# Patient Record
Sex: FEMALE | Race: WHITE | NOT HISPANIC OR LATINO | Employment: PART TIME | URBAN - METROPOLITAN AREA
[De-identification: names, ages, dates, MRNs, and addresses within clinical notes are randomized per-mention and may not be internally consistent; named-entity substitution may affect disease eponyms.]

---

## 2017-01-12 ENCOUNTER — ALLSCRIPTS OFFICE VISIT (OUTPATIENT)
Dept: OTHER | Facility: OTHER | Age: 42
End: 2017-01-12

## 2017-01-17 ENCOUNTER — GENERIC CONVERSION - ENCOUNTER (OUTPATIENT)
Dept: OTHER | Facility: OTHER | Age: 42
End: 2017-01-17

## 2017-01-17 ENCOUNTER — ALLSCRIPTS OFFICE VISIT (OUTPATIENT)
Dept: OTHER | Facility: OTHER | Age: 42
End: 2017-01-17

## 2017-02-13 ENCOUNTER — GENERIC CONVERSION - ENCOUNTER (OUTPATIENT)
Dept: OTHER | Facility: OTHER | Age: 42
End: 2017-02-13

## 2017-02-13 ENCOUNTER — ALLSCRIPTS OFFICE VISIT (OUTPATIENT)
Dept: OTHER | Facility: OTHER | Age: 42
End: 2017-02-13

## 2017-02-14 ENCOUNTER — GENERIC CONVERSION - ENCOUNTER (OUTPATIENT)
Dept: OTHER | Facility: OTHER | Age: 42
End: 2017-02-14

## 2017-02-21 ENCOUNTER — TRANSCRIBE ORDERS (OUTPATIENT)
Dept: LAB | Facility: CLINIC | Age: 42
End: 2017-02-21

## 2017-02-21 ENCOUNTER — LAB (OUTPATIENT)
Dept: LAB | Facility: CLINIC | Age: 42
End: 2017-02-21
Payer: COMMERCIAL

## 2017-02-21 DIAGNOSIS — E61.1 IRON DEFICIENCY: ICD-10-CM

## 2017-02-21 LAB
ALBUMIN SERPL BCP-MCNC: 3.5 G/DL (ref 3.5–5)
ALP SERPL-CCNC: 77 U/L (ref 46–116)
ALT SERPL W P-5'-P-CCNC: 33 U/L (ref 12–78)
ANION GAP SERPL CALCULATED.3IONS-SCNC: 7 MMOL/L (ref 4–13)
AST SERPL W P-5'-P-CCNC: 17 U/L (ref 5–45)
BASOPHILS # BLD AUTO: 0.03 THOUSANDS/ΜL (ref 0–0.1)
BASOPHILS NFR BLD AUTO: 1 % (ref 0–1)
BILIRUB SERPL-MCNC: 0.6 MG/DL (ref 0.2–1)
BUN SERPL-MCNC: 14 MG/DL (ref 5–25)
CALCIUM SERPL-MCNC: 9 MG/DL (ref 8.3–10.1)
CHLORIDE SERPL-SCNC: 104 MMOL/L (ref 100–108)
CO2 SERPL-SCNC: 30 MMOL/L (ref 21–32)
CREAT SERPL-MCNC: 0.71 MG/DL (ref 0.6–1.3)
EOSINOPHIL # BLD AUTO: 0.14 THOUSAND/ΜL (ref 0–0.61)
EOSINOPHIL NFR BLD AUTO: 4 % (ref 0–6)
ERYTHROCYTE [DISTWIDTH] IN BLOOD BY AUTOMATED COUNT: 12.3 % (ref 11.6–15.1)
FERRITIN SERPL-MCNC: 37 NG/ML (ref 8–388)
GFR SERPL CREATININE-BSD FRML MDRD: >60 ML/MIN/1.73SQ M
GLUCOSE SERPL-MCNC: 91 MG/DL (ref 65–140)
HCT VFR BLD AUTO: 39.2 % (ref 34.8–46.1)
HGB BLD-MCNC: 12.8 G/DL (ref 11.5–15.4)
IRON SATN MFR SERPL: 47 %
IRON SERPL-MCNC: 150 UG/DL (ref 50–170)
LYMPHOCYTES # BLD AUTO: 1.47 THOUSANDS/ΜL (ref 0.6–4.47)
LYMPHOCYTES NFR BLD AUTO: 37 % (ref 14–44)
MCH RBC QN AUTO: 29.3 PG (ref 26.8–34.3)
MCHC RBC AUTO-ENTMCNC: 32.7 G/DL (ref 31.4–37.4)
MCV RBC AUTO: 90 FL (ref 82–98)
MONOCYTES # BLD AUTO: 0.24 THOUSAND/ΜL (ref 0.17–1.22)
MONOCYTES NFR BLD AUTO: 6 % (ref 4–12)
NEUTROPHILS # BLD AUTO: 2.09 THOUSANDS/ΜL (ref 1.85–7.62)
NEUTS SEG NFR BLD AUTO: 52 % (ref 43–75)
PLATELET # BLD AUTO: 226 THOUSANDS/UL (ref 149–390)
PMV BLD AUTO: 10.4 FL (ref 8.9–12.7)
POTASSIUM SERPL-SCNC: 3.9 MMOL/L (ref 3.5–5.3)
PROT SERPL-MCNC: 6.9 G/DL (ref 6.4–8.2)
RBC # BLD AUTO: 4.37 MILLION/UL (ref 3.81–5.12)
SODIUM SERPL-SCNC: 141 MMOL/L (ref 136–145)
TIBC SERPL-MCNC: 316 UG/DL (ref 250–450)
WBC # BLD AUTO: 3.97 THOUSAND/UL (ref 4.31–10.16)

## 2017-02-21 PROCEDURE — 36415 COLL VENOUS BLD VENIPUNCTURE: CPT

## 2017-02-21 PROCEDURE — 83540 ASSAY OF IRON: CPT

## 2017-02-21 PROCEDURE — 82728 ASSAY OF FERRITIN: CPT

## 2017-02-21 PROCEDURE — 80053 COMPREHEN METABOLIC PANEL: CPT

## 2017-02-21 PROCEDURE — 83550 IRON BINDING TEST: CPT

## 2017-02-21 PROCEDURE — 83918 ORGANIC ACIDS TOTAL QUANT: CPT

## 2017-02-21 PROCEDURE — 85025 COMPLETE CBC W/AUTO DIFF WBC: CPT

## 2017-02-24 ENCOUNTER — ALLSCRIPTS OFFICE VISIT (OUTPATIENT)
Dept: OTHER | Facility: OTHER | Age: 42
End: 2017-02-24

## 2017-02-27 LAB — METHYLMALONATE SERPL-SCNC: 161 NMOL/L (ref 0–378)

## 2017-03-01 ENCOUNTER — GENERIC CONVERSION - ENCOUNTER (OUTPATIENT)
Dept: OTHER | Facility: OTHER | Age: 42
End: 2017-03-01

## 2017-03-28 ENCOUNTER — ALLSCRIPTS OFFICE VISIT (OUTPATIENT)
Dept: OTHER | Facility: OTHER | Age: 42
End: 2017-03-28

## 2017-04-05 ENCOUNTER — ALLSCRIPTS OFFICE VISIT (OUTPATIENT)
Dept: OTHER | Facility: OTHER | Age: 42
End: 2017-04-05

## 2017-04-05 DIAGNOSIS — K94.23 GASTROSTOMY MALFUNCTION (HCC): ICD-10-CM

## 2017-04-05 DIAGNOSIS — R10.9 ABDOMINAL PAIN: ICD-10-CM

## 2017-04-05 DIAGNOSIS — R13.10 DYSPHAGIA: ICD-10-CM

## 2017-04-05 DIAGNOSIS — Z98.84 BARIATRIC SURGERY STATUS: ICD-10-CM

## 2017-04-05 DIAGNOSIS — H35.52 PIGMENTARY RETINAL DYSTROPHY: ICD-10-CM

## 2017-04-09 ENCOUNTER — HOSPITAL ENCOUNTER (OUTPATIENT)
Dept: ULTRASOUND IMAGING | Facility: HOSPITAL | Age: 42
Discharge: HOME | End: 2017-04-09
Payer: COMMERCIAL

## 2017-04-09 DIAGNOSIS — R10.9 ABDOMINAL PAIN: ICD-10-CM

## 2017-04-09 DIAGNOSIS — Z98.84 BARIATRIC SURGERY STATUS: ICD-10-CM

## 2017-04-09 PROCEDURE — 76705 ECHO EXAM OF ABDOMEN: CPT

## 2017-04-11 ENCOUNTER — GENERIC CONVERSION - ENCOUNTER (OUTPATIENT)
Dept: OTHER | Facility: OTHER | Age: 42
End: 2017-04-11

## 2017-04-12 ENCOUNTER — GENERIC CONVERSION - ENCOUNTER (OUTPATIENT)
Dept: OTHER | Facility: OTHER | Age: 42
End: 2017-04-12

## 2017-04-19 ENCOUNTER — ALLSCRIPTS OFFICE VISIT (OUTPATIENT)
Dept: OTHER | Facility: OTHER | Age: 42
End: 2017-04-19

## 2017-04-28 ENCOUNTER — HOSPITAL ENCOUNTER (OUTPATIENT)
Dept: RADIOLOGY | Facility: HOSPITAL | Age: 42
Discharge: HOME | End: 2017-04-28
Admitting: RADIOLOGY
Payer: COMMERCIAL

## 2017-04-28 VITALS
RESPIRATION RATE: 18 BRPM | WEIGHT: 166 LBS | BODY MASS INDEX: 27.66 KG/M2 | DIASTOLIC BLOOD PRESSURE: 82 MMHG | HEIGHT: 65 IN | SYSTOLIC BLOOD PRESSURE: 132 MMHG | TEMPERATURE: 98.7 F | OXYGEN SATURATION: 99 % | HEART RATE: 70 BPM

## 2017-04-28 DIAGNOSIS — E88.40 DISORDER OF MITOCHONDRIAL METABOLISM (HCC): ICD-10-CM

## 2017-04-28 PROCEDURE — 36561 INSERT TUNNELED CV CATH: CPT

## 2017-04-28 PROCEDURE — C1788 PORT, INDWELLING, IMP: HCPCS

## 2017-04-28 PROCEDURE — 76937 US GUIDE VASCULAR ACCESS: CPT

## 2017-04-28 PROCEDURE — 77001 FLUOROGUIDE FOR VEIN DEVICE: CPT

## 2017-04-28 RX ORDER — CEFAZOLIN SODIUM 1 G/3ML
INJECTION, POWDER, FOR SOLUTION INTRAMUSCULAR; INTRAVENOUS CODE/TRAUMA/SEDATION MEDICATION
Status: COMPLETED | OUTPATIENT
Start: 2017-04-28 | End: 2017-04-28

## 2017-04-28 RX ORDER — LEVOCARNITINE 1 G/10ML
100 SOLUTION ORAL
COMMUNITY
End: 2020-05-19

## 2017-04-28 RX ORDER — SODIUM CHLORIDE 9 MG/ML
75 INJECTION, SOLUTION INTRAVENOUS CONTINUOUS
Status: DISCONTINUED | OUTPATIENT
Start: 2017-04-28 | End: 2017-04-29 | Stop reason: HOSPADM

## 2017-04-28 RX ORDER — FLUDROCORTISONE ACETATE 0.1 MG/1
0.1 TABLET ORAL DAILY
COMMUNITY
End: 2018-10-01 | Stop reason: ALTCHOICE

## 2017-04-28 RX ORDER — ACARBOSE 100 MG/1
100 TABLET ORAL
COMMUNITY
End: 2018-03-30 | Stop reason: SDUPTHER

## 2017-04-28 RX ORDER — FENTANYL CITRATE 50 UG/ML
INJECTION, SOLUTION INTRAMUSCULAR; INTRAVENOUS CODE/TRAUMA/SEDATION MEDICATION
Status: COMPLETED | OUTPATIENT
Start: 2017-04-28 | End: 2017-04-28

## 2017-04-28 RX ADMIN — CEFAZOLIN 1000 MG: 1 INJECTION, POWDER, FOR SOLUTION INTRAVENOUS at 09:37

## 2017-04-28 RX ADMIN — FENTANYL CITRATE 50 MCG: 50 INJECTION INTRAMUSCULAR; INTRAVENOUS at 09:47

## 2017-05-15 DIAGNOSIS — R53.83 OTHER FATIGUE: ICD-10-CM

## 2017-05-15 DIAGNOSIS — E88.40 MITOCHONDRIAL METABOLISM DISORDER (HCC): ICD-10-CM

## 2017-05-15 DIAGNOSIS — R42 DIZZINESS AND GIDDINESS: ICD-10-CM

## 2017-05-15 DIAGNOSIS — N25.81 SECONDARY HYPERPARATHYROIDISM OF RENAL ORIGIN (HCC): ICD-10-CM

## 2017-05-18 ENCOUNTER — TRANSCRIBE ORDERS (OUTPATIENT)
Dept: LAB | Facility: CLINIC | Age: 42
End: 2017-05-18

## 2017-05-18 ENCOUNTER — APPOINTMENT (OUTPATIENT)
Dept: LAB | Facility: CLINIC | Age: 42
End: 2017-05-18
Payer: COMMERCIAL

## 2017-05-18 ENCOUNTER — ALLSCRIPTS OFFICE VISIT (OUTPATIENT)
Dept: OTHER | Facility: OTHER | Age: 42
End: 2017-05-18

## 2017-05-18 DIAGNOSIS — N25.81 SECONDARY HYPERPARATHYROIDISM OF RENAL ORIGIN (HCC): ICD-10-CM

## 2017-05-18 LAB
ALBUMIN SERPL BCP-MCNC: 3.7 G/DL (ref 3.5–5)
CALCIUM SERPL-MCNC: 9.2 MG/DL (ref 8.3–10.1)
PHOSPHATE SERPL-MCNC: 3.8 MG/DL (ref 2.7–4.5)
PTH-INTACT SERPL-MCNC: 46.2 PG/ML (ref 14–72)

## 2017-05-18 PROCEDURE — 82040 ASSAY OF SERUM ALBUMIN: CPT

## 2017-05-18 PROCEDURE — 82310 ASSAY OF CALCIUM: CPT

## 2017-05-18 PROCEDURE — 83970 ASSAY OF PARATHORMONE: CPT

## 2017-05-18 PROCEDURE — 36415 COLL VENOUS BLD VENIPUNCTURE: CPT

## 2017-05-18 PROCEDURE — 84100 ASSAY OF PHOSPHORUS: CPT

## 2017-05-19 ENCOUNTER — GENERIC CONVERSION - ENCOUNTER (OUTPATIENT)
Dept: OTHER | Facility: OTHER | Age: 42
End: 2017-05-19

## 2017-05-30 ENCOUNTER — ALLSCRIPTS OFFICE VISIT (OUTPATIENT)
Dept: OTHER | Facility: OTHER | Age: 42
End: 2017-05-30

## 2017-06-07 ENCOUNTER — APPOINTMENT (OUTPATIENT)
Dept: LAB | Facility: CLINIC | Age: 42
End: 2017-06-07
Payer: COMMERCIAL

## 2017-06-07 ENCOUNTER — GENERIC CONVERSION - ENCOUNTER (OUTPATIENT)
Dept: OTHER | Facility: OTHER | Age: 42
End: 2017-06-07

## 2017-06-07 DIAGNOSIS — E88.40 MITOCHONDRIAL METABOLISM DISORDER (HCC): ICD-10-CM

## 2017-06-07 DIAGNOSIS — R53.83 OTHER FATIGUE: ICD-10-CM

## 2017-06-07 DIAGNOSIS — R42 DIZZINESS AND GIDDINESS: ICD-10-CM

## 2017-06-07 LAB — CORTIS AM PEAK SERPL-MCNC: 14.9 UG/ML (ref 4.2–22.4)

## 2017-06-07 PROCEDURE — 36415 COLL VENOUS BLD VENIPUNCTURE: CPT

## 2017-06-07 PROCEDURE — 82024 ASSAY OF ACTH: CPT

## 2017-06-07 PROCEDURE — 82533 TOTAL CORTISOL: CPT

## 2017-06-08 LAB — ACTH PLAS-MCNC: 20.3 PG/ML (ref 7.2–63.3)

## 2017-06-23 DIAGNOSIS — E61.1 IRON DEFICIENCY: ICD-10-CM

## 2017-06-26 ENCOUNTER — TRANSCRIBE ORDERS (OUTPATIENT)
Dept: LAB | Facility: CLINIC | Age: 42
End: 2017-06-26

## 2017-06-26 ENCOUNTER — APPOINTMENT (OUTPATIENT)
Dept: LAB | Facility: CLINIC | Age: 42
End: 2017-06-26
Payer: COMMERCIAL

## 2017-06-26 DIAGNOSIS — E61.1 IRON DEFICIENCY: ICD-10-CM

## 2017-06-26 LAB
ALBUMIN SERPL BCP-MCNC: 4 G/DL (ref 3.5–5)
ALP SERPL-CCNC: 88 U/L (ref 46–116)
ALT SERPL W P-5'-P-CCNC: 29 U/L (ref 12–78)
ANION GAP SERPL CALCULATED.3IONS-SCNC: 8 MMOL/L (ref 4–13)
AST SERPL W P-5'-P-CCNC: 22 U/L (ref 5–45)
BASOPHILS # BLD AUTO: 0.03 THOUSANDS/ΜL (ref 0–0.1)
BASOPHILS NFR BLD AUTO: 1 % (ref 0–1)
BILIRUB SERPL-MCNC: 0.4 MG/DL (ref 0.2–1)
BUN SERPL-MCNC: 17 MG/DL (ref 5–25)
CALCIUM SERPL-MCNC: 9.1 MG/DL (ref 8.3–10.1)
CHLORIDE SERPL-SCNC: 101 MMOL/L (ref 100–108)
CO2 SERPL-SCNC: 27 MMOL/L (ref 21–32)
CREAT SERPL-MCNC: 0.8 MG/DL (ref 0.6–1.3)
EOSINOPHIL # BLD AUTO: 0.12 THOUSAND/ΜL (ref 0–0.61)
EOSINOPHIL NFR BLD AUTO: 2 % (ref 0–6)
ERYTHROCYTE [DISTWIDTH] IN BLOOD BY AUTOMATED COUNT: 12.5 % (ref 11.6–15.1)
FERRITIN SERPL-MCNC: 29 NG/ML (ref 8–388)
GFR SERPL CREATININE-BSD FRML MDRD: >60 ML/MIN/1.73SQ M
GLUCOSE SERPL-MCNC: 107 MG/DL (ref 65–140)
HCT VFR BLD AUTO: 41.2 % (ref 34.8–46.1)
HGB BLD-MCNC: 13.8 G/DL (ref 11.5–15.4)
IRON SATN MFR SERPL: 24 %
IRON SERPL-MCNC: 94 UG/DL (ref 50–170)
LYMPHOCYTES # BLD AUTO: 2.05 THOUSANDS/ΜL (ref 0.6–4.47)
LYMPHOCYTES NFR BLD AUTO: 35 % (ref 14–44)
MCH RBC QN AUTO: 29.6 PG (ref 26.8–34.3)
MCHC RBC AUTO-ENTMCNC: 33.5 G/DL (ref 31.4–37.4)
MCV RBC AUTO: 88 FL (ref 82–98)
MONOCYTES # BLD AUTO: 0.23 THOUSAND/ΜL (ref 0.17–1.22)
MONOCYTES NFR BLD AUTO: 4 % (ref 4–12)
NEUTROPHILS # BLD AUTO: 3.46 THOUSANDS/ΜL (ref 1.85–7.62)
NEUTS SEG NFR BLD AUTO: 58 % (ref 43–75)
PLATELET # BLD AUTO: 211 THOUSANDS/UL (ref 149–390)
PMV BLD AUTO: 10.1 FL (ref 8.9–12.7)
POTASSIUM SERPL-SCNC: 3.8 MMOL/L (ref 3.5–5.3)
PROT SERPL-MCNC: 8.1 G/DL (ref 6.4–8.2)
RBC # BLD AUTO: 4.66 MILLION/UL (ref 3.81–5.12)
SODIUM SERPL-SCNC: 136 MMOL/L (ref 136–145)
TIBC SERPL-MCNC: 384 UG/DL (ref 250–450)
WBC # BLD AUTO: 5.89 THOUSAND/UL (ref 4.31–10.16)

## 2017-06-26 PROCEDURE — 83918 ORGANIC ACIDS TOTAL QUANT: CPT

## 2017-06-26 PROCEDURE — 83540 ASSAY OF IRON: CPT

## 2017-06-26 PROCEDURE — 85025 COMPLETE CBC W/AUTO DIFF WBC: CPT

## 2017-06-26 PROCEDURE — 36415 COLL VENOUS BLD VENIPUNCTURE: CPT

## 2017-06-26 PROCEDURE — 83550 IRON BINDING TEST: CPT

## 2017-06-26 PROCEDURE — 80053 COMPREHEN METABOLIC PANEL: CPT

## 2017-06-26 PROCEDURE — 82728 ASSAY OF FERRITIN: CPT

## 2017-06-30 ENCOUNTER — ALLSCRIPTS OFFICE VISIT (OUTPATIENT)
Dept: OTHER | Facility: OTHER | Age: 42
End: 2017-06-30

## 2017-07-01 ENCOUNTER — HOSPITAL ENCOUNTER (OUTPATIENT)
Dept: ULTRASOUND IMAGING | Facility: HOSPITAL | Age: 42
Discharge: HOME | End: 2017-07-01
Attending: INTERNAL MEDICINE
Payer: COMMERCIAL

## 2017-07-01 DIAGNOSIS — E61.1 IRON DEFICIENCY: ICD-10-CM

## 2017-07-01 LAB — METHYLMALONATE SERPL-SCNC: 180 NMOL/L (ref 0–378)

## 2017-07-01 PROCEDURE — 76770 US EXAM ABDO BACK WALL COMP: CPT

## 2017-07-02 ENCOUNTER — HOSPITAL ENCOUNTER (EMERGENCY)
Facility: HOSPITAL | Age: 42
Discharge: HOME | End: 2017-07-02
Attending: EMERGENCY MEDICINE | Admitting: EMERGENCY MEDICINE
Payer: COMMERCIAL

## 2017-07-02 ENCOUNTER — APPOINTMENT (EMERGENCY)
Dept: CT IMAGING | Facility: HOSPITAL | Age: 42
End: 2017-07-02
Payer: COMMERCIAL

## 2017-07-02 VITALS
RESPIRATION RATE: 15 BRPM | DIASTOLIC BLOOD PRESSURE: 67 MMHG | TEMPERATURE: 98.4 F | HEART RATE: 69 BPM | SYSTOLIC BLOOD PRESSURE: 111 MMHG | OXYGEN SATURATION: 100 % | BODY MASS INDEX: 27.46 KG/M2 | WEIGHT: 165 LBS

## 2017-07-02 DIAGNOSIS — L03.319 CELLULITIS AT GASTROSTOMY TUBE SITE (HCC): Primary | ICD-10-CM

## 2017-07-02 DIAGNOSIS — K94.22 CELLULITIS AT GASTROSTOMY TUBE SITE (HCC): Primary | ICD-10-CM

## 2017-07-02 LAB
ANION GAP SERPL CALCULATED.3IONS-SCNC: 5 MMOL/L (ref 4–13)
BASOPHILS # BLD AUTO: 0.03 THOUSANDS/ΜL (ref 0–0.1)
BASOPHILS NFR BLD AUTO: 1 % (ref 0–1)
BUN SERPL-MCNC: 16 MG/DL (ref 5–25)
CALCIUM SERPL-MCNC: 8.8 MG/DL (ref 8.3–10.1)
CHLORIDE SERPL-SCNC: 104 MMOL/L (ref 100–108)
CO2 SERPL-SCNC: 31 MMOL/L (ref 21–32)
CREAT SERPL-MCNC: 0.79 MG/DL (ref 0.6–1.3)
EOSINOPHIL # BLD AUTO: 0.12 THOUSAND/ΜL (ref 0–0.61)
EOSINOPHIL NFR BLD AUTO: 3 % (ref 0–6)
ERYTHROCYTE [DISTWIDTH] IN BLOOD BY AUTOMATED COUNT: 12.5 % (ref 11.6–15.1)
GFR SERPL CREATININE-BSD FRML MDRD: >60 ML/MIN/1.73SQ M
GLUCOSE SERPL-MCNC: 96 MG/DL (ref 65–140)
HCG UR QL: NEGATIVE
HCT VFR BLD AUTO: 38 % (ref 34.8–46.1)
HGB BLD-MCNC: 13 G/DL (ref 11.5–15.4)
LYMPHOCYTES # BLD AUTO: 1.84 THOUSANDS/ΜL (ref 0.6–4.47)
LYMPHOCYTES NFR BLD AUTO: 38 % (ref 14–44)
MCH RBC QN AUTO: 30.4 PG (ref 26.8–34.3)
MCHC RBC AUTO-ENTMCNC: 34.2 G/DL (ref 31.4–37.4)
MCV RBC AUTO: 89 FL (ref 82–98)
MONOCYTES # BLD AUTO: 0.23 THOUSAND/ΜL (ref 0.17–1.22)
MONOCYTES NFR BLD AUTO: 5 % (ref 4–12)
NEUTROPHILS # BLD AUTO: 2.61 THOUSANDS/ΜL (ref 1.85–7.62)
NEUTS SEG NFR BLD AUTO: 53 % (ref 43–75)
NRBC BLD AUTO-RTO: 0 /100 WBCS
PLATELET # BLD AUTO: 180 THOUSANDS/UL (ref 149–390)
PMV BLD AUTO: 10.2 FL (ref 8.9–12.7)
POTASSIUM SERPL-SCNC: 3.7 MMOL/L (ref 3.5–5.3)
RBC # BLD AUTO: 4.27 MILLION/UL (ref 3.81–5.12)
SODIUM SERPL-SCNC: 140 MMOL/L (ref 136–145)
WBC # BLD AUTO: 4.83 THOUSAND/UL (ref 4.31–10.16)

## 2017-07-02 PROCEDURE — 85025 COMPLETE CBC W/AUTO DIFF WBC: CPT | Performed by: EMERGENCY MEDICINE

## 2017-07-02 PROCEDURE — 80048 BASIC METABOLIC PNL TOTAL CA: CPT | Performed by: EMERGENCY MEDICINE

## 2017-07-02 PROCEDURE — 81025 URINE PREGNANCY TEST: CPT | Performed by: EMERGENCY MEDICINE

## 2017-07-02 PROCEDURE — 99284 EMERGENCY DEPT VISIT MOD MDM: CPT

## 2017-07-02 PROCEDURE — 36415 COLL VENOUS BLD VENIPUNCTURE: CPT | Performed by: EMERGENCY MEDICINE

## 2017-07-02 PROCEDURE — 74177 CT ABD & PELVIS W/CONTRAST: CPT

## 2017-07-02 RX ORDER — CEPHALEXIN 250 MG/1
500 CAPSULE ORAL ONCE
Status: COMPLETED | OUTPATIENT
Start: 2017-07-02 | End: 2017-07-02

## 2017-07-02 RX ORDER — CEPHALEXIN 250 MG/1
500 CAPSULE ORAL 4 TIMES DAILY
Qty: 56 CAPSULE | Refills: 0 | Status: SHIPPED | OUTPATIENT
Start: 2017-07-02 | End: 2017-07-09

## 2017-07-02 RX ADMIN — IOHEXOL 100 ML: 350 INJECTION, SOLUTION INTRAVENOUS at 18:43

## 2017-07-02 RX ADMIN — CEPHALEXIN 500 MG: 250 CAPSULE ORAL at 19:41

## 2017-07-02 RX ADMIN — IOHEXOL 50 ML: 240 INJECTION, SOLUTION INTRATHECAL; INTRAVASCULAR; INTRAVENOUS; ORAL at 18:42

## 2017-07-31 ENCOUNTER — TRANSCRIBE ORDERS (OUTPATIENT)
Dept: LAB | Facility: CLINIC | Age: 42
End: 2017-07-31

## 2017-07-31 ENCOUNTER — APPOINTMENT (OUTPATIENT)
Dept: LAB | Facility: CLINIC | Age: 42
End: 2017-07-31
Payer: COMMERCIAL

## 2017-07-31 DIAGNOSIS — Z00.8 HEALTH EXAMINATION IN POPULATION SURVEY: ICD-10-CM

## 2017-07-31 DIAGNOSIS — Z00.8 HEALTH EXAMINATION IN POPULATION SURVEY: Primary | ICD-10-CM

## 2017-07-31 LAB
CHOLEST SERPL-MCNC: 135 MG/DL (ref 50–200)
EST. AVERAGE GLUCOSE BLD GHB EST-MCNC: 103 MG/DL
HBA1C MFR BLD: 5.2 % (ref 4.2–6.3)
HDLC SERPL-MCNC: 70 MG/DL (ref 40–60)
LDLC SERPL CALC-MCNC: 56 MG/DL (ref 0–100)
TRIGL SERPL-MCNC: 46 MG/DL

## 2017-07-31 PROCEDURE — 83036 HEMOGLOBIN GLYCOSYLATED A1C: CPT

## 2017-07-31 PROCEDURE — 36415 COLL VENOUS BLD VENIPUNCTURE: CPT

## 2017-07-31 PROCEDURE — 80061 LIPID PANEL: CPT

## 2017-08-04 ENCOUNTER — GENERIC CONVERSION - ENCOUNTER (OUTPATIENT)
Dept: OTHER | Facility: OTHER | Age: 42
End: 2017-08-04

## 2017-08-14 ENCOUNTER — GENERIC CONVERSION - ENCOUNTER (OUTPATIENT)
Dept: OTHER | Facility: OTHER | Age: 42
End: 2017-08-14

## 2017-08-15 ENCOUNTER — APPOINTMENT (OUTPATIENT)
Dept: LAB | Facility: CLINIC | Age: 42
End: 2017-08-15
Payer: COMMERCIAL

## 2017-08-15 ENCOUNTER — TRANSCRIBE ORDERS (OUTPATIENT)
Dept: LAB | Facility: CLINIC | Age: 42
End: 2017-08-15

## 2017-08-15 DIAGNOSIS — G43.109 MIGRAINE WITH AURA AND WITHOUT STATUS MIGRAINOSUS, NOT INTRACTABLE: ICD-10-CM

## 2017-08-15 DIAGNOSIS — M62.81 MUSCLE WEAKNESS (GENERALIZED): ICD-10-CM

## 2017-08-15 DIAGNOSIS — Z83.49 FAMILY HISTORY OF ENDOCRINE AND METABOLIC DISEASE: ICD-10-CM

## 2017-08-15 DIAGNOSIS — H53.60: ICD-10-CM

## 2017-08-15 DIAGNOSIS — H53.453 NASAL STEP VISUAL FIELD DEFECT OF BOTH EYES: ICD-10-CM

## 2017-08-15 DIAGNOSIS — R33.9 RETENTION OF URINE, UNSPECIFIED: ICD-10-CM

## 2017-08-15 DIAGNOSIS — H53.52 ACQUIRED COLOR VISION DEFICIENCIES: ICD-10-CM

## 2017-08-15 DIAGNOSIS — I82.4Y9 ACUTE VENOUS EMBOLISM AND THROMBOSIS OF DEEP VESSELS OF PROXIMAL LOWER EXTREMITY, UNSPECIFIED LATERALITY (HCC): ICD-10-CM

## 2017-08-15 DIAGNOSIS — L90.6 STRIAE ATROPHICAE: ICD-10-CM

## 2017-08-15 DIAGNOSIS — M24.859: ICD-10-CM

## 2017-08-15 DIAGNOSIS — E66.3 SEVERELY OVERWEIGHT: ICD-10-CM

## 2017-08-15 DIAGNOSIS — R11.0 NAUSEA ALONE: ICD-10-CM

## 2017-08-15 DIAGNOSIS — R73.9 BLOOD GLUCOSE ELEVATED: ICD-10-CM

## 2017-08-15 DIAGNOSIS — R13.19 ESOPHAGEAL DYSPHAGIA: ICD-10-CM

## 2017-08-15 DIAGNOSIS — Z98.84 BARIATRIC SURGERY STATUS: ICD-10-CM

## 2017-08-15 DIAGNOSIS — K22.4 DYSKINESIA OF ESOPHAGUS: ICD-10-CM

## 2017-08-15 DIAGNOSIS — G90.9 UNSPECIFIED DISORDER OF AUTONOMIC NERVOUS SYSTEM: ICD-10-CM

## 2017-08-15 DIAGNOSIS — Z98.84 BARIATRIC SURGERY STATUS: Primary | ICD-10-CM

## 2017-08-15 DIAGNOSIS — K59.01 SLOW TRANSIT CONSTIPATION: ICD-10-CM

## 2017-08-15 DIAGNOSIS — H35.52 PIGMENTARY RETINAL DYSTROPHY: ICD-10-CM

## 2017-08-15 DIAGNOSIS — R68.89 OTHER ABNORMAL CLINICAL FINDING: ICD-10-CM

## 2017-08-15 DIAGNOSIS — K21.00 REFLUX ESOPHAGITIS: ICD-10-CM

## 2017-08-15 DIAGNOSIS — F80.9 PROBLEMS WITH COMMUNICATION (INCLUDING SPEECH): ICD-10-CM

## 2017-08-15 LAB
ALBUMIN SERPL BCP-MCNC: 3.9 G/DL (ref 3.5–5)
ALP SERPL-CCNC: 81 U/L (ref 46–116)
ALT SERPL W P-5'-P-CCNC: 31 U/L (ref 12–78)
ANION GAP SERPL CALCULATED.3IONS-SCNC: 9 MMOL/L (ref 4–13)
AST SERPL W P-5'-P-CCNC: 16 U/L (ref 5–45)
BASOPHILS # BLD AUTO: 0.03 THOUSANDS/ΜL (ref 0–0.1)
BASOPHILS NFR BLD AUTO: 1 % (ref 0–1)
BILIRUB SERPL-MCNC: 0.4 MG/DL (ref 0.2–1)
BILIRUB UR QL STRIP: NEGATIVE
BUN SERPL-MCNC: 20 MG/DL (ref 5–25)
CALCIUM SERPL-MCNC: 9.5 MG/DL (ref 8.3–10.1)
CHLORIDE SERPL-SCNC: 102 MMOL/L (ref 100–108)
CHOLEST SERPL-MCNC: 150 MG/DL (ref 50–200)
CLARITY UR: CLEAR
CO2 SERPL-SCNC: 28 MMOL/L (ref 21–32)
COLOR UR: YELLOW
CREAT SERPL-MCNC: 0.9 MG/DL (ref 0.6–1.3)
EOSINOPHIL # BLD AUTO: 0.1 THOUSAND/ΜL (ref 0–0.61)
EOSINOPHIL NFR BLD AUTO: 2 % (ref 0–6)
ERYTHROCYTE [DISTWIDTH] IN BLOOD BY AUTOMATED COUNT: 12.4 % (ref 11.6–15.1)
EST. AVERAGE GLUCOSE BLD GHB EST-MCNC: 103 MG/DL
GFR SERPL CREATININE-BSD FRML MDRD: 80 ML/MIN/1.73SQ M
GLUCOSE SERPL-MCNC: 128 MG/DL (ref 65–140)
GLUCOSE UR STRIP-MCNC: NEGATIVE MG/DL
HBA1C MFR BLD: 5.2 % (ref 4.2–6.3)
HCT VFR BLD AUTO: 42 % (ref 34.8–46.1)
HDLC SERPL-MCNC: 72 MG/DL (ref 40–60)
HGB BLD-MCNC: 14.2 G/DL (ref 11.5–15.4)
HGB UR QL STRIP.AUTO: NEGATIVE
KETONES UR STRIP-MCNC: NEGATIVE MG/DL
LDLC SERPL CALC-MCNC: 70 MG/DL (ref 0–100)
LEUKOCYTE ESTERASE UR QL STRIP: NEGATIVE
LYMPHOCYTES # BLD AUTO: 1.96 THOUSANDS/ΜL (ref 0.6–4.47)
LYMPHOCYTES NFR BLD AUTO: 32 % (ref 14–44)
MCH RBC QN AUTO: 30.4 PG (ref 26.8–34.3)
MCHC RBC AUTO-ENTMCNC: 33.8 G/DL (ref 31.4–37.4)
MCV RBC AUTO: 90 FL (ref 82–98)
MONOCYTES # BLD AUTO: 0.22 THOUSAND/ΜL (ref 0.17–1.22)
MONOCYTES NFR BLD AUTO: 4 % (ref 4–12)
NEUTROPHILS # BLD AUTO: 3.83 THOUSANDS/ΜL (ref 1.85–7.62)
NEUTS SEG NFR BLD AUTO: 61 % (ref 43–75)
NITRITE UR QL STRIP: NEGATIVE
PH UR STRIP.AUTO: 5 [PH] (ref 4.5–8)
PLATELET # BLD AUTO: 200 THOUSANDS/UL (ref 149–390)
PMV BLD AUTO: 10.7 FL (ref 8.9–12.7)
POTASSIUM SERPL-SCNC: 3.6 MMOL/L (ref 3.5–5.3)
PROT SERPL-MCNC: 8 G/DL (ref 6.4–8.2)
PROT UR STRIP-MCNC: NEGATIVE MG/DL
RBC # BLD AUTO: 4.67 MILLION/UL (ref 3.81–5.12)
SODIUM SERPL-SCNC: 139 MMOL/L (ref 136–145)
SP GR UR STRIP.AUTO: 1.02 (ref 1–1.03)
TRIGL SERPL-MCNC: 40 MG/DL
UROBILINOGEN UR QL STRIP.AUTO: 0.2 E.U./DL
WBC # BLD AUTO: 6.14 THOUSAND/UL (ref 4.31–10.16)

## 2017-08-15 PROCEDURE — 82128 AMINO ACIDS MULT QUAL: CPT

## 2017-08-15 PROCEDURE — 81003 URINALYSIS AUTO W/O SCOPE: CPT | Performed by: PHYSICAL THERAPIST

## 2017-08-15 PROCEDURE — 83919 ORGANIC ACIDS QUAL EACH: CPT | Performed by: PHYSICAL THERAPIST

## 2017-08-15 PROCEDURE — 80061 LIPID PANEL: CPT

## 2017-08-15 PROCEDURE — 84210 ASSAY OF PYRUVATE: CPT

## 2017-08-15 PROCEDURE — 80053 COMPREHEN METABOLIC PANEL: CPT

## 2017-08-15 PROCEDURE — 83036 HEMOGLOBIN GLYCOSYLATED A1C: CPT

## 2017-08-15 PROCEDURE — 82542 COL CHROMOTOGRAPHY QUAL/QUAN: CPT

## 2017-08-15 PROCEDURE — 85025 COMPLETE CBC W/AUTO DIFF WBC: CPT

## 2017-08-15 PROCEDURE — 36415 COLL VENOUS BLD VENIPUNCTURE: CPT

## 2017-08-15 PROCEDURE — 83520 IMMUNOASSAY QUANT NOS NONAB: CPT

## 2017-08-18 LAB
3OH-BUTYRCARN SERPL-SCNC: 0.06 UMOL/L (ref 0–0.09)
3OH-IV+2ME3OH-BUTYR CARN SERPL-SCNC: 0.04 UMOL/L (ref 0–0.06)
3OH-LINOLEOYLCARN SERPL-SCNC: 0.01 UMOL/L (ref 0–0.01)
3OH-OLEOYLCARN SERPL-SCNC: 0.01 UMOL/L (ref 0–0.02)
3OH-PALMITOLEYLCARN SERPL-SCNC: 0.01 UMOL/L (ref 0–0.02)
3OH-PALMITOYLCARN SERPL-SCNC: 0 UMOL/L (ref 0–0.02)
3OH-STEAROYLCARN SERPL-SCNC: 0 UMOL/L (ref 0–0.02)
3OH-TDECANOYLCARN SERPL-SCNC: 0.01 UMOL/L (ref 0–0.02)
A-AMINOBUTYR SERPL-SCNC: 11.9 UMOL/L (ref 5.4–34.5)
AAA SERPL-SCNC: 1.1 UMOL/L (ref 0–2.2)
ACETYLCARN SERPL-SCNC: 7.98 UMOL/L (ref 3.23–10.29)
ACYLCARNITINE PATTERN SERPL-IMP: ABNORMAL
ALANINE SERPL-SCNC: 265.9 UMOL/L (ref 124.8–564.2)
ALLOISOLEUCINE SERPL-SCNC: 1.1 UMOL/L (ref 0.4–3.2)
AMINO ACID PAT SERPL-IMP: ABNORMAL
ARGININE SERPL-SCNC: 39.3 UMOL/L (ref 32–150)
ARGININOSUCCINATE SERPL-SCNC: 0.1 UMOL/L (ref 0–3)
ASPARAGINE SERPL-SCNC: 37.7 UMOL/L (ref 29.5–84.5)
ASPARTATE SERPL-SCNC: 0.9 UMOL/L (ref 0.9–7.4)
B-AIB SERPL-SCNC: 2.4 UMOL/L (ref 0.3–4.3)
B-ALANINE SERPL-SCNC: 2.9 UMOL/L (ref 1.1–9)
BUTYRYL+ISOBUTYRYLCARN SERPL-SCNC: 0.41 UMOL/L (ref 0.08–0.32)
CARN ESTERS/C0 SERPL-SRTO: 0.3 RATIO (ref 0.1–0.9)
CARNITINE FREE SERPL-SCNC: 30 UMOL/L (ref 16–60)
CARNITINE SERPL-SCNC: 40 UMOL/L (ref 25–69)
CITRULLINE SERPL-SCNC: 34.5 UMOL/L (ref 13.7–63.2)
CYSTATHIONIN SERPL-SCNC: 0.1 UMOL/L (ref 0–0.7)
CYSTINE SERPL-SCNC: 32.7 UMOL/L (ref 13.5–60.2)
DECADIENOYLCARN SERPL-SCNC: 0.02 UMOL/L (ref 0–0.05)
DECANOYLCARN SERPL-SCNC: 0.15 UMOL/L (ref 0–0.38)
DECENOYLCARN SERPL-SCNC: 0.14 UMOL/L (ref 0.01–0.32)
DODECANOYLCARN SERPL-SCNC: 0.06 UMOL/L (ref 0–0.15)
GABA SERPL-SCNC: <0.4 UMOL/L (ref 0–0.3)
GLUTAMATE SERPL-SCNC: 20.4 UMOL/L (ref 18.1–155.9)
GLUTAMINE SERPL-SCNC: 407.6 UMOL/L (ref 332–754)
GLUTARYLCARN SERPL-SCNC: 0.05 UMOL/L (ref 0–0.1)
GLYCINE SERPL-SCNC: 321.7 UMOL/L (ref 132–467)
HCYS SERPL-SCNC: <0.1 UMOL/L (ref 0–0.1)
HEXANOYLCARN SERPL-SCNC: 0.03 UMOL/L (ref 0–0.1)
HISTIDINE SERPL-SCNC: 73.2 UMOL/L (ref 47.2–98.5)
HOMOCITRULLINE SERPL-SCNC: 0.6 UMOL/L (ref 0–1.7)
ISOLEUCINE SERPL-SCNC: 48.9 UMOL/L (ref 27.7–112.8)
ISOVALERYL+MEBUTYRYLCARN SERPL-SCNC: 0.19 UMOL/L (ref 0.01–0.21)
LAB DIRECTOR NAME PROVIDER: ABNORMAL
LAB DIRECTOR NAME PROVIDER: ABNORMAL
LEUCINE SERPL-SCNC: 92.5 UMOL/L (ref 54.9–205)
LINOLEOYLCARN SERPL-SCNC: 0.09 UMOL/L (ref 0–0.11)
LYSINE SERPL-SCNC: 117.2 UMOL/L (ref 94–278)
MALONYLCARN SERPL-SCNC: 0.08 UMOL/L (ref 0.02–0.12)
ME-MALONYLCARN SERPL-SCNC: 0.04 UMOL/L (ref 0.01–0.07)
METHIONINE SERPL-SCNC: 17.2 UMOL/L (ref 12.7–41.1)
OCTANOYLCARN SERPL-SCNC: 0.1 UMOL/L (ref 0–0.27)
OH-LYSINE SERPL-SCNC: 0.3 UMOL/L (ref 0.1–0.8)
OH-PROLINE SERPL-SCNC: 42.3 UMOL/L (ref 4.7–35.2)
OLEOYLCARN SERPL-SCNC: 0.16 UMOL/L (ref 0.04–0.17)
ORNITHINE SERPL-SCNC: 55 UMOL/L (ref 30.5–131.4)
PALMITOLEYLCARN SERPL-SCNC: 0.03 UMOL/L (ref 0–0.04)
PALMITOYLCARN SERPL-SCNC: 0.1 UMOL/L (ref 0.03–0.13)
PHE SERPL-SCNC: 59.8 UMOL/L (ref 33.6–101.9)
PROLINE SERPL-SCNC: 156.6 UMOL/L (ref 84.8–352.5)
PROPIONYLCARN SERPL-SCNC: 0.6 UMOL/L (ref 0.16–0.62)
PYRUVATE BLD-MCNC: 0.6 MG/DL (ref 0.3–0.7)
REF LAB TEST METHOD: ABNORMAL
REF LAB TEST METHOD: ABNORMAL
SARCOSINE SERPL-SCNC: 1.7 UMOL/L (ref 0–4)
SERINE SERPL-SCNC: 114.4 UMOL/L (ref 48.7–145.2)
STEAROYLCARN SERPL-SCNC: 0.06 UMOL/L (ref 0–0.07)
STONE ANALYSIS-IMP: NORMAL
TAURINE SERPL-SCNC: 64.2 UMOL/L (ref 29.2–132.3)
TDECADIENOYLCARN SERPL-SCNC: 0.04 UMOL/L (ref 0–0.11)
TDECANOYLCARN SERPL-SCNC: 0.04 UMOL/L (ref 0–0.06)
TDECENOYLCARN SERPL-SCNC: 0.06 UMOL/L (ref 0–0.17)
TGLY+MTHYL (C5:1) SERPL-SCNC: 0.02 UMOL/L (ref 0–0.02)
THREONINE SERPL-SCNC: 31.5 UMOL/L (ref 67.8–211.6)
TRYPTOPHAN SERPL-SCNC: 63 UMOL/L (ref 23.5–93)
TYROSINE SERPL-SCNC: 51.5 UMOL/L (ref 31.1–118.1)
VALINE SERPL-SCNC: 199.6 UMOL/L (ref 102.6–345.4)

## 2017-08-19 LAB — UBIQUINONE10 SERPL-MCNC: 0.32 UG/ML (ref 0.37–2.2)

## 2017-08-21 LAB
Lab: NORMAL
ORGANIC ACIDS PATTERN UR-IMP: NORMAL
REF LAB TEST METHOD: NORMAL

## 2017-08-23 ENCOUNTER — ALLSCRIPTS OFFICE VISIT (OUTPATIENT)
Dept: OTHER | Facility: OTHER | Age: 42
End: 2017-08-23

## 2017-08-23 LAB
NIACIN SERPL-MCNC: <5 NG/ML (ref 0–5)
NICOTINAMIDE SERPL-MCNC: 14 NG/ML (ref 5.2–72.1)

## 2017-08-31 ENCOUNTER — ALLSCRIPTS OFFICE VISIT (OUTPATIENT)
Dept: OTHER | Facility: OTHER | Age: 42
End: 2017-08-31

## 2017-09-05 ENCOUNTER — ALLSCRIPTS OFFICE VISIT (OUTPATIENT)
Dept: OTHER | Facility: OTHER | Age: 42
End: 2017-09-05

## 2017-09-06 LAB — MISCELLANEOUS LAB TEST RESULT: NORMAL

## 2017-09-27 ENCOUNTER — GENERIC CONVERSION - ENCOUNTER (OUTPATIENT)
Dept: OTHER | Facility: OTHER | Age: 42
End: 2017-09-27

## 2017-11-01 ENCOUNTER — GENERIC CONVERSION - ENCOUNTER (OUTPATIENT)
Dept: OTHER | Facility: OTHER | Age: 42
End: 2017-11-01

## 2017-11-17 ENCOUNTER — GENERIC CONVERSION - ENCOUNTER (OUTPATIENT)
Dept: OTHER | Facility: OTHER | Age: 42
End: 2017-11-17

## 2017-11-29 ENCOUNTER — ALLSCRIPTS OFFICE VISIT (OUTPATIENT)
Dept: OTHER | Facility: OTHER | Age: 42
End: 2017-11-29

## 2017-11-29 DIAGNOSIS — E88.40 MITOCHONDRIAL METABOLISM DISORDER (HCC): ICD-10-CM

## 2017-11-29 DIAGNOSIS — R60.9 EDEMA: ICD-10-CM

## 2017-11-29 DIAGNOSIS — R63.5 ABNORMAL WEIGHT GAIN: ICD-10-CM

## 2017-11-29 DIAGNOSIS — K94.23 GASTROSTOMY MALFUNCTION (HCC): ICD-10-CM

## 2017-11-29 DIAGNOSIS — N25.81 SECONDARY HYPERPARATHYROIDISM OF RENAL ORIGIN (HCC): ICD-10-CM

## 2017-11-29 DIAGNOSIS — K91.2 POSTSURGICAL MALABSORPTION, NOT ELSEWHERE CLASSIFIED (CODE): ICD-10-CM

## 2017-11-29 DIAGNOSIS — R42 DIZZINESS AND GIDDINESS: ICD-10-CM

## 2017-11-29 DIAGNOSIS — K21.9 GASTRO-ESOPHAGEAL REFLUX DISEASE WITHOUT ESOPHAGITIS: ICD-10-CM

## 2017-11-29 DIAGNOSIS — R10.9 ABDOMINAL PAIN: ICD-10-CM

## 2017-11-29 DIAGNOSIS — M62.81 MUSCLE WEAKNESS (GENERALIZED): ICD-10-CM

## 2017-11-29 DIAGNOSIS — E16.1 OTHER HYPOGLYCEMIA (CODE): ICD-10-CM

## 2017-11-29 DIAGNOSIS — R53.83 OTHER FATIGUE: ICD-10-CM

## 2017-11-29 DIAGNOSIS — E72.12 METHYLENETETRAHYDROFOLATE REDUCTASE DEFICIENCY (HCC): ICD-10-CM

## 2017-11-29 DIAGNOSIS — E61.1 IRON DEFICIENCY: ICD-10-CM

## 2017-11-29 DIAGNOSIS — I82.5Z9 CHRONIC EMBOLISM AND THROMBOSIS OF DEEP VEIN OF DISTAL LOWER EXTREMITY (HCC): ICD-10-CM

## 2017-11-29 DIAGNOSIS — Z98.84 BARIATRIC SURGERY STATUS: ICD-10-CM

## 2017-11-29 DIAGNOSIS — N92.0 EXCESSIVE AND FREQUENT MENSTRUATION WITH REGULAR CYCLE: ICD-10-CM

## 2017-11-29 DIAGNOSIS — K59.00 CONSTIPATION: ICD-10-CM

## 2017-11-29 DIAGNOSIS — G90.3 MULTI-SYSTEM DEGENERATION OF AUTONOMIC NERVOUS SYSTEM (HCC): ICD-10-CM

## 2017-11-30 NOTE — PROGRESS NOTES
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  Dysautonomia orthostatic hypotension syndrome    · Midodrine HCl - 5 MG Oral Tablet; TAKE 1 TABLET 3 TIMES DAILY   Rx By: Ari Sanders; Dispense: 90 Days ; #:270 Tablet; Refill: 3;Dysautonomia orthostatic hypotension syndrome; MAXINE = N; Sent To: 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, gastric bypass surgery, and dysautonomia who presents for follow up  Since her gastric bypass surgery, has needed a central line and giving herself saline boluses - now down to several times/month  Has headaches only when she is dehydrate, and improves with 1L of saline  Orthostasis - likely due to autonomic dysfunction and possible POTS  May be precipitated by underlying malabsorption syndrome  Improving with IV saline every other week  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  Keep florinef at 0 1mg daily  Increase midodrine to 5mg 3x/day  Follow up in 4 months  Chief Complaint  Chief Complaint Free Text Note Form: pt is here for a follow up  pt c/o headaches, fatigue and leg swelling     Chief Complaint Chronic Condition [facility]: Patient is here today for follow up of chronic conditions described in HPI  History of Present Illness  Cardiology HPI Free Text Note Form [facility]: Ms Lucio Akhtar is a 38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, gastric bypass surgery, and dysautonomia who presents for follow up  Since her gastric bypass surgery, has needed a central line and giving herself saline boluses - now down to several times/month  Has headaches only when she is dehydrate, and improves with 1L of saline  Review of Systems  Cardiology Female ROS:    Cardiac: No complaints of chest pain, no palpitations, no fainting  Skin: No complaints of nonhealing sores or skin rash  Genitourinary: No complaints of recurrent urinary tract infections, frequent urination at night, difficult urination, blood in urine, kidney stones, loss of bladder control, kidney problems, denies any birth control or hormone replacement, is not post menopausal, not currently pregnant  Psychological: No complaints of feeling depressed, anxiety, panic attacks, or difficulty concentrating  General: No complaints of trouble sleeping, lack of energy, fatigue, appetite changes, weight changes, fever, frequent infections, or night sweats  Respiratory: No complaints of shortness of breath, cough with sputum, or wheezing  HEENT: No complaints of serious problems, hearing problems, nose problems, throat problems, or snoring  Gastrointestinal: diarrhea-- and-- constipation-- GERD  Hematologic: No complaints of bleeding disorders, anemia, blood clots, or excessive brusing  Neurological: dizziness  Musculoskeletal: No complaints of arthritis, back pain, or painfull swelling  Active Problems  Problems    1  Abdominal discomfort (789 00) (R10 9)   2  Abdominal pain (789 00) (R10 9)   3  Abnormal weight gain (783 1) (R63 5)   4  Acute UTI (599 0) (N39 0)   5  Allergic contact dermatitis due to other agents (692 89) (L23 89)   6  Cervical cancer screening (V76 2) (Z12 4)   7   Chronic venous embolism and thrombosis of deep vessels of distal lower extremity (453 52) (I82 5Z9)   8  Constipation (564 00) (K59 00)   9  Contact dermatitis due to poison ivy (692 6) (L23 7)   10  Diarrhea (787 91) (R19 7)   11  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)   12  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)   13  Dizziness (780 4) (R42)   14  Dysautonomia orthostatic hypotension syndrome (333 0) (G90 3)   15  Dysphagia (787 20) (R13 10)   16  Edema (782 3) (R60 9)   17  Encounter for gynecological examination (V72 31) (Z01 419)   18  Fatigue (780 79) (R53 83)   19  Fibroid (218 9) (D25 9)   20  Gastroesophageal reflux disease (530 81) (K21 9)   21  Gastrostomy tube dysfunction (536 42) (K94 23)   22  History of Nissen fundoplication (C21 61) (S66 150)   23  Hyperparathyroidism, secondary (588 81) (N25 81)   24  Hypertension (401 9) (I10)   25  Infection of Broviac site, initial encounter (999 31) (T80 219A)   26  Iron deficiency (280 9) (E61 1)   27  Lesion of subcutaneous tissue (709 9) (L98 9)   28  Menorrhagia with regular cycle (626 2) (N92 0)   29  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)   30  Migraine headache (346 90) (G43 909)   31  Muscle weakness (generalized) (728 87) (M62 81)   32  Need for diphtheria-tetanus-pertussis (Tdap) vaccine (V06 1) (Z23)   33  Neurogenic bladder (596 54) (N31 9)   34  Obesity (278 00) (E66 9)   35  Oropharyngeal dysphagia (787 22) (R13 12)   36  Polycystic ovarian syndrome (256 4) (E28 2)   37  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 3) (K91 2,Z90 3)   38  Pre-op evaluation (V72 84) (Z01 818)   39  Pre-operative cardiovascular examination (V72 81) (Z01 810)   40  Reactive hypoglycemia (251 2) (E16 1)   41  Renal cyst, right (753 10) (N28 1)   42  Retinitis pigmentosa (362 74) (H35 52)   43  Right upper quadrant abdominal pain (789 01) (R10 11)   44  Status post gastric bypass for obesity (V45 86) (Z98 84)   45   Stroke Syndrome (436)   46  Urinary retention (788 20) (R33 9)   47  Urination pain (788 1) (R30 9)   48  Voiding dysfunction (599 9) (N39 8)    Past Medical History  Problems    1  History of Abnormality Of Walk - Dragging The Right Foot   2  History of Complex Regional Pain Syndrome Type I Of The Hand (337 21)   3  History of Generalized abdominal pain (789 07) (R10 84)   4  History of  3   5  History of acute sinusitis (V12 69) (Z87 09)   6  History of headache (V13 89) (Z87 898)   7  History of nausea (V12 79) (Z87 898)   8  History of nausea and vomiting (V12 79) (Z87 898)   9  History of pilonidal cyst (V13 3) (Z87 2)   10  History of stroke (V12 54) (Z86 73)   11  History of urinary tract infection (V13 02) (Z87 440)   12  History of Intolerance to cold (780 99) (R68 89)   13  History of Intolerance to heat (780 99) (R68 89)   14  History of Morbid obesity (278 01) (E66 01)   15  History of Night sweats (780 8) (R61)   16  History of Skin irritation (709 9) (R23 8)   17  History of Stroke Syndrome (436)   18  History of Type B influenza (487 1) (J10 1)  Active Problems And Past Medical History Reviewed: The active problems and past medical history were reviewed and updated today  Surgical History  Problems    1  History of Central IV Catheter Inferior Vena Cava   2  History of Esophagogastric Fundoplasty Nissen Fundoplication   3  History of Gastric Surgery For Morbid Obesity Bypass With Rossy-en-Y   4  History of G-Tube Insertion Date   5  History of Neuroplasty With Transposition Of Ulnar Nerve - At Wrist   6  History of Open Treatment Of Fracture Of The Lateral Malleolus  Surgical History Reviewed: The surgical history was reviewed and updated today  Family History  Mother    1  Family history of Carcinoid (except of appendix)   2  Family history of Disorder Of Mitochondrial Metabolism   3  Family history of Family Health Status Of Mother - Alive   4   Family history of hypertension (V17 49) (Z82 49) 5  Family history of malignant neoplasm (V16 9) (Z80 9)   6  Family history of thyroiditis (V18 19) (Z83 49)   7  Family history of Hypertension (V17 49)   8  Family history of Stroke Syndrome (V17 1)   9  Family history of Thyroid Cancer  Father    8  Family history of Depression   11  Family history of Family Health Status Of Father - Alive   15  Family history of depression (V17 0) (Z81 8)   13  Denied: Family history of epilepsy   14  Family history of Fuch endothelial dystrophy   15  Family history of Migraine Headache  Daughter    12  Family history of Disorder Of Mitochondrial Metabolism  Son    16  Family history of Disorder Of Mitochondrial Metabolism  Brother    18  Family history of depression (V17 0) (Z81 8)   19  Family history of narcolepsy (V19 8) (Z82 0)  Maternal Grandfather    21  Family history of Breast Cancer (V16 3)  Family History Reviewed: The family history was reviewed and updated today  Social History  Problems    · Denied: Drug use   · Exercise Habits   · Marital History - Currently    · Never A Smoker   · No alcohol use   · Occupation:  Social History Reviewed: The social history was reviewed and updated today  The social history was reviewed and is unchanged  Current Meds   1  Acarbose 100 MG Oral Tablet; TAKE 1 TABLET 3 TIMES DAILY WITH THE FIRST BITE OF EACH MAIN MEAL; Therapy: 00YAN5158 to (Evaluate:56San2136)  Requested for: 03GGI6510; Last Rx:01Mar2017 Ordered   2  Calcium Carbonate 1250 (500 Ca) MG/5ML Oral Suspension; take 4 ml 3 times a day; Therapy: 45IBY1832 to (Evaluate:27Nov2017); Last Rx:88Ypk5513 Ordered   3  Culturelle CAPS; Therapy: (Recorded:40Tny0636) to Recorded   4  Docusate Calcium CAPS; Therapy: (Recorded:13Ypf9514) to Recorded   5  Fludrocortisone Acetate 0 1 MG Oral Tablet; Take 1 tablet daily; Therapy: 96NPA0558 to (Evaluate:68Oxo4021)  Requested for: 19Apr2017; Last Rx:19Apr2017 Ordered   6   Januvia 100 MG Oral Tablet; TAKE 1 TABLET DAILY; Therapy: 00THV0460 to (Evaluate:14Nov2017); Last Rx:74Ktf2355 Ordered   7  LevOCARNitine 1 GM/10ML Oral Solution; 6 5ml 4 time daily; Therapy: 22CQB1416 to (Evaluate:27Nov2017)  Requested for: 11AJF3084; Last Rx:46Kvg2905 Ordered   8  Lupron Depot (3-Month) 11 25 MG Intramuscular Kit; USE AS DIRECTED; Therapy: 14PDM5971 to (Last Zahida Muller)  Requested for: 90XMD1415 Ordered   9  Midodrine HCl - 2 5 MG Oral Tablet; TAKE 1 TABLET 3 TIMES DAILY; Therapy: 31YES3504 to (Buel Smiling)  Requested for: 47YNX2699; Last Rx:46Pdv9833 Ordered   10  Multivitamins TABS; TAKE 1 TABLET DAILY; Therapy: (Recorded:33Pom5167) to Recorded   11  OneTouch Lancets Miscellaneous; check 1x/day  Requested for: 93Qpf1536; Last  Rx:31Hak2516 Ordered   12  OneTouch Ultra 2 w/Device Kit; USE AS DIRECTED  Requested for: 51Owb4342; Last  Rx:99Zpr2986 Ordered   13  OneTouch Ultra Blue In [employer]; Check BS 1x/day  Requested for: 81Sxr7800; Last  Rx:58Wbo7981 Ordered   14  Pantoprazole Sodium 40 MG Oral Tablet Delayed Release; TAKE 1 TABLET TWICE DAILY  30 MINUTES BEFORE BREAKFAST AND DINNER; Therapy: 89DCJ8432 to ([phone])  Requested for: 59DDM8548; Last  Rx:97Whv5545 Ordered  Medication List Reviewed: The medication list was reviewed and updated today  Allergies  Medication    1  Guaifenesin & Derivatives   2  Sulfa Drugs    Vitals  Vital Signs    Recorded: 90VZM6641 04:39PM   Heart Rate 72, R Radial   Systolic 793, LUE, Sitting   Diastolic 78, LUE, Sitting   Height 5 ft 4 in   Weight 168 lb 4 oz   BMI Calculated 28 88   BSA Calculated 1 82   O2 Saturation 99       Physical Exam   Constitutional  General appearance: No acute distress, well appearing and well nourished  Eyes  Conjunctiva and Sclera examination: Conjunctiva pink, sclera anicteric  Ears, Nose, Mouth, and Throat - External inspection of ears and nose: Normal without deformities or discharge    Neck  Neck and thyroid: Normal, supple, trachea midline, no thyromegaly  Pulmonary  Respiratory effort: No increased work of breathing or signs of respiratory distress  Auscultation of lungs: Clear to auscultation, no rales, no rhonchi, no wheezing, good air movement  Cardiovascular  Auscultation of heart: Normal rate and rhythm, normal S1 and S2, no murmurs  Carotid pulses: Normal, 2+ bilaterally  Examination of extremities for edema and/or varicosities: Normal    Chest - Chest: Normal   Abdomen  Abdomen: Non-tender and no distention  Musculoskeletal Gait and station: Normal gait  Skin - Skin and subcutaneous tissue: Normal without rashes or lesions  Skin is warm and well perfused, normal turgor  Neurologic - Speech: Normal    Psychiatric - Orientation to person, place, and time: Normal -- Mood and affect: Normal       Future Appointments    Date/Time Provider Specialty Site   01/04/2018 09:20 AM JON Whitaker  Hematology Oncology CANCER [facility] MEDICAL ONCOLOGY [place]   03/06/2018 09:30 AM JON Mcfarlane  Endocrinology [facility] ENDOCRINOLOGY   12/07/2017 09:30 AM JON Chaney   General Surgery [facility]       Signatures   Electronically signed by : JON Mcnamara ; Nov 29 2017  4:52PM EST                       (Author)

## 2017-12-05 ENCOUNTER — TRANSCRIBE ORDERS (OUTPATIENT)
Dept: LAB | Facility: CLINIC | Age: 42
End: 2017-12-05

## 2017-12-05 ENCOUNTER — APPOINTMENT (OUTPATIENT)
Dept: LAB | Facility: CLINIC | Age: 42
End: 2017-12-05
Payer: COMMERCIAL

## 2017-12-05 DIAGNOSIS — I82.5Z9 CHRONIC EMBOLISM AND THROMBOSIS OF DEEP VEIN OF DISTAL LOWER EXTREMITY (HCC): ICD-10-CM

## 2017-12-05 DIAGNOSIS — N92.0 EXCESSIVE AND FREQUENT MENSTRUATION WITH REGULAR CYCLE: ICD-10-CM

## 2017-12-05 DIAGNOSIS — H53.453 NASAL STEP VISUAL FIELD DEFECT OF BOTH EYES: ICD-10-CM

## 2017-12-05 DIAGNOSIS — R42 DIZZINESS AND GIDDINESS: ICD-10-CM

## 2017-12-05 DIAGNOSIS — K94.23 GASTROSTOMY MALFUNCTION (HCC): ICD-10-CM

## 2017-12-05 DIAGNOSIS — R53.83 FATIGUE, UNSPECIFIED TYPE: ICD-10-CM

## 2017-12-05 DIAGNOSIS — R53.1 ASTHENIA: ICD-10-CM

## 2017-12-05 DIAGNOSIS — G90.3 MULTI-SYSTEM DEGENERATION OF AUTONOMIC NERVOUS SYSTEM (HCC): ICD-10-CM

## 2017-12-05 DIAGNOSIS — R10.9 ABDOMINAL PAIN: ICD-10-CM

## 2017-12-05 DIAGNOSIS — E16.1 OTHER HYPOGLYCEMIA (CODE): ICD-10-CM

## 2017-12-05 DIAGNOSIS — R68.89 EXERCISE INTOLERANCE: Primary | ICD-10-CM

## 2017-12-05 DIAGNOSIS — K21.9 GASTRO-ESOPHAGEAL REFLUX DISEASE WITHOUT ESOPHAGITIS: ICD-10-CM

## 2017-12-05 DIAGNOSIS — H35.52 PIGMENTARY RETINAL DYSTROPHY: ICD-10-CM

## 2017-12-05 DIAGNOSIS — N25.81 SECONDARY HYPERPARATHYROIDISM OF RENAL ORIGIN (HCC): ICD-10-CM

## 2017-12-05 DIAGNOSIS — G90.8 INTESTINAL AUTONOMIC NEUROPATHY: ICD-10-CM

## 2017-12-05 DIAGNOSIS — Z98.84 BARIATRIC SURGERY STATUS: ICD-10-CM

## 2017-12-05 DIAGNOSIS — R68.89 EXERCISE INTOLERANCE: ICD-10-CM

## 2017-12-05 DIAGNOSIS — K59.00 CONSTIPATION: ICD-10-CM

## 2017-12-05 DIAGNOSIS — R60.9 EDEMA: ICD-10-CM

## 2017-12-05 DIAGNOSIS — E72.12 METHYLENETETRAHYDROFOLATE REDUCTASE DEFICIENCY (HCC): ICD-10-CM

## 2017-12-05 DIAGNOSIS — R53.83 OTHER FATIGUE: ICD-10-CM

## 2017-12-05 DIAGNOSIS — R63.5 ABNORMAL WEIGHT GAIN: ICD-10-CM

## 2017-12-05 DIAGNOSIS — M62.81 MUSCLE WEAKNESS (GENERALIZED): ICD-10-CM

## 2017-12-05 DIAGNOSIS — E61.1 IRON DEFICIENCY: ICD-10-CM

## 2017-12-05 DIAGNOSIS — Z83.49: ICD-10-CM

## 2017-12-05 DIAGNOSIS — K91.2 POSTSURGICAL MALABSORPTION, NOT ELSEWHERE CLASSIFIED (CODE): ICD-10-CM

## 2017-12-05 DIAGNOSIS — E88.40 MITOCHONDRIAL METABOLISM DISORDER (HCC): ICD-10-CM

## 2017-12-05 LAB
25(OH)D3 SERPL-MCNC: 38 NG/ML (ref 30–100)
ALBUMIN SERPL BCP-MCNC: 3.7 G/DL (ref 3.5–5)
ALP SERPL-CCNC: 70 U/L (ref 46–116)
ALT SERPL W P-5'-P-CCNC: 26 U/L (ref 12–78)
ANION GAP SERPL CALCULATED.3IONS-SCNC: 9 MMOL/L (ref 4–13)
AST SERPL W P-5'-P-CCNC: 16 U/L (ref 5–45)
BASOPHILS # BLD AUTO: 0.02 THOUSANDS/ΜL (ref 0–0.1)
BASOPHILS NFR BLD AUTO: 1 % (ref 0–1)
BILIRUB SERPL-MCNC: 0.8 MG/DL (ref 0.2–1)
BUN SERPL-MCNC: 14 MG/DL (ref 5–25)
CALCIUM SERPL-MCNC: 9.2 MG/DL (ref 8.3–10.1)
CHLORIDE SERPL-SCNC: 105 MMOL/L (ref 100–108)
CO2 SERPL-SCNC: 26 MMOL/L (ref 21–32)
CREAT SERPL-MCNC: 0.78 MG/DL (ref 0.6–1.3)
EOSINOPHIL # BLD AUTO: 0.07 THOUSAND/ΜL (ref 0–0.61)
EOSINOPHIL NFR BLD AUTO: 2 % (ref 0–6)
ERYTHROCYTE [DISTWIDTH] IN BLOOD BY AUTOMATED COUNT: 12.2 % (ref 11.6–15.1)
FERRITIN SERPL-MCNC: 19 NG/ML (ref 8–388)
FOLATE SERPL-MCNC: >20 NG/ML (ref 3.1–17.5)
GFR SERPL CREATININE-BSD FRML MDRD: 95 ML/MIN/1.73SQ M
GLUCOSE P FAST SERPL-MCNC: 93 MG/DL (ref 65–99)
HCT VFR BLD AUTO: 37.9 % (ref 34.8–46.1)
HGB BLD-MCNC: 12.6 G/DL (ref 11.5–15.4)
IRON SATN MFR SERPL: 38 %
IRON SERPL-MCNC: 147 UG/DL (ref 50–170)
LYMPHOCYTES # BLD AUTO: 1.32 THOUSANDS/ΜL (ref 0.6–4.47)
LYMPHOCYTES NFR BLD AUTO: 34 % (ref 14–44)
MCH RBC QN AUTO: 29.8 PG (ref 26.8–34.3)
MCHC RBC AUTO-ENTMCNC: 33.2 G/DL (ref 31.4–37.4)
MCV RBC AUTO: 90 FL (ref 82–98)
MONOCYTES # BLD AUTO: 0.26 THOUSAND/ΜL (ref 0.17–1.22)
MONOCYTES NFR BLD AUTO: 7 % (ref 4–12)
NEUTROPHILS # BLD AUTO: 2.19 THOUSANDS/ΜL (ref 1.85–7.62)
NEUTS SEG NFR BLD AUTO: 56 % (ref 43–75)
PLATELET # BLD AUTO: 192 THOUSANDS/UL (ref 149–390)
PMV BLD AUTO: 10.6 FL (ref 8.9–12.7)
POTASSIUM SERPL-SCNC: 3.9 MMOL/L (ref 3.5–5.3)
PROT SERPL-MCNC: 7.3 G/DL (ref 6.4–8.2)
PTH-INTACT SERPL-MCNC: 101.4 PG/ML (ref 14–72)
RBC # BLD AUTO: 4.23 MILLION/UL (ref 3.81–5.12)
SODIUM SERPL-SCNC: 140 MMOL/L (ref 136–145)
TIBC SERPL-MCNC: 385 UG/DL (ref 250–450)
VIT B12 SERPL-MCNC: 660 PG/ML (ref 100–900)
WBC # BLD AUTO: 3.86 THOUSAND/UL (ref 4.31–10.16)

## 2017-12-05 PROCEDURE — 82728 ASSAY OF FERRITIN: CPT

## 2017-12-05 PROCEDURE — 83918 ORGANIC ACIDS TOTAL QUANT: CPT

## 2017-12-05 PROCEDURE — 80053 COMPREHEN METABOLIC PANEL: CPT

## 2017-12-05 PROCEDURE — 84590 ASSAY OF VITAMIN A: CPT

## 2017-12-05 PROCEDURE — 83540 ASSAY OF IRON: CPT

## 2017-12-05 PROCEDURE — 82542 COL CHROMOTOGRAPHY QUAL/QUAN: CPT

## 2017-12-05 PROCEDURE — 82746 ASSAY OF FOLIC ACID SERUM: CPT

## 2017-12-05 PROCEDURE — 82306 VITAMIN D 25 HYDROXY: CPT

## 2017-12-05 PROCEDURE — 83970 ASSAY OF PARATHORMONE: CPT

## 2017-12-05 PROCEDURE — 82607 VITAMIN B-12: CPT

## 2017-12-05 PROCEDURE — 36415 COLL VENOUS BLD VENIPUNCTURE: CPT

## 2017-12-05 PROCEDURE — 84425 ASSAY OF VITAMIN B-1: CPT

## 2017-12-05 PROCEDURE — 84630 ASSAY OF ZINC: CPT

## 2017-12-05 PROCEDURE — 85025 COMPLETE CBC W/AUTO DIFF WBC: CPT

## 2017-12-05 PROCEDURE — 83550 IRON BINDING TEST: CPT

## 2017-12-07 ENCOUNTER — ALLSCRIPTS OFFICE VISIT (OUTPATIENT)
Dept: OTHER | Facility: OTHER | Age: 42
End: 2017-12-07

## 2017-12-08 LAB — ZINC SERPL-MCNC: 84 UG/DL (ref 56–134)

## 2017-12-09 LAB — UBIQUINONE10 SERPL-MCNC: 0.35 UG/ML (ref 0.37–2.2)

## 2017-12-11 LAB
METHYLMALONATE SERPL-SCNC: 155 NMOL/L (ref 0–378)
VIT A SERPL-MCNC: 44 UG/DL (ref 20–65)

## 2017-12-13 LAB — VIT B1 BLD-SCNC: 186.9 NMOL/L (ref 66.5–200)

## 2017-12-14 ENCOUNTER — GENERIC CONVERSION - ENCOUNTER (OUTPATIENT)
Dept: OTHER | Facility: OTHER | Age: 42
End: 2017-12-14

## 2017-12-29 DIAGNOSIS — E61.1 IRON DEFICIENCY: ICD-10-CM

## 2018-01-09 NOTE — PROGRESS NOTES
Message  phone consult to discuss plan of care and GI appointment   Patient was weighed at GI office today Assess: Patient went to see Dr Dennise Molina who was unable to provide any alternatives for treatment  Mykel Katarina has made an appointment with Dr Jf Shay for evaluation for autonomic dysfuntion February 13th 2017  Patient continues to have issues with alternating diarrhea and constipation  Uses the linzess every 3 days  Continues to have fat malasbsorption , and at times white stools , and other times tans stools  She cannot tolerate the questran as it gives her symptoms of dumping and it is too thiick to use the tube  1  She has been eating a low fat diet to limit malabsorption  She is running the [employer] during the day and the pedialyte 120 ml over night to keep hydrated  Twice per week she runs IV saline when her BP drops  She continues to have leakage from her G-tube when she takes po, which contributes to her difficulty staying hydrated  Patient is also mixing Venson Keep with her pedialyte, but it can sometimes thicken the pedialyte  DX Fat malabsorption and inability to meet calorie/protein needs by po related to mitochondrial disease as evidenced by ongoing issues with hydration, esophageal dysmotility and decreased BP Intervention: Will continue 1200 ml of pedialyte per day  Will also continue with elecare homar 900 ml per day  Will try prosource for additional protein instead of Venson Keep  Prosource provides 16 gms of protein per 30 ml  Will confer with [facility] concerning coverage  Will monitor prealbumin level monthly Goals: Maintain weight and protein levels Monitor prealbumin level monthly Use prosource for added protein ( if covered) and d/c Unjury       1 Amended By: Stephanie Stein; Jan 17 2017 2:15 PM EST    Active Problems   1  Abdominal discomfort (789 00) (R10 9)  2  Abdominal pain (789 00) (R10 9)  3  Abnormal weight gain (783 1) (R63 5)  4  Acute UTI (599 0) (N39 0)  5   Allergic contact dermatitis due to other agents (692 89) (L23 89)  6  Cervical cancer screening (V76 2) (Z12 4)  7  Chronic venous embolism and thrombosis of deep vessels of distal lower extremity   (453 52) (I82 5Z9)  8  Constipation (564 00) (K59 00)  9  Diarrhea (787 91) (R19 7)  10  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)  11  Dizziness (780 4) (R42)  12  Dysphagia (787 20) (R13 10)  13  Edema (782 3) (R60 9)  14  Encounter for gynecological examination (V72 31) (Z01 419)  15  Gastroesophageal reflux disease (530 81) (K21 9)  16  Gastrostomy tube dysfunction (536 42) (K94 23)  17  History of Nissen fundoplication (C07 78) (J50 706)  18  Hypertension (401 9) (I10)  19  Infection of Broviac site, initial encounter (999 31) (T82 7XXA)  20  Iron deficiency (280 9) (E61 1)  21  Menorrhagia with regular cycle (626 2) (N92 0)  22  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)  23  Migraine headache (346 90) (G43 909)  24  Muscle weakness (generalized) (728 87) (M62 81)  25  Need for diphtheria-tetanus-pertussis (Tdap) vaccine (V06 1) (Z23)  26  Obesity (278 00) (E66 9)  27  Oropharyngeal dysphagia (787 22) (R13 12)  28  Polycystic ovarian syndrome (256 4) (E28 2)  29  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 3) (K91 2,Z90 3)  30  Pre-op evaluation (V72 84) (Z01 818)  31  Pre-operative cardiovascular examination (V72 81) (Z01 810)  32  Retinitis pigmentosa (362 74) (H35 52)  33  Status post gastric bypass for obesity (V45 86) (Z98 84)  34  Stroke Syndrome (436)  35  Urinary retention (788 20) (R33 9)  36  Urination pain (788 1) (R30 9)  37  Voiding dysfunction (599 9) (N39 8)    Current Meds  1  Cholestyramine 4 GM Oral Packet; MIX THE CONTENTS OF 1 POWDER PACKET WITH   2 TO 6 OZ OF NONCARBONATED BEVERAGE AND DRINK 3 TIMES DAILY; Therapy: 51SES9086 to (Evaluate:19Jan2017)  Requested for: 19Ppn2236; Last   Rx:54Pbq1208 Ordered  2  Citracal TABS; Therapy: (Recorded:16Sep2016) to Recorded  3  Culturelle CAPS;    Therapy: (Recorded:39Hfe9139) to Recorded  4  Linzess 145 MCG Oral Capsule Recorded  5  Multivitamins TABS; TAKE 1 TABLET DAILY; Therapy: (Recorded:71Oph9351) to Recorded  6  Pantoprazole Sodium 40 MG Oral Tablet Delayed Release (Protonix); TAKE 1 TABLET   TWICE DAILY 30 MINUTES BEFORE BREAKFAST AND DINNER; Therapy: 18CRQ3695 to (Zoila Bach)  Requested for: 45KYM4464; Last   Rx:10Mar2016 Ordered  7  Stool Softener TABS; Therapy: (Recorded:48Yem8343) to Recorded  8  Temazepam 15 MG Oral Capsule; Therapy: (Recorded:99Xxh5930) to Recorded    Allergies   1  Guaifenesin & Derivatives  2   Sulfa Drugs    Signatures   Electronically signed by : ANGELA Washburn; Jan 17 2017  2:15PM EST                       (Author)

## 2018-01-09 NOTE — PROGRESS NOTES
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)   7  Dysphagia (787 20) (R13 10)   8  Edema (782 3) (R60 9)   9  Gastroesophageal reflux disease (530 81) (K21 9)   10  History of Nissen fundoplication (D29 98) (R33 58)   11  Hypertension (401 9) (I10)   12  Iron deficiency (280 9) (E61 1)   13  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)   14  Migraine headache (346 90) (G43 909)   15  Muscle weakness (generalized) (728 87) (M62 81)   16  Nausea (787 02) (R11 0)   17  Nausea with vomiting (787 01) (R11 2)   18  Need for diphtheria-tetanus-pertussis (Tdap) vaccine (V06 1) (Z23)   19  Obesity (278 00) (E66 9)   20  Oropharyngeal dysphagia (787 22) (R13 12)   21  Polycystic ovarian syndrome (256 4) (E28 2)   22  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 3) (K91 2,Z90 3)   23  Pre-op evaluation (V72 84) (Z01 818)   24  Pre-operative cardiovascular examination (V72 81) (Z01 810)   25  Retinitis pigmentosa (362 74) (H35 52)   26  Status post gastric bypass for obesity (V45 86) (Z98 84)   27  Stroke Syndrome (436)   28  Type B influenza (487 1) (J10 1)    Current Meds   1  Amoxicillin-Pot Clavulanate 400-57 MG/5ML Oral Suspension Reconstituted; TAKE 5 ML   Every 6 hours As Directed TDD:20;   Therapy: 08NJT9102 to (Evaluate:87Xof6200)  Requested for: 94DUW5213; Last   Rx:19May2016 Ordered   2  B Complex CAPS; Therapy: (Recorded:88Acy6986) to Recorded   3  Culturelle CAPS; Therapy: (Recorded:01Cmc1628) to Recorded   4  MiraLax Oral Powder (Polyethylene Glycol 3350); Therapy: (Recorded:51Zqd4473) to Recorded   5  Multivitamins TABS; TAKE 1 TABLET DAILY; Therapy: (Recorded:72Phi8124) to Recorded   6  Ondansetron 4 MG Oral Tablet Dispersible (Zofran ODT); Take one every 4-6 hours prn   for nausea; Therapy: 75RJU0218 to (Last 72 954 091)  Requested for: 23Nov2015 Ordered   7  Pantoprazole Sodium 40 MG Oral Tablet Delayed Release (Protonix); TAKE 1 TABLET   TWICE DAILY 30 MINUTES BEFORE BREAKFAST AND DINNER; Therapy: 78VHO0552 to ([facility])  Requested for: 70XYA1092; Last   Rx:10Mar2016 Ordered   8  Stool Softener TABS; Therapy: (Recorded:05Lkc8263) to Recorded    Allergies    1  Guaifenesin & Derivatives   2   Sulfa Drugs    Signatures   Electronically signed by : ANGELA Pacheco; Jun 14 2016  4:12PM EST                       (Author)

## 2018-01-09 NOTE — MISCELLANEOUS
Message  GI Reminder Recall Leonel Issa:   Date: 10/17/2016   Dear Aniyah Feeling:     Review of our records shows you are due for the following: Follow Up Visit  Please call the following office to schedule your appointment:   [address] [facility], [address], [address] [facility] [phone]  We look forward to hearing from you!      Sincerely,     [facility]      Signatures   Electronically signed by : Aniya Shah, ; Oct 17 2016  1:48PM EST                       (Author)

## 2018-01-09 NOTE — MISCELLANEOUS
Message  spoke with Bharat sinclair to send records from July 2016 to [address] [place] [address] at [phone]  CLAIM # [id number]  and targeted return to work date as of 8/16/2016  Faxed them at 2:34pm via [employer] fax  Active Problems    1  Abdominal discomfort (789 00) (R10 9)   2  Abdominal pain (789 00) (R10 9)   3  Abnormal weight gain (783 1) (R63 5)   4  Acute UTI (599 0) (N39 0)   5  Allergic contact dermatitis due to other agents (692 89) (L23 89)   6  Cervical cancer screening (V76 2) (Z12 4)   7  Chronic venous embolism and thrombosis of deep vessels of distal lower extremity   (453 52) (I82 5Z9)   8  Constipation (564 00) (K59 00)   9  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)   10  Dysphagia (787 20) (R13 10)   11  Edema (782 3) (R60 9)   12  Gastroesophageal reflux disease (530 81) (K21 9)   13  History of Nissen fundoplication (P55 28) (X84 41)   14  Hypertension (401 9) (I10)   15  Iron deficiency (280 9) (E61 1)   16  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)   17  Migraine headache (346 90) (G43 909)   18  Muscle weakness (generalized) (728 87) (M62 81)   19  Nausea (787 02) (R11 0)   20  Nausea with vomiting (787 01) (R11 2)   21  Need for diphtheria-tetanus-pertussis (Tdap) vaccine (V06 1) (Z23)   22  Obesity (278 00) (E66 9)   23  Oropharyngeal dysphagia (787 22) (R13 12)   24  Polycystic ovarian syndrome (256 4) (E28 2)   25  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 3) (K91 2,Z90 3)   26  Pre-op evaluation (V72 84) (Z01 818)   27  Pre-operative cardiovascular examination (V72 81) (Z01 810)   28  Retinitis pigmentosa (362 74) (H35 52)   29  Status post gastric bypass for obesity (V45 86) (Z98 84)   30  Stroke Syndrome (436)   31  Type B influenza (487 1) (J10 1)   32  Urination pain (788 1) (R30 9)    Current Meds   1   Amoxicillin-Pot Clavulanate 400-57 MG/5ML Oral Suspension Reconstituted; TAKE 5 ML   Every 6 hours As Directed TDD:20;   Therapy: 72GGD2387 to (Evaluate:23Dcq5202)  Requested for: 01GHZ8434; Last   Rx:23Mho2369 Ordered   2  B Complex CAPS; Therapy: (Recorded:26Fvb2108) to Recorded   3  Culturelle CAPS; Therapy: (Recorded:07Lkb9138) to Recorded   4  MiraLax Oral Powder (Polyethylene Glycol 3350); Therapy: (Recorded:73Imu4035) to Recorded   5  Multivitamins TABS; TAKE 1 TABLET DAILY; Therapy: (Recorded:74Dcc8361) to Recorded   6  Ondansetron 4 MG Oral Tablet Dispersible (Zofran ODT); Take one every 4-6 hours prn   for nausea; Therapy: 01UMF8529 to (Last 72 954 091)  Requested for: 23Nov2015 Ordered   7  Pantoprazole Sodium 40 MG Oral Tablet Delayed Release (Protonix); TAKE 1 TABLET   TWICE DAILY 30 MINUTES BEFORE BREAKFAST AND DINNER; Therapy: 20GLQ9640 to (Daniel Alba)  Requested for: 65SDJ2605; Last   Rx:10Mar2016 Ordered   8  Stool Softener TABS; Therapy: (Recorded:73Zls0558) to Recorded    Allergies    1  Guaifenesin & Derivatives   2   Sulfa Drugs    Signatures   Electronically signed by : Ellen Christy, ; Aug 22 2016  2:33PM EST                       (Author)

## 2018-01-10 NOTE — PROGRESS NOTES
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 as evidenced by patient report and GI evaluation Intervention: Pt will stop all po intake  She will decrease to 20 ml/hr tube feeding 1/4 strength Will contact pt on Monday  If she is still not tolerating tube feeding- will increase her TPN orders and switch to Vital 1 0- lower concentration and without fiber ( Fios)  If she cannot tolerate Vital 1 0 may consider total elemental formula such as elemental Quyen Drain ( her teenage children are taking) or vivonex- although vivonex requires mixing and potential for contamination  Monitoring/evaluation/Goals 1  Decrease to 20 ml/hr 1/4 strength vital AF 1 20 2  Stop all PO intake 3  Contact pt to assess tolerance on Monday  If patient still cannot tolerate tube feeding, switch to Vital 1 0 high protein ( not fiber, thinner formula) and will increase protein and calories in TPN 4  May need to consider permanent access for TPN if unable to tolerate enteral feedings  Active Problems    1  Abdominal discomfort (789 00) (R10 9)   2  Abnormal weight gain (783 1) (R63 5)   3  Cervical cancer screening (V76 2) (Z12 4)   4  Chronic venous embolism and thrombosis of deep vessels of distal lower extremity   (453 52) (I82 5Z9)   5  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)   6  Dysphagia (787 20) (R13 10)   7   Edema (782 3) (R60 9) 8  Gastroesophageal reflux disease (530 81) (K21 9)   9  History of Nissen fundoplication (K22 69) (C70 22)   10  Hypertension (401 9) (I10)   11  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)   12  Migraine headache (346 90) (G43 909)   13  Muscle weakness (generalized) (728 87) (M62 81)   14  Nausea (787 02) (R11 0)   15  Nausea with vomiting (787 01) (R11 2)   16  Need for diphtheria-tetanus-pertussis (Tdap) vaccine (V06 1) (Z23)   17  Obesity (278 00) (E66 9)   18  Oropharyngeal dysphagia (787 22) (R13 12)   19  Polycystic ovarian syndrome (256 4) (E28 2)   20  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 3) (K91 2,Z90 3)   21  Pre-op evaluation (V72 84) (Z01 818)   22  Pre-operative cardiovascular examination (V72 81) (Z01 810)   23  Retinitis pigmentosa (362 74) (H35 52)   24  Status post gastric bypass for obesity (V45 86) (Z98 84)   25  Stroke Syndrome (436)   26  Type B influenza (487 1) (J10 1)    Past Medical History    1  History of Abnormality Of Walk - Dragging The Right Foot   2  History of Complex Regional Pain Syndrome Type I Of The Hand (337 21)   3  History of Generalized abdominal pain (789 07) (R10 84)   4  History of acute sinusitis (V12 69) (Z87 09)   5  History of headache (V13 89) (Z87 898)   6  History of stroke (V12 54) (Z86 73)   7  History of urinary tract infection (V13 02) (Z87 440)   8  History of Intolerance to cold (780 99) (R68 89)   9  History of Intolerance to heat (780 99) (R68 89)   10  History of Morbid obesity (278 01) (E66 01)   11  History of Night sweats (780 8) (R61)   12  History of Stroke Syndrome (436)    Surgical History    1  History of Central IV Catheter Inferior Vena Cava   2  History of Esophagogastric Fundoplasty Nissen Fundoplication   3  History of Gastric Surgery For Morbid Obesity Bypass With Rossy-en-Y   4  History of Neuroplasty With Transposition Of Ulnar Nerve - At Wrist   5   History of Open Treatment Of Fracture Of The Lateral Malleolus    Family History  Mother    1  Family history of Carcinoid (except of appendix)   2  Family history of Disorder Of Mitochondrial Metabolism   3  Family history of Family Health Status Of Mother - Alive   4  Family history of hypertension (V17 49) (Z82 49)   5  Family history of malignant neoplasm (V16 9) (Z80 9)   6  Family history of thyroiditis (V18 19) (Z83 49)   7  Family history of Hypertension (V17 49)   8  Family history of Stroke Syndrome (V17 1)   9  Family history of Thyroid Cancer  Father    8  Family history of Depression   11  Family history of Family Health Status Of Father - Alive   15  Family history of depression (V17 0) (Z81 8)   13  Denied: Family history of epilepsy   14  Family history of Fuch endothelial dystrophy   15  Family history of Migraine Headache  Daughter    12  Family history of Disorder Of Mitochondrial Metabolism  Son    16  Family history of Disorder Of Mitochondrial Metabolism  Brother    18  Family history of depression (V17 0) (Z81 8)   19  Family history of narcolepsy (V19 8) (Z82 0)  Maternal Grandfather    21  Family history of Breast Cancer (V16 3)    Social History    · Being A Social Drinker   · Denied: Drug use   · Exercise Habits   · Marital History - Currently    · Never A Smoker   · Occupation:   · Occupation: Medical Professional    Current Meds   1  B Complex CAPS; Therapy: (Recorded:11Ftb0074) to Recorded   2  Culturelle CAPS; Therapy: (Recorded:92Fto9671) to Recorded   3  Multivitamins TABS; TAKE 1 TABLET DAILY; Therapy: (Recorded:39Ltb2018) to Recorded   4  Ondansetron 4 MG Oral Tablet Dispersible (Zofran ODT); Take one every 4-6 hours prn   for nausea; Therapy: 43FWJ2810 to (Last 72 393 091)  Requested for: 23Nov2015 Ordered   5  Pantoprazole Sodium 40 MG Oral Tablet Delayed Release (Protonix); TAKE 1 TABLET   TWICE DAILY 30 MINUTES BEFORE BREAKFAST AND DINNER;    Therapy: 09PWI2236 to (Charly Sosa)  Requested for: 74LSM4353; Last   Rx:10Mar2016 Ordered    Allergies    1  Guaifenesin & Derivatives   2   Sulfa Drugs    Future Appointments    Date/Time Provider Specialty Site   05/19/2016 11:30 AM Terri Burch, [facility]     Signatures   Electronically signed by : ANGELA Murdock; May 12 2016  3:05PM EST                       (Author)    Electronically signed by : JON Solis ; May 13 2016 12:00PM EST                       (Validation)

## 2018-01-10 NOTE — RESULT NOTES
Message  Spoke with patient  Unable to use G-Tube except for 20 ml of water  Previously was getting bilious fluid coming out G-Tube  Due to her mitochondrial disease, and anethesisa, patient G I tract was dormant  Per her [place] doctor, it may take some time to use her G tube Currently she still has PICC line and TPN Tolerating solid foods, per patient was able to tolerate 75 grams of food  Will wait to start tube feeding  once able to use for hydration, will consider low rate on pump, gradually transition to bolus, if able  Consider starting elemental formula as it will be easier to digest    Will contact patient weekly for updates  TPN orders faxed to [facility]  Labs within acceptable limits        Signatures   Electronically signed by : ANGELA Pete; Apr 28 2016 11:13AM EST                       (Author)

## 2018-01-10 NOTE — PROGRESS NOTES
Message  Decreased TPN to as follows:  Dex 20% 400 ml= 272 kcal  AA10% 500 ml= 200 kcal  Lipids 10% 994dv=803 kcal  Total kcal = 672 kcal plus 50 grams protein , 1000 ml total volume    Per am feedings with pedialyte= 132 kcal  Unjury= 42 grams protein =168 kcal  Volume = 1320 ml, 300 kcal    Per pm feedings= 355 5 kcal  /31 grams protein  , 720 ml fluid    Total nutrition support = 1327 5 kcal , 123 grams protein, 3040 ml total volume    [facility]       Active Problems    1  Abdominal discomfort (789 00) (R10 9)   2  Abdominal pain (789 00) (R10 9)   3  Abnormal weight gain (783 1) (R63 5)   4  Acute UTI (599 0) (N39 0)   5  Allergic contact dermatitis due to other agents (692 89) (L23 89)   6  Cervical cancer screening (V76 2) (Z12 4)   7  Chronic venous embolism and thrombosis of deep vessels of distal lower extremity   (453 52) (I82 5Z9)   8  Constipation (564 00) (K59 00)   9  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)   10  Dysphagia (787 20) (R13 10)   11  Edema (782 3) (R60 9)   12  Gastroesophageal reflux disease (530 81) (K21 9)   13  History of Nissen fundoplication (P88 75) (W11 39)   14  Hypertension (401 9) (I10)   15  Iron deficiency (280 9) (E61 1)   16  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)   17  Migraine headache (346 90) (G43 909)   18  Muscle weakness (generalized) (728 87) (M62 81)   19  Nausea (787 02) (R11 0)   20  Nausea with vomiting (787 01) (R11 2)   21  Need for diphtheria-tetanus-pertussis (Tdap) vaccine (V06 1) (Z23)   22  Obesity (278 00) (E66 9)   23  Oropharyngeal dysphagia (787 22) (R13 12)   24  Polycystic ovarian syndrome (256 4) (E28 2)   25  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 3) (K91 2,Z90 3)   26  Pre-op evaluation (V72 84) (Z01 818)   27  Pre-operative cardiovascular examination (V72 81) (Z01 810)   28  Retinitis pigmentosa (362 74) (H35 52)   29  Status post gastric bypass for obesity (V45 86) (Z98 84)   30  Stroke Syndrome (436)   31  Type B influenza (487 1) (J10 1)   32  Urination pain (788 1) (R30 9)    Current Meds   1  Amoxicillin-Pot Clavulanate 400-57 MG/5ML Oral Suspension Reconstituted; TAKE 5 ML   Every 6 hours As Directed TDD:20;   Therapy: 57BLQ4763 to (Evaluate:17Aug2016)  Requested for: 86ITJ7039; Last   Rx:33Vik5927 Ordered   2  B Complex CAPS; Therapy: (Recorded:87Dyi3198) to Recorded   3  Culturelle CAPS; Therapy: (Recorded:80Iyb1551) to Recorded   4  MiraLax Oral Powder (Polyethylene Glycol 3350); Therapy: (Recorded:58Nuk8447) to Recorded   5  Multivitamins TABS; TAKE 1 TABLET DAILY; Therapy: (Recorded:73Toi8244) to Recorded   6  Ondansetron 4 MG Oral Tablet Dispersible (Zofran ODT); Take one every 4-6 hours prn   for nausea; Therapy: 75HGE5457 to (Last 72 954 091)  Requested for: 23Nov2015 Ordered   7  Pantoprazole Sodium 40 MG Oral Tablet Delayed Release (Protonix); TAKE 1 TABLET   TWICE DAILY 30 MINUTES BEFORE BREAKFAST AND DINNER; Therapy: 81ODR5177 to (Keyshawn Linton)  Requested for: 04RQD9422; Last   Rx:10Mar2016 Ordered   8  Stool Softener TABS; Therapy: (Recorded:67Ryx5813) to Recorded    Allergies    1  Guaifenesin & Derivatives   2   Sulfa Drugs    Signatures   Electronically signed by : ANGELA Murguia; Aug 10 2016  3:58PM EST                       (Author)

## 2018-01-10 NOTE — RESULT NOTES
Message  Reviewed lab results- within acceptable limits  Prealbumin level 23 1      Signatures   Electronically signed by : ANGELA Lo; May  6 2016  3:47PM EST                       (Author)

## 2018-01-10 NOTE — CONSULTS
I had the pleasure of evaluating your patient, Mc Patel  My full evaluation follows:      Chief Complaint  pt is here for a follow up  pt c/o headaches, fatigue and leg swelling  Patient is here today for follow up of chronic conditions described in HPI  History of Present Illness  Ms Armando Martin is a 38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, gastric bypass surgery, and dysautonomia who presents for follow up  Since her gastric bypass surgery, has needed a central line and giving herself saline boluses - now down to several times/month  Has headaches only when she is dehydrate, and improves with 1L of saline  Review of Systems      Cardiac: No complaints of chest pain, no palpitations, no fainting  Skin: No complaints of nonhealing sores or skin rash  Genitourinary: No complaints of recurrent urinary tract infections, frequent urination at night, difficult urination, blood in urine, kidney stones, loss of bladder control, kidney problems, denies any birth control or hormone replacement, is not post menopausal, not currently pregnant  Psychological: No complaints of feeling depressed, anxiety, panic attacks, or difficulty concentrating  General: No complaints of trouble sleeping, lack of energy, fatigue, appetite changes, weight changes, fever, frequent infections, or night sweats  Respiratory: No complaints of shortness of breath, cough with sputum, or wheezing  HEENT: No complaints of serious problems, hearing problems, nose problems, throat problems, or snoring  Gastrointestinal: diarrhea and constipation GERD  Hematologic: No complaints of bleeding disorders, anemia, blood clots, or excessive brusing  Neurological: dizziness   Musculoskeletal: No complaints of arthritis, back pain, or painfull swelling  Active Problems    1  Abdominal discomfort (789 00) (R10 9)   2  Abdominal pain (789 00) (R10 9)   3  Abnormal weight gain (783 1) (R63 5)   4   Acute UTI (599 0) (N39 0)   5  Allergic contact dermatitis due to other agents (692 89) (L23 89)   6  Cervical cancer screening (V76 2) (Z12 4)   7  Chronic venous embolism and thrombosis of deep vessels of distal lower extremity   (453 52) (I82 5Z9)   8  Constipation (564 00) (K59 00)   9  Contact dermatitis due to poison ivy (692 6) (L23 7)   10  Diarrhea (787 91) (R19 7)   11  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)   12  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)   13  Dizziness (780 4) (R42)   14  Dysautonomia orthostatic hypotension syndrome (333 0) (G90 3)   15  Dysphagia (787 20) (R13 10)   16  Edema (782 3) (R60 9)   17  Encounter for gynecological examination (V72 31) (Z01 419)   18  Fatigue (780 79) (R53 83)   19  Fibroid (218 9) (D25 9)   20  Gastroesophageal reflux disease (530 81) (K21 9)   21  Gastrostomy tube dysfunction (536 42) (K94 23)   22  History of Nissen fundoplication (N06 25) (B01 092)   23  Hyperparathyroidism, secondary (588 81) (N25 81)   24  Hypertension (401 9) (I10)   25  Infection of Broviac site, initial encounter (999 31) (T80 219A)   26  Iron deficiency (280 9) (E61 1)   27  Lesion of subcutaneous tissue (709 9) (L98 9)   28  Menorrhagia with regular cycle (626 2) (N92 0)   29  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)   30  Migraine headache (346 90) (G43 909)   31  Muscle weakness (generalized) (728 87) (M62 81)   32  Need for diphtheria-tetanus-pertussis (Tdap) vaccine (V06 1) (Z23)   33  Neurogenic bladder (596 54) (N31 9)   34  Obesity (278 00) (E66 9)   35  Oropharyngeal dysphagia (787 22) (R13 12)   36  Polycystic ovarian syndrome (256 4) (E28 2)   37  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 3) (K91 2,Z90 3)   38  Pre-op evaluation (V72 84) (Z01 818)   39  Pre-operative cardiovascular examination (V72 81) (Z01 810)   40  Reactive hypoglycemia (251 2) (E16 1)   41  Renal cyst, right (753 10) (N28 1)   42  Retinitis pigmentosa (362 74) (H35 52)   43  Right upper quadrant abdominal pain (789 01) (R10 11)   44  Status post gastric bypass for obesity (V45 86) (Z98 84)   45  Stroke Syndrome (436)   46  Urinary retention (788 20) (R33 9)   47  Urination pain (788 1) (R30 9)   48  Voiding dysfunction (599 9) (N39 8)    Past Medical History    · History of Abnormality Of Walk - Dragging The Right Foot   · History of Complex Regional Pain Syndrome Type I Of The Hand (337 21)   · History of Generalized abdominal pain (789 07) (R10 84)   · History of  3   · History of acute sinusitis (V12 69) (Z87 09)   · History of headache (V13 89) (T96 830)   · History of nausea (V12 79) (W26 467)   · History of nausea and vomiting (V12 79) (Z87 898)   · History of pilonidal cyst (V13 3) (Z87 2)   · History of stroke (V12 54) (Z86 73)   · History of urinary tract infection (V13 02) (Z87 440)   · History of Intolerance to cold (780 99) (R68 89)   · History of Intolerance to heat (780 99) (R68 89)   · History of Morbid obesity (278 01) (E66 01)   · History of Night sweats (780 8) (R61)   · History of Skin irritation (709 9) (R23 8)   · History of Stroke Syndrome (436)   · History of Type B influenza (487 1) (J10 1)    The active problems and past medical history were reviewed and updated today  Surgical History    · History of Central IV Catheter Inferior Vena Cava   · History of Esophagogastric Fundoplasty Nissen Fundoplication   · History of Gastric Surgery For Morbid Obesity Bypass With Rossy-en-Y   · History of G-Tube Insertion Date   · History of Neuroplasty With Transposition Of Ulnar Nerve - At Wrist   · History of Open Treatment Of Fracture Of The Lateral Malleolus    The surgical history was reviewed and updated today         Family History    · Family history of Carcinoid (except of appendix)   · Family history of Disorder Of Mitochondrial Metabolism   · Family history of Family Health Status Of Mother - Alive   · Family history of hypertension (V17 49) (Z82 49)   · Family history of malignant neoplasm (V16 9) (Z80 9)   · Family history of thyroiditis (V18 19) (Z83 49)   · Family history of Hypertension (V17 49)   · Family history of Stroke Syndrome (V17 1)   · Family history of Thyroid Cancer    · Family history of Depression   · Family history of Family Health Status Of Father - Alive   · Family history of depression (V17 0) (Z81 8)   · Denied: Family history of epilepsy   · Family history of Fuch endothelial dystrophy   · Family history of Migraine Headache    · Family history of Disorder Of Mitochondrial Metabolism    · Family history of Disorder Of Mitochondrial Metabolism    · Family history of depression (V17 0) (Z81 8)   · Family history of narcolepsy (V19 8) (Z82 0)    · Family history of Breast Cancer (V16 3)    The family history was reviewed and updated today  Social History    · Denied: Drug use   · Exercise Habits   · Marital History - Currently    · Never A Smoker   · No alcohol use   · Occupation:  The social history was reviewed and updated today  The social history was reviewed and is unchanged  Current Meds   1  Acarbose 100 MG Oral Tablet; TAKE 1 TABLET 3 TIMES DAILY WITH THE FIRST BITE OF   EACH MAIN MEAL; Therapy: 54XBV7099 to (Evaluate:67Goc2970)  Requested for: 08CFW4196; Last   Rx:01Mar2017 Ordered   2  Calcium Carbonate 1250 (500 Ca) MG/5ML Oral Suspension; take 4 ml 3 times a day; Therapy: 20KMK3421 to (Evaluate:27Nov2017); Last Rx:48Eqf2642 Ordered   3  Culturelle CAPS; Therapy: (Recorded:98Uvg1249) to Recorded   4  Docusate Calcium CAPS; Therapy: (Recorded:12Hci9918) to Recorded   5  Fludrocortisone Acetate 0 1 MG Oral Tablet; Take 1 tablet daily; Therapy: 61DYN1250 to (Evaluate:88Fud4300)  Requested for: 19Apr2017; Last   Rx:19Apr2017 Ordered   6  Januvia 100 MG Oral Tablet; TAKE 1 TABLET DAILY; Therapy: 12NHB2154 to (Evaluate:14Nov2017); Last Rx:15Ngk2983 Ordered   7   LevOCARNitine 1 GM/10ML Oral Solution; 6 5ml 4 time daily; Therapy: 33STB9876 to (Evaluate:68Nde7529)  Requested for: 05TLG6422; Last   Rx:46Yby8519 Ordered   8  Lupron Depot (3-Month) 11 25 MG Intramuscular Kit; USE AS DIRECTED; Therapy: 42PQO0530 to (Mitchel Briggs Mack)  Requested for: 18BDX0983 Ordered   9  Midodrine HCl - 2 5 MG Oral Tablet; TAKE 1 TABLET 3 TIMES DAILY; Therapy: 50KKQ2170 to (Telljeff Nephew)  Requested for: 64CNK0271; Last   Rx:33Kqq7860 Ordered   10  Multivitamins TABS; TAKE 1 TABLET DAILY; Therapy: (Recorded:11Uur6003) to Recorded   11  OneTouch Lancets Miscellaneous; check 1x/day  Requested for: 47Wdt0500; Last    Rx:91Pbn7155 Ordered   12  OneTouch Ultra 2 w/Device Kit; USE AS DIRECTED  Requested for: 97Wao2236; Last    Rx:68Wil3886 Ordered   13  OneTouch Ultra Blue In [employer]; Check BS 1x/day  Requested for: 99Hyp6010; Last    Rx:24Ome3332 Ordered   14  Pantoprazole Sodium 40 MG Oral Tablet Delayed Release; TAKE 1 TABLET TWICE DAILY    30 MINUTES BEFORE BREAKFAST AND DINNER; Therapy: 28UND8292 to ([facility])  Requested for: 90SXT9726; Last    Rx:07Icp6143 Ordered    The medication list was reviewed and updated today  Allergies    1  Guaifenesin & Derivatives   2  Sulfa Drugs    Vitals   Recorded: 11QYV8967 04:39PM   Heart Rate 72, R Radial   Systolic 060, LUE, Sitting   Diastolic 78, LUE, Sitting   Height 5 ft 4 in   Weight 168 lb 4 oz   BMI Calculated 28 88   BSA Calculated 1 82   O2 Saturation 99     Physical Exam    Constitutional   General appearance: No acute distress, well appearing and well nourished  Eyes   Conjunctiva and Sclera examination: Conjunctiva pink, sclera anicteric  Ears, Nose, Mouth, and Throat - External inspection of ears and nose: Normal without deformities or discharge  Neck   Neck and thyroid: Normal, supple, trachea midline, no thyromegaly  Pulmonary   Respiratory effort: No increased work of breathing or signs of respiratory distress      Auscultation of lungs: Clear to auscultation, no rales, no rhonchi, no wheezing, good air movement  Cardiovascular   Auscultation of heart: Normal rate and rhythm, normal S1 and S2, no murmurs  Carotid pulses: Normal, 2+ bilaterally  Examination of extremities for edema and/or varicosities: Normal     Chest - Chest: Normal    Abdomen   Abdomen: Non-tender and no distention  Musculoskeletal Gait and station: Normal gait  Skin - Skin and subcutaneous tissue: Normal without rashes or lesions  Skin is warm and well perfused, normal turgor  Neurologic - Speech: Normal     Psychiatric - Orientation to person, place, and time: Normal  Mood and affect: Normal       Assessment    1  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)   · DR ANSELMO JORDAN   2  Dysautonomia orthostatic hypotension syndrome (333 0) (G90 3)   3  Edema (782 3) (R60 9)   4  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 3) (K91 2,Z90 3)    Plan  Disorder of mitochondrial metabolism    · Fludrocortisone Acetate 0 1 MG Oral Tablet; Take 1 tablet daily   Rx By: Star Fillers; Dispense: 90 Days ; #:90 Tablet; Refill: 3; For: Disorder of mitochondrial metabolism; MAXINE = N; Sent To: [facility]  Dysautonomia orthostatic hypotension syndrome    · Midodrine HCl - 5 MG Oral Tablet; TAKE 1 TABLET 3 TIMES DAILY   Rx By: Star Fillers; Dispense: 90 Days ; #:270 Tablet; Refill: 3; For: Dysautonomia orthostatic hypotension syndrome; MAXINE = N; Sent To: [facility]  Postgastrectomy malabsorption    · Follow-up visit in 4 Months Evaluation and Treatment  Follow-up  Status: Hold For -  Scheduling  Requested for: 96NKG7658   Ordered; For: Postgastrectomy malabsorption; Ordered By: Star Fillers Performed:  Due: 39VKC6507    Discussion/Summary    38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, gastric bypass surgery, and dysautonomia who presents for follow up   Since her gastric bypass surgery, has needed a central line and giving herself saline boluses - now down to several times/month  Has headaches only when she is dehydrate, and improves with 1L of saline  Impression:  1  Orthostasis - likely due to autonomic dysfunction and possible POTS  May be precipitated by underlying malabsorption syndrome  Improving with IV saline every other week  2  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  Recommend:  1  Keep florinef at 0 1mg daily  2  Increase midodrine to 5mg 3x/day  3  Follow up in 4 months  Thank you very much for allowing me to participate in the care of this patient  If you have any questions, please do not hesitate to contact me        Future Appointments    Signatures   Electronically signed by : JON Ospina ; Nov 29 2017  4:52PM EST                       (Author)

## 2018-01-10 NOTE — RESULT NOTES
Discussion/Summary   Normal      Verified Results  (1) PTH N-TERMINAL (INTACT) 51EIZ9040 09:04AM [facility] Order Number: [id number]     Test Name Result Flag Reference   PARATHYROID HORMONE INTACT 46 2 pg/mL  14 0-72 0     (1) PHOSPHORUS 53RWL1232 09:04AM [facility] Order Number: [id number]     Test Name Result Flag Reference   PHOSPHORUS 3 8 mg/dL  2 7-4 5     (1) CALCIUM 19DAR9803 09:04AM [facility] Order Number: [id number]     Test Name Result Flag Reference   CALCIUM 9 2 mg/dL  8 3-10 1     (1) ALBUMIN 40PZA8214 09:04AM [facility] Order Number: [id number]     Test Name Result Flag Reference   ALBUMIN 3 7 g/dL  3 5-5 0

## 2018-01-11 NOTE — PROGRESS NOTES
Message  Spoke with pharmacy  Will switch to liquid levocarnitine solution- 1 gm/10ml  Ordered at 6 25 ml 4x per day  Will also try liquid calcium as recommended by endocrinology:  Calcium carbonate liquid (250 mg/5ml) =12 5 ml per day to provide 3000 mg as recommended by endo  LPN will place order to pharmacy      Active Problems    1  Abdominal discomfort (789 00) (R10 9)   2  Abdominal pain (789 00) (R10 9)   3  Abnormal weight gain (783 1) (R63 5)   4  Acute UTI (599 0) (N39 0)   5  Allergic contact dermatitis due to other agents (692 89) (L23 89)   6  Cervical cancer screening (V76 2) (Z12 4)   7  Chronic venous embolism and thrombosis of deep vessels of distal lower extremity   (453 52) (I82 5Z9)   8  Constipation (564 00) (K59 00)   9  Diarrhea (787 91) (R19 7)   10  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)   11  Dizziness (780 4) (R42)   12  Dysautonomia orthostatic hypotension syndrome (333 0) (G90 3)   13  Dysphagia (787 20) (R13 10)   14  Edema (782 3) (R60 9)   15  Encounter for gynecological examination (V72 31) (Z01 419)   16  Gastroesophageal reflux disease (530 81) (K21 9)   17  Gastrostomy tube dysfunction (536 42) (K94 23)   18  History of Nissen fundoplication (E38 84) (F07 854)   19  Hyperparathyroidism, secondary (588 81) (N25 81)   20  Hypertension (401 9) (I10)   21  Infection of Broviac site, initial encounter (999 31) (T82 7XXA)   22  Iron deficiency (280 9) (E61 1)   23  Menorrhagia with regular cycle (626 2) (N92 0)   24  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)   25  Migraine headache (346 90) (G43 909)   26  Muscle weakness (generalized) (728 87) (M62 81)   27  Need for diphtheria-tetanus-pertussis (Tdap) vaccine (V06 1) (Z23)   28  Obesity (278 00) (E66 9)   29  Oropharyngeal dysphagia (787 22) (R13 12)   30  Polycystic ovarian syndrome (256 4) (E28 2)   31  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 3) (K91 2,Z90 3)   32   Pre-op evaluation (S89 84) (Z01 818)   33  Pre-operative cardiovascular examination (V72 81) (Z01 810)   34  Reactive hypoglycemia (251 2) (E16 1)   35  Retinitis pigmentosa (362 74) (H35 52)   36  Status post gastric bypass for obesity (V45 86) (Z98 84)   37  Stroke Syndrome (436)   38  Urinary retention (788 20) (R33 9)   39  Urination pain (788 1) (R30 9)   40  Voiding dysfunction (599 9) (N39 8)    Current Meds   1  Acarbose 50 MG Oral Tablet; TAKE 1 TABLET 3 TIMES DAILY WITH THE FIRST BITE OF   EACH MAIN MEAL; Therapy: 72OJY4694 to (Evaluate:25Ptd6186)  Requested for: 93YDN2844; Last   Rx:84Mxy2780 Ordered   2  Cholestyramine 4 GM Oral Packet; MIX THE CONTENTS OF 1 POWDER PACKET WITH   2 TO 6 OZ OF NONCARBONATED BEVERAGE AND DRINK 3 TIMES DAILY; Therapy: 52YSP8587 to (Evaluate:19Jan2017)  Requested for: 98Fvi7806; Last   Rx:31Tgb5897 Ordered   3  Citracal TABS; Therapy: (Recorded:50Qpl2374) to Recorded   4  Culturelle CAPS; Therapy: (Recorded:91Vwf9959) to Recorded   5  Docusate Calcium CAPS; Therapy: (Recorded:12Rab6135) to Recorded   6  Linzess 145 MCG Oral Capsule Recorded   7  Multivitamins TABS; TAKE 1 TABLET DAILY; Therapy: (Recorded:72Qfq3316) to Recorded   8  Pantoprazole Sodium 40 MG Oral Tablet Delayed Release (Protonix); TAKE 1 TABLET   TWICE DAILY 30 MINUTES BEFORE BREAKFAST AND DINNER; Therapy: 57AKB0069 to (Gerardo Yañez)  Requested for: 68HRJ6422; Last   Rx:10Mar2016 Ordered   9  Stool Softener TABS; Therapy: (Recorded:05Vxd6388) to Recorded   10  Temazepam 15 MG Oral Capsule; Therapy: (Recorded:73Sqn9223) to Recorded    Allergies    1  Guaifenesin & Derivatives   2   Sulfa Drugs    Signatures   Electronically signed by : ANGELA Mcginnis; Feb 14 2017  1:32PM EST                       (Author)

## 2018-01-11 NOTE — RESULT NOTES
Message  Pt is tolerating 1/3 strength at 55 ml/hr= 440 kcal and 38 grams protein   Will Decrease  kcal  Dextrose 20% 700 ml = 476 kcal  Amino Acid 10% 800 mlg= 320 kcal  Lipids 100 ml 20% = 200 kcal  995 + + 440 kcal = 1435 kcal / 118 grams protein  updated orders faxed to [employer]   Electronically signed by : ANGELA Amato; May 31 2016  4:24PM EST                       (Author)

## 2018-01-11 NOTE — PROGRESS NOTES
Message  Per [facility] specialist_ will add 30 mg/kg body weight Carnitor ( carnitine) to TPN  Patient is 79 kg currently  Called [facility] and arranged for 2000 mg of carnitor to be added to TPN per day  Patient will also start taking Co Enzyme Q10-( 600 mg)  She is tolerating 75 ml/hour of pedialyte and unjury = 330 kcal and 40 grams of protein  Decreased TPN protein as patient is getting protein from unjury in G- tube  New TPN orders:  Dextrose 20% 650 ml, AA 10% 800 ml and Lipids 20% 200 ml   total of enteral and parenteral = 1492 kcal and 120 grams protein plus Carnitor ( 2 grams)  Patient is tolerating 16 ounces of po fluids per day ( not thickened) and intermittant po intake up to 500 calories per day  ordered Linzess to improve bowel movement which is allowing her to tolerate her G tube  will contiue to follow      Active Problems    1  Abdominal discomfort (789 00) (R10 9)   2  Abdominal pain (789 00) (R10 9)   3  Abnormal weight gain (783 1) (R63 5)   4  Acute UTI (599 0) (N39 0)   5  Allergic contact dermatitis due to other agents (692 89) (L23 89)   6  Cervical cancer screening (V76 2) (Z12 4)   7  Chronic venous embolism and thrombosis of deep vessels of distal lower extremity   (453 52) (I82 5Z9)   8  Constipation (564 00) (K59 00)   9  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)   10  Dysphagia (787 20) (R13 10)   11  Edema (782 3) (R60 9)   12  Gastroesophageal reflux disease (530 81) (K21 9)   13  History of Nissen fundoplication (G84 69) (O51 189)   14  Hypertension (401 9) (I10)   15  Iron deficiency (280 9) (E61 1)   16  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)   17  Migraine headache (346 90) (G43 909)   18  Muscle weakness (generalized) (728 87) (M62 81)   19  Nausea (787 02) (R11 0)   20  Nausea with vomiting (787 01) (R11 2)   21  Need for diphtheria-tetanus-pertussis (Tdap) vaccine (V06 1) (Z23)   22  Obesity (278 00) (E66 9)   23   Oropharyngeal dysphagia (787 22) (R13 12)   24  Polycystic ovarian syndrome (256 4) (E28 2)   25  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 3) (K91 2,Z90 3)   26  Pre-op evaluation (V72 84) (Z01 818)   27  Pre-operative cardiovascular examination (V72 81) (Z01 810)   28  Retinitis pigmentosa (362 74) (H35 52)   29  Skin irritation (709 9) (R23 8)   30  Status post gastric bypass for obesity (V45 86) (Z98 84)   31  Stroke Syndrome (436)   32  Type B influenza (487 1) (J10 1)   33  Urinary retention (788 20) (R33 9)   34  Urination pain (788 1) (R30 9)   35  Voiding dysfunction (599 9) (N39 8)    Current Meds   1  B Complex CAPS; Therapy: (Recorded:42Rtk0530) to Recorded   2  Citracal TABS; Therapy: (Recorded:40Jnw2538) to Recorded   3  Culturelle CAPS; Therapy: (Recorded:49Zys6366) to Recorded   4  Levsin 0 125 MG Oral Tablet; Therapy: (Recorded:13Kaw9955) to Recorded   5  Maxorb Extra 4"x4" External Pad; Therapy: 49QPZ7686 to Recorded   6  MiraLax Oral Powder (Polyethylene Glycol 3350); Therapy: (Recorded:93Oyy7550) to Recorded   7  Multivitamins TABS; TAKE 1 TABLET DAILY; Therapy: (Recorded:09Fyp4177) to Recorded   8  Ondansetron 4 MG Oral Tablet Dispersible (Zofran ODT); Take one every 4-6 hours prn   for nausea; Therapy: 95MWE4337 to (Last Rx:23Nov2015)  Requested for: 23Nov2015 Ordered   9  Pantoprazole Sodium 40 MG Oral Tablet Delayed Release (Protonix); TAKE 1 TABLET   TWICE DAILY 30 MINUTES BEFORE BREAKFAST AND DINNER; Therapy: 31LYP8564 to ([facility])  Requested for: 03JAQ3220; Last   Rx:10Mar2016 Ordered   10  Stool Softener TABS; Therapy: (Recorded:59Mem9727) to Recorded    Allergies    1  Guaifenesin & Derivatives   2   Sulfa Drugs    Signatures   Electronically signed by : ANGELA Polo; Oct  4 2016  3:50PM EST                       (Author)

## 2018-01-11 NOTE — RESULT NOTES
Message  Received TPN orders and reviewed labs  Labs within acceptable limits with adjustment last week   Potassium level normal  Prealbumin level 20 4 Will continue current orders and fax to [employer]   Electronically signed by : ANGELA Watts; Jan 12 2016  2:24PM EST                       (Author)

## 2018-01-11 NOTE — RESULT NOTES
Message  Labs reviewed and within acceptable limits  Prealbumin level 18  Faxed new orders   Pt is tolerating more po and is concerned about slow/limited weight loss while on TPN    New TPN recommendations:  400 ml 20% dextrose = 80 grams CHO= 272 kca  600 ml 10% AA= 60 grams prot= 240 kcal  100ml 20% lipids= 20 gram lipids 200 kcal  712 kcal and 80 grams of protein    Will continue to follow      Signatures   Electronically signed by : ANGELA Polo; Feb 16 2016 12:12PM EST                       (Author)

## 2018-01-11 NOTE — RESULT NOTES
Verified Results  US GALLBLADDER 21Jun2016 12:26PM Rosi Amanda Order Number: [id number]   Performing Comments: [address]   - Patient Instructions: NOTHING TO EAT OR DRINK AFTER MIDNIGHT   TW Order Number: [id number]   Performing Comments: [address]   - Patient Instructions: NOTHING TO EAT OR DRINK AFTER MIDNIGHT     Test Name Result Flag Reference   US GALLBLADDER (Report)     RIGHT UPPER QUADRANT ULTRASOUND     INDICATION: Right upper quadrant pain  Nausea  COMPARISON: 8/31/2015  TECHNIQUE:  Real-time ultrasound of the right upper quadrant was performed with a curvilinear transducer with both volumetric sweeps and still imaging techniques  FINDINGS:     PANCREAS: Visualized portions show no abnormality  AORTA AND IVC: Visualized portions are normal for patient age  LIVER:   Normal size  Echogenicity and contour are normal    No evidence of mass  Normal directional flow is present within the portal vein  BILIARY:   The gallbladder is normal in caliber  No wall thickening or pericholecystic fluid  No stones or sludge  No sonographic Zepeda's sign  No intrahepatic biliary dilatation  CBD measures 5 mm  No choledocholithiasis  KIDNEY:    Right kidney measures 11 1 x 4 7 cm  Within normal limits  ASCITES:  None  IMPRESSION:     Normal               Workstation performed: [id number]     Signed by:    Elli Escobedo MD   6/22/16

## 2018-01-11 NOTE — PROGRESS NOTES
Message  Returned phone call  Patient's CGM results show frequent fluctuations with blood sugar  Patient experiences symptoms at 70 mg/dl, she will eat 15 grams of carb , it will go up to 130 mg/dl and then crash  She is frequently getting the shakes, irritability and headaches with the constant hypoglycemia  Patient cannot tolerate more than 5 grams of carbohydrate or she will experience hypoglycemia one hour later  Pt's endocrinologist increased her acarbose to 100 mg tid , although she is limiting her carbohydrate so it may not have an effect  She can no longer exercise due to her sugars dropping  Endo may consider Januvia instead of acarbose  As her hypoglycemia has gotten worse since discontinuing the IV fluids, endo would like her to go back to IV saline at night - as her dysautonomia may be causing her low blood sugars  Patient is very upset about the decrease in her quality of life  She is hoping this issue can be corrected soon, does not want to do continuous D5w, as endo stated her body may get to the point of always requiring a slow low amount of glucose  patient is more agreeable to going back to using normal saline to help control her dysautonomia  Patient will temporarily stop her oral intake, and use elecare homar and prosource, plus pedialyte and IV saline   Will f/u with patient on Friday to see if her levels are improved  Active Problems    1  Abdominal discomfort (789 00) (R10 9)   2  Abdominal pain (789 00) (R10 9)   3  Abnormal weight gain (783 1) (R63 5)   4  Acute UTI (599 0) (N39 0)   5  Allergic contact dermatitis due to other agents (692 89) (L23 89)   6  Cervical cancer screening (V76 2) (Z12 4)   7  Chronic venous embolism and thrombosis of deep vessels of distal lower extremity   (453 52) (I82 5Z9)   8  Constipation (564 00) (K59 00)   9  Diarrhea (787 91) (R19 7)   10  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)   11  Dizziness (780 4) (R42)   12   Dysautonomia orthostatic hypotension syndrome (333 0) (G90 3)   13  Dysphagia (787 20) (R13 10)   14  Edema (782 3) (R60 9)   15  Encounter for gynecological examination (V72 31) (Z01 419)   16  Gastroesophageal reflux disease (530 81) (K21 9)   17  Gastrostomy tube dysfunction (536 42) (K94 23)   18  History of Nissen fundoplication (U65 81) (Q07 116)   19  Hyperparathyroidism, secondary (588 81) (N25 81)   20  Hypertension (401 9) (I10)   21  Infection of Broviac site, initial encounter (999 31) (T82 7XXA)   22  Iron deficiency (280 9) (E61 1)   23  Menorrhagia with regular cycle (626 2) (N92 0)   24  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)   25  Migraine headache (346 90) (G43 909)   26  Muscle weakness (generalized) (728 87) (M62 81)   27  Need for diphtheria-tetanus-pertussis (Tdap) vaccine (V06 1) (Z23)   28  Obesity (278 00) (E66 9)   29  Oropharyngeal dysphagia (787 22) (R13 12)   30  Polycystic ovarian syndrome (256 4) (E28 2)   31  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 3) (K91 2,Z90 3)   32  Pre-op evaluation (V72 84) (Z01 818)   33  Pre-operative cardiovascular examination (V72 81) (Z01 810)   34  Reactive hypoglycemia (251 2) (E16 1)   35  Retinitis pigmentosa (362 74) (H35 52)   36  Status post gastric bypass for obesity (V45 86) (Z98 84)   37  Stroke Syndrome (436)   38  Urinary retention (788 20) (R33 9)   39  Urination pain (788 1) (R30 9)   40  Voiding dysfunction (599 9) (N39 8)    Current Meds   1  Acarbose 100 MG Oral Tablet; TAKE 1 TABLET 3 TIMES DAILY WITH THE FIRST BITE   OF EACH MAIN MEAL; Therapy: 36TKA0686 to (Evaluate:11Ifm3838)  Requested for: 82UTW7067; Last   Rx:01Mar2017 Ordered   2  Cholestyramine 4 GM Oral Packet; MIX THE CONTENTS OF 1 POWDER PACKET WITH   2 TO 6 OZ OF NONCARBONATED BEVERAGE AND DRINK 3 TIMES DAILY; Therapy: 02DTI8762 to (Evaluate:19Jan2017)  Requested for: 61Cfe4447; Last   Rx:88Sxz2537 Ordered   3  Citracal TABS;    Therapy: (Recorded:16Sep2016) to Recorded   4  Culturelle CAPS; Therapy: (Recorded:98Fzp0691) to Recorded   5  Docusate Calcium CAPS; Therapy: (Recorded:65Lyp6301) to Recorded   6  LevOCARNitine 1 GM/10ML Oral Solution; 6 5ml 4 time daily; Therapy: 05VZN6699 to (Evaluate:96Qdk4922)  Requested for: 89Gnh1732; Last   Rx:72Tgm9556 Ordered   7  Linzess 145 MCG Oral Capsule Recorded   8  Multivitamins TABS; TAKE 1 TABLET DAILY; Therapy: (Recorded:49Gsh3097) to Recorded   9  OneTouch Lancets Miscellaneous; check 1x/day  Requested for: 19Oep3810; Last   Rx:74Ycq7927 Ordered   10  OneTouch Ultra 2 w/Device Kit; USE AS DIRECTED  Requested for: 75Erz8082; Last    Rx:98Qpy1273 Ordered   11  OneTouch Ultra Blue In [employer]; Check BS 1x/day  Requested for: 46Mwk4436; Last    Rx:46Uuw1810 Ordered   12  Pantoprazole Sodium 40 MG Oral Tablet Delayed Release (Protonix); TAKE 1 TABLET    TWICE DAILY 30 MINUTES BEFORE BREAKFAST AND DINNER; Therapy: 62VIY4391 to (Sydni Wetzel)  Requested for: 58FYC0380; Last    Rx:10Mar2016 Ordered   13  Stool Softener TABS; Therapy: (Recorded:20Hoq5447) to Recorded   14  Temazepam 15 MG Oral Capsule; Therapy: (Recorded:56Hkt2080) to Recorded    Allergies    1  Guaifenesin & Derivatives   2   Sulfa Drugs    Signatures   Electronically signed by : ANGELA Posada; Mar  1 2017  5:00PM EST                       (Author)

## 2018-01-11 NOTE — MISCELLANEOUS
Message  Pt called office today because she is having ULQ ABD pain  It just comes on with no trigger  Shwe gets sweaty and loses her color  She vomits mucus once or twice and sits for a few minuts and she feels better  Spoke to oscar and she told me to tell the pt to go on the liquid diet for 3 days then progress to the pureed and stay on that til she sees Dr Arash Padilla  Told the pt  She understood and said she would  Active Problems    1  Abdominal discomfort (789 00) (R10 9)   2  Abnormal weight gain (783 1) (R63 5)   3  Chronic venous embolism and thrombosis of deep vessels of distal lower extremity   (453 52) (I82 5Z9)   4  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)   5  Dysphagia (787 20) (R13 10)   6  Edema (782 3) (R60 9)   7  Gastroesophageal reflux disease (530 81) (K21 9)   8  History of Nissen fundoplication (T28 09) (W75 68)   9  Hypertension (401 9) (I10)   10  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)   11  Migraine headache (346 90) (G43 909)   12  Muscle weakness (generalized) (728 87) (M62 81)   13  Nausea (787 02) (R11 0)   14  Nausea with vomiting (787 01) (R11 2)   15  Obesity (278 00) (E66 9)   16  Polycystic ovarian syndrome (256 4) (E28 2)   17  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 3) (K91 2,Z90 3)   18  Pre-op evaluation (V72 84) (Z01 818)   19  Pre-operative cardiovascular examination (V72 81) (Z01 810)   20  Retinitis pigmentosa (362 74) (H35 52)   21  Status post gastric bypass for obesity (V45 86) (Z98 84)   22  Stroke Syndrome (436)   23  Type B influenza (487 1) (J10 1)    Current Meds   1  B Complex CAPS; Therapy: (Recorded:68Tpk2319) to Recorded   2  Carafate 1 GM/10ML Oral Suspension; TAKE 2 TEASPOONFULS FOUR TIMES A DAY; Therapy: 60Kqu2722 to (Evaluate:40Luk1091)  Requested for: 05Lnv8329; Last   Rx:33Aav6410 Ordered   3  Culturelle CAPS; Therapy: (Recorded:80Oyv5461) to Recorded   4  Multivitamins TABS; TAKE 1 TABLET DAILY;    Therapy: (Recorded:72Okc2945) to Recorded   5  Ondansetron 4 MG Oral Tablet Dispersible (Zofran ODT); Take one every 4-6 hours prn   for nausea; Therapy: 93DGF0648 to (Last Rx:23Nov2015)  Requested for: 23Nov2015 Ordered   6  Pantoprazole Sodium 40 MG Oral Tablet Delayed Release (Protonix); TAKE 1 TABLET   TWICE DAILY 30 MINUTES BEFORE BREAKFAST AND DINNER; Therapy: 75QUJ4468 to (Jena Graf)  Requested for: 30TLH5646; Last   Rx:10Mar2016 Ordered   7  Ranitidine HCl - 300 MG Oral Capsule; TAKE 1 CAPSULE AT BEDTIME NIGHTLY; Therapy: 46FIY1213 to (Evaluate:77Eug9055)  Requested for: 76PSQ7324; Last   Rx:18Mar2015 Ordered    Allergies    1  Guaifenesin & Derivatives   2   Sulfa Drugs    Signatures   Electronically signed by : Zachary Damon LPN; Mar 16 2061  9:28ME EST                       (Author)

## 2018-01-11 NOTE — PROGRESS NOTES
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 and she is slowly weaning off IV hydration  She is tolerating 2 to 3 cans of Vital HN  With addition of Vital HN 3 cans patient is receiving  1053 kcal and 125 grams protein  If patient can only tolerate 2 cans of Vital UV=846 kcal and 105 grams protein   Her prealbumin dropped, but may be secondary to inflammation with her mitochondrial flare  She reports increased drainage from her G-tube when she eats po, but is tolerating diluted tube feeding well  She is using her port to infusion about 1 liter of saline per day and continues to get carnitine in her saline  Dx: Dyphagia/inability to tolerate po related to mitochondrial disease as evidenced by need to G-tube and IV hydration Intervention: Patient will continue to f/u weekly via email  She will slowly increase her tube feedings and hopefully wean off IV saline  PO as tolerated and continue with carnitine  Will recheck prealbumin in a month Monitoring/evaluation F/U weekly by email, renew orders for Vital HN  Active Problems    1  Abdominal discomfort (789 00) (R10 9)   2  Abdominal pain (789 00) (R10 9)   3  Abnormal weight gain (783 1) (R63 5)   4  Acute UTI (599 0) (N39 0)   5  Allergic contact dermatitis due to other agents (692 89) (L23 89)   6  Cervical cancer screening (V76 2) (Z12 4)   7  Chronic venous embolism and thrombosis of deep vessels of distal lower extremity   (453 52) (I82 5Z9)   8  Constipation (564 00) (K59 00)   9  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)   10  Dysphagia (787 20) (R13 10)   11  Edema (782 3) (R60 9)   12  Gastroesophageal reflux disease (530 81) (K21 9)   13  History of Nissen fundoplication (X17 86) (Y50 228)   14  Hypertension (401 9) (I10)   15   Infection of Broviac site, initial encounter (999 31) (T82 7XXA)   16  Iron deficiency (280 9) (E61 1)   17  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)   18  Migraine headache (346 90) (G43 909)   19  Muscle weakness (generalized) (728 87) (M62 81)   20  Nausea (787 02) (R11 0)   21  Nausea with vomiting (787 01) (R11 2)   22  Need for diphtheria-tetanus-pertussis (Tdap) vaccine (V06 1) (Z23)   23  Obesity (278 00) (E66 9)   24  Oropharyngeal dysphagia (787 22) (R13 12)   25  Polycystic ovarian syndrome (256 4) (E28 2)   26  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 3) (K91 2,Z90 3)   27  Pre-op evaluation (V72 84) (Z01 818)   28  Pre-operative cardiovascular examination (V72 81) (Z01 810)   29  Retinitis pigmentosa (362 74) (H35 52)   30  Skin irritation (709 9) (R23 8)   31  Status post gastric bypass for obesity (V45 86) (Z98 84)   32  Stroke Syndrome (436)   33  Type B influenza (487 1) (J10 1)   34  Urinary retention (788 20) (R33 9)   35  Urination pain (788 1) (R30 9)   36  Voiding dysfunction (599 9) (N39 8)    Past Medical History    1  History of Abnormality Of Walk - Dragging The Right Foot   2  History of Complex Regional Pain Syndrome Type I Of The Hand (337 21)   3  History of Generalized abdominal pain (789 07) (R10 84)   4  History of acute sinusitis (V12 69) (Z87 09)   5  History of headache (V13 89) (Z87 898)   6  History of stroke (V12 54) (Z86 73)   7  History of urinary tract infection (V13 02) (Z87 440)   8  History of Intolerance to cold (780 99) (R68 89)   9  History of Intolerance to heat (780 99) (R68 89)   10  History of Morbid obesity (278 01) (E66 01)   11  History of Night sweats (780 8) (R61)   12  History of Stroke Syndrome (436)    Surgical History    1  History of Central IV Catheter Inferior Vena Cava   2  History of Esophagogastric Fundoplasty Nissen Fundoplication   3  History of Gastric Surgery For Morbid Obesity Bypass With Rossy-en-Y   4   History of Neuroplasty With Transposition Of Ulnar Nerve - At Wrist 5  History of Open Treatment Of Fracture Of The Lateral Malleolus    Family History  Mother    1  Family history of Carcinoid (except of appendix)   2  Family history of Disorder Of Mitochondrial Metabolism   3  Family history of Family Health Status Of Mother - Alive   4  Family history of hypertension (V17 49) (Z82 49)   5  Family history of malignant neoplasm (V16 9) (Z80 9)   6  Family history of thyroiditis (V18 19) (Z83 49)   7  Family history of Hypertension (V17 49)   8  Family history of Stroke Syndrome (V17 1)   9  Family history of Thyroid Cancer  Father    8  Family history of Depression   11  Family history of Family Health Status Of Father - Alive   15  Family history of depression (V17 0) (Z81 8)   13  Denied: Family history of epilepsy   14  Family history of Fuch endothelial dystrophy   15  Family history of Migraine Headache  Daughter    12  Family history of Disorder Of Mitochondrial Metabolism  Son    16  Family history of Disorder Of Mitochondrial Metabolism  Brother    18  Family history of depression (V17 0) (Z81 8)   19  Family history of narcolepsy (V19 8) (Z82 0)  Maternal Grandfather    21  Family history of Breast Cancer (V16 3)    Social History    · Being A Social Drinker   · Denied: Drug use   · Exercise Habits   · Marital History - Currently    · Never A Smoker   · Occupation:   · Occupation: Medical Professional    Current Meds   1  Citracal TABS; Therapy: (Recorded:47Ueo5824) to Recorded   2  Culturelle CAPS; Therapy: (Recorded:89Zzt9299) to Recorded   3  Linzess 145 MCG Oral Capsule Recorded   4  MiraLax Oral Powder (Polyethylene Glycol 3350); Therapy: (Recorded:30Lxz0322) to Recorded   5  Multivitamins TABS; TAKE 1 TABLET DAILY; Therapy: (Recorded:16Ofy9892) to Recorded   6  Ondansetron 4 MG Oral Tablet Dispersible (Zofran ODT); Take one every 4-6 hours prn   for nausea; Therapy: 72RGP3762 to (Last 54 405 123)  Requested for: 23Nov2015 Ordered   7   Pantoprazole Sodium 40 MG Oral Tablet Delayed Release (Protonix); TAKE 1 TABLET   TWICE DAILY 30 MINUTES BEFORE BREAKFAST AND DINNER; Therapy: 21YQX1269 to (Kyra Collet)  Requested for: 58TAX4934; Last   Rx:10Mar2016 Ordered   8  Stool Softener TABS; Therapy: (Recorded:19Vim5277) to Recorded    Allergies    1  Guaifenesin & Derivatives   2  Sulfa Drugs    Future Appointments    Date/Time Provider Specialty Site   02/24/2017 09:00 AM JON Burnette  Hematology [facility] [place]   12/08/2017 10:00 AM JON Griffin   General Surgery [facility]   12/12/2016 01:00 PM Derek Leach MD Obstetrics/Gynecology [facility]   05/11/2017 08:45 AM Sally Lundy MD Urology [facility] [address]     Signatures   Electronically signed by : ANGELA Guy; Dec  1 2016  1:48PM EST                       (Author)    Electronically signed by : JON Avila ; Dec  1 2016  4:51PM EST                       (Validation)

## 2018-01-11 NOTE — MISCELLANEOUS
Assessment    1  Obesity (278 00) (E66 9)   2  Dysphagia (787 20) (R13 10)   3  Gastroesophageal reflux disease (530 81) (K21 9)   4  Muscle weakness (generalized) (728 87) (M62 81)    Plan  Methylenetetrahydrofolate reductase deficiency    · From  Mestinon 60 MG Oral Tablet  To Pyridostigmine Bromide 60 MG Oral  Tablet (Mestinon) Take 1 tablet twice daily   Rx By: Martha Trimble; Dispense: 30 Days ; #:60 Tablet; Refill: 0; For: Methylenetetrahydrofolate reductase deficiency; MAXINE = N; Record    Discussion/Summary  Discussion Summary:     1  s/p feeding tube  s/p partial gastrectomy, gastric bypass, hernia repair  Will start tube feeds gradually and plan to wean off TPN if possible  If not, would like to have Port in   2  Dysphagia, GERD  On PPI bid, Zantac prn  3  Hx of mitochondrial disease  Followed at [facility]  4  Fatigue  Encouraged to schedule sleep study  5  Seasonal allergies  Follow up prn  Counseling Documentation With Imm: The patient was counseled regarding risk factor reductions  Chief Complaint  Chief Complaint Free Text Note Form: RAD      History of Present Illness  TCM Communication [facility]: She was hospitalized at [facility]  The dates of hospitalization: 04/19/2016, date of admission: 04/19/2016, date of discharge: 04/22/2016  Diagnosis: G TUBE SURGERY  She was discharged to home  Smoking Status was not reviewed  Medications reviewed and updated today  She scheduled a follow up appointment  Follow-up appointments with other specialists: Destinee Irby 04/28/2016     Symptoms: upper abdominal pain, middle abdominal pain, lower abdominal pain, nausea, constipation, incisional pain, wound drainage and wound drainage color YELLOW, but no fever, no weakness, no dizziness, no headache, no fatigue, no cough, no shortness of breath, no chest pain, no back pain on left side, no back pain on right side, no arm pain left side, no arm pain on right side, no leg pain on left side, no leg pain on right side, no rash:, no anorexia, no vomiting, no loose stools, no pain with urinating and no swelling  Communication performed and completed by   HPI: Mrs Santhosh Joseph will be starting with tube feeds  She is hopeful that it will work and be weaned off TPN  If not, she would like to have a Port placed in June  She still has dysphagia, felt her swallowing has weakened since her surgery  She feels things get stuck half way down  She has had multiple studies for this  She also noticed she is getting more hoarse, on bid PPI  It was recommended she see an ENT, has not seen them yet  She was started on Mestinon  She is constipated, needs stool softeners daily  She is constantly tired  She reports that it was difficult to wean her off intubation after feeding tube was placed  She has not scheduled a sleep study, both her children has central apnea  She is not working right now  Gastroesophageal Reflux Disease (Follow-Up): The patient is being seen for follow-up of gastroesophageal reflux disease  Interval symptoms:  denies heartburn and stable dysphagia  Associated symptoms: weight loss  Medications include ranitidine (Zantac) and pantoprazole (Protonix)  Review of Systems  Complete-Female:   Constitutional: not feeling tired  Eyes: no eyesight problems  ENT: nasal discharge  Cardiovascular: no chest pain  Respiratory: no cough  Gastrointestinal: constipation  Genitourinary: no dysuria and no incontinence  Musculoskeletal: no arthralgias and no myalgias  Neurological: no dizziness  Psychiatric: no sleep disturbances  feelings of weakness      Active Problems     1  Abdominal discomfort (789 00) (R10 9)   2  Abnormal weight gain (783 1) (R63 5)   3  Chronic venous embolism and thrombosis of deep vessels of distal lower extremity   (453 52) (I82 5Z9)   4  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)   5  Dysphagia (787 20) (R13 10)   6   Edema (782 3) (R60 9)   7  Gastroesophageal reflux disease (530 81) (K21 9)   8  History of Nissen fundoplication (G98 38) (C00 08)   9  Hypertension (401 9) (I10)   10  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)   11  Migraine headache (346 90) (G43 909)   12  Muscle weakness (generalized) (728 87) (M62 81)   13  Nausea (787 02) (R11 0)   14  Obesity (278 00) (E66 9)   15  Polycystic ovarian syndrome (256 4) (E28 2)   16  Pre-op evaluation (V72 84) (Z01 818)   17  Pre-operative cardiovascular examination (V72 81) (Z01 810)   18  Retinitis pigmentosa (362 74) (H35 52)   19  Stroke Syndrome (436)   20  Type B influenza (487 1) (J10 1)    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 3) (K91 2)       Status post gastric bypass for obesity (V45 86) (Z98 84)       Nausea with vomiting (787 01) (R11 2)       Oropharyngeal dysphagia (787 22) (R13 12)          Past Medical History    1  History of Abnormality Of Walk - Dragging The Right Foot   2  History of Complex Regional Pain Syndrome Type I Of The Hand (337 21)   3  History of Generalized abdominal pain (789 07) (R10 84)   4  History of acute sinusitis (V12 69) (Z87 09)   5  History of headache (V13 89) (Z87 898)   6  History of stroke (V12 54) (Z86 73)   7  History of urinary tract infection (V13 02) (Z87 440)   8  History of Intolerance to cold (780 99) (R68 89)   9  History of Intolerance to heat (780 99) (R68 89)   10  History of Morbid obesity (278 01) (E66 01)   11  History of Night sweats (780 8) (R61)   12  History of Stroke Syndrome (436)    Surgical History    1  History of Central IV Catheter Inferior Vena Cava   2  History of Esophagogastric Fundoplasty Nissen Fundoplication   3  History of Gastric Surgery For Morbid Obesity Bypass With Rossy-en-Y   4  History of Neuroplasty With Transposition Of Ulnar Nerve - At Wrist   5  History of Open Treatment Of Fracture Of The Lateral Malleolus    Family History    1   Family history of Carcinoid (except of appendix) 2  Family history of Disorder Of Mitochondrial Metabolism   3  Family history of Family Health Status Of Mother - Alive   4  Family history of hypertension (V17 49) (Z82 49)   5  Family history of malignant neoplasm (V16 9) (Z80 9)   6  Family history of thyroiditis (V18 19) (Z83 49)   7  Family history of Hypertension (V17 49)   8  Family history of Stroke Syndrome (V17 1)   9  Family history of Thyroid Cancer    10  Family history of Depression   11  Family history of Family Health Status Of Father - Alive   15  Family history of depression (V17 0) (Z81 8)   13  Denied: Family history of epilepsy   14  Family history of Fuch endothelial dystrophy   15  Family history of Migraine Headache    16  Family history of Disorder Of Mitochondrial Metabolism    17  Family history of Disorder Of Mitochondrial Metabolism    18  Family history of depression (V17 0) (Z81 8)   19  Family history of narcolepsy (V19 8) (Z82 0)    20  Family history of Breast Cancer (V16 3)    Social History    · Being A Social Drinker   · Denied: Drug use   · Exercise Habits   · Marital History - Currently    · Never A Smoker   · Occupation:   · Occupation: Medical Professional  Social History Reviewed: The social history was reviewed and is unchanged  Current Meds   1  B Complex CAPS; Therapy: (Recorded:89Mpz4874) to Recorded   2  Culturelle CAPS; Therapy: (Recorded:36Fil9807) to Recorded   3  Mestinon 60 MG Oral Tablet Recorded   4  Multivitamins TABS; TAKE 1 TABLET DAILY; Therapy: (Recorded:70Oqx0897) to Recorded   5  Ondansetron 4 MG Oral Tablet Dispersible; Take one every 4-6 hours prn for nausea; Therapy: 63ATF6000 to (Last Rx:23Nov2015)  Requested for: 23Nov2015 Ordered   6  Pantoprazole Sodium 40 MG Oral Tablet Delayed Release; TAKE 1 TABLET TWICE DAILY   30 MINUTES BEFORE BREAKFAST AND DINNER; Therapy: 28WRQ2706 to (Kareem Anderson)  Requested for: 94QPL2849; Last   Rx:10Mar2016 Ordered   7   Ranitidine HCl - 300 MG Oral Capsule; TAKE 1 CAPSULE AT BEDTIME NIGHTLY; Therapy: 95YIR5694 to (Evaluate:10Aet2745)  Requested for: 89JNM0358; Last   Rx:17Mar2016 Ordered  Medication List Reviewed: The medication list was reviewed and updated today  Allergies    1  Guaifenesin & Derivatives   2  Sulfa Drugs    Vitals  Signs [Data Includes: Current Encounter]   Recorded: 21WSI3675 01:00PM   Heart Rate: 85  Respiration: 18  Systolic: 507  Diastolic: 82  Height: 5 ft 4 in  Weight: 185 lb   BMI Calculated: 31 75  BSA Calculated: 1 9  O2 Saturation: 98    Physical Exam    Constitutional   General appearance: Abnormal   chronically ill and comfortable  Head and Face   Head and face: Normal     Eyes   Pupils and irises: Equal, round, reactive to light  Ears, Nose, Mouth, and Throat   External inspection of ears and nose: Normal     Pulmonary   Respiratory effort: No increased work of breathing or signs of respiratory distress  Auscultation of lungs: Clear to auscultation  Cardiovascular   Auscultation of heart: Normal rate and rhythm, normal S1 and S2, no murmurs  Examination of extremities for edema and/or varicosities: Normal     Abdomen   Abdomen: Abnormal   The abdomen was soft and nontender  feeding tube LUQ, minimal discharge, no open wounds  Musculoskeletal   Gait and station: Normal     Neurologic   Cortical function: Normal mental status      Psychiatric   Orientation to person, place, and time: Normal        Future Appointments    Date/Time Provider Specialty Site   05/12/2016 11:30 AM Alicia Burch, [facility] WEIGHT [address]     Signatures   Electronically signed by : Tej Scott MD; May  2 2016  1:37PM EST                       (Author)

## 2018-01-12 VITALS
OXYGEN SATURATION: 98 % | TEMPERATURE: 97 F | HEIGHT: 65 IN | RESPIRATION RATE: 18 BRPM | WEIGHT: 168 LBS | SYSTOLIC BLOOD PRESSURE: 110 MMHG | DIASTOLIC BLOOD PRESSURE: 72 MMHG | HEART RATE: 92 BPM | BODY MASS INDEX: 27.99 KG/M2

## 2018-01-12 NOTE — PROGRESS NOTES
Message  Summary of nutrition intake:  Pedialyte = 114 cheli, o protein, 1140 ml  Enteral= 296 cheli, 26 grams protein, 600 ml( pm)  TPN= 525 cheli, 30 grams protein, 1000ml  Unjury= 180 cheli, 42 grams protein and 240 ml  Total = 1115 kcal, 98 grams protein and 2980 ml    PO = 600 calories, 40 grams of protein and 12 ounces ( 360 ml) fluid  Weight is being maintained will continue to follow      Active Problems    1  Abdominal discomfort (789 00) (R10 9)   2  Abdominal pain (789 00) (R10 9)   3  Abnormal weight gain (783 1) (R63 5)   4  Allergic contact dermatitis due to other agents (692 89) (L23 89)   5  Cervical cancer screening (V76 2) (Z12 4)   6  Chronic venous embolism and thrombosis of deep vessels of distal lower extremity   (453 52) (I82 5Z9)   7  Constipation (564 00) (K59 00)   8  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)   9  Dysphagia (787 20) (R13 10)   10  Edema (782 3) (R60 9)   11  Gastroesophageal reflux disease (530 81) (K21 9)   12  History of Nissen fundoplication (B42 18) (U93 80)   13  Hypertension (401 9) (I10)   14  Iron deficiency (280 9) (E61 1)   15  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)   16  Migraine headache (346 90) (G43 909)   17  Muscle weakness (generalized) (728 87) (M62 81)   18  Nausea (787 02) (R11 0)   19  Nausea with vomiting (787 01) (R11 2)   20  Need for diphtheria-tetanus-pertussis (Tdap) vaccine (V06 1) (Z23)   21  Obesity (278 00) (E66 9)   22  Oropharyngeal dysphagia (787 22) (R13 12)   23  Polycystic ovarian syndrome (256 4) (E28 2)   24  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 3) (K91 2,Z90 3)   25  Pre-op evaluation (V72 84) (Z01 818)   26  Pre-operative cardiovascular examination (V72 81) (Z01 810)   27  Retinitis pigmentosa (362 74) (H35 52)   28  Status post gastric bypass for obesity (V45 86) (Z98 84)   29  Stroke Syndrome (436)   30  Type B influenza (487 1) (J10 1)    Current Meds   1   Amoxicillin-Pot Clavulanate 400-57 MG/5ML Oral Suspension Reconstituted; TAKE 5 ML   Every 6 hours As Directed TDD:20;   Therapy: 17GSQ1850 to (Evaluate:06Usi2594)  Requested for: 74EGU2524; Last   Rx:06Etr0114 Ordered   2  B Complex CAPS; Therapy: (Recorded:58Ayq6226) to Recorded   3  Culturelle CAPS; Therapy: (Recorded:22Pxy8377) to Recorded   4  MiraLax Oral Powder (Polyethylene Glycol 3350); Therapy: (Recorded:43Xhi4967) to Recorded   5  Multivitamins TABS; TAKE 1 TABLET DAILY; Therapy: (Recorded:26Dfk9888) to Recorded   6  Ondansetron 4 MG Oral Tablet Dispersible (Zofran ODT); Take one every 4-6 hours prn   for nausea; Therapy: 80ZTN7172 to (Last 72 954 091)  Requested for: 23Nov2015 Ordered   7  Pantoprazole Sodium 40 MG Oral Tablet Delayed Release (Protonix); TAKE 1 TABLET   TWICE DAILY 30 MINUTES BEFORE BREAKFAST AND DINNER; Therapy: 53UXH1897 to (Briseyda Side)  Requested for: 36UJM3284; Last   Rx:10Mar2016 Ordered   8  Stool Softener TABS; Therapy: (Recorded:47Awp9980) to Recorded    Allergies    1  Guaifenesin & Derivatives   2   Sulfa Drugs    Signatures   Electronically signed by : ANGELA Amato; Jul 14 2016 11:49AM EST                       (Author)

## 2018-01-12 NOTE — RESULT NOTES
Message  As pt was not tolerating jevity 1 2- starting Vital today  Will assess po tolerance by next week  Faxed over TPN orders- continue TPN until tube feeding is tolerated      TPN providing 572 kcal and 50 grams of protein, 1300 ml fluid      Signatures   Electronically signed by : ANGELA Varghese; May 10 2016 12:56PM EST                       (Author)

## 2018-01-12 NOTE — RESULT NOTES
Verified Results  US GALLBLADDER [id number] 03:10PM Shraddha Rowe Order Number: [id number]    - Patient Instructions: To schedule this appointment, please contact Central Scheduling at [phone]  Test Name Result Flag Reference   US GALLBLADDER (Report)     RIGHT UPPER QUADRANT ULTRASOUND     INDICATION: Right upper quadrant pain  COMPARISON: Ultrasound 6/21/2016, CT abdomen and pelvis 7/30/2016     TECHNIQUE:  Real-time ultrasound of the right upper quadrant was performed with a curvilinear transducer with both volumetric sweeps and still imaging techniques  FINDINGS:     PANCREAS: Pancreatic tail is partially obscured by overlying bowel gas  Visualized portions of the pancreas are within normal limits  AORTA AND IVC: Visualized portions are normal for patient age  LIVER:   Size: Within normal range  The liver measures 13 5 cm in the midclavicular line  Contour: Surface contour is smooth  Parenchyma: Echogenicity and echotexture are within normal limits  No evidence of suspicious mass  Limited imaging of the main portal vein shows it to be patent and hepatopedal       BILIARY:   The gallbladder is normal in caliber  No wall thickening or pericholecystic fluid  No stones or sludge identified  No sonographic Zepeda's sign  No intrahepatic biliary dilatation  CBD measures 4 mm  No choledocholithiasis  KIDNEY:    Right kidney measures 12 5 x 5 0 cm  No hydronephrosis, shadowing calculus or solid mass  1 cm mid to upper pole simple cyst      ASCITES:  None  IMPRESSION:     1 cm simple right renal cyst      Otherwise, unremarkable study         Workstation performed: [id number]     Signed by:   Xochitl Chaparro DO   4/11/17

## 2018-01-12 NOTE — PROGRESS NOTES
Message  Surprisingly I'm feeling pretty good  I've had a good week  Eating wise I'm hitting between 700-850 calories  Just can't seem to get above that  I'm drinking 25-35 oz  Tube feed wise I'm doing 12 hrs of the pedialyte/unjury at 80ml/hr and 8 hrs of water at 110ml/HR  We are trying to see if I can get the water rate any higher to decrease the need for IV hydration  My gut is feeling pretty good  Shingleton added back my Zantac as I have periods where I'm refluxing pretty bad  Adjusted TPN to decrease calories:  Dextrose 10% 800 ml  AA 10% 500 ml  Lipids 20% 100 ml  Enteral provides: 276 kcal and 42 grams protein, plus po intake  Will continue to monitor  Active Problems    1  Abdominal discomfort (789 00) (R10 9)   2  Abdominal pain (789 00) (R10 9)   3  Abnormal weight gain (783 1) (R63 5)   4  Acute UTI (599 0) (N39 0)   5  Allergic contact dermatitis due to other agents (692 89) (L23 89)   6  Cervical cancer screening (V76 2) (Z12 4)   7  Chronic venous embolism and thrombosis of deep vessels of distal lower extremity   (453 52) (I82 5Z9)   8  Constipation (564 00) (K59 00)   9  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)   10  Dysphagia (787 20) (R13 10)   11  Edema (782 3) (R60 9)   12  Gastroesophageal reflux disease (530 81) (K21 9)   13  History of Nissen fundoplication (J04 39) (G53 036)   14  Hypertension (401 9) (I10)   15  Infection of Broviac site, initial encounter (999 31) (T82 7XXA)   16  Iron deficiency (280 9) (E61 1)   17  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)   18  Migraine headache (346 90) (G43 909)   19  Muscle weakness (generalized) (728 87) (M62 81)   20  Nausea (787 02) (R11 0)   21  Nausea with vomiting (787 01) (R11 2)   22  Need for diphtheria-tetanus-pertussis (Tdap) vaccine (V06 1) (Z23)   23  Obesity (278 00) (E66 9)   24  Oropharyngeal dysphagia (787 22) (R13 12)   25  Polycystic ovarian syndrome (256 4) (E28 2)   26   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 3) (K91 2,Z90 3)   27  Pre-op evaluation (V72 84) (Z01 818)   28  Pre-operative cardiovascular examination (V72 81) (Z01 810)   29  Retinitis pigmentosa (362 74) (H35 52)   30  Skin irritation (709 9) (R23 8)   31  Status post gastric bypass for obesity (V45 86) (Z98 84)   32  Stroke Syndrome (436)   33  Type B influenza (487 1) (J10 1)   34  Urinary retention (788 20) (R33 9)   35  Urination pain (788 1) (R30 9)   36  Voiding dysfunction (599 9) (N39 8)    Current Meds   1  Citracal TABS; Therapy: (Recorded:07Vzl3481) to Recorded   2  Culturelle CAPS; Therapy: (Recorded:57Nro3349) to Recorded   3  Linzess 145 MCG Oral Capsule Recorded   4  Maxorb Extra 4"x4" External Pad; Therapy: 91PME1470 to Recorded   5  MiraLax Oral Powder (Polyethylene Glycol 3350); Therapy: (Recorded:67Hcu5722) to Recorded   6  Multivitamins TABS; TAKE 1 TABLET DAILY; Therapy: (Recorded:17Wvp0605) to Recorded   7  Ondansetron 4 MG Oral Tablet Dispersible (Zofran ODT); Take one every 4-6 hours prn   for nausea; Therapy: 37IXM5809 to (Last Rx:23Nov2015)  Requested for: 23Nov2015 Ordered   8  Pantoprazole Sodium 40 MG Oral Tablet Delayed Release (Protonix); TAKE 1 TABLET   TWICE DAILY 30 MINUTES BEFORE BREAKFAST AND DINNER; Therapy: 01PDP7914 to (Korey Riding)  Requested for: 41TBV5849; Last   Rx:10Mar2016 Ordered   9  Stool Softener TABS; Therapy: (Recorded:43Jsy2207) to Recorded    Allergies    1  Guaifenesin & Derivatives   2   Sulfa Drugs    Signatures   Electronically signed by : ANGELA Amato; Nov 1 2016  1:24PM EST                       (Author)

## 2018-01-12 NOTE — PROGRESS NOTES
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 needs related to mitochondrial disease as evidenced by manometry and EGD results  Intervention: Pt will continue to take in po as tolerated Will cut TPN in 1/2 to allow for more weight loss  TPN will provide 700 calories, 60 grams protein and 80 grams carb to supplement her po intake and provide 1200 ml of fluid  Monitoring /evaluation: Will continue to communicate via phone weekly F/U with GI doctor on 3/22/2016 F/U with Dr Elaine Claire on 4/1/2016  Active Problems    1  Abdominal discomfort (789 00) (R10 9)   2  Abnormal weight gain (783 1) (R63 5)   3  Chronic venous embolism and thrombosis of deep vessels of distal lower extremity   (453 52) (I82 5Z9)   4  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)   5  Edema (782 3) (R60 9)   6  Gastroesophageal reflux disease (530 81) (K21 9)   7  History of Nissen fundoplication (T42 39) (X40 76)   8  Hypertension (401 9) (I10)   9  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)   10  Migraine headache (346 90) (G43 909)   11  Muscle weakness (generalized) (728 87) (M62 81)   12  Nausea (787 02) (R11 0)   13  Nausea with vomiting (787 01) (R11 2)   14  Obesity (278 00) (E66 9)   15  Polycystic ovarian syndrome (256 4) (E28 2)   16  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 3) (K91 2)   17  Pre-op evaluation (V72 84) (Z01 818)   18  Pre-operative cardiovascular examination (V72 81) (Z01 810)   19  Retinitis pigmentosa (362 74) (H35 52)   20  Status post gastric bypass for obesity (V45 86) (Z98 84)   21  Stroke Syndrome (436)   22  Type B influenza (487 1) (J10 1)    Past Medical History    1  History of Abnormality Of Walk - Dragging The Right Foot   2  History of Complex Regional Pain Syndrome Type I Of The Hand (337 21)   3  History of Generalized abdominal pain (789 07) (R10 84)   4  History of acute sinusitis (V12 69) (Z87 09)   5  History of headache (V13 89) (Z87 898)   6  History of stroke (V12 54) (Z86 73)   7  History of urinary tract infection (V13 02) (Z87 440)   8  History of Intolerance to cold (780 99) (R68 89)   9  History of Intolerance to heat (780 99) (R68 89)   10  History of Morbid obesity (278 01) (E66 01)   11  History of Night sweats (780 8) (R61)   12  History of Stroke Syndrome (436)    Surgical History    1  History of Central IV Catheter Inferior Vena Cava   2  History of Esophagogastric Fundoplasty Nissen Fundoplication   3  History of Gastric Surgery For Morbid Obesity Bypass With Rossy-en-Y   4  History of Neuroplasty With Transposition Of Ulnar Nerve - At Wrist   5  History of Open Treatment Of Fracture Of The Lateral Malleolus    Family History    1  Family history of Carcinoid (except of appendix)   2  Family history of Disorder Of Mitochondrial Metabolism   3  Family history of Family Health Status Of Mother - Alive   4  Family history of hypertension (V17 49) (Z82 49)   5  Family history of malignant neoplasm (V16 9) (Z80 9)   6  Family history of thyroiditis (V18 19) (Z83 49)   7  Family history of Hypertension (V17 49)   8  Family history of Stroke Syndrome (V17 1)   9  Family history of Thyroid Cancer    10  Family history of Depression   11  Family history of Family Health Status Of Father - Alive   15  Family history of depression (V17 0) (Z81 8)   13  Denied: Family history of epilepsy   14  Family history of Fuch endothelial dystrophy   15  Family history of Migraine Headache    16   Family history of Disorder Of Mitochondrial Metabolism    17  Family history of Disorder Of Mitochondrial Metabolism    18  Family history of depression (V17 0) (Z81 8)   19  Family history of narcolepsy (V19 8) (Z82 0)    20  Family history of Breast Cancer (V16 3)    Social History    · Being A Social Drinker   · Denied: Drug use   · Exercise Habits   · Marital History - Currently    · Never A Smoker   · Occupation:   · Occupation: Medical Professional    Current Meds   1  B Complex CAPS; Therapy: (Recorded:91Qmj0858) to Recorded   2  Carafate 1 GM/10ML Oral Suspension; TAKE 2 TEASPOONFULS FOUR TIMES A DAY; Therapy: 60Wyx8634 to (Evaluate:77Iqj0369)  Requested for: 35Qrz4436; Last   Rx:08Gyt8651 Ordered   3  Culturelle CAPS; Therapy: (Recorded:87Yux3059) to Recorded   4  Multivitamins TABS; TAKE 1 TABLET DAILY; Therapy: (Recorded:98Kwq3774) to Recorded   5  Ondansetron 4 MG Oral Tablet Dispersible (Zofran ODT); Take one every 4-6 hours prn   for nausea; Therapy: 93UBH4705 to (Last Rx:23Nov2015)  Requested for: 23Nov2015 Ordered   6  Pantoprazole Sodium 40 MG Oral Tablet Delayed Release (Protonix); TAKE 1 TABLET   TWICE DAILY 30 MINUTES BEFORE BREAKFAST AND DINNER; Therapy: 97IFF7937 to (Evaluate:19Uzx3582)  Requested for: 05XCB5880; Last   Rx:44Tta8786 Ordered   7  Ranitidine HCl - 300 MG Oral Capsule; TAKE 1 CAPSULE AT BEDTIME NIGHTLY; Therapy: 12EZH5389 to (Evaluate:89Iiv0955)  Requested for: 87YZI7430; Last   Rx:18Mar2015 Ordered    Allergies    1  Guaifenesin & Derivatives   2  Sulfa Drugs    Future Appointments    Date/Time Provider Specialty Site   03/22/2016 10:15 AM Bimal Hilton DO Gastroenterology Adult [facility] GASTROENTEROLOGY SPECIAL   04/01/2016 09:00 AM JON Harrison   General Surgery [facility]     Signatures   Electronically signed by : ANGELA Posada; Feb 12 2016  1:43PM EST                       (Author)    Electronically signed by : JON Soto ; Feb 12 2016  2:36PM EST                       (Validation)

## 2018-01-12 NOTE — RESULT NOTES
Message  Reviewed labs  Prealbumin level 19 4( normal)  All labs within normal limits  Will continue current TPN   orders faxed to [facility]   Electronically signed by : ANGELA Murguia; Mar  1 2016  1:07PM EST                       (Author)

## 2018-01-12 NOTE — PROGRESS NOTES
Message  Spoke with Shreeaye Del Valle- she saw GI yesterday  The plan is for labs( review nutritional status) and to order Questran to stop the diarrhea and elastase ( pancreatic enzyme) as she is fat malabsorping She is passing light colored( grey/white stools) that float and will not flush, indicating malabsorption    2  Leigh is up to 4 cans of Vital HN per day and continuing with her Unjury + pedialyte during the day  Labs will be ordered, will add more nutritional labs  If labs indicate fat malabsorption, 2  will consider switching to total elemental 2  product, such as elecare homar or Vivonex  Vital High protein provides: 948 kcal, 82 8 grams of protein  Pedialyte + Unjury= 342 kcal and 63 grams of protein  Total of 145 8 grams of protein and 1290 kcal    Patient is to be ordered Questran after obtaining labs  If enteral tolerance does not improve, will switch to elemental 1  formula  1 Amended By: Estrada Montana; Dec 21 2016 2:09 PM EST   2 Amended By: Estrada Montana; Dec 21 2016 2:12 PM EST    Active Problems   1  Abdominal discomfort (789 00) (R10 9)  2  Abdominal pain (789 00) (R10 9)  3  Abnormal weight gain (783 1) (R63 5)  4  Acute UTI (599 0) (N39 0)  5  Allergic contact dermatitis due to other agents (692 89) (L23 89)  6  Cervical cancer screening (V76 2) (Z12 4)  7  Chronic venous embolism and thrombosis of deep vessels of distal lower extremity   (453 52) (I82 5Z9)  8  Constipation (564 00) (K59 00)  9  Diarrhea (787 91) (R19 7)  10  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)  11  Dysphagia (787 20) (R13 10)  12  Edema (782 3) (R60 9)  13  Encounter for gynecological examination (V72 31) (Z01 419)  14  Gastroesophageal reflux disease (530 81) (K21 9)  15  Gastrostomy tube dysfunction (536 42) (K94 23)  16  History of Nissen fundoplication (H51 83) (A93 178)  17  Hypertension (401 9) (I10)  18  Infection of Broviac site, initial encounter (999 31) (T82 7XXA)  19   Iron deficiency (280  9) (E61 1)  20  Menorrhagia with regular cycle (626 2) (N92 0)  21  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)  22  Migraine headache (346 90) (G43 909)  23  Muscle weakness (generalized) (728 87) (M62 81)  24  Nausea (787 02) (R11 0)  25  Nausea with vomiting (787 01) (R11 2)  26  Need for diphtheria-tetanus-pertussis (Tdap) vaccine (V06 1) (Z23)  27  Obesity (278 00) (E66 9)  28  Oropharyngeal dysphagia (787 22) (R13 12)  29  Polycystic ovarian syndrome (256 4) (E28 2)  30  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 3) (K91 2,Z90 3)  31  Pre-op evaluation (V72 84) (Z01 818)  32  Pre-operative cardiovascular examination (V72 81) (Z01 810)  33  Retinitis pigmentosa (362 74) (H35 52)  34  Skin irritation (709 9) (R23 8)  35  Status post gastric bypass for obesity (V45 86) (Z98 84)  36  Stroke Syndrome (436)  37  Type B influenza (487 1) (J10 1)  38  Urinary retention (788 20) (R33 9)  39  Urination pain (788 1) (R30 9)  40  Voiding dysfunction (599 9) (N39 8)    Current Meds  1  Cholestyramine 4 GM Oral Packet; MIX THE CONTENTS OF 1 POWDER PACKET WITH   2 TO 6 OZ OF NONCARBONATED BEVERAGE AND DRINK 3 TIMES DAILY; Therapy: 07JSZ8784 to (Evaluate:19Jan2017)  Requested for: 93Dlo0858; Last   Rx:94Pna1017; Status: ACTIVE - Retrospective By Protocol Authorization Ordered  2  Citracal TABS; Therapy: (Recorded:90Zke2918) to Recorded  3  Culturelle CAPS; Therapy: (Recorded:34Rdk9221) to Recorded  4  Linzess 145 MCG Oral Capsule Recorded  5  Multivitamins TABS; TAKE 1 TABLET DAILY; Therapy: (Recorded:02Vgm0195) to Recorded  6  Pantoprazole Sodium 40 MG Oral Tablet Delayed Release (Protonix); TAKE 1 TABLET   TWICE DAILY 30 MINUTES BEFORE BREAKFAST AND DINNER; Therapy: 53NEC7705 to (Emily Stagealberto)  Requested for: 25FRT7732; Last   Rx:10Mar2016 Ordered  7  Stool Softener TABS; Therapy: (Recorded:95Ukc8924) to Recorded  8  Temazepam 15 MG Oral Capsule;    Therapy: (Recorded:00Ngs8280) to Recorded    Allergies   1  Guaifenesin & Derivatives  2   Sulfa Drugs    Signatures   Electronically signed by : ANGELA Vance; Dec 21 2016  2:12PM EST                       (Author)

## 2018-01-12 NOTE — PROGRESS NOTES
Message  as patient is tolerating tube feeding and using normal saline for hydration, faxed order to d/c weekly labs as they are no longer needed  patient has ordered for her annual labs in February 2017      Active Problems    1  Abdominal discomfort (789 00) (R10 9)   2  Abdominal pain (789 00) (R10 9)   3  Abnormal weight gain (783 1) (R63 5)   4  Acute UTI (599 0) (N39 0)   5  Allergic contact dermatitis due to other agents (692 89) (L23 89)   6  Cervical cancer screening (V76 2) (Z12 4)   7  Chronic venous embolism and thrombosis of deep vessels of distal lower extremity   (453 52) (I82 5Z9)   8  Constipation (564 00) (K59 00)   9  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)   10  Dysphagia (787 20) (R13 10)   11  Edema (782 3) (R60 9)   12  Gastroesophageal reflux disease (530 81) (K21 9)   13  History of Nissen fundoplication (X50 66) (I22 135)   14  Hypertension (401 9) (I10)   15  Infection of Broviac site, initial encounter (999 31) (T82 7XXA)   16  Iron deficiency (280 9) (E61 1)   17  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)   18  Migraine headache (346 90) (G43 909)   19  Muscle weakness (generalized) (728 87) (M62 81)   20  Nausea (787 02) (R11 0)   21  Nausea with vomiting (787 01) (R11 2)   22  Need for diphtheria-tetanus-pertussis (Tdap) vaccine (V06 1) (Z23)   23  Obesity (278 00) (E66 9)   24  Oropharyngeal dysphagia (787 22) (R13 12)   25  Polycystic ovarian syndrome (256 4) (E28 2)   26  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 3) (K91 2,Z90 3)   27  Pre-op evaluation (V72 84) (Z01 818)   28  Pre-operative cardiovascular examination (V72 81) (Z01 810)   29  Retinitis pigmentosa (362 74) (H35 52)   30  Skin irritation (709 9) (R23 8)   31  Status post gastric bypass for obesity (V45 86) (Z98 84)   32  Stroke Syndrome (436)   33  Type B influenza (487 1) (J10 1)   34  Urinary retention (788 20) (R33 9)   35  Urination pain (788 1) (R30 9)   36   Voiding dysfunction (599  9) (N39 8)    Current Meds   1  Citracal TABS; Therapy: (Recorded:87Xyc1071) to Recorded   2  Culturelle CAPS; Therapy: (Recorded:61Oip0073) to Recorded   3  Linzess 145 MCG Oral Capsule Recorded   4  Maxorb Extra 4"x4" External Pad; Therapy: 61CNJ9863 to Recorded   5  MiraLax Oral Powder (Polyethylene Glycol 3350); Therapy: (Recorded:54Sus9807) to Recorded   6  Multivitamins TABS; TAKE 1 TABLET DAILY; Therapy: (Recorded:36Qpd3755) to Recorded   7  Ondansetron 4 MG Oral Tablet Dispersible (Zofran ODT); Take one every 4-6 hours prn   for nausea; Therapy: 54PHN8366 to (Last Rx:23Nov2015)  Requested for: 23Nov2015 Ordered   8  Pantoprazole Sodium 40 MG Oral Tablet Delayed Release (Protonix); TAKE 1 TABLET   TWICE DAILY 30 MINUTES BEFORE BREAKFAST AND DINNER; Therapy: 86ZNW7759 to (Piter Reddy)  Requested for: 70FCA1733; Last   Rx:10Mar2016 Ordered   9  Stool Softener TABS; Therapy: (Recorded:66Qng3997) to Recorded    Allergies    1  Guaifenesin & Derivatives   2   Sulfa Drugs    Signatures   Electronically signed by : ANGELA Garcia; Nov 30 2016 11:03AM EST                       (Author)

## 2018-01-12 NOTE — RESULT NOTES
Message  Reviewed labs  Phosphorous level is normalized  other labs within acceptable limits  prealbumin level is 20 4  patient reports her weight as 176 pounds     using 800 ml of normal saline  Drinking up to 40 ounces per day ( thickened)  Continues with previous week's enteral regimen  Will continue to follow      Signatures   Electronically signed by : ANGELA Fonseca; Aug 30 2016  4:17PM EST                       (Author)

## 2018-01-12 NOTE — RESULT NOTES
Discussion/Summary   Spoke with lori by phone, cortisol level normal   Also reports she feels much better on Januvia and blood sugars are much more stable  Missed acarbose once and blood sugars were more variable so will continue on both for now  Verified Results  (1) CORTISOL AM SPECIMEN 07Jun2017 08:50AM Tevin Reddy Order Number: [id number]     Test Name Result Flag Reference   CORTISO AM SPEC 14 9 ug/mL  4 2-22 4   Reference ranges established for specimens drawn between 7 and 9 am  Results may be inaccurate if timing is not correct

## 2018-01-12 NOTE — RESULT NOTES
Message  Prealbumin dropped, will increase AA in TPN  Updated order:  Dex 10% 800 ml= 272 kcal  AA 10% 400 ml= 160 kcal ( 40 grams of protein    Lipids 20% 50 ml for 100 kcal    Equals 532 kcal and 40 grams of protein    Per pt: I've been getting between 70-90 grams of protein a day between food and shakes   I'm not doing any better fluid wise  Updated orders faxed to [facility]  will continue to monitor       Signatures   Electronically signed by : ANGELA Shepard; Mar 22 2016  2:15PM EST                       (Author)

## 2018-01-12 NOTE — RESULT NOTES
Message  Reviewed labs  Prealbumin still 18 6 ( normal) Other labs within acceptable limits   Continue current orders:  400 ml 20% dextrose = 80 grams CHO= 272 kca  600 ml 10% AA= 60 grams prot= 240 kcal  100ml 20% lipids= 20 gram lipids 200 kcal  712 kcal   Orders faxed to [facility] signed by : ANGELA Richardson; Feb 23 2016  1:48PM EST                       (Author)

## 2018-01-12 NOTE — MISCELLANEOUS
Message  Patient reports some skin irritation at the tube feeding site with some oozing  She sent a picture to me of the site  Picture was then deleted  I spoke with wound center for suggestions for treatment  Will order Maxsorb (calcium alginate)in [facility] to use once daily over the site  Advised patient of same  I called [facility] who took verbal order for 10 pads and 5 refills  Patient notes oozing increases with po liquids  she is now on full tpn support  No fever, no abdominal pain  She can call with any further concerns  CR      Signatures   Electronically signed by : Lourdes Seen, PAC; Sep 26 2016 12:31PM EST                       (Author)

## 2018-01-13 VITALS
HEART RATE: 78 BPM | WEIGHT: 167.01 LBS | DIASTOLIC BLOOD PRESSURE: 80 MMHG | HEIGHT: 64 IN | SYSTOLIC BLOOD PRESSURE: 118 MMHG | BODY MASS INDEX: 28.51 KG/M2

## 2018-01-13 VITALS
RESPIRATION RATE: 16 BRPM | WEIGHT: 169 LBS | BODY MASS INDEX: 28.16 KG/M2 | SYSTOLIC BLOOD PRESSURE: 118 MMHG | OXYGEN SATURATION: 99 % | HEIGHT: 65 IN | HEART RATE: 81 BPM | DIASTOLIC BLOOD PRESSURE: 76 MMHG | TEMPERATURE: 97.2 F

## 2018-01-13 VITALS
BODY MASS INDEX: 27.66 KG/M2 | WEIGHT: 166 LBS | TEMPERATURE: 97.9 F | HEIGHT: 65 IN | DIASTOLIC BLOOD PRESSURE: 70 MMHG | SYSTOLIC BLOOD PRESSURE: 132 MMHG | RESPIRATION RATE: 20 BRPM | HEART RATE: 72 BPM

## 2018-01-13 VITALS
BODY MASS INDEX: 28 KG/M2 | OXYGEN SATURATION: 97 % | HEART RATE: 76 BPM | RESPIRATION RATE: 18 BRPM | DIASTOLIC BLOOD PRESSURE: 72 MMHG | SYSTOLIC BLOOD PRESSURE: 112 MMHG | HEIGHT: 64 IN | WEIGHT: 164 LBS | TEMPERATURE: 97.4 F

## 2018-01-13 VITALS
SYSTOLIC BLOOD PRESSURE: 98 MMHG | WEIGHT: 170 LBS | HEIGHT: 65 IN | OXYGEN SATURATION: 98 % | TEMPERATURE: 97.1 F | HEART RATE: 80 BPM | BODY MASS INDEX: 28.32 KG/M2 | DIASTOLIC BLOOD PRESSURE: 68 MMHG

## 2018-01-13 VITALS
BODY MASS INDEX: 28.32 KG/M2 | HEART RATE: 78 BPM | WEIGHT: 170 LBS | DIASTOLIC BLOOD PRESSURE: 76 MMHG | HEIGHT: 65 IN | SYSTOLIC BLOOD PRESSURE: 118 MMHG

## 2018-01-13 NOTE — PROGRESS NOTES
Message  Called and spoke with Leigh  She is waiting for her daughter to be admitted for dehydration  Pt reports abdominal distention with the Jevity 1 2 1/3 strength  Changed pump to 1/4 strength and will evaluate tolerance today  if patient continues to experience abdominal distention, will change to elemental formula as recommended by inpatient RD Katerina Brenner  Will continue current TPN for supplementation      Active Problems    1  Abdominal discomfort (789 00) (R10 9)   2  Abnormal weight gain (783 1) (R63 5)   3  Cervical cancer screening (V76 2) (Z12 4)   4  Chronic venous embolism and thrombosis of deep vessels of distal lower extremity   (453 52) (I82 5Z9)   5  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)   6  Dysphagia (787 20) (R13 10)   7  Edema (782 3) (R60 9)   8  Gastroesophageal reflux disease (530 81) (K21 9)   9  History of Nissen fundoplication (D47 41) (X48 82)   10  Hypertension (401 9) (I10)   11  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)   12  Migraine headache (346 90) (G43 909)   13  Muscle weakness (generalized) (728 87) (M62 81)   14  Nausea (787 02) (R11 0)   15  Nausea with vomiting (787 01) (R11 2)   16  Need for diphtheria-tetanus-pertussis (Tdap) vaccine (V06 1) (Z23)   17  Obesity (278 00) (E66 9)   18  Oropharyngeal dysphagia (787 22) (R13 12)   19  Polycystic ovarian syndrome (256 4) (E28 2)   20  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 3) (K91 2,Z90 3)   21  Pre-op evaluation (V72 84) (Z01 818)   22  Pre-operative cardiovascular examination (V72 81) (Z01 810)   23  Retinitis pigmentosa (362 74) (H35 52)   24  Status post gastric bypass for obesity (V45 86) (Z98 84)   25  Stroke Syndrome (436)   26  Type B influenza (487 1) (J10 1)    Current Meds   1  B Complex CAPS; Therapy: (Recorded:17Lwu7519) to Recorded   2  Culturelle CAPS; Therapy: (Recorded:31Iws3594) to Recorded   3  Multivitamins TABS; TAKE 1 TABLET DAILY;    Therapy: (Recorded:06Feb2013) to Recorded   4  Ondansetron 4 MG Oral Tablet Dispersible (Zofran ODT); Take one every 4-6 hours prn   for nausea; Therapy: 70DCV7196 to (Last 9651 4645)  Requested for: 23Nov2015 Ordered   5  Pantoprazole Sodium 40 MG Oral Tablet Delayed Release (Protonix); TAKE 1 TABLET   TWICE DAILY 30 MINUTES BEFORE BREAKFAST AND DINNER; Therapy: 96HFA4987 to (Evorn Found)  Requested for: 48ALR0258; Last   Rx:10Mar2016 Ordered   6  Pyridostigmine Bromide 60 MG Oral Tablet (Mestinon); Take 1 tablet twice daily; Last   CD:39OYA1328 Ordered   7  Ranitidine HCl - 300 MG Oral Capsule; TAKE 1 CAPSULE AT BEDTIME NIGHTLY; Therapy: 77FFK9628 to (Evaluate:73Jjr2964)  Requested for: 85IWW7279; Last   Rx:17Mar2016 Ordered    Allergies    1  Guaifenesin & Derivatives   2   Sulfa Drugs    Signatures   Electronically signed by : ANGELA Barker; May  3 2016  1:09PM EST                       (Author)

## 2018-01-13 NOTE — RESULT NOTES
Message  Faxed over renewal of TPN orders- added famotidine as pt is NPO currently  TPN provides: 1220 kcal and 80 grams of protein/ 1900 ml  As pt is still not tolerating tube feeding, changes to lower fat, lower caloric density and pre/probiotic free formul  Vital High protein 1/4 st at 54 ml per hour to provide:  324 kcal and 28 grams protein, 270 ml  total = 1524 kcal and 108 grams protein  total fluid= 1900 ml per TPN, 1239 via tube feeding and dilution  Will f/u in one week to assess tolerance          Signatures   Electronically signed by : ANGELA Murguia; May 24 2016  3:25PM EST                       (Author)

## 2018-01-13 NOTE — RESULT NOTES
Message  Labs reviewed and within normal limits   Prealbumin level is 21 3      Signatures   Electronically signed by : ANGELA Vale; Sep  7 2016 11:35AM EST                       (Author)

## 2018-01-13 NOTE — RESULT NOTES
Message  Reviewed labs- prealbumin improved to 19 7  Phos slightly elevated will continue to monitor and adjust TPN if needed  Pt continues to have difficult swallowing thin liquids  She is scheduled a video esophagogram at [facility]  Pending results, plan will be to surgically place G-tube into her remnant stomach so that TPN can be dc'd  G tube will primarily be used for hydration   F/U with Dr Tristan Everett on 4/14/2016     Faxed TPN orders to [facility] signed by : ANGELA Quintanilla; Mar 29 2016  4:07PM EST                       (Author)

## 2018-01-14 VITALS
DIASTOLIC BLOOD PRESSURE: 78 MMHG | SYSTOLIC BLOOD PRESSURE: 110 MMHG | HEIGHT: 64 IN | BODY MASS INDEX: 28.73 KG/M2 | OXYGEN SATURATION: 99 % | HEART RATE: 72 BPM | WEIGHT: 168.25 LBS

## 2018-01-14 VITALS
WEIGHT: 169 LBS | HEIGHT: 65 IN | HEART RATE: 80 BPM | DIASTOLIC BLOOD PRESSURE: 80 MMHG | RESPIRATION RATE: 18 BRPM | SYSTOLIC BLOOD PRESSURE: 124 MMHG | BODY MASS INDEX: 28.16 KG/M2 | OXYGEN SATURATION: 98 %

## 2018-01-14 VITALS
HEIGHT: 64 IN | BODY MASS INDEX: 28.36 KG/M2 | SYSTOLIC BLOOD PRESSURE: 110 MMHG | WEIGHT: 166.13 LBS | DIASTOLIC BLOOD PRESSURE: 74 MMHG | HEART RATE: 72 BPM

## 2018-01-14 VITALS
WEIGHT: 174.31 LBS | HEART RATE: 68 BPM | BODY MASS INDEX: 29.04 KG/M2 | HEIGHT: 65 IN | SYSTOLIC BLOOD PRESSURE: 112 MMHG | DIASTOLIC BLOOD PRESSURE: 82 MMHG

## 2018-01-14 VITALS
WEIGHT: 172.38 LBS | SYSTOLIC BLOOD PRESSURE: 120 MMHG | HEIGHT: 65 IN | BODY MASS INDEX: 28.72 KG/M2 | DIASTOLIC BLOOD PRESSURE: 72 MMHG | HEART RATE: 80 BPM

## 2018-01-14 VITALS
BODY MASS INDEX: 28.07 KG/M2 | WEIGHT: 168.5 LBS | DIASTOLIC BLOOD PRESSURE: 78 MMHG | HEART RATE: 70 BPM | HEIGHT: 65 IN | SYSTOLIC BLOOD PRESSURE: 112 MMHG

## 2018-01-14 NOTE — PROGRESS NOTES
Message  Patient stable on current TPN/po/enteral regimen  Will renew TPN orders, same as last week  Labs all within acceptable limits  Prealbumin level is normal      Active Problems    1  Abdominal discomfort (789 00) (R10 9)   2  Abdominal pain (789 00) (R10 9)   3  Abnormal weight gain (783 1) (R63 5)   4  Acute UTI (599 0) (N39 0)   5  Allergic contact dermatitis due to other agents (692 89) (L23 89)   6  Cervical cancer screening (V76 2) (Z12 4)   7  Chronic venous embolism and thrombosis of deep vessels of distal lower extremity   (453 52) (I82 5Z9)   8  Constipation (564 00) (K59 00)   9  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)   10  Dysphagia (787 20) (R13 10)   11  Edema (782 3) (R60 9)   12  Gastroesophageal reflux disease (530 81) (K21 9)   13  History of Nissen fundoplication (D65 05) (X83 500)   14  Hypertension (401 9) (I10)   15  Infection of Broviac site, initial encounter (999 31) (T82 7XXA)   16  Iron deficiency (280 9) (E61 1)   17  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)   18  Migraine headache (346 90) (G43 909)   19  Muscle weakness (generalized) (728 87) (M62 81)   20  Nausea (787 02) (R11 0)   21  Nausea with vomiting (787 01) (R11 2)   22  Need for diphtheria-tetanus-pertussis (Tdap) vaccine (V06 1) (Z23)   23  Obesity (278 00) (E66 9)   24  Oropharyngeal dysphagia (787 22) (R13 12)   25  Polycystic ovarian syndrome (256 4) (E28 2)   26  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 3) (K91 2,Z90 3)   27  Pre-op evaluation (V72 84) (Z01 818)   28  Pre-operative cardiovascular examination (V72 81) (Z01 810)   29  Retinitis pigmentosa (362 74) (H35 52)   30  Skin irritation (709 9) (R23 8)   31  Status post gastric bypass for obesity (V45 86) (Z98 84)   32  Stroke Syndrome (436)   33  Type B influenza (487 1) (J10 1)   34  Urinary retention (788 20) (R33 9)   35  Urination pain (788 1) (R30 9)   36  Voiding dysfunction (599 9) (N39 8)    Current Meds   1  Citracal TABS; Therapy: (Recorded:09Age0771) to Recorded   2  Culturelle CAPS; Therapy: (Recorded:50Xhm4555) to Recorded   3  Linzess 145 MCG Oral Capsule Recorded   4  Maxorb Extra 4"x4" External Pad; Therapy: 31PCT8682 to Recorded   5  MiraLax Oral Powder (Polyethylene Glycol 3350); Therapy: (Recorded:09Qow1300) to Recorded   6  Multivitamins TABS; TAKE 1 TABLET DAILY; Therapy: (Recorded:74Njt6030) to Recorded   7  Ondansetron 4 MG Oral Tablet Dispersible (Zofran ODT); Take one every 4-6 hours prn   for nausea; Therapy: 98BNP8804 to (Last Rx:23Nov2015)  Requested for: 23Nov2015 Ordered   8  Pantoprazole Sodium 40 MG Oral Tablet Delayed Release (Protonix); TAKE 1 TABLET   TWICE DAILY 30 MINUTES BEFORE BREAKFAST AND DINNER; Therapy: 82AEV8416 to (Cathye Slice)  Requested for: 69NRA3017; Last   Rx:10Mar2016 Ordered   9  Stool Softener TABS; Therapy: (Recorded:82Gdi9572) to Recorded    Allergies    1  Guaifenesin & Derivatives   2   Sulfa Drugs    Signatures   Electronically signed by : ANGELA Camilo; Oct 25 2016  1:16PM EST                       (Author)

## 2018-01-14 NOTE — MISCELLANEOUS
Message  Spoke with patient by phone-- she is feeling MUCH better since taking Januvia  Medication was not covered by insurance and appeal in process but she has been taking samples  She is having much less hypoglycemia and has been feeling much better at all  She has met with her metabolic specialist who has advised her to continue the [address]  She will have her specialist send us a copy of office visit and will forward to insurance  Chidi Hobbs is going to send us a download of her CGM report so we can compare Hypoglycemia before starting [facility] and review improvements since she has been on the [facility] [facility] and will send this to [facility] for review  I have found a few articles showing benefits of DPP-IV inhibitors to treat reactive hypoglycemia and will forward these to [facility] as well  She has appt with Dr Edel Munroe on 8/31 and will forward any additional information to [facility] at that time prior to the 10:30AM appeal meeting  Signatures   Electronically signed by : Shawnie Spurling, [facility];  Aug 14 2017 12:42PM EST                       (Author)

## 2018-01-14 NOTE — PROGRESS NOTES
Message  As patient's po intake increased , will decrease TPN  Labs all within acceptable limits  Carnitor is on back order, patient will not receive this week  TPN Orders  Dextrose 20% 500 ml  AA 10% 500 ml  Lipids 205 200 ml  2400 mg carnitor       Active Problems    1  Abdominal discomfort (789 00) (R10 9)   2  Abdominal pain (789 00) (R10 9)   3  Abnormal weight gain (783 1) (R63 5)   4  Acute UTI (599 0) (N39 0)   5  Allergic contact dermatitis due to other agents (692 89) (L23 89)   6  Cervical cancer screening (V76 2) (Z12 4)   7  Chronic venous embolism and thrombosis of deep vessels of distal lower extremity   (453 52) (I82 5Z9)   8  Constipation (564 00) (K59 00)   9  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)   10  Dysphagia (787 20) (R13 10)   11  Edema (782 3) (R60 9)   12  Gastroesophageal reflux disease (530 81) (K21 9)   13  History of Nissen fundoplication (F16 30) (A54 882)   14  Hypertension (401 9) (I10)   15  Infection of Broviac site, initial encounter (999 31) (T82 7XXA)   16  Iron deficiency (280 9) (E61 1)   17  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)   18  Migraine headache (346 90) (G43 909)   19  Muscle weakness (generalized) (728 87) (M62 81)   20  Nausea (787 02) (R11 0)   21  Nausea with vomiting (787 01) (R11 2)   22  Need for diphtheria-tetanus-pertussis (Tdap) vaccine (V06 1) (Z23)   23  Obesity (278 00) (E66 9)   24  Oropharyngeal dysphagia (787 22) (R13 12)   25  Polycystic ovarian syndrome (256 4) (E28 2)   26  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 3) (K91 2,Z90 3)   27  Pre-op evaluation (V72 84) (Z01 818)   28  Pre-operative cardiovascular examination (V72 81) (Z01 810)   29  Retinitis pigmentosa (362 74) (H35 52)   30  Skin irritation (709 9) (R23 8)   31  Status post gastric bypass for obesity (V45 86) (Z98 84)   32  Stroke Syndrome (436)   33  Type B influenza (487 1) (J10 1)   34  Urinary retention (788 20) (R33 9)   35   Urination pain (788  1) (R30 9)   36  Voiding dysfunction (599 9) (N39 8)    Current Meds   1  Citracal TABS; Therapy: (Recorded:25Glo5730) to Recorded   2  Culturelle CAPS; Therapy: (Recorded:62Pvu2648) to Recorded   3  Linzess 145 MCG Oral Capsule Recorded   4  Maxorb Extra 4"x4" External Pad; Therapy: 42RRN3901 to Recorded   5  MiraLax Oral Powder (Polyethylene Glycol 3350); Therapy: (Recorded:46Whx2246) to Recorded   6  Multivitamins TABS; TAKE 1 TABLET DAILY; Therapy: (Recorded:75Vqj8869) to Recorded   7  Ondansetron 4 MG Oral Tablet Dispersible (Zofran ODT); Take one every 4-6 hours prn   for nausea; Therapy: 11LXV8471 to (Last Rx:23Nov2015)  Requested for: 23Nov2015 Ordered   8  Pantoprazole Sodium 40 MG Oral Tablet Delayed Release (Protonix); TAKE 1 TABLET   TWICE DAILY 30 MINUTES BEFORE BREAKFAST AND DINNER; Therapy: 02TRG6977 to (Yasmany Bachelor)  Requested for: 93ZJL6316; Last   Rx:10Mar2016 Ordered   9  Stool Softener TABS; Therapy: (Recorded:95Bqj0711) to Recorded    Allergies    1  Guaifenesin & Derivatives   2   Sulfa Drugs    Signatures   Electronically signed by : ANGELA Mcginnis; Oct 20 2016  2:41PM EST                       (Author)

## 2018-01-14 NOTE — RESULT NOTES
Message  Spoke with patient is regards to urine culture  No growth in urine culture  Instructed patient to stop antibiotic at this time  Patient states symptoms resolved the next day  Patient verbalized understanding        Signatures   Electronically signed by : Ashley Quiñonez, [facility]; Jul 21 2016  3:22PM EST                       (Author)

## 2018-01-14 NOTE — RESULT NOTES
Message  Labs reviewed and within acceptable limits Prealbumin 19 5  Will continue current TPN Orders faxed      Signatures   Electronically signed by : ANGELA Pete; Jan 19 2016  1:25PM EST                       (Author)

## 2018-01-14 NOTE — PROGRESS NOTES
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)   5  Dysphagia (787 20) (R13 10)   6  Edema (782 3) (R60 9)   7  Gastroesophageal reflux disease (530 81) (K21 9)   8  History of Nissen fundoplication (K82 09) (F32 75)   9  Hypertension (401 9) (I10)   10  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)   11  Migraine headache (346 90) (G43 909)   12  Muscle weakness (generalized) (728 87) (M62 81)   13  Nausea (787 02) (R11 0)   14  Nausea with vomiting (787 01) (R11 2)   15  Obesity (278 00) (E66 9)   16  Oropharyngeal dysphagia (787 22) (R13 12)   17  Polycystic ovarian syndrome (256 4) (E28 2)   18  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 3) (K91 2,Z90 3)   19  Pre-op evaluation (V72 84) (Z01 818)   20  Pre-operative cardiovascular examination (V72 81) (Z01 810)   21  Retinitis pigmentosa (362 74) (H35 52)   22  Status post gastric bypass for obesity (V45 86) (Z98 84)   23  Stroke Syndrome (436)   24  Type B influenza (487 1) (J10 1)    Current Meds   1  B Complex CAPS; Therapy: (Recorded:11Aqm0466) to Recorded   2  Carafate 1 GM/10ML Oral Suspension; Therapy: (Recorded:24Mar2016) to Recorded   3  Culturelle CAPS; Therapy: (Recorded:68Xto3034) to Recorded   4  Multivitamins TABS; TAKE 1 TABLET DAILY; Therapy: (Recorded:11Iop7685) to Recorded   5  Ondansetron 4 MG Oral Tablet Dispersible (Zofran ODT); Take one every 4-6 hours prn   for nausea; Therapy: 45PVP8601 to (Last Rx:11Rie0875)  Requested for: 23Nov2015 Ordered   6   Pantoprazole Sodium 40 MG Oral Tablet Delayed Release (Protonix); TAKE 1 TABLET   TWICE DAILY 30 MINUTES BEFORE BREAKFAST AND DINNER; Therapy: 05YUA3620 to (Isaac Gordon)  Requested for: 16ZRA0591; Last   Rx:10Mar2016 Ordered   7  Ranitidine HCl - 300 MG Oral Capsule; TAKE 1 CAPSULE AT BEDTIME NIGHTLY; Therapy: 48ZCA1966 to (Evaluate:14Qjw5905)  Requested for: 20YZA6060; Last   Rx:17Mar2016 Ordered    Allergies    1  Guaifenesin & Derivatives   2   Sulfa Drugs    Signatures   Electronically signed by : ANGELA Hough; Apr 5 2016  4:40PM EST                       (Author)

## 2018-01-15 VITALS
WEIGHT: 168.31 LBS | HEART RATE: 84 BPM | HEIGHT: 65 IN | DIASTOLIC BLOOD PRESSURE: 74 MMHG | BODY MASS INDEX: 28.04 KG/M2 | SYSTOLIC BLOOD PRESSURE: 102 MMHG

## 2018-01-15 NOTE — RESULT NOTES
Message  Reviewed labs- all WNL  Prealbumin the same and stable  Will decrease calories as pt is tolerating more po and pt is concerned about her weight loss   Since she is drinking 2 shakes per day- will decrease the AA in her TPN  800 ml 10% dextrose = 80 grams 272 calories  200 ml 10% AA= 20 grams of protein 80 calories  100 ml 10% lipid = 100 calories  Total calories = 452 kcal, 20 grams of protein  Plus 60-75 grams of protein po = 80 to 95 grams of protein per day  Averaging 450 calories per day plus 452 kcal per TPN=   902 kcal per day, plus 1100 ml fluids plus 1000 ml po  will continue to monitor           Signatures   Electronically signed by : ANGELA Jensen; Mar 14 2016  3:51PM EST                       (Author)

## 2018-01-15 NOTE — RESULT NOTES
Message  Faxed over TPN Preablumin 19 All labs within acceptable limits   Continue current TPN 1064 kcal and 80 grams of protein      Signatures   Electronically signed by : ANGELA Varghese; Feb 2 2016  1:02PM EST                       (Author)

## 2018-01-15 NOTE — PROGRESS NOTES
Message  phone consult- review of endocrinology recommendations Assess: patient continues to do without iv fluids, runs pedialyte during the day and elecare homar at night  Bolus's prosource 4x per day and take po as tolerated  Had consultation with endocrinology concerning PTH levels and hypoglycemia  Due to her mitochondrial disease, patient has decreased absorption, plus decreased absorption with the bypass  Endo recommended that she increase her calcium supplements to 3000 mg per day  Patient also diagnosed with autonomic dystonia and was referred to another specialist for follow up  Endo is going to apply for insurance coverage of CGM   Patient will start acarbose now, then will stop with the CGM for 3 days, then start taking it again to see if it reduces hypoglycemia  If she continues to have issues, may be placed on Januvia twice per day for better control  Will also need to consider D5w instead of normal saline for IV hydration during summer months to help prevent reactive hypoglycemia  She will also consider switching from an indwelling catheter to a permanent port access  Patient was also instructed to carb load prior to her workouts at the gym, as mitochondrial disease make her more prone to reactive hypoglycemia with exercise  Dx Reactive 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 and bypass surgery as evidenced by glucometer readings of 50, 70  patient becomes symptomatic with BS under 80 mg/dl Intervention: Will obtain information on liquid calcium that can be put through G tube to increase absorption  Will also obtain information on insurance coverage for liquid carnitine ( carnitor) to be placed into G tube  Provided patient with information on healthier carbohydrate choices to eat prior to exercise  Goals: 1  Increase calcium to 3000 mg /dy 2  Decrease hypoglycemia but adding more carbohydrates prior to exercise   3  F/U with [facility] concerning liquid carnitine and calcium to be put through the G tube Monitoring/evaluation will f/u concerning liquid calcium and carnitine will order blood work to reassess prealbumin next week  Active Problems    1  Abdominal discomfort (789 00) (R10 9)   2  Abdominal pain (789 00) (R10 9)   3  Abnormal weight gain (783 1) (R63 5)   4  Acute UTI (599 0) (N39 0)   5  Allergic contact dermatitis due to other agents (692 89) (L23 89)   6  Cervical cancer screening (V76 2) (Z12 4)   7  Chronic venous embolism and thrombosis of deep vessels of distal lower extremity   (453 52) (I82 5Z9)   8  Constipation (564 00) (K59 00)   9  Diarrhea (787 91) (R19 7)   10  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)   11  Dizziness (780 4) (R42)   12  Dysautonomia orthostatic hypotension syndrome (333 0) (G90 3)   13  Dysphagia (787 20) (R13 10)   14  Edema (782 3) (R60 9)   15  Encounter for gynecological examination (V72 31) (Z01 419)   16  Gastroesophageal reflux disease (530 81) (K21 9)   17  Gastrostomy tube dysfunction (536 42) (K94 23)   18  History of Nissen fundoplication (J24 83) (X64 087)   19  Hyperparathyroidism, secondary (588 81) (N25 81)   20  Hypertension (401 9) (I10)   21  Infection of Broviac site, initial encounter (999 31) (T82 7XXA)   22  Iron deficiency (280 9) (E61 1)   23  Menorrhagia with regular cycle (626 2) (N92 0)   24  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)   25  Migraine headache (346 90) (G43 909)   26  Muscle weakness (generalized) (728 87) (M62 81)   27  Need for diphtheria-tetanus-pertussis (Tdap) vaccine (V06 1) (Z23)   28  Obesity (278 00) (E66 9)   29  Oropharyngeal dysphagia (787 22) (R13 12)   30  Polycystic ovarian syndrome (256 4) (E28 2)   31  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 3) (K91 2,Z90 3)   32  Pre-op evaluation (V72 84) (Z01 818)   33  Pre-operative cardiovascular examination (V72 81) (Z01 810)   34  Reactive hypoglycemia (251 2) (E16 1)   35  Retinitis pigmentosa (362 74) (H35 52)   36   Status post gastric bypass for obesity (V45 86) (Z98 84)   37  Stroke Syndrome (436)   38  Urinary retention (788 20) (R33 9)   39  Urination pain (788 1) (R30 9)   40  Voiding dysfunction (599 9) (N39 8)    Current Meds   1  Acarbose 50 MG Oral Tablet; TAKE 1 TABLET 3 TIMES DAILY WITH THE FIRST BITE OF   EACH MAIN MEAL; Therapy: 33GRJ6338 to (Evaluate:62Mcv6541)  Requested for: 20AQP2468; Last   Rx:98Ndn3502 Ordered   2  Cholestyramine 4 GM Oral Packet; MIX THE CONTENTS OF 1 POWDER PACKET WITH   2 TO 6 OZ OF NONCARBONATED BEVERAGE AND DRINK 3 TIMES DAILY; Therapy: 85ZOG2776 to (Evaluate:19Jan2017)  Requested for: 34Geb3190; Last   Rx:75Jfg7492 Ordered   3  Citracal TABS; Therapy: (Recorded:20Qnv6308) to Recorded   4  Culturelle CAPS; Therapy: (Recorded:91Xvf1807) to Recorded   5  Docusate Calcium CAPS; Therapy: (Recorded:62Iso9448) to Recorded   6  Linzess 145 MCG Oral Capsule Recorded   7  Multivitamins TABS; TAKE 1 TABLET DAILY; Therapy: (Recorded:33Koq0987) to Recorded   8  Pantoprazole Sodium 40 MG Oral Tablet Delayed Release (Protonix); TAKE 1 TABLET   TWICE DAILY 30 MINUTES BEFORE BREAKFAST AND DINNER; Therapy: 25TDU0728 to (9911 0053)  Requested for: 41KYR8992; Last   Rx:10Mar2016 Ordered   9  Stool Softener TABS; Therapy: (Recorded:23Duy2921) to Recorded   10  Temazepam 15 MG Oral Capsule; Therapy: (Recorded:55Fso3944) to Recorded    Allergies    1  Guaifenesin & Derivatives   2   Sulfa Drugs    Signatures   Electronically signed by : ANGELA Quintanilla; Feb 13 2017  4:24PM EST                       (Author)

## 2018-01-15 NOTE — PROGRESS NOTES
Message  Attended 9 month follow up meeting  Addressed both behavioral and dietary issues  Group discussion included the impact of tobacco use, alcohol use, psychiatric medications, relationships, body image and self esteem issues as it pertains to the weight loss surgery patient  During group discussion, reviewed vitamin recommendations, protein recommendations, portion sizes, balancing diet to include healthy carbohydrates, importance of exercise, and the bariatric rules for success  Overall pleased with weight loss and improved quality of life  Active Problems    1  Abdominal discomfort (789 00) (R10 9)   2  Abdominal pain (789 00) (R10 9)   3  Abnormal weight gain (783 1) (R63 5)   4  Acute UTI (599 0) (N39 0)   5  Allergic contact dermatitis due to other agents (692 89) (L23 89)   6  Cervical cancer screening (V76 2) (Z12 4)   7  Chronic venous embolism and thrombosis of deep vessels of distal lower extremity   (453 52) (I82 5Z9)   8  Constipation (564 00) (K59 00)   9  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)   10  Dysphagia (787 20) (R13 10)   11  Edema (782 3) (R60 9)   12  Gastroesophageal reflux disease (530 81) (K21 9)   13  History of Nissen fundoplication (G00 75) (R69 22)   14  Hypertension (401 9) (I10)   15  Iron deficiency (280 9) (E61 1)   16  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)   17  Migraine headache (346 90) (G43 909)   18  Muscle weakness (generalized) (728 87) (M62 81)   19  Nausea (787 02) (R11 0)   20  Nausea with vomiting (787 01) (R11 2)   21  Need for diphtheria-tetanus-pertussis (Tdap) vaccine (V06 1) (Z23)   22  Obesity (278 00) (E66 9)   23  Oropharyngeal dysphagia (787 22) (R13 12)   24  Polycystic ovarian syndrome (256 4) (E28 2)   25  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 3) (K91 2,Z90 3)   26  Pre-op evaluation (V72 84) (Z01 818)   27  Pre-operative cardiovascular examination (V72 81) (Z01 810)   28   Retinitis pigmentosa (362 74) (H35 52)   29  Status post gastric bypass for obesity (V45 86) (Z98 84)   30  Stroke Syndrome (436)   31  Type B influenza (487 1) (J10 1)   32  Urination pain (788 1) (R30 9)    Current Meds   1  Amoxicillin-Pot Clavulanate 400-57 MG/5ML Oral Suspension Reconstituted; TAKE 5 ML   Every 6 hours As Directed TDD:20;   Therapy: 82HXX0273 to (Evaluate:17Aug2016)  Requested for: 45EMU5165; Last   Rx:19May2016 Ordered   2  B Complex CAPS; Therapy: (Recorded:25Ucs7428) to Recorded   3  Culturelle CAPS; Therapy: (Recorded:71Qgy8033) to Recorded   4  MiraLax Oral Powder (Polyethylene Glycol 3350); Therapy: (Recorded:42Hmu2309) to Recorded   5  Multivitamins TABS; TAKE 1 TABLET DAILY; Therapy: (Recorded:05Vef9823) to Recorded   6  Ondansetron 4 MG Oral Tablet Dispersible (Zofran ODT); Take one every 4-6 hours prn   for nausea; Therapy: 17SGM0452 to (Last 72 954 091)  Requested for: 23Nov2015 Ordered   7  Pantoprazole Sodium 40 MG Oral Tablet Delayed Release (Protonix); TAKE 1 TABLET   TWICE DAILY 30 MINUTES BEFORE BREAKFAST AND DINNER; Therapy: 21UDU8000 to (William Sorto)  Requested for: 47ZFZ0365; Last   Rx:10Mar2016 Ordered   8  Stool Softener TABS; Therapy: (Recorded:30Xak8861) to Recorded    Allergies    1  Guaifenesin & Derivatives   2   Sulfa Drugs    Signatures   Electronically signed by : ANGELA Maza; Aug 18 2016 12:06PM EST                       (Author)

## 2018-01-15 NOTE — PROGRESS NOTES
Message  Labs within normal limits- decreased to weekly  As patient is tolerating pedialyte and Unjury through G tube, decreased TPN to the following  Dex 20% 650 ml  AA10% 1000ml  Lipids 20% 200 ml  Total kcal plus pedialyte/unjury= 1452 kcal, 121 grams protein and 3050 ml fluid per day  will continue to monitor labs and adjust TPN as enteral/po is tolerated  Active Problems    1  Abdominal discomfort (789 00) (R10 9)   2  Abdominal pain (789 00) (R10 9)   3  Abnormal weight gain (783 1) (R63 5)   4  Acute UTI (599 0) (N39 0)   5  Allergic contact dermatitis due to other agents (692 89) (L23 89)   6  Cervical cancer screening (V76 2) (Z12 4)   7  Chronic venous embolism and thrombosis of deep vessels of distal lower extremity   (453 52) (I82 5Z9)   8  Constipation (564 00) (K59 00)   9  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)   10  Dysphagia (787 20) (R13 10)   11  Edema (782 3) (R60 9)   12  Gastroesophageal reflux disease (530 81) (K21 9)   13  History of Nissen fundoplication (U87 40) (T12 97)   14  Hypertension (401 9) (I10)   15  Iron deficiency (280 9) (E61 1)   16  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)   17  Migraine headache (346 90) (G43 909)   18  Muscle weakness (generalized) (728 87) (M62 81)   19  Nausea (787 02) (R11 0)   20  Nausea with vomiting (787 01) (R11 2)   21  Need for diphtheria-tetanus-pertussis (Tdap) vaccine (V06 1) (Z23)   22  Obesity (278 00) (E66 9)   23  Oropharyngeal dysphagia (787 22) (R13 12)   24  Polycystic ovarian syndrome (256 4) (E28 2)   25  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 3) (K91 2,Z90 3)   26  Pre-op evaluation (V72 84) (Z01 818)   27  Pre-operative cardiovascular examination (V72 81) (Z01 810)   28  Retinitis pigmentosa (362 74) (H35 52)   29  Skin irritation (709 9) (R23 8)   30  Status post gastric bypass for obesity (V45 86) (Z98 84)   31  Stroke Syndrome (436)   32  Type B influenza (487 1) (J10 1)   33   Urinary retention (788 20) (R33 9)   34  Urination pain (788 1) (R30 9)   35  Voiding dysfunction (599 9) (N39 8)    Current Meds   1  B Complex CAPS; Therapy: (Recorded:38Fmy1050) to Recorded   2  Citracal TABS; Therapy: (Recorded:89Lxg1718) to Recorded   3  Culturelle CAPS; Therapy: (Recorded:73Vfn8554) to Recorded   4  Levsin 0 125 MG Oral Tablet; Therapy: (Recorded:16Sep2016) to Recorded   5  Maxorb Extra 4"x4" External Pad; Therapy: 05OIW0717 to Recorded   6  MiraLax Oral Powder (Polyethylene Glycol 3350); Therapy: (Recorded:88Bsu6110) to Recorded   7  Multivitamins TABS; TAKE 1 TABLET DAILY; Therapy: (Recorded:76Bcv8858) to Recorded   8  Ondansetron 4 MG Oral Tablet Dispersible (Zofran ODT); Take one every 4-6 hours prn   for nausea; Therapy: 83RIB9736 to (Last Rx:23Nov2015)  Requested for: 23Nov2015 Ordered   9  Pantoprazole Sodium 40 MG Oral Tablet Delayed Release (Protonix); TAKE 1 TABLET   TWICE DAILY 30 MINUTES BEFORE BREAKFAST AND DINNER; Therapy: 73JIS1775 to (Sharan Moyer)  Requested for: 99NUV9187; Last   Rx:10Mar2016 Ordered   10  Stool Softener TABS; Therapy: (Recorded:19May2016) to Recorded    Allergies    1  Guaifenesin & Derivatives   2   Sulfa Drugs    Signatures   Electronically signed by : ANGELA Mcginnis; Sep 28 2016  8:36AM EST                       (Author)

## 2018-01-15 NOTE — MISCELLANEOUS
Message  Pt called the office today because she is having pain around her gt tube site  She was in the [facility] over the weekend and they were unable to get a hold of the bariatric doctors  They told her to change the tube which she did and she is still having the pain  She feels a hard painful lump between 12 and 3  She has difficulty walking by the end of the day  Laying flat makes it go away  Spoke to Dr Antonio Mims and he said to have come in to the office to see him tomorrow  Gave the message to the MR and had her call and schedule pt for an appointment  Active Problems    1  Abdominal discomfort (789 00) (R10 9)   2  Abdominal pain (789 00) (R10 9)   3  Abnormal weight gain (783 1) (R63 5)   4  Acute UTI (599 0) (N39 0)   5  Allergic contact dermatitis due to other agents (692 89) (L23 89)   6  Cervical cancer screening (V76 2) (Z12 4)   7  Chronic venous embolism and thrombosis of deep vessels of distal lower extremity   (453 52) (I82 5Z9)   8  Constipation (564 00) (K59 00)   9  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)   10  Dysphagia (787 20) (R13 10)   11  Edema (782 3) (R60 9)   12  Gastroesophageal reflux disease (530 81) (K21 9)   13  History of Nissen fundoplication (A92 19) (W74 50)   14  Hypertension (401 9) (I10)   15  Iron deficiency (280 9) (E61 1)   16  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)   17  Migraine headache (346 90) (G43 909)   18  Muscle weakness (generalized) (728 87) (M62 81)   19  Nausea (787 02) (R11 0)   20  Nausea with vomiting (787 01) (R11 2)   21  Need for diphtheria-tetanus-pertussis (Tdap) vaccine (V06 1) (Z23)   22  Obesity (278 00) (E66 9)   23  Oropharyngeal dysphagia (787 22) (R13 12)   24  Polycystic ovarian syndrome (256 4) (E28 2)   25  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 3) (K91 2,Z90 3)   26  Pre-op evaluation (V72 84) (Z01 818)   27  Pre-operative cardiovascular examination (V72 81) (Z01 810)   28   Retinitis pigmentosa (235 43) (H35 52)   29  Status post gastric bypass for obesity (V45 86) (Z98 84)   30  Stroke Syndrome (436)   31  Type B influenza (487 1) (J10 1)   32  Urination pain (788 1) (R30 9)    Current Meds   1  Amoxicillin-Pot Clavulanate 400-57 MG/5ML Oral Suspension Reconstituted; TAKE 5 ML   Every 6 hours As Directed TDD:20;   Therapy: 16OYU1377 to (Evaluate:17Aug2016)  Requested for: 67JIV8706; Last   Rx:95Ukl6145 Ordered   2  B Complex CAPS; Therapy: (Recorded:77Lae3916) to Recorded   3  Culturelle CAPS; Therapy: (Recorded:04Zut4022) to Recorded   4  MiraLax Oral Powder (Polyethylene Glycol 3350); Therapy: (Recorded:86Lus1141) to Recorded   5  Multivitamins TABS; TAKE 1 TABLET DAILY; Therapy: (Recorded:39Rch6977) to Recorded   6  Ondansetron 4 MG Oral Tablet Dispersible (Zofran ODT); Take one every 4-6 hours prn   for nausea; Therapy: 75IEX1330 to (Last 72 954 091)  Requested for: 23Nov2015 Ordered   7  Pantoprazole Sodium 40 MG Oral Tablet Delayed Release (Protonix); TAKE 1 TABLET   TWICE DAILY 30 MINUTES BEFORE BREAKFAST AND DINNER; Therapy: 78ALA4849 to ([place])  Requested for: 76URW9102; Last   Rx:10Mar2016 Ordered   8  Stool Softener TABS; Therapy: (Recorded:98Tci4796) to Recorded    Allergies    1  Guaifenesin & Derivatives   2  Sulfa Drugs    Signatures   Electronically signed by : Judit Xiong LPN;  Aug  4 2016 10:50AM EST                       (Author)

## 2018-01-15 NOTE — PROGRESS NOTES
Message  Faxed over updated orders:  Pedialyte 120 ml x 12 hours to provide 120 kcal  Elecare homar 900 ml to provide 900 kcal and 27 5 grams protein  Prosource QID to provide 240 kcal and 60 grams of protein   Total= 1260 kcal and 87 5 ( 1 3 grams/kg bw) grams protein  Patient is also eating po, depending on po intake may need to increase prosource level  Active Problems    1  Abdominal discomfort (789 00) (R10 9)   2  Abdominal pain (789 00) (R10 9)   3  Abnormal weight gain (783 1) (R63 5)   4  Acute UTI (599 0) (N39 0)   5  Allergic contact dermatitis due to other agents (692 89) (L23 89)   6  Cervical cancer screening (V76 2) (Z12 4)   7  Chronic venous embolism and thrombosis of deep vessels of distal lower extremity   (453 52) (I82 5Z9)   8  Constipation (564 00) (K59 00)   9  Diarrhea (787 91) (R19 7)   10  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)   11  Dizziness (780 4) (R42)   12  Dysphagia (787 20) (R13 10)   13  Edema (782 3) (R60 9)   14  Encounter for gynecological examination (V72 31) (Z01 419)   15  Gastroesophageal reflux disease (530 81) (K21 9)   16  Gastrostomy tube dysfunction (536 42) (K94 23)   17  History of Nissen fundoplication (Q42 44) (U00 921)   18  Hypertension (401 9) (I10)   19  Infection of Broviac site, initial encounter (999 31) (T82 7XXA)   20  Iron deficiency (280 9) (E61 1)   21  Menorrhagia with regular cycle (626 2) (N92 0)   22  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)   23  Migraine headache (346 90) (G43 909)   24  Muscle weakness (generalized) (728 87) (M62 81)   25  Need for diphtheria-tetanus-pertussis (Tdap) vaccine (V06 1) (Z23)   26  Obesity (278 00) (E66 9)   27  Oropharyngeal dysphagia (787 22) (R13 12)   28  Polycystic ovarian syndrome (256 4) (E28 2)   29  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 3) (K91 2,Z90 3)   30  Pre-op evaluation (V72 84) (Z01 818)   31  Pre-operative cardiovascular examination (V72 81) (Z01 810)   32  Retinitis pigmentosa (362 74) (H35 52)   33  Status post gastric bypass for obesity (V45 86) (Z98 84)   34  Stroke Syndrome (436)   35  Urinary retention (788 20) (R33 9)   36  Urination pain (788 1) (R30 9)   37  Voiding dysfunction (599 9) (N39 8)    Current Meds   1  Cholestyramine 4 GM Oral Packet; MIX THE CONTENTS OF 1 POWDER PACKET WITH   2 TO 6 OZ OF NONCARBONATED BEVERAGE AND DRINK 3 TIMES DAILY; Therapy: 89SXL9339 to (Evaluate:19Jan2017)  Requested for: 20Clf1361; Last   Rx:61Gks9506 Ordered   2  Citracal TABS; Therapy: (Recorded:69Fzk7223) to Recorded   3  Culturelle CAPS; Therapy: (Recorded:95Kis0126) to Recorded   4  Linzess 145 MCG Oral Capsule Recorded   5  Multivitamins TABS; TAKE 1 TABLET DAILY; Therapy: (Recorded:98Sxq3768) to Recorded   6  Pantoprazole Sodium 40 MG Oral Tablet Delayed Release (Protonix); TAKE 1 TABLET   TWICE DAILY 30 MINUTES BEFORE BREAKFAST AND DINNER; Therapy: 83ETR7617 to (Mike Nolasco)  Requested for: 19EZR8892; Last   Rx:10Mar2016 Ordered   7  Stool Softener TABS; Therapy: (Recorded:87Wjw3090) to Recorded   8  Temazepam 15 MG Oral Capsule; Therapy: (Recorded:49Hbr7513) to Recorded    Allergies    1  Guaifenesin & Derivatives   2   Sulfa Drugs    Signatures   Electronically signed by : ANGELA Camilo; Jan 17 2017  4:00PM EST                       (Author)

## 2018-01-15 NOTE — RESULT NOTES
Message  From patient:  NPO is going well for the most part  I'm tolerating 40 ml of the 1/4 strength  I tried to increase to 45 but got nauseated and when unclamped, I had 5 hrs worth of tube feed in my stomach  I went back to 40 without issues  I am definitely missing the oral fluids I was getting in BP wise  Come afternoon I'm having a lot of dizziness when I change positions  My BP yesterday afternoon was 90/45  Other than that, I'm doing ok   I have a nasty cold so that's not helping anything at all          5/17/2016 1:58:52 PM - Rachel Vargas  Adjusted TPN orders to provide more nutrition and fluid with NPO status  Dextrose 20% 1000 ml  Amino acids 10% 800 ml  Lipid Emulsion 20% 100 ml  Total = 1900 ml per day( 24 ml/kg bw)  1200 kcal per day ( 15kcal/kg bw)  Labs reviewed and within acceptable limits      Signatures   Electronically signed by : ANGELA Vance; May 17 2016  2:59PM EST                       (Author)

## 2018-01-15 NOTE — PROGRESS NOTES
Discussion/Summary  Discussion Summary:   Email from pt: I have been able to increase the rate to 37 ml/hr of a mixture of 1 can to 20 oz water  I tried increasing the strength and rate and I became very distended and nauseated  I'm thinking we need to change formulas  Contacted [facility] and faxed updated order to [phone]  Changed formula to Vital AF 1 2 1/2 strength - 40 ml per hour over 24 hours plus 960 ml H20 Flushes  Provides: 36 grams of protein, 576 kcal  Per pt - tolerating  500 calories po, and 50 grams protein and 25 ounces of fluid  Total= 1076 kcal and 86 grams of protein  , plus 1828 ml H20 in formula and 750 ml PO = 2578 ml total fluid  Will continue to asses po tolerance and enteral tolerance  Goal is to d/c PICC by end of the week  Active Problems    1  Abdominal discomfort (789 00) (R10 9)   2  Abnormal weight gain (783 1) (R63 5)   3  Cervical cancer screening (V76 2) (Z12 4)   4  Chronic venous embolism and thrombosis of deep vessels of distal lower extremity   (453 52) (I82 5Z9)   5  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)   6  Dysphagia (787 20) (R13 10)   7  Edema (782 3) (R60 9)   8  Gastroesophageal reflux disease (530 81) (K21 9)   9  History of Nissen fundoplication (P98 35) (Y45 94)   10  Hypertension (401 9) (I10)   11  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)   12  Migraine headache (346 90) (G43 909)   13  Muscle weakness (generalized) (728 87) (M62 81)   14  Nausea (787 02) (R11 0)   15  Nausea with vomiting (787 01) (R11 2)   16  Need for diphtheria-tetanus-pertussis (Tdap) vaccine (V06 1) (Z23)   17  Obesity (278 00) (E66 9)   18  Oropharyngeal dysphagia (787 22) (R13 12)   19  Polycystic ovarian syndrome (256 4) (E28 2)   20  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 3) (K91 2,Z90 3)   21  Pre-op evaluation (V72 84) (Z01 818)   22  Pre-operative cardiovascular examination (V72 81) (Z01 810)   23   Retinitis pigmentosa (362 74) (H35 52)   24  Status post gastric bypass for obesity (V45 86) (Z98 84)   25  Stroke Syndrome (436)   26  Type B influenza (487 1) (J10 1)    Past Medical History    1  History of Abnormality Of Walk - Dragging The Right Foot   2  History of Complex Regional Pain Syndrome Type I Of The Hand (337 21)   3  History of Generalized abdominal pain (789 07) (R10 84)   4  History of acute sinusitis (V12 69) (Z87 09)   5  History of headache (V13 89) (Z87 898)   6  History of stroke (V12 54) (Z86 73)   7  History of urinary tract infection (V13 02) (Z87 440)   8  History of Intolerance to cold (780 99) (R68 89)   9  History of Intolerance to heat (780 99) (R68 89)   10  History of Morbid obesity (278 01) (E66 01)   11  History of Night sweats (780 8) (R61)   12  History of Stroke Syndrome (436)    Surgical History    1  History of Central IV Catheter Inferior Vena Cava   2  History of Esophagogastric Fundoplasty Nissen Fundoplication   3  History of Gastric Surgery For Morbid Obesity Bypass With Rossy-en-Y   4  History of Neuroplasty With Transposition Of Ulnar Nerve - At Wrist   5  History of Open Treatment Of Fracture Of The Lateral Malleolus    Family History  Mother    1  Family history of Carcinoid (except of appendix)   2  Family history of Disorder Of Mitochondrial Metabolism   3  Family history of Family Health Status Of Mother - Alive   4  Family history of hypertension (V17 49) (Z82 49)   5  Family history of malignant neoplasm (V16 9) (Z80 9)   6  Family history of thyroiditis (V18 19) (Z83 49)   7  Family history of Hypertension (V17 49)   8  Family history of Stroke Syndrome (V17 1)   9  Family history of Thyroid Cancer  Father    8  Family history of Depression   11  Family history of Family Health Status Of Father - Alive   15  Family history of depression (V17 0) (Z81 8)   13  Denied: Family history of epilepsy   14  Family history of Fuch endothelial dystrophy   15   Family history of Migraine Headache  Daughter    12  Family history of Disorder Of Mitochondrial Metabolism  Son    16  Family history of Disorder Of Mitochondrial Metabolism  Brother    18  Family history of depression (V17 0) (Z81 8)   19  Family history of narcolepsy (V19 8) (Z82 0)  Maternal Grandfather    21  Family history of Breast Cancer (V16 3)    Social History    · Being A Social Drinker   · Denied: Drug use   · Exercise Habits   · Marital History - Currently    · Never A Smoker   · Occupation:   · Occupation: Medical Professional    Current Meds   1  B Complex CAPS; Therapy: (Recorded:38Kcq4833) to Recorded   2  Culturelle CAPS; Therapy: (Recorded:73Ijq1126) to Recorded   3  Multivitamins TABS; TAKE 1 TABLET DAILY; Therapy: (Recorded:22Pib2607) to Recorded   4  Ondansetron 4 MG Oral Tablet Dispersible (Zofran ODT); Take one every 4-6 hours prn   for nausea; Therapy: 36IMN1841 to (Mitchel Shay)  Requested for: 23Nov2015 Ordered   5  Pantoprazole Sodium 40 MG Oral Tablet Delayed Release (Protonix); TAKE 1 TABLET   TWICE DAILY 30 MINUTES BEFORE BREAKFAST AND DINNER; Therapy: 02VAX1672 to ([place])  Requested for: 10TLH4593; Last   Rx:10Mar2016 Ordered   6  Pyridostigmine Bromide 60 MG Oral Tablet (Mestinon); Take 1 tablet twice daily; Last   FQ:47HUG1846 Ordered   7  Ranitidine HCl - 300 MG Oral Capsule; TAKE 1 CAPSULE AT BEDTIME NIGHTLY; Therapy: 11GYQ2828 to (Evaluate:47Fnh5158)  Requested for: 70LYA2421; Last   Rx:17Mar2016 Ordered    Allergies    1  Guaifenesin & Derivatives   2   Sulfa Drugs    Future Appointments    Date/Time Provider Specialty Site   05/12/2016 11:30 AM Terri Burch, [facility] General Surgery [facility]     Signatures   Electronically signed by : ANGELA Murdock; May  9 2016  1:23PM EST                       (Author)    Electronically signed by : JON Solis ; May 12 2016 10:39AM EST                       (Validation)

## 2018-01-15 NOTE — MISCELLANEOUS
Message  Return to work or school:   Sholajustice Connell is under my professional care  She was seen in my office on 08/05/2016   She is able to return to work on  08/15/2016    She can perform work without limitations           Signatures   Electronically signed by : Cristina Murillo, ; Aug 11 2016  2:03PM EST                       (Author)

## 2018-01-15 NOTE — RESULT NOTES
Message  Labs within acceptable limits Faxed over TPN orders- same as last week  Pt has f/u appointment with Dr Bandar Murray on 2/11/2016          Signatures   Electronically signed by : ANGELA Murdock; Feb 10 2016 10:22AM EST                       (Author)

## 2018-01-15 NOTE — MISCELLANEOUS
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)   5  Dysphagia (787 20) (R13 10)   6  Edema (782 3) (R60 9)   7  Gastroesophageal reflux disease (530 81) (K21 9)   8  History of Nissen fundoplication (T04 70) (P99 40)   9  Hypertension (401 9) (I10)   10  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)   11  Migraine headache (346 90) (G43 909)   12  Muscle weakness (generalized) (728 87) (M62 81)   13  Nausea (787 02) (R11 0)   14  Nausea with vomiting (787 01) (R11 2)   15  Obesity (278 00) (E66 9)   16  Oropharyngeal dysphagia (787 22) (R13 12)   17  Polycystic ovarian syndrome (256 4) (E28 2)   18  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 3) (K91 2,Z90 3)   19  Pre-op evaluation (V72 84) (Z01 818)   20  Pre-operative cardiovascular examination (V72 81) (Z01 810)   21  Retinitis pigmentosa (362 74) (H35 52)   22  Status post gastric bypass for obesity (V45 86) (Z98 84)   23  Stroke Syndrome (436)   24  Type B influenza (487 1) (J10 1)    Current Meds   1  B Complex CAPS; Therapy: (Recorded:77Abr6741) to Recorded   2  Carafate 1 GM/10ML Oral Suspension; Therapy: (Recorded:24Mar2016) to Recorded   3  Culturelle CAPS; Therapy: (Recorded:19Swf9852) to Recorded   4  Mestinon 60 MG Oral Tablet (Pyridostigmine Bromide) Recorded   5  Multivitamins TABS; TAKE 1 TABLET DAILY; Therapy: (Recorded:48Jax0240) to Recorded   6  Ondansetron 4 MG Oral Tablet Dispersible (Zofran ODT); Take one every 4-6 hours prn   for nausea; Therapy: 44AOO2604 to (Last 20 521 396)  Requested for: 23Nov2015 Ordered   7   Pantoprazole Sodium 40 MG Oral Tablet Delayed Release (Protonix); TAKE 1 TABLET   TWICE DAILY 30 MINUTES BEFORE BREAKFAST AND DINNER; Therapy: 17OBF2522 to (Breann Olmedo)  Requested for: 63PCC8916; Last   Rx:10Mar2016 Ordered   8  Ranitidine HCl - 300 MG Oral Capsule; TAKE 1 CAPSULE AT BEDTIME NIGHTLY; Therapy: 52VZG5439 to (Evaluate:25Tja7978)  Requested for: 23MIO4859; Last   Rx:17Mar2016 Ordered    Allergies    1  Guaifenesin & Derivatives   2   Sulfa Drugs    Signatures   Electronically signed by : Eboni Angeles LPN; Apr 25 0284  9:58NH EST                       (Author)

## 2018-01-16 NOTE — PROGRESS NOTES
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)   9  Dysphagia (787 20) (R13 10)   10  Edema (782 3) (R60 9)   11  Gastroesophageal reflux disease (530 81) (K21 9)   12  History of Nissen fundoplication (S55 42) (E78 22)   13  Hypertension (401 9) (I10)   14  Iron deficiency (280 9) (E61 1)   15  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)   16  Migraine headache (346 90) (G43 909)   17  Muscle weakness (generalized) (728 87) (M62 81)   18  Nausea (787 02) (R11 0)   19  Nausea with vomiting (787 01) (R11 2)   20  Need for diphtheria-tetanus-pertussis (Tdap) vaccine (V06 1) (Z23)   21  Obesity (278 00) (E66 9)   22  Oropharyngeal dysphagia (787 22) (R13 12)   23  Polycystic ovarian syndrome (256 4) (E28 2)   24  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 3) (K91 2,Z90 3)   25  Pre-op evaluation (V72 84) (Z01 818)   26  Pre-operative cardiovascular examination (V72 81) (Z01 810)   27  Retinitis pigmentosa (362 74) (H35 52)   28  Status post gastric bypass for obesity (V45 86) (Z98 84)   29  Stroke Syndrome (436)   30  Type B influenza (487 1) (J10 1)    Current Meds   1  Amoxicillin-Pot Clavulanate 400-57 MG/5ML Oral Suspension Reconstituted; TAKE 5 ML   Every 6 hours As Directed TDD:20;   Therapy: 40ASX9400 to (Evaluate:32Qoo9196)  Requested for: 53CMF7426; Last   Rx:19May2016 Ordered   2  B Complex CAPS; Therapy: (Recorded:52Ctd9007) to Recorded   3  Culturelle CAPS;    Therapy: (Recorded:11Feb2016) to Recorded   4  MiraLax Oral Powder (Polyethylene Glycol 3350); Therapy: (Recorded:24Fwq4315) to Recorded   5  Multivitamins TABS; TAKE 1 TABLET DAILY; Therapy: (Recorded:31Wje4310) to Recorded   6  Ondansetron 4 MG Oral Tablet Dispersible (Zofran ODT); Take one every 4-6 hours prn   for nausea; Therapy: 40MSW6445 to (Last [id number])  Requested for: 23Nov2015 Ordered   7  Pantoprazole Sodium 40 MG Oral Tablet Delayed Release (Protonix); TAKE 1 TABLET   TWICE DAILY 30 MINUTES BEFORE BREAKFAST AND DINNER; Therapy: 61ONO1692 to ([facility])  Requested for: 27YHS9826; Last   Rx:10Mar2016 Ordered   8  Stool Softener TABS; Therapy: (Recorded:15Luy2786) to Recorded    Allergies    1  Guaifenesin & Derivatives   2   Sulfa Drugs    Signatures   Electronically signed by : ANGELA Torres; Jul 6 2016 12:21PM EST                       (Author)

## 2018-01-16 NOTE — PROGRESS NOTES
Message  Patient is receiving via feeding tube  1380 ml 138 kcal ( pedialyte)  180 kcal, 42 grams protein Unjury  720 ml, 805 kcal, 34 grams protein ( vital)  720ml, 800 kca, 50 grams protein po  2880 ml total, 1503 kcal, 126 grams protein     As recommended by her [place] doctor- will d/c TPN and run normal saline with banana bag, plus thiamine    Contacted Kristie Carpenter and updated orders faxed to [employer] monitor hydration status and po intake - hopefully will eventually be able to d/c hydration and d/c double lumen  Will continue to follow      Active Problems    1  Abdominal discomfort (789 00) (R10 9)   2  Abdominal pain (789 00) (R10 9)   3  Abnormal weight gain (783 1) (R63 5)   4  Acute UTI (599 0) (N39 0)   5  Allergic contact dermatitis due to other agents (692 89) (L23 89)   6  Cervical cancer screening (V76 2) (Z12 4)   7  Chronic venous embolism and thrombosis of deep vessels of distal lower extremity   (453 52) (I82 5Z9)   8  Constipation (564 00) (K59 00)   9  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)   10  Dysphagia (787 20) (R13 10)   11  Edema (782 3) (R60 9)   12  Gastroesophageal reflux disease (530 81) (K21 9)   13  History of Nissen fundoplication (E27 04) (O99 09)   14  Hypertension (401 9) (I10)   15  Iron deficiency (280 9) (E61 1)   16  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)   17  Migraine headache (346 90) (G43 909)   18  Muscle weakness (generalized) (728 87) (M62 81)   19  Nausea (787 02) (R11 0)   20  Nausea with vomiting (787 01) (R11 2)   21  Need for diphtheria-tetanus-pertussis (Tdap) vaccine (V06 1) (Z23)   22  Obesity (278 00) (E66 9)   23  Oropharyngeal dysphagia (787 22) (R13 12)   24  Polycystic ovarian syndrome (256 4) (E28 2)   25  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 3) (K91 2,Z90 3)   26  Pre-op evaluation (V72 84) (Z01 818)   27  Pre-operative cardiovascular examination (V72 81) (Z01 810)   28   Retinitis pigmentosa (145 56) (H35 52)   29  Status post gastric bypass for obesity (V45 86) (Z98 84)   30  Stroke Syndrome (436)   31  Type B influenza (487 1) (J10 1)   32  Urination pain (788 1) (R30 9)    Current Meds   1  Amoxicillin-Pot Clavulanate 400-57 MG/5ML Oral Suspension Reconstituted; TAKE 5 ML   Every 6 hours As Directed TDD:20;   Therapy: 73KCG5348 to (Evaluate:17Aug2016)  Requested for: 72TTS9399; Last   Rx:54Qqp9585 Ordered   2  B Complex CAPS; Therapy: (Recorded:78Jii0792) to Recorded   3  Culturelle CAPS; Therapy: (Recorded:44Zzt7166) to Recorded   4  MiraLax Oral Powder (Polyethylene Glycol 3350); Therapy: (Recorded:55Tax6341) to Recorded   5  Multivitamins TABS; TAKE 1 TABLET DAILY; Therapy: (Recorded:97Xoi8900) to Recorded   6  Ondansetron 4 MG Oral Tablet Dispersible (Zofran ODT); Take one every 4-6 hours prn   for nausea; Therapy: 49UUE5809 to (Last 72 954 091)  Requested for: 23Nov2015 Ordered   7  Pantoprazole Sodium 40 MG Oral Tablet Delayed Release (Protonix); TAKE 1 TABLET   TWICE DAILY 30 MINUTES BEFORE BREAKFAST AND DINNER; Therapy: 81DXT6716 to (Karsten Mello)  Requested for: 54GYS1542; Last   Rx:10Mar2016 Ordered   8  Stool Softener TABS; Therapy: (Recorded:76Blz7980) to Recorded    Allergies    1  Guaifenesin & Derivatives   2   Sulfa Drugs    Signatures   Electronically signed by : Claudette Abler, LD; Aug 16 2016  1:12PM EST                       (Author)

## 2018-01-16 NOTE — RESULT NOTES
Message  Reviewed labs- all within acceptable limits  As patient it tolerating enteral and po, will d/c weekly labs     Orders faxed to [facility] to d/c labs      Signatures   Electronically signed by : ANGELA Hough; Sep 13 2016 11:28AM EST                       (Author)

## 2018-01-16 NOTE — PROGRESS NOTES
Message  Per email from patient - estimate patient is receiving the following  257 kcal from tube feeding  500 kcal from po  604 kcal from TPN  Total kcal = 1361 kcal    45 grams protein Unjury  17 5 grams from Vital  30 grams from po   50 grams from TPN= 142 5 grams of protein    Will decrease TPN to   Dex 10% 600 ml= 204 kcal  AA 10% 300 ml= 30 grams of protein/120 kcal  Lipids 20% 200 ml= 200 kcal    total with TF, po and TPN = 1277 kcal and 102 5 grams protein      Active Problems    1  Abdominal discomfort (789 00) (R10 9)   2  Abdominal pain (789 00) (R10 9)   3  Abnormal weight gain (783 1) (R63 5)   4  Allergic contact dermatitis due to other agents (692 89) (L23 89)   5  Cervical cancer screening (V76 2) (Z12 4)   6  Chronic venous embolism and thrombosis of deep vessels of distal lower extremity   (453 52) (I82 5Z9)   7  Constipation (564 00) (K59 00)   8  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)   9  Dysphagia (787 20) (R13 10)   10  Edema (782 3) (R60 9)   11  Gastroesophageal reflux disease (530 81) (K21 9)   12  History of Nissen fundoplication (L27 43) (U39 95)   13  Hypertension (401 9) (I10)   14  Iron deficiency (280 9) (E61 1)   15  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)   16  Migraine headache (346 90) (G43 909)   17  Muscle weakness (generalized) (728 87) (M62 81)   18  Nausea (787 02) (R11 0)   19  Nausea with vomiting (787 01) (R11 2)   20  Need for diphtheria-tetanus-pertussis (Tdap) vaccine (V06 1) (Z23)   21  Obesity (278 00) (E66 9)   22  Oropharyngeal dysphagia (787 22) (R13 12)   23  Polycystic ovarian syndrome (256 4) (E28 2)   24  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 3) (K91 2,Z90 3)   25  Pre-op evaluation (V72 84) (Z01 818)   26  Pre-operative cardiovascular examination (V72 81) (Z01 810)   27  Retinitis pigmentosa (362 74) (H35 52)   28  Status post gastric bypass for obesity (V45 86) (Z98 84)   29  Stroke Syndrome (436)   30   Type B influenza (487 1) (J10 1)    Current Meds   1  Amoxicillin-Pot Clavulanate 400-57 MG/5ML Oral Suspension Reconstituted; TAKE 5 ML   Every 6 hours As Directed TDD:20;   Therapy: 56FLE4428 to (Evaluate:17Aug2016)  Requested for: 00JLQ9883; Last   Rx:46Gdb0686 Ordered   2  B Complex CAPS; Therapy: (Recorded:01Vuh2027) to Recorded   3  Culturelle CAPS; Therapy: (Recorded:03Ebz9836) to Recorded   4  MiraLax Oral Powder (Polyethylene Glycol 3350); Therapy: (Recorded:82Ifq8881) to Recorded   5  Multivitamins TABS; TAKE 1 TABLET DAILY; Therapy: (Recorded:71Wcv9064) to Recorded   6  Ondansetron 4 MG Oral Tablet Dispersible (Zofran ODT); Take one every 4-6 hours prn   for nausea; Therapy: 88FNS2846 to (Last 72 954 091)  Requested for: 23Nov2015 Ordered   7  Pantoprazole Sodium 40 MG Oral Tablet Delayed Release (Protonix); TAKE 1 TABLET   TWICE DAILY 30 MINUTES BEFORE BREAKFAST AND DINNER; Therapy: 44FNW5075 to (9911 0053)  Requested for: 57KFA9170; Last   Rx:10Mar2016 Ordered   8  Stool Softener TABS; Therapy: (Recorded:19May2016) to Recorded    Allergies    1  Guaifenesin & Derivatives   2   Sulfa Drugs    Signatures   Electronically signed by : ANGELA Quintanilla; Jun 28 2016  1:06PM EST                       (Author)

## 2018-01-17 NOTE — PROGRESS NOTES
Message  Patient reports she is doing the same this week  Prealbumin level 19 ( normal) Other labs within acceptable limits  Will fax over renewal of same TPN orders  Active Problems    1  Abdominal discomfort (789 00) (R10 9)   2  Abdominal pain (789 00) (R10 9)   3  Abnormal weight gain (783 1) (R63 5)   4  Acute UTI (599 0) (N39 0)   5  Allergic contact dermatitis due to other agents (692 89) (L23 89)   6  Cervical cancer screening (V76 2) (Z12 4)   7  Chronic venous embolism and thrombosis of deep vessels of distal lower extremity   (453 52) (I82 5Z9)   8  Constipation (564 00) (K59 00)   9  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)   10  Dysphagia (787 20) (R13 10)   11  Edema (782 3) (R60 9)   12  Gastroesophageal reflux disease (530 81) (K21 9)   13  History of Nissen fundoplication (J55 44) (E09 350)   14  Hypertension (401 9) (I10)   15  Infection of Broviac site, initial encounter (999 31) (T82 7XXA)   16  Iron deficiency (280 9) (E61 1)   17  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)   18  Migraine headache (346 90) (G43 909)   19  Muscle weakness (generalized) (728 87) (M62 81)   20  Nausea (787 02) (R11 0)   21  Nausea with vomiting (787 01) (R11 2)   22  Need for diphtheria-tetanus-pertussis (Tdap) vaccine (V06 1) (Z23)   23  Obesity (278 00) (E66 9)   24  Oropharyngeal dysphagia (787 22) (R13 12)   25  Polycystic ovarian syndrome (256 4) (E28 2)   26  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 3) (K91 2,Z90 3)   27  Pre-op evaluation (V72 84) (Z01 818)   28  Pre-operative cardiovascular examination (V72 81) (Z01 810)   29  Retinitis pigmentosa (362 74) (H35 52)   30  Skin irritation (709 9) (R23 8)   31  Status post gastric bypass for obesity (V45 86) (Z98 84)   32  Stroke Syndrome (436)   33  Type B influenza (487 1) (J10 1)   34  Urinary retention (788 20) (R33 9)   35  Urination pain (788 1) (R30 9)   36  Voiding dysfunction (599 9) (N39 8)    Current Meds   1  Citracal TABS; Therapy: (Recorded:51Msd6348) to Recorded   2  Culturelle CAPS; Therapy: (Recorded:39Xlu6624) to Recorded   3  Linzess 145 MCG Oral Capsule Recorded   4  Maxorb Extra 4"x4" External Pad; Therapy: 15KFO4880 to Recorded   5  MiraLax Oral Powder (Polyethylene Glycol 3350); Therapy: (Recorded:27Pxl5856) to Recorded   6  Multivitamins TABS; TAKE 1 TABLET DAILY; Therapy: (Recorded:23Vhv7982) to Recorded   7  Ondansetron 4 MG Oral Tablet Dispersible (Zofran ODT); Take one every 4-6 hours prn   for nausea; Therapy: 16AWX9084 to (Last Rx:23Nov2015)  Requested for: 23Nov2015 Ordered   8  Pantoprazole Sodium 40 MG Oral Tablet Delayed Release (Protonix); TAKE 1 TABLET   TWICE DAILY 30 MINUTES BEFORE BREAKFAST AND DINNER; Therapy: 74ERT6635 to (Jeni Best)  Requested for: 16UKR8755; Last   Rx:10Mar2016 Ordered   9  Stool Softener TABS; Therapy: (Recorded:51Yai7882) to Recorded    Allergies    1  Guaifenesin & Derivatives   2   Sulfa Drugs    Signatures   Electronically signed by : ANGELA Maza; Nov 9 2016 11:56AM EST                       (Author)

## 2018-01-17 NOTE — PROGRESS NOTES
Message  [facility] of nutrition info:  Pediatlyte plus Unjury= 90 calories, 42 grams protein, 900 ml  Vital High protein = 300 calories, 26 grams protein 1200 ml  TPN= 944 calories, 50 grams protein 1400 ml  Total = 1514 kcal, 118 grams protein and 3500 ml fluid  Patient reports for po- 500 calories, 20 ounces fluid, inconsistent  TPN orders faxed to [facility] on Tuesday  If patient continues to tolerate more po and enteral feeds, will decrease kcal in TPN  Active Problems    1  Abdominal discomfort (789 00) (R10 9)   2  Abdominal pain (789 00) (R10 9)   3  Abnormal weight gain (783 1) (R63 5)   4  Acute UTI (599 0) (N39 0)   5  Allergic contact dermatitis due to other agents (692 89) (L23 89)   6  Cervical cancer screening (V76 2) (Z12 4)   7  Chronic venous embolism and thrombosis of deep vessels of distal lower extremity   (453 52) (I82 5Z9)   8  Constipation (564 00) (K59 00)   9  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)   10  Dysphagia (787 20) (R13 10)   11  Edema (782 3) (R60 9)   12  Gastroesophageal reflux disease (530 81) (K21 9)   13  History of Nissen fundoplication (N65 95) (Z45 99)   14  Hypertension (401 9) (I10)   15  Iron deficiency (280 9) (E61 1)   16  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)   17  Migraine headache (346 90) (G43 909)   18  Muscle weakness (generalized) (728 87) (M62 81)   19  Nausea (787 02) (R11 0)   20  Nausea with vomiting (787 01) (R11 2)   21  Need for diphtheria-tetanus-pertussis (Tdap) vaccine (V06 1) (Z23)   22  Obesity (278 00) (E66 9)   23  Oropharyngeal dysphagia (787 22) (R13 12)   24  Polycystic ovarian syndrome (256 4) (E28 2)   25  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 3) (K91 2,Z90 3)   26  Pre-op evaluation (V72 84) (Z01 818)   27  Pre-operative cardiovascular examination (V72 81) (Z01 810)   28  Retinitis pigmentosa (362 74) (H35 52)   29  Status post gastric bypass for obesity (V45 86) (Z98 84)   30   Stroke Syndrome (436)   31  Type B influenza (487 1) (J10 1)   32  Urination pain (788 1) (R30 9)    Current Meds   1  Amoxicillin-Pot Clavulanate 400-57 MG/5ML Oral Suspension Reconstituted; TAKE 5 ML   Every 6 hours As Directed TDD:20;   Therapy: 63WAQ5346 to (Evaluate:17Aug2016)  Requested for: 49PDP3214; Last   Rx:19May2016 Ordered   2  B Complex CAPS; Therapy: (Recorded:34Oho8095) to Recorded   3  Culturelle CAPS; Therapy: (Recorded:59Tqe2493) to Recorded   4  MiraLax Oral Powder (Polyethylene Glycol 3350); Therapy: (Recorded:19May2016) to Recorded   5  Multivitamins TABS; TAKE 1 TABLET DAILY; Therapy: (Recorded:52Dor2030) to Recorded   6  Nitrofurantoin Monohyd Macro 100 MG Oral Capsule (Macrobid); TAKE 1 CAPSULE   TWICE DAILY UNTIL GONE;   Therapy: 15XHY1479 to (Complete:29Jul2016)  Requested for: 50WWH6032; Last   Rx:19Jul2016 Ordered   7  Ondansetron 4 MG Oral Tablet Dispersible (Zofran ODT); Take one every 4-6 hours prn   for nausea; Therapy: 10YDK5176 to (Last Rx:23Nov2015)  Requested for: 23Nov2015 Ordered   8  Pantoprazole Sodium 40 MG Oral Tablet Delayed Release (Protonix); TAKE 1 TABLET   TWICE DAILY 30 MINUTES BEFORE BREAKFAST AND DINNER; Therapy: 29JPN6516 to (Jonah Brock)  Requested for: 17BNB7480; Last   Rx:10Mar2016 Ordered   9  Stool Softener TABS; Therapy: (Recorded:19May2016) to Recorded    Allergies    1  Guaifenesin & Derivatives   2   Sulfa Drugs    Signatures   Electronically signed by : ANGELA Lo; Jul 28 2016  9:51AM EST                       (Author)

## 2018-01-17 NOTE — PROCEDURES
Procedures by Octavio Carreon RN at 10/12/2016  3:03 PM      Author:  Octavio Carreon RN Service:  Interventional Radiology  Author Type:  Registered Nurse    Filed:  10/12/2016  3:08 PM Date of Service:  10/12/2016  3:03 PM Status:  Attested    :  Octavio Carreon RN (Registered Nurse)  Cosigner:  Tom Gorman MD at 10/12/2016  6:36 PM      Procedure Orders:       1  CENTRAL LINE [[id number]] ordered by Tom Gorman MD at 10/12/16 1503               Attestation signed by Tom Gorman MD at 10/12/2016  6:36 PM           I performed the procedure  Agree with above note  Central Line Insertion  Date/Time: 10/12/2016 3:03 PM  Performed by: George Ramos  Authorized by: Nidia Laurent     Patient location:  Other (comment) (IR )  Other Assisting  Provider: No    Consent:     Consent obtained:  Written    Consent given by:  Patient    Risks discussed:  Infection and bleeding    Alternatives discussed:  No treatment  Universal protocol:     Procedure explained and questions answered to patient or proxy's satisfaction: yes      Relevant documents present and verified: yes      Imaging studies available: yes      Required blood products,  implants, devices, and special equipment available: yes      Patient identity confirmed:  Verbally with patient and arm band  Pre-procedure details:     Hand hygiene: Hand hygiene performed prior to insertion      Sterile barrier technique: All elements of maximal sterile technique followed      Skin preparation:  ChloraPrep    Skin preparation agent: Skin preparation agent completely dried prior to procedure    Indications:     Central line indications: vascular access      Central line indications comment:  TPN     Site selection rationale:  Exchange of existing Broviac catheter   Anesthesia (see MAR for exact dosages):      Anesthesia method:  Local infiltration    Local anesthetic:  Lidocaine 1% WITH epi  Procedure details:     Location:  Right internal jugular    Vessel type: vein      Laterality:  Right    Approach: percutaneous technique used      Patient position:  Flat    Catheter type:  Single lumen    Catheter size: 6 6 fr     Landmarks identified: yes      Number of attempts:  1    Successful placement: yes      Vessel of catheter tip end:  Superior vena cava   Post-procedure details:     Assessment:  Blood return through all ports and placement verified by x-ray (in IR under Fluroscopy)    Post-procedure complications: none      Patient tolerance of procedure:   Tolerated well, no immediate complications  Comments:      Right IJ vein Cuffed/tunneled Broviac catheter                      Received for:Provider  EPIC   Oct 12 2016  6:36PM [facility] Standard Time

## 2018-01-17 NOTE — RESULT NOTES
Message  Reviewed labs  Levels all within acceptable limits   Prealbumin level 23 1  Pt is receiving via tube feedin kcal/48 grams protein, 1104 ml fluid  Adjust TPN, decreased dextrose by 100 ml  Receiving via TPn: 610 kcal, 60 grams of protein  Total kcal = 1162 kcal, 108 grams protein, 2304 ml fluid  Will continue to follow       Signatures   Electronically signed by : ANGELA Vance; 2016  2:54PM EST                       (Author)

## 2018-01-17 NOTE — MISCELLANEOUS
Message  Pt called office because she is having episodes pf ULQ ABD pain  She said it just comes on she gets sweaty and loses her color  She Vomits once or twice all mucous and sits for a few minutes and then feels better  It has happened once daily for the past 5 days  Spoke to Rajiv Mclaughlin the [place] and she told me to advise pt to do liquid diet for 3 days then progess to pureed diet and stay on that until she sees Dr Hilary Figueroa  Told pt  She understood and jim she would  Active Problems    1  Abdominal discomfort (789 00) (R10 9)   2  Abnormal weight gain (783 1) (R63 5)   3  Chronic venous embolism and thrombosis of deep vessels of distal lower extremity   (453 52) (I82 5Z9)   4  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)   5  Dysphagia (787 20) (R13 10)   6  Edema (782 3) (R60 9)   7  Gastroesophageal reflux disease (530 81) (K21 9)   8  History of Nissen fundoplication (J18 05) (I06 45)   9  Hypertension (401 9) (I10)   10  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)   11  Migraine headache (346 90) (G43 909)   12  Muscle weakness (generalized) (728 87) (M62 81)   13  Nausea (787 02) (R11 0)   14  Nausea with vomiting (787 01) (R11 2)   15  Obesity (278 00) (E66 9)   16  Polycystic ovarian syndrome (256 4) (E28 2)   17  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 3) (K91 2,Z90 3)   18  Pre-op evaluation (V72 84) (Z01 818)   19  Pre-operative cardiovascular examination (V72 81) (Z01 810)   20  Retinitis pigmentosa (362 74) (H35 52)   21  Status post gastric bypass for obesity (V45 86) (Z98 84)   22  Stroke Syndrome (436)   23  Type B influenza (487 1) (J10 1)    Current Meds   1  B Complex CAPS; Therapy: (Recorded:99Ton3176) to Recorded   2  Carafate 1 GM/10ML Oral Suspension; TAKE 2 TEASPOONFULS FOUR TIMES A DAY; Therapy: 23Jsj2005 to (Evaluate:79Kbf4286)  Requested for: 25Aug2015; Last   Rx:25Aug2015 Ordered   3  Culturelle CAPS; Therapy: (Recorded:11Feb2016) to Recorded   4  Multivitamins TABS; TAKE 1 TABLET DAILY; Therapy: (Recorded:46Lhv3335) to Recorded   5  Ondansetron 4 MG Oral Tablet Dispersible; Take one every 4-6 hours prn for nausea; Therapy: 59ZZR9475 to (Last Rx:23Nov2015)  Requested for: 23Nov2015 Ordered   6  Pantoprazole Sodium 40 MG Oral Tablet Delayed Release; TAKE 1 TABLET TWICE   DAILY 30 MINUTES BEFORE BREAKFAST AND DINNER; Therapy: 26TXR4071 to (Georgie Cates)  Requested for: 59VZQ6890; Last   Rx:10Mar2016 Ordered   7  Ranitidine HCl - 300 MG Oral Capsule; TAKE 1 CAPSULE AT BEDTIME NIGHTLY; Therapy: 32EHG5102 to (Evaluate:04Fzk3683)  Requested for: 54FGT2515; Last   Rx:18Mar2015 Ordered    Allergies    1  Guaifenesin & Derivatives   2   Sulfa Drugs    Signatures   Electronically signed by : Hayley Rubio LPN; Mar 16 8515  0:06GU EST                       (Author)

## 2018-01-17 NOTE — PROGRESS NOTES
Message /19/2016 12:15:43 PM - Conception Mancini  Pedialyte= 66 calories, o protein ( 55 ml/hr over 12 hours)  Enteral = 158 kcal, 6 grams of protein  TPN: increased to 944 kcal and 50 grams of protein  Unjury= 180 kcal, 42 grams of protein  Total = 1348 kcal and 98 grams protein    Patient is tolerating approx  200 calories per day  total volume= 2540 ml         Active Problems    1  Abdominal discomfort (789 00) (R10 9)   2  Abdominal pain (789 00) (R10 9)   3  Abnormal weight gain (783 1) (R63 5)   4  Allergic contact dermatitis due to other agents (692 89) (L23 89)   5  Cervical cancer screening (V76 2) (Z12 4)   6  Chronic venous embolism and thrombosis of deep vessels of distal lower extremity   (453 52) (I82 5Z9)   7  Constipation (564 00) (K59 00)   8  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)   9  Dysphagia (787 20) (R13 10)   10  Edema (782 3) (R60 9)   11  Gastroesophageal reflux disease (530 81) (K21 9)   12  History of Nissen fundoplication (Y11 53) (D01 54)   13  Hypertension (401 9) (I10)   14  Iron deficiency (280 9) (E61 1)   15  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)   16  Migraine headache (346 90) (G43 909)   17  Muscle weakness (generalized) (728 87) (M62 81)   18  Nausea (787 02) (R11 0)   19  Nausea with vomiting (787 01) (R11 2)   20  Need for diphtheria-tetanus-pertussis (Tdap) vaccine (V06 1) (Z23)   21  Obesity (278 00) (E66 9)   22  Oropharyngeal dysphagia (787 22) (R13 12)   23  Polycystic ovarian syndrome (256 4) (E28 2)   24  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 3) (K91 2,Z90 3)   25  Pre-op evaluation (V72 84) (Z01 818)   26  Pre-operative cardiovascular examination (V72 81) (Z01 810)   27  Retinitis pigmentosa (362 74) (H35 52)   28  Status post gastric bypass for obesity (V45 86) (Z98 84)   29  Stroke Syndrome (436)   30  Type B influenza (487 1) (J10 1)    Current Meds   1   Amoxicillin-Pot Clavulanate 400-57 MG/5ML Oral Suspension Reconstituted; TAKE 5 ML   Every 6 hours As Directed TDD:20;   Therapy: 61JCI1439 to (Evaluate:17Aug2016)  Requested for: 27RYU4210; Last   Rx:19May2016 Ordered   2  B Complex CAPS; Therapy: (Recorded:19Uuu0919) to Recorded   3  Culturelle CAPS; Therapy: (Recorded:70Xdh6343) to Recorded   4  MiraLax Oral Powder (Polyethylene Glycol 3350); Therapy: (Recorded:19May2016) to Recorded   5  Multivitamins TABS; TAKE 1 TABLET DAILY; Therapy: (Recorded:37Khc6732) to Recorded   6  Ondansetron 4 MG Oral Tablet Dispersible (Zofran ODT); Take one every 4-6 hours prn   for nausea; Therapy: 20MIG3884 to (Last 9651 1475)  Requested for: 23Nov2015 Ordered   7  Pantoprazole Sodium 40 MG Oral Tablet Delayed Release (Protonix); TAKE 1 TABLET   TWICE DAILY 30 MINUTES BEFORE BREAKFAST AND DINNER; Therapy: 29TTN4610 to (Devorah Lopez)  Requested for: 17YVI2061; Last   Rx:10Mar2016 Ordered   8  Stool Softener TABS; Therapy: (Recorded:19May2016) to Recorded    Allergies    1  Guaifenesin & Derivatives   2   Sulfa Drugs    Signatures   Electronically signed by : ANGELA Wells; Jul 19 2016  1:15PM EST                       (Author)

## 2018-01-17 NOTE — RESULT NOTES
Message  As pt is eating more food but still struggling with fluid intake, will increase fluid volume:  800 ml 10% Dex=80 grams 272 kcal  600 ml 10% BL=171lbzd  100 ml 20% lipid=200 kcal  1500 ml ( plus 1050 ml po) = 2550 ml ( 30 ml/kg bw  712 kcal plus po intake of approx   450 kcal= 1162 kcal ( 13 kcal/kg bw)   new orders faxed to [facility] signed by : ANGELA Jensen; Mar  8 2016  1:16PM EST                       (Author)

## 2018-01-18 NOTE — PROGRESS NOTES
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)  via Enteralite infinity feeding pump - portable  500 ml feeding bags  Climmie Banister CARLIN-Key extension set with secur-madonna right angle connector and 2 Port Y and clamp Length 12 in ( 30 cm)      4/29/2016 12:42:57 PM - Lilia De Anda  240 ml of Jevity 1 2 provides:  288 kcal, 13 3 gm protein    TPN provides 572 calories/ 50 grams protein  860 calories , 63 grams protein  Per pt, she is eating 70-75 grams protein per day  Will f/u concerning tolerance  Active Problems    1  Abdominal discomfort (789 00) (R10 9)   2  Abnormal weight gain (783 1) (R63 5)   3  Chronic venous embolism and thrombosis of deep vessels of distal lower extremity   (453 52) (I82 5Z9)   4  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)   5  Dysphagia (787 20) (R13 10)   6  Edema (782 3) (R60 9)   7  Gastroesophageal reflux disease (530 81) (K21 9)   8  History of Nissen fundoplication (F30 30) (K87 96)   9  Hypertension (401 9) (I10)   10  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)   11  Migraine headache (346 90) (G43 909)   12  Muscle weakness (generalized) (728 87) (M62 81)   13  Nausea (787 02) (R11 0)   14  Nausea with vomiting (787 01) (R11 2)   15  Obesity (278 00) (E66 9)   16  Oropharyngeal dysphagia (787 22) (R13 12)   17  Polycystic ovarian syndrome (256 4) (E28 2)   18  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 3) (K91 2,Z90 3)   19  Pre-op evaluation (V72 84) (Z01 818)   20  Pre-operative cardiovascular examination (V72 81) (Z01 810)   21  Retinitis pigmentosa (362 74) (H35 52)   22  Status post gastric bypass for obesity (V45 86) (Z98 84)   23  Stroke Syndrome (436)   24  Type B influenza (487 1) (J10 1)    Current Meds   1  B Complex CAPS; Therapy: (Recorded:11Feb2016) to Recorded   2  Carafate 1 GM/10ML Oral Suspension; Therapy: (Recorded:24Mar2016) to Recorded   3  Deana SMITH; Therapy: (Recorded:81Yyo5534) to Recorded   4  Mestinon 60 MG Oral Tablet (Pyridostigmine Bromide) Recorded   5  Multivitamins TABS; TAKE 1 TABLET DAILY; Therapy: (Recorded:37Swc0241) to Recorded   6  Ondansetron 4 MG Oral Tablet Dispersible (Zofran ODT); Take one every 4-6 hours prn   for nausea; Therapy: 08LLQ1660 to (Last 72 954 091)  Requested for: 23Nov2015 Ordered   7  Pantoprazole Sodium 40 MG Oral Tablet Delayed Release (Protonix); TAKE 1 TABLET   TWICE DAILY 30 MINUTES BEFORE BREAKFAST AND DINNER; Therapy: 33ZUJ9421 to (Constanza Mealing)  Requested for: 51KVQ4036; Last   Rx:10Mar2016 Ordered   8  Ranitidine HCl - 300 MG Oral Capsule; TAKE 1 CAPSULE AT BEDTIME NIGHTLY; Therapy: 09MYG3369 to (Evaluate:13Byp8984)  Requested for: 09HGM8893; Last   Rx:17Mar2016 Ordered    Allergies    1  Guaifenesin & Derivatives   2   Sulfa Drugs    Signatures   Electronically signed by : ANGELA Vance; Apr 29 2016  1:46PM EST                       (Author)

## 2018-01-18 NOTE — RESULT NOTES
Message  Labs reviewed  Prealbumin 18- will increase AA in TPN  New orders:  Dextrose 20% 800 ml= 544 kcal  Amino Acid 800ml 10% 320 kcal ( 80 grams of protein)  Lipids 20% 100 ml= 200kcal  Total kcal = 1064 calories   Other labs within normal limits  Pt scheduled for EGD and possible dilation on 1/27/2016 and manometry at [facility]  Continuing TPN will be determined after results of tests   DTM      Signatures   Electronically signed by : ANGELA Quintanilla; Jan 26 2016 10:00AM EST                       (Author)

## 2018-01-18 NOTE — PROGRESS NOTES
Message  faxed over orders to [facility] to determine if elemental formula will be covered under insurance  recommend elecare homar- 30 cheli per fluid ounce concentration  300 ml of elecare via feeding pump (75 ml/hr) for 4 hours , TID  mix 4 scoops of elecare homar with 5 ounces of water ( 8 scoops per 10 ounces of water) Hang time is 4 hours due to product being mixed  Await lab results and insurance determination      Active Problems    1  Abdominal discomfort (789 00) (R10 9)   2  Abdominal pain (789 00) (R10 9)   3  Abnormal weight gain (783 1) (R63 5)   4  Acute UTI (599 0) (N39 0)   5  Allergic contact dermatitis due to other agents (692 89) (L23 89)   6  Cervical cancer screening (V76 2) (Z12 4)   7  Chronic venous embolism and thrombosis of deep vessels of distal lower extremity   (453 52) (I82 5Z9)   8  Constipation (564 00) (K59 00)   9  Diarrhea (787 91) (R19 7)   10  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)   11  Dysphagia (787 20) (R13 10)   12  Edema (782 3) (R60 9)   13  Encounter for gynecological examination (V72 31) (Z01 419)   14  Gastroesophageal reflux disease (530 81) (K21 9)   15  Gastrostomy tube dysfunction (536 42) (K94 23)   16  History of Nissen fundoplication (D16 96) (O29 920)   17  Hypertension (401 9) (I10)   18  Infection of Broviac site, initial encounter (999 31) (T82 7XXA)   19  Iron deficiency (280 9) (E61 1)   20  Menorrhagia with regular cycle (626 2) (N92 0)   21  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)   22  Migraine headache (346 90) (G43 909)   23  Muscle weakness (generalized) (728 87) (M62 81)   24  Nausea (787 02) (R11 0)   25  Nausea with vomiting (787 01) (R11 2)   26  Need for diphtheria-tetanus-pertussis (Tdap) vaccine (V06 1) (Z23)   27  Obesity (278 00) (E66 9)   28  Oropharyngeal dysphagia (787 22) (R13 12)   29  Polycystic ovarian syndrome (256 4) (E28 2)   30  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 3) (K91 2,Z90 3)   31  Pre-op evaluation (V72 84) (Z01 818)   32  Pre-operative cardiovascular examination (V72 81) (Z01 810)   33  Retinitis pigmentosa (362 74) (H35 52)   34  Skin irritation (709 9) (R23 8)   35  Status post gastric bypass for obesity (V45 86) (Z98 84)   36  Stroke Syndrome (436)   37  Type B influenza (487 1) (J10 1)   38  Urinary retention (788 20) (R33 9)   39  Urination pain (788 1) (R30 9)   40  Voiding dysfunction (599 9) (N39 8)    Current Meds   1  Cholestyramine 4 GM Oral Packet; MIX THE CONTENTS OF 1 POWDER PACKET WITH   2 TO 6 OZ OF NONCARBONATED BEVERAGE AND DRINK 3 TIMES DAILY; Therapy: 05UOV7536 to (Evaluate:19Jan2017)  Requested for: 67Rxo6649; Last   Rx:50Fho8177 Ordered   2  Citracal TABS; Therapy: (Recorded:48Gxk2707) to Recorded   3  Culturelle CAPS; Therapy: (Recorded:00Tqk1118) to Recorded   4  Linzess 145 MCG Oral Capsule Recorded   5  Multivitamins TABS; TAKE 1 TABLET DAILY; Therapy: (Recorded:71Rdl3783) to Recorded   6  Pantoprazole Sodium 40 MG Oral Tablet Delayed Release (Protonix); TAKE 1 TABLET   TWICE DAILY 30 MINUTES BEFORE BREAKFAST AND DINNER; Therapy: 77ANK2480 to (Yosvany Arteaga)  Requested for: 37IMW6385; Last   Rx:10Mar2016 Ordered   7  Stool Softener TABS; Therapy: (Recorded:08Cbk2725) to Recorded   8  Temazepam 15 MG Oral Capsule; Therapy: (Recorded:42Dgi0207) to Recorded    Allergies    1  Guaifenesin & Derivatives   2   Sulfa Drugs    Plan    · (1) COMPREHENSIVE METABOLIC PANEL; Status:Resulted - Requires  Verification,Retrospective By Protocol Authorization;   Done: 92MOY4005 09:22AM    Signatures   Electronically signed by : ANGELA Moreno; Dec 21 2016  2:22PM EST                       (Author)

## 2018-01-18 NOTE — RESULT NOTES
Message  Prealbumin level normal, other labs within acceptable limits  Enteral feedings provide 1040 ml at 1/3 strength  Provides 520 ml enteral formula  Providing 520 calories and 45 5 grams of protein  Per pt- tolerating 15 ounces of fluid and 200 calories  Will decrease TPN  Dex 20% 300 ml  AA10% 400 ml  Lipids 20% 100 ml  To provide 564 kcal, 40 grams of protein and 800 ml fluid  Addition of po, enteral and TPN to provide  2190 ml fluid  1284 kcal and 85 5 grams of protein  Will continue to follow  Updated orders faxed to [facility]   Electronically signed by : ANGELA Vance; Jun 7 2016 12:01PM EST                       (Author)

## 2018-01-23 VITALS
SYSTOLIC BLOOD PRESSURE: 122 MMHG | WEIGHT: 161 LBS | HEIGHT: 65 IN | DIASTOLIC BLOOD PRESSURE: 80 MMHG | HEART RATE: 70 BPM | RESPIRATION RATE: 18 BRPM | TEMPERATURE: 98.2 F | BODY MASS INDEX: 26.82 KG/M2

## 2018-01-23 NOTE — RESULT NOTES
Message  Patient's labs were review with her by Dr Kathy Magdaleno  She is following with endocrinology who will further address elevated PTH level  Patient is currently taking liquid calcium carbonate, may need to increase her dose  Patient has my contact information         Signatures   Electronically signed by : ANGELA Shea; Dec 14 2017 12:42PM EST                       (Author)

## 2018-01-23 NOTE — MISCELLANEOUS
Message  Pt called office because she changed her G-tube at home and when she took the old one out she there was a lot of bright red blood  She said it didn't stop until she inserted the new one  She hasn't had this happen before and she a;ways changes it at home  I spoke to Dr Jamia Case and he said to just keep an eye on it and to irrigate it  He said if the bleeding has stopped and doesn';t return there is nothing to be done  If the bleeding returns pt should call the office  Called the pt back to make her aware  Active Problems    1  Abdominal discomfort (789 00) (R10 9)   2  Abdominal pain (789 00) (R10 9)   3  Abnormal weight gain (783 1) (R63 5)   4  Acute UTI (599 0) (N39 0)   5  Allergic contact dermatitis due to other agents (692 89) (L23 89)   6  Cervical cancer screening (V76 2) (Z12 4)   7  Chronic venous embolism and thrombosis of deep vessels of distal lower extremity   (453 52) (I82 5Z9)   8  Constipation (564 00) (K59 00)   9  Contact dermatitis due to poison ivy (692 6) (L23 7)   10  Diarrhea (787 91) (R19 7)   11  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)   12  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)   13  Dizziness (780 4) (R42)   14  Dysautonomia orthostatic hypotension syndrome (333 0) (G90 3)   15  Dysphagia (787 20) (R13 10)   16  Edema (782 3) (R60 9)   17  Encounter for gynecological examination (V72 31) (Z01 419)   18  Fatigue (780 79) (R53 83)   19  Fibroid (218 9) (D25 9)   20  Gastroesophageal reflux disease (530 81) (K21 9)   21  Gastrostomy tube dysfunction (536 42) (K94 23)   22  History of Nissen fundoplication (O94 31) (Z28 485)   23  Hyperparathyroidism, secondary (588 81) (N25 81)   24  Hypertension (401 9) (I10)   25  Infection of [place] site, initial encounter (999 31) (T80 219A)   26  Iron deficiency (280 9) (E61 1)   27  Lesion of subcutaneous tissue (709 9) (L98 9)   28  Menorrhagia with regular cycle (626 2) (N92 0)   29  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)   30  Migraine headache (346 90) (G43 909)   31  Muscle weakness (generalized) (728 87) (M62 81)   32  Need for diphtheria-tetanus-pertussis (Tdap) vaccine (V06 1) (Z23)   33  Neurogenic bladder (596 54) (N31 9)   34  Obesity (278 00) (E66 9)   35  Oropharyngeal dysphagia (787 22) (R13 12)   36  Polycystic ovarian syndrome (256 4) (E28 2)   37  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 3) (K91 2,Z90 3)   38  Pre-op evaluation (V72 84) (Z01 818)   39  Pre-operative cardiovascular examination (V72 81) (Z01 810)   40  Reactive hypoglycemia (251 2) (E16 1)   41  Renal cyst, right (753 10) (N28 1)   42  Retinitis pigmentosa (362 74) (H35 52)   43  Right upper quadrant abdominal pain (789 01) (R10 11)   44  Status post gastric bypass for obesity (V45 86) (Z98 84)   45  Stroke Syndrome (436)   46  Urinary retention (788 20) (R33 9)   47  Urination pain (788 1) (R30 9)   48  Voiding dysfunction (599 9) (N39 8)    Current Meds   1  Acarbose 100 MG Oral Tablet; TAKE 1 TABLET 3 TIMES DAILY WITH THE FIRST BITE   OF EACH MAIN MEAL; Therapy: 06VQG8419 to (Evaluate:17Ocq7832)  Requested for: 43QHE8344; Last   Rx:[id number] Ordered   2  Calcium Carbonate 1250 (500 Ca) MG/5ML Oral Suspension; take 4 ml 3 times a day; Therapy: 13EJZ1261 to (Evaluate:25Mmh1751); Last Rx:06Uvd3426 Ordered   3  Culturelle CAPS; Therapy: (Recorded:41Hlv0739) to Recorded   4  Docusate Calcium CAPS; Therapy: (Recorded:31Cqz4522) to Recorded   5  Fludrocortisone Acetate 0 1 MG Oral Tablet; Take 1 tablet daily; Therapy: 24MJM5524 to (Tsosie Solum)  Requested for: 00VCZ7648; Last   Rx:29Nov2017 Ordered   6  Januvia 100 MG Oral Tablet; TAKE 1 TABLET DAILY; Therapy: 50LDW5634 to (Evaluate:53Jdt2324); Last Rx:82Sic9754 Ordered   7  LevOCARNitine 1 GM/10ML Oral Solution; 6 5ml 4 time daily;    Therapy: 58BOB6971 to (Veto Tamayo)  Requested for: 20VUM9459; Last   Rx:26Mlk0593 Ordered 8  Lupron Depot (3-Month) 11 25 MG Intramuscular Kit; USE AS DIRECTED; Therapy: 24RBV0596 to (Mitchel Garcia)  Requested for: 91FIN3919 Ordered   9  Midodrine HCl - 5 MG Oral Tablet; TAKE 1 TABLET 3 TIMES DAILY; Therapy: 34BHB7057 to (Chelo Arturo)  Requested for: 68NWP6504; Last   Rx:02Afv4247 Ordered   10  Multivitamins TABS; TAKE 1 TABLET DAILY; Therapy: (Recorded:81Vzm1028) to Recorded   11  OneTouch Lancets Miscellaneous; check 1x/day  Requested for: 48Rwn7259; Last    Rx:15Xxn5607 Ordered   12  OneTouch Ultra 2 w/Device Kit; USE AS DIRECTED  Requested for: 24Ofv9800; Last    Rx:76Jie8844 Ordered   13  OneTouch Ultra Blue In [employer]; Check BS 1x/day  Requested for: 75End5934; Last    Rx:84Lqd6482 Ordered   14  Pantoprazole Sodium 40 MG Oral Tablet Delayed Release (Protonix); TAKE 1 TABLET    TWICE DAILY 30 MINUTES BEFORE BREAKFAST AND DINNER; Therapy: 34FHF0330 to ([facility])  Requested for: 92AIP8491; Last    Rx:15Fvy0228 Ordered    Allergies    1  Guaifenesin & Derivatives   2   Sulfa Drugs    Signatures   Electronically signed by : Ann Campbell LPN; Dec  7 8983  3:41NV EST                       (Author)

## 2018-01-29 ENCOUNTER — TELEPHONE (OUTPATIENT)
Dept: ENDOCRINOLOGY | Facility: CLINIC | Age: 43
End: 2018-01-29

## 2018-01-29 DIAGNOSIS — E16.1 REACTIVE HYPOGLYCEMIA: Primary | ICD-10-CM

## 2018-01-29 RX ORDER — ACARBOSE 100 MG/1
TABLET ORAL
COMMUNITY
End: 2018-02-01

## 2018-01-29 RX ORDER — CALCIUM CARBONATE 1250 MG/5ML
SUSPENSION ORAL
COMMUNITY
Start: 2017-03-16 | End: 2020-02-14 | Stop reason: ALTCHOICE

## 2018-01-29 RX ORDER — LACTOBACILLUS RHAMNOSUS GG 10B CELL
CAPSULE ORAL
COMMUNITY
End: 2019-01-28 | Stop reason: ALTCHOICE

## 2018-01-29 NOTE — TELEPHONE ENCOUNTER
Patient called in today and states that she has concerns about her pth labs  Patient is a asking to please advise on what she can do to lower this lab

## 2018-01-30 NOTE — TELEPHONE ENCOUNTER
The elevated parathyroid hormone level improved when she was taking more calcium  I do not know if she continues to take the same amount of calcium as she was middle of last year  If she has not continue taking the calcium, this is likely the reason that the PTH level has gone up

## 2018-02-01 ENCOUNTER — OFFICE VISIT (OUTPATIENT)
Dept: HEMATOLOGY ONCOLOGY | Facility: CLINIC | Age: 43
End: 2018-02-01
Payer: COMMERCIAL

## 2018-02-01 VITALS
DIASTOLIC BLOOD PRESSURE: 88 MMHG | WEIGHT: 169 LBS | SYSTOLIC BLOOD PRESSURE: 138 MMHG | TEMPERATURE: 97 F | BODY MASS INDEX: 28.16 KG/M2 | RESPIRATION RATE: 16 BRPM | HEIGHT: 65 IN | HEART RATE: 73 BPM

## 2018-02-01 DIAGNOSIS — D50.9 IRON DEFICIENCY ANEMIA: Primary | ICD-10-CM

## 2018-02-01 PROCEDURE — 99214 OFFICE O/P EST MOD 30 MIN: CPT | Performed by: INTERNAL MEDICINE

## 2018-02-01 RX ORDER — MIDODRINE HYDROCHLORIDE 5 MG/1
TABLET ORAL
COMMUNITY
Start: 2017-11-29 | End: 2018-11-30 | Stop reason: SDUPTHER

## 2018-02-01 NOTE — PROGRESS NOTES
Hematology/Oncology Outpatient Follow- up Note  Lord Andrews 38 y o  female MRN: @ Encounter: [id number]        Date:  2/1/2018    Presenting Complaint/Diagnosis :Iron deficiency anemia in the setting of a gastric bypass and motility issues  The patient is TPN dependent     HPI:        Previous Hematologic/ Oncologic History:    Venofer for 8 doses     Current Hematologic/ Oncologic Treatment:      Observation    Interval History:      The patient returns for follow-up visit  She states she does feel fatigued  White count was 3 86, hemoglobin 12 6, platelet count 713  MCV was 90  Iron and B12 studies were normal Her symptoms are Apart from the Fatigue She Is Otherwise at Baseline  Denies Any Nausea Denies Any Vomiting Denies Any Diarrhea  Her 14 Point Review of Systems Today Was Negative  Test Results:    Imaging: No results found  Labs:   Lab Results   Component Value Date    WBC 3 86 (L) 12/05/2017    HGB 12 6 12/05/2017    HCT 37 9 12/05/2017    MCV 90 12/05/2017     12/05/2017     Lab Results   Component Value Date     12/05/2017    K 3 9 12/05/2017     12/05/2017    CO2 26 12/05/2017    ANIONGAP 9 12/05/2017    BUN 14 12/05/2017    CREATININE 0 78 12/05/2017    GLUCOSE 128 08/15/2017    GLUF 93 12/05/2017    CALCIUM 9 2 12/05/2017    AST 16 12/05/2017    ALT 26 12/05/2017    ALKPHOS 70 12/05/2017    PROT 7 3 12/05/2017    BILITOT 0 80 12/05/2017    EGFR 95 12/05/2017             Lab Results   Component Value Date    IRON 147 12/05/2017    TIBC 385 12/05/2017    FERRITIN 19 12/05/2017       Lab Results   Component Value Date    IRNJQKUU01 660 12/05/2017         ROS: As stated in the history of present illness otherwise his 14 point review of systems today was negative        Active Problems:   Patient Active Problem List   Diagnosis    Dysphagia    Postgastrectomy malabsorption    S/P gastric bypass    Iron deficiency    Constipation    Nausea    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[address] )    GERD (gastroesophageal reflux disease)    Edema    Migraine headache    History of Nissen fundoplication       Past Medical History:   Past Medical History:   Diagnosis Date    Acid reflux     Acid reflux     Constipated     Dysautonomia     Esophageal abnormality     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   Gastrostomy tube in place [facility])     History of blood clots 2012    Left leg  Has IVC filter now  Occurred while on Lovenox    Iron deficiency     Malignant hyperthermia due to anesthesia     Patient's son has M H   States she needs precautions    Migraines     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[address] )     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[address] )     MTHFR (methylene THF reductase) deficiency and homocystinuria (HCC)     Muscle weakness (generalized)     Nausea     On total parenteral nutrition (TPN)     for 8 mts    PCOS (polycystic ovarian syndrome)     PONV (postoperative nausea and vomiting)     Postgastrectomy syndrome     malabsorption    Status post PICC central line placement     Right upper arm  Receives TPN    Stroke [facility]) 4654, 7295    Metabolic strokes  Right hemiparesis= minor now   Vomiting     When eating       Surgical History:   Past Surgical History:   Procedure Laterality Date    ANKLE SURGERY Left     Has plate and screws    COLONOSCOPY      COSMETIC SURGERY      X14 as child post attack by dog  Arms, legs and face    DILATION AND CURETTAGE OF UTERUS      x2    ESOPHAGOGASTRODUODENOSCOPY N/A 1/27/2016    Procedure: ESOPHAGOGASTRODUODENOSCOPY (EGD); Surgeon: Guanaco Villalta MD;  Location: [facility]; Service:     FRACTURE SURGERY Left     Left ankle - hardware    GASTRIC BYPASS      IVC FILTER INSERTION      NISSEN FUNDOPLICATION      OVARY SURGERY Bilateral     "Drilling" due to multiple cysts- PCOS    [place] EGD TRANSORAL BIOPSY SINGLE/MULTIPLE N/A 3/9/2016    Procedure: ESOPHAGOGASTRODUODENOSCOPY (EGD);   Surgeon: Guanaco Villalta MD;  Location: [facility];  Service: Bariatrics    MS LAP,GASTROSTOMY,W/O TUBE CONSTR N/A 4/19/2016    Procedure: INSERTION GASTROSTOMY TUBE LAPAROSCOPIC WITH INTRAOP EGD;  Surgeon: Rosemary Valera MD;  Location: [facility];  Service: Bariatrics    ULNAR NERVE TRANSPOSITION Right     WISDOM TOOTH EXTRACTION         Family History:  No family history on file  Cancer-related family history is not on file  Social History:   Social History     Social History    Marital status: /Civil Union     Spouse name: N/A    Number of children: N/A    Years of education: N/A     Occupational History    Not on file  Social History Main Topics    Smoking status: Never Smoker    Smokeless tobacco: Never Used    Alcohol use No    Drug use: No    Sexual activity: Not on file     Other Topics Concern    Not on file     Social History Narrative    No narrative on file       Current Medications:   Current Outpatient Prescriptions   Medication Sig Dispense Refill    acarbose (PRECOSE) 100 MG tablet Take 100 mg by mouth 3 (three) times a day with meals      B Complex-Biotin-FA (TH VITAMIN B 50/B-COMPLEX PO) Take 1 tablet by mouth daily   Calcium Carbonate Antacid 1250 mg/5 mL Take by mouth      docusate sodium (COLACE) 100 mg capsule Take 200 mg by mouth 2 (two) times a day   fludrocortisone (FLORINEF) 0 1 mg tablet Take 0 1 mg by mouth daily      Lactobacillus-Inulin ([address]) CAPS Take by mouth      levOCARNitine (CARNITOR) 1 g/10 mL solution Take 100 mg by mouth 4 (four) times a day (before meals and at bedtime)      midodrine (PROAMATINE) 5 mg tablet       Multiple Vitamin (MULTIVITAMIN) tablet Take 1 tablet by mouth daily   pantoprazole (PROTONIX) 40 mg tablet Take 40 mg by mouth 2 (two) times a day   polyethylene glycol (MIRALAX) 17 g packet Take 17 g by mouth 2 (two) times a day Indications: 1 5 caps full 1-2 times/day          sitaGLIPtin (JANUVIA) 100 mg tablet Take 1 tablet (100 mg total) by mouth daily 90 tablet 3     No current facility-administered medications for this visit  Allergies: Allergies   Allergen Reactions    Sulfa Antibiotics Other (See Comments)     Arrhythmia     Other Other (See Comments)     Cardiac arrhythmias    Guaifenesin Other (See Comments)     Arrhythmia       Physical Exam:    Body surface area is 1 83 meters squared  Wt Readings from Last 3 Encounters:   02/01/18 76 7 kg (169 lb)   12/07/17 73 kg (161 lb)   11/29/17 76 3 kg (168 lb 4 oz)        Temp Readings from Last 3 Encounters:   02/01/18 (!) 97 °F (36 1 °C) (Tympanic)   12/07/17 98 2 °F (36 8 °C)   09/05/17 (!) 97 4 °F (36 3 °C)        BP Readings from Last 3 Encounters:   02/01/18 138/88   12/07/17 122/80   11/29/17 110/78         Pulse Readings from Last 3 Encounters:   02/01/18 73   12/07/17 70   11/29/17 72         Physical Exam     Constitutional   General appearance: No acute distress, well appearing and well nourished  Eyes   Conjunctiva and lids: No swelling, erythema or discharge  Pupils and irises: Equal, round and reactive to light  Ears, Nose, Mouth, and Throat   External inspection of ears and nose: Normal     Nasal mucosa, septum, and turbinates: Normal without edema or erythema  Oropharynx: Normal with no erythema, edema, exudate or lesions  Pulmonary   Respiratory effort: No increased work of breathing or signs of respiratory distress  Auscultation of lungs: Clear to auscultation  Cardiovascular   Palpation of heart: Normal PMI, no thrills  Auscultation of heart: Normal rate and rhythm, normal S1 and S2, without murmurs  Examination of extremities for edema and/or varicosities: Normal     Carotid pulses: Normal     Abdomen   Abdomen: Non-tender, no masses  Liver and spleen: No hepatomegaly or splenomegaly  Lymphatic   Palpation of lymph nodes in neck: No lymphadenopathy      Musculoskeletal   Gait and station: Normal     Digits and nails: Normal without clubbing or cyanosis  Inspection/palpation of joints, bones, and muscles: Normal     Skin   Skin and subcutaneous tissue: Normal without rashes or lesions  Neurologic   Cranial nerves: Cranial nerves 2-12 intact  Sensation: No sensory loss  Psychiatric   Orientation to person, place, and time: Normal     Mood and affect: Normal         Assessment / Plan:    The patient is a very pleasant 37-year-old female with a past medical history mitochondrial disease status post Nissen fundoplication for motility issues and now more recently a gastric bypass  She was mildly anemic but her iron studies revealed iron deficiency  We gave her Venofer 200 mg twice a week for a total of 8 doses  Numbers all corrected  She is fatigued but her numbers are all normal  I told her if she starts feeling Even more fatigued he should get her blood work sooner and call us  She will discuss this with them  I'll see her back in 6 months  She does have absorption issue so is at risk for recurrent iron deficiency so we will continue to monitor her levels and replace vitamins and iron as needed  Goals and Barriers:  Current Goal:  Monitoring iron and replacement as needed  Barriers: None  Patient's Capacity to Self Care:  Patient  able to self care

## 2018-02-12 ENCOUNTER — TELEPHONE (OUTPATIENT)
Dept: INTERNAL MEDICINE CLINIC | Facility: CLINIC | Age: 43
End: 2018-02-12

## 2018-02-12 DIAGNOSIS — R51.9 NONINTRACTABLE EPISODIC HEADACHE, UNSPECIFIED HEADACHE TYPE: Primary | ICD-10-CM

## 2018-02-12 NOTE — TELEPHONE ENCOUNTER
Patient left message stating that she needs a referral to neurologist for chronic headaches  Can you please enter? Please call patient when referral is ready

## 2018-03-03 ENCOUNTER — HOSPITAL ENCOUNTER (EMERGENCY)
Facility: HOSPITAL | Age: 43
Discharge: HOME | End: 2018-03-03
Admitting: EMERGENCY MEDICINE
Payer: COMMERCIAL

## 2018-03-03 VITALS
BODY MASS INDEX: 27.91 KG/M2 | HEART RATE: 74 BPM | TEMPERATURE: 98.2 F | DIASTOLIC BLOOD PRESSURE: 59 MMHG | OXYGEN SATURATION: 96 % | SYSTOLIC BLOOD PRESSURE: 101 MMHG | WEIGHT: 165.34 LBS | RESPIRATION RATE: 16 BRPM

## 2018-03-03 DIAGNOSIS — R51.9 NONINTRACTABLE EPISODIC HEADACHE, UNSPECIFIED HEADACHE TYPE: Primary | ICD-10-CM

## 2018-03-03 LAB — EXT PREG TEST URINE: NORMAL

## 2018-03-03 PROCEDURE — 96374 THER/PROPH/DIAG INJ IV PUSH: CPT

## 2018-03-03 PROCEDURE — 81025 URINE PREGNANCY TEST: CPT | Performed by: PHYSICIAN ASSISTANT

## 2018-03-03 PROCEDURE — 96361 HYDRATE IV INFUSION ADD-ON: CPT

## 2018-03-03 PROCEDURE — 99284 EMERGENCY DEPT VISIT MOD MDM: CPT

## 2018-03-03 PROCEDURE — 96375 TX/PRO/DX INJ NEW DRUG ADDON: CPT

## 2018-03-03 RX ORDER — METOCLOPRAMIDE HYDROCHLORIDE 5 MG/ML
10 INJECTION INTRAMUSCULAR; INTRAVENOUS ONCE
Status: COMPLETED | OUTPATIENT
Start: 2018-03-03 | End: 2018-03-03

## 2018-03-03 RX ORDER — LORAZEPAM 1 MG/1
1 TABLET ORAL EVERY 6 HOURS PRN
Qty: 5 TABLET | Refills: 0 | Status: SHIPPED | OUTPATIENT
Start: 2018-03-03 | End: 2018-04-10 | Stop reason: ALTCHOICE

## 2018-03-03 RX ORDER — KETOROLAC TROMETHAMINE 30 MG/ML
30 INJECTION, SOLUTION INTRAMUSCULAR; INTRAVENOUS ONCE
Status: COMPLETED | OUTPATIENT
Start: 2018-03-03 | End: 2018-03-03

## 2018-03-03 RX ORDER — DIPHENHYDRAMINE HYDROCHLORIDE 50 MG/ML
25 INJECTION INTRAMUSCULAR; INTRAVENOUS ONCE
Status: COMPLETED | OUTPATIENT
Start: 2018-03-03 | End: 2018-03-03

## 2018-03-03 RX ORDER — METOCLOPRAMIDE 10 MG/1
10 TABLET ORAL EVERY 6 HOURS
Qty: 10 TABLET | Refills: 0 | Status: SHIPPED | OUTPATIENT
Start: 2018-03-03 | End: 2018-04-10 | Stop reason: ALTCHOICE

## 2018-03-03 RX ORDER — LORAZEPAM 2 MG/ML
1 INJECTION INTRAMUSCULAR ONCE
Status: COMPLETED | OUTPATIENT
Start: 2018-03-03 | End: 2018-03-03

## 2018-03-03 RX ORDER — MECLIZINE HCL 12.5 MG/1
25 TABLET ORAL ONCE
Status: COMPLETED | OUTPATIENT
Start: 2018-03-03 | End: 2018-03-03

## 2018-03-03 RX ORDER — MECLIZINE HYDROCHLORIDE 25 MG/1
25 TABLET ORAL EVERY 8 HOURS PRN
Qty: 15 TABLET | Refills: 0 | Status: SHIPPED | OUTPATIENT
Start: 2018-03-03 | End: 2018-10-01 | Stop reason: ALTCHOICE

## 2018-03-03 RX ADMIN — SODIUM CHLORIDE 1000 ML: 0.9 INJECTION, SOLUTION INTRAVENOUS at 20:42

## 2018-03-03 RX ADMIN — METOCLOPRAMIDE 10 MG: 5 INJECTION, SOLUTION INTRAMUSCULAR; INTRAVENOUS at 20:44

## 2018-03-03 RX ADMIN — MECLIZINE 25 MG: 12.5 TABLET ORAL at 21:36

## 2018-03-03 RX ADMIN — SODIUM CHLORIDE 1000 ML: 0.9 INJECTION, SOLUTION INTRAVENOUS at 21:39

## 2018-03-03 RX ADMIN — LORAZEPAM 1 MG: 2 INJECTION, SOLUTION INTRAMUSCULAR; INTRAVENOUS at 20:49

## 2018-03-03 RX ADMIN — DIPHENHYDRAMINE HYDROCHLORIDE 25 MG: 50 INJECTION, SOLUTION INTRAMUSCULAR; INTRAVENOUS at 20:46

## 2018-03-03 RX ADMIN — KETOROLAC TROMETHAMINE 30 MG: 30 INJECTION, SOLUTION INTRAMUSCULAR at 20:41

## 2018-03-04 NOTE — ED NOTES
Pt reports HA pain is duller, however she reports feeling like "im on a boat, swaying"     Fredi Silva, RN  03/03/18 0067

## 2018-03-04 NOTE — ED NOTES
Pt reports being in contact with PCP and neurologiest, states cant get into neurologist until April  Was told to come into ed with new developing dizziness or nausea  Pt reports bolusing self 2L NSS through port 2 days ago with no relief       Laury Farley RN  03/03/18 2019

## 2018-03-04 NOTE — ED PROVIDER NOTES
History  Chief Complaint   Patient presents with    Dizziness     pt reports to ed with on going headache with new dizziness and nausea  Pt 39 yo f with significant pmh dysautonomia, mitochondrial disease, gerd, dvt s/p IVC filter, malignant hypothermia, migraines, and metabolic strokes here today c/o headache x 4 weeks and dizziness x today  Pt has been in contact with her pcp and has an appt with neurology April 14  Her pcp told her that if her migraine gets worse, she gets dizzy or nauseated, then she should go to the ER for further eval  She states that in the past she has gotten dizzy with her migraines and when that happens she requires hospitalization for further treatment  She has tried multiple treatments at home including bolusing fluid through her port, increasing her electrolytes and magnesium, taking otc meds including motrin, excedrin and tylenol, taking rx meds including zofran  She has had photo and phonophobia  She started with nausea and dizziness today which has been getting worse  Pt also states that her dysautonomia is worse today, she is chilled and then hot  10:26 PM I had a long discussion with pt and    would like pt admitted for intractable migraine, pt would like to continue to try medication at home  I explained what would happen if pt was admitted and on the other hand what medications I would rx if pt wanted to go home  Due to pts complicated medical history I left the decision up to her  She feels like she is improving and would like to go home to try to rest at home with oral medications  I will give a short course of ativan, reglan, and antivert  I discussed with pt and  when to take medications and how long they last for  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, she is a nurse  I gave return to ed precautions and welcomed her back at any time  Pt and  agreeable           History provided by:  Patient   used: No    Headache   Pain location:  Generalized  Quality:  Sharp  Radiates to:  Does not radiate  Onset quality:  Sudden  Duration:  4 weeks  Timing:  Constant  Progression:  Worsening  Chronicity:  Recurrent  Similar to prior headaches: yes    Relieved by:  Nothing  Worsened by:  Nothing  Ineffective treatments:  Acetaminophen, aspirin, NSAIDs, prescription medications and resting in a darkened room  Associated symptoms: loss of balance, nausea, photophobia and weakness    Associated symptoms: no abdominal pain, no back pain, no blurred vision, no congestion, no cough, no diarrhea, no ear pain, no fever, no focal weakness, no myalgias, no near-syncope, no neck stiffness, no paresthesias, no sore throat, no syncope, no visual change and no vomiting        Prior to Admission Medications   Prescriptions Last Dose Informant Patient Reported? Taking? B Complex-Biotin-FA (TH VITAMIN B 50/B-COMPLEX PO)  Self Yes No   Sig: Take 1 tablet by mouth daily  Calcium Carbonate Antacid 1250 mg/5 mL   Yes No   Sig: Take by mouth   Lactobacillus-Inulin ([address]) CAPS   Yes No   Sig: Take by mouth   Multiple Vitamin (MULTIVITAMIN) tablet  Self Yes No   Sig: Take 1 tablet by mouth daily  acarbose (PRECOSE) 100 MG tablet   Yes No   Sig: Take 100 mg by mouth 3 (three) times a day with meals   docusate sodium (COLACE) 100 mg capsule   Yes No   Sig: Take 200 mg by mouth 2 (two) times a day  fludrocortisone (FLORINEF) 0 1 mg tablet   Yes No   Sig: Take 0 1 mg by mouth daily   levOCARNitine (CARNITOR) 1 g/10 mL solution   Yes No   Sig: Take 100 mg by mouth 4 (four) times a day (before meals and at bedtime)   midodrine (PROAMATINE) 5 mg tablet   Yes No   pantoprazole (PROTONIX) 40 mg tablet  Self Yes No   Sig: Take 40 mg by mouth 2 (two) times a day  polyethylene glycol (MIRALAX) 17 g packet   Yes No   Sig: Take 17 g by mouth 2 (two) times a day Indications: 1 5 caps full 1-2 times/day       sitaGLIPtin (JANUVIA) 100 mg tablet   No No Sig: Take 1 tablet (100 mg total) by mouth daily      Facility-Administered Medications: None       Past Medical History:   Diagnosis Date    Acid reflux     Acid reflux     Constipated     Dysautonomia     Esophageal abnormality     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   Gastrostomy tube in place [facility])     History of blood clots 2012    Left leg  Has IVC filter now  Occurred while on Lovenox    Iron deficiency     Malignant hyperthermia due to anesthesia     Patient's son has M H   States she needs precautions    Migraines     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[facility] [address] )     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[facility] )     MTHFR (methylene THF reductase) deficiency and homocystinuria (HCC)     Muscle weakness (generalized)     Nausea     On total parenteral nutrition (TPN)     for 8 mts    PCOS (polycystic ovarian syndrome)     PONV (postoperative nausea and vomiting)     Postgastrectomy syndrome     malabsorption    Status post PICC central line placement     Right upper arm  Receives TPN    Stroke [facility]) [id number], [id number]    Metabolic strokes  Right hemiparesis= minor now   Vomiting     When eating       Past Surgical History:   Procedure Laterality Date    ANKLE SURGERY Left     Has plate and screws    COLONOSCOPY      COSMETIC SURGERY      X14 as child post attack by dog  Arms, legs and face    DILATION AND CURETTAGE OF UTERUS      x2    ESOPHAGOGASTRODUODENOSCOPY N/A 1/27/2016    Procedure: ESOPHAGOGASTRODUODENOSCOPY (EGD); Surgeon: Kavin Vela MD;  Location: [facility]; Service:     FRACTURE SURGERY Left     Left ankle - hardware    GASTRIC BYPASS      IVC FILTER INSERTION      NISSEN FUNDOPLICATION      OVARY SURGERY Bilateral     "Drilling" due to multiple cysts- PCOS    ME EGD TRANSORAL BIOPSY SINGLE/MULTIPLE N/A 3/9/2016    Procedure: ESOPHAGOGASTRODUODENOSCOPY (EGD); Surgeon: Kavin Vela MD;  Location: [facility];   Service: Bariatrics    ME LAP,GASTROSTOMY,W/O TUBE CONSTR N/A 4/19/2016    Procedure: INSERTION GASTROSTOMY TUBE LAPAROSCOPIC WITH INTRAOP EGD;  Surgeon: Darrius Bridges MD;  Location: [facility];  Service: Bariatrics    ULNAR NERVE TRANSPOSITION Right     WISDOM TOOTH EXTRACTION         No family history on file  I have reviewed and agree with the history as documented  Social History   Substance Use Topics    Smoking status: Never Smoker    Smokeless tobacco: Never Used    Alcohol use No        Review of Systems   Constitutional: Negative for chills and fever  HENT: Negative for congestion, ear pain and sore throat  Eyes: Positive for photophobia  Negative for blurred vision  Respiratory: Negative for cough, shortness of breath and wheezing  Cardiovascular: Negative for syncope and near-syncope  Gastrointestinal: Positive for nausea  Negative for abdominal pain, diarrhea and vomiting  Musculoskeletal: Negative for back pain, myalgias and neck stiffness  Skin: Negative for color change and rash  Neurological: Positive for weakness, headaches and loss of balance  Negative for focal weakness, light-headedness and paresthesias  Psychiatric/Behavioral: Negative for behavioral problems  The patient is not hyperactive  All other systems reviewed and are negative  Physical Exam  ED Triage Vitals   Temperature Pulse Respirations Blood Pressure SpO2   03/03/18 2023 03/03/18 2017 03/03/18 2017 03/03/18 2017 03/03/18 2017   98 2 °F (36 8 °C) 74 18 (!) 161/105 99 %      Temp Source Heart Rate Source Patient Position - Orthostatic VS BP Location FiO2 (%)   03/03/18 2023 03/03/18 2017 03/03/18 2017 03/03/18 2017 --   Oral Monitor Lying Right arm       Pain Score       03/03/18 2023       8           Orthostatic Vital Signs  Vitals:    03/03/18 2017 03/03/18 2138   BP: (!) 161/105 104/73   Pulse: 74 89   Patient Position - Orthostatic VS: Lying Lying       Physical Exam   Constitutional: She is oriented to person, place, and time   She appears well-developed and well-nourished  No distress  HENT:   Head: Atraumatic  Right Ear: External ear normal    Left Ear: External ear normal    Mouth/Throat: Oropharynx is clear and moist  No oropharyngeal exudate  Eyes: EOM are normal  Pupils are equal, round, and reactive to light  Neck: Neck supple  Cardiovascular: Normal rate, regular rhythm and normal heart sounds  Exam reveals no gallop and no friction rub  No murmur heard  Pulmonary/Chest: Effort normal and breath sounds normal  No respiratory distress  She has no wheezes  She has no rales  She exhibits no tenderness  Abdominal: Soft  Bowel sounds are normal  She exhibits no distension and no mass  There is no tenderness  There is no guarding  Musculoskeletal: Normal range of motion  She exhibits no edema or tenderness  Neurological: She is alert and oriented to person, place, and time  No cranial nerve deficit or sensory deficit  She exhibits normal muscle tone  Coordination normal    Cerebellar fxn intact   Skin: Skin is warm and dry  No rash noted  No erythema  No pallor  Psychiatric: She has a normal mood and affect  Her behavior is normal    Nursing note and vitals reviewed        ED Medications  Medications   sodium chloride 0 9 % bolus 1,000 mL (1,000 mL Intravenous New Bag 3/3/18 2139)   diphenhydrAMINE (BENADRYL) injection 25 mg (25 mg Intravenous Given 3/3/18 2046)   ketorolac (TORADOL) injection 30 mg (30 mg Intravenous Given 3/3/18 2041)   metoclopramide (REGLAN) injection 10 mg (10 mg Intravenous Given 3/3/18 2044)   sodium chloride 0 9 % bolus 1,000 mL (1,000 mL Intravenous New Bag 3/3/18 2042)   LORazepam (ATIVAN) 2 mg/mL injection 1 mg (1 mg Intravenous Given 3/3/18 2049)   meclizine (ANTIVERT) tablet 25 mg (25 mg Oral Given 3/3/18 2136)       Diagnostic Studies  Results Reviewed     Procedure Component Value Units Date/Time    POCT pregnancy, urine [[id number]]  (Normal) Resulted:  03/03/18 2045    Lab Status:  Final result Specimen:  Urine Updated:  03/03/18 2045     EXT PREG TEST UR (Ref: Negative) neg                 No orders to display              Procedures  Procedures       Phone Contacts  ED Phone Contact    ED Course  ED Course                                MDM  CritCare Time    Disposition  Final diagnoses:   Nonintractable episodic headache, unspecified headache type     Time reflects when diagnosis was documented in both MDM as applicable and the Disposition within this note     Time User Action Codes Description Comment    3/3/2018 10:28 PM Airam Whittington Add [R51] Nonintractable episodic headache, unspecified headache type     3/3/2018 10:28 PM Audra Burris Modify [R51] Nonintractable episodic headache, unspecified headache type       ED Disposition     ED Disposition Condition Comment    Discharge  Dawn Link discharge to home/self care  Condition at discharge: Stable        Follow-up Information     Follow up With Specialties Details Why Contact Info    Mayi Harvey MD Internal Medicine In 1 week  [address] [place] [address],[address]  [place] [address]  [phone]          Patient's Medications   Discharge Prescriptions    LORAZEPAM (ATIVAN) 1 MG TABLET    Take 1 tablet (1 mg total) by mouth every 6 (six) hours as needed for anxiety for up to 5 days       Start Date: 3/3/2018  End Date: 3/8/2018       Order Dose: 1 mg       Quantity: 5 tablet    Refills: 0    MECLIZINE (ANTIVERT) 25 MG TABLET    Take 1 tablet (25 mg total) by mouth every 8 (eight) hours as needed for dizziness       Start Date: 3/3/2018  End Date: --       Order Dose: 25 mg       Quantity: 15 tablet    Refills: 0    METOCLOPRAMIDE (REGLAN) 10 MG TABLET    Take 1 tablet (10 mg total) by mouth every 6 (six) hours       Start Date: 3/3/2018  End Date: --       Order Dose: 10 mg       Quantity: 10 tablet    Refills: 0     No discharge procedures on file      ED Provider  Electronically Signed by           Juana Mchugh PA-C  03/03/18 Emily

## 2018-03-04 NOTE — DISCHARGE INSTRUCTIONS
Acute Headache   WHAT YOU NEED TO KNOW:   An acute headache is pain or discomfort that starts suddenly and gets worse quickly  You may have an acute headache only when you feel stress or eat certain foods  Other acute headache pain can happen every day, and sometimes several times a day  DISCHARGE INSTRUCTIONS:   Return to the emergency department if:   · You have severe pain  · You have numbness or weakness on one side of your face or body  · You have a headache that occurs after a blow to the head, a fall, or other trauma  · You have a headache, are forgetful or confused, or have trouble speaking  · You have a headache, stiff neck, and a fever  Contact your healthcare provider if:   · You have a constant headache and are vomiting  · You have a headache each day that does not get better, even after treatment  · You have changes in your headaches, or new symptoms that occur when you have a headache  · You have questions or concerns about your condition or care  Medicines: You may need any of the following:  · Prescription pain medicine  may be given  The medicine your healthcare provider recommends will depend on the kind of headaches you have  You will need to take prescription headache medicines as directed to prevent a problem called rebound headache  These headaches happen with regular use of pain relievers for headache disorders  · NSAIDs , such as ibuprofen, help decrease swelling, pain, and fever  This medicine is available with or without a doctor's order  NSAIDs can cause stomach bleeding or kidney problems in certain people  If you take blood thinner medicine, always ask your healthcare provider if NSAIDs are safe for you  Always read the medicine label and follow directions  · Acetaminophen  decreases pain and fever  It is available without a doctor's order  Ask how much to take and how often to take it  Follow directions   Read the labels of all other medicines you are using to see if they also contain acetaminophen, or ask your doctor or pharmacist  Acetaminophen can cause liver damage if not taken correctly  Do not use more than 3 grams (3,000 milligrams) total of acetaminophen in one day  · Antidepressants  may be given for some kinds of headaches  · Take your medicine as directed  Contact your healthcare provider if you think your medicine is not helping or if you have side effects  Tell him or her if you are allergic to any medicine  Keep a list of the medicines, vitamins, and herbs you take  Include the amounts, and when and why you take them  Bring the list or the pill bottles to follow-up visits  Carry your medicine list with you in case of an emergency  Manage your symptoms:   · Apply heat or ice  on the headache area  Use a heat or ice pack  For an ice pack, you can also put crushed ice in a plastic bag  Cover the pack or bag with a towel before you apply it to your skin  Ice and heat both help decrease pain, and heat also helps decrease muscle spasms  Apply heat for 20 to 30 minutes every 2 hours  Apply ice for 15 to 20 minutes every hour  Apply heat or ice for as long and for as many days as directed  You may alternate heat and ice  · Relax your muscles  Lie down in a comfortable position and close your eyes  Relax your muscles slowly  Start at your toes and work your way up your body  · Keep a record of your headaches  Write down when your headaches start and stop  Include your symptoms and what you were doing when the headache began  Record what you ate or drank for 24 hours before the headache started  Describe the pain and where it hurts  Keep track of what you did to treat your headache and if it worked  Prevent an acute headache:   · Avoid anything that triggers an acute headache  Examples include exposure to chemicals, going to high altitude, or not getting enough sleep  Create a regular sleep routine   Go to sleep at the same time and wake up at the same time each day  Do not use electronic devices before bedtime  These may trigger a headache or prevent you from sleeping well  · Do not smoke  Nicotine and other chemicals in cigarettes and cigars can trigger an acute headache or make it worse  Ask your healthcare provider for information if you currently smoke and need help to quit  E-cigarettes or smokeless tobacco still contain nicotine  Talk to your healthcare provider before you use these products  · Limit alcohol as directed  Alcohol can trigger an acute headache or make it worse  If you have cluster headaches, do not drink alcohol during an episode  For other types of headaches, ask your healthcare provider if it is safe for you to drink alcohol  Ask how much is safe for you to drink, and how often  · Exercise as directed  Exercise can reduce tension and help with headache pain  Aim for 30 minutes of physical activity on most days of the week  Your healthcare provider can help you create an exercise plan  · Eat a variety of healthy foods  Healthy foods include fruits, vegetables, low-fat dairy products, lean meats, fish, whole grains, and cooked beans  Your healthcare provider or dietitian can help you create meals plans if you need to avoid foods that trigger headaches  Follow up with your healthcare provider as directed:  Bring your headache record with you when you see your healthcare provider  Write down your questions so you remember to ask them during your visits  © 2017 [facility] Information is for End User's use only and may not be sold, redistributed or otherwise used for commercial purposes  All illustrations and images included in CareNotes® are the copyrighted property of A D [employer] M [employer]  or [employer]ora  The above information is an  only  It is not intended as medical advice for individual conditions or treatments   Talk to your doctor, nurse or pharmacist before following any medical regimen to see if it is safe and effective for you

## 2018-03-04 NOTE — ED NOTES
Orthostatic vitals    Laying down  nbp 142/85  sp02 100  Rate 65    Sitting  nbp 151/97  sp02 100  Rate 69    Standing  nbp 141/92  sp02 100  Rate 68     Anne Petersen  03/03/18 2036

## 2018-03-05 ENCOUNTER — DOCUMENTATION (OUTPATIENT)
Dept: BARIATRICS | Facility: CLINIC | Age: 43
End: 2018-03-05

## 2018-03-05 NOTE — PROGRESS NOTES
Patient emailed me that she is having severe headaches with eating  Her endocrinologist recommended running her tube feeding continuous and to stop po intake of food  Provided with updated recommendations based on 24 hour tube feedings:  Elecare homar ( 30 kcal /fluid ounce)  55 ml/hour to provide 1320 kcal and 40 grams of protein  Add 60 ml of prosource to provide additional 30 grams of protein and 120 kcal   Total 1440 kcal and 70 grams of protein     Based on most recent vitals, provides 19 kcal per kg bw,  9 gm/kg bw protein   Recommended that she flush with 240 ml TID for additional hydration

## 2018-03-06 ENCOUNTER — OFFICE VISIT (OUTPATIENT)
Dept: ENDOCRINOLOGY | Facility: CLINIC | Age: 43
End: 2018-03-06
Payer: COMMERCIAL

## 2018-03-06 VITALS
HEIGHT: 64 IN | SYSTOLIC BLOOD PRESSURE: 118 MMHG | HEART RATE: 72 BPM | DIASTOLIC BLOOD PRESSURE: 60 MMHG | WEIGHT: 171.5 LBS | BODY MASS INDEX: 29.28 KG/M2

## 2018-03-06 DIAGNOSIS — E21.1 HYPERPARATHYROIDISM , SECONDARY, NON-RENAL (HCC): ICD-10-CM

## 2018-03-06 DIAGNOSIS — L68.0 HIRSUTISM: ICD-10-CM

## 2018-03-06 DIAGNOSIS — E61.1 IRON DEFICIENCY: ICD-10-CM

## 2018-03-06 DIAGNOSIS — E16.1 REACTIVE HYPOGLYCEMIA: Primary | ICD-10-CM

## 2018-03-06 PROCEDURE — 84402 ASSAY OF FREE TESTOSTERONE: CPT | Performed by: INTERNAL MEDICINE

## 2018-03-06 PROCEDURE — 95251 CONT GLUC MNTR ANALYSIS I&R: CPT | Performed by: INTERNAL MEDICINE

## 2018-03-06 PROCEDURE — 36415 COLL VENOUS BLD VENIPUNCTURE: CPT | Performed by: INTERNAL MEDICINE

## 2018-03-06 PROCEDURE — 84403 ASSAY OF TOTAL TESTOSTERONE: CPT | Performed by: INTERNAL MEDICINE

## 2018-03-06 PROCEDURE — 99214 OFFICE O/P EST MOD 30 MIN: CPT | Performed by: INTERNAL MEDICINE

## 2018-03-06 PROCEDURE — 82627 DEHYDROEPIANDROSTERONE: CPT | Performed by: INTERNAL MEDICINE

## 2018-03-06 NOTE — ASSESSMENT & PLAN NOTE
Her intact PTH is elevated  I suspect this is due to malabsorption of calcium  She is on 4 g of calcium per day  Today, we discussed adding two tablets of calcium citrate per day to her regimen to see if we can increase absorption  If this does not improve her parathyroid level, we could consider adding Rocaltrol at a very low dose to improve absorption  She has labs to be done in about three months  Based on the results of those, we will make a decision about adding Rocaltrol

## 2018-03-06 NOTE — ASSESSMENT & PLAN NOTE
Based on her continuous glucose monitoring, her blood sugars have been stable  Continue acarbose and sitagliptin

## 2018-03-06 NOTE — ASSESSMENT & PLAN NOTE
Since she has noticed increase in hair growth on her face, arms and abdomen, I will plan to check a total and free testosterone along with a DHEA-S  Based on the results, she may need further testing

## 2018-03-06 NOTE — PROGRESS NOTES
Assessment/Plan:    Reactive hypoglycemia  Based on her continuous glucose monitoring, her blood sugars have been stable  Continue acarbose and sitagliptin  Iron deficiency  Continue iron supplementation  Hyperparathyroidism , secondary, non-renal ([address] )  Her intact PTH is elevated  I suspect this is due to malabsorption of calcium  She is on 4 g of calcium per day  Today, we discussed adding two tablets of calcium citrate per day to her regimen to see if we can increase absorption  If this does not improve her parathyroid level, we could consider adding Rocaltrol at a very low dose to improve absorption  She has labs to be done in about three months  Based on the results of those, we will make a decision about adding Rocaltrol  Hirsutism  Since she has noticed increase in hair growth on her face, arms and abdomen, I will plan to check a total and free testosterone along with a DHEA-S  Based on the results, she may need further testing  Migraine headache  She is waiting to see Neurology  Diagnoses and all orders for this visit:    Reactive hypoglycemia    Iron deficiency    Hyperparathyroidism , secondary, non-renal (HCC)    Hirsutism  -     Testosterone, free, total Clinic Collect  [facility]          Subjective:      Patient ID: Jazmine Rose is a 38 y o  female  82-year-old woman with gastrectomy who presents for follow-up of reactive hypoglycemia  She is currently on Januvia and acarbose  This has stabilized her blood sugars  She continues to have hypoglycemia around her menstrual cycle  We did discuss androgen suppression but the medication is might of toxic and is contraindicated due to her mitochondrial disease  Today, she does complain to me about increasing hair growth  This has occurred on her arms, chin and upper neck    She has also noticed increased hair growth on the abdomen as well as new hair growth on the breast     She has secondary hyperparathyroidism due to malabsorption of calcium  She is currently on at least 4 g of calcium per day  She also complains of headaches related to eating  The following portions of the patient's history were reviewed and updated as appropriate: allergies, current medications, past family history, past medical history, past social history, past surgical history and problem list     Review of Systems   Constitutional: Negative for chills and fever  Respiratory: Negative for shortness of breath  Cardiovascular: Negative for chest pain  Gastrointestinal: Negative for constipation, diarrhea, nausea and vomiting  Endocrine: Negative for cold intolerance, heat intolerance, polydipsia and polyuria  Skin:        Increased hair growth   Neurological: Negative for dizziness  All other systems reviewed and are negative  Objective:      /60   Pulse 72   Ht 5' 4 4" (1 636 m)   Wt 77 8 kg (171 lb 8 oz)   BMI 29 07 kg/m²          Physical Exam   Constitutional: She is oriented to person, place, and time  She appears well-developed and well-nourished  No distress  HENT:   Head: Normocephalic and atraumatic  Mouth/Throat: Oropharynx is clear and moist and mucous membranes are normal  No oropharyngeal exudate  Eyes: Conjunctivae, EOM and lids are normal  Right eye exhibits no discharge  Left eye exhibits no discharge  No scleral icterus  Neck: Neck supple  No thyromegaly present  Cardiovascular: Normal rate, regular rhythm and normal heart sounds  Exam reveals no gallop and no friction rub  No murmur heard  Pulmonary/Chest: Effort normal and breath sounds normal  No respiratory distress  She has no wheezes  Abdominal: Soft  Bowel sounds are normal  She exhibits no distension  There is no tenderness  Musculoskeletal: Normal range of motion  She exhibits no edema, tenderness or deformity  Lymphadenopathy:        Head (right side): No occipital adenopathy present  Head (left side): No occipital adenopathy present  Right: No supraclavicular adenopathy present  Left: No supraclavicular adenopathy present  Neurological: She is alert and oriented to person, place, and time  No cranial nerve deficit  Skin: Skin is warm and intact  No rash noted  She is not diaphoretic  No erythema  She does have hirsutism of the upper lip, chin, upper neck, abdomen  Psychiatric: She has a normal mood and affect  Vitals reviewed

## 2018-03-07 LAB
DHEA-S SERPL-MCNC: 184.9 UG/DL (ref 57.3–279.2)
TESTOST FREE SERPL-MCNC: 1.6 PG/ML (ref 0–4.2)
TESTOST SERPL-MCNC: 29 NG/DL (ref 8–48)

## 2018-03-14 ENCOUNTER — TELEPHONE (OUTPATIENT)
Dept: ENDOCRINOLOGY | Facility: CLINIC | Age: 43
End: 2018-03-14

## 2018-03-14 NOTE — TELEPHONE ENCOUNTER
----- Message from Steven Orourke MD sent at 3/8/2018  9:36 AM EST -----  The laboratory testing results are normal

## 2018-03-30 DIAGNOSIS — E16.2 HYPOGLYCEMIA: Primary | ICD-10-CM

## 2018-04-02 RX ORDER — ACARBOSE 100 MG/1
100 TABLET ORAL
Qty: 270 TABLET | Refills: 1 | Status: SHIPPED | OUTPATIENT
Start: 2018-04-02 | End: 2018-07-23 | Stop reason: SDUPTHER

## 2018-04-10 ENCOUNTER — OFFICE VISIT (OUTPATIENT)
Dept: CARDIOLOGY CLINIC | Facility: CLINIC | Age: 43
End: 2018-04-10
Payer: COMMERCIAL

## 2018-04-10 ENCOUNTER — OFFICE VISIT (OUTPATIENT)
Dept: NEUROLOGY | Facility: CLINIC | Age: 43
End: 2018-04-10
Payer: COMMERCIAL

## 2018-04-10 VITALS
OXYGEN SATURATION: 99 % | DIASTOLIC BLOOD PRESSURE: 74 MMHG | SYSTOLIC BLOOD PRESSURE: 130 MMHG | HEART RATE: 76 BPM | HEIGHT: 64 IN | WEIGHT: 167.1 LBS | BODY MASS INDEX: 28.53 KG/M2

## 2018-04-10 VITALS
WEIGHT: 167.1 LBS | DIASTOLIC BLOOD PRESSURE: 90 MMHG | HEIGHT: 64 IN | BODY MASS INDEX: 28.53 KG/M2 | HEART RATE: 81 BPM | SYSTOLIC BLOOD PRESSURE: 122 MMHG

## 2018-04-10 DIAGNOSIS — Z90.3 POSTGASTRECTOMY MALABSORPTION: ICD-10-CM

## 2018-04-10 DIAGNOSIS — I95.1 ORTHOSTATIC HYPOTENSION: ICD-10-CM

## 2018-04-10 DIAGNOSIS — E88.40 MITOCHONDRIAL DISEASE (HCC): ICD-10-CM

## 2018-04-10 DIAGNOSIS — K91.2 POSTGASTRECTOMY MALABSORPTION: ICD-10-CM

## 2018-04-10 DIAGNOSIS — G43.711 INTRACTABLE CHRONIC MIGRAINE WITHOUT AURA AND WITH STATUS MIGRAINOSUS: Primary | ICD-10-CM

## 2018-04-10 DIAGNOSIS — K21.9 GASTROESOPHAGEAL REFLUX DISEASE, ESOPHAGITIS PRESENCE NOT SPECIFIED: ICD-10-CM

## 2018-04-10 DIAGNOSIS — I82.5Z9 CHRONIC DEEP VEIN THROMBOSIS (DVT) OF DISTAL VEIN OF LOWER EXTREMITY, UNSPECIFIED LATERALITY (HCC): ICD-10-CM

## 2018-04-10 DIAGNOSIS — Z15.89 MTHFR MUTATION: ICD-10-CM

## 2018-04-10 DIAGNOSIS — E88.40 MITOCHONDRIAL METABOLISM DISORDER (HCC): Primary | ICD-10-CM

## 2018-04-10 PROCEDURE — 99214 OFFICE O/P EST MOD 30 MIN: CPT | Performed by: INTERNAL MEDICINE

## 2018-04-10 PROCEDURE — 99245 OFF/OP CONSLTJ NEW/EST HI 55: CPT | Performed by: PSYCHIATRY & NEUROLOGY

## 2018-04-10 PROCEDURE — 93000 ELECTROCARDIOGRAM COMPLETE: CPT | Performed by: INTERNAL MEDICINE

## 2018-04-10 RX ORDER — ARGININE HCL 1000 MG
6000 TABLET ORAL 2 TIMES DAILY
COMMUNITY

## 2018-04-10 RX ORDER — TOPIRAMATE 25 MG/1
TABLET ORAL
Qty: 60 TABLET | Refills: 3 | Status: SHIPPED | OUTPATIENT
Start: 2018-04-10 | End: 2018-07-23

## 2018-04-10 RX ORDER — SUCRALFATE 1 G/1
TABLET ORAL
Qty: 30 TABLET | Refills: 1 | Status: SHIPPED | OUTPATIENT
Start: 2018-04-10 | End: 2018-04-27 | Stop reason: HOSPADM

## 2018-04-10 RX ORDER — KETOROLAC TROMETHAMINE 10 MG/1
TABLET, FILM COATED ORAL
Qty: 30 TABLET | Refills: 0 | Status: SHIPPED | OUTPATIENT
Start: 2018-04-10 | End: 2018-10-01 | Stop reason: ALTCHOICE

## 2018-04-10 RX ORDER — ACARBOSE 100 MG/1
100 TABLET ORAL
COMMUNITY
End: 2018-04-10 | Stop reason: SDUPTHER

## 2018-04-10 NOTE — PROGRESS NOTES
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  3  Headaches - unclear etiology  Has an appt to see neuro  Recommendations:  1  Continue current medications  2  Increase frequency of IV saline  3  Await neuro input  4  Follow up in 4 months  HPI: Christiana Orozco is a 38y o  year old female with mitochondrial disease, gastric bypass surgery, and dysautonomia who presents for follow up  Since her gastric bypass surgery, has needed a central line and giving herself saline boluses - now down to several times/month  Has more frequent headaches, not only when she is dehydrated, and improves with 1L of saline  On Florinef 0 2mg daily and midodrine 5mg 3x/day  Review of Systems   Constitutional: Negative  HENT: Negative  Eyes: Negative  Respiratory: Negative for chest tightness and shortness of breath  Cardiovascular: Negative for chest pain, palpitations and leg swelling  Gastrointestinal: Negative  Endocrine: Negative  Genitourinary: Negative  Musculoskeletal: Negative  Skin: Negative  Allergic/Immunologic: Negative  Neurological: Positive for dizziness  Hematological: Negative  Psychiatric/Behavioral: Negative  All other systems reviewed and are negative  Past Medical History:   Diagnosis Date    Acid reflux     Acid reflux     Constipated     Dysautonomia     Esophageal abnormality     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   Gastrostomy tube in place [facility])     History of blood clots 2012    Left leg  Has IVC filter now   Occurred while on Lovenox    Iron deficiency     Malignant hyperthermia due to anesthesia     Patient's son has M H   States she needs precautions    Migraines     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[facility] [address] )     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     MTHFR (methylene THF reductase) deficiency and homocystinuria (HCC)     Muscle weakness (generalized)     Nausea     On total parenteral nutrition (TPN)     for 8 mts    PCOS (polycystic ovarian syndrome)     PONV (postoperative nausea and vomiting)     Postgastrectomy syndrome     malabsorption    Status post PICC central line placement     Right upper arm  Receives TPN    Stroke [facility]) [id number], [phone]    Metabolic strokes  Right hemiparesis= minor now   Vomiting     When eating     Past Surgical History:   Procedure Laterality Date    ANKLE SURGERY Left     Has plate and screws    COLONOSCOPY      COSMETIC SURGERY      X14 as child post attack by dog  Arms, legs and face    DILATION AND CURETTAGE OF UTERUS      x2    ESOPHAGOGASTRODUODENOSCOPY N/A 1/27/2016    Procedure: ESOPHAGOGASTRODUODENOSCOPY (EGD); Surgeon: Dayday Edmond MD;  Location: [facility]; Service:     FRACTURE SURGERY Left     Left ankle - hardware    GASTRIC BYPASS      IVC FILTER INSERTION      NISSEN FUNDOPLICATION      OVARY SURGERY Bilateral     "Drilling" due to multiple cysts- PCOS    NM EGD TRANSORAL BIOPSY SINGLE/MULTIPLE N/A 3/9/2016    Procedure: ESOPHAGOGASTRODUODENOSCOPY (EGD); Surgeon: Dayday Edmond MD;  Location: [facility];   Service: Bariatrics    NM LAP,GASTROSTOMY,W/O TUBE CONSTR N/A 4/19/2016    Procedure: INSERTION GASTROSTOMY TUBE LAPAROSCOPIC WITH INTRAOP EGD;  Surgeon: Dayday Edmond MD;  [facility]: [facility];  Service: Bariatrics    ULNAR NERVE TRANSPOSITION Right     WISDOM TOOTH EXTRACTION       History   Alcohol Use No     History   Drug Use No     History   Smoking Status    Never Smoker   Smokeless Tobacco    Never Used     Family History   Problem Relation Age of Onset    Mitochondrial disorder Mother     Cancer Mother     Hypertension Mother     Thyroid cancer Mother     Depression Father  Endocrinopathy Father     Depression Brother     Narcolepsy Brother     Breast cancer Maternal Grandmother     Mitochondrial disorder Son     Mitochondrial disorder Daughter     Stroke Family        Allergies: Allergies   Allergen Reactions    Sulfa Antibiotics Other (See Comments)     Arrhythmia     Other Other (See Comments)     Cardiac arrhythmias    Guaifenesin Other (See Comments)     Arrhythmia       Medications:     Current Outpatient Prescriptions:     acarbose (PRECOSE) 100 MG tablet, Take 1 tablet (100 mg total) by mouth 3 (three) times a day with meals for 180 days, Disp: 270 tablet, Rfl: 1    B Complex-Biotin-FA (TH VITAMIN B 50/B-COMPLEX PO), Take 1 tablet by mouth daily  , Disp: , Rfl:     Calcium Carbonate Antacid 1250 mg/5 mL, Take by mouth, Disp: , Rfl:     docusate sodium (COLACE) 100 mg capsule, Take 200 mg by mouth 2 (two) times a day , Disp: , Rfl:     fludrocortisone (FLORINEF) 0 1 mg tablet, Take 0 1 mg by mouth daily, Disp: , Rfl:     L-Arginine 1000 MG TABS, Take 1,000 mg by mouth 2 (two) times a day, Disp: , Rfl:     Lactobacillus-Inulin ([address]) CAPS, Take by mouth, Disp: , Rfl:     levOCARNitine (CARNITOR) 1 g/10 mL solution, Take 100 mg by mouth 4 (four) times a day (before meals and at bedtime), Disp: , Rfl:     meclizine (ANTIVERT) 25 mg tablet, Take 1 tablet (25 mg total) by mouth every 8 (eight) hours as needed for dizziness, Disp: 15 tablet, Rfl: 0    midodrine (PROAMATINE) 5 mg tablet, , Disp: , Rfl:     Multiple Vitamin (MULTIVITAMIN) tablet, Take 1 tablet by mouth daily  , Disp: , Rfl:     pantoprazole (PROTONIX) 40 mg tablet, Take 40 mg by mouth 2 (two) times a day , Disp: , Rfl:     polyethylene glycol (MIRALAX) 17 g packet, Take 17 g by mouth 2 (two) times a day Indications: 1 5 caps full 1-2 times/day   , Disp: , Rfl:     sitaGLIPtin (JANUVIA) 100 mg tablet, Take 1 tablet (100 mg total) by mouth daily, Disp: 90 tablet, Rfl: 3      Wt Readings from Last 3 Encounters:   04/10/18 75 8 kg (167 lb 1 6 oz)   03/06/18 77 8 kg (171 lb 8 oz)   03/03/18 75 kg (165 lb 5 5 oz)     Temp Readings from Last 3 Encounters:   03/03/18 98 2 °F (36 8 °C) (Oral)   02/01/18 (!) 97 °F (36 1 °C) (Tympanic)   12/07/17 98 2 °F (36 8 °C)     BP Readings from Last 3 Encounters:   04/10/18 130/74   03/06/18 118/60   03/03/18 101/59     Pulse Readings from Last 3 Encounters:   04/10/18 76   03/06/18 72   03/03/18 74         Physical Exam   Constitutional: She is oriented to person, place, and time  She appears well-developed  HENT:   Head: Normocephalic  Eyes: EOM are normal    Neck: Normal range of motion  Cardiovascular: Normal rate, regular rhythm and normal heart sounds  Exam reveals no gallop and no friction rub  No murmur heard  Pulmonary/Chest: Effort normal and breath sounds normal  No respiratory distress  She has no wheezes  She has no rales  Abdominal: Soft  Musculoskeletal: Normal range of motion  Neurological: She is alert and oriented to person, place, and time  Skin: Skin is warm and dry  Psychiatric: She has a normal mood and affect           Laboratory Studies:  CMP:  Lab Results   Component Value Date     12/05/2017    K 3 9 12/05/2017     12/05/2017    CO2 26 12/05/2017    ANIONGAP 9 12/05/2017    BUN 14 12/05/2017    CREATININE 0 78 12/05/2017    GLUCOSE 128 08/15/2017    AST 16 12/05/2017    ALT 26 12/05/2017    BILITOT 0 80 12/05/2017    EGFR 95 12/05/2017       Lipid Profile:   Lab Results   Component Value Date    CHOL 150 08/15/2017     Lab Results   Component Value Date    HDL 72 (H) 08/15/2017     Lab Results   Component Value Date    LDLCALC 70 08/15/2017     Lab Results   Component Value Date    TRIG 40 08/15/2017       Cardiac testing:   EKG reviewed personally: NSR 74 Nml  Results for orders placed in visit on 08/26/15   Echo complete with contrast if indicated    Narrative [address] [place] [address], [address] [facility]   Phone: [phone]   TRANSTHORACIC ECHOCARDIOGRAM   2D, M-MODE, DOPPLER, AND COLOR DOPPLER   Study date:  26-Aug-2015   Patient: Nay Hancock   MR number: [id number]   Account number: [de-identified]   : 1975   Age: 47 years   Gender: Female   Status: Outpatient   Location: [facility] [place]   Height: 64 in   Weight: 236 5 lb   BP: 120/ 80 mmHg   Indications: Pre operative cardiovascular exam    Diagnoses: V72 81 - PREOP CARDIOVSCLR EXAM   Sonographer:  Roderick Patel, SAM, RDCS   Primary Physician:  Alfonzo Brenner MD   Referring Physician:  Virgil Romero DO   Group:  [facility]   Interpreting Physician:  Mykel Funez MD   SUMMARY   LEFT VENTRICLE:   Systolic function was normal by EF (biplane method of disks)  Ejection    fraction   was estimated to be 67 %  There were no regional wall motion abnormalities  MITRAL VALVE:   There was trace regurgitation  TRICUSPID VALVE:   There was trace regurgitation  PULMONIC VALVE:   There was trace regurgitation  COMPARISONS:   Comparison was made with the previous study of 29-Sep-2011  Ejection fraction   has not changed  HISTORY: PRIOR HISTORY: Mitochondrial disorder, DVT s/p IVC filter,   Hypertension  TRANSTHORACIC ECHOCARDIOGRAM   Patient: Nay Hancock   MR number: [id number]    ------ Page 2   PROCEDURE: The study was performed in the [facility]  This was a routine study  The transthoracic approach was used  The study   included complete 2D imaging, M-mode, complete spectral Doppler, and color   Doppler  The heart rate was 63 bpm, at the start of the study  Images were   obtained from the parasternal, apical, subcostal, and suprasternal notch   acoustic windows  Image quality was adequate  LEFT VENTRICLE: Size was normal  Systolic function was normal by EF (biplane   method of disks)  Ejection fraction was estimated to be 67 %  There were no   regional wall motion abnormalities  Wall thickness was normal  DOPPLER: The   ratio of early ventricular filling to atrial contraction velocities was within   the normal range  Left ventricular diastolic function parameters were normal    RIGHT VENTRICLE: The size was normal  Systolic function was normal  DOPPLER:   Systolic pressure was within the normal range  Estimated peak pressure was 26   mmHg  LEFT ATRIUM: Size was normal    RIGHT ATRIUM: Size was normal    MITRAL VALVE: Valve structure was normal  There was normal leaflet separation  DOPPLER: The transmitral velocity was within the normal range  There was no   evidence for stenosis  There was trace regurgitation  AORTIC VALVE: The valve was trileaflet  Leaflets exhibited normal thickness    and   normal cuspal separation  DOPPLER: Transaortic velocity was within the normal   range  There was no evidence for stenosis  There was no regurgitation  TRICUSPID VALVE: The valve structure was normal  There was normal leaflet   separation  DOPPLER: The transtricuspid velocity was within the normal range  There was no evidence for stenosis  There was trace regurgitation  PULMONIC VALVE: Leaflets exhibited normal thickness, no calcification, and   normal cuspal separation  DOPPLER: The transpulmonic velocity was within the   normal range  There was trace regurgitation  PERICARDIUM: There was no pericardial effusion  AORTA: The root exhibited normal size  SYSTEMIC VEINS: IVC: The inferior vena cava was normal in size and course  Respirophasic changes were normal    PULMONARY VEINS: DOPPLER: Doppler flow pattern was normal in the pulmonary   vein(s)     SYSTEM MEASUREMENT TABLES   2D   TRANSTHORACIC ECHOCARDIOGRAM   Patient: Genna Castillo   MR number: [id number]    ------ Page 3   %FS: 30 68 %   AV Diam: 3 27 cm   EDV(Teich): 113 63 ml   EF Biplane: 66 8 %   EF(Cube): 66 7 %   EF(Teich): 58 05 %   ESV(Cube): 39 55 ml   ESV(Teich): 47 67 ml   IVSd: 0 71 cm   LA Area: 14 58 cm2   LA Diam: 3 57 cm   LVEDV MOD A2C: 110 54 ml   LVEDV MOD A4C: 101 73 ml   LVEDV MOD BP: 107 39 ml   LVEF MOD A2C: 60 61 %   LVEF MOD A4C: 71 57 %   LVESV MOD A2C: 43 54 ml   LVESV MOD A4C: 28 92 ml   LVESV MOD BP: 35 65 ml   LVIDd: 4 92 cm   LVIDs: 3 41 cm   LVLd A2C: 8 85 cm   LVLd A4C: 8 6 cm   LVLs A2C: 7 14 cm   LVLs A4C: 7 06 cm   LVPWd: 0 89 cm   RA Area: 15 21 cm2   RV Diam: 3 28 cm   SI(Cube): 37 72 ml/m2   SI(Teich): 31 41 ml/m2   SV MOD A2C: 67 ml   SV MOD A4C: 72 81 ml   SV(Cube): 79 2 ml   SV(Teich): 65 96 ml   CW   TR MaxP 04 mmHg   TR Vmax: 2 29 m/s   MM   TAPSE: 2 95 cm   PW   E': 0 13 m/s   E/E': 6 67   MV A Cesar: 0 85 m/s   MV Dec [place]: 6 43 m/s2   MV DecT: 133 22 ms   MV E Cesar: 0 86 m/s   MV E/A Ratio: 1 01   TRANSTHORACIC ECHOCARDIOGRAM   Patient: Jose Guadalupe Bocanegra   MR number: [id number]    ------ Page 4   [facility]   Prepared and electronically signed by   Monie Crowell MD   Signed [id number] 16:37:07

## 2018-04-10 NOTE — PATIENT INSTRUCTIONS
Recommendations:  1  Continue current medications  2  Increase frequency of IV saline  3  Await neuro input    4  Follow up in 4 month

## 2018-04-10 NOTE — PROGRESS NOTES
Patient ID: Isidra Barlow is a 38 y o  female  Assessment/Plan:    No problem-specific Assessment & Plan notes found for this encounter  Diagnoses and all orders for this visit:    Intractable chronic migraine without aura and with status migrainosus  -     ketorolac (TORADOL) 10 mg tablet; 1-2 tabs at onset of migraine, can repeat X1 in 6 hours, can combine with triptan  Take with food/milk/antacid  Max 2 tab a day  Max 2-3 days a week  Take with carafate to help with GI irritation  -     topiramate (TOPAMAX) 25 mg tablet; 1 tab HS X 1 week, then increase to 2 tabs qhs as tolerated  -     MRA and or MRV head wo contrast; Future to rule out venous sinus thrombosis given history MTHFR mutation and DVT  However her neurologic exam is non focal other than trace left pronator drift and headaches per description not typical for increased ICP thus relatively low suspicion for venous sinus thrombosis  -     MRI brain without contrast; Future  Discussed potential med a/e  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I have reviewed [facility] Records sent to me, with patient worked up by  as well as NM specialist, with vision changes- rods and cones diminishing over time, metabolic strokes history, gastric malabsorption, significant adverse effects with anesthesia, orthostatic hypotension and hypoglycemia and frequent dehydration (due to gastric malabsorption)- does have a port and PEG tube for nightly fluids and weekly IVF  Was on TPN but no longer   Following with multiple specialists including endocrinology, cardiology, opthalmology [facility] and here for various symptoms from likely mitochondrial disease as noted above (has significant family history with both her children with this and worse symptoms)    MTHFR mutation ([facility] [address] )  -     MRA and or MRV head wo contrast; Future  -     MRI brain without contrast; Future    Chronic deep vein thrombosis (DVT) of distal vein of lower extremity, unspecified laterality (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         Potential medication a/e discussed with patient    Subjective:    HPI    Ms Darshan Gonzalez is a pleasant 39 yo female seen in consultation, for severe headaches  She has significant past medical history (including family history) of likely mitochrondrial disease, absorption issues- has a feeding tube, weekly IVF, hypoglycemia periodically seeing endocrinology  She has hypocalcemia on supplementation, and low CoQ10 despite supplementation (800 twice a day)- follow with  and neuromuscular specialist at [address]  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 and seeing [facility] vision specialist     In regards to her headaches:  States these have been persistent for 12 weeks with no clear inciting etiology  She tells me she has been getting IVF weekly for the last 4 weeks due to dehydration and orthostatic hypotension and states that has not made a difference  States blood glucose stable and cleared from endocrine standpoint  Describes it as fronto temporal, severe pressure like and states associated periods of severe vertigo  States extreme disorientation with her  turning the lights off  States constant for 12 weeks- 2-3/10 and sometimes 10/10 with significant dizziness ( was in ER couple of weeks ago), nausea but no vomiting, photophobia, no phonophobia  States dark and quiet helps  Has mood changes and disorientation with severe migraines  No significant worsening with lying down, bearing down, sneezing, coughing  Triggers: fatigue  Denies inciting event  No apnea reports  Normal sleep study except increased sleep and states melatonin worsens symptoms      Last MRI brain 2006 normal  States 2007 had right sided weakness aphasia, cannot swallow, cognitive slowing for weeks   States was in IPR for over a week and out of work for almost nine months  Had MRI with "oddities" and was told likely metabolic stroke, states MRA negative  States no migraines with any of these  History of DVT- MTHFR mutation- currently have IVC filter  Was on blood thinners for 1 5 years when she had DVT   Has port for TPN and weekly IVF  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  Drives at daytime with no issues  States does not drive at night- states with mitochondrial mutation has dying rods and cones and does not drive when its night time  The following portions of the patient's history were reviewed and updated as appropriate:   She  has a past medical history of Acid reflux; Acid reflux; Constipated; Dysautonomia; Esophageal abnormality; Gastrostomy tube in place [facility]); History of blood clots (2012); Iron deficiency; Malignant hyperthermia due to anesthesia; Migraines; 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[facility] ); 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[place] [address] ); MTHFR (methylene THF reductase) deficiency and homocystinuria ([place] [address] ); Muscle weakness (generalized); Nausea; On total parenteral nutrition (TPN); PCOS (polycystic ovarian syndrome); PONV (postoperative nausea and vomiting); Postgastrectomy syndrome; Status post PICC central line placement; Stroke [facility]) (2004, 2009); and Vomiting    She   Patient Active Problem List    Diagnosis Date Noted    Orthostatic hypotension 04/10/2018    Reactive hypoglycemia 03/06/2018    Hyperparathyroidism , secondary, non-renal ([facility] ) 03/06/2018    Hirsutism 03/06/2018    Headache 02/12/2018    Postgastrectomy malabsorption 06/10/2016    S/P gastric bypass 06/10/2016    Iron deficiency 06/10/2016    Constipation 06/10/2016    Nausea 06/10/2016    Mitochondrial metabolism disorder ([facility] ) 06/10/2016    GERD (gastroesophageal reflux disease) 06/10/2016    Edema 06/10/2016    Migraine headache 06/10/2016    History of Nissen fundoplication 52/86/8581    Dysphagia 04/19/2016     She  has a past surgical history that includes Ulnar nerve transposition (Right); Gastric bypass; Ankle surgery (Left); Nissen fundoplication; IVC FILTER INSERTION; Cosmetic surgery; Dilation and curettage of uterus; [place] tooth extraction; Colonoscopy; Fracture surgery (Left); Ovary surgery (Bilateral); pr lap,gastrostomy,w/o tube constr (N/A, 4/19/2016); pr egd transoral biopsy single/multiple (N/A, 3/9/2016); and Esophagogastroduodenoscopy (N/A, 1/27/2016)  Her family history includes Breast cancer in her maternal grandmother; Cancer in her mother; Depression in her brother and father; Endocrinopathy in her father; Hypertension in her mother; Mitochondrial disorder in her daughter, mother, and son; Narcolepsy in her brother; Stroke in her family; Thyroid cancer in her mother  She  reports that she has never smoked  She has never used smokeless tobacco  She reports that she does not drink alcohol or use drugs  Current Outpatient Prescriptions   Medication Sig Dispense Refill    acarbose (PRECOSE) 100 MG tablet Take 1 tablet (100 mg total) by mouth 3 (three) times a day with meals for 180 days 270 tablet 1    B Complex-Biotin-FA (TH VITAMIN B 50/B-COMPLEX PO) Take 1 tablet by mouth daily   Calcium Carbonate Antacid 1250 mg/5 mL Take by mouth      docusate sodium (COLACE) 100 mg capsule Take 200 mg by mouth 2 (two) times a day   fludrocortisone (FLORINEF) 0 1 mg tablet Take 0 1 mg by mouth daily      L-Arginine 1000 MG TABS Take 1,000 mg by mouth 2 (two) times a day      Lactobacillus-Inulin ([address]) CAPS Take by mouth      levOCARNitine (CARNITOR) 1 g/10 mL solution Take 100 mg by mouth 4 (four) times a day (before meals and at bedtime)      meclizine (ANTIVERT) 25 mg tablet Take 1 tablet (25 mg total) by mouth every 8 (eight) hours as needed for dizziness 15 tablet 0    midodrine (PROAMATINE) 5 mg tablet       Multiple Vitamin (MULTIVITAMIN) tablet Take 1 tablet by mouth daily        pantoprazole (PROTONIX) 40 mg tablet Take 40 mg by mouth 2 (two) times a day   polyethylene glycol (MIRALAX) 17 g packet Take 17 g by mouth 2 (two) times a day Indications: 1 5 caps full 1-2 times/day   sitaGLIPtin (JANUVIA) 100 mg tablet Take 1 tablet (100 mg total) by mouth daily 90 tablet 3     No current facility-administered medications for this visit  Current Outpatient Prescriptions on File Prior to Visit   Medication Sig    acarbose (PRECOSE) 100 MG tablet Take 1 tablet (100 mg total) by mouth 3 (three) times a day with meals for 180 days    B Complex-Biotin-FA (TH VITAMIN B 50/B-COMPLEX PO) Take 1 tablet by mouth daily   Calcium Carbonate Antacid 1250 mg/5 mL Take by mouth    docusate sodium (COLACE) 100 mg capsule Take 200 mg by mouth 2 (two) times a day   fludrocortisone (FLORINEF) 0 1 mg tablet Take 0 1 mg by mouth daily    L-Arginine 1000 MG TABS Take 1,000 mg by mouth 2 (two) times a day    Lactobacillus-Inulin ([address]) CAPS Take by mouth    levOCARNitine (CARNITOR) 1 g/10 mL solution Take 100 mg by mouth 4 (four) times a day (before meals and at bedtime)    meclizine (ANTIVERT) 25 mg tablet Take 1 tablet (25 mg total) by mouth every 8 (eight) hours as needed for dizziness    midodrine (PROAMATINE) 5 mg tablet     Multiple Vitamin (MULTIVITAMIN) tablet Take 1 tablet by mouth daily   pantoprazole (PROTONIX) 40 mg tablet Take 40 mg by mouth 2 (two) times a day   polyethylene glycol (MIRALAX) 17 g packet Take 17 g by mouth 2 (two) times a day Indications: 1 5 caps full 1-2 times/day        sitaGLIPtin (JANUVIA) 100 mg tablet Take 1 tablet (100 mg total) by mouth daily    [DISCONTINUED] acarbose (PRECOSE) 100 MG tablet Take 100 mg by mouth 3 (three) times daily after meals    [DISCONTINUED] LORazepam (ATIVAN) 1 mg tablet Take 1 tablet (1 mg total) by mouth every 6 (six) hours as needed for anxiety for up to 5 days    [DISCONTINUED] metoclopramide (REGLAN) 10 mg tablet Take 1 tablet (10 mg total) by mouth every 6 (six) hours     No current facility-administered medications on file prior to visit  She is allergic to sulfa antibiotics; other; and guaifenesin            Objective:    Blood pressure 122/90, pulse 81, height 5' 4 4" (1 636 m), weight 75 8 kg (167 lb 1 6 oz), not currently breastfeeding  Physical Exam   Constitutional: She is oriented to person, place, and time  She appears well-developed and well-nourished  HENT:   Head: Normocephalic and atraumatic  Eyes: EOM are normal  Pupils are equal, round, and reactive to light  Neck: Normal range of motion  Cardiovascular: Normal rate and regular rhythm  Pulmonary/Chest: Effort normal    Abdominal: Soft  PEG tube   Musculoskeletal: She exhibits tenderness  Neurological: She is alert and oriented to person, place, and time  She has normal strength and normal reflexes  Gait and coordination normal    Nursing note and vitals reviewed  Neurological Exam    Mental Status  The patient is alert and oriented to person, place, time, and situation  Her recent and remote memory are normal  She has no dysarthria  She is able to name object, read and repeat  She has normal attention span and concentration  She follows multi-step commands  She has a normal fund of knowledge  Cranial Nerves    CN II: The patient's visual acuity and visual fields are normal   CN III, IV, VI: The patient's pupils are equally round and reactive to light and ocular movements are normal   CN V: The patient has normal facial sensation  CN VII:  The patient has symmetric facial movement  CN VIII:  The patient's hearing is normal   CN IX, X: The patient has symmetric palate movement and normal gag reflex  CN XI: The patient's shoulder shrug strength is normal   CN XII: The patient's tongue is midline without atrophy or fasciculations  Motor  The patient has normal muscle bulk throughout   Her overall muscle tone is normal throughout  Her strength is 5/5 throughout all four extremities  Mild left pronator drift     Sensory  The patient's sensation is normal in all four extremities to light touch, temperature and vibration  She has no right-sided and no left-sided hemispatial neglect  Reflexes  Deep tendon reflexes are 2+ and symmetric in all four extremities with downgoing toes bilaterally  Gait and Coordination  The patient has normal gait and station  She has normal tandem gait  Romberg's sign is negative  She has normal coordination bilaterally  ROS:    Review of Systems   Constitutional: Negative  Negative for appetite change and fever  HENT: Negative  Negative for hearing loss, tinnitus, trouble swallowing and voice change  Eyes: Negative  Negative for photophobia and pain  Respiratory: Negative  Negative for shortness of breath  Cardiovascular: Negative  Negative for palpitations  Gastrointestinal: Positive for nausea  Negative for vomiting  Endocrine: Negative  Negative for cold intolerance and heat intolerance  Genitourinary: Negative for dysuria, frequency and urgency  Musculoskeletal: Negative  Negative for myalgias and neck pain  Skin: Negative  Negative for rash  Neurological: Positive for dizziness, speech difficulty, light-headedness and headaches  Negative for tremors, seizures, syncope, facial asymmetry, weakness and numbness  Hematological: Negative  Does not bruise/bleed easily  Psychiatric/Behavioral: Negative  Negative for confusion, hallucinations and sleep disturbance

## 2018-04-24 ENCOUNTER — HOSPITAL ENCOUNTER (OUTPATIENT)
Dept: MRI IMAGING | Facility: HOSPITAL | Age: 43
Discharge: HOME | End: 2018-04-24
Attending: PSYCHIATRY & NEUROLOGY
Payer: COMMERCIAL

## 2018-04-24 DIAGNOSIS — G43.711 INTRACTABLE CHRONIC MIGRAINE WITHOUT AURA AND WITH STATUS MIGRAINOSUS: ICD-10-CM

## 2018-04-24 DIAGNOSIS — Z15.89 MTHFR MUTATION: ICD-10-CM

## 2018-04-24 PROCEDURE — 70551 MRI BRAIN STEM W/O DYE: CPT

## 2018-04-24 PROCEDURE — 70544 MR ANGIOGRAPHY HEAD W/O DYE: CPT

## 2018-04-25 ENCOUNTER — HOSPITAL ENCOUNTER (OUTPATIENT)
Facility: HOSPITAL | Age: 43
Setting detail: OBSERVATION
Discharge: HOME | End: 2018-04-26
Attending: EMERGENCY MEDICINE | Admitting: SURGERY
Payer: COMMERCIAL

## 2018-04-25 ENCOUNTER — APPOINTMENT (EMERGENCY)
Dept: CT IMAGING | Facility: HOSPITAL | Age: 43
End: 2018-04-25
Payer: COMMERCIAL

## 2018-04-25 ENCOUNTER — TELEPHONE (OUTPATIENT)
Dept: BARIATRICS | Facility: CLINIC | Age: 43
End: 2018-04-25

## 2018-04-25 DIAGNOSIS — Z93.1 GASTROSTOMY TUBE IN PLACE (HCC): ICD-10-CM

## 2018-04-25 DIAGNOSIS — Z98.84 HX OF GASTRIC BYPASS: ICD-10-CM

## 2018-04-25 DIAGNOSIS — R13.10 DYSPHAGIA, UNSPECIFIED TYPE: ICD-10-CM

## 2018-04-25 DIAGNOSIS — R10.9 ABDOMINAL PAIN: Primary | ICD-10-CM

## 2018-04-25 LAB
ALBUMIN SERPL BCP-MCNC: 3.8 G/DL (ref 3.5–5)
ALP SERPL-CCNC: 66 U/L (ref 46–116)
ALT SERPL W P-5'-P-CCNC: 25 U/L (ref 12–78)
ANION GAP SERPL CALCULATED.3IONS-SCNC: 8 MMOL/L (ref 4–13)
AST SERPL W P-5'-P-CCNC: 16 U/L (ref 5–45)
BASOPHILS # BLD AUTO: 0.02 THOUSANDS/ΜL (ref 0–0.1)
BASOPHILS NFR BLD AUTO: 0 % (ref 0–1)
BILIRUB SERPL-MCNC: 0.39 MG/DL (ref 0.2–1)
BILIRUB UR QL STRIP: NEGATIVE
BUN SERPL-MCNC: 14 MG/DL (ref 5–25)
CALCIUM SERPL-MCNC: 9.5 MG/DL (ref 8.3–10.1)
CHLORIDE SERPL-SCNC: 105 MMOL/L (ref 100–108)
CLARITY UR: CLEAR
CO2 SERPL-SCNC: 27 MMOL/L (ref 21–32)
COLOR UR: YELLOW
COLOR, POC: YELLOW
CREAT SERPL-MCNC: 0.88 MG/DL (ref 0.6–1.3)
EOSINOPHIL # BLD AUTO: 0.02 THOUSAND/ΜL (ref 0–0.61)
EOSINOPHIL NFR BLD AUTO: 0 % (ref 0–6)
ERYTHROCYTE [DISTWIDTH] IN BLOOD BY AUTOMATED COUNT: 12.6 % (ref 11.6–15.1)
EXT PREG TEST URINE: NEGATIVE
GFR SERPL CREATININE-BSD FRML MDRD: 82 ML/MIN/1.73SQ M
GLUCOSE SERPL-MCNC: 101 MG/DL (ref 65–140)
GLUCOSE SERPL-MCNC: 89 MG/DL (ref 65–140)
GLUCOSE UR STRIP-MCNC: NEGATIVE MG/DL
HCT VFR BLD AUTO: 38 % (ref 34.8–46.1)
HGB BLD-MCNC: 12.8 G/DL (ref 11.5–15.4)
HGB UR QL STRIP.AUTO: NEGATIVE
KETONES UR STRIP-MCNC: NEGATIVE MG/DL
LEUKOCYTE ESTERASE UR QL STRIP: NEGATIVE
LIPASE SERPL-CCNC: 117 U/L (ref 73–393)
LYMPHOCYTES # BLD AUTO: 1.41 THOUSANDS/ΜL (ref 0.6–4.47)
LYMPHOCYTES NFR BLD AUTO: 26 % (ref 14–44)
MCH RBC QN AUTO: 30.2 PG (ref 26.8–34.3)
MCHC RBC AUTO-ENTMCNC: 33.7 G/DL (ref 31.4–37.4)
MCV RBC AUTO: 90 FL (ref 82–98)
MONOCYTES # BLD AUTO: 0.15 THOUSAND/ΜL (ref 0.17–1.22)
MONOCYTES NFR BLD AUTO: 3 % (ref 4–12)
NEUTROPHILS # BLD AUTO: 3.8 THOUSANDS/ΜL (ref 1.85–7.62)
NEUTS SEG NFR BLD AUTO: 71 % (ref 43–75)
NITRITE UR QL STRIP: NEGATIVE
NRBC BLD AUTO-RTO: 0 /100 WBCS
PH UR STRIP.AUTO: 7 [PH] (ref 4.5–8)
PLATELET # BLD AUTO: 191 THOUSANDS/UL (ref 149–390)
PMV BLD AUTO: 10.4 FL (ref 8.9–12.7)
POTASSIUM SERPL-SCNC: 3.4 MMOL/L (ref 3.5–5.3)
PROT SERPL-MCNC: 7.5 G/DL (ref 6.4–8.2)
PROT UR STRIP-MCNC: NEGATIVE MG/DL
RBC # BLD AUTO: 4.24 MILLION/UL (ref 3.81–5.12)
SODIUM SERPL-SCNC: 140 MMOL/L (ref 136–145)
SP GR UR STRIP.AUTO: 1.01 (ref 1–1.03)
UROBILINOGEN UR QL STRIP.AUTO: 0.2 E.U./DL
WBC # BLD AUTO: 5.4 THOUSAND/UL (ref 4.31–10.16)

## 2018-04-25 PROCEDURE — 74177 CT ABD & PELVIS W/CONTRAST: CPT

## 2018-04-25 PROCEDURE — 96374 THER/PROPH/DIAG INJ IV PUSH: CPT

## 2018-04-25 PROCEDURE — 82948 REAGENT STRIP/BLOOD GLUCOSE: CPT

## 2018-04-25 PROCEDURE — 99285 EMERGENCY DEPT VISIT HI MDM: CPT

## 2018-04-25 PROCEDURE — 96375 TX/PRO/DX INJ NEW DRUG ADDON: CPT

## 2018-04-25 PROCEDURE — C9113 INJ PANTOPRAZOLE SODIUM, VIA: HCPCS | Performed by: SURGERY

## 2018-04-25 PROCEDURE — 36415 COLL VENOUS BLD VENIPUNCTURE: CPT | Performed by: EMERGENCY MEDICINE

## 2018-04-25 PROCEDURE — 85025 COMPLETE CBC W/AUTO DIFF WBC: CPT | Performed by: EMERGENCY MEDICINE

## 2018-04-25 PROCEDURE — 81002 URINALYSIS NONAUTO W/O SCOPE: CPT | Performed by: EMERGENCY MEDICINE

## 2018-04-25 PROCEDURE — 81025 URINE PREGNANCY TEST: CPT | Performed by: EMERGENCY MEDICINE

## 2018-04-25 PROCEDURE — 81003 URINALYSIS AUTO W/O SCOPE: CPT

## 2018-04-25 PROCEDURE — 83690 ASSAY OF LIPASE: CPT | Performed by: EMERGENCY MEDICINE

## 2018-04-25 PROCEDURE — 80053 COMPREHEN METABOLIC PANEL: CPT | Performed by: EMERGENCY MEDICINE

## 2018-04-25 RX ORDER — DEXTROSE, SODIUM CHLORIDE, SODIUM LACTATE, POTASSIUM CHLORIDE, AND CALCIUM CHLORIDE 5; .6; .31; .03; .02 G/100ML; G/100ML; G/100ML; G/100ML; G/100ML
125 INJECTION, SOLUTION INTRAVENOUS CONTINUOUS
Status: DISCONTINUED | OUTPATIENT
Start: 2018-04-25 | End: 2018-04-25

## 2018-04-25 RX ORDER — FLUDROCORTISONE ACETATE 0.1 MG/1
0.1 TABLET ORAL DAILY
Status: DISCONTINUED | OUTPATIENT
Start: 2018-04-26 | End: 2018-04-27 | Stop reason: HOSPADM

## 2018-04-25 RX ORDER — MECLIZINE HYDROCHLORIDE 25 MG/1
25 TABLET ORAL EVERY 8 HOURS PRN
Status: DISCONTINUED | OUTPATIENT
Start: 2018-04-25 | End: 2018-04-27 | Stop reason: HOSPADM

## 2018-04-25 RX ORDER — DEXTROSE AND SODIUM CHLORIDE 5; .45 G/100ML; G/100ML
50 INJECTION, SOLUTION INTRAVENOUS CONTINUOUS
Status: DISCONTINUED | OUTPATIENT
Start: 2018-04-25 | End: 2018-04-27 | Stop reason: HOSPADM

## 2018-04-25 RX ORDER — PANTOPRAZOLE SODIUM 40 MG/1
40 INJECTION, POWDER, FOR SOLUTION INTRAVENOUS
Status: DISCONTINUED | OUTPATIENT
Start: 2018-04-25 | End: 2018-04-27 | Stop reason: HOSPADM

## 2018-04-25 RX ORDER — MORPHINE SULFATE 4 MG/ML
4 INJECTION, SOLUTION INTRAMUSCULAR; INTRAVENOUS ONCE
Status: COMPLETED | OUTPATIENT
Start: 2018-04-25 | End: 2018-04-25

## 2018-04-25 RX ORDER — ONDANSETRON 2 MG/ML
4 INJECTION INTRAMUSCULAR; INTRAVENOUS ONCE
Status: DISCONTINUED | OUTPATIENT
Start: 2018-04-25 | End: 2018-04-25

## 2018-04-25 RX ORDER — ONDANSETRON 2 MG/ML
4 INJECTION INTRAMUSCULAR; INTRAVENOUS EVERY 6 HOURS PRN
Status: DISCONTINUED | OUTPATIENT
Start: 2018-04-25 | End: 2018-04-27 | Stop reason: HOSPADM

## 2018-04-25 RX ORDER — MIDODRINE HYDROCHLORIDE 5 MG/1
5 TABLET ORAL
Status: DISCONTINUED | OUTPATIENT
Start: 2018-04-25 | End: 2018-04-27 | Stop reason: HOSPADM

## 2018-04-25 RX ORDER — TOPIRAMATE 25 MG/1
50 TABLET ORAL
Status: DISCONTINUED | OUTPATIENT
Start: 2018-04-25 | End: 2018-04-27 | Stop reason: HOSPADM

## 2018-04-25 RX ORDER — ONDANSETRON 2 MG/ML
4 INJECTION INTRAMUSCULAR; INTRAVENOUS ONCE
Status: COMPLETED | OUTPATIENT
Start: 2018-04-25 | End: 2018-04-25

## 2018-04-25 RX ORDER — DEXTROSE AND SODIUM CHLORIDE 5; .9 G/100ML; G/100ML
125 INJECTION, SOLUTION INTRAVENOUS CONTINUOUS
Status: DISCONTINUED | OUTPATIENT
Start: 2018-04-25 | End: 2018-04-25

## 2018-04-25 RX ORDER — ACETAMINOPHEN 160 MG/5ML
650 SUSPENSION, ORAL (FINAL DOSE FORM) ORAL EVERY 4 HOURS PRN
Status: DISCONTINUED | OUTPATIENT
Start: 2018-04-25 | End: 2018-04-27 | Stop reason: HOSPADM

## 2018-04-25 RX ORDER — OXYCODONE HCL 5 MG/5 ML
5 SOLUTION, ORAL ORAL EVERY 4 HOURS PRN
Status: DISCONTINUED | OUTPATIENT
Start: 2018-04-25 | End: 2018-04-27 | Stop reason: HOSPADM

## 2018-04-25 RX ADMIN — PANTOPRAZOLE SODIUM 40 MG: 40 INJECTION, POWDER, FOR SOLUTION INTRAVENOUS at 16:22

## 2018-04-25 RX ADMIN — ONDANSETRON 4 MG: 2 INJECTION INTRAMUSCULAR; INTRAVENOUS at 13:29

## 2018-04-25 RX ADMIN — TOPIRAMATE 50 MG: 25 TABLET, FILM COATED ORAL at 22:25

## 2018-04-25 RX ADMIN — ASCORBIC ACID, VITAMIN A PALMITATE, CHOLECALCIFEROL, THIAMINE HYDROCHLORIDE, RIBOFLAVIN-5 PHOSPHATE SODIUM, PYRIDOXINE HYDROCHLORIDE, NIACINAMIDE, DEXPANTHENOL, ALPHA-TOCOPHEROL ACETATE, VITAMIN K1, FOLIC ACID, BIOTIN, CYANOCOBALAMIN: 200; 3300; 200; 6; 3.6; 6; 40; 15; 10; 150; 600; 60; 5 INJECTION, SOLUTION INTRAVENOUS at 16:30

## 2018-04-25 RX ADMIN — ACETAMINOPHEN 650 MG: 160 SUSPENSION ORAL at 20:09

## 2018-04-25 RX ADMIN — IOHEXOL 25 ML: 240 INJECTION, SOLUTION INTRATHECAL; INTRAVASCULAR; INTRAVENOUS; ORAL at 14:22

## 2018-04-25 RX ADMIN — ONDANSETRON 4 MG: 2 INJECTION INTRAMUSCULAR; INTRAVENOUS at 18:26

## 2018-04-25 RX ADMIN — IOHEXOL 100 ML: 350 INJECTION, SOLUTION INTRAVENOUS at 14:23

## 2018-04-25 RX ADMIN — MORPHINE SULFATE 4 MG: 4 INJECTION, SOLUTION INTRAMUSCULAR; INTRAVENOUS at 13:42

## 2018-04-25 RX ADMIN — MIDODRINE HYDROCHLORIDE 5 MG: 5 TABLET ORAL at 18:26

## 2018-04-25 RX ADMIN — DEXTROSE AND SODIUM CHLORIDE 100 ML/HR: 5; .45 INJECTION, SOLUTION INTRAVENOUS at 18:28

## 2018-04-25 NOTE — ED ATTENDING ATTESTATION
Evin Holcomb MD, saw and evaluated the patient  I have discussed the patient with the resident/non-physician practitioner and agree with the resident's/non-physician practitioner's findings, Plan of Care, and MDM as documented in the resident's/non-physician practitioner's note, except where noted  All available labs and Radiology studies were reviewed  At this point I agree with the current assessment done in the Emergency Department  I have conducted an independent evaluation of this patient a history and physical is as follows:      Critical Care Time  CritCare Time    Procedures       LUQ abd  Pain since 2am    s/p gastric bypass 2 years ago by dr Nima Lam   +nausea/wretching  Pain is worse with eating  No fevers, no cp, no sob  Pt  Doesn't normally get abd  Pain like this  She has a Rhea button for tube feeds as well as she takes po's  She also has to self-cath  Secondary to her mitochondrial disease  Well-appearing, no distress, RRR, CTA b/l, abd  LUQ abd  Tenderness, no r/g, rhea button site okay, ext   No edema

## 2018-04-25 NOTE — H&P
H&P Exam - Bariatric Surgery   Kate Tong 38 y o  female MRN: [id number]  Unit/Bed#: ED 31 Encounter: [id number]    Assessment/Plan     Assessment:  38year old female with previous gastric bypass presenting with abdominal pain    Plan:  Admission  IVF  Bowel rest  Pain control    History of Present Illness     HPI:  Kate Tong is a 38 y o  female with complex past medical history who presents with abdominal pain and nausea  She previously had a gastric bypass, paraesophaheal hernia repair w/ Bio-A mesh, with Nissen takedown in November 2015  This was complicated postoperatively by stricture s/p dilation, TPN dependance and followed by GT placement in 2016  She currently does GT feeds at night that provide ~50% calories  She follows with Dr Gamaliel Moses at [address] for a mitochondrial disorder  She awoke this morning with RUQ pain and nausea  She occassionaly gets pain like this but rarely this severe, from her feeds "backing up"  This usually improves by GT drainage  However, this time not much drained out and that pain worsened  Her BMs have been normal and regular with her daily bowel regiment  Her last bowel movement was this morning  CT was done in ED and negative for anything acute  Review of Systems   Constitutional: Negative for chills and fever  Respiratory: Negative for chest tightness and shortness of breath  Cardiovascular: Negative for chest pain and palpitations  Gastrointestinal: Positive for abdominal pain and nausea  Negative for constipation, diarrhea and vomiting  Skin: Negative for rash and wound  Neurological: Negative for dizziness and light-headedness  Historical Information   Past Medical History:   Diagnosis Date    Acid reflux     Acid reflux     Constipated     Dysautonomia     Esophageal abnormality     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 [address] )     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[facility] [address] )     MTHFR (methylene THF reductase) deficiency and homocystinuria (HCC)     Muscle weakness (generalized)     Nausea     On total parenteral nutrition (TPN)     for 8 mts    PCOS (polycystic ovarian syndrome)     PONV (postoperative nausea and vomiting)     Postgastrectomy syndrome     malabsorption    Status post PICC central line placement     Right upper arm  Receives TPN    Stroke [facility]) 3080, 2049    Metabolic strokes  Right hemiparesis= minor now   Vomiting     When eating     Past Surgical History:   Procedure Laterality Date    ANKLE SURGERY Left     Has plate and screws    COLONOSCOPY      COSMETIC SURGERY      X14 as child post attack by dog  Arms, legs and face    DILATION AND CURETTAGE OF UTERUS      x2    ESOPHAGOGASTRODUODENOSCOPY N/A 1/27/2016    Procedure: ESOPHAGOGASTRODUODENOSCOPY (EGD); Surgeon: Channing Boucher MD;  Location: [facility]; Service:     FRACTURE SURGERY Left     Left ankle - hardware    GASTRIC BYPASS      IVC FILTER INSERTION      NISSEN FUNDOPLICATION      OVARY SURGERY Bilateral     "Drilling" due to multiple cysts- PCOS    MD EGD TRANSORAL BIOPSY SINGLE/MULTIPLE N/A 3/9/2016    Procedure: ESOPHAGOGASTRODUODENOSCOPY (EGD); Surgeon: Channing Boucher MD;  Location: [facility];   Service: Bariatrics    MD LAP,GASTROSTOMY,W/O TUBE CONSTR N/A 4/19/2016    Procedure: INSERTION GASTROSTOMY TUBE LAPAROSCOPIC WITH INTRAOP EGD;  Surgeon: Channing Boucher MD;  Location: [facility];  Service: Bariatrics    ULNAR NERVE TRANSPOSITION Right     WISDOM TOOTH EXTRACTION       Social History   History   Alcohol Use No     History   Drug Use No     History   Smoking Status    Never Smoker   Smokeless Tobacco    Never Used     Family History: non-contributory    Meds/Allergies   PTA meds:   Prior to Admission Medications   Prescriptions Last Dose Informant Patient Reported? Taking? B Complex-Biotin-FA ( VITAMIN B 50/B-COMPLEX PO)  Self Yes Yes   Sig: Take 1 tablet by mouth daily  Calcium Carbonate Antacid 1250 mg/5 mL  Self Yes Yes   Sig: Take by mouth   L-Arginine 1000 MG TABS  Self Yes Yes   Sig: Take 1,000 mg by mouth 2 (two) times a day   Lactobacillus-Inulin ([address]) CAPS  Self Yes Yes   Sig: Take by mouth   Multiple Vitamin (MULTIVITAMIN) tablet  Self Yes Yes   Sig: Take 1 tablet by mouth daily  acarbose (PRECOSE) 100 MG tablet  Self No Yes   Sig: Take 1 tablet (100 mg total) by mouth 3 (three) times a day with meals for 180 days   docusate sodium (COLACE) 100 mg capsule  Self Yes Yes   Sig: Take 200 mg by mouth 2 (two) times a day  fludrocortisone (FLORINEF) 0 1 mg tablet  Self Yes Yes   Sig: Take 0 1 mg by mouth daily   ketorolac (TORADOL) 10 mg tablet   No Yes   Si-2 tabs at onset of migraine, can repeat X1 in 6 hours, can combine with triptan  Take with food/milk/antacid    levOCARNitine (CARNITOR) 1 g/10 mL solution  Self Yes Yes   Sig: Take 100 mg by mouth 4 (four) times a day (before meals and at bedtime)   meclizine (ANTIVERT) 25 mg tablet  Self No Yes   Sig: Take 1 tablet (25 mg total) by mouth every 8 (eight) hours as needed for dizziness   midodrine (PROAMATINE) 5 mg tablet  Self Yes Yes   pantoprazole (PROTONIX) 40 mg tablet  Self Yes Yes   Sig: Take 40 mg by mouth 2 (two) times a day  polyethylene glycol (MIRALAX) 17 g packet  Self Yes Yes   Sig: Take 17 g by mouth 2 (two) times a day Indications: 1 5 caps full 1-2 times/day       sitaGLIPtin (JANUVIA) 100 mg tablet  Self No Yes   Sig: Take 1 tablet (100 mg total) by mouth daily   sucralfate (CARAFATE) 1 g tablet   No Yes   Sig: Take 1 tab as need with toradol   topiramate (TOPAMAX) 25 mg tablet   No Yes   Si tab HS X 1 week, then increase to 2 tabs qhs as tolerated      Facility-Administered Medications: None     Allergies   Allergen Reactions    Sulfa Antibiotics Other (See Comments)     Arrhythmia     Other Other (See Comments)     Cardiac arrhythmias    Guaifenesin Other (See Comments)     Arrhythmia       Objective   First Vitals:   Blood Pressure: 158/80 (04/25/18 1241)  Pulse: 83 (04/25/18 1241)  Temperature: 97 9 °F (36 6 °C) (04/25/18 1241)  Temp Source: Oral (04/25/18 1241)  Respirations: 16 (04/25/18 1241)  Weight - Scale: 71 7 kg (158 lb) (04/25/18 1242)  SpO2: 100 % (04/25/18 1241)    Current Vitals:   Blood Pressure: 118/65 (04/25/18 1450)  Pulse: 65 (04/25/18 1450)  Temperature: 97 9 °F (36 6 °C) (04/25/18 1241)  Temp Source: Oral (04/25/18 1241)  Respirations: 18 (04/25/18 1450)  Weight - Scale: 71 7 kg (158 lb) (04/25/18 1242)  SpO2: 100 % (04/25/18 1450)    No intake or output data in the 24 hours ending 04/25/18 1529    Invasive Devices     Peripheral Intravenous Line            Peripheral IV 04/25/18 Left Antecubital less than 1 day          Drain            Gastrostomy/Enterostomy Gastrostomy 20 Fr   days                Physical Exam   Constitutional: She is oriented to person, place, and time  She appears well-developed and well-nourished  No distress  HENT:   Head: Normocephalic  Cardiovascular: Normal rate and intact distal pulses  Pulmonary/Chest: Effort normal  No respiratory distress  Abdominal: Soft  There is no rebound and no guarding  Tender to LUQ around GT   Musculoskeletal: She exhibits no edema or deformity  Neurological: She is alert and oriented to person, place, and time  Skin: Skin is warm  No rash noted  She is not diaphoretic  Psychiatric: She has a normal mood and affect  Her behavior is normal  Judgment and thought content normal        Lab Results:   I have personally reviewed pertinent lab results    , CBC:   Lab Results   Component Value Date    WBC 5 40 04/25/2018    HGB 12 8 04/25/2018    HCT 38 0 04/25/2018    MCV 90 04/25/2018     04/25/2018    MCH 30 2 04/25/2018    MCHC 33 7 04/25/2018    RDW 12 6 04/25/2018    MPV 10 4 04/25/2018    NRBC 0 04/25/2018   , CMP:   Lab Results   Component Value Date     04/25/2018    K 3 4 (L) 04/25/2018     04/25/2018    CO2 27 04/25/2018    ANIONGAP 8 04/25/2018    BUN 14 04/25/2018    CREATININE 0 88 04/25/2018    GLUCOSE 101 04/25/2018    CALCIUM 9 5 04/25/2018    AST 16 04/25/2018    ALT 25 04/25/2018    ALKPHOS 66 04/25/2018    PROT 7 5 04/25/2018    BILITOT 0 39 04/25/2018    EGFR 82 04/25/2018     Imaging: I have personally reviewed pertinent reports  EKG, Pathology, and Other Studies: I have personally reviewed pertinent reports  Code Status: Prior  Advance Directive and Living Will:      Power of :    POLST:      Counseling / Coordination of Care  Total floor / unit time spent today 25 minutes  Greater than 50% of total time was spent with the patient and / or family counseling and / or coordination of care  A description of the counseling / coordination of care: 25

## 2018-04-25 NOTE — TELEPHONE ENCOUNTER
Pt called to make us aware she was headed to the ER in [facility]  She has LUQ pain and it is getting worse  She said it started at 2am today  She is unable to keep anything down  I sent an email message to Dr Diamond Miranda and Dr Jenaro Mendez to make them aware

## 2018-04-25 NOTE — ED PROVIDER NOTES
History  Chief Complaint   Patient presents with    Abdominal Pain     Patient reports she woke up at 2AM with LUQ abdominal pain and nausea  Patient denies fevers  57-year-old female history of gastric bypass, mitochondral disease presents to the emergency department for evaluation of left upper quadrant abdominal pain  Patient states that she had a gradual onset of abdominal pain around her Freddie gastrostomy tube site and been constant since 2:00 a m  this morning  Describes abdominal pain as pressure squeezing- like sensation and radiates to the back  Patient states that she was in her normal state of health up until today  Patient was concerned that she may have pancreatitis given that she read that this was the side effects of her Topamax medication that she was recently started on 2 weeks ago  Patient states that her abdominal pain is worse with food intake  Patient normally receives most of her nutrition through tube feeds thru her Freddie gastrostomy tube however she is able to the ingest orally  Patient denies any urinary complaints  Patient denies any recent fever chest pain shortness of breath vomiting constipation or diarrhea  Prior to Admission Medications   Prescriptions Last Dose Informant Patient Reported? Taking? B Complex-Biotin-FA (TH VITAMIN B 50/B-COMPLEX PO)  Self Yes Yes   Sig: Take 1 tablet by mouth daily  Calcium Carbonate Antacid 1250 mg/5 mL  Self Yes Yes   Sig: Take by mouth   L-Arginine 1000 MG TABS  Self Yes Yes   Sig: Take 1,000 mg by mouth 2 (two) times a day   Lactobacillus-Inulin ([address]) CAPS  Self Yes Yes   Sig: Take by mouth   Multiple Vitamin (MULTIVITAMIN) tablet  Self Yes Yes   Sig: Take 1 tablet by mouth daily     acarbose (PRECOSE) 100 MG tablet  Self No Yes   Sig: Take 1 tablet (100 mg total) by mouth 3 (three) times a day with meals for 180 days   docusate sodium (COLACE) 100 mg capsule  Self Yes Yes   Sig: Take 200 mg by mouth 2 (two) times a day  fludrocortisone (FLORINEF) 0 1 mg tablet  Self Yes Yes   Sig: Take 0 1 mg by mouth daily   ketorolac (TORADOL) 10 mg tablet   No Yes   Si-2 tabs at onset of migraine, can repeat X1 in 6 hours, can combine with triptan  Take with food/milk/antacid    levOCARNitine (CARNITOR) 1 g/10 mL solution  Self Yes Yes   Sig: Take 100 mg by mouth 4 (four) times a day (before meals and at bedtime)   meclizine (ANTIVERT) 25 mg tablet  Self No Yes   Sig: Take 1 tablet (25 mg total) by mouth every 8 (eight) hours as needed for dizziness   midodrine (PROAMATINE) 5 mg tablet  Self Yes Yes   pantoprazole (PROTONIX) 40 mg tablet  Self Yes Yes   Sig: Take 40 mg by mouth 2 (two) times a day  polyethylene glycol (MIRALAX) 17 g packet  Self Yes Yes   Sig: Take 17 g by mouth 2 (two) times a day Indications: 1 5 caps full 1-2 times/day  sitaGLIPtin (JANUVIA) 100 mg tablet  Self No Yes   Sig: Take 1 tablet (100 mg total) by mouth daily   sucralfate (CARAFATE) 1 g tablet   No Yes   Sig: Take 1 tab as need with toradol   topiramate (TOPAMAX) 25 mg tablet   No Yes   Si tab HS X 1 week, then increase to 2 tabs qhs as tolerated      Facility-Administered Medications: None       Past Medical History:   Diagnosis Date    Acid reflux     Acid reflux     Constipated     Dysautonomia     Esophageal abnormality     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   Gastrostomy tube in place [facility])     History of blood clots     Left leg  Has IVC filter now   Occurred while on Lovenox    Iron deficiency     Malignant hyperthermia due to anesthesia     Patient's son has M H   States she needs precautions    Migraines     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     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     MTHFR (methylene THF reductase) deficiency and homocystinuria (HCC)     Muscle weakness (generalized)     Nausea     On total parenteral nutrition (TPN)     for 8 mts    PCOS (polycystic ovarian syndrome)     PONV (postoperative nausea and vomiting)     Postgastrectomy syndrome     malabsorption    Status post PICC central line placement     Right upper arm  Receives TPN    Stroke [facility]) 0984, 0680    Metabolic strokes  Right hemiparesis= minor now   Vomiting     When eating       Past Surgical History:   Procedure Laterality Date    ANKLE SURGERY Left     Has plate and screws    COLONOSCOPY      COSMETIC SURGERY      X14 as child post attack by dog  Arms, legs and face    DILATION AND CURETTAGE OF UTERUS      x2    ESOPHAGOGASTRODUODENOSCOPY N/A 1/27/2016    Procedure: ESOPHAGOGASTRODUODENOSCOPY (EGD); Surgeon: Johanna Shields MD;  Location: [facility]; Service:     FRACTURE SURGERY Left     Left ankle - hardware    GASTRIC BYPASS      IVC FILTER INSERTION      NISSEN FUNDOPLICATION      OVARY SURGERY Bilateral     "Drilling" due to multiple cysts- PCOS    NV EGD TRANSORAL BIOPSY SINGLE/MULTIPLE N/A 3/9/2016    Procedure: ESOPHAGOGASTRODUODENOSCOPY (EGD); Surgeon: Johanna Shields MD;  Location: [facility]; Service: Bariatrics    NV LAP,GASTROSTOMY,W/O TUBE CONSTR N/A 4/19/2016    Procedure: INSERTION GASTROSTOMY TUBE LAPAROSCOPIC WITH INTRAOP EGD;  Surgeon: Johanna Shields MD;  Location: [facility];  Service: Bariatrics    ULNAR NERVE TRANSPOSITION Right     WISDOM TOOTH EXTRACTION         Family History   Problem Relation Age of Onset    Mitochondrial disorder Mother     Cancer Mother     Hypertension Mother     Thyroid cancer Mother     Depression Father     Endocrinopathy Father     Depression Brother     Narcolepsy Brother     Breast cancer Maternal Grandmother     Mitochondrial disorder Son     Mitochondrial disorder Daughter     Stroke Family      I have reviewed and agree with the history as documented      Social History   Substance Use Topics    Smoking status: Never Smoker    Smokeless tobacco: Never Used    Alcohol use No        Review of Systems   Constitutional: Negative for appetite change, chills, diaphoresis, fatigue and fever  HENT: Negative for congestion, ear discharge, ear pain, hearing loss, postnasal drip, rhinorrhea, sneezing and sore throat  Eyes: Negative for pain, discharge and redness  Respiratory: Negative for choking, chest tightness, shortness of breath, wheezing and stridor  Cardiovascular: Negative for chest pain and palpitations  Gastrointestinal: Positive for abdominal pain and nausea  Negative for abdominal distention, blood in stool, constipation, diarrhea and vomiting  Genitourinary: Negative for decreased urine volume, difficulty urinating, dysuria, flank pain, frequency and hematuria  Musculoskeletal: Negative for arthralgias, gait problem, joint swelling and neck pain  Skin: Negative for color change, pallor and rash  Allergic/Immunologic: Negative for environmental allergies, food allergies and immunocompromised state  Neurological: Negative for dizziness, seizures, weakness, light-headedness, numbness and headaches  Hematological: Negative for adenopathy  Does not bruise/bleed easily  Psychiatric/Behavioral: Negative for agitation and behavioral problems  Physical Exam  ED Triage Vitals [04/25/18 1241]   Temperature Pulse Respirations Blood Pressure SpO2   97 9 °F (36 6 °C) 83 16 158/80 100 %      Temp Source Heart Rate Source Patient Position - Orthostatic VS BP Location FiO2 (%)   Oral Monitor Lying Left arm --      Pain Score       6           Orthostatic Vital Signs  Vitals:    04/25/18 1241 04/25/18 1450 04/25/18 1648   BP: 158/80 118/65 134/94   Pulse: 83 65 59   Patient Position - Orthostatic VS: Lying         Physical Exam   Constitutional: She is oriented to person, place, and time  She appears well-developed and well-nourished  HENT:   Head: Normocephalic and atraumatic     Nose: Nose normal    Mouth/Throat: Oropharynx is clear and moist    Eyes: Conjunctivae and EOM are normal  Pupils are equal, round, and reactive to light  Neck: Normal range of motion  Neck supple  Cardiovascular: Normal rate, regular rhythm and normal heart sounds  Exam reveals no gallop and no friction rub  No murmur heard  Pulmonary/Chest: Effort normal and breath sounds normal    Abdominal: Soft  Bowel sounds are normal  She exhibits no distension  There is tenderness  There is no rebound and no guarding  Peg tube site dry,clean, and intact   Musculoskeletal: Normal range of motion  Neurological: She is alert and oriented to person, place, and time  She has normal reflexes  Skin: Skin is warm and dry  No erythema  No pallor  Psychiatric: She has a normal mood and affect  Her behavior is normal    Nursing note and vitals reviewed        ED Medications  Medications   fludrocortisone (FLORINEF) tablet 0 1 mg (not administered)   meclizine (ANTIVERT) tablet 25 mg (not administered)   midodrine (PROAMATINE) tablet 5 mg (not administered)   topiramate (TOPAMAX) tablet 50 mg (not administered)   pantoprazole (PROTONIX) injection 40 mg (40 mg Intravenous Given 4/25/18 1622)   insulin lispro (HumaLOG) 100 units/mL subcutaneous injection 1-6 Units (not administered)   ondansetron (ZOFRAN) injection 4 mg (4 mg Intravenous Not Given 4/25/18 1622)   dextrose 5 % and sodium chloride 0 45 % infusion (not administered)   ondansetron (ZOFRAN) injection 4 mg (4 mg Intravenous Given 4/25/18 1329)   morphine (PF) 4 mg/mL injection 4 mg (4 mg Intravenous Given 4/25/18 1342)   iohexol (OMNIPAQUE) 350 MG/ML injection (MULTI-DOSE) 100 mL (100 mL Intravenous Given 4/25/18 1423)   iohexol (OMNIPAQUE) 240 MG/ML solution 25 mL (25 mL Oral Given 4/25/18 1422)   multivitamin (INFUVITE ADULT) 10 mL in sodium chloride 0 9 % 1,000 mL infusion ( Intravenous New Bag 4/25/18 1630)       Diagnostic Studies  Results Reviewed     Procedure Component Value Units Date/Time    Comprehensive metabolic panel [[id number]]  (Abnormal) Collected:  04/25/18 1327    Lab Status:  Final result Specimen:  Blood from Arm, Left Updated:  04/25/18 1406     Sodium 140 mmol/L      Potassium 3 4 (L) mmol/L      Chloride 105 mmol/L      CO2 27 mmol/L      Anion Gap 8 mmol/L      BUN 14 mg/dL      Creatinine 0 88 mg/dL      Glucose 101 mg/dL      Calcium 9 5 mg/dL      AST 16 U/L      ALT 25 U/L      Alkaline Phosphatase 66 U/L      Total Protein 7 5 g/dL      Albumin 3 8 g/dL      Total Bilirubin 0 39 mg/dL      eGFR 82 ml/min/1 73sq m     Narrative:         National Kidney Disease Education Program recommendations are as follows:  GFR calculation is accurate only with a steady state creatinine  Chronic Kidney disease less than 60 ml/min/1 73 sq  meters  Kidney failure less than 15 ml/min/1 73 sq  meters      Lipase [[id number]]  (Normal) Collected:  04/25/18 1327    Lab Status:  Final result Specimen:  Blood from Arm, Left Updated:  04/25/18 1406     Lipase 117 u/L     CBC and differential [[id number]]  (Abnormal) Collected:  04/25/18 1327    Lab Status:  Final result Specimen:  Blood from Arm, Left Updated:  04/25/18 1339     WBC 5 40 Thousand/uL      RBC 4 24 Million/uL      Hemoglobin 12 8 g/dL      Hematocrit 38 0 %      MCV 90 fL      MCH 30 2 pg      MCHC 33 7 g/dL      RDW 12 6 %      MPV 10 4 fL      Platelets 681 Thousands/uL      nRBC 0 /100 WBCs      Neutrophils Relative 71 %      Lymphocytes Relative 26 %      Monocytes Relative 3 (L) %      Eosinophils Relative 0 %      Basophils Relative 0 %      Neutrophils Absolute 3 80 Thousands/µL      Lymphocytes Absolute 1 41 Thousands/µL      Monocytes Absolute 0 15 (L) Thousand/µL      Eosinophils Absolute 0 02 Thousand/µL      Basophils Absolute 0 02 Thousands/µL     POCT pregnancy, urine [[id number]]  (Normal) Resulted:  04/25/18 1315    Lab Status:  Final result Specimen:  Urine Updated:  04/25/18 1315     EXT PREG TEST UR (Ref: Negative) negative    POCT urinalysis dipstick [[id number]]  (Normal) Resulted:  04/25/18 1315    Lab Status: Final result Specimen:  Urine from Urine, Other Updated:  04/25/18 1315     Color, UA yellow    ED Urine Macroscopic [[id number]]  (Normal) Collected:  04/25/18 1315    Lab Status:  Final result Specimen:  Urine Updated:  04/25/18 1314     Color, UA Yellow     Clarity, UA Clear     pH, UA 7 0     Leukocytes, UA Negative     Nitrite, UA Negative     Protein, UA Negative mg/dl      Glucose, UA Negative mg/dl      Ketones, UA Negative mg/dl      Urobilinogen, UA 0 2 E U /dl      Bilirubin, UA Negative     Blood, UA Negative     Specific Gravity, UA 1 010    Narrative:       CL[facility] RESULT                 CT abdomen pelvis with contrast   Final Result by Haylee Verma MD (04/25 5747)      No acute pathology  Workstation performed: [id number]               Procedures  Procedures      Phone Consults  ED Phone Contact    ED Course  ED Course as of Apr 25 1657 Wed Apr 25, 2018   [phone] Paged Dr Candace Amos    [phone] Patient states that she feels better  MDM  Number of Diagnoses or Management Options  Abdominal pain:   Diagnosis management comments: 53-year-old female history of gastric bypass PEG tube , mitochondrial disease presents to the emergency department for evaluation of left lower quadrant abdominal pain since 2:00 a m     I will order CBC CMP lipase to evaluate for leukocytosis gastrointestinal pathology and pancreatitis  Also will order urinalysis urine pregnancy to evaluate for UTI in pregnancy    I will order CT abdomen pelvis with IV and p o  contrast   I will treat symptomatically with Zofran and morphine    CritCare Time    Disposition  Final diagnoses:   Abdominal pain   Hx of gastric bypass   Gastrostomy tube in place [facility])     Time reflects when diagnosis was documented in both MDM as applicable and the Disposition within this note     Time User Action Codes Description Comment    4/25/2018  4:08 PM Zoe Mojica Add [R10 9] Abdominal pain     4/25/2018 4:56 PM Baldomerocornell Raf Add [Z98 84] Hx of gastric bypass     4/25/2018  4:56 PM LeighDavid gonzalez Add [Z93 1] Gastrostomy tube in place [facility])       ED Disposition     ED Disposition Condition Comment    Admit  Case was discussed with fellow for Dr Candace Amos and the patient's admission status was agreed to be observation/med/surg      Follow-up Information    None       Current Discharge Medication List      CONTINUE these medications which have NOT CHANGED    Details   acarbose (PRECOSE) 100 MG tablet Take 1 tablet (100 mg total) by mouth 3 (three) times a day with meals for 180 days  Qty: 270 tablet, Refills: 1    Associated Diagnoses: Hypoglycemia      B Complex-Biotin-FA (TH VITAMIN B 50/B-COMPLEX PO) Take 1 tablet by mouth daily  Calcium Carbonate Antacid 1250 mg/5 mL Take by mouth      docusate sodium (COLACE) 100 mg capsule Take 200 mg by mouth 2 (two) times a day  fludrocortisone (FLORINEF) 0 1 mg tablet Take 0 1 mg by mouth daily      ketorolac (TORADOL) 10 mg tablet 1-2 tabs at onset of migraine, can repeat X1 in 6 hours, can combine with triptan  Take with food/milk/antacid  Qty: 30 tablet, Refills: 0    Associated Diagnoses: Intractable chronic migraine without aura and with status migrainosus      L-Arginine 1000 MG TABS Take 1,000 mg by mouth 2 (two) times a day      Lactobacillus-Inulin ([address]) CAPS Take by mouth      levOCARNitine (CARNITOR) 1 g/10 mL solution Take 100 mg by mouth 4 (four) times a day (before meals and at bedtime)      meclizine (ANTIVERT) 25 mg tablet Take 1 tablet (25 mg total) by mouth every 8 (eight) hours as needed for dizziness  Qty: 15 tablet, Refills: 0    Associated Diagnoses: Nonintractable episodic headache, unspecified headache type      midodrine (PROAMATINE) 5 mg tablet       Multiple Vitamin (MULTIVITAMIN) tablet Take 1 tablet by mouth daily  pantoprazole (PROTONIX) 40 mg tablet Take 40 mg by mouth 2 (two) times a day        polyethylene glycol (MIRALAX) 17 g packet Take 17 g by mouth 2 (two) times a day Indications: 1 5 caps full 1-2 times/day  sitaGLIPtin (JANUVIA) 100 mg tablet Take 1 tablet (100 mg total) by mouth daily  Qty: 90 tablet, Refills: 3    Associated Diagnoses: Reactive hypoglycemia      sucralfate (CARAFATE) 1 g tablet Take 1 tab as need with toradol  Qty: 30 tablet, Refills: 1    Associated Diagnoses: Gastroesophageal reflux disease, esophagitis presence not specified      topiramate (TOPAMAX) 25 mg tablet 1 tab HS X 1 week, then increase to 2 tabs qhs as tolerated  Qty: 60 tablet, Refills: 3    Associated Diagnoses: Intractable chronic migraine without aura and with status migrainosus           No discharge procedures on file  ED Provider  Attending physically available and evaluated Zayda Marybeth EDMOND managed the patient along with the ED Attending      Electronically Signed by         Tiburcio Vega MD  04/25/18 0938

## 2018-04-26 VITALS
OXYGEN SATURATION: 100 % | RESPIRATION RATE: 18 BRPM | HEART RATE: 69 BPM | WEIGHT: 158.07 LBS | SYSTOLIC BLOOD PRESSURE: 144 MMHG | HEIGHT: 64 IN | BODY MASS INDEX: 26.99 KG/M2 | DIASTOLIC BLOOD PRESSURE: 78 MMHG | TEMPERATURE: 98 F

## 2018-04-26 LAB
ANION GAP SERPL CALCULATED.3IONS-SCNC: 7 MMOL/L (ref 4–13)
BASOPHILS # BLD AUTO: 0.01 THOUSANDS/ΜL (ref 0–0.1)
BASOPHILS NFR BLD AUTO: 0 % (ref 0–1)
BUN SERPL-MCNC: 11 MG/DL (ref 5–25)
CALCIUM SERPL-MCNC: 8.7 MG/DL (ref 8.3–10.1)
CHLORIDE SERPL-SCNC: 107 MMOL/L (ref 100–108)
CO2 SERPL-SCNC: 26 MMOL/L (ref 21–32)
CREAT SERPL-MCNC: 0.89 MG/DL (ref 0.6–1.3)
EOSINOPHIL # BLD AUTO: 0.09 THOUSAND/ΜL (ref 0–0.61)
EOSINOPHIL NFR BLD AUTO: 3 % (ref 0–6)
ERYTHROCYTE [DISTWIDTH] IN BLOOD BY AUTOMATED COUNT: 12.6 % (ref 11.6–15.1)
GFR SERPL CREATININE-BSD FRML MDRD: 81 ML/MIN/1.73SQ M
GLUCOSE SERPL-MCNC: 101 MG/DL (ref 65–140)
GLUCOSE SERPL-MCNC: 104 MG/DL (ref 65–140)
GLUCOSE SERPL-MCNC: 76 MG/DL (ref 65–140)
GLUCOSE SERPL-MCNC: 96 MG/DL (ref 65–140)
GLUCOSE SERPL-MCNC: 98 MG/DL (ref 65–140)
HCT VFR BLD AUTO: 36.6 % (ref 34.8–46.1)
HGB BLD-MCNC: 12.3 G/DL (ref 11.5–15.4)
LYMPHOCYTES # BLD AUTO: 1.49 THOUSANDS/ΜL (ref 0.6–4.47)
LYMPHOCYTES NFR BLD AUTO: 44 % (ref 14–44)
MCH RBC QN AUTO: 29.9 PG (ref 26.8–34.3)
MCHC RBC AUTO-ENTMCNC: 33.6 G/DL (ref 31.4–37.4)
MCV RBC AUTO: 89 FL (ref 82–98)
MONOCYTES # BLD AUTO: 0.25 THOUSAND/ΜL (ref 0.17–1.22)
MONOCYTES NFR BLD AUTO: 7 % (ref 4–12)
NEUTROPHILS # BLD AUTO: 1.54 THOUSANDS/ΜL (ref 1.85–7.62)
NEUTS SEG NFR BLD AUTO: 46 % (ref 43–75)
NRBC BLD AUTO-RTO: 0 /100 WBCS
PLATELET # BLD AUTO: 190 THOUSANDS/UL (ref 149–390)
PMV BLD AUTO: 10.6 FL (ref 8.9–12.7)
POTASSIUM SERPL-SCNC: 3.4 MMOL/L (ref 3.5–5.3)
RBC # BLD AUTO: 4.11 MILLION/UL (ref 3.81–5.12)
SODIUM SERPL-SCNC: 140 MMOL/L (ref 136–145)
WBC # BLD AUTO: 3.38 THOUSAND/UL (ref 4.31–10.16)

## 2018-04-26 PROCEDURE — C9113 INJ PANTOPRAZOLE SODIUM, VIA: HCPCS | Performed by: SURGERY

## 2018-04-26 PROCEDURE — 80048 BASIC METABOLIC PNL TOTAL CA: CPT | Performed by: SURGERY

## 2018-04-26 PROCEDURE — 82948 REAGENT STRIP/BLOOD GLUCOSE: CPT

## 2018-04-26 PROCEDURE — 99024 POSTOP FOLLOW-UP VISIT: CPT | Performed by: SURGERY

## 2018-04-26 PROCEDURE — 85025 COMPLETE CBC W/AUTO DIFF WBC: CPT | Performed by: SURGERY

## 2018-04-26 RX ORDER — SUCRALFATE ORAL 1 G/10ML
1 SUSPENSION ORAL 4 TIMES DAILY
Qty: 420 ML | Refills: 1 | Status: SHIPPED | OUTPATIENT
Start: 2018-04-26 | End: 2019-04-11

## 2018-04-26 RX ADMIN — MIDODRINE HYDROCHLORIDE 5 MG: 5 TABLET ORAL at 11:38

## 2018-04-26 RX ADMIN — FLUDROCORTISONE ACETATE 0.1 MG: 0.1 TABLET ORAL at 08:27

## 2018-04-26 RX ADMIN — ONDANSETRON 4 MG: 2 INJECTION INTRAMUSCULAR; INTRAVENOUS at 04:18

## 2018-04-26 RX ADMIN — MIDODRINE HYDROCHLORIDE 5 MG: 5 TABLET ORAL at 16:49

## 2018-04-26 RX ADMIN — ONDANSETRON 4 MG: 2 INJECTION INTRAMUSCULAR; INTRAVENOUS at 11:38

## 2018-04-26 RX ADMIN — DEXTROSE AND SODIUM CHLORIDE 100 ML/HR: 5; .45 INJECTION, SOLUTION INTRAVENOUS at 04:17

## 2018-04-26 RX ADMIN — MIDODRINE HYDROCHLORIDE 5 MG: 5 TABLET ORAL at 06:04

## 2018-04-26 RX ADMIN — ACETAMINOPHEN 650 MG: 160 SUSPENSION ORAL at 04:20

## 2018-04-26 RX ADMIN — PANTOPRAZOLE SODIUM 40 MG: 40 INJECTION, POWDER, FOR SOLUTION INTRAVENOUS at 08:27

## 2018-04-26 NOTE — CASE MANAGEMENT
Initial Clinical Review    Admission: Date/Time/Statement:   OBS  ORDER    4/25  @    1546     Orders Placed This Encounter   Procedures    Place in Observation     Standing Status:   Standing     Number of Occurrences:   1     Order Specific Question:   Admitting Physician     Answer:   Sia Trevino     Order Specific Question:   Level of Care     Answer:   Med Surg [16]    Place in Observation (expected length of stay for this patient is less than two midnights)     Standing Status:   Standing     Number of Occurrences:   1     Order Specific Question:   Admitting Physician     Answer:   Saeid Siddiqui [[id number]]     Order Specific Question:   Level of Care     Answer:   Med Surg [16]         ED: Date/Time/Mode of Arrival:   ED Arrival Information     Expected Arrival Acuity Means of Arrival Escorted By Service Admission Type    - 4/25/2018 12:33 Urgent Walk-In Self Surgery-General Urgent    Arrival Complaint    Abdominal Pain          Chief Complaint:   Chief Complaint   Patient presents with    Abdominal Pain     Patient reports she woke up at 2AM with LUQ abdominal pain and nausea  Patient denies fevers  History of Illness:    Zaina Rubio is a 38 y o  female with complex past medical history who presents with abdominal pain and nausea  She previously had a gastric bypass, paraesophaheal hernia repair w/ Bio-A mesh, with Nissen takedown in November 2015  This was complicated postoperatively by stricture s/p dilation, TPN dependance and followed by GT placement in 2016  She currently does GT feeds at night that provide ~50% calories  She follows with [facility] Sergio Chavez at [facility] for a mitochondrial disorder  She awoke this morning with RUQ pain and nausea  She occassionaly gets pain like this but rarely this severe, from her feeds "backing up"  This usually improves by GT drainage  However, this time not much drained out and that pain worsened   Her BMs have been normal and regular with her daily bowel regiment  Her last bowel movement was this morning      CT was done in ED and negative for anything acute  ED Vital Signs:   ED Triage Vitals [04/25/18 1241]   Temperature Pulse Respirations Blood Pressure SpO2   97 9 °F (36 6 °C) 83 16 158/80 100 %      Temp Source Heart Rate Source Patient Position - Orthostatic VS BP Location FiO2 (%)   Oral Monitor Lying Left arm --      Pain Score       6        Wt Readings from Last 1 Encounters:   04/25/18 71 7 kg (158 lb)       Vital Signs (abnormal):    above    Abnormal Labs/Diagnostic Test Results:     K  3 4  Ct  abd:  No  Acute pathology    ED Treatment:   Medication Administration from 04/25/2018 1232 to 04/25/2018 1638       Date/Time Order Dose Route Action Action by Comments     04/25/2018 1329 ondansetron (ZOFRAN) injection 4 mg 4 mg Intravenous Given Chelsy Raymundo RN      04/25/2018 1342 morphine (PF) 4 mg/mL injection 4 mg 4 mg Intravenous Given Nica Blas RN      04/25/2018 1423 iohexol (OMNIPAQUE) 350 MG/ML injection (MULTI-DOSE) 100 mL 100 mL Intravenous Given Tala Medico      04/25/2018 1422 iohexol (OMNIPAQUE) 240 MG/ML solution 25 mL 25 mL Oral Given Tala Medico      04/25/2018 1622 pantoprazole (PROTONIX) injection 40 mg 40 mg Intravenous Given Nica Blas RN      04/25/2018 1622 ondansetron (ZOFRAN) injection 4 mg 4 mg Intravenous Not Given Nica Blas RN      04/25/2018 1630 multivitamin (INFUVITE ADULT) 10 mL in sodium chloride 0 9 % 1,000 mL infusion   Intravenous New Bag Nica Blas RN           Past Medical/Surgical History:    Active Ambulatory Problems     Diagnosis Date Noted    Dysphagia 04/19/2016    Postgastrectomy malabsorption 06/10/2016    S/P gastric bypass 06/10/2016    Iron deficiency 06/10/2016    Constipation 06/10/2016    Nausea 06/10/2016    Mitochondrial metabolism disorder ([facility] [address] ) 06/10/2016    GERD (gastroesophageal reflux disease) 06/10/2016    Edema 06/10/2016    Migraine headache 06/10/2016    History of Nissen fundoplication 92/60/5217    Headache 02/12/2018    Reactive hypoglycemia 03/06/2018    Hyperparathyroidism , secondary, non-renal ([facility] ) 03/06/2018    Hirsutism 03/06/2018    Orthostatic hypotension 04/10/2018     Resolved Ambulatory Problems     Diagnosis Date Noted    No Resolved Ambulatory Problems     Past Medical History:   Diagnosis Date    Acid reflux     Acid reflux     Constipated     Dysautonomia     Esophageal abnormality     [facility])     History of blood clots 2012    Iron deficiency     Malignant hyperthermia due to anesthesia     Migraines     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HCC)     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[place] [address] )     MTHFR (methylene THF reductase) deficiency and homocystinuria ([facility])     Muscle weakness (generalized)     Nausea     On total parenteral nutrition (TPN)     PCOS (polycystic ovarian syndrome)     PONV (postoperative nausea and vomiting)     Postgastrectomy syndrome     Status post PICC central line placement     Stroke ([place] [address] ) 2004, 2009    Vomiting        Admitting Diagnosis: Abdominal pain [R10 9]    Age/Sex: 38 y o  female    Assessment/Plan: Assessment:  38year old female with previous gastric bypass presenting with abdominal pain     Plan:  Admission  IVF  Bowel rest  Pain control    Admission Orders:   OBS  ORDER    4/25  @   1546  Scheduled Meds:   Current Facility-Administered Medications:  acetaminophen 650 mg Oral Q4H PRN Sarah Martell MD    dextrose 5 % and sodium chloride 0 45 % 50 mL/hr Intravenous Continuous Sarah Martell MD Last Rate: 100 mL/hr (04/26/18 1118)   fludrocortisone 0 1 mg Oral Daily Sarah Martell MD    insulin lispro 1-6 Units Subcutaneous Q6H [facility] Sarah Martell MD    meclizine 25 mg Oral Q8H PRN Sarah Martell MD    midodrine 5 mg Oral TID AC Sarah Martell MD    ondansetron 4 mg Intravenous Q6H PRN Sarah Martell MD    oxyCODONE 5 mg Oral Q4H PRN Sarah Martell MD    pantoprazole 40 mg Intravenous Q24H [address] Vincent Rhoades MD    topiramate 50 mg Oral HS Vincent Rhoades MD      Continuous Infusions:   dextrose 5 % and sodium chloride 0 45 % 50 mL/hr Last Rate: 100 mL/hr (04/26/18 0417)     PRN Meds:   acetaminophen    meclizine    ondansetron    oxyCODONE     NPO  IV  zofran PRN  (  X 1  4/25  And  X 1  4/26  Thus far)    Thank you,  [facility] in the [employer] by [employer] for 2017  Network Utilization Review Department  Phone: [phone]; Fax [phone]  ATTENTION: The Network Utilization Review Department is now centralized for our 7 Facilities  Make a note that we have a new phone and fax numbers for our Department  Please call with any questions or concerns to [phone] and carefully follow the prompts so that you are directed to the right person  All voicemails are confidential  Fax any determinations, approvals, denials, and requests for initial or continue stay review clinical to [phone]  Due to HIGH CALL volume, it would be easier if you could please send faxed requests to expedite your requests and in part, help us provide discharge notifications faster

## 2018-04-26 NOTE — PROGRESS NOTES
Patient is pleasant and ambulating frequently in halls  Tolerating well  States she feels ready to go home  Tolerating pedialyte  Will discharge when  arrives

## 2018-04-26 NOTE — PLAN OF CARE
DISCHARGE PLANNING     Discharge to home or other facility with appropriate resources Progressing        INFECTION - ADULT     Absence or prevention of progression during hospitalization Progressing     Absence of fever/infection during neutropenic period Progressing        Knowledge Deficit     Patient/family/caregiver demonstrates understanding of disease process, treatment plan, medications, and discharge instructions Progressing        Nutrition/Hydration-ADULT     Nutrient/Hydration intake appropriate for improving, restoring or maintaining nutritional needs Progressing        PAIN - ADULT     Verbalizes/displays adequate comfort level or baseline comfort level Progressing        Potential for Falls     Patient will remain free of falls Progressing        SAFETY ADULT     Maintain or return to baseline ADL function Progressing     Maintain or return mobility status to optimal level Progressing     Patient will remain free of falls Progressing

## 2018-04-26 NOTE — PROGRESS NOTES
Bariatric Surgery Progress Note    Subjective  No adverse events  Pain improved  Ongoing nausea  Objective  Vitals:    04/26/18 0718   BP: 137/77   Pulse: 70   Resp: 15   Temp: 97 9 °F (36 6 °C)   SpO2: 100%     NAD, alert  Normal inspiratory effort  Abdomen soft, non-distended, minimally tender  G tube site c/d/i    Labs  K 3 4  H/H 12 3/36 6  Otherwise unremarkable    Assessment  38year-old woman with pain and nausea status post Rossy-en-Y gastric bypass, also in the setting of mitochondrial disorder  Workup negative for mechanical obstruction or other surgical etiology of her symptoms  Presently feeling better      Plan  - initiate tube feeds today - Pedialyte  - symptomatic nausea treatment  - decrease IVF  - possible discharge later today if feeling better

## 2018-04-26 NOTE — PROGRESS NOTES
Spoke with Sin Juárez RD who has seen pt via outpatient bariatric services, and spoke with pt as well  Both relayed detailed information regarding eating and TF rate home  Pt will try to take oral intake as much as possible when at home, and will use Elecare [employer]-Paul via TF overnight to provide roughly ~50% of energy needs  When pt has a flare up where she cannot tolerate intake, she will run Pedialyte through her port to provide glucose and electrolytes  Once she is able to tolerate this well, she will begin to titrate the [employer] as tolerated  Per outpatient RD, pt was also taking CoEnzyme Q10, L-carnitine, and calcium supplements  Recommended Elecare Jean-Paul rate was 55ml/hr x24hr continuous with 60ml prosource added to provide 1440 kcal and 70g protein  240ml flush TID  Pt is ordered Pedialyte @ 25ml/hr  Currently no TF running as Pedialyte is not on TF formulary  This RD spoke with [facility] to procure Pedialyte from their PEDS unit   will bring Pedialyte to [facility]  If pt begins to tolerate Pedialyte, she can transition to [employer] as able  This will also need to be procured from B as this formula is also not on [facility] formulary  Nutrition services will continue to monitor pt, however if pt wishes to switch to [employer] prior to d/c, please contact nutrition services so the formula can be procured with haste  Thank you

## 2018-04-27 ENCOUNTER — TELEPHONE (OUTPATIENT)
Dept: BARIATRICS | Facility: CLINIC | Age: 43
End: 2018-04-27

## 2018-04-27 NOTE — TELEPHONE ENCOUNTER
Post op follow up phone call attempted  Pt stating that she restarted tube feedings last evening but developed increased pain in middle of night and stopped them  Currently tolerating pedialyte and will compensate with IV fluids if needed  Pt will also proceed to ED if any change in condition or increased pain

## 2018-04-27 NOTE — DISCHARGE SUMMARY
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   Gastrostomy tube in place [facility])     History of blood clots 2012    Left leg  Has IVC filter now  Occurred while on Lovenox    Iron deficiency     Malignant hyperthermia due to anesthesia     Patient's son has M H   States she needs precautions    Migraines     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[address] )     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[facility] )     MTHFR (methylene THF reductase) deficiency and homocystinuria (HCC)     Muscle weakness (generalized)     Nausea     On total parenteral nutrition (TPN)     for 8 mts    PCOS (polycystic ovarian syndrome)     PONV (postoperative nausea and vomiting)     Postgastrectomy syndrome     malabsorption    Status post PICC central line placement     Right upper arm  Receives TPN    Stroke [facility]) 4964, 0632    Metabolic strokes  Right hemiparesis= minor now   Vomiting     When eating       Discharge Diagnosis: Same    Procedures Performed:     Consults: None    Hospital Course: patient is a 82-year-old woman with chronic nausea, dysphagia, and esophageal dysmotility secondary to a mitochondrial disorder that developed following an elective Rossy-en-Y gastric bypass for refractory reflux  She has been successfully managed as an outpatient with gastrostomy tube supplemental feedings and follows up regularly  She presented to the emergency department on April 25, 2018 with unrelenting visceral left upper quadrant pain  Labs and vitals were normal   Contrast-enhanced CT scan was also negative for any at abnormality    She was admitted for bowel rest and rehydration  On hospital day 1, we initiated tube feeds which she tolerated well and she was subsequently discharged home  Disposition: The patient should follow up with Amado Turner MD as needed for medical management  Call physician for temperature over 101, wound redness or discharge, vomiting, or intolerance to diet  Discharge Medications:  See after visit summary for reconciled discharge medications provided to patient and family

## 2018-05-03 ENCOUNTER — TELEPHONE (OUTPATIENT)
Dept: NEUROLOGY | Facility: CLINIC | Age: 43
End: 2018-05-03

## 2018-05-03 DIAGNOSIS — R93.0 ABNORMAL MRA, HEAD: Primary | ICD-10-CM

## 2018-05-03 NOTE — PROGRESS NOTES
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 especially as MRI shows no stroke in that region  She tells me her orthostatic episodes usually consist of right sided paresis- transient  We discussed obtaining CTA H/N for further vascular evaluation  In regards to her migraines- states Topamax not helpful thus far but tells me on 50 mg at this time  She will try a few more weeks and if no helpful, Depakote is a consideration  Depakote can cause liver failure especially in certain mitochondrial diseases- which she does have - thus asked to check if this is okay with her [facility] specialist     States will do  Will have staff call and schedule CTA H/N tomorrow morning

## 2018-05-03 NOTE — TELEPHONE ENCOUNTER
pt called regarding mra results  she would like to speak to you regarding the results      [phone]

## 2018-05-04 ENCOUNTER — TELEPHONE (OUTPATIENT)
Dept: NEUROLOGY | Facility: CLINIC | Age: 43
End: 2018-05-04

## 2018-05-04 NOTE — TELEPHONE ENCOUNTER
Appt  For CTA head and neck with and without contrast scheduled on 5-8-18 at 10pm at [facility]  Order mailed to pt

## 2018-05-04 NOTE — TELEPHONE ENCOUNTER
----- Message from [address] [employer], DO sent at 5/3/2018  5:33 PM EDT -----  Regarding: schedule CTA  Hey! I already spoke with patient, please schedule CTA H/N  She had normal BUN/Cr 4/25      Thanks

## 2018-05-08 ENCOUNTER — HOSPITAL ENCOUNTER (OUTPATIENT)
Dept: CT IMAGING | Facility: HOSPITAL | Age: 43
Discharge: HOME | End: 2018-05-08
Attending: PSYCHIATRY & NEUROLOGY
Payer: COMMERCIAL

## 2018-05-08 DIAGNOSIS — R93.0 ABNORMAL MRA, HEAD: ICD-10-CM

## 2018-05-08 PROCEDURE — 70496 CT ANGIOGRAPHY HEAD: CPT

## 2018-05-08 PROCEDURE — 70498 CT ANGIOGRAPHY NECK: CPT

## 2018-05-08 RX ADMIN — IOHEXOL 85 ML: 350 INJECTION, SOLUTION INTRAVENOUS at 22:07

## 2018-05-15 ENCOUNTER — TELEPHONE (OUTPATIENT)
Dept: ENDOCRINOLOGY | Facility: CLINIC | Age: 43
End: 2018-05-15

## 2018-05-15 NOTE — TELEPHONE ENCOUNTER
Faxed completed Prior Auth form along with last office note dated 3/6/18  to [employer] for Januvia 100 mg   Faxed to: [phone]

## 2018-05-18 ENCOUNTER — TELEPHONE (OUTPATIENT)
Dept: ENDOCRINOLOGY | Facility: CLINIC | Age: 43
End: 2018-05-18

## 2018-05-18 NOTE — TELEPHONE ENCOUNTER
[address] to get status of prior auth that was faxed to [phone] on 5/15/18 for Januvia 100 mg   Was advised the fax was not received and to refax to [phone] (faxed)

## 2018-06-12 ENCOUNTER — TELEPHONE (OUTPATIENT)
Dept: ENDOCRINOLOGY | Facility: CLINIC | Age: 43
End: 2018-06-12

## 2018-06-12 NOTE — TELEPHONE ENCOUNTER
Completed Certificate of Medical Necessity for CGM   and faxed to [facility] @ [phone]

## 2018-06-25 ENCOUNTER — TELEPHONE (OUTPATIENT)
Dept: ENDOCRINOLOGY | Facility: CLINIC | Age: 43
End: 2018-06-25

## 2018-06-25 NOTE — TELEPHONE ENCOUNTER
Completed Certificate of Medical Necessity and faxed to [facility] [phone]

## 2018-06-28 ENCOUNTER — DOCUMENTATION (OUTPATIENT)
Dept: BARIATRICS | Facility: CLINIC | Age: 43
End: 2018-06-28

## 2018-06-29 ENCOUNTER — TELEPHONE (OUTPATIENT)
Dept: ENDOCRINOLOGY | Facility: CLINIC | Age: 43
End: 2018-06-29

## 2018-06-29 NOTE — TELEPHONE ENCOUNTER
Called [employer] and requested status of prior auth submitted May 18 for Januvia 100 mg  Was advised that prior auth was denied Januvia is non formulary   Dorys Hayes is covered   Do you want to prescribe Tradjenta?

## 2018-06-29 NOTE — TELEPHONE ENCOUNTER
Spoke to jose manuel and advised that paper work was emailed to [employer] at [facility] today as she advised that she did not receive fax I sent few days ago

## 2018-07-02 DIAGNOSIS — E16.1 REACTIVE HYPOGLYCEMIA: Primary | ICD-10-CM

## 2018-07-02 NOTE — TELEPHONE ENCOUNTER
Spoke to patient and advised Myna Sour is not covered by insurance     Dr Zion Caputo stated to try Tradjenta 5 mg

## 2018-07-13 ENCOUNTER — APPOINTMENT (OUTPATIENT)
Dept: LAB | Facility: CLINIC | Age: 43
End: 2018-07-13
Payer: COMMERCIAL

## 2018-07-13 ENCOUNTER — TRANSCRIBE ORDERS (OUTPATIENT)
Dept: LAB | Facility: CLINIC | Age: 43
End: 2018-07-13

## 2018-07-13 DIAGNOSIS — Z00.8 HEALTH EXAMINATION IN POPULATION SURVEY: Primary | ICD-10-CM

## 2018-07-13 DIAGNOSIS — Z00.8 HEALTH EXAMINATION IN POPULATION SURVEY: ICD-10-CM

## 2018-07-13 DIAGNOSIS — N31.9 NEUROMUSCULAR DYSFUNCTION OF BLADDER: ICD-10-CM

## 2018-07-13 LAB
ANION GAP SERPL CALCULATED.3IONS-SCNC: 3 MMOL/L (ref 4–13)
BUN SERPL-MCNC: 20 MG/DL (ref 5–25)
CALCIUM SERPL-MCNC: 9.5 MG/DL (ref 8.3–10.1)
CHLORIDE SERPL-SCNC: 103 MMOL/L (ref 100–108)
CHOLEST SERPL-MCNC: 146 MG/DL (ref 50–200)
CO2 SERPL-SCNC: 33 MMOL/L (ref 21–32)
CREAT SERPL-MCNC: 0.86 MG/DL (ref 0.6–1.3)
EST. AVERAGE GLUCOSE BLD GHB EST-MCNC: 100 MG/DL
GFR SERPL CREATININE-BSD FRML MDRD: 85 ML/MIN/1.73SQ M
GLUCOSE SERPL-MCNC: 86 MG/DL (ref 65–140)
HBA1C MFR BLD: 5.1 % (ref 4.2–6.3)
HDLC SERPL-MCNC: 77 MG/DL (ref 40–60)
LDLC SERPL CALC-MCNC: 57 MG/DL (ref 0–100)
NONHDLC SERPL-MCNC: 69 MG/DL
POTASSIUM SERPL-SCNC: 3.5 MMOL/L (ref 3.5–5.3)
SODIUM SERPL-SCNC: 139 MMOL/L (ref 136–145)
TRIGL SERPL-MCNC: 61 MG/DL

## 2018-07-13 PROCEDURE — 80048 BASIC METABOLIC PNL TOTAL CA: CPT

## 2018-07-13 PROCEDURE — 36415 COLL VENOUS BLD VENIPUNCTURE: CPT | Performed by: PREVENTIVE MEDICINE

## 2018-07-13 PROCEDURE — 83036 HEMOGLOBIN GLYCOSYLATED A1C: CPT | Performed by: PREVENTIVE MEDICINE

## 2018-07-13 PROCEDURE — 80061 LIPID PANEL: CPT

## 2018-07-23 ENCOUNTER — OFFICE VISIT (OUTPATIENT)
Dept: ENDOCRINOLOGY | Facility: CLINIC | Age: 43
End: 2018-07-23
Payer: COMMERCIAL

## 2018-07-23 ENCOUNTER — OFFICE VISIT (OUTPATIENT)
Dept: NEUROLOGY | Facility: CLINIC | Age: 43
End: 2018-07-23
Payer: COMMERCIAL

## 2018-07-23 VITALS
DIASTOLIC BLOOD PRESSURE: 84 MMHG | BODY MASS INDEX: 28.51 KG/M2 | HEIGHT: 64 IN | WEIGHT: 167 LBS | SYSTOLIC BLOOD PRESSURE: 128 MMHG | HEART RATE: 64 BPM

## 2018-07-23 VITALS
WEIGHT: 168 LBS | HEART RATE: 95 BPM | SYSTOLIC BLOOD PRESSURE: 138 MMHG | BODY MASS INDEX: 28.84 KG/M2 | DIASTOLIC BLOOD PRESSURE: 72 MMHG

## 2018-07-23 DIAGNOSIS — Z98.84 S/P GASTRIC BYPASS: ICD-10-CM

## 2018-07-23 DIAGNOSIS — G43.119 INTRACTABLE MIGRAINE WITH AURA WITHOUT STATUS MIGRAINOSUS: ICD-10-CM

## 2018-07-23 DIAGNOSIS — E88.40 MITOCHONDRIAL METABOLISM DISORDER (HCC): ICD-10-CM

## 2018-07-23 DIAGNOSIS — E61.1 IRON DEFICIENCY: ICD-10-CM

## 2018-07-23 DIAGNOSIS — E16.2 HYPOGLYCEMIA: ICD-10-CM

## 2018-07-23 DIAGNOSIS — E16.1 REACTIVE HYPOGLYCEMIA: Primary | ICD-10-CM

## 2018-07-23 DIAGNOSIS — L68.0 HIRSUTISM: ICD-10-CM

## 2018-07-23 DIAGNOSIS — E21.1 HYPERPARATHYROIDISM , SECONDARY, NON-RENAL (HCC): ICD-10-CM

## 2018-07-23 DIAGNOSIS — R51.0 HEADACHE DUE TO LOW CEREBROSPINAL FLUID PRESSURE: Primary | ICD-10-CM

## 2018-07-23 PROCEDURE — 99215 OFFICE O/P EST HI 40 MIN: CPT | Performed by: PSYCHIATRY & NEUROLOGY

## 2018-07-23 PROCEDURE — 99214 OFFICE O/P EST MOD 30 MIN: CPT | Performed by: INTERNAL MEDICINE

## 2018-07-23 RX ORDER — RIZATRIPTAN BENZOATE 5 MG/1
TABLET, ORALLY DISINTEGRATING ORAL
Qty: 12 TABLET | Refills: 1 | Status: SHIPPED | OUTPATIENT
Start: 2018-07-23 | End: 2019-01-28 | Stop reason: ALTCHOICE

## 2018-07-23 RX ORDER — CYPROHEPTADINE HYDROCHLORIDE 4 MG/1
TABLET ORAL
Qty: 60 TABLET | Refills: 3 | Status: SHIPPED | OUTPATIENT
Start: 2018-07-23 | End: 2018-10-01 | Stop reason: ALTCHOICE

## 2018-07-23 RX ORDER — ACARBOSE 100 MG/1
100 TABLET ORAL
Qty: 270 TABLET | Refills: 3 | Status: SHIPPED | OUTPATIENT
Start: 2018-07-23 | End: 2018-10-05 | Stop reason: SDUPTHER

## 2018-07-23 NOTE — PROGRESS NOTES
Assessment/Plan:    Hyperparathyroidism , secondary, non-renal ([facility] )  She has increased her calcium intake since her last visit  Check intact PTH, calcium and albumin  Reactive hypoglycemia  She adjusted her diet further which has improved hypoglycemia  She continues on acarbose and sitagliptin  Continue to monitor with continuous glucose monitor  She follows with hematology for iron deficiency  The have been discussing doing monthly iron infusion  She had a workup for hirsutism which was essentially normal  I suspect she has idiopathic/genetic related hirsutism  Diagnoses and all orders for this visit:    Reactive hypoglycemia    Iron deficiency    Hyperparathyroidism , secondary, non-renal ([facility] )  -     Albumin Lab Collect; Future  -     Calcium Lab Collect; Future  -     PTH, intact Lab Collect Lab Collect; Future    Hirsutism    Hypoglycemia  -     acarbose (PRECOSE) 100 MG tablet; Take 1 tablet (100 mg total) by mouth 3 (three) times a day with meals for 180 days          Subjective:      Patient ID: Bhavna Ramos is a 38 y o  female  29-year-old woman with reactive hypoglycemia after gastric bypass surgery  Her hypoglycemia has been under pretty good control with medication as well as lifestyle modification  Her continuous glucose monitor is broken and therefore on the unable to obtain any data  For the hyperparathyroidism, she has increase her calcium intake  She denies any numbness or tingling  She is tolerating oral iron supplementation for iron deficiency  She underwent a workup for hirsutism which did come back normal         The following portions of the patient's history were reviewed and updated as appropriate: allergies, current medications, past family history, past medical history, past social history, past surgical history and problem list     Review of Systems   Constitutional: Negative for chills and fever  Eyes: Negative for visual disturbance     Respiratory: Negative for shortness of breath  Cardiovascular: Negative for chest pain  Gastrointestinal: Negative for constipation, diarrhea, nausea and vomiting  Neurological: Negative for light-headedness and numbness  All other systems reviewed and are negative  Objective:      /72   Pulse 95   Wt 76 2 kg (168 lb)   BMI 28 84 kg/m²          Physical Exam   Constitutional: She is oriented to person, place, and time  She appears well-developed and well-nourished  No distress  HENT:   Head: Normocephalic and atraumatic  Mouth/Throat: Oropharynx is clear and moist and mucous membranes are normal  No oropharyngeal exudate  Eyes: Conjunctivae, EOM and lids are normal  Right eye exhibits no discharge  Left eye exhibits no discharge  No scleral icterus  Neck: Neck supple  No thyromegaly present  Cardiovascular: Normal rate, regular rhythm and normal heart sounds  Exam reveals no gallop and no friction rub  No murmur heard  Pulmonary/Chest: Effort normal and breath sounds normal  No respiratory distress  She has no wheezes  Abdominal: Soft  Bowel sounds are normal  She exhibits no distension  There is no tenderness  Musculoskeletal: Normal range of motion  She exhibits no edema, tenderness or deformity  Lymphadenopathy:        Head (right side): No occipital adenopathy present  Head (left side): No occipital adenopathy present  Right: No supraclavicular adenopathy present  Left: No supraclavicular adenopathy present  Neurological: She is alert and oriented to person, place, and time  No cranial nerve deficit  Skin: Skin is warm and intact  No rash noted  She is not diaphoretic  No erythema  Psychiatric: She has a normal mood and affect  Her behavior is normal    Vitals reviewed

## 2018-07-23 NOTE — ASSESSMENT & PLAN NOTE
She adjusted her diet further which has improved hypoglycemia  She continues on acarbose and sitagliptin  Continue to monitor with continuous glucose monitor

## 2018-07-23 NOTE — PROGRESS NOTES
Patient ID: Derrick Cano is a 38 y o  female  Assessment/Plan:    No problem-specific Assessment & Plan notes found for this encounter  Diagnoses and all orders for this visit:    Headache due to low cerebrospinal fluid pressure- concern for this as significantly worse migraine, headache with standing, sitting but does have significant orthostasis from her mitochrondrial disease requiring weekly IVF from port and does have PEG tube as well  MRI Brain did not show any structural change  MRA/MRV/CTA with no acute changes as well  -     Preventative: cyproheptadine (PERIACTIN) 4 mg tablet; Take 1 tab nightly x 1 week and increase to 1 tab BID as tolerated  - Abortive: Maxalt  - Other options include Zonegran, verapamil low dose (due to significant orthostatic hypotension)  - C/w B2, CoQ10  We discussed Botox as a potential option and states will consider this pending how she does with above medication  Discussed potential med a/e  Discussed tapering off Topamax d/e minimal benefit and cognitive dulling  Intractable migraine without aura    Other orders  -     COENZYME Q-10 PO;        Subjective:    HPI     Ms Elli Wahl is a pleasant 39 yo female with mitochondrial disease and is on IVF with port 3x/ weekly and recently daily weekly when she was in [facility]  Denies any passing out events  Still has bouts of hypoglycemia and is off TPN, does have PEG tube being managed by Dr Landaverde and Dr Winkler due to esophageal dysmotility- is on altered diet based on the day  States now headaches 5 days a week and bad headaches 3 days a week with severe photophobia, phonophobia, nausea and one episode of retching and emesis    Has not tried Triptans recently- states tried Imitrex in her 25s and doesn't remember how she did with that  States Topamax mildly helpful, but already noting some cognitive deficits  States she checked with [facility] genetics and they were okay with anything other than Depakote  Tells me if she ever has a metabolic stroke the go to person is Dr Margarita Mata as they have regimen for acute metabolic stroke with her mitochondrial encephalopathy  Other medications tried and failed: gabapentin- trouble talking and forming sentences  Topamax minimally helpful but is starting to note cognitive deficits at 50 mg dosing  States daughter and son with severe mitochondrial disease  Denies any syncopal events  The following portions of the patient's history were reviewed and updated as appropriate: allergies, current medications, past family history, past medical history, past social history, past surgical history and problem list          Objective:    Blood pressure 128/84, pulse 64, height 5' 4" (1 626 m), weight 75 8 kg (167 lb), not currently breastfeeding  Physical Exam   Constitutional: She is oriented to person, place, and time  She appears well-developed and well-nourished  HENT:   Head: Normocephalic and atraumatic  Eyes: EOM are normal  Pupils are equal, round, and reactive to light  Neck: Normal range of motion  Cardiovascular: Normal rate and regular rhythm  Pulmonary/Chest: Effort normal    Abdominal: Soft  Neurological: She is alert and oriented to person, place, and time  She has normal strength and normal reflexes  Gait and coordination normal    Nursing note and vitals reviewed  Neurological Exam    Mental Status  The patient is alert and oriented to person, place, time, and situation  Her recent and remote memory are normal  She has no dysarthria  She is able to name object, read and repeat  She has normal attention span and concentration  She follows multi-step commands  She has a normal fund of knowledge      Cranial Nerves    CN II: The patient's visual acuity and visual fields are normal   CN III, IV, VI: The patient's pupils are equally round and reactive to light and ocular movements are normal   CN V: The patient has normal facial sensation  CN VII:  The patient has symmetric facial movement  CN VIII:  The patient's hearing is normal   CN IX, X: The patient has symmetric palate movement and normal gag reflex  CN XI: The patient's shoulder shrug strength is normal   CN XII: The patient's tongue is midline without atrophy or fasciculations  Motor  The patient has normal muscle bulk throughout  Her overall muscle tone is normal throughout  Her strength is 5/5 throughout all four extremities  Sensory  The patient's sensation is normal in all four extremities to light touch, temperature and vibration  She has no right-sided and no left-sided hemispatial neglect  Reflexes  Deep tendon reflexes are 2+ and symmetric in all four extremities with downgoing toes bilaterally  Gait and Coordination  The patient has normal gait and station  Romberg's sign is negative  She has normal coordination bilaterally  ROS:    Review of Systems   Constitutional: Negative  Negative for appetite change and fever  HENT: Negative  Negative for hearing loss, tinnitus, trouble swallowing and voice change  Eyes: Negative  Negative for photophobia and pain  Respiratory: Negative  Negative for shortness of breath  Cardiovascular: Negative  Negative for palpitations  Gastrointestinal: Negative  Negative for nausea and vomiting  Endocrine: Negative  Negative for cold intolerance and heat intolerance  Genitourinary: Negative  Negative for dysuria, frequency and urgency  Musculoskeletal: Negative  Negative for myalgias and neck pain  Skin: Negative  Negative for rash  Neurological: Positive for dizziness and headaches (Light sensitivity)  Negative for tremors, seizures, syncope, facial asymmetry, speech difficulty, weakness, light-headedness and numbness  Hematological: Negative  Does not bruise/bleed easily  Psychiatric/Behavioral: Positive for sleep disturbance  Negative for confusion and hallucinations

## 2018-07-26 ENCOUNTER — LAB (OUTPATIENT)
Dept: LAB | Facility: CLINIC | Age: 43
End: 2018-07-26
Payer: COMMERCIAL

## 2018-07-26 DIAGNOSIS — E21.1 HYPERPARATHYROIDISM , SECONDARY, NON-RENAL (HCC): ICD-10-CM

## 2018-07-26 DIAGNOSIS — D50.9 IRON DEFICIENCY ANEMIA: ICD-10-CM

## 2018-07-26 LAB
ALBUMIN SERPL BCP-MCNC: 3.6 G/DL (ref 3.5–5)
ALP SERPL-CCNC: 73 U/L (ref 46–116)
ALT SERPL W P-5'-P-CCNC: 24 U/L (ref 12–78)
ANION GAP SERPL CALCULATED.3IONS-SCNC: 5 MMOL/L (ref 4–13)
AST SERPL W P-5'-P-CCNC: 14 U/L (ref 5–45)
BASOPHILS # BLD AUTO: 0.02 THOUSANDS/ΜL (ref 0–0.1)
BASOPHILS NFR BLD AUTO: 1 % (ref 0–1)
BILIRUB SERPL-MCNC: 0.5 MG/DL (ref 0.2–1)
BUN SERPL-MCNC: 13 MG/DL (ref 5–25)
CALCIUM SERPL-MCNC: 9.1 MG/DL (ref 8.3–10.1)
CHLORIDE SERPL-SCNC: 106 MMOL/L (ref 100–108)
CO2 SERPL-SCNC: 30 MMOL/L (ref 21–32)
CREAT SERPL-MCNC: 0.85 MG/DL (ref 0.6–1.3)
EOSINOPHIL # BLD AUTO: 0.07 THOUSAND/ΜL (ref 0–0.61)
EOSINOPHIL NFR BLD AUTO: 2 % (ref 0–6)
ERYTHROCYTE [DISTWIDTH] IN BLOOD BY AUTOMATED COUNT: 12.3 % (ref 11.6–15.1)
FERRITIN SERPL-MCNC: 4 NG/ML (ref 8–388)
GFR SERPL CREATININE-BSD FRML MDRD: 86 ML/MIN/1.73SQ M
GLUCOSE SERPL-MCNC: 111 MG/DL (ref 65–140)
HCT VFR BLD AUTO: 37.9 % (ref 34.8–46.1)
HGB BLD-MCNC: 12.3 G/DL (ref 11.5–15.4)
IRON SATN MFR SERPL: 18 %
IRON SERPL-MCNC: 70 UG/DL (ref 50–170)
LYMPHOCYTES # BLD AUTO: 0.99 THOUSANDS/ΜL (ref 0.6–4.47)
LYMPHOCYTES NFR BLD AUTO: 27 % (ref 14–44)
MCH RBC QN AUTO: 28.7 PG (ref 26.8–34.3)
MCHC RBC AUTO-ENTMCNC: 32.5 G/DL (ref 31.4–37.4)
MCV RBC AUTO: 88 FL (ref 82–98)
MONOCYTES # BLD AUTO: 0.18 THOUSAND/ΜL (ref 0.17–1.22)
MONOCYTES NFR BLD AUTO: 5 % (ref 4–12)
NEUTROPHILS # BLD AUTO: 2.38 THOUSANDS/ΜL (ref 1.85–7.62)
NEUTS SEG NFR BLD AUTO: 66 % (ref 43–75)
PLATELET # BLD AUTO: 201 THOUSANDS/UL (ref 149–390)
PMV BLD AUTO: 10.1 FL (ref 8.9–12.7)
POTASSIUM SERPL-SCNC: 3.7 MMOL/L (ref 3.5–5.3)
PROT SERPL-MCNC: 7.3 G/DL (ref 6.4–8.2)
PTH-INTACT SERPL-MCNC: 48.9 PG/ML (ref 18.4–80.1)
RBC # BLD AUTO: 4.29 MILLION/UL (ref 3.81–5.12)
SODIUM SERPL-SCNC: 141 MMOL/L (ref 136–145)
TIBC SERPL-MCNC: 387 UG/DL (ref 250–450)
WBC # BLD AUTO: 3.64 THOUSAND/UL (ref 4.31–10.16)

## 2018-07-26 PROCEDURE — 83970 ASSAY OF PARATHORMONE: CPT

## 2018-07-26 PROCEDURE — 83918 ORGANIC ACIDS TOTAL QUANT: CPT

## 2018-07-26 PROCEDURE — 82728 ASSAY OF FERRITIN: CPT

## 2018-07-26 PROCEDURE — 83550 IRON BINDING TEST: CPT

## 2018-07-26 PROCEDURE — 83540 ASSAY OF IRON: CPT

## 2018-07-26 PROCEDURE — 36415 COLL VENOUS BLD VENIPUNCTURE: CPT

## 2018-07-26 PROCEDURE — 85025 COMPLETE CBC W/AUTO DIFF WBC: CPT

## 2018-07-26 PROCEDURE — 80053 COMPREHEN METABOLIC PANEL: CPT

## 2018-07-27 ENCOUNTER — TELEPHONE (OUTPATIENT)
Dept: ENDOCRINOLOGY | Facility: CLINIC | Age: 43
End: 2018-07-27

## 2018-07-29 LAB
METHYLMALONATE SERPL-SCNC: 119 NMOL/L (ref 0–378)
SL AMB DISCLAIMER: NORMAL

## 2018-07-30 DIAGNOSIS — N31.9 NEUROMUSCULAR DYSFUNCTION OF BLADDER: ICD-10-CM

## 2018-08-02 ENCOUNTER — OFFICE VISIT (OUTPATIENT)
Dept: HEMATOLOGY ONCOLOGY | Facility: CLINIC | Age: 43
End: 2018-08-02
Payer: COMMERCIAL

## 2018-08-02 VITALS
DIASTOLIC BLOOD PRESSURE: 72 MMHG | HEIGHT: 65 IN | OXYGEN SATURATION: 100 % | TEMPERATURE: 98.4 F | HEART RATE: 74 BPM | WEIGHT: 166 LBS | BODY MASS INDEX: 27.66 KG/M2 | SYSTOLIC BLOOD PRESSURE: 104 MMHG | RESPIRATION RATE: 16 BRPM

## 2018-08-02 DIAGNOSIS — D50.8 OTHER IRON DEFICIENCY ANEMIA: Primary | ICD-10-CM

## 2018-08-02 PROCEDURE — 99214 OFFICE O/P EST MOD 30 MIN: CPT | Performed by: INTERNAL MEDICINE

## 2018-08-02 NOTE — PROGRESS NOTES
Hematology/Oncology Outpatient Follow- up Note  Alfonzo Howard 41 y o  female MRN: @ Encounter: [id number]        Date:  8/2/2018    Presenting Complaint/Diagnosis : Iron deficiency anemia in the setting of a gastric bypass and motility issues  The patient is TPN dependent       Previous Hematologic/ Oncologic History:      Venofer for 8 doses     Current Hematologic/ Oncologic Treatment:      Observation    Interval History:      Patient returns for a follow-up visit  Her ferritin is down to 4  Her white count is slightly low but her hemoglobin is within normal limits  At this point because of her fatigue I think it is reasonable to give her 8 more doses of Venofer  She does agree with this and does feel very tired  Denies any nausea denies any vomiting  Otherwise is at baseline apart from the fatigue  The rest of her 14 point review of systems today was negative  Test Results:    Imaging: No results found  Labs:   Lab Results   Component Value Date    WBC 3 64 (L) 07/26/2018    HGB 12 3 07/26/2018    HCT 37 9 07/26/2018    MCV 88 07/26/2018     07/26/2018     Lab Results   Component Value Date     07/26/2018    K 3 7 07/26/2018     07/26/2018    CO2 30 07/26/2018    ANIONGAP 5 07/26/2018    BUN 13 07/26/2018    CREATININE 0 85 07/26/2018    GLUCOSE 111 07/26/2018    GLUF 93 12/05/2017    CALCIUM 9 1 07/26/2018    AST 14 07/26/2018    ALT 24 07/26/2018    ALKPHOS 73 07/26/2018    PROT 7 3 07/26/2018    BILITOT 0 50 07/26/2018    EGFR 86 07/26/2018       Lab Results   Component Value Date    IRON 70 07/26/2018    TIBC 387 07/26/2018    FERRITIN 4 (L) 07/26/2018       Lab Results   Component Value Date    EVYWIQGB23 660 12/05/2017         ROS: As stated in the history of present illness otherwise his 14 point review of systems today was negative        Active Problems:   Patient Active Problem List   Diagnosis    Dysphagia    Postgastrectomy malabsorption    S/P gastric bypass    Iron deficiency    Constipation    Nausea    Mitochondrial metabolism disorder (HCC)    GERD (gastroesophageal reflux disease)    Edema    Migraine headache    History of Nissen fundoplication    Headache    Reactive hypoglycemia    Hyperparathyroidism , secondary, non-renal (HCC)    Hirsutism    Orthostatic hypotension       Past Medical History:   Past Medical History:   Diagnosis Date    Acid reflux     Acid reflux     Constipated     Dysautonomia     Esophageal abnormality     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   Gastrostomy tube in place [facility])     History of blood clots 2012    Left leg  Has IVC filter now  Occurred while on Lovenox    Iron deficiency     Malignant hyperthermia due to anesthesia     Patient's son has M H   States she needs precautions    Migraines     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[facility] [address] )     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     MTHFR (methylene THF reductase) deficiency and homocystinuria (HCC)     Muscle weakness (generalized)     Nausea     On total parenteral nutrition (TPN)     for 8 mts    PCOS (polycystic ovarian syndrome)     PONV (postoperative nausea and vomiting)     Postgastrectomy syndrome     malabsorption    Status post PICC central line placement     Right upper arm  Receives TPN    Stroke [facility]) [id number], [phone]    Metabolic strokes  Right hemiparesis= minor now   Vomiting     When eating       Surgical History:   Past Surgical History:   Procedure Laterality Date    ANKLE SURGERY Left     Has plate and screws    COLONOSCOPY      COSMETIC SURGERY      X14 as child post attack by dog  Arms, legs and face    DILATION AND CURETTAGE OF UTERUS      x2    ESOPHAGOGASTRODUODENOSCOPY N/A 1/27/2016    Procedure: ESOPHAGOGASTRODUODENOSCOPY (EGD); Surgeon: Zoran Arambula MD;  Location: [facility];   Service:     FRACTURE SURGERY Left     Left ankle - hardware    GASTRIC BYPASS      IVC FILTER INSERTION      NISSEN FUNDOPLICATION      OVARY SURGERY Bilateral     "Drilling" due to multiple cysts- PCOS    AL EGD TRANSORAL BIOPSY SINGLE/MULTIPLE N/A 3/9/2016    Procedure: ESOPHAGOGASTRODUODENOSCOPY (EGD); Surgeon: Chalmer Apley, MD;  Location: [facility]; Service: Bariatrics    AL LAP,GASTROSTOMY,W/O TUBE CONSTR N/A 4/19/2016    Procedure: INSERTION GASTROSTOMY TUBE LAPAROSCOPIC WITH INTRAOP EGD;  Surgeon: Chalmer Apley, MD;  Location: [facility];  Service: Bariatrics    ULNAR NERVE TRANSPOSITION Right     WISDOM TOOTH EXTRACTION         Family History:    Family History   Problem Relation Age of Onset    Mitochondrial disorder Mother     Cancer Mother     Hypertension Mother     Thyroid cancer Mother     Depression Father     Endocrinopathy Father     Depression Brother     Narcolepsy Brother     Breast cancer Maternal Grandmother     Mitochondrial disorder Son     Mitochondrial disorder Daughter     Stroke Family        Cancer-related family history includes Breast cancer in her maternal grandmother; Cancer in her mother; Thyroid cancer in her mother  Social History:   Social History     Social History    Marital status: /Civil Union     Spouse name: N/A    Number of children: N/A    Years of education: N/A     Occupational History    Not on file  Social History Main Topics    Smoking status: Never Smoker    Smokeless tobacco: Never Used    Alcohol use No    Drug use: No    Sexual activity: Not on file     Other Topics Concern    Not on file     Social History Narrative    No narrative on file       Current Medications:   Current Outpatient Prescriptions   Medication Sig Dispense Refill    acarbose (PRECOSE) 100 MG tablet Take 1 tablet (100 mg total) by mouth 3 (three) times a day with meals for 180 days 270 tablet 3    B Complex-Biotin-FA (TH VITAMIN B 50/B-COMPLEX PO) Take 1 tablet by mouth daily          Calcium Carbonate Antacid 1250 mg/5 mL Take by mouth      COENZYME Q-10 PO       cyproheptadine (PERIACTIN) 4 mg tablet Take 1 tab nightly x 1 week and increase to 1 tab BID as tolerated 60 tablet 3    docusate sodium (COLACE) 100 mg capsule Take 200 mg by mouth 2 (two) times a day   fludrocortisone (FLORINEF) 0 1 mg tablet Take 0 1 mg by mouth daily      ketorolac (TORADOL) 10 mg tablet 1-2 tabs at onset of migraine, can repeat X1 in 6 hours, can combine with triptan  Take with food/milk/antacid  30 tablet 0    L-Arginine 1000 MG TABS Take 1,000 mg by mouth 2 (two) times a day      Lactobacillus-Inulin ([address]) CAPS Take by mouth      levOCARNitine (CARNITOR) 1 g/10 mL solution Take 100 mg by mouth 4 (four) times a day (before meals and at bedtime)      Linagliptin 5 MG TABS Take 5 mg by mouth daily 30 mg 3    meclizine (ANTIVERT) 25 mg tablet Take 1 tablet (25 mg total) by mouth every 8 (eight) hours as needed for dizziness 15 tablet 0    midodrine (PROAMATINE) 5 mg tablet       Multiple Vitamin (MULTIVITAMIN) tablet Take 1 tablet by mouth daily   pantoprazole (PROTONIX) 40 mg tablet Take 40 mg by mouth 2 (two) times a day   polyethylene glycol (MIRALAX) 17 g packet Take 17 g by mouth 2 (two) times a day Indications: 1 5 caps full 1-2 times/day   rizatriptan (MAXALT-MLT) 5 MG disintegrating tablet Take 1-2 tabs at migraine onset, can repeat once in 2 hours  Max 4 tabs in 24 hours  12 tablet 1    sucralfate (CARAFATE) 1 g/10 mL suspension Take 10 mL (1 g total) by mouth 4 (four) times a day 420 mL 1     No current facility-administered medications for this visit  Allergies: Allergies   Allergen Reactions    Sulfa Antibiotics Other (See Comments)     Arrhythmia     Other Other (See Comments)     Other reaction(s): Other  Cardiac arrhythmias  Cardiac arrhythmias    Guaifenesin Other (See Comments)     Arrhythmia       Physical Exam:    There is no height or weight on file to calculate BSA      Wt Readings from Last 3 Encounters:   07/23/18 75 8 kg (167 lb)   07/23/18 76 2 kg (168 lb)   04/26/18 71 7 kg (158 lb 1 1 oz)        Temp Readings from Last 3 Encounters:   04/26/18 98 °F (36 7 °C) (Temporal)   03/03/18 98 2 °F (36 8 °C) (Oral)   02/01/18 (!) 97 °F (36 1 °C) (Tympanic)        BP Readings from Last 3 Encounters:   07/23/18 128/84   07/23/18 138/72   04/26/18 144/78         Pulse Readings from Last 3 Encounters:   07/23/18 64   07/23/18 95   04/26/18 69         Physical Exam     Constitutional   General appearance: No acute distress, well appearing and well nourished  Eyes   Conjunctiva and lids: No swelling, erythema or discharge  Pupils and irises: Equal, round and reactive to light  Ears, Nose, Mouth, and Throat   External inspection of ears and nose: Normal     Nasal mucosa, septum, and turbinates: Normal without edema or erythema  Oropharynx: Normal with no erythema, edema, exudate or lesions  Pulmonary   Respiratory effort: No increased work of breathing or signs of respiratory distress  Auscultation of lungs: Clear to auscultation  Cardiovascular   Palpation of heart: Normal PMI, no thrills  Auscultation of heart: Normal rate and rhythm, normal S1 and S2, without murmurs  Examination of extremities for edema and/or varicosities: Normal     Carotid pulses: Normal     Abdomen   Abdomen: Non-tender, no masses  Liver and spleen: No hepatomegaly or splenomegaly  Lymphatic   Palpation of lymph nodes in neck: No lymphadenopathy  Musculoskeletal   Gait and station: Normal     Digits and nails: Normal without clubbing or cyanosis  Inspection/palpation of joints, bones, and muscles: Normal     Skin   Skin and subcutaneous tissue: Normal without rashes or lesions  Neurologic   Cranial nerves: Cranial nerves 2-12 intact  Sensation: No sensory loss      Psychiatric   Orientation to person, place, and time: Normal     Mood and affect: Normal         Assessment / Plan:    The patient is a very pleasant 53-year-old female with a past medical history mitochondrial disease status post Nissen fundoplication for motility issues and then a gastric bypass  She was mildly anemic but her iron studies revealed iron deficiency  We gave her Venofer 200 mg twice a week for a total of 8 doses  Numbers all corrected  She is fatigued again and this is worse than previously  At this point we will proceed with 8 more doses of Venofer  She is agreeable to this  She will get this twice a week  She has any questions she will call our office  See her back in 6 months with repeat blood work  If she has any questions or recurrent issues she will call us  Goals and Barriers:  Current Goal:  Prolong Survival from Iron deficiency anemia  Barriers: None  Patient's Capacity to Self Care:  Patient  able to self care  Portions of the record may have been created with voice recognition software   Occasional wrong word or "sound a like" substitutions may have occurred due to the inherent limitations of voice recognition software   Read the chart carefully and recognize, using context, where substitutions have occurred

## 2018-08-06 RX ORDER — SODIUM CHLORIDE 9 MG/ML
20 INJECTION, SOLUTION INTRAVENOUS CONTINUOUS
Status: DISCONTINUED | OUTPATIENT
Start: 2018-08-07 | End: 2018-08-10 | Stop reason: HOSPADM

## 2018-08-07 ENCOUNTER — TELEPHONE (OUTPATIENT)
Dept: BARIATRICS | Facility: CLINIC | Age: 43
End: 2018-08-07

## 2018-08-07 ENCOUNTER — HOSPITAL ENCOUNTER (OUTPATIENT)
Dept: INFUSION CENTER | Facility: CLINIC | Age: 43
Discharge: HOME | End: 2018-08-07
Payer: COMMERCIAL

## 2018-08-07 VITALS
DIASTOLIC BLOOD PRESSURE: 75 MMHG | TEMPERATURE: 98 F | SYSTOLIC BLOOD PRESSURE: 152 MMHG | RESPIRATION RATE: 18 BRPM | HEART RATE: 72 BPM

## 2018-08-07 PROCEDURE — 96365 THER/PROPH/DIAG IV INF INIT: CPT

## 2018-08-07 RX ADMIN — SODIUM CHLORIDE 20 ML/HR: 0.9 INJECTION, SOLUTION INTRAVENOUS at 13:23

## 2018-08-07 RX ADMIN — IRON SUCROSE 200 MG: 20 INJECTION, SOLUTION INTRAVENOUS at 13:23

## 2018-08-07 RX ADMIN — Medication 300 UNITS: at 14:28

## 2018-08-07 NOTE — PLAN OF CARE
Problem: SAFETY ADULT  Goal: Patient will remain free of falls  INTERVENTIONS:  - Assess patient frequently for physical needs  -  Identify cognitive and physical deficits and behaviors that affect risk of falls    -  [place] fall precautions as indicated by assessment   - Educate patient/family on patient safety including physical limitations  - Instruct patient to call for assistance with activity based on assessment  - Modify environment to reduce risk of injury  - Consider OT/PT consult to assist with strengthening/mobility  Outcome: Progressing

## 2018-08-07 NOTE — PROGRESS NOTES
LATE NOTE:  Pt to clinic for first of 8 bi-weekly venofer infusions  She offers no complaints  Venofer administered without incident

## 2018-08-08 ENCOUNTER — TRANSCRIBE ORDERS (OUTPATIENT)
Dept: BARIATRICS | Facility: CLINIC | Age: 43
End: 2018-08-08

## 2018-08-08 ENCOUNTER — DOCUMENTATION (OUTPATIENT)
Dept: BARIATRICS | Facility: CLINIC | Age: 43
End: 2018-08-08

## 2018-08-08 DIAGNOSIS — E21.1 HYPERPARATHYROIDISM , SECONDARY, NON-RENAL (HCC): ICD-10-CM

## 2018-08-08 DIAGNOSIS — K91.2 POSTGASTRECTOMY MALABSORPTION: ICD-10-CM

## 2018-08-08 DIAGNOSIS — E88.40 MITOCHONDRIAL METABOLISM DISORDER (HCC): ICD-10-CM

## 2018-08-08 DIAGNOSIS — E16.1 REACTIVE HYPOGLYCEMIA: ICD-10-CM

## 2018-08-08 DIAGNOSIS — I95.1 ORTHOSTATIC HYPOTENSION: ICD-10-CM

## 2018-08-08 DIAGNOSIS — E61.1 IRON DEFICIENCY: ICD-10-CM

## 2018-08-08 DIAGNOSIS — Z90.3 POSTGASTRECTOMY MALABSORPTION: ICD-10-CM

## 2018-08-08 DIAGNOSIS — K21.9 GASTROESOPHAGEAL REFLUX DISEASE, ESOPHAGITIS PRESENCE NOT SPECIFIED: ICD-10-CM

## 2018-08-08 DIAGNOSIS — R13.10 DYSPHAGIA, UNSPECIFIED TYPE: Primary | ICD-10-CM

## 2018-08-08 DIAGNOSIS — Z98.84 S/P GASTRIC BYPASS: ICD-10-CM

## 2018-08-08 PROBLEM — Z15.89 MTHFR MUTATION (METHYLENETETRAHYDROFOLATE REDUCTASE): Status: ACTIVE | Noted: 2018-08-08

## 2018-08-08 NOTE — PROGRESS NOTES
Received fax to renew Mrs Eulalio Santos enteral orders  Faxed orders and scanned into chart    600 ml nightly elecare jr at 75 ml/hr  Prosource No Carb 30 ml QID ( flush with 30 ml before and after) with 3 Refills  Enteral pump infinity 1200 ml bags, one nightly  Mix-key-g-tube 20 F 3 5 cm an d12"right angle extension - one extension per week     Patient to continue 3 L saline via port weekly  Patient to continue 120 ml/hr Pedialyte via feeding tube over 10 hours    Patient to continue po liquids and solids as tolerated  She will continue liquid calcium and calcium/mineral patches for treatment of her secondary hyperparathyroidism

## 2018-08-09 RX ORDER — SODIUM CHLORIDE 9 MG/ML
20 INJECTION, SOLUTION INTRAVENOUS CONTINUOUS
Status: DISCONTINUED | OUTPATIENT
Start: 2018-08-11 | End: 2018-08-14 | Stop reason: HOSPADM

## 2018-08-11 ENCOUNTER — HOSPITAL ENCOUNTER (OUTPATIENT)
Dept: INFUSION CENTER | Facility: CLINIC | Age: 43
Discharge: HOME | End: 2018-08-11
Payer: COMMERCIAL

## 2018-08-11 VITALS
DIASTOLIC BLOOD PRESSURE: 87 MMHG | SYSTOLIC BLOOD PRESSURE: 155 MMHG | HEART RATE: 83 BPM | RESPIRATION RATE: 20 BRPM | TEMPERATURE: 97.8 F

## 2018-08-11 PROCEDURE — 96365 THER/PROPH/DIAG IV INF INIT: CPT

## 2018-08-11 RX ADMIN — IRON SUCROSE 200 MG: 20 INJECTION, SOLUTION INTRAVENOUS at 11:20

## 2018-08-11 RX ADMIN — SODIUM CHLORIDE 20 ML/HR: 0.9 INJECTION, SOLUTION INTRAVENOUS at 11:12

## 2018-08-11 RX ADMIN — HEPARIN 300 UNITS: 100 SYRINGE at 12:35

## 2018-08-11 NOTE — PLAN OF CARE
Problem: Potential for Falls  Goal: Patient will remain free of falls  INTERVENTIONS:  - Assess patient frequently for physical needs  -  Identify cognitive and physical deficits and behaviors that affect risk of falls  -  [place] fall precautions as indicated by assessment   - Educate patient/family on patient safety including physical limitations  - Instruct patient to call for assistance with activity based on assessment  - Modify environment to reduce risk of injury  - Consider OT/PT consult to assist with strengthening/mobility   Outcome: Progressing      Problem: SAFETY ADULT  Goal: Patient will remain free of falls  INTERVENTIONS:  - Assess patient frequently for physical needs  -  Identify cognitive and physical deficits and behaviors that affect risk of falls  -  [place] fall precautions as indicated by assessment   - Educate patient/family on patient safety including physical limitations  - Instruct patient to call for assistance with activity based on assessment  - Modify environment to reduce risk of injury  - Consider OT/PT consult to assist with strengthening/mobility   Outcome: Progressing      Problem: Knowledge Deficit  Goal: Patient/family/caregiver demonstrates understanding of disease process, treatment plan, medications, and discharge instructions  Complete learning assessment and assess knowledge base    Interventions:  - Provide teaching at level of understanding  - Provide teaching via preferred learning methods  Outcome: Progressing

## 2018-08-15 RX ORDER — SODIUM CHLORIDE 9 MG/ML
20 INJECTION, SOLUTION INTRAVENOUS CONTINUOUS
Status: DISCONTINUED | OUTPATIENT
Start: 2018-08-16 | End: 2018-08-19 | Stop reason: HOSPADM

## 2018-08-16 ENCOUNTER — HOSPITAL ENCOUNTER (OUTPATIENT)
Dept: INFUSION CENTER | Facility: CLINIC | Age: 43
Discharge: HOME | End: 2018-08-16
Payer: COMMERCIAL

## 2018-08-16 VITALS
RESPIRATION RATE: 18 BRPM | HEART RATE: 82 BPM | TEMPERATURE: 98.4 F | DIASTOLIC BLOOD PRESSURE: 68 MMHG | SYSTOLIC BLOOD PRESSURE: 104 MMHG

## 2018-08-16 PROCEDURE — 96365 THER/PROPH/DIAG IV INF INIT: CPT

## 2018-08-16 RX ADMIN — HEPARIN 300 UNITS: 100 SYRINGE at 12:22

## 2018-08-16 RX ADMIN — SODIUM CHLORIDE 20 ML/HR: 0.9 INJECTION, SOLUTION INTRAVENOUS at 11:15

## 2018-08-16 RX ADMIN — IRON SUCROSE 200 MG: 20 INJECTION, SOLUTION INTRAVENOUS at 11:21

## 2018-08-16 NOTE — PLAN OF CARE
Problem: Potential for Falls  Goal: Patient will remain free of falls  INTERVENTIONS:  - Assess patient frequently for physical needs  -  Identify cognitive and physical deficits and behaviors that affect risk of falls    -  [place] fall precautions as indicated by assessment   - Educate patient/family on patient safety including physical limitations  - Instruct patient to call for assistance with activity based on assessment  - Modify environment to reduce risk of injury  - Consider OT/PT consult to assist with strengthening/mobility   Outcome: Progressing

## 2018-08-20 RX ORDER — SODIUM CHLORIDE 9 MG/ML
20 INJECTION, SOLUTION INTRAVENOUS CONTINUOUS
Status: DISCONTINUED | OUTPATIENT
Start: 2018-08-21 | End: 2018-08-24 | Stop reason: HOSPADM

## 2018-08-21 ENCOUNTER — HOSPITAL ENCOUNTER (OUTPATIENT)
Dept: INFUSION CENTER | Facility: CLINIC | Age: 43
Discharge: HOME | End: 2018-08-21
Payer: COMMERCIAL

## 2018-08-21 VITALS
OXYGEN SATURATION: 99 % | TEMPERATURE: 98.5 F | SYSTOLIC BLOOD PRESSURE: 122 MMHG | HEART RATE: 63 BPM | RESPIRATION RATE: 14 BRPM | DIASTOLIC BLOOD PRESSURE: 78 MMHG

## 2018-08-21 PROCEDURE — 96365 THER/PROPH/DIAG IV INF INIT: CPT

## 2018-08-21 RX ADMIN — IRON SUCROSE 200 MG: 20 INJECTION, SOLUTION INTRAVENOUS at 13:59

## 2018-08-21 RX ADMIN — HEPARIN 300 UNITS: 100 SYRINGE at 15:02

## 2018-08-21 RX ADMIN — SODIUM CHLORIDE 20 ML/HR: 0.9 INJECTION, SOLUTION INTRAVENOUS at 13:50

## 2018-08-21 NOTE — PLAN OF CARE
Problem: Potential for Falls  Goal: Patient will remain free of falls  INTERVENTIONS:  - Assess patient frequently for physical needs  -  Identify cognitive and physical deficits and behaviors that affect risk of falls    -  [place] fall precautions as indicated by assessment   - Educate patient/family on patient safety including physical limitations  - Instruct patient to call for assistance with activity based on assessment  - Modify environment to reduce risk of injury  - Consider OT/PT consult to assist with strengthening/mobility   Outcome: Progressing

## 2018-08-22 RX ORDER — SODIUM CHLORIDE 9 MG/ML
20 INJECTION, SOLUTION INTRAVENOUS CONTINUOUS
Status: DISCONTINUED | OUTPATIENT
Start: 2018-08-23 | End: 2018-08-26 | Stop reason: HOSPADM

## 2018-08-23 ENCOUNTER — HOSPITAL ENCOUNTER (OUTPATIENT)
Dept: INFUSION CENTER | Facility: CLINIC | Age: 43
Discharge: HOME | End: 2018-08-23
Payer: COMMERCIAL

## 2018-08-23 VITALS
TEMPERATURE: 98.6 F | RESPIRATION RATE: 20 BRPM | DIASTOLIC BLOOD PRESSURE: 77 MMHG | SYSTOLIC BLOOD PRESSURE: 115 MMHG | HEART RATE: 80 BPM

## 2018-08-23 PROCEDURE — 96365 THER/PROPH/DIAG IV INF INIT: CPT

## 2018-08-23 RX ADMIN — Medication 300 UNITS: at 15:31

## 2018-08-23 RX ADMIN — SODIUM CHLORIDE 20 ML/HR: 0.9 INJECTION, SOLUTION INTRAVENOUS at 14:21

## 2018-08-23 RX ADMIN — IRON SUCROSE 200 MG: 20 INJECTION, SOLUTION INTRAVENOUS at 14:21

## 2018-08-23 NOTE — PLAN OF CARE
Problem: Potential for Falls  Goal: Patient will remain free of falls  INTERVENTIONS:  - Assess patient frequently for physical needs  -  Identify cognitive and physical deficits and behaviors that affect risk of falls    -  [place] fall precautions as indicated by assessment   - Educate patient/family on patient safety including physical limitations  - Instruct patient to call for assistance with activity based on assessment  - Modify environment to reduce risk of injury  - Consider OT/PT consult to assist with strengthening/mobility   Outcome: Progressing

## 2018-08-24 RX ORDER — SODIUM CHLORIDE 9 MG/ML
20 INJECTION, SOLUTION INTRAVENOUS CONTINUOUS
Status: DISCONTINUED | OUTPATIENT
Start: 2018-08-28 | End: 2018-08-31 | Stop reason: HOSPADM

## 2018-08-28 ENCOUNTER — HOSPITAL ENCOUNTER (OUTPATIENT)
Dept: INFUSION CENTER | Facility: CLINIC | Age: 43
Discharge: HOME | End: 2018-08-28
Payer: COMMERCIAL

## 2018-08-28 VITALS
RESPIRATION RATE: 18 BRPM | HEART RATE: 81 BPM | TEMPERATURE: 98.6 F | DIASTOLIC BLOOD PRESSURE: 76 MMHG | SYSTOLIC BLOOD PRESSURE: 137 MMHG

## 2018-08-28 PROCEDURE — 96365 THER/PROPH/DIAG IV INF INIT: CPT

## 2018-08-28 RX ADMIN — SODIUM CHLORIDE 20 ML/HR: 0.9 INJECTION, SOLUTION INTRAVENOUS at 12:13

## 2018-08-28 RX ADMIN — HEPARIN 300 UNITS: 100 SYRINGE at 13:26

## 2018-08-28 RX ADMIN — IRON SUCROSE 200 MG: 20 INJECTION, SOLUTION INTRAVENOUS at 12:14

## 2018-08-29 RX ORDER — SODIUM CHLORIDE 9 MG/ML
20 INJECTION, SOLUTION INTRAVENOUS CONTINUOUS
Status: DISCONTINUED | OUTPATIENT
Start: 2018-08-30 | End: 2018-09-02 | Stop reason: HOSPADM

## 2018-08-30 ENCOUNTER — HOSPITAL ENCOUNTER (OUTPATIENT)
Dept: INFUSION CENTER | Facility: CLINIC | Age: 43
Discharge: HOME | End: 2018-08-30
Payer: COMMERCIAL

## 2018-08-30 VITALS
DIASTOLIC BLOOD PRESSURE: 74 MMHG | HEART RATE: 84 BPM | TEMPERATURE: 97 F | SYSTOLIC BLOOD PRESSURE: 125 MMHG | RESPIRATION RATE: 20 BRPM

## 2018-08-30 PROCEDURE — 96365 THER/PROPH/DIAG IV INF INIT: CPT

## 2018-08-30 RX ADMIN — SODIUM CHLORIDE 20 ML/HR: 0.9 INJECTION, SOLUTION INTRAVENOUS at 13:21

## 2018-08-30 RX ADMIN — Medication 300 UNITS: at 14:32

## 2018-08-30 RX ADMIN — IRON SUCROSE 200 MG: 20 INJECTION, SOLUTION INTRAVENOUS at 13:28

## 2018-08-30 NOTE — PLAN OF CARE
Problem: Potential for Falls  Goal: Patient will remain free of falls  INTERVENTIONS:  - Assess patient frequently for physical needs  -  Identify cognitive and physical deficits and behaviors that affect risk of falls    -  [place] fall precautions as indicated by assessment   - Educate patient/family on patient safety including physical limitations  - Instruct patient to call for assistance with activity based on assessment  - Modify environment to reduce risk of injury  - Consider OT/PT consult to assist with strengthening/mobility   Outcome: Progressing

## 2018-08-31 RX ORDER — SODIUM CHLORIDE 9 MG/ML
20 INJECTION, SOLUTION INTRAVENOUS CONTINUOUS
Status: DISCONTINUED | OUTPATIENT
Start: 2018-09-04 | End: 2018-09-07 | Stop reason: HOSPADM

## 2018-09-04 ENCOUNTER — HOSPITAL ENCOUNTER (OUTPATIENT)
Dept: INFUSION CENTER | Facility: CLINIC | Age: 43
Discharge: HOME | End: 2018-09-04
Payer: COMMERCIAL

## 2018-09-04 VITALS
SYSTOLIC BLOOD PRESSURE: 122 MMHG | OXYGEN SATURATION: 100 % | RESPIRATION RATE: 18 BRPM | HEART RATE: 100 BPM | DIASTOLIC BLOOD PRESSURE: 74 MMHG | TEMPERATURE: 98.7 F

## 2018-09-04 PROCEDURE — 96365 THER/PROPH/DIAG IV INF INIT: CPT

## 2018-09-04 RX ADMIN — IRON SUCROSE 200 MG: 20 INJECTION, SOLUTION INTRAVENOUS at 12:15

## 2018-09-04 RX ADMIN — HEPARIN 300 UNITS: 100 SYRINGE at 13:13

## 2018-09-04 RX ADMIN — SODIUM CHLORIDE 20 ML/HR: 0.9 INJECTION, SOLUTION INTRAVENOUS at 12:15

## 2018-09-04 NOTE — PROGRESS NOTES
Pt to clinic for venofer infusion, pt offers no complaints at this time, aware of next appointment, declines avs

## 2018-09-27 DIAGNOSIS — G43.719 INTRACTABLE CHRONIC MIGRAINE WITHOUT AURA AND WITHOUT STATUS MIGRAINOSUS: Primary | ICD-10-CM

## 2018-09-27 RX ORDER — AMITRIPTYLINE HYDROCHLORIDE 10 MG/1
TABLET, FILM COATED ORAL
Qty: 150 TABLET | Refills: 0 | Status: SHIPPED | OUTPATIENT
Start: 2018-09-27 | End: 2019-01-28 | Stop reason: ALTCHOICE

## 2018-09-27 RX ORDER — DEXAMETHASONE 1 MG
TABLET ORAL
Qty: 7 TABLET | Refills: 0 | Status: SHIPPED | OUTPATIENT
Start: 2018-09-27 | End: 2019-01-28 | Stop reason: ALTCHOICE

## 2018-09-27 NOTE — PROGRESS NOTES
Neurology    Spoke to patient, Toradol helpful for rescue for migraine but for the last few days migraine keeps coming back  Sent in steroid taper and will take this with carafate  Topamax not helpful, not longer on this, sent in amitriptyline for preventative treatment  Discussed potential med a/e      If migraine persists in a few days, she knows to call and let me know

## 2018-10-01 ENCOUNTER — OFFICE VISIT (OUTPATIENT)
Dept: CARDIOLOGY CLINIC | Facility: CLINIC | Age: 43
End: 2018-10-01
Payer: COMMERCIAL

## 2018-10-01 VITALS
SYSTOLIC BLOOD PRESSURE: 122 MMHG | HEART RATE: 72 BPM | BODY MASS INDEX: 29.16 KG/M2 | DIASTOLIC BLOOD PRESSURE: 68 MMHG | HEIGHT: 64 IN | OXYGEN SATURATION: 99 % | WEIGHT: 170.8 LBS

## 2018-10-01 DIAGNOSIS — Z98.84 S/P GASTRIC BYPASS: ICD-10-CM

## 2018-10-01 DIAGNOSIS — E88.40 MITOCHONDRIAL METABOLISM DISORDER (HCC): ICD-10-CM

## 2018-10-01 DIAGNOSIS — I95.1 ORTHOSTATIC HYPOTENSION: Primary | ICD-10-CM

## 2018-10-01 PROCEDURE — 99213 OFFICE O/P EST LOW 20 MIN: CPT | Performed by: INTERNAL MEDICINE

## 2018-10-01 NOTE — PROGRESS NOTES
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  3  Headaches - unclear etiology           Recommendations:  1  Continue current medications  2  Continue IV saline boluses  3  Check echocardiogram to evaluate for cardiomyopathy  4  Follow up in one year  HPI: Maria Isabel Louise is a 41y o  year old female with mitochondrial disease, gastric bypass surgery, and dysautonomia who presents for follow up  Since her gastric bypass surgery, has needed a central line and giving herself saline boluses - 1-2x/week  On midodrine 5mg 3x/day  Review of Systems   Constitutional: Negative  HENT: Negative  Eyes: Negative  Respiratory: Negative for chest tightness and shortness of breath  Cardiovascular: Positive for palpitations  Negative for chest pain and leg swelling  Gastrointestinal: Negative  Endocrine: Negative  Genitourinary: Negative  Musculoskeletal: Negative  Skin: Negative  Allergic/Immunologic: Negative  Neurological: Positive for syncope and headaches  Hematological: Negative  Psychiatric/Behavioral: Negative  All other systems reviewed and are negative  Past Medical History:   Diagnosis Date    Acid reflux     Acid reflux     Constipated     Dysautonomia (HCC)     Esophageal abnormality     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)     History of blood clots 2012    Left leg  Has IVC filter now   Occurred while on Lovenox    Iron deficiency     Malignant hyperthermia due to anesthesia     Patient's son has M H   States she needs precautions    Migraines     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[address] )     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     MTHFR (methylene THF reductase) deficiency and homocystinuria (HCC)     Muscle weakness (generalized)     Nausea     On total parenteral nutrition (TPN)     for 8 mts    PCOS (polycystic ovarian syndrome)     PONV (postoperative nausea and vomiting)     Postgastrectomy syndrome     malabsorption    Status post PICC central line placement     Right upper arm  Receives TPN    Stroke [facility]) 9640, 2806    Metabolic strokes  Right hemiparesis= minor now   Vomiting     When eating     Past Surgical History:   Procedure Laterality Date    ANKLE SURGERY Left     Has plate and screws    COLONOSCOPY      COSMETIC SURGERY      X14 as child post attack by dog  Arms, legs and face    DILATION AND CURETTAGE OF UTERUS      x2    ESOPHAGOGASTRODUODENOSCOPY N/A 1/27/2016    Procedure: ESOPHAGOGASTRODUODENOSCOPY (EGD); Surgeon: Juwan Caballero MD;  Location: [facility]; Service:     FRACTURE SURGERY Left     Left ankle - hardware    GASTRIC BYPASS      IVC FILTER INSERTION      NISSEN FUNDOPLICATION      OVARY SURGERY Bilateral     "Drilling" due to multiple cysts- PCOS    [place] EGD TRANSORAL BIOPSY SINGLE/MULTIPLE N/A 3/9/2016    Procedure: ESOPHAGOGASTRODUODENOSCOPY (EGD); Surgeon: Juwan Caballero MD;  Location: [facility];   Service: Bariatrics    [place] LAP,GASTROSTOMY,W/O TUBE CONSTR N/A 4/19/2016    Procedure: INSERTION GASTROSTOMY TUBE LAPAROSCOPIC WITH INTRAOP EGD;  Surgeon: Juwan Caballero MD;  [facility]: [facility];  Service: Bariatrics    ULNAR NERVE TRANSPOSITION Right     WISDOM TOOTH EXTRACTION       History   Alcohol Use No     History   Drug Use No     History   Smoking Status    Never Smoker   Smokeless Tobacco    Never Used     Family History   Problem Relation Age of Onset    Mitochondrial disorder Mother     Cancer Mother     Hypertension Mother     Thyroid cancer Mother     Clotting disorder Mother         MTFR    Depression Father     Endocrinopathy Father     Clotting disorder Father MTFR    Depression Brother     Narcolepsy Brother     Breast cancer Maternal Grandmother     Heart failure Maternal Grandmother 80    Coronary artery disease Maternal Grandmother     Mitochondrial disorder Son     Mitochondrial disorder Daughter     Stroke Family     Anuerysm Paternal Uncle         abdominal     Heart attack Neg Hx     Arrhythmia Neg Hx     Sudden death Neg Hx         scd       Allergies: Allergies   Allergen Reactions    Sulfa Antibiotics Other (See Comments)     Arrhythmia     Other Other (See Comments)     Other reaction(s): Other  Cardiac arrhythmias  Cardiac arrhythmias    Guaifenesin Other (See Comments)     Arrhythmia       Medications:     Current Outpatient Prescriptions:     acarbose (PRECOSE) 100 MG tablet, Take 1 tablet (100 mg total) by mouth 3 (three) times a day with meals for 180 days, Disp: 270 tablet, Rfl: 3    amitriptyline (ELAVIL) 10 mg tablet, 1 tab HS X 1 week, then increase to 2 tabs qhs, Disp: 150 tablet, Rfl: 0    B Complex-Biotin-FA (TH VITAMIN B 50/B-COMPLEX PO), Take 1 tablet by mouth daily  , Disp: , Rfl:     Calcium Carbonate Antacid 1250 mg/5 mL, Take by mouth, Disp: , Rfl:     COENZYME Q-10 PO, , Disp: , Rfl:     dexamethasone (DECADRON) 1 mg tablet, Take 2 tabs daily x 2 days, then 1 tab daily x 2 days, then half tab x 2 days   Take in AM with food or antacid, Disp: 7 tablet, Rfl: 0    docusate sodium (COLACE) 100 mg capsule, Take 200 mg by mouth 2 (two) times a day , Disp: , Rfl:     L-Arginine 1000 MG TABS, Take 1,000 mg by mouth 2 (two) times a day, Disp: , Rfl:     Lactobacillus-Inulin ([address]) CAPS, Take by mouth, Disp: , Rfl:     levOCARNitine (CARNITOR) 1 g/10 mL solution, Take 100 mg by mouth 4 (four) times a day (before meals and at bedtime), Disp: , Rfl:     Linagliptin 5 MG TABS, Take 5 mg by mouth daily, Disp: 30 mg, Rfl: 3    midodrine (PROAMATINE) 5 mg tablet, , Disp: , Rfl:     Multiple Vitamin (MULTIVITAMIN) tablet, Take 1 tablet by mouth daily  , Disp: , Rfl:     pantoprazole (PROTONIX) 40 mg tablet, Take 40 mg by mouth 2 (two) times a day , Disp: , Rfl:     polyethylene glycol (MIRALAX) 17 g packet, Take 17 g by mouth 2 (two) times a day Indications: 1 5 caps full 1-2 times/day   , Disp: , Rfl:     rizatriptan (MAXALT-MLT) 5 MG disintegrating tablet, Take 1-2 tabs at migraine onset, can repeat once in 2 hours  Max 4 tabs in 24 hours  , Disp: 12 tablet, Rfl: 1    sucralfate (CARAFATE) 1 g/10 mL suspension, Take 10 mL (1 g total) by mouth 4 (four) times a day, Disp: 420 mL, Rfl: 1      Wt Readings from Last 3 Encounters:   10/01/18 77 5 kg (170 lb 12 8 oz)   08/02/18 75 3 kg (166 lb)   07/23/18 75 8 kg (167 lb)     Temp Readings from Last 3 Encounters:   09/04/18 98 7 °F (37 1 °C) (Temporal)   08/30/18 (!) 97 °F (36 1 °C) (Temporal)   08/28/18 98 6 °F (37 °C) (Oral)     BP Readings from Last 3 Encounters:   10/01/18 122/68   09/04/18 122/74   08/30/18 125/74     Pulse Readings from Last 3 Encounters:   10/01/18 72   09/04/18 100   08/30/18 84         Physical Exam   Constitutional: She is oriented to person, place, and time  She appears well-developed  HENT:   Head: Atraumatic  Eyes: EOM are normal    Neck: Normal range of motion  Cardiovascular: Normal rate, regular rhythm and normal heart sounds  Exam reveals no gallop and no friction rub  No murmur heard  Pulmonary/Chest: Effort normal and breath sounds normal  No respiratory distress  She has no wheezes  She has no rales  Abdominal: Soft  Musculoskeletal: Normal range of motion  Neurological: She is alert and oriented to person, place, and time  Skin: Skin is warm and dry  Psychiatric: She has a normal mood and affect           Laboratory Studies:  CMP:  Lab Results   Component Value Date     07/26/2018    K 3 7 07/26/2018     07/26/2018    CO2 30 07/26/2018    ANIONGAP 9 01/06/2016    BUN 13 07/26/2018    CREATININE 0 85 07/26/2018    GLUCOSE 76 01/06/2016    AST 14 07/26/2018    ALT 24 07/26/2018    BILITOT 0 32 01/06/2016    EGFR 86 07/26/2018       Lipid Profile:   Lab Results   Component Value Date    CHOL 113 01/06/2016     Lab Results   Component Value Date    HDL 77 (H) 07/13/2018     Lab Results   Component Value Date    LDLCALC 57 07/13/2018     Lab Results   Component Value Date    TRIG 61 07/13/2018

## 2018-10-01 NOTE — PATIENT INSTRUCTIONS
Recommendations:  1  Continue current medications  2  Continue IV saline boluses  3  Check echocardiogram to evaluate for cardiomyopathy  4  Follow up in one year

## 2018-10-04 ENCOUNTER — OFFICE VISIT (OUTPATIENT)
Dept: NEUROLOGY | Facility: CLINIC | Age: 43
End: 2018-10-04
Payer: COMMERCIAL

## 2018-10-04 VITALS
SYSTOLIC BLOOD PRESSURE: 104 MMHG | HEART RATE: 73 BPM | HEIGHT: 64 IN | BODY MASS INDEX: 29.02 KG/M2 | DIASTOLIC BLOOD PRESSURE: 78 MMHG | WEIGHT: 170 LBS

## 2018-10-04 DIAGNOSIS — R51.9 FRONTAL HEADACHE: ICD-10-CM

## 2018-10-04 DIAGNOSIS — M79.18 MYOFASCIAL PAIN: ICD-10-CM

## 2018-10-04 DIAGNOSIS — G43.019 INTRACTABLE MIGRAINE WITHOUT AURA AND WITHOUT STATUS MIGRAINOSUS: Primary | ICD-10-CM

## 2018-10-04 DIAGNOSIS — M54.2 CERVICALGIA: ICD-10-CM

## 2018-10-04 PROCEDURE — 99214 OFFICE O/P EST MOD 30 MIN: CPT | Performed by: PSYCHIATRY & NEUROLOGY

## 2018-10-04 PROCEDURE — 20552 NJX 1/MLT TRIGGER POINT 1/2: CPT | Performed by: PSYCHIATRY & NEUROLOGY

## 2018-10-04 RX ORDER — KETOROLAC TROMETHAMINE 30 MG/ML
INJECTION, SOLUTION INTRAMUSCULAR; INTRAVENOUS
Qty: 10 ML | Refills: 0 | Status: SHIPPED | OUTPATIENT
Start: 2018-10-04 | End: 2019-10-17

## 2018-10-04 RX ORDER — BUPIVACAINE HYDROCHLORIDE 2.5 MG/ML
0.3 INJECTION, SOLUTION INFILTRATION; PERINEURAL ONCE
Status: COMPLETED | OUTPATIENT
Start: 2018-10-04 | End: 2018-10-04

## 2018-10-04 RX ORDER — KETOROLAC TROMETHAMINE 30 MG/ML
INJECTION, SOLUTION INTRAMUSCULAR; INTRAVENOUS
Qty: 1 ML | Refills: 0 | Status: SHIPPED | OUTPATIENT
Start: 2018-10-04 | End: 2018-10-04 | Stop reason: DRUGHIGH

## 2018-10-04 RX ORDER — SYRINGE W-NEEDLE,DISPOSAB,3 ML 25GX5/8"
SYRINGE, EMPTY DISPOSABLE MISCELLANEOUS
Qty: 15 EACH | Refills: 0 | Status: SHIPPED | OUTPATIENT
Start: 2018-10-04 | End: 2019-10-17

## 2018-10-04 RX ORDER — METHOCARBAMOL 500 MG/1
500 TABLET, FILM COATED ORAL
Qty: 10 TABLET | Refills: 0 | Status: SHIPPED | OUTPATIENT
Start: 2018-10-04 | End: 2019-01-28 | Stop reason: ALTCHOICE

## 2018-10-04 RX ADMIN — BUPIVACAINE HYDROCHLORIDE 0.3 ML: 2.5 INJECTION, SOLUTION INFILTRATION; PERINEURAL at 09:56

## 2018-10-04 NOTE — PROGRESS NOTES
Patient ID: Maria Isabel Louise is a 41 y o  female  Assessment/Plan:    No problem-specific Assessment & Plan notes found for this encounter  Diagnoses and all orders for this visit:    Intractable migraine without aura and without status migrainosus  - Trigger point injection today in office- right frontal given significant pain with palpation, in that region with myofascial tenderness  - Toradol injections will send to pharmacy  Cannot do PO given significant GI issues/ PEG tube, reflex issues with mitochondrial disease  - Robaxin 500 qhs trial  - C/w amitryptiline- may take 4-6 weeks to start noting significant benefit with this  - She is agreeable with the above treatment plan  - S/p steroid taper with some headache relief    Cervicalgia  -     methocarbamol (ROBAXIN) 500 mg tablet; Take 1 tablet (500 mg total) by mouth daily at bedtime    Myofascial pain  -     bupivacaine (MARCAINE) 0 25 % injection 0 3 mL; Inject 0 3 mL as directed once     Frontal headache  -     bupivacaine (MARCAINE) 0 25 % injection 0 3 mL; Inject 0 3 mL as directed once         Follow up in three months time  Subjective:    HPI     Ms Hardy Parks is a pleasant 37 yo female seen in follow up for migraines  She noted no benefit with Topamax, she recently started elavil as recommended after our last phone call and is not yet at 20 mg qhs dosing- discussed increasing dose as no s/e with 10 mg dose  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 with port 3x/ weekly  Denies any passing out events  Still has bouts of hypoglycemia and is off TPN, does have PEG tube being managed by Dr Landaverde and Dr Winkler due to esophageal dysmotility- is on altered diet based on the day      States now headaches 5 days a week and bad headaches 3 days a week with severe photophobia, phonophobia, nausea and one episode of retching and emesis  States tried Imitrex in her 25s and doesn't remember how she did with that      Medications tried and failed:  Maxalt made her completely dazed and confused- states felt like she could not function  States Topamax minimally helpful, but already noting some cognitive deficits at 59 mg thus discontinued  Periactin- brain fog  Has orthostatic hypotension and passing out events periodically thus cannot be on a beta blocker or CCB  States she checked with [facility] genetics and they were okay with anything other than Depakote      Description: Pressure on the right side of the head, photophobia, phonophobia, flashing lights, no nausea and vomiting, sleep can help  States triptans are okay for her as she has metabolic strokes  She is agreeable to trial of Sumatriptan SC  With her GI issues she is okay with Toradol injections but not not Toradol or other NSAIDs PO  Short course of steroids helpful first two days but then the headaches came back- takes this with carafate  Tells me if she ever has a metabolic stroke the go to person is Dr Margarita Blount as they have regimen for acute metabolic stroke with her mitochondrial encephalopathy      Other medications tried and failed: gabapentin- trouble talking and forming sentences  Topamax minimally helpful but is starting to note cognitive deficits at 50 mg dosing  States daughter and son with severe mitochondrial disease  Denies any syncopal events  The following portions of the patient's history were reviewed and updated as appropriate: allergies, current medications, past family history, past medical history, past social history, past surgical history and problem list      Had a sleep study that was great    Cough sneeze exercise are not triggers      The following portions of the patient's history were reviewed and updated as appropriate: allergies, current medications, past family history, past medical history, past social history, past surgical history and problem list       Objective:    Blood pressure 104/78, pulse 73, height 5' 4" (1 626 m), weight 77 1 kg (170 lb), not currently breastfeeding  Physical Exam   Constitutional: She is oriented to person, place, and time  She appears well-developed and well-nourished  HENT:   Head: Normocephalic and atraumatic  Eyes: Pupils are equal, round, and reactive to light  Neck: Normal range of motion  Cardiovascular: Normal rate and regular rhythm  Pulmonary/Chest: Effort normal    Abdominal: Soft  Musculoskeletal: She exhibits tenderness  Tenderness to palpation right cervical parapinal region as well as right frontal head region   Neurological: She is alert and oriented to person, place, and time  She has normal strength and normal reflexes  Coordination normal    Nursing note and vitals reviewed  Neurological Exam  Mental Status  Alert  Oriented to person, place, time and situation  Recent and remote memory are intact  no dysarthria present  Unable to repeat  Attention and concentration are normal     Cranial Nerves  CN II: Right visual acuity: normal  Left visual acuity: normal  Right normal visual field  Left normal visual field  Right funduscopic exam: disc intact  Left funduscopic exam: disc intact  CN III, IV, VI: Extraocular movements intact bilaterally  Pupils equal round and reactive to light bilaterally  CN V: Facial sensation is normal   CN VII: Full and symmetric facial movement  CN VIII: Hearing is normal   CN IX, X: Palate elevates symmetrically  Normal gag reflex  CN XI: Shoulder shrug strength is normal   CN XII: Tongue midline without atrophy or fasciculations  Motor   Normal muscle tone  Strength is 5/5 throughout all four extremities  Sensory  Sensation is intact to light touch, pinprick, vibration and proprioception in all four extremities  Light touch is normal in upper and lower extremities  Temperature is normal in upper and lower extremities  Vibration is normal in upper and lower extremities  No right-sided hemispatial neglect  No left-sided hemispatial neglect      Reflexes  Deep tendon reflexes are 2+ and symmetric in all four extremities with downgoing toes bilaterally  Coordination  Finger-to-nose, rapid alternating movements and heel-to-shin normal bilaterally without dysmetria  Gait  Normal casual, toe, heel and tandem gait  Romberg is absent  Procedure noted  Trigger point- 1 location  0 3 ml of 0 25% bupivacaine- 32 gauge-0 5 in, was injected in the right frontal head region near the right supraorbital nerve location, in a sterile manner and prepped/cleaned with alcohol swab pre-procedure  Patient tolerated the procedure well with no complications     ROS:    Review of Systems   Constitutional: Negative  Negative for appetite change and fever  HENT: Negative  Negative for hearing loss, tinnitus, trouble swallowing and voice change  Eyes: Negative  Negative for photophobia and pain  Respiratory: Negative  Negative for shortness of breath  Cardiovascular: Negative  Negative for palpitations  Gastrointestinal: Negative  Negative for nausea and vomiting  Endocrine: Negative  Negative for cold intolerance and heat intolerance  Genitourinary: Negative  Negative for dysuria, frequency and urgency  Musculoskeletal: Negative  Negative for myalgias and neck pain  Skin: Negative  Negative for rash  Neurological: Negative  Negative for dizziness, tremors, seizures, syncope, facial asymmetry, speech difficulty, weakness, light-headedness, numbness and headaches  Hematological: Negative  Does not bruise/bleed easily  Psychiatric/Behavioral: Negative  Negative for confusion, hallucinations and sleep disturbance

## 2018-10-05 DIAGNOSIS — E16.2 HYPOGLYCEMIA: ICD-10-CM

## 2018-10-09 RX ORDER — ACARBOSE 100 MG/1
TABLET ORAL
Qty: 270 TABLET | Refills: 0 | Status: SHIPPED | OUTPATIENT
Start: 2018-10-09 | End: 2019-01-15 | Stop reason: SDUPTHER

## 2018-10-11 ENCOUNTER — TRANSCRIBE ORDERS (OUTPATIENT)
Dept: LAB | Facility: CLINIC | Age: 43
End: 2018-10-11

## 2018-10-11 DIAGNOSIS — R53.83 OTHER FATIGUE: ICD-10-CM

## 2018-10-11 DIAGNOSIS — R53.1 WEAKNESS OF RIGHT SIDE OF BODY: ICD-10-CM

## 2018-10-11 DIAGNOSIS — K59.01 SLOW TRANSIT CONSTIPATION: ICD-10-CM

## 2018-10-11 DIAGNOSIS — R68.89 EXERCISE INTOLERANCE: ICD-10-CM

## 2018-10-11 DIAGNOSIS — Z83.49 FAMILY HISTORY OF ENDOCRINE AND METABOLIC DISEASE: ICD-10-CM

## 2018-10-11 DIAGNOSIS — T88.59XD DELAYED EMERGENCE FROM ANESTHESIA, SUBSEQUENT ENCOUNTER: ICD-10-CM

## 2018-10-11 DIAGNOSIS — L90.6 STRIAE: ICD-10-CM

## 2018-10-11 DIAGNOSIS — E66.3 OVERWEIGHT: ICD-10-CM

## 2018-10-11 DIAGNOSIS — G43.109 MIGRAINE WITH AURA AND WITHOUT STATUS MIGRAINOSUS, NOT INTRACTABLE: ICD-10-CM

## 2018-10-11 DIAGNOSIS — G90.1 DYSAUTONOMIA (HCC): ICD-10-CM

## 2018-10-11 DIAGNOSIS — F80.9 SPEECH DELAY: ICD-10-CM

## 2018-10-11 DIAGNOSIS — H35.52 RETINITIS PIGMENTOSA: ICD-10-CM

## 2018-10-11 DIAGNOSIS — Z98.84 BARIATRIC SURGERY STATUS: Primary | ICD-10-CM

## 2018-10-11 DIAGNOSIS — R33.9 URINARY RETENTION: ICD-10-CM

## 2018-10-11 DIAGNOSIS — H53.453: ICD-10-CM

## 2018-10-11 DIAGNOSIS — K22.4 ESOPHAGEAL DYSMOTILITY: ICD-10-CM

## 2018-10-11 DIAGNOSIS — K21.00 GASTROESOPHAGEAL REFLUX DISEASE WITH ESOPHAGITIS: ICD-10-CM

## 2018-10-11 DIAGNOSIS — Q74.2 CONGENITAL ANOMALY OF LOWER LIMB: ICD-10-CM

## 2018-10-11 DIAGNOSIS — H53.60 NYCTALOPIA: ICD-10-CM

## 2018-10-11 DIAGNOSIS — R13.14 PHARYNGOESOPHAGEAL DYSPHAGIA: ICD-10-CM

## 2018-10-11 DIAGNOSIS — R11.0 CHRONIC NAUSEA: ICD-10-CM

## 2018-10-11 DIAGNOSIS — I82.4Y9 DEEP VEIN THROMBOSIS (DVT) OF PROXIMAL LOWER EXTREMITY, UNSPECIFIED CHRONICITY, UNSPECIFIED LATERALITY (HCC): ICD-10-CM

## 2018-10-11 DIAGNOSIS — E16.2 HYPOGLYCEMIA, UNSPECIFIED: ICD-10-CM

## 2018-10-12 ENCOUNTER — APPOINTMENT (OUTPATIENT)
Dept: LAB | Facility: CLINIC | Age: 43
End: 2018-10-12
Payer: COMMERCIAL

## 2018-10-12 DIAGNOSIS — K21.00 GASTROESOPHAGEAL REFLUX DISEASE WITH ESOPHAGITIS: ICD-10-CM

## 2018-10-12 DIAGNOSIS — F80.9 SPEECH DELAY: ICD-10-CM

## 2018-10-12 DIAGNOSIS — L90.6 STRIAE: ICD-10-CM

## 2018-10-12 DIAGNOSIS — H53.453: ICD-10-CM

## 2018-10-12 DIAGNOSIS — H53.60 NYCTALOPIA: ICD-10-CM

## 2018-10-12 DIAGNOSIS — I82.4Y9 DEEP VEIN THROMBOSIS (DVT) OF PROXIMAL LOWER EXTREMITY, UNSPECIFIED CHRONICITY, UNSPECIFIED LATERALITY (HCC): ICD-10-CM

## 2018-10-12 DIAGNOSIS — Z83.49 FAMILY HISTORY OF ENDOCRINE AND METABOLIC DISEASE: ICD-10-CM

## 2018-10-12 DIAGNOSIS — H35.52 RETINITIS PIGMENTOSA: ICD-10-CM

## 2018-10-12 DIAGNOSIS — R53.83 OTHER FATIGUE: ICD-10-CM

## 2018-10-12 DIAGNOSIS — K59.01 SLOW TRANSIT CONSTIPATION: ICD-10-CM

## 2018-10-12 DIAGNOSIS — R33.9 URINARY RETENTION: ICD-10-CM

## 2018-10-12 DIAGNOSIS — T88.59XD DELAYED EMERGENCE FROM ANESTHESIA, SUBSEQUENT ENCOUNTER: ICD-10-CM

## 2018-10-12 DIAGNOSIS — E66.3 OVERWEIGHT: ICD-10-CM

## 2018-10-12 DIAGNOSIS — G43.109 MIGRAINE WITH AURA AND WITHOUT STATUS MIGRAINOSUS, NOT INTRACTABLE: ICD-10-CM

## 2018-10-12 DIAGNOSIS — R53.1 WEAKNESS OF RIGHT SIDE OF BODY: ICD-10-CM

## 2018-10-12 DIAGNOSIS — R13.14 PHARYNGOESOPHAGEAL DYSPHAGIA: ICD-10-CM

## 2018-10-12 DIAGNOSIS — Q74.2 CONGENITAL ANOMALY OF LOWER LIMB: ICD-10-CM

## 2018-10-12 DIAGNOSIS — R68.89 EXERCISE INTOLERANCE: ICD-10-CM

## 2018-10-12 DIAGNOSIS — K22.4 ESOPHAGEAL DYSMOTILITY: ICD-10-CM

## 2018-10-12 DIAGNOSIS — E16.2 HYPOGLYCEMIA, UNSPECIFIED: ICD-10-CM

## 2018-10-12 DIAGNOSIS — Z98.84 BARIATRIC SURGERY STATUS: ICD-10-CM

## 2018-10-12 DIAGNOSIS — G90.1 DYSAUTONOMIA (HCC): ICD-10-CM

## 2018-10-12 DIAGNOSIS — R11.0 CHRONIC NAUSEA: ICD-10-CM

## 2018-10-12 LAB
BILIRUB UR QL STRIP: NEGATIVE
CLARITY UR: CLEAR
COLOR UR: YELLOW
GLUCOSE UR STRIP-MCNC: NEGATIVE MG/DL
HGB UR QL STRIP.AUTO: NEGATIVE
KETONES UR STRIP-MCNC: NEGATIVE MG/DL
LEUKOCYTE ESTERASE UR QL STRIP: NEGATIVE
NITRITE UR QL STRIP: NEGATIVE
PH UR STRIP.AUTO: 5.5 [PH] (ref 4.5–8)
PROT UR STRIP-MCNC: NEGATIVE MG/DL
SP GR UR STRIP.AUTO: 1.01 (ref 1–1.03)
T3FREE SERPL-MCNC: 2.72 PG/ML (ref 2.3–4.2)
T4 SERPL-MCNC: 7.6 UG/DL (ref 4.7–13.3)
TSH SERPL DL<=0.05 MIU/L-ACNC: 1.31 UIU/ML (ref 0.36–3.74)
UROBILINOGEN UR QL STRIP.AUTO: 0.2 E.U./DL

## 2018-10-12 PROCEDURE — 84439 ASSAY OF FREE THYROXINE: CPT

## 2018-10-12 PROCEDURE — 82128 AMINO ACIDS MULT QUAL: CPT

## 2018-10-12 PROCEDURE — 82542 COL CHROMOTOGRAPHY QUAL/QUAN: CPT

## 2018-10-12 PROCEDURE — 36415 COLL VENOUS BLD VENIPUNCTURE: CPT

## 2018-10-12 PROCEDURE — 84482 T3 REVERSE: CPT

## 2018-10-12 PROCEDURE — 84210 ASSAY OF PYRUVATE: CPT

## 2018-10-12 PROCEDURE — 82128 AMINO ACIDS MULT QUAL: CPT | Performed by: MEDICAL GENETICS

## 2018-10-12 PROCEDURE — 83520 IMMUNOASSAY QUANT NOS NONAB: CPT

## 2018-10-12 PROCEDURE — 84436 ASSAY OF TOTAL THYROXINE: CPT

## 2018-10-12 PROCEDURE — 84443 ASSAY THYROID STIM HORMONE: CPT

## 2018-10-12 PROCEDURE — 81003 URINALYSIS AUTO W/O SCOPE: CPT | Performed by: MEDICAL GENETICS

## 2018-10-12 PROCEDURE — 82570 ASSAY OF URINE CREATININE: CPT

## 2018-10-12 PROCEDURE — 83918 ORGANIC ACIDS TOTAL QUANT: CPT

## 2018-10-12 PROCEDURE — 84481 FREE ASSAY (FT-3): CPT

## 2018-10-16 LAB
(HCYS)2/CREAT UR-RTO: <0.3 UMOL/G CR (ref 0–2)
A-AMINOBUTYR/CREAT UR-RTO: 20.3 UMOL/G CR (ref 0–34.6)
AAA/CREAT UR-RTO: 71 UMOL/G CR (ref 0–146.7)
ALANINE/CREAT UR-RTO: 510.7 UMOL/G CR (ref 0–1337)
ALLOISOLEUCINE/CREAT UR-RTO: 0.7 UMOL/G CR (ref 0–13.5)
AMINO ACID PAT SERPL-IMP: NORMAL
AMINO ACID, QN URINE: NORMAL
ARGININE/CREAT UR-RTO: 62.1 UMOL/G CR (ref 0–69.6)
ARGININOSUCCINATE/CREAT UR-RTO: 21.8 UMOL/G CR (ref 0–51.2)
ASPARAGINE/CREAT UR-RTO: 127.1 UMOL/G CR (ref 0–454.2)
ASPARTATE/CREAT UR-RTO: 9.3 UMOL/G CR (ref 0–86.7)
B-AIB/CREAT UR-RTO: 363.7 UMOL/G CR (ref 0–807.9)
B-ALANINE/CREAT UR-RTO: 10 UMOL/G CR (ref 0–869.8)
CARN ESTERS/C0 SERPL-SRTO: 0.4 RATIO (ref 0–0.9)
CARNITINE FREE SERPL-SCNC: 36 UMOL/L (ref 20–55)
CARNITINE SERPL-SCNC: 51 UMOL/L (ref 27–73)
CITRULLINE/CREAT UR-RTO: 7.9 UMOL/G CR (ref 0–27.4)
CYSTATHIONIN/CREAT UR-RTO: 6.7 UMOL/G CR (ref 0–80.8)
CYSTINE/CREAT UR-RTO: 68.5 UMOL/G CR (ref 0–223.8)
GABA/CREAT UR-RTO: <0.5 UMOL/G CR (ref 0–13.1)
GLUTAMATE/CREAT UR-RTO: 31.1 UMOL/G CR (ref 0–92.4)
GLUTAMINE/CREAT UR-RTO: 749.5 UMOL/G CR (ref 0–1756.2)
GLYCINE/CREAT UR-RTO: 2392.5 UMOL/G CR (ref 0–7996.9)
HISTIDINE/CREAT UR-RTO: 967.7 UMOL/G CR (ref 0–2534.2)
HOMOCITRULLINE/CREAT UR-RTO: 15.7 UMOL/G CR (ref 0–80)
ISOLEUCINE/CREAT UR-RTO: 19 UMOL/G CR (ref 0–48.1)
LAB DIRECTOR NAME PROVIDER: NORMAL
LEUCINE/CREAT UR-RTO: 52.1 UMOL/G CR (ref 0–129.1)
LYSINE/CREAT UR-RTO: 97.8 UMOL/G CR (ref 0–1020.6)
METHIONINE/CREAT UR-RTO: 6.4 UMOL/G CR (ref 0–37.1)
MISCELLANEOUS LAB TEST RESULT: NORMAL
OH-LYSINE/CREAT UR-RTO: 3.5 UMOL/G CR (ref 0–37.3)
OH-PROLINE/CREAT UR-RTO: <0.1 UMOL/G CR (ref 0–87.9)
ORNITHINE/CREAT UR-RTO: 21.6 UMOL/G CR (ref 0–76.3)
PHE/CREAT UR-RTO: 58.3 UMOL/G CR (ref 0–239)
PROLINE/CREAT UR-RTO: <1 UMOL/G CR (ref 0–168.6)
PYRUVATE BLD-MCNC: 0.1 MG/DL (ref 0.3–0.7)
REF LAB TEST METHOD: NORMAL
SARCOSINE/CREAT UR-RTO: 4.7 UMOL/G CR (ref 0–27.3)
SERINE/CREAT UR-RTO: 498.4 UMOL/G CR (ref 0–1052.8)
STONE ANALYSIS-IMP: NORMAL
TAURINE/CREAT UR-RTO: 125.3 UMOL/G CR (ref 0–5335.7)
THREONINE/CREAT UR-RTO: 234.3 UMOL/G CR (ref 0–714.9)
TRYPTOPHAN/CREAT UR-RTO: 62.6 UMOL/G CR (ref 0–207.5)
TYROSINE/CREAT UR-RTO: 116.9 UMOL/G CR (ref 0–388.9)
VALINE/CREAT UR-RTO: 45.2 UMOL/G CR (ref 0–147.4)

## 2018-10-17 LAB
3OH-BUTYRCARN SERPL-SCNC: 0.04 UMOL/L (ref 0–0.09)
3OH-IV+2ME3OH-BUTYR CARN SERPL-SCNC: 0.03 UMOL/L (ref 0–0.06)
3OH-LINOLEOYLCARN SERPL-SCNC: 0 UMOL/L (ref 0–0.01)
3OH-OLEOYLCARN SERPL-SCNC: 0.01 UMOL/L (ref 0–0.02)
3OH-PALMITOLEYLCARN SERPL-SCNC: 0.01 UMOL/L (ref 0–0.02)
3OH-PALMITOYLCARN SERPL-SCNC: 0.01 UMOL/L (ref 0–0.02)
3OH-STEAROYLCARN SERPL-SCNC: 0.01 UMOL/L (ref 0–0.02)
3OH-TDECANOYLCARN SERPL-SCNC: 0.01 UMOL/L (ref 0–0.02)
A-AMINOBUTYR SERPL-SCNC: 27.9 UMOL/L (ref 5.4–34.5)
AAA SERPL-SCNC: 2.1 UMOL/L (ref 0–1.9)
ACETYLCARN SERPL-SCNC: 7.32 UMOL/L (ref 3.23–10.29)
ACYLCARNITINE PATTERN SERPL-IMP: ABNORMAL
ALANINE SERPL-SCNC: 428 UMOL/L (ref 209.2–515.5)
ALLOISOLEUCINE SERPL-SCNC: 1.4 UMOL/L (ref 0–3.2)
AMINO ACID PAT SERPL-IMP: ABNORMAL
AMINO ACID, QN URINE: ABNORMAL
AMINO ACID, QN URINE: ABNORMAL
ARGININE SERPL-SCNC: 138.3 UMOL/L (ref 36.3–119.2)
ARGININOSUCCINATE SERPL-SCNC: 0.1 UMOL/L (ref 0–3)
ASPARAGINE SERPL-SCNC: 98.8 UMOL/L (ref 29.5–84.5)
ASPARTATE SERPL-SCNC: 5.4 UMOL/L (ref 0–7.4)
B-AIB SERPL-SCNC: 3.3 UMOL/L (ref 0–4.3)
B-ALANINE SERPL-SCNC: 2.8 UMOL/L (ref 1.1–9)
BUTYRYL+ISOBUTYRYLCARN SERPL-SCNC: 0.42 UMOL/L (ref 0.08–0.32)
CITRULLINE SERPL-SCNC: 42.4 UMOL/L (ref 15.6–46.9)
CYSTATHIONIN SERPL-SCNC: <0.5 UMOL/L (ref 0–0.7)
CYSTINE SERPL-SCNC: 24.3 UMOL/L (ref 15.8–47.3)
DECADIENOYLCARN SERPL-SCNC: 0.01 UMOL/L (ref 0–0.05)
DECANOYLCARN SERPL-SCNC: 0.07 UMOL/L (ref 0–0.38)
DECENOYLCARN SERPL-SCNC: 0.06 UMOL/L (ref 0.01–0.32)
DODECANOYLCARN SERPL-SCNC: 0.04 UMOL/L (ref 0–0.15)
GABA SERPL-SCNC: <0.5 UMOL/L (ref 0–0.6)
GLUTAMATE SERPL-SCNC: 79.2 UMOL/L (ref 18.1–155.9)
GLUTAMINE SERPL-SCNC: 657.7 UMOL/L (ref 372.8–701.4)
GLUTARYLCARN SERPL-SCNC: 0.04 UMOL/L (ref 0–0.1)
GLYCINE SERPL-SCNC: 413.9 UMOL/L (ref 144–411)
HCYS SERPL-SCNC: <0.3 UMOL/L (ref 0–0.2)
HEXANOYLCARN SERPL-SCNC: 0.03 UMOL/L (ref 0–0.1)
HISTIDINE SERPL-SCNC: 119.9 UMOL/L (ref 47.2–98.5)
HOMOCITRULLINE SERPL-SCNC: 0.5 UMOL/L (ref 0–1.7)
ISOLEUCINE SERPL-SCNC: 157.2 UMOL/L (ref 32.8–88.3)
ISOVALERYL+MEBUTYRYLCARN SERPL-SCNC: 0.27 UMOL/L (ref 0.01–0.21)
LAB DIRECTOR NAME PROVIDER: ABNORMAL
LAB DIRECTOR NAME PROVIDER: ABNORMAL
LEUCINE SERPL-SCNC: 344.9 UMOL/L (ref 66.7–165.7)
LINOLEOYLCARN SERPL-SCNC: 0.05 UMOL/L (ref 0–0.11)
LYSINE SERPL-SCNC: 378.4 UMOL/L (ref 94–278)
MALONYLCARN SERPL-SCNC: 0.07 UMOL/L (ref 0.02–0.12)
ME-MALONYLCARN SERPL-SCNC: 0.05 UMOL/L (ref 0.01–0.07)
METHIONINE SERPL-SCNC: 55.3 UMOL/L (ref 14.7–35.2)
OCTANOYLCARN SERPL-SCNC: 0.06 UMOL/L (ref 0–0.27)
OH-LYSINE SERPL-SCNC: 0.3 UMOL/L (ref 0.1–0.8)
OH-PROLINE SERPL-SCNC: 12.6 UMOL/L (ref 4.7–35.2)
OLEOYLCARN SERPL-SCNC: 0.09 UMOL/L (ref 0.04–0.17)
ORNITHINE SERPL-SCNC: 123.2 UMOL/L (ref 30.1–101.3)
PALMITOLEYLCARN SERPL-SCNC: 0.01 UMOL/L (ref 0–0.04)
PALMITOYLCARN SERPL-SCNC: 0.07 UMOL/L (ref 0.03–0.13)
PHE SERPL-SCNC: 123.6 UMOL/L (ref 35.8–76.9)
PROLINE SERPL-SCNC: 354.6 UMOL/L (ref 84.8–352.5)
PROPIONYLCARN SERPL-SCNC: 0.87 UMOL/L (ref 0.16–0.62)
REF LAB TEST METHOD: ABNORMAL
REF LAB TEST METHOD: ABNORMAL
SARCOSINE SERPL-SCNC: 3.3 UMOL/L (ref 0–4)
SERINE SERPL-SCNC: 217.8 UMOL/L (ref 48.7–145.2)
STEAROYLCARN SERPL-SCNC: 0.03 UMOL/L (ref 0–0.07)
TAURINE SERPL-SCNC: 78.6 UMOL/L (ref 29.2–132.3)
TDECADIENOYLCARN SERPL-SCNC: 0.01 UMOL/L (ref 0–0.11)
TDECANOYLCARN SERPL-SCNC: 0.03 UMOL/L (ref 0–0.06)
TDECENOYLCARN SERPL-SCNC: 0.04 UMOL/L (ref 0–0.17)
TGLY+MTHYL (C5:1) SERPL-SCNC: 0.02 UMOL/L (ref 0–0.02)
THREONINE SERPL-SCNC: 219 UMOL/L (ref 67.8–211.6)
TRYPTOPHAN SERPL-SCNC: 114.5 UMOL/L (ref 23.5–93)
TYROSINE SERPL-SCNC: 185 UMOL/L (ref 27.8–83.3)
UBIQUINONE10 SERPL-MCNC: 0.37 UG/ML (ref 0.37–2.2)
VALINE SERPL-SCNC: 475.4 UMOL/L (ref 133–317.1)

## 2018-10-18 LAB
NIACIN SERPL-MCNC: <5 NG/ML (ref 0–5)
NICOTINAMIDE SERPL-MCNC: 11 NG/ML (ref 5.2–72.1)
T3REVERSE SERPL-MCNC: 18 NG/DL (ref 9.2–24.1)

## 2018-10-22 LAB — MISCELLANEOUS LAB TEST RESULT: NORMAL

## 2018-10-23 LAB — MISCELLANEOUS LAB TEST RESULT: NORMAL

## 2018-10-25 DIAGNOSIS — Z12.39 SCREENING FOR MALIGNANT NEOPLASM OF BREAST: Primary | ICD-10-CM

## 2018-10-30 ENCOUNTER — OFFICE VISIT (OUTPATIENT)
Dept: INTERNAL MEDICINE CLINIC | Facility: CLINIC | Age: 43
End: 2018-10-30
Payer: COMMERCIAL

## 2018-10-30 VITALS
TEMPERATURE: 98 F | RESPIRATION RATE: 18 BRPM | HEART RATE: 70 BPM | OXYGEN SATURATION: 97 % | WEIGHT: 171.4 LBS | BODY MASS INDEX: 29.26 KG/M2 | HEIGHT: 64 IN | DIASTOLIC BLOOD PRESSURE: 80 MMHG | SYSTOLIC BLOOD PRESSURE: 118 MMHG

## 2018-10-30 DIAGNOSIS — E88.40 MITOCHONDRIAL METABOLISM DISORDER (HCC): ICD-10-CM

## 2018-10-30 DIAGNOSIS — E61.1 IRON DEFICIENCY: ICD-10-CM

## 2018-10-30 DIAGNOSIS — Z85.820 HISTORY OF MALIGNANT MELANOMA OF SKIN: ICD-10-CM

## 2018-10-30 DIAGNOSIS — G43.009 MIGRAINE WITHOUT AURA AND WITHOUT STATUS MIGRAINOSUS, NOT INTRACTABLE: ICD-10-CM

## 2018-10-30 DIAGNOSIS — L98.9 SKIN LESION: Primary | ICD-10-CM

## 2018-10-30 DIAGNOSIS — R33.9 URINARY RETENTION: ICD-10-CM

## 2018-10-30 PROBLEM — D21.9 FIBROID: Status: ACTIVE | Noted: 2017-09-29

## 2018-10-30 PROBLEM — N28.1 RENAL CYST, RIGHT: Status: ACTIVE | Noted: 2017-08-23

## 2018-10-30 PROBLEM — N31.9 NEUROGENIC BLADDER: Status: ACTIVE | Noted: 2017-08-23

## 2018-10-30 PROBLEM — I95.1 DYSAUTONOMIA ORTHOSTATIC HYPOTENSION SYNDROME: Status: ACTIVE | Noted: 2017-02-13

## 2018-10-30 PROBLEM — E16.1 REACTIVE HYPOGLYCEMIA: Status: RESOLVED | Noted: 2018-03-06 | Resolved: 2018-10-30

## 2018-10-30 PROCEDURE — 3008F BODY MASS INDEX DOCD: CPT | Performed by: INTERNAL MEDICINE

## 2018-10-30 PROCEDURE — 1036F TOBACCO NON-USER: CPT | Performed by: INTERNAL MEDICINE

## 2018-10-30 PROCEDURE — 99214 OFFICE O/P EST MOD 30 MIN: CPT | Performed by: INTERNAL MEDICINE

## 2018-10-30 NOTE — PROGRESS NOTES
Assessment/Plan:    Mitochondrial metabolism disorder (HCC)  Doing well, tolerating IV saline weekly and tube feeds qHS  Migraine without aura and without status migrainosus, not intractable  On amitriptyline, uses Toradol IM prn  Followed by neurology  ? Botox injections    Iron deficiency  Will start IV iron  Urinary retention  Encouraged to use catheter daily  Dysphagia  Improved  S/P gastric bypass  On Tradjenta  Diagnoses and all orders for this visit:    Skin lesion  Comments:  Reassured  Refer to dermatology since with history of melanoma  Orders:  -     Ambulatory referral to Dermatology; Future    History of malignant melanoma of skin  -     Ambulatory referral to Dermatology; Future    Mitochondrial metabolism disorder (HCC)    Migraine without aura and without status migrainosus, not intractable    Iron deficiency    Urinary retention          Subjective:      Patient ID: Jeff Meek is a 41 y o  female  [place] is feeling well  She is concerned about a spot on her neck, was told it is now raised  She has an appointment with dermatology in 2 months  She reports she had an melanoma on her left arm when she was 9, has not had a skin check for several years  She reports that she has been doing well  She continues to do tube feeds in the evening, does IV saline at least once a week  She has not had any dizziness, low blood pressure or low sugar readings  Her iron is low, she will be scheduled for iron infusions  Her main issue is her more frequent migraine headaches  She is seen Urology, has tried multiple meds medications with minimal success  She is currently on a bit amitriptyline  Last weekend, she reports she needed Toradol because of a severe headache  She is taking Tradjenta for her sugars  She was recommended to self catheterize at least once a day, she does it about 3 to 4 times a week    She feels she is able to empty her bladder most of the time     She follows several specialists in the area and in at [address]  The following portions of the patient's history were reviewed and updated as appropriate: allergies, current medications, past medical history, past social history and problem list     Review of Systems   Constitutional: Negative for fatigue  Respiratory: Negative for shortness of breath  Cardiovascular: Negative for chest pain  Gastrointestinal: Negative for abdominal pain  Genitourinary: Negative for difficulty urinating and dysuria  Musculoskeletal: Negative for arthralgias  Neurological: Negative for dizziness, syncope and weakness  Psychiatric/Behavioral: Negative for sleep disturbance  Objective:      /80   Pulse 70   Temp 98 °F (36 7 °C)   Resp 18   Ht 5' 4" (1 626 m)   Wt 77 7 kg (171 lb 6 4 oz)   SpO2 97%   BMI 29 42 kg/m²          Physical Exam   Constitutional: She is oriented to person, place, and time  She appears well-developed  HENT:   Head: Normocephalic and atraumatic  Eyes: Pupils are equal, round, and reactive to light  Neck: Normal range of motion  Pulmonary/Chest: Effort normal    Neurological: She is alert and oriented to person, place, and time  Skin: Skin is warm and dry  Multiple nevi on neck, trunk and extremities  Psychiatric: She has a normal mood and affect  Her behavior is normal    Nursing note and vitals reviewed  Reviewed available records, labs

## 2018-11-02 LAB — MISCELLANEOUS LAB TEST RESULT: NORMAL

## 2018-11-14 DIAGNOSIS — E16.1 REACTIVE HYPOGLYCEMIA: ICD-10-CM

## 2018-11-20 ENCOUNTER — PATIENT MESSAGE (OUTPATIENT)
Dept: BARIATRICS | Facility: CLINIC | Age: 43
End: 2018-11-20

## 2018-11-20 NOTE — TELEPHONE ENCOUNTER
Sent an email to patient   I received some patches for CoQ10 with L-carnitine  I sent some in the mail for her to try as it does not require absorption through the GI track  The patch needs to be worn for 8 hours for maximum absorption       Product name:  CoQ10 Plus, made by Bridget BURNS

## 2018-11-27 ENCOUNTER — TELEPHONE (OUTPATIENT)
Dept: NEUROLOGY | Facility: CLINIC | Age: 43
End: 2018-11-27

## 2018-11-30 ENCOUNTER — TELEPHONE (OUTPATIENT)
Dept: CARDIOLOGY CLINIC | Facility: CLINIC | Age: 43
End: 2018-11-30

## 2018-11-30 DIAGNOSIS — I95.89 OTHER SPECIFIED HYPOTENSION: Primary | ICD-10-CM

## 2018-11-30 RX ORDER — MIDODRINE HYDROCHLORIDE 5 MG/1
5 TABLET ORAL 3 TIMES DAILY
Qty: 270 TABLET | Refills: 3 | Status: SHIPPED | OUTPATIENT
Start: 2018-11-30 | End: 2019-11-18 | Stop reason: SDUPTHER

## 2018-11-30 NOTE — TELEPHONE ENCOUNTER
----- Message from Jenna Lockhart sent at 11/30/2018  7:31 AM EST -----  Regarding: Prescription Question  Contact: [phone]  I am in need of a refill of my midodrine  Due to insurance it needs to be a 90 day script sent to [facility] mail order  Thank you!

## 2018-11-30 NOTE — TELEPHONE ENCOUNTER
----- Message from Jenna Ash sent at 11/30/2018  7:31 AM EST -----  Regarding: Prescription Question  Contact: [phone]  I am in need of a refill of my midodrine  Due to insurance it needs to be a 90 day script sent to [facility] mail order  Thank you!

## 2018-12-03 DIAGNOSIS — G43.719 INTRACTABLE CHRONIC MIGRAINE WITHOUT AURA AND WITHOUT STATUS MIGRAINOSUS: Primary | ICD-10-CM

## 2018-12-03 NOTE — PROGRESS NOTES
Neurology    Failed elavil, topamax, decadron, toradol, methocarbamol, rizatriptan  Cannot do beta blockers or CCB due to mitochondrial disease and significant orthostatic hypotension and low Bps requiring PEG tube fluids from time to time already  Cannot do Depakote due to mitochondrial disease      Thus sent for emgality after discussion with patient

## 2018-12-14 ENCOUNTER — TELEPHONE (OUTPATIENT)
Dept: BARIATRICS | Facility: CLINIC | Age: 43
End: 2018-12-14

## 2018-12-27 ENCOUNTER — TELEPHONE (OUTPATIENT)
Dept: NEUROLOGY | Facility: CLINIC | Age: 43
End: 2018-12-27

## 2018-12-27 NOTE — TELEPHONE ENCOUNTER
Pt states emgality needs PA per pharmacy    sandip [phone]    ID: [id number]  William Ayala [id number]  Pcn: Carlton Ang  rx grp: [id number]    PA submitted via CMM, will a/w determination

## 2018-12-27 NOTE — TELEPHONE ENCOUNTER
chente- I scanned this PA over to you for Dr KUMARI to sign  Please fax w/last office note & also send the scanned document from [facility] genetics 12/20  Thanks so much

## 2018-12-27 NOTE — TELEPHONE ENCOUNTER
sandip will fax paper form to backline fax  I submitted w/incorrect med name  sandip prefers to only use [employer] for online PA's or paper faxes    Will resubmit

## 2019-01-14 ENCOUNTER — TELEPHONE (OUTPATIENT)
Dept: GASTROENTEROLOGY | Facility: CLINIC | Age: 44
End: 2019-01-14

## 2019-01-14 ENCOUNTER — OFFICE VISIT (OUTPATIENT)
Dept: NEUROLOGY | Facility: CLINIC | Age: 44
End: 2019-01-14
Payer: COMMERCIAL

## 2019-01-14 VITALS
SYSTOLIC BLOOD PRESSURE: 119 MMHG | WEIGHT: 162 LBS | HEART RATE: 63 BPM | BODY MASS INDEX: 27.81 KG/M2 | DIASTOLIC BLOOD PRESSURE: 82 MMHG

## 2019-01-14 DIAGNOSIS — Z98.84 S/P GASTRIC BYPASS: ICD-10-CM

## 2019-01-14 DIAGNOSIS — I73.00 RAYNAUD'S DISEASE WITHOUT GANGRENE: ICD-10-CM

## 2019-01-14 DIAGNOSIS — G43.719 INTRACTABLE CHRONIC MIGRAINE WITHOUT AURA AND WITHOUT STATUS MIGRAINOSUS: Primary | ICD-10-CM

## 2019-01-14 DIAGNOSIS — G43.119 INTRACTABLE MIGRAINE WITH AURA WITHOUT STATUS MIGRAINOSUS: ICD-10-CM

## 2019-01-14 DIAGNOSIS — E16.2 HYPOGLYCEMIA: ICD-10-CM

## 2019-01-14 PROCEDURE — 99214 OFFICE O/P EST MOD 30 MIN: CPT | Performed by: PHYSICIAN ASSISTANT

## 2019-01-14 RX ORDER — ACARBOSE 100 MG/1
TABLET ORAL
Qty: 270 TABLET | Refills: 0 | Status: CANCELLED | OUTPATIENT
Start: 2019-01-14

## 2019-01-14 RX ORDER — VERAPAMIL HYDROCHLORIDE 40 MG/1
TABLET ORAL
Qty: 30 TABLET | Refills: 2 | Status: SHIPPED | OUTPATIENT
Start: 2019-01-14 | End: 2019-01-22 | Stop reason: ALTCHOICE

## 2019-01-14 NOTE — PROGRESS NOTES
Patient ID: Torsten Kevin is a 41 y o  female  Assessment/Plan:       Problem List Items Addressed This Visit        Cardiovascular and Mediastinum    Raynaud's disease without gangrene    Relevant Medications    verapamil (CALAN) 40 mg tablet    Other Relevant Orders    Ambulatory referral to Rheumatology       Other    S/P gastric bypass      Other Visit Diagnoses     Intractable chronic migraine without aura and without status migrainosus    -  Primary    Relevant Medications    verapamil (CALAN) 40 mg tablet    Intractable migraine with aura without status migrainosus        Relevant Medications    verapamil (CALAN) 40 mg tablet            For prevention she will repeat Emgality injection every 30 days  Loading dose was about 2 weeks ago and she denies any side effects other than mild site injection reaction with only one of the injections  She does not think that it worsened Raynaud's  She is concerned about poor circulation in the fingers and toes, and color changes as well, indicative of possible Raynaud's   I prescribed a low dose of verapamil and considering she is on midodrine for hypotension she should be cautious but I think the benefits here outweigh the risks considering she is debilitated by the symptoms of poor circulation  I reviewed the common side effects of verapamil including lightheadedness, constipation  She should start a half tab at bedtime for about a week and then b i d  If tolerated  She does not 1 repeat trigger points today, but they did help for a few days after the last visit  Toradol injections prn migraine  Cannot do PO given significant GI issues/ PEG tube, reflex issues with mitochondrial disease    Of note, Depakote C/I  I asked her to avoid Maxalt unless absolutely necessary  She does not usually take Maxalt unless debilitated by a migraine  I asked her to consider Botox injections for the prevention of chronic migraine    Handout information was provided today  She will consider  Subjective:    HPI    Essence Cruz is a pleasant 40 yo female seen in follow up for migraines        Since last seen she tried and failed Topamax and Elavil so far  She states she no longer takes amitriptyline because it was not effective  She reports a site injection reaction of mild redness and swelling with 1 injection of Emgality, the other injection during loading dose was uneventful  She had these injections 2 weeks ago  She does not notice a huge difference yet in terms of migraine prevention  She is considering Botox  She reports poor circulation, pain and sometimes numbness and tingling in the fingertips and toes  She reports color changes such as white fingers, sometimes blue and purple, she denies a grayish or black discoloration  She states that her circulation is there but is very poor  She has had poor circulation her whole life, but recently in the winter months it started to become debilitating  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[employer] and has difficulty doing her job due to the pain in her fingers  She uses multiple gloves, and heat packs which burn at times but improves the discomfort temporarily  She states that she cannot walk her 6year old daughter the bus because she does not like to be outside as this worsens the circulation and pain      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 with port 3x/ weekly  Denies any passing out events  Still has bouts of hypoglycemia and is off TPN, does have PEG tube being managed by Dr Landaverde and Dr Winkler due to esophageal dysmotility- is on altered diet based on the day      States now headaches 5 days a week and bad headaches 3 days a week with severe photophobia, phonophobia, nausea and one episode of retching and emesis  Headaches are >15 days out of the month and last at least 4 hr or greater  States tried Imitrex in her 25s and doesn't remember how she did with that  Medications tried and failed:  Maxalt made her completely dazed and confused- states felt like she could not function  States Topamax minimally helpful, but already noting some cognitive deficits at 59 mg thus discontinued  Periactin- brain fog  Has orthostatic hypotension and passing out events periodically thus cannot be on a beta blocker or CCB  States she checked with [facility] genetics and they were okay with anything other than Depakote  Failed elavil      Description: Pressure on the right side of the head, photophobia, phonophobia, flashing lights, no nausea and vomiting, sleep can help  States triptans are okay for her as she has metabolic strokes  She is agreeable to trial of Sumatriptan SC  With her GI issues she is okay with Toradol injections but not not Toradol or other NSAIDs PO  Short course of steroids helpful first two days but then the headaches came back- takes this with carafate  Tells me if she ever has a metabolic stroke the go to person is Dr Margarita Mena as they have regimen for acute metabolic stroke with her mitochondrial encephalopathy      Other medications tried and failed: gabapentin- trouble talking and forming sentences  Topamax minimally helpful but is starting to note cognitive deficits at 50 mg dosing  States daughter and son with severe mitochondrial disease  Denies any syncopal events  The following portions of the patient's history were reviewed and updated as appropriate: allergies, current medications, past family history, past medical history, past social history, past surgical history and problem list      Had a sleep study that was great  Cough, sneeze, exercise are not triggers  The following portions of the patient's history were reviewed and updated as appropriate:   She  has a past medical history of Acid reflux; Acid reflux; Constipated; Dysautonomia ([address] ); Esophageal abnormality; Gastrostomy tube in place [facility]); History of blood clots (2012);  Iron deficiency; Malignant hyperthermia due to anesthesia; Migraines; 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[facility] ); 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[facility] ); MTHFR (methylene THF reductase) deficiency and homocystinuria ([facility] ); Muscle weakness (generalized); Nausea; On total parenteral nutrition (TPN); PCOS (polycystic ovarian syndrome); PONV (postoperative nausea and vomiting); Postgastrectomy syndrome; Status post PICC central line placement; Stroke [facility]) (2004, 2009); and Vomiting  She   Patient Active Problem List    Diagnosis Date Noted    Raynaud's disease without gangrene 01/14/2019    History of malignant melanoma of skin 10/30/2018    MTHFR mutation (methylenetetrahydrofolate reductase) ([place] [address] ) 08/08/2018    Orthostatic hypotension 04/10/2018    Hyperparathyroidism , secondary, non-renal ([place] [address] ) 03/06/2018    Hirsutism 03/06/2018    Fibroid 09/29/2017    Renal cyst, right 08/23/2017    Neurogenic bladder 08/23/2017    Dysautonomia orthostatic hypotension syndrome (HCC) 02/13/2017    Postgastrectomy malabsorption 06/10/2016    S/P gastric bypass 06/10/2016    Iron deficiency 06/10/2016    Constipation 06/10/2016    Mitochondrial metabolism disorder ([facility] ) 06/10/2016    GERD (gastroesophageal reflux disease) 06/10/2016    Migraine without aura and without status migrainosus, not intractable 06/10/2016    History of Nissen fundoplication 57/69/0660    Dysphagia 04/19/2016    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    Family history of endocrine and metabolic disease 49/17/3580    Esophageal dysmotility 12/04/2015    Urinary retention 12/04/2015    Retinitis pigmentosa 12/04/2015    Nyctalopia 12/04/2015    Polycystic ovarian syndrome 03/12/2013    Hypertension 02/06/2013    Chronic venous embolism and thrombosis of deep vessels of distal lower extremity ([facility] ) 07/10/2012     She  has a past surgical history that includes Ulnar nerve transposition (Right); Gastric bypass; Ankle surgery (Left);  Nissen fundoplication; IVC FILTER INSERTION; Cosmetic surgery; Dilation and curettage of uterus; [place] tooth extraction; Colonoscopy; Fracture surgery (Left); Ovary surgery (Bilateral); pr lap,gastrostomy,w/o tube constr (N/A, 4/19/2016); pr egd transoral biopsy single/multiple (N/A, 3/9/2016); and Esophagogastroduodenoscopy (N/A, 1/27/2016)  Her family history includes Anuerysm in her paternal uncle; Breast cancer in her maternal grandmother; Cancer in her mother; Clotting disorder in her father and mother; Coronary artery disease in her maternal grandmother; Depression in her brother and father; Endocrinopathy in her father; Heart failure (age of onset: 80) in her maternal grandmother; Hypertension in her mother; Mitochondrial disorder in her daughter, mother, and son; Narcolepsy in her brother; Stroke in her family; Thyroid cancer in her mother  She  reports that she has never smoked  She has never used smokeless tobacco  She reports that she does not drink alcohol or use drugs  Current Outpatient Prescriptions   Medication Sig Dispense Refill    acarbose (PRECOSE) 100 MG tablet TAKE ONE TABLET BY MOUTH 3 TIMES A DAY WITH MEALS 270 tablet 0    amitriptyline (ELAVIL) 10 mg tablet 1 tab HS X 1 week, then increase to 2 tabs qhs 150 tablet 0    B Complex-Biotin-FA (TH VITAMIN B 50/B-COMPLEX PO) Take 1 tablet by mouth daily   Calcium Carbonate Antacid 1250 mg/5 mL Take by mouth      COENZYME Q-10 PO       dexamethasone (DECADRON) 1 mg tablet Take 2 tabs daily x 2 days, then 1 tab daily x 2 days, then half tab x 2 days  Take in AM with food or antacid 7 tablet 0    docusate sodium (COLACE) 100 mg capsule Take 200 mg by mouth 2 (two) times a day   Galcanezumab-gnlm (EMGALITY) 120 MG/ML SOAJ Two injections SC for first use (one injection in each thigh) with once monthly injection afterward 3 pen 0    ketorolac (TORADOL) 30 mg/mL injection Administer 1 ml by IM route at onset of migraine, max 1 injection in 24 hours   Max 2-3 days a week  Do not take with other NSAIDs 10 mL 0    L-Arginine 1000 MG TABS Take 1,000 mg by mouth 2 (two) times a day      Lactobacillus-Inulin ([address]) CAPS Take by mouth      levOCARNitine (CARNITOR) 1 g/10 mL solution Take 100 mg by mouth 4 (four) times a day (before meals and at bedtime)      Linagliptin 5 MG TABS Take 5 mg by mouth daily 90 tablet 0    methocarbamol (ROBAXIN) 500 mg tablet Take 1 tablet (500 mg total) by mouth daily at bedtime 10 tablet 0    midodrine (PROAMATINE) 5 mg tablet Take 1 tablet (5 mg total) by mouth 3 (three) times a day 270 tablet 3    Multiple Vitamin (MULTIVITAMIN) tablet Take 1 tablet by mouth daily   pantoprazole (PROTONIX) 40 mg tablet Take 40 mg by mouth 2 (two) times a day   polyethylene glycol (MIRALAX) 17 g packet Take 17 g by mouth 2 (two) times a day Indications: 1 5 caps full 1-2 times/day   rizatriptan (MAXALT-MLT) 5 MG disintegrating tablet Take 1-2 tabs at migraine onset, can repeat once in 2 hours  Max 4 tabs in 24 hours  12 tablet 1    sucralfate (CARAFATE) 1 g/10 mL suspension Take 10 mL (1 g total) by mouth 4 (four) times a day 420 mL 1    Syringe/Needle, Disp, (SYRINGE 3CC/35UE8-6/4") 27G X 1-1/4" 3 ML MISC Use with Toradol IM injection 15 each 0    verapamil (CALAN) 40 mg tablet 20 mg or 1/2 tab qhs x 5 days, then BID if tolerated  30 tablet 2     No current facility-administered medications for this visit  She is allergic to sulfa antibiotics; guaifenesin; and other            Objective:    Blood pressure 119/82, pulse 63, weight 73 5 kg (162 lb), not currently breastfeeding  Physical Exam    Neurological Exam  Vital signs reviewed  Well developed, well nourished  Head: Normocephalic, atraumatic  CN 2-43: intact and symmetric, including EOMs which are normal b/l and PERRL  MSK: 5/5 t/o  ROM normal x all 4 extr  Sensation: Inact to LT and temp x4 extr    Fingers and hands are cold to the touch, and there is some sensory loss to temperature and pin in the fingers bilaterally  I do not observe redness or blue discoloration  Hands are pale with slow capillary refill  No weakness of finger abduction or   Reflexes: 2+ and symmetric in all 4 extr  Coordination: Nml x4 extr  Gait: Steady normal gait  ROS:    Review of Systems   Constitutional: Positive for fatigue  HENT: Positive for trouble swallowing  Sore throat, Hoarseness   Eyes: Negative  Respiratory: Negative  Cardiovascular: Negative  Gastrointestinal: Negative  Endocrine: Positive for cold intolerance (pain in feet/hands)  Genitourinary: Negative  Musculoskeletal: Negative  Skin: Negative  Allergic/Immunologic: Negative  Neurological: Positive for dizziness, light-headedness and headaches  Memory problems   Hematological: Negative  Psychiatric/Behavioral: Positive for sleep disturbance (Increase sleepiness)  The following portions of the patient's history were reviewed and updated as appropriate: allergies, current medications/ medication history, past family history, past medical history, past social history, past surgical history and problem list     Review of systems was reviewed and otherwise unremarkable from a neurological perspective

## 2019-01-15 DIAGNOSIS — E16.2 HYPOGLYCEMIA: ICD-10-CM

## 2019-01-15 RX ORDER — ACARBOSE 100 MG/1
100 TABLET ORAL
Qty: 270 TABLET | Refills: 0 | Status: SHIPPED | OUTPATIENT
Start: 2019-01-15 | End: 2019-01-28 | Stop reason: SDUPTHER

## 2019-01-15 RX ORDER — ACARBOSE 100 MG/1
100 TABLET ORAL
Qty: 270 TABLET | Refills: 0 | Status: CANCELLED | OUTPATIENT
Start: 2019-01-15

## 2019-01-18 ENCOUNTER — TELEPHONE (OUTPATIENT)
Dept: NEUROLOGY | Facility: CLINIC | Age: 44
End: 2019-01-18

## 2019-01-18 DIAGNOSIS — I73.00 RAYNAUD'S DISEASE WITHOUT GANGRENE: Primary | ICD-10-CM

## 2019-01-18 NOTE — TELEPHONE ENCOUNTER
----- Message from Markus Guthrie DO sent at 1/14/2019 12:55 PM EST -----  Hi Katarzyna Howard,  The first thing I would do is change the verapamil  The dihydropyridine calcium channel blockers (amlodipine, nifedipine) are the best for raynaud's because they have more of the peripheral vasodilatory effects than verapamil, which is more cardiac-specific  I usually start with one of those CCB at a low dose first and see how the patient does before adding anything else  If their BP tolerates the low dose and it's not helping much, try increasing the dose with an eye on their BP  The most important thing you can tell her is keeping warm, not just hands and toes, but core body temp is the most important thing  We are happy to see her, but I would encourage her to take a new patient appointment with Dr Shashank Bell who is located at the [facility] which is only about 10-15 min from [facility], since my new patient slots are now booking into July unfortunately  Dr Shashank Bell should have openings within 1-2 months  Hope that helps! Thanks,  Daniel Dickens     ----- Message -----  From: Robel Bishop PA-C  Sent: 1/14/2019  10:32 AM  To: DO Suraj Salcedo Dr,  I am seeing this pt for migraines and she also has mitochondrial disease (seeing [facility])  She tells me for the past few weeks her finger pain/ circulation has been terrible and burning pain in the finger tips, etc  She definitely has a circulation problem and possibly raynaud's  I did speak with the neurologist and we agreed to give her low dose verapamil to see if this would help  Do you mind seeing her or adding recs? You have not seen this pt yet so you may not be able to recommend anything until you see her  Thanks in advance for your help    Katarzyna Howard

## 2019-01-18 NOTE — TELEPHONE ENCOUNTER
Patient returning M C's call  She wanted to let her know that she "hasn't noticed much change" since starting the verapamil  Not sure if her headaches are weather related? She also said she will be making an appt with rheumatologist   Umer Leone can try to call her on her cell [phone] (she is at work til 4:30)

## 2019-01-21 NOTE — TELEPHONE ENCOUNTER
Please attempt to contact her re: recs below  Basically just want to tell her that verapamil not greatest choice for raynauds and if she is willing I could give her amlodipine per Dr Cooney Elder  She would obviously need to stop the verapamil  Thanks

## 2019-01-22 RX ORDER — AMLODIPINE BESYLATE 5 MG/1
TABLET ORAL
Qty: 30 TABLET | Refills: 2 | Status: SHIPPED | OUTPATIENT
Start: 2019-01-22 | End: 2019-03-04 | Stop reason: SDUPTHER

## 2019-01-22 NOTE — TELEPHONE ENCOUNTER
Sent amlodipine  I sent another referral for her to see Dr Apple Corey, as noted below this rheumatologist is a shorter wait versus Dr See Bains  I will mail to her  Thanks

## 2019-01-28 ENCOUNTER — OFFICE VISIT (OUTPATIENT)
Dept: ENDOCRINOLOGY | Facility: CLINIC | Age: 44
End: 2019-01-28
Payer: COMMERCIAL

## 2019-01-28 ENCOUNTER — APPOINTMENT (OUTPATIENT)
Dept: LAB | Facility: CLINIC | Age: 44
End: 2019-01-28
Payer: COMMERCIAL

## 2019-01-28 ENCOUNTER — TRANSCRIBE ORDERS (OUTPATIENT)
Dept: LAB | Facility: CLINIC | Age: 44
End: 2019-01-28

## 2019-01-28 VITALS
WEIGHT: 175 LBS | BODY MASS INDEX: 29.88 KG/M2 | SYSTOLIC BLOOD PRESSURE: 116 MMHG | HEART RATE: 66 BPM | HEIGHT: 64 IN | DIASTOLIC BLOOD PRESSURE: 78 MMHG

## 2019-01-28 DIAGNOSIS — E21.1 HYPERPARATHYROIDISM , SECONDARY, NON-RENAL (HCC): ICD-10-CM

## 2019-01-28 DIAGNOSIS — L68.0 HIRSUTISM: ICD-10-CM

## 2019-01-28 DIAGNOSIS — E16.2 HYPOGLYCEMIA: ICD-10-CM

## 2019-01-28 DIAGNOSIS — D50.8 OTHER IRON DEFICIENCY ANEMIA: ICD-10-CM

## 2019-01-28 DIAGNOSIS — E16.1 REACTIVE HYPOGLYCEMIA: Primary | ICD-10-CM

## 2019-01-28 DIAGNOSIS — E16.1 REACTIVE HYPOGLYCEMIA: ICD-10-CM

## 2019-01-28 LAB
ALBUMIN SERPL BCP-MCNC: 3.8 G/DL (ref 3.5–5)
ALP SERPL-CCNC: 75 U/L (ref 46–116)
ALT SERPL W P-5'-P-CCNC: 29 U/L (ref 12–78)
ANION GAP SERPL CALCULATED.3IONS-SCNC: 4 MMOL/L (ref 4–13)
AST SERPL W P-5'-P-CCNC: 14 U/L (ref 5–45)
BASOPHILS # BLD AUTO: 0.05 THOUSANDS/ΜL (ref 0–0.1)
BASOPHILS NFR BLD AUTO: 1 % (ref 0–1)
BILIRUB SERPL-MCNC: 0.3 MG/DL (ref 0.2–1)
BUN SERPL-MCNC: 14 MG/DL (ref 5–25)
CALCIUM SERPL-MCNC: 9.7 MG/DL (ref 8.3–10.1)
CHLORIDE SERPL-SCNC: 105 MMOL/L (ref 100–108)
CO2 SERPL-SCNC: 32 MMOL/L (ref 21–32)
CREAT SERPL-MCNC: 0.71 MG/DL (ref 0.6–1.3)
EOSINOPHIL # BLD AUTO: 0.06 THOUSAND/ΜL (ref 0–0.61)
EOSINOPHIL NFR BLD AUTO: 1 % (ref 0–6)
ERYTHROCYTE [DISTWIDTH] IN BLOOD BY AUTOMATED COUNT: 11.8 % (ref 11.6–15.1)
FERRITIN SERPL-MCNC: 67 NG/ML (ref 8–388)
GFR SERPL CREATININE-BSD FRML MDRD: 106 ML/MIN/1.73SQ M
GLUCOSE SERPL-MCNC: 83 MG/DL (ref 65–140)
HCT VFR BLD AUTO: 41 % (ref 34.8–46.1)
HGB BLD-MCNC: 13.7 G/DL (ref 11.5–15.4)
IMM GRANULOCYTES # BLD AUTO: 0.01 THOUSAND/UL (ref 0–0.2)
IMM GRANULOCYTES NFR BLD AUTO: 0 % (ref 0–2)
IRON SATN MFR SERPL: 25 %
IRON SERPL-MCNC: 87 UG/DL (ref 50–170)
LYMPHOCYTES # BLD AUTO: 1.54 THOUSANDS/ΜL (ref 0.6–4.47)
LYMPHOCYTES NFR BLD AUTO: 31 % (ref 14–44)
MCH RBC QN AUTO: 31.1 PG (ref 26.8–34.3)
MCHC RBC AUTO-ENTMCNC: 33.4 G/DL (ref 31.4–37.4)
MCV RBC AUTO: 93 FL (ref 82–98)
MONOCYTES # BLD AUTO: 0.3 THOUSAND/ΜL (ref 0.17–1.22)
MONOCYTES NFR BLD AUTO: 6 % (ref 4–12)
NEUTROPHILS # BLD AUTO: 2.94 THOUSANDS/ΜL (ref 1.85–7.62)
NEUTS SEG NFR BLD AUTO: 61 % (ref 43–75)
NRBC BLD AUTO-RTO: 0 /100 WBCS
PLATELET # BLD AUTO: 217 THOUSANDS/UL (ref 149–390)
PMV BLD AUTO: 9.6 FL (ref 8.9–12.7)
POTASSIUM SERPL-SCNC: 3.9 MMOL/L (ref 3.5–5.3)
PROT SERPL-MCNC: 7.5 G/DL (ref 6.4–8.2)
PTH-INTACT SERPL-MCNC: 57.8 PG/ML (ref 18.4–80.1)
RBC # BLD AUTO: 4.41 MILLION/UL (ref 3.81–5.12)
SODIUM SERPL-SCNC: 141 MMOL/L (ref 136–145)
TIBC SERPL-MCNC: 344 UG/DL (ref 250–450)
WBC # BLD AUTO: 4.9 THOUSAND/UL (ref 4.31–10.16)

## 2019-01-28 PROCEDURE — 82728 ASSAY OF FERRITIN: CPT

## 2019-01-28 PROCEDURE — 99214 OFFICE O/P EST MOD 30 MIN: CPT | Performed by: PHYSICIAN ASSISTANT

## 2019-01-28 PROCEDURE — 80053 COMPREHEN METABOLIC PANEL: CPT

## 2019-01-28 PROCEDURE — 83918 ORGANIC ACIDS TOTAL QUANT: CPT

## 2019-01-28 PROCEDURE — 83540 ASSAY OF IRON: CPT

## 2019-01-28 PROCEDURE — 36415 COLL VENOUS BLD VENIPUNCTURE: CPT

## 2019-01-28 PROCEDURE — 85025 COMPLETE CBC W/AUTO DIFF WBC: CPT

## 2019-01-28 PROCEDURE — 83550 IRON BINDING TEST: CPT

## 2019-01-28 PROCEDURE — 83970 ASSAY OF PARATHORMONE: CPT

## 2019-01-28 PROCEDURE — 83036 HEMOGLOBIN GLYCOSYLATED A1C: CPT

## 2019-01-28 RX ORDER — BLOOD-GLUCOSE METER
EACH MISCELLANEOUS
Qty: 1 KIT | Refills: 1 | Status: SHIPPED | OUTPATIENT
Start: 2019-01-28 | End: 2021-05-03

## 2019-01-28 RX ORDER — BLOOD SUGAR DIAGNOSTIC
STRIP MISCELLANEOUS
Qty: 300 EACH | Refills: 1 | Status: SHIPPED | OUTPATIENT
Start: 2019-01-28 | End: 2020-09-02 | Stop reason: SDUPTHER

## 2019-01-28 RX ORDER — ACARBOSE 100 MG/1
100 TABLET ORAL
Qty: 270 TABLET | Refills: 3 | Status: SHIPPED | OUTPATIENT
Start: 2019-01-28 | End: 2020-01-03 | Stop reason: SDUPTHER

## 2019-01-29 ENCOUNTER — TELEPHONE (OUTPATIENT)
Dept: ENDOCRINOLOGY | Facility: CLINIC | Age: 44
End: 2019-01-29

## 2019-01-29 LAB
EST. AVERAGE GLUCOSE BLD GHB EST-MCNC: 100 MG/DL
HBA1C MFR BLD: 5.1 % (ref 4.2–6.3)

## 2019-01-29 NOTE — ASSESSMENT & PLAN NOTE
Continues with hypoglycemia unawareness and Hypoglycemia in Dexcom report 10% of time  Severity and frequency of hypoglycemia has improved with Dpp4 inhibitors, Acarbose, Dietary modifications, and use of DexCom  Continue current regimen/CGM

## 2019-01-29 NOTE — PROGRESS NOTES
Established Patient Progress Note       Chief Complaint   Patient presents with    Hypoglycemia        History of Present Illness:     Serafin Gudino is a 41 y o  female with a history of hypoglycemia that has developed after gastric bypass surgery for severe GI disorder  Trying to get into rheumatology  Suffering from migraines, follows with neurology  On TPN and IV fluids  Following with genetics at [facility]  For hypoglycemia, she is using DexCom CGM  She will be getting G6 soon  She is taking tradjenta and Acarbose  With these medications, she can tolerate about 10g carb per meal   Prior to that, could only tolerate about 2g  She continues with hypoglycemia but much better than in the past   She has had hypoglycemia alarms on dexcom and didn't feel symptoms even when BG as low as 45  She has history of secondary hyperparathyroidism and has increased her calcium intake and also takes Vitamin D supplements       Patient Active Problem List   Diagnosis    Dysphagia    Postgastrectomy malabsorption    S/P gastric bypass    Iron deficiency    Constipation    Mitochondrial metabolism disorder (HCC)    GERD (gastroesophageal reflux disease)    Migraine without aura and without status migrainosus, not intractable    History of Nissen fundoplication    Reactive hypoglycemia    Hyperparathyroidism , secondary, non-renal (HCC)    Hirsutism    Orthostatic hypotension    MTHFR mutation (methylenetetrahydrofolate reductase) (HCC)    History of malignant melanoma of skin    Chronic venous embolism and thrombosis of deep vessels of distal lower extremity (HCC)    Congenital anomaly of lower limb    Family history of endocrine and metabolic disease    Esophageal dysmotility    Dysautonomia orthostatic hypotension syndrome (HCC)    Fibroid    Urinary retention    Renal cyst, right    Retinitis pigmentosa    Polycystic ovarian syndrome    Neurogenic bladder    Nyctalopia    Hypertension    Raynaud's disease without gangrene      Past Medical History:   Diagnosis Date    Acid reflux     Acid reflux     Constipated     Dysautonomia (HCC)     Esophageal abnormality     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   Gastrostomy tube in place [facility])     History of blood clots 2012    Left leg  Has IVC filter now  Occurred while on Lovenox    Iron deficiency     Malignant hyperthermia due to anesthesia     Patient's son has M H   States she needs precautions    Migraines     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[facility] [address] )     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[facility] )     MTHFR (methylene THF reductase) deficiency and homocystinuria (HCC)     Muscle weakness (generalized)     Nausea     On total parenteral nutrition (TPN)     for 8 mts    PCOS (polycystic ovarian syndrome)     PONV (postoperative nausea and vomiting)     Postgastrectomy syndrome     malabsorption    Status post PICC central line placement     Right upper arm  Receives TPN    Stroke [facility]) 7525, 9587    Metabolic strokes  Right hemiparesis= minor now   Vomiting     When eating      Past Surgical History:   Procedure Laterality Date    ANKLE SURGERY Left     Has plate and screws    COLONOSCOPY      COSMETIC SURGERY      X14 as child post attack by dog  Arms, legs and face    DILATION AND CURETTAGE OF UTERUS      x2    ESOPHAGOGASTRODUODENOSCOPY N/A 1/27/2016    Procedure: ESOPHAGOGASTRODUODENOSCOPY (EGD); Surgeon: Elaine Peña MD;  Location: [facility]; Service:     FRACTURE SURGERY Left     Left ankle - hardware    GASTRIC BYPASS      IVC FILTER INSERTION      NISSEN FUNDOPLICATION      OVARY SURGERY Bilateral     "Drilling" due to multiple cysts- PCOS    ME EGD TRANSORAL BIOPSY SINGLE/MULTIPLE N/A 3/9/2016    Procedure: ESOPHAGOGASTRODUODENOSCOPY (EGD); Surgeon: Elaine Peña MD;  Location: [facility];   Service: Bariatrics    ME LAP,GASTROSTOMY,W/O TUBE CONSTR N/A 4/19/2016    Procedure: INSERTION GASTROSTOMY TUBE LAPAROSCOPIC WITH INTRAOP EGD;  Surgeon: Ramona Britt MD;  Location: [facility];  Service: Bariatrics    ULNAR NERVE TRANSPOSITION Right     WISDOM TOOTH EXTRACTION        Family History   Problem Relation Age of Onset    Mitochondrial disorder Mother     Cancer Mother     Hypertension Mother     Thyroid cancer Mother     Clotting disorder Mother         MTFR   [place] Depression Father     Endocrinopathy Father     Clotting disorder Father         MTFR    Depression Brother     Narcolepsy Brother     Breast cancer Maternal Grandmother     Heart failure Maternal Grandmother 80    Coronary artery disease Maternal Grandmother     Mitochondrial disorder Son     Mitochondrial disorder Daughter     Stroke Family     Anuerysm Paternal Uncle         abdominal     Heart attack Neg Hx     Arrhythmia Neg Hx     Sudden death Neg Hx         scd     Social History   Substance Use Topics    Smoking status: Never Smoker    Smokeless tobacco: Never Used    Alcohol use No     Allergies   Allergen Reactions    Sulfa Antibiotics Other (See Comments)     Arrhythmia     Guaifenesin Other (See Comments)     Arrhythmia    Other Other (See Comments)     Arrhythmia       Current Outpatient Prescriptions:     acarbose (PRECOSE) 100 MG tablet, Take 1 tablet (100 mg total) by mouth 3 (three) times a day with meals for 300 days, Disp: 270 tablet, Rfl: 3    amLODIPine (NORVASC) 5 mg tablet, 5 mg qd x 1 week, if needed can increase to 10 mg qd  Hold verapamil , Disp: 30 tablet, Rfl: 2    B Complex-Biotin-FA (TH VITAMIN B 50/B-COMPLEX PO), Take 1 tablet by mouth daily    , Disp: , Rfl:     Calcium Carbonate Antacid 1250 mg/5 mL, Take by mouth, Disp: , Rfl:     COENZYME Q-10 PO, , Disp: , Rfl:     docusate sodium (COLACE) 100 mg capsule, Take 200 mg by mouth 2 (two) times a day , Disp: , Rfl:     esomeprazole (NEXIUM) 20 mg capsule, Take 20 mg by mouth every morning before breakfast, Disp: , Rfl:     Galcanezumab-gnlm (EMGALITY) 120 MG/ML SOAJ, Two injections SC for first use (one injection in each thigh) with once monthly injection afterward, Disp: 3 pen, Rfl: 0    ketorolac (TORADOL) 30 mg/mL injection, Administer 1 ml by IM route at onset of migraine, max 1 injection in 24 hours  Max 2-3 days a week  Do not take with other NSAIDs, Disp: 10 mL, Rfl: 0    L-Arginine 1000 MG TABS, Take 1,000 mg by mouth 2 (two) times a day, Disp: , Rfl:     levOCARNitine (CARNITOR) 1 g/10 mL solution, Take 100 mg by mouth 4 (four) times a day (before meals and at bedtime), Disp: , Rfl:     Linagliptin (TRADJENTA) 5 MG TABS, Take 5 mg by mouth daily for 90 days, Disp: 90 mg, Rfl: 3    midodrine (PROAMATINE) 5 mg tablet, Take 1 tablet (5 mg total) by mouth 3 (three) times a day, Disp: 270 tablet, Rfl: 3    Multiple Vitamin (MULTIVITAMIN) tablet, Take 1 tablet by mouth daily  , Disp: , Rfl:     polyethylene glycol (MIRALAX) 17 g packet, Take 17 g by mouth 2 (two) times a day Indications: 1 5 caps full 1-2 times/day   , Disp: , Rfl:     sucralfate (CARAFATE) 1 g/10 mL suspension, Take 10 mL (1 g total) by mouth 4 (four) times a day, Disp: 420 mL, Rfl: 1    Syringe/Needle, Disp, (SYRINGE 3CC/25EI6-5/4") 27G X 1-1/4" 3 ML MISC, Use with Toradol IM injection, Disp: 15 each, Rfl: 0    Blood Glucose Monitoring Suppl (ONETOUCH VERIO) w/Device KIT, Dispense 1 meter, Disp: 1 kit, Rfl: 1    ONETOUCH DELICA LANCETS FINE MISC, Check BG 3x per day, Disp: 300 each, Rfl: 3    ONETOUCH VERIO test strip, Check BG 3x per day, Disp: 300 each, Rfl: 1    Review of Systems   Constitutional: Negative for activity change, appetite change, chills, diaphoresis, fatigue, fever and unexpected weight change  HENT: Positive for voice change  Negative for trouble swallowing  Eyes: Negative for visual disturbance  Respiratory: Negative for shortness of breath  Cardiovascular: Negative for chest pain and palpitations     Gastrointestinal: Negative for abdominal pain, constipation and diarrhea  Endocrine: Negative for cold intolerance, heat intolerance, polydipsia, polyphagia and polyuria  Genitourinary: Negative for frequency and menstrual problem  Musculoskeletal: Positive for myalgias  Negative for arthralgias  Skin: Negative for rash  Allergic/Immunologic: Negative for food allergies  Neurological: Positive for dizziness and headaches  Negative for tremors  Hematological: Negative for adenopathy  Psychiatric/Behavioral: Negative for sleep disturbance  All other systems reviewed and are negative  Physical Exam:  Body mass index is 30 04 kg/m²  /78   Pulse 66   Ht 5' 4" (1 626 m)   Wt 79 4 kg (175 lb)   BMI 30 04 kg/m²    Wt Readings from Last 3 Encounters:   01/28/19 79 4 kg (175 lb)   01/14/19 73 5 kg (162 lb)   10/30/18 77 7 kg (171 lb 6 4 oz)       Physical Exam   Constitutional: She is oriented to person, place, and time  She appears well-developed and well-nourished  No distress  HENT:   Head: Normocephalic and atraumatic  Eyes: Pupils are equal, round, and reactive to light  Conjunctivae are normal    Neck: Normal range of motion  Neck supple  No thyromegaly present  Cardiovascular: Normal rate, regular rhythm and normal heart sounds  Pulmonary/Chest: Effort normal and breath sounds normal  No respiratory distress  She has no wheezes  She has no rales  Abdominal: Soft  Bowel sounds are normal  She exhibits no distension  There is no tenderness  Musculoskeletal: Normal range of motion  She exhibits no edema  Neurological: She is alert and oriented to person, place, and time  Skin: Skin is warm and dry  Psychiatric: She has a normal mood and affect  Vitals reviewed        Labs:   Component      Latest Ref Rng & Units 8/15/2017 12/5/2017 7/26/2018 10/12/2018   Sodium      136 - 145 mmol/L       Potassium      3 5 - 5 3 mmol/L       Chloride      100 - 108 mmol/L       CO2      21 - 32 mmol/L       Anion Gap      4 - 13 mmol/L       BUN      5 - 25 mg/dL       Creatinine      0 60 - 1 30 mg/dL       Glucose, Random      65 - 140 mg/dL       Calcium      8 3 - 10 1 mg/dL       AST      5 - 45 U/L       ALT      12 - 78 U/L       Alkaline Phosphatase      46 - 116 U/L       Total Protein      6 4 - 8 2 g/dL       Albumin      3 5 - 5 0 g/dL       TOTAL BILIRUBIN      0 20 - 1 00 mg/dL       eGFR      ml/min/1 73sq m       Hemoglobin A1C      4 2 - 6 3 % 5 2      EAG      mg/dl 103      PARATHYROID HORMONE      18 4 - 80 1 pg/mL  101 4 (H) 48 9    Vit D, 25-Hydroxy      30 0 - 100 0 ng/mL  38 0     T4 TOTAL      4 7 - 13 3 ug/dL    7 6   T3 REVERSE      9 2 - 24 1 ng/dL    18 0   T3, Free      2 30 - 4 20 pg/mL    2 72   TSH 3RD GENERATON      0 358 - 3 740 uIU/mL    1 307     Component      Latest Ref Rng & Units 1/28/2019   Sodium      136 - 145 mmol/L 141   Potassium      3 5 - 5 3 mmol/L 3 9   Chloride      100 - 108 mmol/L 105   CO2      21 - 32 mmol/L 32   Anion Gap      4 - 13 mmol/L 4   BUN      5 - 25 mg/dL 14   Creatinine      0 60 - 1 30 mg/dL 0 71   Glucose, Random      65 - 140 mg/dL 83   Calcium      8 3 - 10 1 mg/dL 9 7   AST      5 - 45 U/L 14   ALT      12 - 78 U/L 29   Alkaline Phosphatase      46 - 116 U/L 75   Total Protein      6 4 - 8 2 g/dL 7 5   Albumin      3 5 - 5 0 g/dL 3 8   TOTAL BILIRUBIN      0 20 - 1 00 mg/dL 0 30   eGFR      ml/min/1 73sq m 106   Hemoglobin A1C      4 2 - 6 3 %    EAG      mg/dl    PARATHYROID HORMONE      18 4 - 80 1 pg/mL    Vit D, 25-Hydroxy      30 0 - 100 0 ng/mL    T4 TOTAL      4 7 - 13 3 ug/dL    T3 REVERSE      9 2 - 24 1 ng/dL    T3, Free      2 30 - 4 20 pg/mL    TSH 3RD GENERATON      0 358 - 3 740 uIU/mL        Impression & Plan:    Problem List Items Addressed This Visit     Reactive hypoglycemia - Primary     Continues with hypoglycemia unawareness and Hypoglycemia in Dexcom report 10% of time    Severity and frequency of hypoglycemia has improved with Dpp4 inhibitors, Acarbose, Dietary modifications, and use of DexCom  Continue current regimen/CGM  Relevant Medications    Blood Glucose Monitoring Suppl (ONETOUCH VERIO) w/Device KIT    ONETOUCH VERIO test strip    ONETOUCH DELICA LANCETS FINE MISC    Linagliptin (TRADJENTA) 5 MG TABS    Other Relevant Orders    HEMOGLOBIN A1C W/ EAG ESTIMATION Lab Collect    Hyperparathyroidism , secondary, non-renal ([facility] [address] )     Improved on 7/2018 labs  Continue to monitor and continue Calcium/Vitamin D  Relevant Orders    Albumin Lab Collect    PTH, intact Lab Collect Lab Collect    Calcium Lab Collect    Hirsutism      Other Visit Diagnoses     Hypoglycemia        Relevant Medications    acarbose (PRECOSE) 100 MG tablet    Other Relevant Orders    HEMOGLOBIN A1C W/ EAG ESTIMATION Lab Collect          Orders Placed This Encounter   Procedures    Albumin Lab Collect     Standing Status:   Future     Number of Occurrences:   1     Standing Expiration Date:   1/28/2020    PTH, intact Lab Collect Lab Collect     Standing Status:   Future     Number of Occurrences:   1     Standing Expiration Date:   1/28/2020    Calcium Lab Collect     Standing Status:   Future     Number of Occurrences:   1     Standing Expiration Date:   1/28/2020    HEMOGLOBIN A1C W/ EAG ESTIMATION Lab Collect     Standing Status:   Future     Number of Occurrences:   1     Standing Expiration Date:   1/28/2020       There are no Patient Instructions on file for this visit  Discussed with the patient and all questioned fully answered  She will call me if any problems arise  Follow-up appointment in 6 months       Counseled patient on diagnostic results, prognosis, risk and benefit of treatment options, instruction for management, importance of treatment compliance, Risk  factor reduction and impressions      Mónica Vickers PA-C

## 2019-01-30 ENCOUNTER — TELEPHONE (OUTPATIENT)
Dept: ENDOCRINOLOGY | Facility: CLINIC | Age: 44
End: 2019-01-30

## 2019-01-30 NOTE — TELEPHONE ENCOUNTER
----- Message from Mónica Vickers PA-C sent at 1/30/2019  8:50 AM EST -----  PTH, A1C, CMP normal   Nydia sent dexcom paperwork yesterday

## 2019-01-31 LAB
METHYLMALONATE SERPL-SCNC: 243 NMOL/L (ref 0–378)
SL AMB DISCLAIMER: NORMAL

## 2019-02-14 ENCOUNTER — OFFICE VISIT (OUTPATIENT)
Dept: BARIATRICS | Facility: CLINIC | Age: 44
End: 2019-02-14
Payer: COMMERCIAL

## 2019-02-14 ENCOUNTER — TELEPHONE (OUTPATIENT)
Dept: ENDOCRINOLOGY | Facility: CLINIC | Age: 44
End: 2019-02-14

## 2019-02-14 ENCOUNTER — OFFICE VISIT (OUTPATIENT)
Dept: HEMATOLOGY ONCOLOGY | Facility: CLINIC | Age: 44
End: 2019-02-14
Payer: COMMERCIAL

## 2019-02-14 VITALS
SYSTOLIC BLOOD PRESSURE: 140 MMHG | DIASTOLIC BLOOD PRESSURE: 84 MMHG | RESPIRATION RATE: 16 BRPM | WEIGHT: 168 LBS | HEIGHT: 64 IN | BODY MASS INDEX: 28.68 KG/M2 | OXYGEN SATURATION: 100 % | TEMPERATURE: 96.5 F | HEART RATE: 75 BPM

## 2019-02-14 VITALS
DIASTOLIC BLOOD PRESSURE: 82 MMHG | TEMPERATURE: 97.4 F | WEIGHT: 165.5 LBS | HEIGHT: 65 IN | SYSTOLIC BLOOD PRESSURE: 126 MMHG | HEART RATE: 74 BPM | BODY MASS INDEX: 27.57 KG/M2

## 2019-02-14 DIAGNOSIS — E88.40 MITOCHONDRIAL METABOLISM DISORDER (HCC): ICD-10-CM

## 2019-02-14 DIAGNOSIS — Z98.84 S/P GASTRIC BYPASS: ICD-10-CM

## 2019-02-14 DIAGNOSIS — K91.2 POSTSURGICAL MALABSORPTION: ICD-10-CM

## 2019-02-14 DIAGNOSIS — Z15.89 MTHFR MUTATION (METHYLENETETRAHYDROFOLATE REDUCTASE): ICD-10-CM

## 2019-02-14 DIAGNOSIS — Z98.84 S/P GASTRIC BYPASS: Primary | ICD-10-CM

## 2019-02-14 DIAGNOSIS — Z98.84 BARIATRIC SURGERY STATUS: ICD-10-CM

## 2019-02-14 DIAGNOSIS — E61.1 IRON DEFICIENCY: Primary | ICD-10-CM

## 2019-02-14 PROCEDURE — 99213 OFFICE O/P EST LOW 20 MIN: CPT | Performed by: INTERNAL MEDICINE

## 2019-02-14 PROCEDURE — 99213 OFFICE O/P EST LOW 20 MIN: CPT | Performed by: SURGERY

## 2019-02-14 RX ORDER — LACTOBACILLUS RHAMNOSUS GG 10B CELL
CAPSULE ORAL
COMMUNITY
Start: 2018-10-17 | End: 2019-04-11

## 2019-02-14 RX ORDER — COMPOUNDING VEHICLE SUSP NO.5
SUSPENSION, ORAL (FINAL DOSE FORM) ORAL
COMMUNITY
Start: 2019-02-06

## 2019-02-14 NOTE — PROGRESS NOTES
Hematology/Oncology Outpatient Follow- up Note  Sandoval Teixeira 41 y o  female MRN: @ Encounter: [id number]        Date:  2/14/2019    Presenting Complaint/Diagnosis :     Iron deficiency anemia in the setting of a gastric bypass and motility issues  the patient is TPN dependent  Previous Hematologic/ Oncologic History:      Venofer for 8 doses     Current Hematologic/ Oncologic Treatment:      Observation    Interval History:      Patient returns for follow-up visit  Her iron studies are returned to normal  Her hemoglobin is also now normal  She is feeling better  Iron studies have improved  She is asking if we could consider some form of maintenance as her other physicians wishes for a ferritin run above 50  I think this is very reasonable considering she does drop lower every few months  We could put her on Venofer once a month along with B12 once a month and monitor her ferritin  She is agreeable to this  As far as symptoms are concerned she still gets fatigued but she states sometimes she does not sleep well  Denies any nausea denies any vomiting or diarrhea  The rest of her 14 point review of systems today was negative  Test Results:    Imaging: No results found      Labs:   Lab Results   Component Value Date    WBC 4 90 01/28/2019    HGB 13 7 01/28/2019    HCT 41 0 01/28/2019    MCV 93 01/28/2019     01/28/2019     Lab Results   Component Value Date     01/06/2016    K 3 9 01/28/2019     01/28/2019    CO2 32 01/28/2019    ANIONGAP 9 01/06/2016    BUN 14 01/28/2019    CREATININE 0 71 01/28/2019    GLUCOSE 76 01/06/2016    GLUF 93 12/05/2017    CALCIUM 9 7 01/28/2019    AST 14 01/28/2019    ALT 29 01/28/2019    ALKPHOS 75 01/28/2019    PROT 6 5 01/06/2016    BILITOT 0 32 01/06/2016    EGFR 106 01/28/2019         Lab Results   Component Value Date    IRON 87 01/28/2019    TIBC 344 01/28/2019    FERRITIN 67 01/28/2019       Lab Results   Component Value Date    [id number] 968 12/05/2017         ROS: As stated in the history of present illness otherwise his 14 point review of systems today was negative  Active Problems:   Patient Active Problem List   Diagnosis    Dysphagia    Postgastrectomy malabsorption    S/P gastric bypass    Iron deficiency    Constipation    Mitochondrial metabolism disorder (HCC)    GERD (gastroesophageal reflux disease)    Migraine without aura and without status migrainosus, not intractable    History of Nissen fundoplication    Reactive hypoglycemia    Hyperparathyroidism , secondary, non-renal (HCC)    Hirsutism    Orthostatic hypotension    MTHFR mutation (methylenetetrahydrofolate reductase) (HCC)    History of malignant melanoma of skin    Chronic venous embolism and thrombosis of deep vessels of distal lower extremity (HCC)    Congenital anomaly of lower limb    Family history of endocrine and metabolic disease    Esophageal dysmotility    Dysautonomia orthostatic hypotension syndrome ([facility])    Fibroid    Urinary retention    Renal cyst, right    Retinitis pigmentosa    Polycystic ovarian syndrome    Neurogenic bladder    Nyctalopia    Hypertension    Raynaud's disease without gangrene       Past Medical History:   Past Medical History:   Diagnosis Date    Acid reflux     Constipated     [facility]onomia ([facility] )     Esophageal abnormality     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   Gastrostomy tube in place [facility])     History of blood clots 2012    Left leg  Has IVC filter now   Occurred while on Lovenox    Iron deficiency     Malignant hyperthermia due to anesthesia     Patient's son has M H   States she needs precautions    Migraines     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 )     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 )     MTHFR (methylene THF reductase) deficiency and homocystinuria ([facility])     Muscle weakness (generalized)     Nausea     On total parenteral nutrition (TPN)     for 8 mts    PCOS (polycystic ovarian syndrome)  PONV (postoperative nausea and vomiting)     Postgastrectomy syndrome     malabsorption    Postsurgical malabsorption     Status post gastric bypass for obesity     Status post PICC central line placement     Right upper arm  Receives TPN    Stroke [facility]) 3395, [id number]    Metabolic strokes  Right hemiparesis= minor now   Vomiting     When eating       Surgical History:   Past Surgical History:   Procedure Laterality Date    ANKLE SURGERY Left     Has plate and screws    COLONOSCOPY      COSMETIC SURGERY      X14 as child post attack by dog  Arms, legs and face    DILATION AND CURETTAGE OF UTERUS      x2    ESOPHAGOGASTRODUODENOSCOPY N/A 1/27/2016    Procedure: ESOPHAGOGASTRODUODENOSCOPY (EGD); Surgeon: Lasandra Lefort, MD;  [facility]; Service:     FRACTURE SURGERY Left     Left ankle - hardware    GASTRIC BYPASS      IVC FILTER INSERTION      NISSEN FUNDOPLICATION      OVARY SURGERY Bilateral     "Drilling" due to multiple cysts- PCOS    DE EGD TRANSORAL BIOPSY SINGLE/MULTIPLE N/A 3/9/2016    Procedure: ESOPHAGOGASTRODUODENOSCOPY (EGD); Surgeon: Lasandra Lefort, MD;  [facility];   Service: Bariatrics    DE LAP,GASTROSTOMY,W/O TUBE CONSTR N/A 4/19/2016    Procedure: INSERTION GASTROSTOMY TUBE LAPAROSCOPIC WITH INTRAOP EGD;  Surgeon: Lasandra Lefort, MD;  [facility];  Service: Bariatrics    ULNAR NERVE TRANSPOSITION Right     WISDOM TOOTH EXTRACTION         Family History:    Family History   Problem Relation Age of Onset    Mitochondrial disorder Mother     Cancer Mother     Hypertension Mother     Thyroid cancer Mother     Clotting disorder Mother         MTFR    Depression Father     Endocrinopathy Father     Clotting disorder Father         MTFR    Depression Brother     Narcolepsy Brother     Breast cancer Maternal Grandmother     Heart failure Maternal Grandmother 80    Coronary artery disease Maternal Grandmother     Mitochondrial disorder Son  Mitochondrial disorder Daughter     Stroke Family     Anuerysm Paternal Uncle         abdominal     Heart attack Neg Hx     Arrhythmia Neg Hx     Sudden death Neg Hx         scd       Cancer-related family history includes Breast cancer in her maternal grandmother; Cancer in her mother; Thyroid cancer in her mother      Social History:   Social History     Socioeconomic History    Marital status: /Civil Union     Spouse name: Not on file    Number of children: Not on file    Years of education: Not on file    Highest education level: Not on file   Occupational History    Not on file   Social Needs    Financial resource strain: Not on file    Food insecurity:     Worry: Not on file     Inability: Not on file    Transportation needs:     Medical: Not on file     Non-medical: Not on file   Tobacco Use    Smoking status: Never Smoker    Smokeless tobacco: Never Used   Substance and Sexual Activity    Alcohol use: No    Drug use: No    Sexual activity: Not on file   Lifestyle    Physical activity:     Days per week: Not on file     Minutes per session: Not on file    Stress: Not on file   Relationships    Social connections:     Talks on phone: Not on file     Gets together: Not on file     Attends Holiness service: Not on file     Active member of club or organization: Not on file     Attends meetings of clubs or organizations: Not on file     Relationship status: Not on file    Intimate partner violence:     Fear of current or ex partner: Not on file     Emotionally abused: Not on file     Physically abused: Not on file     Forced sexual activity: Not on file   Other Topics Concern    Not on file   Social History Narrative        2 children - son and daughter with mitrochondrial diseas       Current Medications:   Current Outpatient Medications   Medication Sig Dispense Refill    acarbose (PRECOSE) 100 MG tablet Take 1 tablet (100 mg total) by mouth 3 (three) times a day with meals for 300 days 270 tablet 3    amLODIPine (NORVASC) 5 mg tablet 5 mg qd x 1 week, if needed can increase to 10 mg qd  Hold verapamil  30 tablet 2    B Complex-Biotin-FA (TH VITAMIN B 50/B-COMPLEX PO) Take 1 tablet by mouth daily   Blood Glucose Monitoring Suppl (ONETOUCH VERIO) w/Device KIT Dispense 1 meter 1 kit 1    Calcium Carbonate Antacid 1250 mg/5 mL Take by mouth      COENZYME Q-10 PO       docusate sodium (COLACE) 100 mg capsule Take 200 mg by mouth 2 (two) times a day   esomeprazole (NEXIUM) 20 mg capsule Take 20 mg by mouth every morning before breakfast      Galcanezumab-gnlm (EMGALITY) 120 MG/ML SOAJ Two injections SC for first use (one injection in each thigh) with once monthly injection afterward 3 pen 0    ketorolac (TORADOL) 30 mg/mL injection Administer 1 ml by IM route at onset of migraine, max 1 injection in 24 hours  Max 2-3 days a week  Do not take with other NSAIDs 10 mL 0    L-Arginine 1000 MG TABS Take 1,000 mg by mouth 2 (two) times a day      levOCARNitine (CARNITOR) 1 g/10 mL solution Take 100 mg by mouth 4 (four) times a day (before meals and at bedtime)      Linagliptin (TRADJENTA) 5 MG TABS Take 5 mg by mouth daily for 90 days 90 mg 3    midodrine (PROAMATINE) 5 mg tablet Take 1 tablet (5 mg total) by mouth 3 (three) times a day 270 tablet 3    Multiple Vitamin (MULTIVITAMIN) tablet Take 1 tablet by mouth daily   ONETOUCH DELICA LANCETS FINE MISC Check BG 3x per day 300 each 3    ONETOUCH VERIO test strip Check BG 3x per day 300 each 1    polyethylene glycol (MIRALAX) 17 g packet Take 17 g by mouth 2 (two) times a day Indications: 1 5 caps full 1-2 times/day   sucralfate (CARAFATE) 1 g/10 mL suspension Take 10 mL (1 g total) by mouth 4 (four) times a day 420 mL 1    Syringe/Needle, Disp, (SYRINGE 3CC/40GG4-8/4") 27G X 1-1/4" 3 ML MISC Use with Toradol IM injection 15 each 0     No current facility-administered medications for this visit  Allergies: Allergies   Allergen Reactions    Sulfa Antibiotics      Arrhythmia     Guaifenesin      Arrhythmia    Other      Arrhythmia       Physical Exam:    There is no height or weight on file to calculate BSA  Wt Readings from Last 3 Encounters:   01/28/19 79 4 kg (175 lb)   01/14/19 73 5 kg (162 lb)   10/30/18 77 7 kg (171 lb 6 4 oz)        Temp Readings from Last 3 Encounters:   10/30/18 98 °F (36 7 °C)   09/04/18 98 7 °F (37 1 °C) (Temporal)   08/30/18 (!) 97 °F (36 1 °C) (Temporal)        BP Readings from Last 3 Encounters:   01/28/19 116/78   01/14/19 119/82   10/30/18 118/80         Pulse Readings from Last 3 Encounters:   01/28/19 66   01/14/19 63   10/30/18 70         Physical Exam     Constitutional   General appearance: No acute distress, well appearing and well nourished  Eyes   Conjunctiva and lids: No swelling, erythema or discharge  Pupils and irises: Equal, round and reactive to light  Ears, Nose, Mouth, and Throat   External inspection of ears and nose: Normal     Nasal mucosa, septum, and turbinates: Normal without edema or erythema  Oropharynx: Normal with no erythema, edema, exudate or lesions  Pulmonary   Respiratory effort: No increased work of breathing or signs of respiratory distress  Auscultation of lungs: Clear to auscultation  Cardiovascular   Palpation of heart: Normal PMI, no thrills  Auscultation of heart: Normal rate and rhythm, normal S1 and S2, without murmurs  Examination of extremities for edema and/or varicosities: Normal     Carotid pulses: Normal     Abdomen   Abdomen: Non-tender, no masses  Liver and spleen: No hepatomegaly or splenomegaly  Lymphatic   Palpation of lymph nodes in neck: No lymphadenopathy  Musculoskeletal   Gait and station: Normal     Digits and nails: Normal without clubbing or cyanosis      Inspection/palpation of joints, bones, and muscles: Normal     Skin   Skin and subcutaneous tissue: Normal without rashes or lesions  Neurologic   Cranial nerves: Cranial nerves 2-12 intact  Sensation: No sensory loss  Psychiatric   Orientation to person, place, and time: Normal     Mood and affect: Normal         Assessment / Plan:      The patient is a very pleasant 51-year-old female with a past medical history mitochondrial disease status post Nissen fundoplication for motility issues and then a gastric bypass  She was mildly anemic but her iron studies revealed iron deficiency  We gave her Venofer 200 mg twice a week for a total of 8 doses  Numbers all corrected  At her last visit she was fatigued again  We decided to give her a more doses of Venofer  Her numbers are all improved now  Her B12 and iron studies are within normal limits as is her hemoglobin  She tells me her mitochondrial physicians wished for her ferritin to run in a higher range I will put her on maintenance Venofer  She will get 200 mg IV every month  She will also get B12 once a month  I'll see her back in 6 months  She will have iron studies I e  Ferritin repeated at that time  I have spent 20 minutes with Patient  today in which greater than 50% of this time was spent in counseling/coordination of care regarding Diagnostic results  Goals and Barriers:  Current Goal:  Prolong Survival from Iron deficiency anemia  Barriers: None  Patient's Capacity to Self Care:  Patient able to self care  Portions of the record may have been created with voice recognition software   Occasional wrong word or "sound a like" substitutions may have occurred due to the inherent limitations of voice recognition software   Read the chart carefully and recognize, using context, where substitutions have occurred

## 2019-02-14 NOTE — PROGRESS NOTES
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 )     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[place] [address] )     MTHFR (methylene THF reductase) deficiency and homocystinuria (HCC)     Muscle weakness (generalized)     Nausea     On total parenteral nutrition (TPN)     for 8 mts    PCOS (polycystic ovarian syndrome)     PONV (postoperative nausea and vomiting)     Postgastrectomy syndrome     malabsorption    Postsurgical malabsorption     Status post gastric bypass for obesity     Status post PICC central line placement     Right upper arm  Receives TPN    Stroke [facility]) 3231, 7637    Metabolic strokes  Right hemiparesis= minor now   Vomiting     When eating     Past Surgical History:   Procedure Laterality Date    ANKLE SURGERY Left     Has plate and screws    COLONOSCOPY      COSMETIC SURGERY      X14 as child post attack by dog  Arms, legs and face    DILATION AND CURETTAGE OF UTERUS      x2    ESOPHAGOGASTRODUODENOSCOPY N/A 1/27/2016    Procedure: ESOPHAGOGASTRODUODENOSCOPY (EGD); Surgeon: Song Cordova MD;  [facility];   Service:     FRACTURE SURGERY Left     Left ankle - hardware    GASTRIC BYPASS      IVC FILTER INSERTION      NISSEN FUNDOPLICATION      OVARY SURGERY Bilateral "Drilling" due to multiple cysts- PCOS    KS EGD TRANSORAL BIOPSY SINGLE/MULTIPLE N/A 3/9/2016    Procedure: ESOPHAGOGASTRODUODENOSCOPY (EGD); Surgeon: Destinee Irby MD;  [facility]; Service: Bariatrics    [place] LAP,GASTROSTOMY,W/O TUBE CONSTR N/A 4/19/2016    Procedure: INSERTION GASTROSTOMY TUBE LAPAROSCOPIC WITH INTRAOP EGD;  Surgeon: Destinee Irby MD;  [facility];  Service: [address] [place] [address] Right     WISDOM TOOTH EXTRACTION       Social History   Social History     Substance and Sexual Activity   Alcohol Use No     Social History     Substance and Sexual Activity   Drug Use No     Social History     Tobacco Use   Smoking Status Never Smoker   Smokeless Tobacco Never Used     Family History: non-contributory    Meds/Allergies   all medications and allergies reviewed  Allergies   Allergen Reactions    Sulfa Antibiotics      Arrhythmia     Guaifenesin      Arrhythmia    Other      Arrhythmia       Objective       Current Vitals:   Blood Pressure: 126/82 (02/14/19 1551)  Pulse: 74 (02/14/19 1551)  Temperature: (!) 97 4 °F (36 3 °C) (02/14/19 1551)  Temp Source: Tympanic (02/14/19 1551)  Height: 5' 4 5" (163 8 cm) (02/14/19 1551)  Weight - Scale: 75 1 kg (165 lb 8 oz) (02/14/19 1551)        Invasive Devices     Peripheral Intravenous Line            Peripheral IV 04/25/18 Left Antecubital 295 days    Peripheral IV 05/08/18 Left Antecubital 281 days          Drain            Gastrostomy/Enterostomy Gastrostomy 20 Fr  LUQ 1031 days                Physical Exam   Constitutional: She is oriented to person, place, and time  She appears well-developed  No distress  HENT:   Head: Normocephalic and atraumatic  Right Ear: External ear normal    Left Ear: External ear normal    Eyes: Conjunctivae are normal  Right eye exhibits no discharge  Left eye exhibits no discharge  No scleral icterus  Abdominal: Soft   Bowel sounds are normal  She exhibits no distension and no mass  There is no tenderness  There is no rebound and no guarding  Abdomen is flat benign  G-tube button is in place  Some irritation around it  No evidence of infection   Neurological: She is alert and oriented to person, place, and time  Skin: She is not diaphoretic  Psychiatric: She has a normal mood and affect  Her behavior is normal  Judgment and thought content normal    Vitals reviewed  Lab Results: I have personally reviewed pertinent lab results  Imaging: I have personally reviewed pertinent reports  EKG, Pathology, and Other Studies: I have personally reviewed pertinent reports  Assessment/PLAN:    41 y o  female with a history 51-year-old female status post laparoscopic Rossy-en-Y gastric bypass in October 2015  Alaina Chou has a complicated past medical history  Refer to my previous notes for further details  From the bariatric point of view she is doing well  She has lost 85% excess weight loss and 37% total body weight and has been maintaining over 3 years out  She is in good spirits and very optimistic as usual   She uses tube feeds to supplement her diet and she tells me that she uses it still as needed  She describes episodes intermittently where she would eat and she feels crampy abdominal pain and bile reflux is out of her G-tube button  As she lets it air into a bag she feels relief  I had an opportunity to review her labs and I was very happy that her levels are within normal limits  She is getting iron infusions and her PTH has normalized  She is seeing  GI to address some of her abdominal pain issues and she continues to be [facility]  We had a long discussion with regards to her progress and that she is maintaining her excess weight loss  From the bariatric standpoint she is stable  She is committed to continue to observe her diet and to increase her physical activity  She will follow up with us as scheduled yearly      Elaine Peña MD  2/14/2019  4:23 PM

## 2019-02-14 NOTE — TELEPHONE ENCOUNTER
[employer] forms are on LAS desk   Pt is not a diabetic however is currently using the sensor, I want to make sure this is filled out properly, I have started it however I will wait for her to go over the rest

## 2019-02-21 ENCOUNTER — TELEPHONE (OUTPATIENT)
Dept: ENDOCRINOLOGY | Facility: CLINIC | Age: 44
End: 2019-02-21

## 2019-02-28 RX ORDER — CYANOCOBALAMIN 1000 UG/ML
1000 INJECTION INTRAMUSCULAR; SUBCUTANEOUS ONCE
Status: COMPLETED | OUTPATIENT
Start: 2019-03-02 | End: 2019-03-02

## 2019-02-28 RX ORDER — SODIUM CHLORIDE 9 MG/ML
20 INJECTION, SOLUTION INTRAVENOUS CONTINUOUS
Status: DISCONTINUED | OUTPATIENT
Start: 2019-03-02 | End: 2019-03-05 | Stop reason: HOSPADM

## 2019-03-02 ENCOUNTER — HOSPITAL ENCOUNTER (OUTPATIENT)
Dept: INFUSION CENTER | Facility: CLINIC | Age: 44
Discharge: HOME | End: 2019-03-02
Payer: COMMERCIAL

## 2019-03-02 VITALS
HEART RATE: 81 BPM | TEMPERATURE: 97.8 F | RESPIRATION RATE: 16 BRPM | DIASTOLIC BLOOD PRESSURE: 76 MMHG | OXYGEN SATURATION: 99 % | SYSTOLIC BLOOD PRESSURE: 112 MMHG

## 2019-03-02 PROCEDURE — 96372 THER/PROPH/DIAG INJ SC/IM: CPT

## 2019-03-02 PROCEDURE — 96365 THER/PROPH/DIAG IV INF INIT: CPT

## 2019-03-02 RX ADMIN — IRON SUCROSE 200 MG: 20 INJECTION, SOLUTION INTRAVENOUS at 11:14

## 2019-03-02 RX ADMIN — SODIUM CHLORIDE 20 ML/HR: 0.9 INJECTION, SOLUTION INTRAVENOUS at 11:14

## 2019-03-02 RX ADMIN — CYANOCOBALAMIN 1000 MCG: 1000 INJECTION, SOLUTION INTRAMUSCULAR at 12:14

## 2019-03-02 NOTE — PLAN OF CARE
Problem: Potential for Falls  Goal: Patient will remain free of falls  Description  INTERVENTIONS:  - Assess patient frequently for physical needs  -  Identify cognitive and physical deficits and behaviors that affect risk of falls  -  [place] fall precautions as indicated by assessment   - Educate patient/family on patient safety including physical limitations  - Instruct patient to call for assistance with activity based on assessment  - Modify environment to reduce risk of injury  - Consider OT/PT consult to assist with strengthening/mobility  Outcome: Progressing     Problem: SAFETY ADULT  Goal: Patient will remain free of falls  Description  INTERVENTIONS:  - Assess patient frequently for physical needs  -  Identify cognitive and physical deficits and behaviors that affect risk of falls  -  [place] fall precautions as indicated by assessment   - Educate patient/family on patient safety including physical limitations  - Instruct patient to call for assistance with activity based on assessment  - Modify environment to reduce risk of injury  - Consider OT/PT consult to assist with strengthening/mobility  Outcome: Progressing     Problem: Knowledge Deficit  Goal: Patient/family/caregiver demonstrates understanding of disease process, treatment plan, medications, and discharge instructions  Description  Complete learning assessment and assess knowledge base    Interventions:  - Provide teaching at level of understanding  - Provide teaching via preferred learning methods  Outcome: Progressing

## 2019-03-02 NOTE — PROGRESS NOTES
Patient tolerated treatment today without issue   Patient verified upcoming appointment and declined AVS

## 2019-03-04 DIAGNOSIS — I73.00 RAYNAUD'S DISEASE WITHOUT GANGRENE: ICD-10-CM

## 2019-03-04 DIAGNOSIS — G43.719 INTRACTABLE CHRONIC MIGRAINE WITHOUT AURA AND WITHOUT STATUS MIGRAINOSUS: ICD-10-CM

## 2019-03-04 RX ORDER — AMLODIPINE BESYLATE 5 MG/1
TABLET ORAL
Qty: 180 TABLET | Refills: 1 | Status: SHIPPED | OUTPATIENT
Start: 2019-03-04 | End: 2019-09-04 | Stop reason: SDUPTHER

## 2019-03-04 RX ORDER — AMLODIPINE BESYLATE 5 MG/1
5 TABLET ORAL 2 TIMES DAILY
Qty: 180 TABLET | Refills: 0 | Status: CANCELLED | OUTPATIENT
Start: 2019-03-04

## 2019-03-04 RX ORDER — AMLODIPINE BESYLATE 5 MG/1
TABLET ORAL
Qty: 30 TABLET | Refills: 0 | Status: CANCELLED | OUTPATIENT
Start: 2019-03-04

## 2019-03-27 NOTE — PLAN OF CARE
Problem: Nutrition/Hydration-ADULT  Goal: Nutrient/Hydration intake appropriate for improving, restoring or maintaining nutritional needs  Monitor and assess patient's nutrition/hydration status for malnutrition (ex- brittle hair, bruises, dry skin, pale skin and conjunctiva, muscle wasting, smooth red tongue, and disorientation)  Collaborate with interdisciplinary team and initiate plan and interventions as ordered  Monitor patient's weight and dietary intake as ordered or per policy  Utilize nutrition screening tool and intervene per policy  Determine patient's food preferences and provide high-protein, high-caloric foods as appropriate  INTERVENTIONS:  - Monitor oral intake, urinary output, labs, and treatment plans  - Assess nutrition and hydration status and recommend course of action  - Evaluate amount of meals eaten  - Assist patient with eating if necessary   - Allow adequate time for meals  - Recommend/ encourage appropriate diets, oral nutritional supplements, and vitamin/mineral supplements  - Order, calculate, and assess calorie counts as needed  - Recommend, monitor, and adjust tube feedings and TPN/PPN based on assessed needs  - Assess need for intravenous fluids  - Provide specific nutrition/hydration education as appropriate  - Include patient/family/caregiver in decisions related to nutrition   Outcome: Progressing  spoke with Nick Black RD who has seen pt via outpatient bariatric services, and spoke with pt as well  Both relayed detailed information regarding eating and TF ate home  Pt will try to take oral intake as much as possible when at home, and will use Elecare Jean-Paul via TF overnight to provide roughly ~50% of energy needs  When pt has a flare up where she cannot tolerate intake, she will run Pedialyte through her port to provide glucose and electrolytes  Once she is able to tolerate this well, she will begin to titrate the [employer] as tolerated   Per outpatient RD, pt was also taking CoEnzyme Q10, L-carnitine, and calcium supplements  Recommended Elecare Jean-Paul rate was 55ml/hr x24hr continuous with 60ml prosource added to provide 1440 kcal and 70g protein  240ml flush TID  Pt is ordered Pedialyte @ 25ml/hr  Currently no TF running as Pedialyte is not on TF formulary  This RD spoke with [facility] to procure Pedialyte from their PEDS unit   will bring Pedialyte to [facility]  If pt begins to tolerate Pedialyte, she can transition to [employer] as able  This will also need to be procured from [facility] as this formula is also not on [facility] formulary  Nutrition services will continue to monitor pt, however if pt wishes to switch to [employer] prior to d/c, please contact nutrition services so the formula can be procured with francis  18

## 2019-04-01 RX ORDER — SODIUM CHLORIDE 9 MG/ML
20 INJECTION, SOLUTION INTRAVENOUS CONTINUOUS
Status: DISCONTINUED | OUTPATIENT
Start: 2019-04-02 | End: 2019-04-05 | Stop reason: HOSPADM

## 2019-04-01 RX ORDER — CYANOCOBALAMIN 1000 UG/ML
1000 INJECTION INTRAMUSCULAR; SUBCUTANEOUS ONCE
Status: COMPLETED | OUTPATIENT
Start: 2019-04-02 | End: 2019-04-02

## 2019-04-02 ENCOUNTER — HOSPITAL ENCOUNTER (OUTPATIENT)
Dept: INFUSION CENTER | Facility: CLINIC | Age: 44
Discharge: HOME | End: 2019-04-02
Payer: COMMERCIAL

## 2019-04-02 ENCOUNTER — OFFICE VISIT (OUTPATIENT)
Dept: GASTROENTEROLOGY | Facility: AMBULARY SURGERY CENTER | Age: 44
End: 2019-04-02
Payer: COMMERCIAL

## 2019-04-02 VITALS
DIASTOLIC BLOOD PRESSURE: 74 MMHG | OXYGEN SATURATION: 99 % | SYSTOLIC BLOOD PRESSURE: 116 MMHG | HEART RATE: 78 BPM | TEMPERATURE: 97.2 F | RESPIRATION RATE: 18 BRPM

## 2019-04-02 VITALS
DIASTOLIC BLOOD PRESSURE: 68 MMHG | RESPIRATION RATE: 14 BRPM | BODY MASS INDEX: 28.99 KG/M2 | WEIGHT: 174 LBS | SYSTOLIC BLOOD PRESSURE: 120 MMHG | TEMPERATURE: 98 F | HEIGHT: 65 IN | HEART RATE: 80 BPM

## 2019-04-02 DIAGNOSIS — K21.9 GASTROESOPHAGEAL REFLUX DISEASE, ESOPHAGITIS PRESENCE NOT SPECIFIED: ICD-10-CM

## 2019-04-02 DIAGNOSIS — R13.19 ESOPHAGEAL DYSPHAGIA: Primary | ICD-10-CM

## 2019-04-02 PROCEDURE — 96365 THER/PROPH/DIAG IV INF INIT: CPT

## 2019-04-02 PROCEDURE — 99213 OFFICE O/P EST LOW 20 MIN: CPT | Performed by: INTERNAL MEDICINE

## 2019-04-02 PROCEDURE — 96372 THER/PROPH/DIAG INJ SC/IM: CPT

## 2019-04-02 RX ORDER — ACETYLCYSTEINE 200 MG/ML
SOLUTION ORAL; RESPIRATORY (INHALATION)
COMMUNITY
Start: 2019-03-10 | End: 2019-04-11

## 2019-04-02 RX ORDER — UBIQUINOL 32 %
POWDER (GRAM) MISCELLANEOUS
COMMUNITY
Start: 2019-03-23

## 2019-04-02 RX ORDER — ACETYLCYSTEINE 200 MG/ML
SOLUTION ORAL; RESPIRATORY (INHALATION)
COMMUNITY
End: 2019-04-11

## 2019-04-02 RX ORDER — IRON SUCROSE 20 MG/ML
INJECTION, SOLUTION INTRAVENOUS
COMMUNITY

## 2019-04-02 RX ORDER — PANTOPRAZOLE SODIUM 40 MG/1
40 TABLET, DELAYED RELEASE ORAL 2 TIMES DAILY
Qty: 180 TABLET | Refills: 3 | Status: SHIPPED | OUTPATIENT
Start: 2019-04-02 | End: 2019-08-20 | Stop reason: ALTCHOICE

## 2019-04-02 RX ADMIN — SODIUM CHLORIDE 20 ML/HR: 0.9 INJECTION, SOLUTION INTRAVENOUS at 12:41

## 2019-04-02 RX ADMIN — IRON SUCROSE 200 MG: 20 INJECTION, SOLUTION INTRAVENOUS at 12:46

## 2019-04-02 RX ADMIN — CYANOCOBALAMIN 1000 MCG: 1000 INJECTION, SOLUTION INTRAMUSCULAR at 13:58

## 2019-04-09 ENCOUNTER — HOSPITAL ENCOUNTER (OUTPATIENT)
Dept: NON INVASIVE DIAGNOSTICS | Facility: CLINIC | Age: 44
Discharge: HOME | End: 2019-04-09
Payer: COMMERCIAL

## 2019-04-09 DIAGNOSIS — I95.1 ORTHOSTATIC HYPOTENSION: ICD-10-CM

## 2019-04-09 PROCEDURE — 93306 TTE W/DOPPLER COMPLETE: CPT

## 2019-04-10 PROCEDURE — 93306 TTE W/DOPPLER COMPLETE: CPT | Performed by: INTERNAL MEDICINE

## 2019-04-11 ENCOUNTER — CLINICAL SUPPORT (OUTPATIENT)
Dept: PAIN MEDICINE | Facility: CLINIC | Age: 44
End: 2019-04-11
Payer: COMMERCIAL

## 2019-04-11 VITALS
SYSTOLIC BLOOD PRESSURE: 130 MMHG | HEART RATE: 68 BPM | HEIGHT: 64 IN | WEIGHT: 178 LBS | BODY MASS INDEX: 30.39 KG/M2 | TEMPERATURE: 98.2 F | DIASTOLIC BLOOD PRESSURE: 91 MMHG

## 2019-04-11 DIAGNOSIS — G90.511 COMPLEX REGIONAL PAIN SYNDROME TYPE 1 OF RIGHT UPPER EXTREMITY: Primary | ICD-10-CM

## 2019-04-11 DIAGNOSIS — I73.00 RAYNAUD'S DISEASE WITHOUT GANGRENE: ICD-10-CM

## 2019-04-11 PROCEDURE — 99244 OFF/OP CNSLTJ NEW/EST MOD 40: CPT | Performed by: ANESTHESIOLOGY

## 2019-04-11 RX ORDER — GABAPENTIN 100 MG/1
300 CAPSULE ORAL
Qty: 90 CAPSULE | Refills: 1 | Status: SHIPPED | OUTPATIENT
Start: 2019-04-11 | End: 2019-07-25 | Stop reason: ALTCHOICE

## 2019-04-11 RX ORDER — GABAPENTIN 300 MG/1
CAPSULE ORAL
COMMUNITY
End: 2019-04-11 | Stop reason: SDUPTHER

## 2019-04-16 ENCOUNTER — EVALUATION (OUTPATIENT)
Dept: PHYSICAL THERAPY | Facility: REHABILITATION | Age: 44
End: 2019-04-16
Payer: COMMERCIAL

## 2019-04-16 DIAGNOSIS — G90.511 COMPLEX REGIONAL PAIN SYNDROME TYPE 1 OF RIGHT UPPER EXTREMITY: Primary | ICD-10-CM

## 2019-04-16 DIAGNOSIS — M79.601 CHRONIC PAIN OF RIGHT UPPER EXTREMITY: ICD-10-CM

## 2019-04-16 DIAGNOSIS — G89.29 CHRONIC PAIN OF RIGHT UPPER EXTREMITY: ICD-10-CM

## 2019-04-16 PROCEDURE — 97112 NEUROMUSCULAR REEDUCATION: CPT | Performed by: PHYSICAL THERAPIST

## 2019-04-16 PROCEDURE — 97163 PT EVAL HIGH COMPLEX 45 MIN: CPT | Performed by: PHYSICAL THERAPIST

## 2019-04-17 ENCOUNTER — OFFICE VISIT (OUTPATIENT)
Dept: NEUROLOGY | Facility: CLINIC | Age: 44
End: 2019-04-17
Payer: COMMERCIAL

## 2019-04-17 ENCOUNTER — TELEPHONE (OUTPATIENT)
Dept: CARDIOLOGY CLINIC | Facility: CLINIC | Age: 44
End: 2019-04-17

## 2019-04-17 VITALS
HEART RATE: 77 BPM | HEIGHT: 64 IN | DIASTOLIC BLOOD PRESSURE: 80 MMHG | RESPIRATION RATE: 16 BRPM | WEIGHT: 178 LBS | BODY MASS INDEX: 30.39 KG/M2 | SYSTOLIC BLOOD PRESSURE: 110 MMHG

## 2019-04-17 DIAGNOSIS — G43.719 INTRACTABLE CHRONIC MIGRAINE WITHOUT AURA AND WITHOUT STATUS MIGRAINOSUS: Primary | ICD-10-CM

## 2019-04-17 DIAGNOSIS — G90.511 COMPLEX REGIONAL PAIN SYNDROME TYPE 1 OF RIGHT UPPER EXTREMITY: ICD-10-CM

## 2019-04-17 DIAGNOSIS — I73.00 RAYNAUD'S DISEASE WITHOUT GANGRENE: ICD-10-CM

## 2019-04-17 PROCEDURE — 99214 OFFICE O/P EST MOD 30 MIN: CPT | Performed by: PSYCHIATRY & NEUROLOGY

## 2019-04-18 ENCOUNTER — HOSPITAL ENCOUNTER (OUTPATIENT)
Dept: MAMMOGRAPHY | Facility: HOSPITAL | Age: 44
Discharge: HOME | End: 2019-04-18
Attending: OBSTETRICS & GYNECOLOGY
Payer: COMMERCIAL

## 2019-04-18 VITALS — BODY MASS INDEX: 30.39 KG/M2 | HEIGHT: 64 IN | WEIGHT: 178 LBS

## 2019-04-18 DIAGNOSIS — Z12.39 SCREENING FOR MALIGNANT NEOPLASM OF BREAST: ICD-10-CM

## 2019-04-18 PROCEDURE — 77063 BREAST TOMOSYNTHESIS BI: CPT

## 2019-04-18 PROCEDURE — 77067 SCR MAMMO BI INCL CAD: CPT

## 2019-04-23 ENCOUNTER — OFFICE VISIT (OUTPATIENT)
Dept: PHYSICAL THERAPY | Facility: REHABILITATION | Age: 44
End: 2019-04-23
Payer: COMMERCIAL

## 2019-04-23 DIAGNOSIS — M79.601 CHRONIC PAIN OF RIGHT UPPER EXTREMITY: ICD-10-CM

## 2019-04-23 DIAGNOSIS — G90.511 COMPLEX REGIONAL PAIN SYNDROME TYPE 1 OF RIGHT UPPER EXTREMITY: Primary | ICD-10-CM

## 2019-04-23 DIAGNOSIS — G89.29 CHRONIC PAIN OF RIGHT UPPER EXTREMITY: ICD-10-CM

## 2019-04-23 PROCEDURE — 97112 NEUROMUSCULAR REEDUCATION: CPT | Performed by: PHYSICAL THERAPIST

## 2019-04-29 RX ORDER — CYANOCOBALAMIN 1000 UG/ML
1000 INJECTION INTRAMUSCULAR; SUBCUTANEOUS ONCE
Status: DISCONTINUED | OUTPATIENT
Start: 2019-04-30 | End: 2019-04-29

## 2019-04-29 RX ORDER — SODIUM CHLORIDE 9 MG/ML
20 INJECTION, SOLUTION INTRAVENOUS CONTINUOUS
Status: DISCONTINUED | OUTPATIENT
Start: 2019-04-30 | End: 2019-04-29

## 2019-04-30 ENCOUNTER — HOSPITAL ENCOUNTER (OUTPATIENT)
Dept: INFUSION CENTER | Facility: CLINIC | Age: 44
Discharge: HOME | End: 2019-04-30

## 2019-04-30 ENCOUNTER — HOSPITAL ENCOUNTER (OUTPATIENT)
Dept: RADIOLOGY | Facility: CLINIC | Age: 44
Discharge: HOME | End: 2019-04-30
Payer: COMMERCIAL

## 2019-04-30 ENCOUNTER — OFFICE VISIT (OUTPATIENT)
Dept: PHYSICAL THERAPY | Facility: REHABILITATION | Age: 44
End: 2019-04-30
Payer: COMMERCIAL

## 2019-04-30 VITALS
HEART RATE: 78 BPM | TEMPERATURE: 98.2 F | SYSTOLIC BLOOD PRESSURE: 125 MMHG | RESPIRATION RATE: 20 BRPM | DIASTOLIC BLOOD PRESSURE: 83 MMHG | OXYGEN SATURATION: 97 %

## 2019-04-30 DIAGNOSIS — G90.511 COMPLEX REGIONAL PAIN SYNDROME TYPE 1 OF RIGHT UPPER EXTREMITY: Primary | ICD-10-CM

## 2019-04-30 DIAGNOSIS — G90.511 COMPLEX REGIONAL PAIN SYNDROME TYPE 1 OF RIGHT UPPER EXTREMITY: ICD-10-CM

## 2019-04-30 DIAGNOSIS — M79.601 CHRONIC PAIN OF RIGHT UPPER EXTREMITY: ICD-10-CM

## 2019-04-30 DIAGNOSIS — G89.29 CHRONIC PAIN OF RIGHT UPPER EXTREMITY: ICD-10-CM

## 2019-04-30 PROCEDURE — 97112 NEUROMUSCULAR REEDUCATION: CPT | Performed by: PHYSICAL THERAPIST

## 2019-04-30 PROCEDURE — 64510 N BLOCK STELLATE GANGLION: CPT | Performed by: ANESTHESIOLOGY

## 2019-04-30 PROCEDURE — 77003 FLUOROGUIDE FOR SPINE INJECT: CPT | Performed by: ANESTHESIOLOGY

## 2019-04-30 PROCEDURE — 97140 MANUAL THERAPY 1/> REGIONS: CPT | Performed by: PHYSICAL THERAPIST

## 2019-04-30 RX ORDER — LIDOCAINE HYDROCHLORIDE 10 MG/ML
5 INJECTION, SOLUTION EPIDURAL; INFILTRATION; INTRACAUDAL; PERINEURAL ONCE
Status: COMPLETED | OUTPATIENT
Start: 2019-04-30 | End: 2019-04-30

## 2019-04-30 RX ADMIN — LIDOCAINE HYDROCHLORIDE 2 ML: 10 INJECTION, SOLUTION EPIDURAL; INFILTRATION; INTRACAUDAL; PERINEURAL at 10:04

## 2019-04-30 RX ADMIN — LIDOCAINE HYDROCHLORIDE 5 ML: 20 INJECTION, SOLUTION EPIDURAL; INFILTRATION; INTRACAUDAL; PERINEURAL at 10:12

## 2019-04-30 RX ADMIN — IOHEXOL 2 ML: 300 INJECTION, SOLUTION INTRAVENOUS at 10:08

## 2019-04-30 RX ADMIN — SODIUM CHLORIDE 250 ML: 0.9 INJECTION, SOLUTION INTRAVENOUS at 09:35

## 2019-05-01 ENCOUNTER — TELEPHONE (OUTPATIENT)
Dept: RADIOLOGY | Facility: CLINIC | Age: 44
End: 2019-05-01

## 2019-05-02 DIAGNOSIS — E61.1 IRON DEFICIENCY: ICD-10-CM

## 2019-05-02 RX ORDER — SODIUM CHLORIDE 9 MG/ML
20 INJECTION, SOLUTION INTRAVENOUS CONTINUOUS
Status: DISCONTINUED | OUTPATIENT
Start: 2019-05-04 | End: 2019-05-07 | Stop reason: HOSPADM

## 2019-05-02 RX ORDER — CYANOCOBALAMIN 1000 UG/ML
1000 INJECTION INTRAMUSCULAR; SUBCUTANEOUS ONCE
Status: CANCELLED | OUTPATIENT
Start: 2019-05-28

## 2019-05-02 RX ORDER — CYANOCOBALAMIN 1000 UG/ML
1000 INJECTION INTRAMUSCULAR; SUBCUTANEOUS ONCE
Status: COMPLETED | OUTPATIENT
Start: 2019-05-04 | End: 2019-05-04

## 2019-05-03 ENCOUNTER — TELEPHONE (OUTPATIENT)
Dept: NEUROLOGY | Facility: CLINIC | Age: 44
End: 2019-05-03

## 2019-05-04 ENCOUNTER — HOSPITAL ENCOUNTER (OUTPATIENT)
Dept: INFUSION CENTER | Facility: CLINIC | Age: 44
Discharge: HOME | End: 2019-05-04
Payer: COMMERCIAL

## 2019-05-04 VITALS
RESPIRATION RATE: 16 BRPM | TEMPERATURE: 97.6 F | SYSTOLIC BLOOD PRESSURE: 116 MMHG | OXYGEN SATURATION: 99 % | HEART RATE: 69 BPM | DIASTOLIC BLOOD PRESSURE: 72 MMHG

## 2019-05-04 PROCEDURE — 96372 THER/PROPH/DIAG INJ SC/IM: CPT

## 2019-05-04 PROCEDURE — 96365 THER/PROPH/DIAG IV INF INIT: CPT

## 2019-05-04 RX ADMIN — CYANOCOBALAMIN 1000 MCG: 1000 INJECTION, SOLUTION INTRAMUSCULAR at 11:21

## 2019-05-04 RX ADMIN — SODIUM CHLORIDE 20 ML/HR: 0.9 INJECTION, SOLUTION INTRAVENOUS at 11:20

## 2019-05-04 RX ADMIN — IRON SUCROSE 200 MG: 20 INJECTION, SOLUTION INTRAVENOUS at 11:20

## 2019-05-07 ENCOUNTER — TELEPHONE (OUTPATIENT)
Dept: PAIN MEDICINE | Facility: CLINIC | Age: 44
End: 2019-05-07

## 2019-05-07 ENCOUNTER — OFFICE VISIT (OUTPATIENT)
Dept: PHYSICAL THERAPY | Facility: REHABILITATION | Age: 44
End: 2019-05-07
Payer: COMMERCIAL

## 2019-05-07 DIAGNOSIS — M79.601 CHRONIC PAIN OF RIGHT UPPER EXTREMITY: ICD-10-CM

## 2019-05-07 DIAGNOSIS — G89.29 CHRONIC PAIN OF RIGHT UPPER EXTREMITY: ICD-10-CM

## 2019-05-07 DIAGNOSIS — G90.511 COMPLEX REGIONAL PAIN SYNDROME TYPE 1 OF RIGHT UPPER EXTREMITY: Primary | ICD-10-CM

## 2019-05-07 PROCEDURE — 97140 MANUAL THERAPY 1/> REGIONS: CPT | Performed by: PHYSICAL THERAPIST

## 2019-05-07 PROCEDURE — 97112 NEUROMUSCULAR REEDUCATION: CPT | Performed by: PHYSICAL THERAPIST

## 2019-05-07 RX ORDER — DULOXETIN HYDROCHLORIDE 30 MG/1
30 CAPSULE, DELAYED RELEASE ORAL DAILY
Qty: 30 CAPSULE | Refills: 1 | Status: SHIPPED | OUTPATIENT
Start: 2019-05-07 | End: 2019-07-25 | Stop reason: ALTCHOICE

## 2019-05-14 ENCOUNTER — APPOINTMENT (OUTPATIENT)
Dept: PHYSICAL THERAPY | Facility: REHABILITATION | Age: 44
End: 2019-05-14
Payer: COMMERCIAL

## 2019-05-15 NOTE — PROGRESS NOTES
Message  Patient is getting in 2500 ml of pedialyte per day: 250 kcal  Unjury provides 360 kcal and 84 grams of protein   Plus po intake = 800 kcal and 50 grams of protein  Total for the day= 1410 kcal per day and 134 grams of protein  Will discontinue the Vital high protein as patient no longer needs additional calories or protein  Patient will continue to wean off of her saline        Active Problems    1  Abdominal discomfort (789 00) (R10 9)   2  Abdominal pain (789 00) (R10 9)   3  Abnormal weight gain (783 1) (R63 5)   4  Acute UTI (599 0) (N39 0)   5  Allergic contact dermatitis due to other agents (692 89) (L23 89)   6  Cervical cancer screening (V76 2) (Z12 4)   7  Chronic venous embolism and thrombosis of deep vessels of distal lower extremity   (453 52) (I82 5Z9)   8  Constipation (564 00) (K59 00)   9  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)   10  Dysphagia (787 20) (R13 10)   11  Edema (782 3) (R60 9)   12  Gastroesophageal reflux disease (530 81) (K21 9)   13  History of Nissen fundoplication (V99 44) (W33 76)   14  Hypertension (401 9) (I10)   15  Iron deficiency (280 9) (E61 1)   16  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)   17  Migraine headache (346 90) (G43 909)   18  Muscle weakness (generalized) (728 87) (M62 81)   19  Nausea (787 02) (R11 0)   20  Nausea with vomiting (787 01) (R11 2)   21  Need for diphtheria-tetanus-pertussis (Tdap) vaccine (V06 1) (Z23)   22  Obesity (278 00) (E66 9)   23  Oropharyngeal dysphagia (787 22) (R13 12)   24  Polycystic ovarian syndrome (256 4) (E28 2)   25  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 3) (K91 2,Z90 3)   26  Pre-op evaluation (V72 84) (Z01 818)   27  Pre-operative cardiovascular examination (V72 81) (Z01 810)   28  Retinitis pigmentosa (362 74) (H35 52)   29  Status post gastric bypass for obesity (V45 86) (Z98 84)   30  Stroke Syndrome (436)   31  Type B influenza (487 1) (J10 1)   32   Urination pain (788 1) (R30 9)    Current Meds   1  Amoxicillin-Pot Clavulanate 400-57 MG/5ML Oral Suspension Reconstituted; TAKE 5 ML   Every 6 hours As Directed TDD:20;   Therapy: 08WXL7321 to (Evaluate:17Aug2016)  Requested for: 22RNL7942; Last   Rx:19May2016 Ordered   2  B Complex CAPS; Therapy: (Recorded:54Jkf6189) to Recorded   3  Culturelle CAPS; Therapy: (Recorded:22Yvv6839) to Recorded   4  MiraLax Oral Powder (Polyethylene Glycol 3350); Therapy: (Recorded:46Bjp4707) to Recorded   5  Multivitamins TABS; TAKE 1 TABLET DAILY; Therapy: (Recorded:29Yww0426) to Recorded   6  Ondansetron 4 MG Oral Tablet Dispersible (Zofran ODT); Take one every 4-6 hours prn   for nausea; Therapy: 31UUL6793 to (Last 72 954 091)  Requested for: 23Nov2015 Ordered   7  Pantoprazole Sodium 40 MG Oral Tablet Delayed Release (Protonix); TAKE 1 TABLET   TWICE DAILY 30 MINUTES BEFORE BREAKFAST AND DINNER; Therapy: 50XIE0661 to (Aury Seaman)  Requested for: 34SJG6424; Last   Rx:10Mar2016 Ordered   8  Stool Softener TABS; Therapy: (Recorded:19May2016) to Recorded    Allergies    1  Guaifenesin & Derivatives   2   Sulfa Drugs    Signatures   Electronically signed by : ANGELA Moreno; Sep 14 2016 10:30AM EST                       (Author) 0 = independent

## 2019-05-16 ENCOUNTER — OFFICE VISIT (OUTPATIENT)
Dept: PHYSICAL THERAPY | Facility: REHABILITATION | Age: 44
End: 2019-05-16
Payer: COMMERCIAL

## 2019-05-16 DIAGNOSIS — G90.511 COMPLEX REGIONAL PAIN SYNDROME TYPE 1 OF RIGHT UPPER EXTREMITY: Primary | ICD-10-CM

## 2019-05-16 DIAGNOSIS — M79.601 CHRONIC PAIN OF RIGHT UPPER EXTREMITY: ICD-10-CM

## 2019-05-16 DIAGNOSIS — G89.29 CHRONIC PAIN OF RIGHT UPPER EXTREMITY: ICD-10-CM

## 2019-05-16 PROCEDURE — 97110 THERAPEUTIC EXERCISES: CPT | Performed by: PHYSICAL THERAPIST

## 2019-05-16 PROCEDURE — 97140 MANUAL THERAPY 1/> REGIONS: CPT | Performed by: PHYSICAL THERAPIST

## 2019-05-16 PROCEDURE — 97112 NEUROMUSCULAR REEDUCATION: CPT | Performed by: PHYSICAL THERAPIST

## 2019-05-21 ENCOUNTER — OFFICE VISIT (OUTPATIENT)
Dept: PHYSICAL THERAPY | Facility: REHABILITATION | Age: 44
End: 2019-05-21
Payer: COMMERCIAL

## 2019-05-21 DIAGNOSIS — M79.601 CHRONIC PAIN OF RIGHT UPPER EXTREMITY: Primary | ICD-10-CM

## 2019-05-21 DIAGNOSIS — G90.511 COMPLEX REGIONAL PAIN SYNDROME TYPE 1 OF RIGHT UPPER EXTREMITY: ICD-10-CM

## 2019-05-21 DIAGNOSIS — G89.29 CHRONIC PAIN OF RIGHT UPPER EXTREMITY: Primary | ICD-10-CM

## 2019-05-21 PROCEDURE — 97112 NEUROMUSCULAR REEDUCATION: CPT | Performed by: PHYSICAL THERAPIST

## 2019-05-21 PROCEDURE — 97110 THERAPEUTIC EXERCISES: CPT | Performed by: PHYSICAL THERAPIST

## 2019-05-21 PROCEDURE — 97140 MANUAL THERAPY 1/> REGIONS: CPT | Performed by: PHYSICAL THERAPIST

## 2019-05-23 ENCOUNTER — OFFICE VISIT (OUTPATIENT)
Dept: PHYSICAL THERAPY | Facility: REHABILITATION | Age: 44
End: 2019-05-23
Payer: COMMERCIAL

## 2019-05-23 DIAGNOSIS — E61.1 IRON DEFICIENCY: ICD-10-CM

## 2019-05-23 DIAGNOSIS — G89.29 CHRONIC PAIN OF RIGHT UPPER EXTREMITY: Primary | ICD-10-CM

## 2019-05-23 DIAGNOSIS — G90.511 COMPLEX REGIONAL PAIN SYNDROME TYPE 1 OF RIGHT UPPER EXTREMITY: ICD-10-CM

## 2019-05-23 DIAGNOSIS — M79.601 CHRONIC PAIN OF RIGHT UPPER EXTREMITY: Primary | ICD-10-CM

## 2019-05-23 PROCEDURE — 97110 THERAPEUTIC EXERCISES: CPT | Performed by: PHYSICAL THERAPIST

## 2019-05-23 PROCEDURE — 97112 NEUROMUSCULAR REEDUCATION: CPT | Performed by: PHYSICAL THERAPIST

## 2019-05-23 RX ORDER — SODIUM CHLORIDE 9 MG/ML
20 INJECTION, SOLUTION INTRAVENOUS ONCE
Status: CANCELLED | OUTPATIENT
Start: 2019-05-28

## 2019-05-28 ENCOUNTER — HOSPITAL ENCOUNTER (OUTPATIENT)
Dept: INFUSION CENTER | Facility: CLINIC | Age: 44
Discharge: HOME | End: 2019-05-28
Payer: COMMERCIAL

## 2019-05-28 VITALS
RESPIRATION RATE: 20 BRPM | SYSTOLIC BLOOD PRESSURE: 133 MMHG | TEMPERATURE: 98.2 F | DIASTOLIC BLOOD PRESSURE: 72 MMHG | HEART RATE: 82 BPM

## 2019-05-28 DIAGNOSIS — E61.1 IRON DEFICIENCY: Primary | ICD-10-CM

## 2019-05-28 PROCEDURE — 96365 THER/PROPH/DIAG IV INF INIT: CPT

## 2019-05-28 PROCEDURE — 96372 THER/PROPH/DIAG INJ SC/IM: CPT

## 2019-05-28 RX ORDER — SODIUM CHLORIDE 9 MG/ML
20 INJECTION, SOLUTION INTRAVENOUS ONCE
Status: COMPLETED | OUTPATIENT
Start: 2019-05-28 | End: 2019-05-28

## 2019-05-28 RX ORDER — CYANOCOBALAMIN 1000 UG/ML
1000 INJECTION INTRAMUSCULAR; SUBCUTANEOUS ONCE
Status: COMPLETED | OUTPATIENT
Start: 2019-05-28 | End: 2019-05-28

## 2019-05-28 RX ORDER — CYANOCOBALAMIN 1000 UG/ML
1000 INJECTION INTRAMUSCULAR; SUBCUTANEOUS ONCE
Status: CANCELLED | OUTPATIENT
Start: 2019-06-25

## 2019-05-28 RX ORDER — SODIUM CHLORIDE 9 MG/ML
20 INJECTION, SOLUTION INTRAVENOUS ONCE
Status: CANCELLED | OUTPATIENT
Start: 2019-06-25

## 2019-05-28 RX ADMIN — SODIUM CHLORIDE 20 ML/HR: 0.9 INJECTION, SOLUTION INTRAVENOUS at 11:26

## 2019-05-28 RX ADMIN — CYANOCOBALAMIN 1000 MCG: 1000 INJECTION, SOLUTION INTRAMUSCULAR at 11:27

## 2019-05-28 RX ADMIN — IRON SUCROSE 200 MG: 20 INJECTION, SOLUTION INTRAVENOUS at 11:26

## 2019-05-28 NOTE — PROGRESS NOTES
Patient here for venofer and b12  She is doing well, no c/o offered  She tolerated b12 as ordered to left deltoid, bandaid applied post   Venofer infusing via port

## 2019-05-28 NOTE — PATIENT INSTRUCTIONS
May 2019      [place] Monday Tuesday Wednesday Thursday Friday Saturday                  1     2     3     4    INF IV-MED-VENOFER 1 HR  11:00 AM   (120 min )   AN INF CHAIR [facility]            5     6     7    PT-TREATMENT   2:15 PM   (60 min )   Kiya Jha PT   Physical Therapy at [facility]  8     9     10     11                12     13     14     15     16    PT-TREATMENT   2:00 PM   (60 min )   Kiya Jha PT   Physical Therapy at [facility]  17     18                19     20     21    PT-TREATMENT  10:00 AM   (60 min )   Kiya Jha PT   Physical Therapy at [facility]  22     23    PT-TREATMENT   2:00 PM   (60 min )   Kiya Jha PT   Physical Therapy at [facility]  24     25                26     27     28    INF IV-MED-VENOFER 1 HR  11:00 AM   (120 min )   AN INF CHAIR [facility] 29     30    PT-TREATMENT   2:00 PM   (60 min )   Kiya Jha PT   Physical Therapy at [facility]  31              Cycle 1, Treatment 1            Treatment Details       5/28/2019 - Cycle 1, Treatment 1      Supportive Care: APPT1, [facility] NURSE [id number], sodium chloride, iron sucrose (VENOFER), MONITOR MARTY        June 2019 Sunday Monday Tuesday Wednesday Thursday Friday Saturday                                 1                2     3     4    PT-TREATMENT   2:00 PM   (60 min )   Kiya Jha PT   Physical Therapy at [facility]  5     6     7     8                9     10    OFFICE VISIT SHORT PG   1:45 PM   (15 min )   Joanne Casas, DO [facility] [place] 11     12     13     14     15                16     17     18     19     20     21     22                23     24     25     26     27     28     29         Cycle 1, Treatment 2       30                                                   Treatment Details       6/25/2019 - Cycle 1, Treatment 2      Supportive Care: APPT1

## 2019-05-28 NOTE — PROGRESS NOTES
Patient tolerated venofer without incident and will RTO in 1 month for same or at physician's discretion  Appointment has to be scheduled yet

## 2019-05-30 ENCOUNTER — OFFICE VISIT (OUTPATIENT)
Dept: PHYSICAL THERAPY | Facility: REHABILITATION | Age: 44
End: 2019-05-30
Payer: COMMERCIAL

## 2019-05-30 DIAGNOSIS — G89.29 CHRONIC PAIN OF RIGHT UPPER EXTREMITY: Primary | ICD-10-CM

## 2019-05-30 DIAGNOSIS — G90.511 COMPLEX REGIONAL PAIN SYNDROME TYPE 1 OF RIGHT UPPER EXTREMITY: ICD-10-CM

## 2019-05-30 DIAGNOSIS — M79.601 CHRONIC PAIN OF RIGHT UPPER EXTREMITY: Primary | ICD-10-CM

## 2019-05-30 PROCEDURE — 97140 MANUAL THERAPY 1/> REGIONS: CPT | Performed by: PHYSICAL THERAPIST

## 2019-05-30 PROCEDURE — 97110 THERAPEUTIC EXERCISES: CPT | Performed by: PHYSICAL THERAPIST

## 2019-06-10 ENCOUNTER — CLINICAL SUPPORT (OUTPATIENT)
Dept: PAIN MEDICINE | Facility: CLINIC | Age: 44
End: 2019-06-10
Payer: COMMERCIAL

## 2019-06-10 VITALS
TEMPERATURE: 98.6 F | SYSTOLIC BLOOD PRESSURE: 124 MMHG | DIASTOLIC BLOOD PRESSURE: 86 MMHG | WEIGHT: 173 LBS | HEART RATE: 71 BPM | BODY MASS INDEX: 29.7 KG/M2

## 2019-06-10 DIAGNOSIS — G90.511 COMPLEX REGIONAL PAIN SYNDROME TYPE 1 OF RIGHT UPPER EXTREMITY: Primary | ICD-10-CM

## 2019-06-10 DIAGNOSIS — G43.719 INTRACTABLE CHRONIC MIGRAINE WITHOUT AURA AND WITHOUT STATUS MIGRAINOSUS: ICD-10-CM

## 2019-06-10 PROCEDURE — 99213 OFFICE O/P EST LOW 20 MIN: CPT | Performed by: ANESTHESIOLOGY

## 2019-06-24 ENCOUNTER — HOSPITAL ENCOUNTER (OUTPATIENT)
Dept: INFUSION CENTER | Facility: CLINIC | Age: 44
Discharge: HOME | End: 2019-06-24
Payer: COMMERCIAL

## 2019-06-24 VITALS
HEART RATE: 79 BPM | DIASTOLIC BLOOD PRESSURE: 76 MMHG | RESPIRATION RATE: 18 BRPM | TEMPERATURE: 98.5 F | SYSTOLIC BLOOD PRESSURE: 115 MMHG

## 2019-06-24 DIAGNOSIS — E61.1 IRON DEFICIENCY: Primary | ICD-10-CM

## 2019-06-24 PROCEDURE — 96365 THER/PROPH/DIAG IV INF INIT: CPT

## 2019-06-24 PROCEDURE — 96372 THER/PROPH/DIAG INJ SC/IM: CPT

## 2019-06-24 RX ORDER — SODIUM CHLORIDE 9 MG/ML
20 INJECTION, SOLUTION INTRAVENOUS ONCE
Status: COMPLETED | OUTPATIENT
Start: 2019-06-24 | End: 2019-06-24

## 2019-06-24 RX ORDER — CYANOCOBALAMIN 1000 UG/ML
1000 INJECTION INTRAMUSCULAR; SUBCUTANEOUS ONCE
Status: COMPLETED | OUTPATIENT
Start: 2019-06-24 | End: 2019-06-24

## 2019-06-24 RX ORDER — SODIUM CHLORIDE 9 MG/ML
20 INJECTION, SOLUTION INTRAVENOUS ONCE
Status: CANCELLED | OUTPATIENT
Start: 2019-07-22

## 2019-06-24 RX ORDER — CYANOCOBALAMIN 1000 UG/ML
1000 INJECTION INTRAMUSCULAR; SUBCUTANEOUS ONCE
Status: CANCELLED | OUTPATIENT
Start: 2019-07-22

## 2019-06-24 RX ADMIN — CYANOCOBALAMIN 1000 MCG: 1000 INJECTION, SOLUTION INTRAMUSCULAR at 12:00

## 2019-06-24 RX ADMIN — IRON SUCROSE 200 MG: 20 INJECTION, SOLUTION INTRAVENOUS at 10:45

## 2019-06-24 RX ADMIN — SODIUM CHLORIDE 20 ML/HR: 0.9 INJECTION, SOLUTION INTRAVENOUS at 10:34

## 2019-06-24 NOTE — PROGRESS NOTES
Patient tolerated her venofer and B12 without any adverse reactions   Next appointment confirmed and she declined avs

## 2019-07-11 ENCOUNTER — TELEPHONE (OUTPATIENT)
Dept: NEUROLOGY | Facility: CLINIC | Age: 44
End: 2019-07-11

## 2019-07-11 NOTE — TELEPHONE ENCOUNTER
Pt left message on refill line requesting [employer] refill  However medication does need a PA  Please assist with this

## 2019-07-12 ENCOUNTER — TELEPHONE (OUTPATIENT)
Dept: NEUROLOGY | Facility: CLINIC | Age: 44
End: 2019-07-12

## 2019-07-15 NOTE — TELEPHONE ENCOUNTER
sandip called and states that [employer] approved for 1 year, effective 7/15/19-7/15/2020    Left message for pharm making them aware of approval

## 2019-07-24 RX ORDER — CYANOCOBALAMIN 1000 UG/ML
1000 INJECTION INTRAMUSCULAR; SUBCUTANEOUS ONCE
Status: CANCELLED | OUTPATIENT
Start: 2019-07-30

## 2019-07-25 ENCOUNTER — OFFICE VISIT (OUTPATIENT)
Dept: RHEUMATOLOGY | Facility: CLINIC | Age: 44
End: 2019-07-25
Payer: COMMERCIAL

## 2019-07-25 VITALS — BODY MASS INDEX: 29.71 KG/M2 | HEART RATE: 80 BPM | WEIGHT: 174 LBS | HEIGHT: 64 IN

## 2019-07-25 DIAGNOSIS — I73.00 RAYNAUD'S DISEASE WITHOUT GANGRENE: Primary | ICD-10-CM

## 2019-07-25 DIAGNOSIS — M25.50 POLYARTHRALGIA: ICD-10-CM

## 2019-07-25 DIAGNOSIS — M79.10 MYALGIA: ICD-10-CM

## 2019-07-25 DIAGNOSIS — M15.0 PRIMARY GENERALIZED (OSTEO)ARTHRITIS: ICD-10-CM

## 2019-07-25 PROCEDURE — 99204 OFFICE O/P NEW MOD 45 MIN: CPT | Performed by: INTERNAL MEDICINE

## 2019-07-25 NOTE — PROGRESS NOTES
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 who presents for rheumatology evaluation for Raynaud phenomenon  She has had multiple GI issues including gastric bypass related to the mitochondrial disease  She continues to follow with specialist at [facility] for this  In terms of her Raynaud's she has had this for quite some time but feels that over the past winter it was particularly worse and she did developed early signs of a finger wound  She was started on amlodipine by Neurology and the doses titrated up to 10 mg daily which has been helping  She otherwise does not describe any other symptoms that would be concerning for an underlying systemic autoimmune disease such as scleroderma or lupus  Her exam today is unrevealing for scleroderma skin changes and is grossly unremarkable  She does have bony hypertrophy in her PIP joints but I do not appreciate any swelling or synovitis  We discussed we will do the appropriate autoimmune workup since she has not had 1 previously but I have low suspicion for underlying systemic autoimmune disease related to the Raynaud's and I think she likely has primary Raynaud's  She will follow up again in about 4 months as it will be the start of winter around that time and we will see if she needs any additional treatment for the Raynaud's  Plan:  Diagnoses and all orders for this visit:    Raynaud's disease without gangrene  -     BLAIRE Screen w/ Reflex to Titer/Pattern  -     Anticardiolipin Ab, IgG/M, Qn  -     Beta-2 glycoprotein antibodies  -     C3 complement  -     C4 complement  -     CBC and differential  -     Comprehensive metabolic panel  -     Sjogren's Antibodies; Future  -     RF Screen w/ Reflex to Titer;  Future  -     Cyclic citrul peptide antibody, IgG  -     Chronic Hepatitis Panel  -     Lupus anticoagulant    Polyarthralgia  -     BLAIRE Screen w/ Reflex to Titer/Pattern  -     Anticardiolipin Ab, IgG/M, Qn  -     Beta-2 glycoprotein antibodies  -     C3 complement  -     C4 complement  -     CBC and differential  -     Comprehensive metabolic panel  -     Sjogren's Antibodies; Future  -     RF Screen w/ Reflex to Titer; Future  -     Cyclic citrul peptide antibody, IgG  -     Chronic Hepatitis Panel  -     Lupus anticoagulant    Myalgia  -     BLAIRE Screen w/ Reflex to Titer/Pattern  -     Anticardiolipin Ab, IgG/M, Qn  -     Beta-2 glycoprotein antibodies  -     C3 complement  -     C4 complement  -     CBC and differential  -     Comprehensive metabolic panel  -     Sjogren's Antibodies; Future  -     RF Screen w/ Reflex to Titer; Future  -     Cyclic citrul peptide antibody, IgG  -     Chronic Hepatitis Panel  -     Lupus anticoagulant    Primary generalized (osteo)arthritis        Follow-up plan: 4 months       HPI  Deena Price is a 41 y o   female with a form of mitochondrial genetic disorder (follows at [facility]), history of Nissen fundoplication and ultimately gastric bypass in 2015 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, GERD, chronic TPN use through PEG tube, iron deficiency anemia requiring IV replacement, secondary hyperparathyroidism, hypoglycemia, vitamin-D deficiency, 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, who presents for rheumatology consult by request of Dr Mulu Huynh for raynaud phenomenon  Her mitochondrial disease has caused various issues for her including dysautonomia, GI dysmotility resulting in Nissen procedure then gastric bypass in 2015, she now has uptake to which she uses mainly for hydration and protein and does eat normally p o;  also has chronic hypoglycemia, persistent GERD, and some dysphagia  She reports that overall she is doing better compared to initially after she had a gastric bypass  She continues to follow with [facility]    Both of her children have mitochondrial disease and have had severe GI complications  She reports she has had Raynaud's for quite some time but always felt it was milder  She gets Raynaud's in both her fingers and toes  Raynaud's became severe again this past winter with blue and white color changes  She got a sore on her left index finger this past winter but it resolved within 1 week  She was started on amlodipine 5mg over the winter which helped the symptoms  She is now on 10 mg daily and tolerating the dose well  She still does feel that it has helped her  She does anticipate worsening Raynaud's with the winter  Saw Dr Gauri Pulido in the past several years ago and she was not diagnosed with underlying systemic autoimmune disease  She reports swelling in her PIP knuckles and think they are generally larger  She has morning stiffness for a few minutes but no prolonged stiffness  Denies photosensitivity, rashes, psoriasis, sicca symptoms, oral or nasal ulcers, alopecia, h/o pericarditis or pleurisy  1 miscarriage at 10 weeks  She had 2 pregnancies but both had  labor  She also has history of a DVT which occurred in the setting of a foot fracture  Her CRPS flared back up within past 6 months and she had a repeat stellate ganglion block  It did not help as much as it did the first time she had it 15 years ago  She plans to follow up with pain management again for additional treatment options  She did previously try gabapentin but felt too drowsy on the even on the lower dose  She also felt more depression after starting Cymbalta and so she is now on neither of these medications  She has not tried Lyrica  Review of Systems  Review of Systems   Constitutional: Negative for chills, fatigue, fever and unexpected weight change  HENT: Positive for trouble swallowing  Negative for mouth sores  Eyes: Negative for pain and visual disturbance     Respiratory: Negative for cough and shortness of breath  Cardiovascular: Negative for chest pain and leg swelling  Gastrointestinal: Positive for constipation  Negative for abdominal pain, blood in stool, diarrhea and nausea  Genitourinary: Negative for hematuria  Musculoskeletal: Negative for arthralgias, back pain, joint swelling and myalgias  Skin: Negative for rash  Neurological: Negative for weakness and numbness  Hematological: Negative for adenopathy  Psychiatric/Behavioral: Negative for sleep disturbance  Allergies  Allergies   Allergen Reactions    Sulfate Other (See Comments)    Sulfa Antibiotics      Arrhythmia     Guaifenesin      Arrhythmia    Other      Arrhythmia       Home Medications    Current Outpatient Medications:     acarbose (PRECOSE) 100 MG tablet, Take 1 tablet (100 mg total) by mouth 3 (three) times a day with meals for 300 days, Disp: 270 tablet, Rfl: 3    Alpha-Lipoic Acid 100 MG CAPS, Take by mouth, Disp: , Rfl:     amLODIPine (NORVASC) 5 mg tablet, 5 mg BID , Disp: 180 tablet, Rfl: 1    B Complex-Biotin-FA (TH VITAMIN B 50/B-COMPLEX PO), Take 1 tablet by mouth daily  , Disp: , Rfl:     Blood Glucose Monitoring Suppl (ONETOUCH VERIO) w/Device KIT, Dispense 1 meter, Disp: 1 kit, Rfl: 1    Calcium Carbonate Antacid 1250 mg/5 mL, Take by mouth, Disp: , Rfl:     COENZYME Q-10 PO, , Disp: , Rfl:     CREATINE MONOHYDRATE PO, Take 2 5 g by mouth, Disp: , Rfl:     docusate sodium (COLACE) 100 mg capsule, Take 200 mg by mouth 2 (two) times a day , Disp: , Rfl:     Galcanezumab-gnlm (EMGALITY) 120 MG/ML SOAJ, One subQ injection q 30 days  , Disp: 3 pen, Rfl: 0    iron sucrose (VENOFER) 20 mg/mL, Venofer 200 mg iron/10 mL intravenous solution, Disp: , Rfl:     ketorolac (TORADOL) 30 mg/mL injection, Administer 1 ml by IM route at onset of migraine, max 1 injection in 24 hours  Max 2-3 days a week   Do not take with other NSAIDs, Disp: 10 mL, Rfl: 0    L-Arginine 1000 MG TABS, Take 6,000 mg by mouth 2 (two) times a day , Disp: , Rfl:     levOCARNitine (CARNITOR) 1 g/10 mL solution, Take 100 mg by mouth 4 (four) times a day (before meals and at bedtime), Disp: , Rfl:     midodrine (PROAMATINE) 5 mg tablet, Take 1 tablet (5 mg total) by mouth 3 (three) times a day, Disp: 270 tablet, Rfl: 3    Multiple Vitamin (MULTIVITAMIN) tablet, Take 1 tablet by mouth daily  , Disp: , Rfl:     ONETOUCH DELICA LANCETS FINE MISC, Check BG 3x per day, Disp: 300 each, Rfl: 3    ONETOUCH VERIO test strip, Check BG 3x per day, Disp: 300 each, Rfl: 1    Oral Vehicles (PCCA-PLUS) SUSP, , Disp: , Rfl:     pantoprazole (PROTONIX) 40 mg tablet, Take 1 tablet (40 mg total) by mouth 2 (two) times a day, Disp: 180 tablet, Rfl: 3    polyethylene glycol (MIRALAX) 17 g packet, Take 17 g by mouth 2 (two) times a day Indications: 1 5 caps full 1-2 times/day   , Disp: , Rfl:     Riboflavin 100 MG TABS, Take 100 mg by mouth, Disp: , Rfl:     Syringe/Needle, Disp, (SYRINGE 3CC/57NA6-2/4") 27G X 1-1/4" 3 ML MISC, Use with Toradol IM injection, Disp: 15 each, Rfl: 0    Ubiquinol Liposomal 100 MG/5ML SYRP, Take 300 mg by mouth, Disp: , Rfl:     Ubiquinol POWD, , Disp: , Rfl:     vitamin E 100 UNIT capsule, Take 78 Units by mouth daily, Disp: , Rfl:     Linagliptin (TRADJENTA) 5 MG TABS, Take 5 mg by mouth daily for 90 days, Disp: 90 mg, Rfl: 3    Past Medical History  Past Medical History:   Diagnosis Date    Acid reflux     Constipated     [facility]     Esophageal abnormality     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)     History of blood clots 2012    Left leg  Has IVC filter now   Occurred while on Lovenox    Iron deficiency     Malignant hyperthermia due to anesthesia     Patient's son has M H   States she needs precautions    Migraines     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[facility] )     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[facility] )     MTHFR (methylene THF reductase) deficiency and homocystinuria ([facility] )     Muscle weakness (generalized)     Nausea     On total parenteral nutrition (TPN)     for 8 mts    PCOS (polycystic ovarian syndrome)     PONV (postoperative nausea and vomiting)     Postgastrectomy syndrome     malabsorption    Postsurgical malabsorption     Status post gastric bypass for obesity     Status post PICC central line placement     Right upper arm  Receives TPN    Stroke [facility]) [id number], [id number]    Metabolic strokes  Right hemiparesis= minor now   Vomiting     When eating       Past Surgical History   Past Surgical History:   Procedure Laterality Date    ANKLE SURGERY Left     Has plate and screws    COLONOSCOPY      COSMETIC SURGERY      X14 as child post attack by dog  Arms, legs and face    DILATION AND CURETTAGE OF UTERUS      x2    ESOPHAGOGASTRODUODENOSCOPY N/A 1/27/2016    Procedure: ESOPHAGOGASTRODUODENOSCOPY (EGD); Surgeon: Benito Hardy MD;  Location: [facility]; Service:     FRACTURE SURGERY Left     Left ankle - hardware    GASTRIC BYPASS      IVC FILTER INSERTION      NISSEN FUNDOPLICATION      OVARY SURGERY Bilateral     "Drilling" due to multiple cysts- PCOS    NH EGD TRANSORAL BIOPSY SINGLE/MULTIPLE N/A 3/9/2016    Procedure: ESOPHAGOGASTRODUODENOSCOPY (EGD); Surgeon: Benito Hardy MD;  Location: [facility];   Service: Bariatrics    NH LAP,GASTROSTOMY,W/O TUBE CONSTR N/A 4/19/2016    Procedure: INSERTION GASTROSTOMY TUBE LAPAROSCOPIC WITH INTRAOP EGD;  Surgeon: Benito Hardy MD;  Location: [facility];  Service: Bariatrics    ULNAR NERVE TRANSPOSITION Right     WISDOM TOOTH EXTRACTION         Family History  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in both children; RA in GM  Family History   Problem Relation Age of Onset    Mitochondrial disorder Mother     Hypertension Mother     Thyroid cancer Mother 48    Clotting disorder Mother         MTFR   [place] Cancer Mother 62        renal    Depression Father     Endocrinopathy Father     Clotting disorder Father         MTFR   [place] Depression Brother     Narcolepsy Brother     Heart failure Maternal Grandmother 80    Coronary artery disease Maternal Grandmother     Mitochondrial disorder Son     Mitochondrial disorder Daughter     Stroke Family     Anuerysm Paternal Uncle         abdominal     Breast cancer Maternal Grandfather 55    Breast cancer Maternal Aunt 43    Heart attack Neg Hx     Arrhythmia Neg Hx     Sudden death Neg Hx         scd       Social History  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   Social History     Substance and Sexual Activity   Alcohol Use No     Social History     Substance and Sexual Activity   Drug Use No     Social History     Tobacco Use   Smoking Status Never Smoker   Smokeless Tobacco Never Used       Objective:    Vitals:    07/25/19 1258   Pulse: 80   Weight: 78 9 kg (174 lb)   Height: 5' 4" (1 626 m)       Physical Exam   Constitutional: She appears well-developed and well-nourished  No distress  HENT:   Head: Normocephalic and atraumatic  Mouth/Throat: Oropharynx is clear and moist and mucous membranes are normal    Eyes: Conjunctivae and EOM are normal  No scleral icterus  Neck: Neck supple  No spinous process tenderness and no muscular tenderness present  No thyromegaly present  Cardiovascular: Normal rate, regular rhythm, S1 normal and S2 normal  Exam reveals no friction rub  No murmur heard  Pulmonary/Chest: Effort normal and breath sounds normal  No respiratory distress  She has no wheezes  She has no rhonchi  She has no rales  Abdominal: Soft  She exhibits no distension  There is no hepatosplenomegaly  There is no tenderness  +PEG in place clean/dry   Musculoskeletal:   No joint swelling or synovitis anywhere  No reproducible soft tissue or joint tenderness  Bony hypertrophy throughout PIPs   Lymphadenopathy:     She has no cervical adenopathy  Neurological: She is alert  She has normal strength  No sensory deficit  Skin: Skin is warm and dry  No rash noted   Nails show no clubbing  Psychiatric: She has a normal mood and affect         Labs:   Component      Latest Ref Rng & Units 1/28/2019   WBC      4 31 - 10 16 Thousand/uL 4 90   Red Blood Cell Count      3 81 - 5 12 Million/uL 4 41   Hemoglobin      11 5 - 15 4 g/dL 13 7   HCT      34 8 - 46 1 % 41 0   MCV      82 - 98 fL 93   MCH      26 8 - 34 3 pg 31 1   MCHC      31 4 - 37 4 g/dL 33 4   RDW      11 6 - 15 1 % 11 8   MPV      8 9 - 12 7 fL 9 6   Platelet Count      770 - 390 Thousands/uL 217   nRBC      /100 WBCs 0   Neutrophils %      43 - 75 % 61   Immat GRANS %      0 - 2 % 0   Lymphocytes Relative      14 - 44 % 31   Monocytes Relative      4 - 12 % 6   Eosinophils      0 - 6 % 1   Basophils Relative      0 - 1 % 1   Absolute Neutrophils      1 85 - 7 62 Thousands/µL 2 94   Immature Grans Absolute      0 00 - 0 20 Thousand/uL 0 01   Lymphocytes Absolute      0 60 - 4 47 Thousands/µL 1 54   Absolute Monocytes      0 17 - 1 22 Thousand/µL 0 30   Absolute Eosinophils      0 00 - 0 61 Thousand/µL 0 06   Basophils Absolute      0 00 - 0 10 Thousands/µL 0 05   Sodium      136 - 145 mmol/L 141   Potassium      3 5 - 5 3 mmol/L 3 9   Chloride      100 - 108 mmol/L 105   CO2      21 - 32 mmol/L 32   Anion Gap      4 - 13 mmol/L 4   BUN      5 - 25 mg/dL 14   Creatinine      0 60 - 1 30 mg/dL 0 71   Glucose, Random      65 - 140 mg/dL 83   Calcium      8 3 - 10 1 mg/dL 9 7   AST      5 - 45 U/L 14   ALT      12 - 78 U/L 29   Alkaline Phosphatase      46 - 116 U/L 75   Total Protein      6 4 - 8 2 g/dL 7 5   Albumin      3 5 - 5 0 g/dL 3 8   TOTAL BILIRUBIN      0 20 - 1 00 mg/dL 0 30   eGFR      ml/min/1 73sq m 106

## 2019-07-26 RX ORDER — SODIUM CHLORIDE 9 MG/ML
20 INJECTION, SOLUTION INTRAVENOUS ONCE
Status: CANCELLED | OUTPATIENT
Start: 2019-07-30

## 2019-07-29 ENCOUNTER — CLINICAL SUPPORT (OUTPATIENT)
Dept: PAIN MEDICINE | Facility: CLINIC | Age: 44
End: 2019-07-29
Payer: COMMERCIAL

## 2019-07-29 VITALS
DIASTOLIC BLOOD PRESSURE: 77 MMHG | SYSTOLIC BLOOD PRESSURE: 111 MMHG | BODY MASS INDEX: 29.02 KG/M2 | HEIGHT: 64 IN | TEMPERATURE: 98.3 F | HEART RATE: 72 BPM | WEIGHT: 170 LBS

## 2019-07-29 DIAGNOSIS — G90.511 COMPLEX REGIONAL PAIN SYNDROME TYPE 1 OF RIGHT UPPER EXTREMITY: Primary | ICD-10-CM

## 2019-07-29 PROCEDURE — 99213 OFFICE O/P EST LOW 20 MIN: CPT | Performed by: ANESTHESIOLOGY

## 2019-07-29 NOTE — PROGRESS NOTES
Assessment  1  Complex regional pain syndrome type 1 of right upper extremity        Plan  56-year-old female returning for follow-up of CRPS 1 of the right upper extremity  She had a right stellate ganglion block April 30, 2019 with a centrally 90% relief of her right upper extremity pain which she is still noticing today  She does still continue to do desensitization therapy at home  She is no longer taking duloxetine or gabapentin as she was unable tolerate side effects  1  We will repeat right stellate ganglion block p r n     The patient does not feel the need to repeat this as she is still getting significant relief from the last 1  2  The patient will continue with desensitization therapy  3  I will follow up the patient on a p r n  Basis       My impressions and treatment recommendations were discussed in detail with the patient who verbalized understanding and had no further questions  Discharge instructions were provided  I personally saw and examined the patient and I agree with the above discussed plan of care  No orders of the defined types were placed in this encounter  No orders of the defined types were placed in this encounter  History of Present Illness    Dominick Bailey is a 45 y o  female returning for follow-up of right upper extremity pain and allodynia with associated paresthesias and subjective weakness in no specific dermatomal fashion secondary to CRPS 1 of the right upper extremity  The patient states that her pain has been significantly alleviated from right stellate ganglion block which was done in April 2019 and she is still experiencing approximately 90% relief today  The weakness in the right arm has also improved  The patient was taking gabapentin and duloxetine, however has discontinue these medications secondary to side effects  She is continuing to do desensitization therapy at home with significant progress in her neuropathic symptoms    The patient rates her pain a 1/10 and the pain is worse in the evening  The pain is intermittent and described as burning  The pain is worse with manual palpation and movement of the right upper extremity  The pain is alleviated with relaxation, therapy, and biofeedback  I have personally reviewed and/or updated the patient's past medical history, past surgical history, family history, social history, current medications, allergies, and vital signs today  Other than as stated above, the patient denies any interval changes in medications, medical condition, mental condition, symptoms, or allergies since the last office visit  Review of Systems   Constitutional: Negative for fever and unexpected weight change  HENT: Negative for trouble swallowing  Eyes: Negative for visual disturbance  Respiratory: Negative for shortness of breath and wheezing  Cardiovascular: Negative for chest pain and palpitations  Gastrointestinal: Negative for constipation, diarrhea, nausea and vomiting  Endocrine: Negative for cold intolerance, heat intolerance and polydipsia  Genitourinary: Negative for difficulty urinating and frequency  Musculoskeletal: Negative for arthralgias, gait problem, joint swelling and myalgias  Pain in extremity   Skin: Negative for rash  Neurological: Negative for dizziness, seizures, syncope, weakness and headaches  Hematological: Does not bruise/bleed easily  Psychiatric/Behavioral: Negative for dysphoric mood  All other systems reviewed and are negative        Patient Active Problem List   Diagnosis    Dysphagia    Postsurgical malabsorption    S/P gastric bypass    Iron deficiency    Constipation    Mitochondrial metabolism disorder (HCC)    GERD (gastroesophageal reflux disease)    Migraine without aura and without status migrainosus, not intractable    History of Nissen fundoplication    Reactive hypoglycemia    Hyperparathyroidism , secondary, non-renal ([facility] )    Hirsutism    Orthostatic hypotension    MTHFR mutation (methylenetetrahydrofolate reductase) (HCC)    History of malignant melanoma of skin    Chronic venous embolism and thrombosis of deep vessels of distal lower extremity (HCC)    Congenital anomaly of lower limb    Family history of endocrine and metabolic disease    Esophageal dysmotility    Dysautonomia orthostatic hypotension syndrome (HCC)    Fibroid    Urinary retention    Renal cyst, right    Retinitis pigmentosa    Polycystic ovarian syndrome    Neurogenic bladder    Nyctalopia    Hypertension    Raynaud's disease without gangrene    Bariatric surgery status    Complex regional pain syndrome type 1 of right upper extremity       Past Medical History:   Diagnosis Date    Acid reflux     Constipated     [facility]     Esophageal abnormality     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   Gastrostomy tube in place [facility])     History of blood clots 2012    Left leg  Has IVC filter now  Occurred while on Lovenox    Iron deficiency     Malignant hyperthermia due to anesthesia     Patient's son has M H   States she needs precautions    Migraines     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[facility] )     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[place] [address] )     MTHFR (methylene THF reductase) deficiency and homocystinuria ([facility])     Muscle weakness (generalized)     Nausea     On total parenteral nutrition (TPN)     for 8 mts    PCOS (polycystic ovarian syndrome)     PONV (postoperative nausea and vomiting)     Postgastrectomy syndrome     malabsorption    Postsurgical malabsorption     Status post gastric bypass for obesity     Status post PICC central line placement     Right upper arm  Receives TPN    Stroke [facility]) 2698, 0216    Metabolic strokes  Right hemiparesis= minor now      Vomiting     When eating       Past Surgical History:   Procedure Laterality Date    ANKLE SURGERY Left     Has plate and screws    COLONOSCOPY      COSMETIC SURGERY      X14 as child post attack by dog  Arms, legs and face    DILATION AND CURETTAGE OF UTERUS      x2    ESOPHAGOGASTRODUODENOSCOPY N/A 1/27/2016    Procedure: ESOPHAGOGASTRODUODENOSCOPY (EGD); Surgeon: Anup Meza MD;  Location: [facility]; Service:     FRACTURE SURGERY Left     Left ankle - hardware    GASTRIC BYPASS      IVC FILTER INSERTION      NISSEN FUNDOPLICATION      OVARY SURGERY Bilateral     "Drilling" due to multiple cysts- PCOS    NM EGD TRANSORAL BIOPSY SINGLE/MULTIPLE N/A 3/9/2016    Procedure: ESOPHAGOGASTRODUODENOSCOPY (EGD); Surgeon: Anup Meza MD;  Location: [facility];   Service: Bariatrics    NM LAP,GASTROSTOMY,W/O TUBE CONSTR N/A 4/19/2016    Procedure: INSERTION GASTROSTOMY TUBE LAPAROSCOPIC WITH INTRAOP EGD;  Surgeon: Anup Meza MD;  Location: [facility];  Service: Bariatrics    ULNAR NERVE TRANSPOSITION Right     WISDOM TOOTH EXTRACTION         Family History   Problem Relation Age of Onset    Mitochondrial disorder Mother     Hypertension Mother     Thyroid cancer Mother 48    Clotting disorder Mother         MTFR   Kayy Blanco Cancer Mother 62        renal    Depression Father     Endocrinopathy Father     Clotting disorder Father         MTFR    Depression Brother     Narcolepsy Brother     Heart failure Maternal Grandmother 80    Coronary artery disease Maternal Grandmother     Mitochondrial disorder Son     Mitochondrial disorder Daughter     Stroke Family     Anuerysm Paternal Uncle         abdominal     Breast cancer Maternal Grandfather 55    Breast cancer Maternal Aunt 43    Heart attack Neg Hx     Arrhythmia Neg Hx     Sudden death Neg Hx         scd       Social History     Occupational History    Not on file   Tobacco Use    Smoking status: Never Smoker    Smokeless tobacco: Never Used   Substance and Sexual Activity    Alcohol use: No    Drug use: No    Sexual activity: Yes     Partners: Male     Birth control/protection: Male Sterilization Current Outpatient Medications on File Prior to Visit   Medication Sig    acarbose (PRECOSE) 100 MG tablet Take 1 tablet (100 mg total) by mouth 3 (three) times a day with meals for 300 days    Alpha-Lipoic Acid 100 MG CAPS Take by mouth    amLODIPine (NORVASC) 5 mg tablet 5 mg BID   B Complex-Biotin-FA (TH VITAMIN B 50/B-COMPLEX PO) Take 1 tablet by mouth daily   Blood Glucose Monitoring Suppl (ONETOUCH VERIO) w/Device KIT Dispense 1 meter    Calcium Carbonate Antacid 1250 mg/5 mL Take by mouth    COENZYME Q-10 PO     CREATINE MONOHYDRATE PO Take 2 5 g by mouth    docusate sodium (COLACE) 100 mg capsule Take 200 mg by mouth 2 (two) times a day   Galcanezumab-gnlm (EMGALITY) 120 MG/ML SOAJ One subQ injection q 30 days   iron sucrose (VENOFER) 20 mg/mL Venofer 200 mg iron/10 mL intravenous solution    ketorolac (TORADOL) 30 mg/mL injection Administer 1 ml by IM route at onset of migraine, max 1 injection in 24 hours  Max 2-3 days a week  Do not take with other NSAIDs    L-Arginine 1000 MG TABS Take 6,000 mg by mouth 2 (two) times a day     levOCARNitine (CARNITOR) 1 g/10 mL solution Take 100 mg by mouth 4 (four) times a day (before meals and at bedtime)    midodrine (PROAMATINE) 5 mg tablet Take 1 tablet (5 mg total) by mouth 3 (three) times a day    Multiple Vitamin (MULTIVITAMIN) tablet Take 1 tablet by mouth daily   ONETOUCH DELICA LANCETS FINE MISC Check BG 3x per day    ONETOUCH VERIO test strip Check BG 3x per day    Oral Vehicles (PCCA-PLUS) SUSP     pantoprazole (PROTONIX) 40 mg tablet Take 1 tablet (40 mg total) by mouth 2 (two) times a day    polyethylene glycol (MIRALAX) 17 g packet Take 17 g by mouth 2 (two) times a day Indications: 1 5 caps full 1-2 times/day        Riboflavin 100 MG TABS Take 100 mg by mouth    Syringe/Needle, Disp, (SYRINGE 3CC/50DA8-4/4") 27G X 1-1/4" 3 ML MISC Use with Toradol IM injection    Ubiquinol Liposomal 100 MG/5ML SYRP Take 300 mg by mouth    Ubiquinol POWD     vitamin E 100 UNIT capsule Take 78 Units by mouth daily    Linagliptin (TRADJENTA) 5 MG TABS Take 5 mg by mouth daily for 90 days     No current facility-administered medications on file prior to visit  Allergies   Allergen Reactions    Sulfate Other (See Comments)    Sulfa Antibiotics      Arrhythmia     Guaifenesin      Arrhythmia    Other      Arrhythmia       Physical Exam    /77   Pulse 72   Temp 98 3 °F (36 8 °C) (Oral)   Ht 5' 4" (1 626 m)   Wt 77 1 kg (170 lb)   BMI 29 18 kg/m²     Constitutional: normal, well developed, well nourished, alert, in no distress and non-toxic and no overt pain behavior  Eyes: anicteric  HEENT: grossly intact  Neck: supple, symmetric, trachea midline and no masses   Pulmonary:even and unlabored  Cardiovascular:No edema or pitting edema present  Skin:Normal without rashes or lesions and well hydrated  Psychiatric:Mood and affect appropriate  Neurologic:Cranial Nerves II-XII grossly intact  Musculoskeletal:normal gait  Bilateral cervical paraspinals and trapezii nontender to palpation  Bilateral upper extremity strength 5/5 in all muscle groups  Sensation intact to light touch in C5 through T1 dermatomes bilaterally  Negative Spurling's bilaterally  Allodynia noted of the entire right upper extremity in no specific dermatomal fashion, however worsen the ulnar distribution of the right arm  No temperature or color changes noted of the right arm when compared to the left      Imaging  Imaging reviewed

## 2019-07-30 ENCOUNTER — APPOINTMENT (OUTPATIENT)
Dept: LAB | Facility: CLINIC | Age: 44
End: 2019-07-30
Payer: COMMERCIAL

## 2019-07-30 ENCOUNTER — HOSPITAL ENCOUNTER (OUTPATIENT)
Dept: INFUSION CENTER | Facility: CLINIC | Age: 44
Discharge: HOME | End: 2019-07-30
Payer: COMMERCIAL

## 2019-07-30 VITALS
RESPIRATION RATE: 18 BRPM | TEMPERATURE: 98.1 F | HEART RATE: 67 BPM | SYSTOLIC BLOOD PRESSURE: 128 MMHG | DIASTOLIC BLOOD PRESSURE: 72 MMHG

## 2019-07-30 DIAGNOSIS — I73.00 RAYNAUD'S DISEASE WITHOUT GANGRENE: ICD-10-CM

## 2019-07-30 DIAGNOSIS — Z15.89 MTHFR MUTATION (METHYLENETETRAHYDROFOLATE REDUCTASE): ICD-10-CM

## 2019-07-30 DIAGNOSIS — E61.1 IRON DEFICIENCY: ICD-10-CM

## 2019-07-30 DIAGNOSIS — E61.1 IRON DEFICIENCY: Primary | ICD-10-CM

## 2019-07-30 DIAGNOSIS — M25.50 POLYARTHRALGIA: ICD-10-CM

## 2019-07-30 DIAGNOSIS — M79.10 MYALGIA: ICD-10-CM

## 2019-07-30 DIAGNOSIS — Z98.84 S/P GASTRIC BYPASS: ICD-10-CM

## 2019-07-30 LAB
ALBUMIN SERPL BCP-MCNC: 3.7 G/DL (ref 3.5–5)
ALP SERPL-CCNC: 86 U/L (ref 46–116)
ALT SERPL W P-5'-P-CCNC: 26 U/L (ref 12–78)
ANION GAP SERPL CALCULATED.3IONS-SCNC: 9 MMOL/L (ref 4–13)
AST SERPL W P-5'-P-CCNC: 17 U/L (ref 5–45)
BASOPHILS # BLD AUTO: 0.04 THOUSANDS/ΜL (ref 0–0.1)
BASOPHILS NFR BLD AUTO: 1 % (ref 0–1)
BILIRUB SERPL-MCNC: 0.4 MG/DL (ref 0.2–1)
BUN SERPL-MCNC: 16 MG/DL (ref 5–25)
C3 SERPL-MCNC: 94.3 MG/DL (ref 90–180)
C4 SERPL-MCNC: 26 MG/DL (ref 10–40)
CALCIUM SERPL-MCNC: 9.8 MG/DL (ref 8.3–10.1)
CHLORIDE SERPL-SCNC: 106 MMOL/L (ref 100–108)
CO2 SERPL-SCNC: 29 MMOL/L (ref 21–32)
CREAT SERPL-MCNC: 0.92 MG/DL (ref 0.6–1.3)
EOSINOPHIL # BLD AUTO: 0.1 THOUSAND/ΜL (ref 0–0.61)
EOSINOPHIL NFR BLD AUTO: 2 % (ref 0–6)
ERYTHROCYTE [DISTWIDTH] IN BLOOD BY AUTOMATED COUNT: 11.7 % (ref 11.6–15.1)
FERRITIN SERPL-MCNC: 184 NG/ML (ref 8–388)
GFR SERPL CREATININE-BSD FRML MDRD: 77 ML/MIN/1.73SQ M
GLUCOSE SERPL-MCNC: 93 MG/DL (ref 65–140)
HCT VFR BLD AUTO: 40.3 % (ref 34.8–46.1)
HGB BLD-MCNC: 13.5 G/DL (ref 11.5–15.4)
IMM GRANULOCYTES # BLD AUTO: 0.01 THOUSAND/UL (ref 0–0.2)
IMM GRANULOCYTES NFR BLD AUTO: 0 % (ref 0–2)
LYMPHOCYTES # BLD AUTO: 1.17 THOUSANDS/ΜL (ref 0.6–4.47)
LYMPHOCYTES NFR BLD AUTO: 18 % (ref 14–44)
MCH RBC QN AUTO: 31 PG (ref 26.8–34.3)
MCHC RBC AUTO-ENTMCNC: 33.5 G/DL (ref 31.4–37.4)
MCV RBC AUTO: 93 FL (ref 82–98)
MONOCYTES # BLD AUTO: 0.36 THOUSAND/ΜL (ref 0.17–1.22)
MONOCYTES NFR BLD AUTO: 6 % (ref 4–12)
NEUTROPHILS # BLD AUTO: 4.81 THOUSANDS/ΜL (ref 1.85–7.62)
NEUTS SEG NFR BLD AUTO: 73 % (ref 43–75)
NRBC BLD AUTO-RTO: 0 /100 WBCS
PLATELET # BLD AUTO: 220 THOUSANDS/UL (ref 149–390)
PMV BLD AUTO: 9.9 FL (ref 8.9–12.7)
POTASSIUM SERPL-SCNC: 3.4 MMOL/L (ref 3.5–5.3)
PROT SERPL-MCNC: 7.3 G/DL (ref 6.4–8.2)
RBC # BLD AUTO: 4.35 MILLION/UL (ref 3.81–5.12)
SODIUM SERPL-SCNC: 144 MMOL/L (ref 136–145)
WBC # BLD AUTO: 6.49 THOUSAND/UL (ref 4.31–10.16)

## 2019-07-30 PROCEDURE — 96372 THER/PROPH/DIAG INJ SC/IM: CPT

## 2019-07-30 PROCEDURE — 87340 HEPATITIS B SURFACE AG IA: CPT | Performed by: INTERNAL MEDICINE

## 2019-07-30 PROCEDURE — 36415 COLL VENOUS BLD VENIPUNCTURE: CPT | Performed by: INTERNAL MEDICINE

## 2019-07-30 PROCEDURE — 86200 CCP ANTIBODY: CPT | Performed by: INTERNAL MEDICINE

## 2019-07-30 PROCEDURE — 85613 RUSSELL VIPER VENOM DILUTED: CPT | Performed by: INTERNAL MEDICINE

## 2019-07-30 PROCEDURE — 86803 HEPATITIS C AB TEST: CPT | Performed by: INTERNAL MEDICINE

## 2019-07-30 PROCEDURE — 86704 HEP B CORE ANTIBODY TOTAL: CPT | Performed by: INTERNAL MEDICINE

## 2019-07-30 PROCEDURE — 85670 THROMBIN TIME PLASMA: CPT | Performed by: INTERNAL MEDICINE

## 2019-07-30 PROCEDURE — 86147 CARDIOLIPIN ANTIBODY EA IG: CPT

## 2019-07-30 PROCEDURE — 96365 THER/PROPH/DIAG IV INF INIT: CPT

## 2019-07-30 PROCEDURE — 86160 COMPLEMENT ANTIGEN: CPT | Performed by: INTERNAL MEDICINE

## 2019-07-30 PROCEDURE — 82728 ASSAY OF FERRITIN: CPT

## 2019-07-30 PROCEDURE — 86038 ANTINUCLEAR ANTIBODIES: CPT | Performed by: INTERNAL MEDICINE

## 2019-07-30 PROCEDURE — 85732 THROMBOPLASTIN TIME PARTIAL: CPT | Performed by: INTERNAL MEDICINE

## 2019-07-30 PROCEDURE — 80053 COMPREHEN METABOLIC PANEL: CPT | Performed by: INTERNAL MEDICINE

## 2019-07-30 PROCEDURE — 86235 NUCLEAR ANTIGEN ANTIBODY: CPT

## 2019-07-30 PROCEDURE — 86146 BETA-2 GLYCOPROTEIN ANTIBODY: CPT | Performed by: INTERNAL MEDICINE

## 2019-07-30 PROCEDURE — 86705 HEP B CORE ANTIBODY IGM: CPT | Performed by: INTERNAL MEDICINE

## 2019-07-30 PROCEDURE — 86430 RHEUMATOID FACTOR TEST QUAL: CPT

## 2019-07-30 PROCEDURE — 85025 COMPLETE CBC W/AUTO DIFF WBC: CPT | Performed by: INTERNAL MEDICINE

## 2019-07-30 PROCEDURE — 85705 THROMBOPLASTIN INHIBITION: CPT | Performed by: INTERNAL MEDICINE

## 2019-07-30 RX ORDER — CYANOCOBALAMIN 1000 UG/ML
1000 INJECTION INTRAMUSCULAR; SUBCUTANEOUS ONCE
Status: CANCELLED | OUTPATIENT
Start: 2019-08-27

## 2019-07-30 RX ORDER — CYANOCOBALAMIN 1000 UG/ML
1000 INJECTION INTRAMUSCULAR; SUBCUTANEOUS ONCE
Status: COMPLETED | OUTPATIENT
Start: 2019-07-30 | End: 2019-07-30

## 2019-07-30 RX ORDER — SODIUM CHLORIDE 9 MG/ML
20 INJECTION, SOLUTION INTRAVENOUS ONCE
Status: COMPLETED | OUTPATIENT
Start: 2019-07-30 | End: 2019-07-30

## 2019-07-30 RX ORDER — SODIUM CHLORIDE 9 MG/ML
20 INJECTION, SOLUTION INTRAVENOUS ONCE
Status: CANCELLED | OUTPATIENT
Start: 2019-08-27

## 2019-07-30 RX ADMIN — SODIUM CHLORIDE 20 ML/HR: 0.9 INJECTION, SOLUTION INTRAVENOUS at 12:53

## 2019-07-30 RX ADMIN — IRON SUCROSE 200 MG: 20 INJECTION, SOLUTION INTRAVENOUS at 12:56

## 2019-07-30 RX ADMIN — CYANOCOBALAMIN 1000 MCG: 1000 INJECTION, SOLUTION INTRAMUSCULAR at 12:59

## 2019-07-30 NOTE — PROGRESS NOTES
Pt  tolerated Venofer infusion without incident, B12 admin  IM in [place]  AVS declined  Next appt  confirmed

## 2019-07-31 LAB
CARDIOLIPIN IGA SER IA-ACNC: <9 APL U/ML (ref 0–11)
CARDIOLIPIN IGG SER IA-ACNC: <9 GPL U/ML (ref 0–14)
CARDIOLIPIN IGM SER IA-ACNC: <9 MPL U/ML (ref 0–12)
ENA SS-A AB SER-ACNC: <0.2 AI (ref 0–0.9)
ENA SS-B AB SER-ACNC: <0.2 AI (ref 0–0.9)
HBV CORE AB SER QL: NORMAL
HBV CORE IGM SER QL: NORMAL
HBV SURFACE AG SER QL: NORMAL
HCV AB SER QL: NORMAL
RHEUMATOID FACT SER QL LA: NEGATIVE
RYE IGE QN: NEGATIVE

## 2019-08-01 LAB
APTT SCREEN TO CONFIRM RATIO: 1.14 RATIO (ref 0–1.4)
B2 GLYCOPROT1 IGA SER-ACNC: <9 GPI IGA UNITS (ref 0–25)
B2 GLYCOPROT1 IGG SER-ACNC: <9 GPI IGG UNITS (ref 0–20)
B2 GLYCOPROT1 IGM SER-ACNC: <9 GPI IGM UNITS (ref 0–32)
CCP IGA+IGG SERPL IA-ACNC: 2 UNITS (ref 0–19)
CONFIRM APTT/NORMAL: 39.5 SEC (ref 0–55)
LA PPP-IMP: NORMAL
SCREEN APTT: 29.4 SEC (ref 0–51.9)
SCREEN DRVVT: 33.3 SEC (ref 0–47)
THROMBIN TIME: 16.9 SEC (ref 0–23)

## 2019-08-13 ENCOUNTER — HOSPITAL ENCOUNTER (OUTPATIENT)
Dept: RADIOLOGY | Facility: HOSPITAL | Age: 44
Discharge: HOME | End: 2019-08-13
Attending: INTERNAL MEDICINE
Payer: COMMERCIAL

## 2019-08-13 DIAGNOSIS — R13.19 ESOPHAGEAL DYSPHAGIA: ICD-10-CM

## 2019-08-13 PROCEDURE — 74240 X-RAY XM UPR GI TRC 1CNTRST: CPT

## 2019-08-15 ENCOUNTER — OFFICE VISIT (OUTPATIENT)
Dept: HEMATOLOGY ONCOLOGY | Facility: CLINIC | Age: 44
End: 2019-08-15
Payer: COMMERCIAL

## 2019-08-15 VITALS
SYSTOLIC BLOOD PRESSURE: 112 MMHG | TEMPERATURE: 97.1 F | OXYGEN SATURATION: 99 % | RESPIRATION RATE: 16 BRPM | HEART RATE: 77 BPM | BODY MASS INDEX: 29.53 KG/M2 | WEIGHT: 173 LBS | HEIGHT: 64 IN | DIASTOLIC BLOOD PRESSURE: 84 MMHG

## 2019-08-15 DIAGNOSIS — G43.719 INTRACTABLE CHRONIC MIGRAINE WITHOUT AURA AND WITHOUT STATUS MIGRAINOSUS: ICD-10-CM

## 2019-08-15 DIAGNOSIS — D50.8 OTHER IRON DEFICIENCY ANEMIA: Primary | ICD-10-CM

## 2019-08-15 PROCEDURE — 99213 OFFICE O/P EST LOW 20 MIN: CPT | Performed by: INTERNAL MEDICINE

## 2019-08-15 RX ORDER — ACETYLCYSTEINE 200 MG/ML
SOLUTION ORAL; RESPIRATORY (INHALATION) AS NEEDED
COMMUNITY
Start: 2019-07-29

## 2019-08-15 NOTE — PROGRESS NOTES
Hematology/Oncology Outpatient Follow- up Note  [place] 45 y o  female MRN: @ Encounter: [id number]        Date:  8/15/2019    Presenting Complaint/Diagnosis :     Iron deficiency anemia in the setting of a gastric bypass and motility issues  the patient is TPN dependent      Previous Hematologic/ Oncologic History:      Venofer And B12 on multiple occasions    Current Hematologic/ Oncologic Treatment:      Venofer and B12 once a month as maintenance  Interval History:      Patient returns for follow-up visit  Her iron studies are normal And her ferritin is running higher  Her hemoglobin is also now normal  She is feeling better  Iron studies have improved  He is currently on maintenance once a month  She states her energy levels have improved significantly  Really denies any complaints today apart from heartburn which is chronic  Denies any nausea denies any vomiting or diarrhea  The rest of her 14 point review of systems today was negative  Test Results:    Imaging: Fl Upper Gi Ugi    Result Date: 8/13/2019  Narrative: UPPER GI SERIES INDICATION:  Dysphagia COMPARISON:  7/15/2016 IMAGES:  261  (this includes cine capture images) FLUOROSCOPY TIME:   1 5 mft TECHNIQUE:  The patient was given thin liquid barium by mouth and images of the esophagus, stomach, and small bowel were obtained  FINDINGS: Patient is status post Rossy-en-Y gastric bypass surgery  The small gastric remnant appears unremarkable and the anastomotic site is also unremarkable as is the proximal small bowel Evaluation of the esophagus demonstrated no evidence of stricture, lesion or ulceration  On the patient is supine, spontaneous gastroesophageal reflux between the pouch and distal to mid esophagus is noted  An IVC filter is present  Impression: 1  Status post Rossy-en-Y gastric bypass  Reflux occurs between the gastric pouch and mid to distal esophagus 2  The esophagus is otherwise unremarkable in appearance    The anastomotic site and proximal visualized small bowel is also unremarkable  Workstation performed: [id number]       Labs:   Lab Results   Component Value Date    WBC 6 49 07/30/2019    HGB 13 5 07/30/2019    HCT 40 3 07/30/2019    MCV 93 07/30/2019     07/30/2019     Lab Results   Component Value Date     01/06/2016    K 3 4 (L) 07/30/2019     07/30/2019    CO2 29 07/30/2019    ANIONGAP 9 01/06/2016    BUN 16 07/30/2019    CREATININE 0 92 07/30/2019    GLUCOSE 76 01/06/2016    GLUF 93 12/05/2017    CALCIUM 9 8 07/30/2019    AST 17 07/30/2019    ALT 26 07/30/2019    ALKPHOS 86 07/30/2019    PROT 6 5 01/06/2016    BILITOT 0 32 01/06/2016    EGFR 77 07/30/2019       Lab Results   Component Value Date    IRON 87 01/28/2019    TIBC 344 01/28/2019    FERRITIN 184 07/30/2019       Lab Results   Component Value Date    ESYEOBLV28 660 12/05/2017         ROS: As stated in the history of present illness otherwise his 14 point review of systems today was negative        Active Problems:   Patient Active Problem List   Diagnosis    Dysphagia    Postsurgical malabsorption    S/P gastric bypass    Iron deficiency    Constipation    Mitochondrial metabolism disorder (HCC)    GERD (gastroesophageal reflux disease)    Migraine without aura and without status migrainosus, not intractable    History of Nissen fundoplication    Reactive hypoglycemia    Hyperparathyroidism , secondary, non-renal (HCC)    Hirsutism    Orthostatic hypotension    MTHFR mutation (methylenetetrahydrofolate reductase) (HCC)    History of malignant melanoma of skin    Chronic venous embolism and thrombosis of deep vessels of distal lower extremity (HCC)    Congenital anomaly of lower limb    Family history of endocrine and metabolic disease    Esophageal dysmotility    Dysautonomia orthostatic hypotension syndrome (HCC)    Fibroid    Urinary retention    Renal cyst, right    Retinitis pigmentosa    Polycystic ovarian syndrome    Neurogenic bladder    Nyctalopia    Hypertension    Raynaud's disease without gangrene    Bariatric surgery status    Complex regional pain syndrome type 1 of right upper extremity       Past Medical History:   Past Medical History:   Diagnosis Date    Acid reflux     Constipated     Dysautonomia ([facility] )     Esophageal abnormality     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)     History of blood clots 2012    Left leg  Has IVC filter now  Occurred while on Lovenox    Iron deficiency     Malignant hyperthermia due to anesthesia     Patient's son has M H   States she needs precautions    Migraines     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[facility] )     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[place] [address] )     MTHFR (methylene THF reductase) deficiency and homocystinuria (HCC)     Muscle weakness (generalized)     Nausea     On total parenteral nutrition (TPN)     for 8 mts    PCOS (polycystic ovarian syndrome)     PONV (postoperative nausea and vomiting)     Postgastrectomy syndrome     malabsorption    Postsurgical malabsorption     Status post gastric bypass for obesity     Status post PICC central line placement     Right upper arm  Receives TPN    Stroke [facility]) 0120, 0429    Metabolic strokes  Right hemiparesis= minor now   Vomiting     When eating       Surgical History:   Past Surgical History:   Procedure Laterality Date    ANKLE SURGERY Left     Has plate and screws    COLONOSCOPY      COSMETIC SURGERY      X14 as child post attack by dog  Arms, legs and face    DILATION AND CURETTAGE OF UTERUS      x2    ESOPHAGOGASTRODUODENOSCOPY N/A 1/27/2016    Procedure: ESOPHAGOGASTRODUODENOSCOPY (EGD); Surgeon: Karoline Larios MD;  Location: [facility];   Service:     FRACTURE SURGERY Left     Left ankle - hardware    GASTRIC BYPASS      IVC FILTER INSERTION      NISSEN FUNDOPLICATION      OVARY SURGERY Bilateral     "Drilling" due to multiple cysts- PCOS    SC EGD TRANSORAL BIOPSY SINGLE/MULTIPLE N/A 3/9/2016    Procedure: ESOPHAGOGASTRODUODENOSCOPY (EGD); Surgeon: Lilliam Hernandez MD;  Location: [facility]; Service: Bariatrics    MI LAP,GASTROSTOMY,W/O TUBE CONSTR N/A 4/19/2016    Procedure: INSERTION GASTROSTOMY TUBE LAPAROSCOPIC WITH INTRAOP EGD;  Surgeon: Lilliam Hernandez MD;  Location: [facility];  Service: Bariatrics    ULNAR NERVE TRANSPOSITION Right     WISDOM TOOTH EXTRACTION         Family History:    Family History   Problem Relation Age of Onset    Mitochondrial disorder Mother     Hypertension Mother     Thyroid cancer Mother 48    Clotting disorder Mother         MTFR   [facility] Cancer Mother 62        renal    Depression Father     Endocrinopathy Father     Clotting disorder Father         MTFR    Depression Brother     Narcolepsy Brother     Heart failure Maternal Grandmother 80    Coronary artery disease Maternal Grandmother     Mitochondrial disorder Son     Mitochondrial disorder Daughter     Stroke Family     Anuerysm Paternal Uncle         abdominal     Breast cancer Maternal Grandfather 55    Breast cancer Maternal Aunt 43    Heart attack Neg Hx     Arrhythmia Neg Hx     Sudden death Neg Hx         scd       Cancer-related family history includes Breast cancer (age of onset: 43) in her maternal aunt; Breast cancer (age of onset: 55) in her maternal grandfather; Cancer (age of onset: 62) in her mother; Thyroid cancer (age of onset: 48) in her mother      Social History:   Social History     Socioeconomic History    Marital status: /Civil Union     Spouse name: Not on file    Number of children: Not on file    Years of education: Not on file    Highest education level: Not on file   Occupational History    Not on file   Social Needs    Financial resource strain: Not on file    Food insecurity:     Worry: Not on file     Inability: Not on file    Transportation needs:     Medical: Not on file     Non-medical: Not on file Tobacco Use    Smoking status: Never Smoker    Smokeless tobacco: Never Used   Substance and Sexual Activity    Alcohol use: No    Drug use: No    Sexual activity: Yes     Partners: Male     Birth control/protection: Male Sterilization   Lifestyle    Physical activity:     Days per week: Not on file     Minutes per session: Not on file    Stress: Not on file   Relationships    Social connections:     Talks on phone: Not on file     Gets together: Not on file     Attends Je[employer]'s witness service: Not on file     Active member of club or organization: Not on file     Attends meetings of clubs or organizations: Not on file     Relationship status: Not on file    Intimate partner violence:     Fear of current or ex partner: Not on file     Emotionally abused: Not on file     Physically abused: Not on file     Forced sexual activity: Not on file   Other Topics Concern    Not on file   Social History Narrative        2 children - son and daughter with mitrochondrial diseas       Current Medications:   Current Outpatient Medications   Medication Sig Dispense Refill    acarbose (PRECOSE) 100 MG tablet Take 1 tablet (100 mg total) by mouth 3 (three) times a day with meals for 300 days 270 tablet 3    Alpha-Lipoic Acid 100 MG CAPS Take by mouth      amLODIPine (NORVASC) 5 mg tablet 5 mg BID  180 tablet 1    B Complex-Biotin-FA (TH VITAMIN B 50/B-COMPLEX PO) Take 1 tablet by mouth daily   Blood Glucose Monitoring Suppl (ONETOUCH VERIO) w/Device KIT Dispense 1 meter 1 kit 1    Calcium Carbonate Antacid 1250 mg/5 mL Take by mouth      COENZYME Q-10 PO       CREATINE MONOHYDRATE PO Take 2 5 g by mouth      docusate sodium (COLACE) 100 mg capsule Take 200 mg by mouth 2 (two) times a day   Galcanezumab-gnlm (EMGALITY) 120 MG/ML SOAJ One subQ injection q 30 days   3 pen 0    iron sucrose (VENOFER) 20 mg/mL Venofer 200 mg iron/10 mL intravenous solution      ketorolac (TORADOL) 30 mg/mL injection Administer 1 ml by IM route at onset of migraine, max 1 injection in 24 hours  Max 2-3 days a week  Do not take with other NSAIDs 10 mL 0    L-Arginine 1000 MG TABS Take 6,000 mg by mouth 2 (two) times a day       levOCARNitine (CARNITOR) 1 g/10 mL solution Take 100 mg by mouth 4 (four) times a day (before meals and at bedtime)      Linagliptin (TRADJENTA) 5 MG TABS Take 5 mg by mouth daily for 90 days 90 mg 3    midodrine (PROAMATINE) 5 mg tablet Take 1 tablet (5 mg total) by mouth 3 (three) times a day 270 tablet 3    Multiple Vitamin (MULTIVITAMIN) tablet Take 1 tablet by mouth daily   ONETOUCH DELICA LANCETS FINE MISC Check BG 3x per day 300 each 3    ONETOUCH VERIO test strip Check BG 3x per day 300 each 1    Oral Vehicles (PCCA-PLUS) SUSP       pantoprazole (PROTONIX) 40 mg tablet Take 1 tablet (40 mg total) by mouth 2 (two) times a day 180 tablet 3    polyethylene glycol (MIRALAX) 17 g packet Take 17 g by mouth 2 (two) times a day Indications: 1 5 caps full 1-2 times/day   Riboflavin 100 MG TABS Take 100 mg by mouth      Syringe/Needle, Disp, (SYRINGE 3CC/66JM5-1/4") 27G X 1-1/4" 3 ML MISC Use with Toradol IM injection 15 each 0    Ubiquinol Liposomal 100 MG/5ML SYRP Take 300 mg by mouth      Ubiquinol POWD       vitamin E 100 UNIT capsule Take 78 Units by mouth daily       No current facility-administered medications for this visit  Allergies: Allergies   Allergen Reactions    Sulfate Other (See Comments)    Sulfa Antibiotics      Arrhythmia     Guaifenesin      Arrhythmia    Other      Arrhythmia       Physical Exam:    There is no height or weight on file to calculate BSA      Wt Readings from Last 3 Encounters:   07/29/19 77 1 kg (170 lb)   07/25/19 78 9 kg (174 lb)   06/10/19 78 5 kg (173 lb)        Temp Readings from Last 3 Encounters:   07/30/19 98 1 °F (36 7 °C)   07/29/19 98 3 °F (36 8 °C) (Oral)   06/24/19 98 5 °F (36 9 °C) (Temporal)        BP Readings from Last 3 Encounters:   07/30/19 128/72   07/29/19 111/77   06/24/19 115/76         Pulse Readings from Last 3 Encounters:   07/30/19 67   07/29/19 72   07/25/19 80        Physical Exam     Constitutional   General appearance: No acute distress, well appearing and well nourished  Eyes   Conjunctiva and lids: No swelling, erythema or discharge  Pupils and irises: Equal, round and reactive to light  Ears, Nose, Mouth, and Throat   External inspection of ears and nose: Normal     Nasal mucosa, septum, and turbinates: Normal without edema or erythema  Oropharynx: Normal with no erythema, edema, exudate or lesions  Pulmonary   Respiratory effort: No increased work of breathing or signs of respiratory distress  Auscultation of lungs: Clear to auscultation  Cardiovascular   Palpation of heart: Normal PMI, no thrills  Auscultation of heart: Normal rate and rhythm, normal S1 and S2, without murmurs  Examination of extremities for edema and/or varicosities: Normal     Carotid pulses: Normal     Abdomen   Abdomen: Non-tender, no masses  Liver and spleen: No hepatomegaly or splenomegaly  Lymphatic   Palpation of lymph nodes in neck: No lymphadenopathy  Musculoskeletal   Gait and station: Normal     Digits and nails: Normal without clubbing or cyanosis  Inspection/palpation of joints, bones, and muscles: Normal     Skin   Skin and subcutaneous tissue: Normal without rashes or lesions  Neurologic   Cranial nerves: Cranial nerves 2-12 intact  Sensation: No sensory loss  Psychiatric   Orientation to person, place, and time: Normal     Mood and affect: Normal         Assessment / Plan:      The patient is a very pleasant 45year-old female with a past medical history mitochondrial disease status post Nissen fundoplication for motility issues and then a gastric bypass  She was mildly anemic but her iron studies revealed iron deficiency   We gave her Venofer 200 mg twice a week for a total of 8 doses  Numbers all corrected       At her last visit she told me her mitochondrial physicians wished for her ferritin to run in a higher range so I put her on maintenance Venofer  She gets 200 mg IV every month  She also gets B12 once a month  Her numbers are running in a normal range now  I'll see her back in 6 months  She will have iron studies I e  Ferritin repeated at that time      I have spent 20 minutes with Patient  today in which greater than 50% of this time was spent in counseling/coordination of care regarding Diagnostic results  Goals and Barriers:  Current Goal:  Prolong Survival from Iron deficiency anemia  Barriers: None  Patient's Capacity to Self Care:  Patient able to self care  Portions of the record may have been created with voice recognition software   Occasional wrong word or "sound a like" substitutions may have occurred due to the inherent limitations of voice recognition software   Read the chart carefully and recognize, using context, where substitutions have occurred

## 2019-08-16 RX ORDER — GALCANEZUMAB 120 MG/ML
INJECTION, SOLUTION SUBCUTANEOUS
Qty: 1 ML | Refills: 11 | Status: SHIPPED | OUTPATIENT
Start: 2019-08-16 | End: 2019-09-04 | Stop reason: SDUPTHER

## 2019-08-19 ENCOUNTER — TELEPHONE (OUTPATIENT)
Dept: GASTROENTEROLOGY | Facility: AMBULARY SURGERY CENTER | Age: 44
End: 2019-08-19

## 2019-08-19 NOTE — TELEPHONE ENCOUNTER
Pt called back informed her of the results,   Patient mentioned that the medication she was given for reflux doesn't seem to be working  Is there something else that can be prescribed that may help? And also she would like to know what else is the plan because she doesn't want to keep feeling this way  Patient is scheduled in Oct   With [facility] for a follow up

## 2019-08-19 NOTE — TELEPHONE ENCOUNTER
----- Message from Ivonne Carrington MD sent at 8/19/2019 10:23 AM EDT -----  Please inform the patient that recent upper GI series demonstrates acid reflux from her gastric pouch into her esophagus  The contrast otherwise appears to flow from the pouch to her small-bowel normally without any significant strictures or narrowing which is good news  Please find out if her symptoms persist or if they have improved with her medications  Please have her follow up in the office as previously scheduled  Please have her call with any questions or concerns

## 2019-08-20 DIAGNOSIS — K21.9 GASTROESOPHAGEAL REFLUX DISEASE, ESOPHAGITIS PRESENCE NOT SPECIFIED: Primary | ICD-10-CM

## 2019-08-20 RX ORDER — DEXLANSOPRAZOLE 60 MG/1
60 CAPSULE, DELAYED RELEASE ORAL DAILY
Qty: 30 CAPSULE | Refills: 5 | Status: SHIPPED | OUTPATIENT
Start: 2019-08-20 | End: 2019-10-17

## 2019-08-20 NOTE — TELEPHONE ENCOUNTER
PT GIVEN SAMPLES FOR 3 WEEKS OF DEXILANT 60MG  5 BOXES LOT #[id number]  EXP MAY2021  SPOKE TO PT PT WILL BE OVER TO  SAMPLES FROM THE [place] OFFICE

## 2019-08-20 NOTE — TELEPHONE ENCOUNTER
I talked with her, she has not noticed any improvement on the pantoprazole BID, she notes reflux with even drinking water and develops a hoarse voice by the end of the day  In regards to medications, I recommend we trial dexilant  I also will reach out to Dr Melissa Carcamo in regards to any further recommendations/consideration for an EGD given her complicated history  An alternative is to consider using more tube feedings however she wishes to trial all alternatives to this as it will significantly affect her quality of life    -Please provider patient with samples of dexilant 60mg for 2-3 weeks  I will also send a prescription  Please call her when these are available to be picked up   Thank you

## 2019-08-27 ENCOUNTER — HOSPITAL ENCOUNTER (OUTPATIENT)
Dept: INFUSION CENTER | Facility: CLINIC | Age: 44
Discharge: HOME | End: 2019-08-27
Payer: COMMERCIAL

## 2019-08-27 VITALS
HEART RATE: 69 BPM | SYSTOLIC BLOOD PRESSURE: 128 MMHG | TEMPERATURE: 98.4 F | RESPIRATION RATE: 69 BRPM | DIASTOLIC BLOOD PRESSURE: 77 MMHG

## 2019-08-27 DIAGNOSIS — E61.1 IRON DEFICIENCY: Primary | ICD-10-CM

## 2019-08-27 PROCEDURE — 96372 THER/PROPH/DIAG INJ SC/IM: CPT

## 2019-08-27 PROCEDURE — 96365 THER/PROPH/DIAG IV INF INIT: CPT

## 2019-08-27 RX ORDER — CYANOCOBALAMIN 1000 UG/ML
1000 INJECTION INTRAMUSCULAR; SUBCUTANEOUS ONCE
Status: COMPLETED | OUTPATIENT
Start: 2019-08-27 | End: 2019-08-27

## 2019-08-27 RX ORDER — SODIUM CHLORIDE 9 MG/ML
20 INJECTION, SOLUTION INTRAVENOUS ONCE
Status: CANCELLED | OUTPATIENT
Start: 2019-09-24

## 2019-08-27 RX ORDER — CYANOCOBALAMIN 1000 UG/ML
1000 INJECTION INTRAMUSCULAR; SUBCUTANEOUS ONCE
Status: CANCELLED | OUTPATIENT
Start: 2019-09-24

## 2019-08-27 RX ORDER — SODIUM CHLORIDE 9 MG/ML
20 INJECTION, SOLUTION INTRAVENOUS ONCE
Status: COMPLETED | OUTPATIENT
Start: 2019-08-27 | End: 2019-08-27

## 2019-08-27 RX ADMIN — IRON SUCROSE 200 MG: 20 INJECTION, SOLUTION INTRAVENOUS at 09:08

## 2019-08-27 RX ADMIN — CYANOCOBALAMIN 1000 MCG: 1000 INJECTION, SOLUTION INTRAMUSCULAR at 10:15

## 2019-08-27 RX ADMIN — SODIUM CHLORIDE 20 ML/HR: 0.9 INJECTION, SOLUTION INTRAVENOUS at 08:56

## 2019-08-27 NOTE — PROGRESS NOTES
Pt received venofer infusion & B12 IM inj given in left deltoid   Tolerated well, offers no complaints

## 2019-08-29 ENCOUNTER — TELEPHONE (OUTPATIENT)
Dept: GASTROENTEROLOGY | Facility: AMBULARY SURGERY CENTER | Age: 44
End: 2019-08-29

## 2019-09-04 DIAGNOSIS — I73.00 RAYNAUD'S DISEASE WITHOUT GANGRENE: ICD-10-CM

## 2019-09-04 DIAGNOSIS — G43.719 INTRACTABLE CHRONIC MIGRAINE WITHOUT AURA AND WITHOUT STATUS MIGRAINOSUS: ICD-10-CM

## 2019-09-04 RX ORDER — AMLODIPINE BESYLATE 5 MG/1
TABLET ORAL
Qty: 180 TABLET | Refills: 1 | Status: SHIPPED | OUTPATIENT
Start: 2019-09-04 | End: 2020-02-14 | Stop reason: ALTCHOICE

## 2019-09-17 ENCOUNTER — OFFICE VISIT (OUTPATIENT)
Dept: NEUROLOGY | Facility: CLINIC | Age: 44
End: 2019-09-17
Payer: COMMERCIAL

## 2019-09-17 VITALS
WEIGHT: 172 LBS | DIASTOLIC BLOOD PRESSURE: 76 MMHG | HEIGHT: 64 IN | BODY MASS INDEX: 29.37 KG/M2 | SYSTOLIC BLOOD PRESSURE: 124 MMHG

## 2019-09-17 DIAGNOSIS — G43.709 CHRONIC MIGRAINE WITHOUT AURA WITHOUT STATUS MIGRAINOSUS, NOT INTRACTABLE: Primary | ICD-10-CM

## 2019-09-17 DIAGNOSIS — Z98.84 S/P GASTRIC BYPASS: ICD-10-CM

## 2019-09-17 DIAGNOSIS — R47.89 WORD FINDING DIFFICULTY: ICD-10-CM

## 2019-09-17 DIAGNOSIS — G90.511 COMPLEX REGIONAL PAIN SYNDROME TYPE 1 OF RIGHT UPPER EXTREMITY: ICD-10-CM

## 2019-09-17 DIAGNOSIS — M79.2 NEUROPATHIC PAIN: ICD-10-CM

## 2019-09-17 PROCEDURE — 99214 OFFICE O/P EST MOD 30 MIN: CPT | Performed by: PSYCHIATRY & NEUROLOGY

## 2019-09-17 NOTE — PROGRESS NOTES
Patient ID: Marie Lombardi is a 45 y o  female  Assessment/Plan:    No problem-specific Assessment & Plan notes found for this encounter  Diagnoses and all orders for this visit:    Chronic migraine without aura without status migrainosus, not intractable  - Preventative: Emgality- over 75% reduction in frequency and severity and no a/e, thus will continue  - Abortive: Ibuprofen helps now  Rare: maxalt with benefit    Neuropathic pain-- with no significant focal weakness, mild length dependent sensory loss only to temp LLE greater than right which is where she has the neuropathic pain  Will monitor    -     Vitamin B12; Future  -     Folate; Future  -     Vitamin B6; Future  Sent TD topical cream to be used nightly for severe neuropathic LE pain and CRPS RUE as needed 3-4 x/day  With her mitochondrial disorder she has significant other med comorbidity and medication a/e with PO dosing  Word finding difficulty  -     Vitamin B12; Future  -     Folate; Future  -     Vitamin B6; Future    S/P gastric bypass  -     Vitamin B12; Future  -     Folate; Future  -     Vitamin B6; Future     She will let me know if worse pain, imbalance, sensory loss or low back pain or bowel or bladder control changes and will see her sooner  Follow up in six months to one years yolande      Subjective:    HPI   Mrs Jovani Myrick is a pleasant 42 yo female with hx mitochondrial disorder, chronic hypotension, esophageal dysmotility, seen in follow up for chronic migraines  She continues to do well with Emgality and states over 75% reduction in migraine frequency and severity  She tells me she still has wearing of after three weeks  Denies medication a/e,  She states she is having sharp shooting severe pain at the end of the day lasting for ten minutes at least at the end of the day starting about 3 5 weeks ago    States it is electric burning pain, sudden onset severe for 10-15 minutes left foot radiating to calf and on occasion mild right foot  No new medication  States not other weakness low back pain falls  States now off duloxetine due to worse depression  Prior hx     States her RSD flared up six weeks ago with no inciting event, hx of this 13 years ago after bypass surgery helped with stellate ganglion block  States gabapentin with severe depression with 100 mg  States it might be the Raynaud's that was worse this winter  Is on amlodipine per rheumatology that is helping and undergoing rheumatology work up with [facility]  Doing PT with benefit  States the are trying mirror desensitization therapy  Seeing PM- Dr Quesada and may get a block again  The following portions of the patient's history were reviewed and updated as appropriate: allergies, current medications, past family history, past medical history, past social history, past surgical history and problem list and ROS         Objective:    Blood pressure 124/76, height 5' 4" (1 626 m), weight 78 kg (172 lb), not currently breastfeeding  Physical Exam   Constitutional: She is oriented to person, place, and time  She appears well-developed and well-nourished  HENT:   Head: Normocephalic and atraumatic  Eyes: Pupils are equal, round, and reactive to light  Neck: Normal range of motion  Cardiovascular: Normal rate and regular rhythm  Pulmonary/Chest: Effort normal    Abdominal: Soft  Musculoskeletal: Normal range of motion  She exhibits no tenderness  Neurological: She is alert and oriented to person, place, and time  She has normal strength and normal reflexes  Coordination normal    Nursing note and vitals reviewed  Neurological Exam  Mental Status  Alert  Oriented to person, place, time and situation  Recent and remote memory are intact  no dysarthria present  Language is fluent with no aphasia  Attention and concentration are normal  Fund of knowledge is appropriate for level of education      Cranial Nerves  CN II: Visual fields full to confrontation  CN III, IV, VI: Extraocular movements intact bilaterally  Pupils equal round and reactive to light bilaterally  CN V: Facial sensation is normal   CN VII: Full and symmetric facial movement  CN VIII: Hearing is normal   CN IX, X: Palate elevates symmetrically  Normal gag reflex  CN XI: Shoulder shrug strength is normal   CN XII: Tongue midline without atrophy or fasciculations  Motor   Normal muscle tone  Strength is 5/5 throughout all four extremities  Sensory  Light touch is normal in upper and lower extremities  Pinprick is normal in upper and lower extremities  Temperature abnormality: Vibration is normal in upper and lower extremities  No right-sided hemispatial neglect  No left-sided hemispatial neglect  Length dependent sensory loss bl Le mild to temp only on todays exam worse LLE  Allodynia to touch RUE mild  Reflexes  Deep tendon reflexes are 2+ and symmetric in all four extremities with downgoing toes bilaterally  Coordination  Finger-to-nose, rapid alternating movements and heel-to-shin normal bilaterally without dysmetria  Gait  Casual gait is normal including stance, stride, and arm swing  Romberg is absent  ROS:    Review of Systems   Constitutional: Negative  Negative for appetite change and fever  HENT: Negative  Negative for hearing loss, tinnitus, trouble swallowing and voice change  Eyes: Negative  Negative for photophobia and pain  Respiratory: Negative  Negative for shortness of breath  Cardiovascular: Negative  Negative for palpitations  Gastrointestinal: Negative  Negative for nausea and vomiting  Endocrine: Negative  Negative for cold intolerance and heat intolerance  Genitourinary: Negative  Negative for dysuria, frequency and urgency  Musculoskeletal: Negative  Negative for myalgias and neck pain  Skin: Negative  Negative for rash  Neurological: Positive for dizziness, light-headedness and headaches   Negative for tremors, seizures, syncope, facial asymmetry, speech difficulty, weakness and numbness  Electric shots on left leg    Hematological: Negative  Does not bruise/bleed easily  Psychiatric/Behavioral: Negative  Negative for confusion, hallucinations and sleep disturbance

## 2019-09-17 NOTE — PATIENT INSTRUCTIONS
Trial of Transdermal therapeutics for neuropathic pain- call us if you do not hear from them in two to three weeks

## 2019-09-24 ENCOUNTER — HOSPITAL ENCOUNTER (OUTPATIENT)
Dept: INFUSION CENTER | Facility: CLINIC | Age: 44
Discharge: HOME | End: 2019-09-24
Payer: COMMERCIAL

## 2019-09-24 VITALS
TEMPERATURE: 98.5 F | OXYGEN SATURATION: 100 % | SYSTOLIC BLOOD PRESSURE: 134 MMHG | HEART RATE: 82 BPM | RESPIRATION RATE: 18 BRPM | DIASTOLIC BLOOD PRESSURE: 79 MMHG

## 2019-09-24 DIAGNOSIS — E61.1 IRON DEFICIENCY: Primary | ICD-10-CM

## 2019-09-24 PROCEDURE — 96365 THER/PROPH/DIAG IV INF INIT: CPT

## 2019-09-24 PROCEDURE — 96372 THER/PROPH/DIAG INJ SC/IM: CPT

## 2019-09-24 RX ORDER — SODIUM CHLORIDE 9 MG/ML
20 INJECTION, SOLUTION INTRAVENOUS ONCE
Status: CANCELLED | OUTPATIENT
Start: 2019-10-22

## 2019-09-24 RX ORDER — SODIUM CHLORIDE 9 MG/ML
20 INJECTION, SOLUTION INTRAVENOUS ONCE
Status: COMPLETED | OUTPATIENT
Start: 2019-09-24 | End: 2019-09-24

## 2019-09-24 RX ORDER — CYANOCOBALAMIN 1000 UG/ML
1000 INJECTION INTRAMUSCULAR; SUBCUTANEOUS ONCE
Status: CANCELLED | OUTPATIENT
Start: 2019-10-22

## 2019-09-24 RX ORDER — CYANOCOBALAMIN 1000 UG/ML
1000 INJECTION INTRAMUSCULAR; SUBCUTANEOUS ONCE
Status: COMPLETED | OUTPATIENT
Start: 2019-09-24 | End: 2019-09-24

## 2019-09-24 RX ADMIN — SODIUM CHLORIDE 20 ML/HR: 0.9 INJECTION, SOLUTION INTRAVENOUS at 11:30

## 2019-09-24 RX ADMIN — IRON SUCROSE 200 MG: 20 INJECTION, SOLUTION INTRAVENOUS at 11:45

## 2019-09-24 RX ADMIN — CYANOCOBALAMIN 1000 MCG: 1000 INJECTION, SOLUTION INTRAMUSCULAR; SUBCUTANEOUS at 12:55

## 2019-09-24 NOTE — PROGRESS NOTES
Patient to Sergey for Venofer / Vitamin B12: Offers no complaints at present time: Right PAC accessed without difficulty: Good blood return noted

## 2019-09-24 NOTE — PROGRESS NOTES
Tolerated infusion without incident: No adverse reactions noted: Vitamin B12 per MD order: Injection given in Left UA: Verified follow up appt with patient: Declined AVS

## 2019-10-15 ENCOUNTER — OFFICE VISIT (OUTPATIENT)
Dept: GASTROENTEROLOGY | Facility: AMBULARY SURGERY CENTER | Age: 44
End: 2019-10-15
Payer: COMMERCIAL

## 2019-10-15 VITALS
HEART RATE: 72 BPM | RESPIRATION RATE: 16 BRPM | TEMPERATURE: 98.7 F | WEIGHT: 175.8 LBS | HEIGHT: 64 IN | BODY MASS INDEX: 30.01 KG/M2 | SYSTOLIC BLOOD PRESSURE: 120 MMHG | DIASTOLIC BLOOD PRESSURE: 70 MMHG

## 2019-10-15 DIAGNOSIS — K59.04 CHRONIC IDIOPATHIC CONSTIPATION: ICD-10-CM

## 2019-10-15 DIAGNOSIS — R13.19 ESOPHAGEAL DYSPHAGIA: ICD-10-CM

## 2019-10-15 DIAGNOSIS — R13.10 DYSPHAGIA, UNSPECIFIED TYPE: Primary | ICD-10-CM

## 2019-10-15 DIAGNOSIS — K21.9 GASTROESOPHAGEAL REFLUX DISEASE, ESOPHAGITIS PRESENCE NOT SPECIFIED: ICD-10-CM

## 2019-10-15 DIAGNOSIS — K21.9 GASTROESOPHAGEAL REFLUX DISEASE, ESOPHAGITIS PRESENCE NOT SPECIFIED: Primary | ICD-10-CM

## 2019-10-15 PROCEDURE — 99214 OFFICE O/P EST MOD 30 MIN: CPT | Performed by: PHYSICIAN ASSISTANT

## 2019-10-15 RX ORDER — PANTOPRAZOLE SODIUM 40 MG/1
TABLET, DELAYED RELEASE ORAL
Refills: 3 | COMMUNITY
Start: 2019-09-27

## 2019-10-15 RX ORDER — SUCRALFATE ORAL 1 G/10ML
1 SUSPENSION ORAL 4 TIMES DAILY
Qty: 420 ML | Refills: 5 | Status: SHIPPED | OUTPATIENT
Start: 2019-10-15

## 2019-10-15 NOTE — PROGRESS NOTES
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  Had been seen at [facility] in the past  Persistent GERD symptoms despite gastric bypass in 2015 with Dr Liya Choudhary  She has a PEG in place for hydration also a port in place given dysautonomia  -UGI shows reflux to mid esophagus but does not show delay of contrast  -continued symptoms despite BID pantoprazole  -will add carafate, she has had symptomatic relief with this in the past  -she is undergoing at the hospital in 2 days, assess for esophagitis/barretts, consider botox injection, she has never had this completed prior  -given intermittent coughing/choking would recommend repeat swallow eval/barium swallow  -she is to see Dr Renuka Connell at [facility], pt states he has a history of working with mitochondrial disorders    Constipation  -continue miralax daily  ______________________________________________________________________    SUBJECTIVE:  Geri Beverage is a pleasant 46 yo female who presents for follow up of GERD/dysmotility  History of mitochondrial disorder, follows at clinic at [facility] for this  Long history of GI issues with dysmotility, gerd, constipation  History of gastric bypass in 2015 for GERD however her symptoms returned after 6 months  She reports heartburn and hoarseness of her voice, she feels that liquids and solids are very slow to pass through her esophagus but she has not had food impaction  She feels that she refluxes liquids prior to them reaching the esophagus  She takes miralax for constipation and generally goes daily  She gets fluids through PEG overnight but she gets her nutrition orally  She was recently trialed on dexilant however this made symptoms worse  She is now back on pantoprazole BID   Carafate suspension has helped with symptoms in the past  She reports intermittent coughing with eating and drinking  Her weight is stable    REVIEW OF SYSTEMS IS OTHERWISE NEGATIVE  Historical Information   Past Medical History:   Diagnosis Date    Acid reflux     Constipated     Dysautonomia ([facility] )     Esophageal abnormality     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   Gastrostomy tube in place [facility])     History of blood clots 2012    Left leg  Has IVC filter now  Occurred while on Lovenox    Iron deficiency     Malignant hyperthermia due to anesthesia     Patient's son has M H   States she needs precautions    Migraines     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[address] )     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[address] )     MTHFR (methylene THF reductase) deficiency and homocystinuria (HCC)     Muscle weakness (generalized)     Nausea     On total parenteral nutrition (TPN)     for 8 mts    PCOS (polycystic ovarian syndrome)     PONV (postoperative nausea and vomiting)     Postgastrectomy syndrome     malabsorption    Postsurgical malabsorption     Status post gastric bypass for obesity     Status post PICC central line placement     Right upper arm  Receives TPN    Stroke [facility]) 8924, 6572    Metabolic strokes  Right hemiparesis= minor now   Vomiting     When eating     Past Surgical History:   Procedure Laterality Date    ANKLE SURGERY Left     Has plate and screws    COLONOSCOPY      COSMETIC SURGERY      X14 as child post attack by dog  Arms, legs and face    DILATION AND CURETTAGE OF UTERUS      x2    ESOPHAGOGASTRODUODENOSCOPY N/A 1/27/2016    Procedure: ESOPHAGOGASTRODUODENOSCOPY (EGD); Surgeon: Celio Mcmillan MD;  [facility];   Service:     FRACTURE SURGERY Left     Left ankle - hardware    GASTRIC BYPASS      IVC FILTER INSERTION      NISSEN FUNDOPLICATION      OVARY SURGERY Bilateral     "Drilling" due to multiple cysts- PCOS    [place] EGD TRANSORAL BIOPSY SINGLE/MULTIPLE N/A 3/9/2016    Procedure: ESOPHAGOGASTRODUODENOSCOPY (EGD); Surgeon: Trudi Loving MD;  [facility];   Service: Bariatrics    [place] LAP,GASTROSTOMY,W/O TUBE CONSTR N/A 4/19/2016    Procedure: INSERTION GASTROSTOMY TUBE LAPAROSCOPIC WITH INTRAOP EGD;  Surgeon: Trudi Loving MD;  [facility];  Service: [id number] [facility] Right     WISDOM TOOTH EXTRACTION       Social History   Social History     Substance and Sexual Activity   Alcohol Use No     Social History     Substance and Sexual Activity   Drug Use No     Social History     Tobacco Use   Smoking Status Never Smoker   Smokeless Tobacco Never Used     Family History   Problem Relation Age of Onset    Mitochondrial disorder Mother     Hypertension Mother     Thyroid cancer Mother 48    Clotting disorder Mother         MTFR    Cancer Mother 62        renal    Depression Father     Endocrinopathy Father     Clotting disorder Father         MTFR    Depression Brother     Narcolepsy Brother     Heart failure Maternal Grandmother 80    Coronary artery disease Maternal Grandmother     Mitochondrial disorder Son     Mitochondrial disorder Daughter     Stroke Family     Anuerysm Paternal Uncle         abdominal     Breast cancer Maternal Grandfather 55    Breast cancer Maternal Aunt 43    Heart attack Neg Hx     Arrhythmia Neg Hx     Sudden death Neg Hx         scd     Meds/Allergies     Current Outpatient Medications:     acarbose (PRECOSE) 100 MG tablet    acetylcysteine (MUCOMYST) 200 mg/mL nebulizer solution    Alpha-Lipoic Acid 100 MG CAPS    amLODIPine (NORVASC) 5 mg tablet    B Complex-Biotin-FA (TH VITAMIN B 50/B-COMPLEX PO)    Blood Glucose Monitoring Suppl (ONETOUCH VERIO) w/Device KIT    Calcium Carbonate Antacid 1250 mg/5 mL    COENZYME Q-10 PO    CREATINE MONOHYDRATE PO    docusate sodium (COLACE) 100 mg capsule    Galcanezumab-gnlm (EMGALITY) 120 MG/ML SOAJ    iron sucrose (VENOFER) 20 mg/mL    L-Arginine 1000 MG TABS    midodrine (PROAMATINE) 5 mg tablet    Multiple Vitamin (MULTIVITAMIN) tablet    ONETOUCH DELICA LANCETS FINE MISC    ONETOUCH VERIO test strip    Oral Vehicles (PCCA-PLUS) SUSP    pantoprazole (PROTONIX) 40 mg tablet    polyethylene glycol (MIRALAX) 17 g packet    Riboflavin 100 MG TABS    Ubiquinol Liposomal 100 MG/5ML SYRP    Ubiquinol POWD    vitamin E 100 UNIT capsule    dexlansoprazole (DEXILANT) 60 MG capsule    ketorolac (TORADOL) 30 mg/mL injection    levOCARNitine (CARNITOR) 1 g/10 mL solution    sucralfate (CARAFATE) 1 g/10 mL suspension    Syringe/Needle, Disp, (SYRINGE 3CC/46RE1-2/4") 27G X 1-1/4" 3 ML MISC    Allergies   Allergen Reactions    Sulfate Other (See Comments)    Sulfa Antibiotics      Arrhythmia     Guaifenesin      Arrhythmia    Other      Arrhythmia     Objective     Blood pressure 120/70, pulse 72, temperature 98 7 °F (37 1 °C), temperature source Tympanic, resp  rate 16, height 5' 4" (1 626 m), weight 79 7 kg (175 lb 12 8 oz), not currently breastfeeding  Body mass index is 30 18 kg/m²  PHYSICAL EXAM:      General Appearance:   Alert, cooperative, no distress   HEENT:   Normocephalic, atraumatic, anicteric      Neck:  Supple, symmetrical, trachea midline   Lungs:   Clear to auscultation bilaterally   Heart[de-identified]   Regular rate   Abdomen:   Soft, non-tender, non-distended; normal bowel sounds   Genitalia:   Deferred    Rectal:   Deferred    Extremities:  No cyanosis, clubbing or edema    Pulses:  2+ and symmetric    Skin:  No jaundice   Lymph nodes:  No palpable cervical lymphadenopathy        Lab Results:   No visits with results within 1 Day(s) from this visit     Latest known visit with results is:   Appointment on 07/30/2019   Component Date Value    Ferritin 07/30/2019 184     SS-A (RO) Ab 07/30/2019 <0 2     SS-B (LA) Ab 07/30/2019 <0 2     Rheumatoid Factor 07/30/2019 Negative     Anticardiolipin IgA 07/30/2019 <9     Anticardiolipin IgG 07/30/2019 <9     Anticardiolipin IgM 07/30/2019 <9          Radiology Results:   No results found  I have reviewed and confirmed nurses' notes for patient's medications, allergies, medical history, and surgical history.

## 2019-10-17 ENCOUNTER — ANESTHESIA (OUTPATIENT)
Dept: GASTROENTEROLOGY | Facility: HOSPITAL | Age: 44
End: 2019-10-17

## 2019-10-17 ENCOUNTER — HOSPITAL ENCOUNTER (OUTPATIENT)
Dept: GASTROENTEROLOGY | Facility: HOSPITAL | Age: 44
Setting detail: OUTPATIENT SURGERY
Discharge: HOME | End: 2019-10-17
Attending: INTERNAL MEDICINE
Payer: COMMERCIAL

## 2019-10-17 ENCOUNTER — ANESTHESIA EVENT (OUTPATIENT)
Dept: GASTROENTEROLOGY | Facility: HOSPITAL | Age: 44
End: 2019-10-17

## 2019-10-17 VITALS
WEIGHT: 175 LBS | HEART RATE: 77 BPM | OXYGEN SATURATION: 98 % | DIASTOLIC BLOOD PRESSURE: 87 MMHG | SYSTOLIC BLOOD PRESSURE: 134 MMHG | HEIGHT: 64 IN | RESPIRATION RATE: 20 BRPM | TEMPERATURE: 98.2 F | BODY MASS INDEX: 29.88 KG/M2

## 2019-10-17 DIAGNOSIS — R13.19 ESOPHAGEAL DYSPHAGIA: ICD-10-CM

## 2019-10-17 LAB
EXT PREGNANCY TEST URINE: NEGATIVE
EXT. CONTROL: NORMAL

## 2019-10-17 PROCEDURE — 88305 TISSUE EXAM BY PATHOLOGIST: CPT | Performed by: PATHOLOGY

## 2019-10-17 PROCEDURE — 81025 URINE PREGNANCY TEST: CPT | Performed by: ANESTHESIOLOGY

## 2019-10-17 PROCEDURE — 43239 EGD BIOPSY SINGLE/MULTIPLE: CPT | Performed by: INTERNAL MEDICINE

## 2019-10-17 RX ORDER — PROMETHAZINE HYDROCHLORIDE 25 MG/ML
12.5 INJECTION, SOLUTION INTRAMUSCULAR; INTRAVENOUS ONCE AS NEEDED
Status: DISCONTINUED | OUTPATIENT
Start: 2019-10-17 | End: 2019-10-21 | Stop reason: HOSPADM

## 2019-10-17 RX ORDER — FENTANYL CITRATE/PF 50 MCG/ML
25 SYRINGE (ML) INJECTION
Status: DISCONTINUED | OUTPATIENT
Start: 2019-10-17 | End: 2019-10-21 | Stop reason: HOSPADM

## 2019-10-17 RX ORDER — SODIUM CHLORIDE, SODIUM LACTATE, POTASSIUM CHLORIDE, CALCIUM CHLORIDE 600; 310; 30; 20 MG/100ML; MG/100ML; MG/100ML; MG/100ML
125 INJECTION, SOLUTION INTRAVENOUS CONTINUOUS
Status: DISCONTINUED | OUTPATIENT
Start: 2019-10-17 | End: 2019-10-21 | Stop reason: HOSPADM

## 2019-10-17 RX ORDER — PROPOFOL 10 MG/ML
INJECTION, EMULSION INTRAVENOUS AS NEEDED
Status: DISCONTINUED | OUTPATIENT
Start: 2019-10-17 | End: 2019-10-17 | Stop reason: SURG

## 2019-10-17 RX ORDER — SODIUM CHLORIDE 9 MG/ML
INJECTION, SOLUTION INTRAVENOUS CONTINUOUS PRN
Status: DISCONTINUED | OUTPATIENT
Start: 2019-10-17 | End: 2019-10-17 | Stop reason: SURG

## 2019-10-17 RX ORDER — LIDOCAINE HYDROCHLORIDE 10 MG/ML
0.5 INJECTION, SOLUTION EPIDURAL; INFILTRATION; INTRACAUDAL; PERINEURAL ONCE AS NEEDED
Status: DISCONTINUED | OUTPATIENT
Start: 2019-10-17 | End: 2019-10-21 | Stop reason: HOSPADM

## 2019-10-17 RX ORDER — ALBUTEROL SULFATE 2.5 MG/3ML
2.5 SOLUTION RESPIRATORY (INHALATION) ONCE AS NEEDED
Status: DISCONTINUED | OUTPATIENT
Start: 2019-10-17 | End: 2019-10-21 | Stop reason: HOSPADM

## 2019-10-17 RX ORDER — LABETALOL 20 MG/4 ML (5 MG/ML) INTRAVENOUS SYRINGE
5
Status: DISCONTINUED | OUTPATIENT
Start: 2019-10-17 | End: 2019-10-21 | Stop reason: HOSPADM

## 2019-10-17 RX ORDER — ONDANSETRON 2 MG/ML
4 INJECTION INTRAMUSCULAR; INTRAVENOUS ONCE AS NEEDED
Status: DISCONTINUED | OUTPATIENT
Start: 2019-10-17 | End: 2019-10-21 | Stop reason: HOSPADM

## 2019-10-17 RX ORDER — HYDROMORPHONE HCL/PF 1 MG/ML
0.5 SYRINGE (ML) INJECTION
Status: DISCONTINUED | OUTPATIENT
Start: 2019-10-17 | End: 2019-10-21 | Stop reason: HOSPADM

## 2019-10-17 RX ORDER — MEPERIDINE HYDROCHLORIDE 25 MG/ML
12.5 INJECTION INTRAMUSCULAR; INTRAVENOUS; SUBCUTANEOUS AS NEEDED
Status: DISCONTINUED | OUTPATIENT
Start: 2019-10-17 | End: 2019-10-21 | Stop reason: HOSPADM

## 2019-10-17 RX ADMIN — PROPOFOL 150 MG: 10 INJECTION, EMULSION INTRAVENOUS at 12:40

## 2019-10-17 RX ADMIN — PROPOFOL 50 MG: 10 INJECTION, EMULSION INTRAVENOUS at 12:43

## 2019-10-17 RX ADMIN — SODIUM CHLORIDE: 0.9 INJECTION, SOLUTION INTRAVENOUS at 12:34

## 2019-10-17 RX ADMIN — PROPOFOL 50 MG: 10 INJECTION, EMULSION INTRAVENOUS at 12:47

## 2019-10-17 NOTE — ANESTHESIA PREPROCEDURE EVALUATION
Review of Systems/Medical History  Patient summary reviewed    History of anesthetic complications PONV    Cardiovascular  Negative cardio ROS Hypertension ,    Pulmonary  Negative pulmonary ROS        GI/Hepatic  Negative GI/hepatic ROS   GERD well controlled,        Negative  ROS        Endo/Other  Negative endo/other ROS   Comment: MO- lost 40 pounds    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GYN  Negative gynecology ROS          Hematology  Negative hematology ROS      Musculoskeletal  Negative musculoskeletal ROS        Neurology  Negative neurology ROS   CVA , residual symptoms, Headaches,    Psychology   Negative psychology ROS              Physical Exam    Airway    Mallampati score: I  TM Distance: >3 FB  Neck ROM: full     Dental   No notable dental hx     Cardiovascular  Comment: Negative ROS, Rhythm: regular, Rate: normal,     Pulmonary  Pulmonary exam normal Breath sounds clear to auscultation,     Other Findings        Anesthesia Plan  ASA Score- 3     Anesthesia Type- general and IV sedation with anesthesia with ASA Monitors  Additional Monitors:   Airway Plan:     Comment: I have seen the patient and reviewed the history  Patient to receive IV sedation with full ASA monitors  Risks discussed with the patient, consent signed  As per literature review small short term IV propofol boluses are OK in patient's with mitochondrial disease  Therefore we plan to use only propofol for the procedure        Plan Factors-    Induction- intravenous  Postoperative Plan-     Informed Consent- Anesthetic plan and risks discussed with patient  I personally reviewed this patient with the CRNA  Discussed and agreed on the Anesthesia Plan with the CRNA  Cindy Foreman

## 2019-10-17 NOTE — ANESTHESIA POSTPROCEDURE EVALUATION
Post-Op Assessment Note    CV Status:  Stable  Pain Score: 0    Pain management: adequate     Mental Status:  Sleepy   Hydration Status:  Euvolemic   PONV Controlled:  Controlled   Airway Patency:  Patent   Post Op Vitals Reviewed: Yes      Staff: CRNA, Anesthesiologist           /81 (10/17/19 1255)    Temp (!) 97 °F (36 1 °C) (10/17/19 1255)    Pulse 76 (10/17/19 1255)   Resp 18 (10/17/19 1255)    SpO2 99 % (10/17/19 1255)

## 2019-10-17 NOTE — H&P
History and Physical -  [facility]  Marly Potts 45 y o  female MRN: [id number]    HPI: Marly Potts is a 45y o  year old female who presents with GERD and dysphagia  Review of Systems    Historical Information   Past Medical History:   Diagnosis Date    Acid reflux     Constipated     Dysautonomia (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[address] )     MTHFR (methylene THF reductase) deficiency and homocystinuria (HCC)     Muscle weakness (generalized)     Nausea     On total parenteral nutrition (TPN)     for 8 mts    PCOS (polycystic ovarian syndrome)     PONV (postoperative nausea and vomiting)     Postgastrectomy syndrome     malabsorption    Postsurgical malabsorption     Status post gastric bypass for obesity     Status post PICC central line placement     Right upper arm  Receives TPN    Stroke [facility]) 4256, 0808    Metabolic strokes  Right hemiparesis= minor now   Vomiting     When eating     Past Surgical History:   Procedure Laterality Date    ANKLE SURGERY Left     Has plate and screws    COLONOSCOPY      COSMETIC SURGERY      X14 as child post attack by dog  Arms, legs and face    DILATION AND CURETTAGE OF UTERUS      x2    ESOPHAGOGASTRODUODENOSCOPY N/A 1/27/2016    Procedure: ESOPHAGOGASTRODUODENOSCOPY (EGD); Surgeon: Bill Soria MD;  [facility];   Service:     FRACTURE SURGERY Left     Left ankle - hardware    GASTRIC BYPASS      IVC FILTER INSERTION      NISSEN FUNDOPLICATION      OVARY SURGERY Bilateral     "Drilling" due to multiple cysts- PCOS    [place] EGD TRANSORAL BIOPSY SINGLE/MULTIPLE N/A 3/9/2016    Procedure: ESOPHAGOGASTRODUODENOSCOPY (EGD); Surgeon: Yudi Groves MD;  [facility]; Service: Bariatrics    [place] LAP,GASTROSTOMY,W/O TUBE CONSTR N/A 4/19/2016    Procedure: INSERTION GASTROSTOMY TUBE LAPAROSCOPIC WITH INTRAOP EGD;  Surgeon: Yudi Groves MD;  [facility];  Service: [address] [place] [address] Right     WISDOM TOOTH EXTRACTION       Social History   Social History     Substance and Sexual Activity   Alcohol Use No     Social History     Substance and Sexual Activity   Drug Use No     Social History     Tobacco Use   Smoking Status Never Smoker   Smokeless Tobacco Never Used     Family History   Problem Relation Age of Onset    Mitochondrial disorder Mother     Hypertension Mother     Thyroid cancer Mother 48    Clotting disorder Mother         MTFR    Cancer Mother 62        renal    Depression Father     Endocrinopathy Father     Clotting disorder Father         MTFR    Depression Brother     Narcolepsy Brother     Heart failure Maternal Grandmother 80    Coronary artery disease Maternal Grandmother     Mitochondrial disorder Son     Mitochondrial disorder Daughter     Stroke Family     Anuerysm Paternal Uncle         abdominal     Breast cancer Maternal Grandfather 55    Breast cancer Maternal Aunt 43    Heart attack Neg Hx     Arrhythmia Neg Hx     Sudden death Neg Hx         scd       Meds/Allergies       (Not in a hospital admission)    Allergies   Allergen Reactions    Sulfate Other (See Comments)    Sulfa Antibiotics      Arrhythmia     Guaifenesin      Arrhythmia    Other      Malignant hyperhermia precautions   NEVER use Lacted Ringers       Objective     /76   Pulse 77   Temp 97 5 °F (36 4 °C)   Resp 18   Ht 5' 4" (1 626 m)   Wt 79 4 kg (175 lb)   LMP  (LMP Unknown)   SpO2 100%   BMI 30 04 kg/m²       PHYSICAL EXAM    Gen: NAD  CV: RRR  CHEST: Clear  ABD: soft, NT/ND  EXT: no edema  Neuro: AAO      ASSESSMENT/PLAN:  This is a 45y o  year old female here for GERD and dysphagia         PLAN:   Procedure: Eusebio Calvo

## 2019-10-17 NOTE — PROGRESS NOTES
Pt came with her port accessed,so I flushed and started nss fluids, pt wanted to leave with her port accessed like how she came and I dc'd fluids and flushed with nss  Pt gives herself fluids at home  pt was awake and alert and said she felt good to go home

## 2019-10-18 ENCOUNTER — TELEPHONE (OUTPATIENT)
Dept: GASTROENTEROLOGY | Facility: CLINIC | Age: 44
End: 2019-10-18

## 2019-10-18 ENCOUNTER — TELEPHONE (OUTPATIENT)
Dept: INFUSION CENTER | Facility: CLINIC | Age: 44
End: 2019-10-18

## 2019-10-18 NOTE — TELEPHONE ENCOUNTER
----- Message from Jenna Mayberry sent at 10/18/2019 10:26 AM EDT -----  Regarding: Prescription Question  Contact: [phone]  I received the OK to try Bethanechol  After reviewing my chart, Dr Alexi Joseph at [facility] is going to see me November 7th  Thank you

## 2019-10-24 ENCOUNTER — TELEPHONE (OUTPATIENT)
Dept: GASTROENTEROLOGY | Facility: CLINIC | Age: 44
End: 2019-10-24

## 2019-10-24 ENCOUNTER — HOSPITAL ENCOUNTER (OUTPATIENT)
Dept: RADIOLOGY | Facility: HOSPITAL | Age: 44
Discharge: HOME | End: 2019-10-24
Payer: COMMERCIAL

## 2019-10-24 DIAGNOSIS — R13.10 DYSPHAGIA, UNSPECIFIED TYPE: ICD-10-CM

## 2019-10-24 PROCEDURE — 74230 X-RAY XM SWLNG FUNCJ C+: CPT

## 2019-10-24 PROCEDURE — G8996 SWALLOW CURRENT STATUS: HCPCS

## 2019-10-24 PROCEDURE — G8997 SWALLOW GOAL STATUS: HCPCS

## 2019-10-24 PROCEDURE — G8998 SWALLOW D/C STATUS: HCPCS

## 2019-10-24 PROCEDURE — 92611 MOTION FLUOROSCOPY/SWALLOW: CPT

## 2019-10-24 NOTE — TELEPHONE ENCOUNTER
----- Message from Saud Durant MD sent at 10/23/2019 10:37 PM EDT -----  Please inform the patient that biopsies from recent upper endoscopy were negative for eosinophilic esophagitis  Please find out if she has discussed the possibility of using bethanechol with her metabolic specialist   If it is okay that we can go ahead prescribe this medication  Please have her call with any questions or concerns

## 2019-10-24 NOTE — PROCEDURES
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   Gastrostomy tube in place [facility])     History of blood clots 2012    Left leg  Has IVC filter now  Occurred while on Lovenox    Iron deficiency     Malignant hyperthermia due to anesthesia     Patient's son has M H   States she needs precautions    Migraines     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[address] )     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[address] )     MTHFR (methylene THF reductase) deficiency and homocystinuria (HCC)     Muscle weakness (generalized)     Nausea     On total parenteral nutrition (TPN)     for 8 mts    PCOS (polycystic ovarian syndrome)     PONV (postoperative nausea and vomiting)     Postgastrectomy syndrome     malabsorption    Postsurgical malabsorption     Status post gastric bypass for obesity     Status post PICC central line placement     Right upper arm  Receives TPN    Stroke [facility]) 4007, 4623    Metabolic strokes  Right hemiparesis= minor now   Vomiting     When eating        Past Surgical History  Past Surgical History:   Procedure Laterality Date    ANKLE SURGERY Left     Has plate and screws    COLONOSCOPY      COSMETIC SURGERY      X14 as child post attack by dog  Arms, legs and face    DILATION AND CURETTAGE OF UTERUS      x2    ESOPHAGOGASTRODUODENOSCOPY N/A 1/27/2016    Procedure: ESOPHAGOGASTRODUODENOSCOPY (EGD); Surgeon: Bill Soria MD;  [facility];   Service:     FRACTURE SURGERY Left     Left ankle - hardware    GASTRIC BYPASS      IVC FILTER INSERTION      NISSEN FUNDOPLICATION      OVARY SURGERY Bilateral     "Drilling" due to multiple cysts- PCOS    [place] EGD TRANSORAL BIOPSY SINGLE/MULTIPLE N/A 3/9/2016    Procedure: ESOPHAGOGASTRODUODENOSCOPY (EGD); Surgeon: Juan Antonio Garsia MD;  [facility]; Service: Bariatrics    [place] LAP,GASTROSTOMY,W/O TUBE CONSTR N/A 4/19/2016    Procedure: INSERTION GASTROSTOMY TUBE LAPAROSCOPIC WITH INTRAOP EGD;  Surgeon: Juan Atnonio Garsia MD;  [facility];  Service: Bariatrics    ULNAR NERVE TRANSPOSITION Right     WISDOM TOOTH EXTRACTION           General Information:   46 yo female referred to [facility] for a VBS by Jordana Lawson PA-C for dysphagia w/  c/o intermittent coughing when eating/drinking, and feels like food gets stuck in throat  PMH significant for GERD, esophageal dysmotility, and mitochondrial disorder  Pt has a PEG in place for hydration overnight, but primary means of nutrition is oral      Cognition:  WFL    Speech/Swallow Mech: Oral motor movements appeared WFL; Dentition was natural; Respiratory Status: RA; Current diet: regular and thin; PEG for hydration overnight  Prior VBS N/A  Pt was seen in radiology for a Video Barium Swallow Study, standing in the upright position and viewed laterally with the following consistencies: puree, soft solid, solid, HTL, NTL, thin liquids via cup/straw, barium pill w/ water      Results are as follows:     **Images are available for review on PACS          Oral Stage: WFL  Good bolus acceptance via spoon, cup, straw  Functional manipulation/mastication with puree and solid foods  Timely A-P transfer  Good oral control of liquids with no posterior spill to pharynx  Velopharyngeal closure complete  Pharyngeal Stage: [facility]  Prompt swallow initiation  Normal hyolaryngeal excursion, though decreased in elevation  Complete epiglottic inversion and laryngeal vestibule closure  Good BOT retraction  Adequate pharyngeal constriction with no pharyngeal residue  Trace, transient penetration noted x1 with thin liquids  No aspiration observed  Barium tablet passed through pharynx w/o incidence  Esophageal Stage:  Briefly assessed using barium pill w/ water; Pill slow to clear w/ stasis in lower esophagus; use of second sip of water facilitated emptying into stomach  Assessment Summary:  Oral and pharyngeal stages of swallow are within normal limits  No aspiration events                 Recommendations:  Regular diet w/ thin liquids  Meds as tolerated (use of extra sip w/ water to facilitate clearance)  Alternate food and liquid  Continue to follow with GI

## 2019-10-26 DIAGNOSIS — R13.19 ESOPHAGEAL DYSPHAGIA: Primary | ICD-10-CM

## 2019-10-26 DIAGNOSIS — K21.9 GASTROESOPHAGEAL REFLUX DISEASE WITHOUT ESOPHAGITIS: ICD-10-CM

## 2019-10-26 RX ORDER — BETHANECHOL CHLORIDE 25 MG/1
25 TABLET ORAL 2 TIMES DAILY
Qty: 60 TABLET | Refills: 3 | Status: SHIPPED | OUTPATIENT
Start: 2019-10-26 | End: 2019-12-09 | Stop reason: ALTCHOICE

## 2019-10-26 NOTE — TELEPHONE ENCOUNTER
Please inform the patient that I sent in a prescription for the bethanechol  Please have her try this, 25 mg twice daily, call with any side effects

## 2019-10-30 RX ORDER — CYANOCOBALAMIN 1000 UG/ML
1000 INJECTION INTRAMUSCULAR; SUBCUTANEOUS ONCE
Status: CANCELLED | OUTPATIENT
Start: 2019-10-31

## 2019-10-31 ENCOUNTER — APPOINTMENT (OUTPATIENT)
Dept: LAB | Facility: CLINIC | Age: 44
End: 2019-10-31
Payer: COMMERCIAL

## 2019-10-31 ENCOUNTER — HOSPITAL ENCOUNTER (OUTPATIENT)
Dept: INFUSION CENTER | Facility: CLINIC | Age: 44
Discharge: HOME | End: 2019-10-31
Payer: COMMERCIAL

## 2019-10-31 ENCOUNTER — TRANSCRIBE ORDERS (OUTPATIENT)
Dept: LAB | Facility: CLINIC | Age: 44
End: 2019-10-31

## 2019-10-31 VITALS
OXYGEN SATURATION: 100 % | RESPIRATION RATE: 18 BRPM | DIASTOLIC BLOOD PRESSURE: 78 MMHG | SYSTOLIC BLOOD PRESSURE: 111 MMHG | HEART RATE: 82 BPM | TEMPERATURE: 98.7 F

## 2019-10-31 DIAGNOSIS — K59.01 SLOW TRANSIT CONSTIPATION: ICD-10-CM

## 2019-10-31 DIAGNOSIS — K22.4 ESOPHAGEAL DYSMOTILITY: ICD-10-CM

## 2019-10-31 DIAGNOSIS — R33.9 RETENTION OF URINE, UNSPECIFIED: ICD-10-CM

## 2019-10-31 DIAGNOSIS — R53.83 OTHER FATIGUE: ICD-10-CM

## 2019-10-31 DIAGNOSIS — G43.109 COMPLICATED MIGRAINE: ICD-10-CM

## 2019-10-31 DIAGNOSIS — E61.1 IRON DEFICIENCY: Primary | ICD-10-CM

## 2019-10-31 DIAGNOSIS — G90.1 DYSAUTONOMIA (HCC): ICD-10-CM

## 2019-10-31 DIAGNOSIS — D50.8 OTHER IRON DEFICIENCY ANEMIA: ICD-10-CM

## 2019-10-31 DIAGNOSIS — R11.0 NAUSEA: ICD-10-CM

## 2019-10-31 DIAGNOSIS — R11.0 NAUSEA: Primary | ICD-10-CM

## 2019-10-31 LAB
ALBUMIN SERPL BCP-MCNC: 3.9 G/DL (ref 3.5–5)
ALP SERPL-CCNC: 80 U/L (ref 46–116)
ALT SERPL W P-5'-P-CCNC: 26 U/L (ref 12–78)
ANION GAP SERPL CALCULATED.3IONS-SCNC: 6 MMOL/L (ref 4–13)
AST SERPL W P-5'-P-CCNC: 18 U/L (ref 5–45)
BACTERIA UR QL AUTO: ABNORMAL /HPF
BASOPHILS # BLD AUTO: 0.03 THOUSANDS/ΜL (ref 0–0.1)
BASOPHILS NFR BLD AUTO: 1 % (ref 0–1)
BILIRUB SERPL-MCNC: 0.4 MG/DL (ref 0.2–1)
BILIRUB UR QL STRIP: NEGATIVE
BUN SERPL-MCNC: 11 MG/DL (ref 5–25)
CALCIUM SERPL-MCNC: 9.7 MG/DL (ref 8.3–10.1)
CHLORIDE SERPL-SCNC: 103 MMOL/L (ref 100–108)
CHOLEST SERPL-MCNC: 160 MG/DL (ref 50–200)
CLARITY UR: CLEAR
CO2 SERPL-SCNC: 30 MMOL/L (ref 21–32)
COLOR UR: YELLOW
CREAT SERPL-MCNC: 0.63 MG/DL (ref 0.6–1.3)
EOSINOPHIL # BLD AUTO: 0.07 THOUSAND/ΜL (ref 0–0.61)
EOSINOPHIL NFR BLD AUTO: 2 % (ref 0–6)
ERYTHROCYTE [DISTWIDTH] IN BLOOD BY AUTOMATED COUNT: 11.9 % (ref 11.6–15.1)
EST. AVERAGE GLUCOSE BLD GHB EST-MCNC: 103 MG/DL
FERRITIN SERPL-MCNC: 170 NG/ML (ref 8–388)
GFR SERPL CREATININE-BSD FRML MDRD: 111 ML/MIN/1.73SQ M
GLUCOSE P FAST SERPL-MCNC: 91 MG/DL (ref 65–99)
GLUCOSE UR STRIP-MCNC: NEGATIVE MG/DL
HBA1C MFR BLD: 5.2 % (ref 4.2–6.3)
HCT VFR BLD AUTO: 37.1 % (ref 34.8–46.1)
HDLC SERPL-MCNC: 80 MG/DL
HGB BLD-MCNC: 12.4 G/DL (ref 11.5–15.4)
HGB UR QL STRIP.AUTO: NEGATIVE
IMM GRANULOCYTES # BLD AUTO: 0.02 THOUSAND/UL (ref 0–0.2)
IMM GRANULOCYTES NFR BLD AUTO: 0 % (ref 0–2)
IRON SERPL-MCNC: 160 UG/DL (ref 50–170)
KETONES UR STRIP-MCNC: NEGATIVE MG/DL
LDLC SERPL CALC-MCNC: 67 MG/DL (ref 0–100)
LEUKOCYTE ESTERASE UR QL STRIP: ABNORMAL
LYMPHOCYTES # BLD AUTO: 1.15 THOUSANDS/ΜL (ref 0.6–4.47)
LYMPHOCYTES NFR BLD AUTO: 25 % (ref 14–44)
MCH RBC QN AUTO: 31.3 PG (ref 26.8–34.3)
MCHC RBC AUTO-ENTMCNC: 33.4 G/DL (ref 31.4–37.4)
MCV RBC AUTO: 94 FL (ref 82–98)
MONOCYTES # BLD AUTO: 0.35 THOUSAND/ΜL (ref 0.17–1.22)
MONOCYTES NFR BLD AUTO: 8 % (ref 4–12)
NEUTROPHILS # BLD AUTO: 3 THOUSANDS/ΜL (ref 1.85–7.62)
NEUTS SEG NFR BLD AUTO: 64 % (ref 43–75)
NITRITE UR QL STRIP: NEGATIVE
NON-SQ EPI CELLS URNS QL MICRO: ABNORMAL /HPF
NONHDLC SERPL-MCNC: 80 MG/DL
NRBC BLD AUTO-RTO: 0 /100 WBCS
PH UR STRIP.AUTO: 7.5 [PH]
PLATELET # BLD AUTO: 168 THOUSANDS/UL (ref 149–390)
PMV BLD AUTO: 10 FL (ref 8.9–12.7)
POTASSIUM SERPL-SCNC: 3.8 MMOL/L (ref 3.5–5.3)
PROT SERPL-MCNC: 7.5 G/DL (ref 6.4–8.2)
PROT UR STRIP-MCNC: NEGATIVE MG/DL
PTH-INTACT SERPL-MCNC: 122.5 PG/ML (ref 18.4–80.1)
RBC # BLD AUTO: 3.96 MILLION/UL (ref 3.81–5.12)
RBC #/AREA URNS AUTO: ABNORMAL /HPF
SODIUM SERPL-SCNC: 139 MMOL/L (ref 136–145)
SP GR UR STRIP.AUTO: <=1.005 (ref 1–1.03)
T3 SERPL-MCNC: 0.9 NG/ML (ref 0.6–1.8)
T4 FREE SERPL-MCNC: 0.93 NG/DL (ref 0.76–1.46)
TRIGL SERPL-MCNC: 63 MG/DL
TSH SERPL DL<=0.05 MIU/L-ACNC: 1.73 UIU/ML (ref 0.36–3.74)
UROBILINOGEN UR QL STRIP.AUTO: 0.2 E.U./DL
WBC # BLD AUTO: 4.62 THOUSAND/UL (ref 4.31–10.16)
WBC #/AREA URNS AUTO: ABNORMAL /HPF

## 2019-10-31 PROCEDURE — 83919 ORGANIC ACIDS QUAL EACH: CPT | Performed by: MEDICAL GENETICS

## 2019-10-31 PROCEDURE — 84480 ASSAY TRIIODOTHYRONINE (T3): CPT

## 2019-10-31 PROCEDURE — 84443 ASSAY THYROID STIM HORMONE: CPT

## 2019-10-31 PROCEDURE — 82728 ASSAY OF FERRITIN: CPT

## 2019-10-31 PROCEDURE — 80061 LIPID PANEL: CPT

## 2019-10-31 PROCEDURE — 81001 URINALYSIS AUTO W/SCOPE: CPT | Performed by: MEDICAL GENETICS

## 2019-10-31 PROCEDURE — 96372 THER/PROPH/DIAG INJ SC/IM: CPT

## 2019-10-31 PROCEDURE — CPT82978 HB MISC LAB SENDOUT

## 2019-10-31 PROCEDURE — 84439 ASSAY OF FREE THYROXINE: CPT

## 2019-10-31 PROCEDURE — 83540 ASSAY OF IRON: CPT

## 2019-10-31 PROCEDURE — 80053 COMPREHEN METABOLIC PANEL: CPT

## 2019-10-31 PROCEDURE — 85025 COMPLETE CBC W/AUTO DIFF WBC: CPT

## 2019-10-31 PROCEDURE — 84210 ASSAY OF PYRUVATE: CPT

## 2019-10-31 PROCEDURE — 83970 ASSAY OF PARATHORMONE: CPT

## 2019-10-31 PROCEDURE — 83520 IMMUNOASSAY QUANT NOS NONAB: CPT

## 2019-10-31 PROCEDURE — 83036 HEMOGLOBIN GLYCOSYLATED A1C: CPT

## 2019-10-31 PROCEDURE — 82128 AMINO ACIDS MULT QUAL: CPT

## 2019-10-31 PROCEDURE — 36415 COLL VENOUS BLD VENIPUNCTURE: CPT

## 2019-10-31 PROCEDURE — 82128 AMINO ACIDS MULT QUAL: CPT | Performed by: MEDICAL GENETICS

## 2019-10-31 PROCEDURE — 96365 THER/PROPH/DIAG IV INF INIT: CPT

## 2019-10-31 RX ORDER — SODIUM CHLORIDE 9 MG/ML
20 INJECTION, SOLUTION INTRAVENOUS ONCE
Status: COMPLETED | OUTPATIENT
Start: 2019-10-31 | End: 2019-10-31

## 2019-10-31 RX ORDER — CYANOCOBALAMIN 1000 UG/ML
1000 INJECTION INTRAMUSCULAR; SUBCUTANEOUS ONCE
Status: CANCELLED | OUTPATIENT
Start: 2019-12-03

## 2019-10-31 RX ORDER — CYANOCOBALAMIN 1000 UG/ML
1000 INJECTION INTRAMUSCULAR; SUBCUTANEOUS ONCE
Status: COMPLETED | OUTPATIENT
Start: 2019-10-31 | End: 2019-10-31

## 2019-10-31 RX ORDER — SODIUM CHLORIDE 9 MG/ML
20 INJECTION, SOLUTION INTRAVENOUS ONCE
Status: CANCELLED | OUTPATIENT
Start: 2019-11-19

## 2019-10-31 RX ADMIN — IRON SUCROSE 200 MG: 20 INJECTION, SOLUTION INTRAVENOUS at 14:12

## 2019-10-31 RX ADMIN — CYANOCOBALAMIN 1000 MCG: 1000 INJECTION, SOLUTION INTRAMUSCULAR; SUBCUTANEOUS at 14:15

## 2019-10-31 RX ADMIN — SODIUM CHLORIDE 20 ML/HR: 0.9 INJECTION, SOLUTION INTRAVENOUS at 14:05

## 2019-10-31 NOTE — PROGRESS NOTES
Pt tolerated infusion without complications   Pt requested to stay accessed, she will de-access self at home, aware of next appointments, declined avs

## 2019-11-04 LAB
3OH-BUTYRCARN SERPL-SCNC: 0.04 UMOL/L (ref 0–0.09)
3OH-IV+2ME3OH-BUTYR CARN SERPL-SCNC: 0.05 UMOL/L (ref 0–0.06)
3OH-LINOLEOYLCARN SERPL-SCNC: 0 UMOL/L (ref 0–0.01)
3OH-OLEOYLCARN SERPL-SCNC: 0.01 UMOL/L (ref 0–0.02)
3OH-PALMITOLEYLCARN SERPL-SCNC: 0.01 UMOL/L (ref 0–0.02)
3OH-PALMITOYLCARN SERPL-SCNC: 0 UMOL/L (ref 0–0.02)
3OH-STEAROYLCARN SERPL-SCNC: 0.01 UMOL/L (ref 0–0.02)
3OH-TDECANOYLCARN SERPL-SCNC: 0.01 UMOL/L (ref 0–0.02)
ACETYLCARN SERPL-SCNC: 7.56 UMOL/L (ref 3.23–10.29)
ACYLCARNITINE PATTERN SERPL-IMP: NORMAL
AMINO ACID, QN URINE: NORMAL
BUTYRYL+ISOBUTYRYLCARN SERPL-SCNC: 0.22 UMOL/L (ref 0.08–0.32)
DECADIENOYLCARN SERPL-SCNC: 0.02 UMOL/L (ref 0–0.05)
DECANOYLCARN SERPL-SCNC: 0.12 UMOL/L (ref 0–0.38)
DECENOYLCARN SERPL-SCNC: 0.12 UMOL/L (ref 0.01–0.32)
DODECANOYLCARN SERPL-SCNC: 0.05 UMOL/L (ref 0–0.15)
GLUTARYLCARN SERPL-SCNC: 0.05 UMOL/L (ref 0–0.1)
HEXANOYLCARN SERPL-SCNC: 0.05 UMOL/L (ref 0–0.1)
ISOVALERYL+MEBUTYRYLCARN SERPL-SCNC: 0.07 UMOL/L (ref 0.01–0.21)
LAB DIRECTOR NAME PROVIDER: NORMAL
LINOLEOYLCARN SERPL-SCNC: 0.08 UMOL/L (ref 0–0.11)
MALONYLCARN SERPL-SCNC: 0.07 UMOL/L (ref 0.02–0.12)
ME-MALONYLCARN SERPL-SCNC: 0.03 UMOL/L (ref 0.01–0.07)
OCTANOYLCARN SERPL-SCNC: 0.1 UMOL/L (ref 0–0.27)
OLEOYLCARN SERPL-SCNC: 0.16 UMOL/L (ref 0.04–0.17)
PALMITOLEYLCARN SERPL-SCNC: 0.04 UMOL/L (ref 0–0.04)
PALMITOYLCARN SERPL-SCNC: 0.13 UMOL/L (ref 0.03–0.13)
PROPIONYLCARN SERPL-SCNC: 0.28 UMOL/L (ref 0.16–0.62)
REF LAB TEST METHOD: NORMAL
STEAROYLCARN SERPL-SCNC: 0.05 UMOL/L (ref 0–0.07)
TDECADIENOYLCARN SERPL-SCNC: 0.05 UMOL/L (ref 0–0.11)
TDECANOYLCARN SERPL-SCNC: 0.03 UMOL/L (ref 0–0.06)
TDECENOYLCARN SERPL-SCNC: 0.07 UMOL/L (ref 0–0.17)
TGLY+MTHYL (C5:1) SERPL-SCNC: 0.01 UMOL/L (ref 0–0.02)

## 2019-11-05 LAB
(HCYS)2/CREAT UR-RTO: <0.3 UMOL/G CR (ref 0–2)
A-AMINOBUTYR SERPL-SCNC: 16.4 UMOL/L (ref 5.4–34.5)
A-AMINOBUTYR/CREAT UR-RTO: 16.6 UMOL/G CR (ref 0–34.6)
AAA SERPL-SCNC: 0.7 UMOL/L (ref 0–1.9)
AAA/CREAT UR-RTO: 22.4 UMOL/G CR (ref 0–146.7)
ALANINE SERPL-SCNC: 173.2 UMOL/L (ref 209.2–515.5)
ALANINE/CREAT UR-RTO: 265.5 UMOL/G CR (ref 0–1337)
ALLOISOLEUCINE SERPL-SCNC: 1.2 UMOL/L (ref 0–3.2)
ALLOISOLEUCINE/CREAT UR-RTO: 0.3 UMOL/G CR (ref 0–13.5)
AMINO ACID PAT SERPL-IMP: ABNORMAL
AMINO ACID PAT SERPL-IMP: NORMAL
AMINO ACID, QN URINE: NORMAL
ARGININE SERPL-SCNC: 45.2 UMOL/L (ref 36.3–119.2)
ARGININE/CREAT UR-RTO: 26.2 UMOL/G CR (ref 0–69.6)
ARGININOSUCCINATE SERPL-SCNC: <0.1 UMOL/L (ref 0–3)
ARGININOSUCCINATE/CREAT UR-RTO: 10.3 UMOL/G CR (ref 0–51.2)
ASPARAGINE SERPL-SCNC: 40.7 UMOL/L (ref 29.5–84.5)
ASPARAGINE/CREAT UR-RTO: 60.7 UMOL/G CR (ref 0–454.2)
ASPARTATE SERPL-SCNC: 2.1 UMOL/L (ref 0–7.4)
ASPARTATE/CREAT UR-RTO: 4.5 UMOL/G CR (ref 0–86.7)
B-AIB SERPL-SCNC: 2.7 UMOL/L (ref 0–4.3)
B-AIB/CREAT UR-RTO: 270 UMOL/G CR (ref 0–807.9)
B-ALANINE SERPL-SCNC: 3.1 UMOL/L (ref 1.1–9)
B-ALANINE/CREAT UR-RTO: 17.2 UMOL/G CR (ref 0–869.8)
CARN ESTERS/C0 SERPL-SRTO: 0.6 RATIO (ref 0–0.9)
CARNITINE FREE SERPL-SCNC: 26 UMOL/L (ref 20–55)
CARNITINE SERPL-SCNC: 41 UMOL/L (ref 27–73)
CITRULLINE SERPL-SCNC: 34.2 UMOL/L (ref 15.6–46.9)
CITRULLINE/CREAT UR-RTO: <1 UMOL/G CR (ref 0–27.4)
CYSTATHIONIN SERPL-SCNC: <0.5 UMOL/L (ref 0–0.7)
CYSTATHIONIN/CREAT UR-RTO: <0.5 UMOL/G CR (ref 0–80.8)
CYSTINE SERPL-SCNC: 26.9 UMOL/L (ref 15.8–47.3)
CYSTINE/CREAT UR-RTO: 31.4 UMOL/G CR (ref 0–223.8)
GABA SERPL-SCNC: <0.5 UMOL/L (ref 0–0.6)
GABA/CREAT UR-RTO: 1.7 UMOL/G CR (ref 0–13.1)
GLUTAMATE SERPL-SCNC: 35.5 UMOL/L (ref 18.1–155.9)
GLUTAMATE/CREAT UR-RTO: <5 UMOL/G CR (ref 0–92.4)
GLUTAMINE SERPL-SCNC: 489.1 UMOL/L (ref 372.8–701.4)
GLUTAMINE/CREAT UR-RTO: 380.7 UMOL/G CR (ref 0–1756.2)
GLYCINE SERPL-SCNC: 313.2 UMOL/L (ref 144–411)
GLYCINE/CREAT UR-RTO: 1244.1 UMOL/G CR (ref 0–7996.9)
HCYS SERPL-SCNC: <0.3 UMOL/L (ref 0–0.2)
HISTIDINE SERPL-SCNC: 65.2 UMOL/L (ref 47.2–98.5)
HISTIDINE/CREAT UR-RTO: 411.4 UMOL/G CR (ref 0–2534.2)
HOMOCITRULLINE SERPL-SCNC: <0.5 UMOL/L (ref 0–1.7)
HOMOCITRULLINE/CREAT UR-RTO: 15.9 UMOL/G CR (ref 0–80)
ISOLEUCINE SERPL-SCNC: 51.3 UMOL/L (ref 32.8–88.3)
ISOLEUCINE/CREAT UR-RTO: 9.3 UMOL/G CR (ref 0–48.1)
LAB DIRECTOR NAME PROVIDER: ABNORMAL
LAB DIRECTOR NAME PROVIDER: NORMAL
LEUCINE SERPL-SCNC: 89.7 UMOL/L (ref 66.7–165.7)
LEUCINE/CREAT UR-RTO: 22.1 UMOL/G CR (ref 0–129.1)
LYSINE SERPL-SCNC: 156.5 UMOL/L (ref 94–278)
LYSINE/CREAT UR-RTO: 58.3 UMOL/G CR (ref 0–1020.6)
METHIONINE SERPL-SCNC: 22.4 UMOL/L (ref 14.7–35.2)
METHIONINE/CREAT UR-RTO: 6.6 UMOL/G CR (ref 0–37.1)
OH-LYSINE SERPL-SCNC: 0.3 UMOL/L (ref 0.1–0.8)
OH-LYSINE/CREAT UR-RTO: 3.4 UMOL/G CR (ref 0–37.3)
OH-PROLINE SERPL-SCNC: 12 UMOL/L (ref 4.7–35.2)
OH-PROLINE/CREAT UR-RTO: 1.7 UMOL/G CR (ref 0–87.9)
ORNITHINE SERPL-SCNC: 59.5 UMOL/L (ref 30.1–101.3)
ORNITHINE/CREAT UR-RTO: <5 UMOL/G CR (ref 0–76.3)
PHE SERPL-SCNC: 55.9 UMOL/L (ref 35.8–76.9)
PHE/CREAT UR-RTO: 33.1 UMOL/G CR (ref 0–239)
PROLINE SERPL-SCNC: 111.1 UMOL/L (ref 84.8–352.5)
PROLINE/CREAT UR-RTO: <5 UMOL/G CR (ref 0–168.6)
PYRUVATE BLD-MCNC: 0.2 MG/DL (ref 0.3–0.7)
REF LAB TEST METHOD: ABNORMAL
REF LAB TEST METHOD: NORMAL
SARCOSINE SERPL-SCNC: 1.1 UMOL/L (ref 0–4)
SARCOSINE/CREAT UR-RTO: <0.5 UMOL/G CR (ref 0–27.3)
SERINE SERPL-SCNC: 145.8 UMOL/L (ref 48.7–145.2)
SERINE/CREAT UR-RTO: 336.9 UMOL/G CR (ref 0–1052.8)
TAURINE SERPL-SCNC: 64.5 UMOL/L (ref 29.2–132.3)
TAURINE/CREAT UR-RTO: 387.6 UMOL/G CR (ref 0–5335.7)
THREONINE SERPL-SCNC: 61.7 UMOL/L (ref 67.8–211.6)
THREONINE/CREAT UR-RTO: 127.6 UMOL/G CR (ref 0–714.9)
TRYPTOPHAN SERPL-SCNC: 46.7 UMOL/L (ref 23.5–93)
TRYPTOPHAN/CREAT UR-RTO: 47.2 UMOL/G CR (ref 0–207.5)
TYROSINE SERPL-SCNC: 52.4 UMOL/L (ref 27.8–83.3)
TYROSINE/CREAT UR-RTO: 41.7 UMOL/G CR (ref 0–388.9)
VALINE SERPL-SCNC: 181 UMOL/L (ref 133–317.1)
VALINE/CREAT UR-RTO: 24.8 UMOL/G CR (ref 0–147.4)

## 2019-11-11 LAB — MISCELLANEOUS LAB TEST RESULT: NORMAL

## 2019-11-15 LAB
MISCELLANEOUS LAB TEST RESULT: NORMAL
MISCELLANEOUS LAB TEST RESULT: NORMAL

## 2019-11-18 DIAGNOSIS — I95.89 OTHER SPECIFIED HYPOTENSION: ICD-10-CM

## 2019-11-18 RX ORDER — MIDODRINE HYDROCHLORIDE 5 MG/1
5 TABLET ORAL 3 TIMES DAILY
Qty: 270 TABLET | Refills: 3 | Status: SHIPPED | OUTPATIENT
Start: 2019-11-18 | End: 2020-10-30 | Stop reason: SDUPTHER

## 2019-11-29 RX ORDER — SODIUM CHLORIDE 9 MG/ML
20 INJECTION, SOLUTION INTRAVENOUS ONCE
Status: CANCELLED | OUTPATIENT
Start: 2019-12-03

## 2019-12-03 ENCOUNTER — HOSPITAL ENCOUNTER (OUTPATIENT)
Dept: INFUSION CENTER | Facility: CLINIC | Age: 44
Discharge: HOME | End: 2019-12-03
Payer: COMMERCIAL

## 2019-12-03 VITALS
TEMPERATURE: 98.7 F | RESPIRATION RATE: 16 BRPM | SYSTOLIC BLOOD PRESSURE: 115 MMHG | HEART RATE: 71 BPM | DIASTOLIC BLOOD PRESSURE: 70 MMHG | OXYGEN SATURATION: 100 %

## 2019-12-03 DIAGNOSIS — E61.1 IRON DEFICIENCY: Primary | ICD-10-CM

## 2019-12-03 PROCEDURE — 96372 THER/PROPH/DIAG INJ SC/IM: CPT

## 2019-12-03 PROCEDURE — 96365 THER/PROPH/DIAG IV INF INIT: CPT

## 2019-12-03 RX ORDER — SODIUM CHLORIDE 9 MG/ML
20 INJECTION, SOLUTION INTRAVENOUS ONCE
Status: CANCELLED | OUTPATIENT
Start: 2019-12-31

## 2019-12-03 RX ORDER — SODIUM CHLORIDE 9 MG/ML
20 INJECTION, SOLUTION INTRAVENOUS ONCE
Status: COMPLETED | OUTPATIENT
Start: 2019-12-03 | End: 2019-12-03

## 2019-12-03 RX ORDER — CYANOCOBALAMIN 1000 UG/ML
1000 INJECTION INTRAMUSCULAR; SUBCUTANEOUS ONCE
Status: CANCELLED | OUTPATIENT
Start: 2019-12-26

## 2019-12-03 RX ORDER — CYANOCOBALAMIN 1000 UG/ML
1000 INJECTION INTRAMUSCULAR; SUBCUTANEOUS ONCE
Status: COMPLETED | OUTPATIENT
Start: 2019-12-03 | End: 2019-12-03

## 2019-12-03 RX ADMIN — CYANOCOBALAMIN 1000 MCG: 1000 INJECTION, SOLUTION INTRAMUSCULAR; SUBCUTANEOUS at 09:01

## 2019-12-03 RX ADMIN — SODIUM CHLORIDE 20 ML/HR: 0.9 INJECTION, SOLUTION INTRAVENOUS at 08:58

## 2019-12-03 RX ADMIN — IRON SUCROSE 200 MG: 20 INJECTION, SOLUTION INTRAVENOUS at 09:00

## 2019-12-03 NOTE — PLAN OF CARE
Problem: Potential for Falls  Goal: Patient will remain free of falls  Description  INTERVENTIONS:  - Assess patient frequently for physical needs  -  Identify cognitive and physical deficits and behaviors that affect risk of falls  -  [facility] fall precautions as indicated by assessment   - Educate patient/family on patient safety including physical limitations  - Instruct patient to call for assistance with activity based on assessment  - Modify environment to reduce risk of injury  - Consider OT/PT consult to assist with strengthening/mobility  Outcome: Progressing     Problem: Knowledge Deficit  Goal: Patient/family/caregiver demonstrates understanding of disease process, treatment plan, medications, and discharge instructions  Description  Complete learning assessment and assess knowledge base    Interventions:  - Provide teaching at level of understanding  - Provide teaching via preferred learning methods  Outcome: Progressing

## 2019-12-03 NOTE — PROGRESS NOTES
Pt to clinic for venofer and b12 given via L arm, pt came with port accessed and left with port accessed per request  Pt will disconnect herself at home    pt tolerated infusion without complications, aware of next appointment, declined avs

## 2019-12-09 ENCOUNTER — OFFICE VISIT (OUTPATIENT)
Dept: OBGYN CLINIC | Facility: CLINIC | Age: 44
End: 2019-12-09
Payer: COMMERCIAL

## 2019-12-09 VITALS
SYSTOLIC BLOOD PRESSURE: 120 MMHG | WEIGHT: 177 LBS | DIASTOLIC BLOOD PRESSURE: 80 MMHG | HEIGHT: 64 IN | BODY MASS INDEX: 30.22 KG/M2

## 2019-12-09 DIAGNOSIS — Z01.419 ENCOUNTER FOR GYNECOLOGICAL EXAMINATION WITHOUT ABNORMAL FINDING: Primary | ICD-10-CM

## 2019-12-09 DIAGNOSIS — N92.0 MENORRHAGIA WITH REGULAR CYCLE: ICD-10-CM

## 2019-12-09 PROCEDURE — 99396 PREV VISIT EST AGE 40-64: CPT | Performed by: OBSTETRICS & GYNECOLOGY

## 2019-12-09 PROCEDURE — 87624 HPV HI-RISK TYP POOLED RSLT: CPT | Performed by: OBSTETRICS & GYNECOLOGY

## 2019-12-09 PROCEDURE — G0145 SCR C/V CYTO,THINLAYER,RESCR: HCPCS | Performed by: OBSTETRICS & GYNECOLOGY

## 2019-12-09 RX ORDER — DIPHENHYDRAMINE HCL 25 MG
TABLET ORAL
COMMUNITY
Start: 2019-10-25

## 2019-12-09 RX ORDER — POLYETHYLENE GLYOCOL 3350, SODIUM CHLORIDE, SODIUM BICARBONATE AND POTASSIUM CHLORIDE 420; 11.2; 5.72; 1.48 G/4L; G/4L; G/4L; G/4L
POWDER, FOR SOLUTION NASOGASTRIC; ORAL
Refills: 5 | COMMUNITY
Start: 2019-12-03

## 2019-12-09 NOTE — PROGRESS NOTES
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  She has a port in place and is getting iron infusions monthly she also has a G-tube and gets fluids overnight  Patient also does a bowel cleanout on a regular basis due to her dysmotility which slightly improves her bladder function  She usually has to self cath at least twice a day in order to empty  No intermenstrual bleeding, spotting, or discharge  Patient reports Yes mild hot flashes/night sweats, No pain problems intercourse, Yes occasional  vaginal dryness, sleeping poorly  Fully counseled to possible options risks and benefits  Patient is most interested in ablation and will schedule appointment for endometrial biopsy and preop discussion  Will give patient slip for pelvic ultrasound in the meantime  Reviewed in counseled discussed anesthesia which takes extra time to clear and she can only have normal saline not lactated  Current contraception: vasectomy  History of abnormal Pap smear: no  Family history of uterine or ovarian cancer: no  Regular self breast exam: yes  History of abnormal mammogram: no  Family history of breast cancer:  Yes maternal grandfather  Mother diagnosed with renal cell cancer as well as GI carcinoid also has mitochondrial disorder  Menstrual History:  OB History        2    Para   2    Term   2            AB        Living           SAB        TAB        Ectopic        Multiple        Live Births                    Menarche age: 17    LMP Dec 4, 2019      Past Medical History:   Diagnosis Date    Acid reflux     Constipated     Dysautonomia ([address] )     Esophageal abnormality     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      [facility]     History of blood clots     Left leg  Has IVC filter now  Occurred while on Lovenox    Iron deficiency     Malignant hyperthermia due to anesthesia     Patient's son has M H   States she needs precautions    Migraines     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     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     MTHFR (methylene THF reductase) deficiency and homocystinuria (HCC)     Muscle weakness (generalized)     Nausea     On total parenteral nutrition (TPN)     for 8 mts    PCOS (polycystic ovarian syndrome)     PONV (postoperative nausea and vomiting)     Postgastrectomy syndrome     malabsorption    Postsurgical malabsorption     Status post gastric bypass for obesity     Status post PICC central line placement     Right upper arm  Receives TPN    Stroke [facility]) 2527, 1177    Metabolic strokes  Right hemiparesis= minor now   Vomiting     When eating     Past Surgical History:   Procedure Laterality Date    ANKLE SURGERY Left     Has plate and screws    COLONOSCOPY      COSMETIC SURGERY      X14 as child post attack by dog  Arms, legs and face    DILATION AND CURETTAGE OF UTERUS      x2    ESOPHAGOGASTRODUODENOSCOPY N/A 2016    Procedure: ESOPHAGOGASTRODUODENOSCOPY (EGD); Surgeon: Tete Novoa MD;  [facility]; Service:     FRACTURE SURGERY Left     Left ankle - hardware    GASTRIC BYPASS      IVC FILTER INSERTION      NISSEN FUNDOPLICATION      OVARY SURGERY Bilateral     "Drilling" due to multiple cysts- PCOS    DC EGD TRANSORAL BIOPSY SINGLE/MULTIPLE N/A 3/9/2016    Procedure: ESOPHAGOGASTRODUODENOSCOPY (EGD); Surgeon: Tete Novoa MD;  [facility];   Service: Bariatrics    DC LAP,GASTROSTOMY,W/O TUBE CONSTR N/A 2016    Procedure: INSERTION GASTROSTOMY TUBE LAPAROSCOPIC WITH INTRAOP EGD;  Surgeon: Tete Novoa MD;  [facility];  Service: [address] [place] [address] Right     WISDOM TOOTH EXTRACTION       OB History        3 Para   2    Term   2            AB   1    Living           SAB   1    TAB        Ectopic        Multiple        Live Births                     Review of Systems  Review of Systems   Constitutional: Negative for fatigue, fever and unexpected weight change  HENT: Negative for dental problem, sinus pressure and sinus pain  Eyes: Negative for visual disturbance  Respiratory: Negative for cough, shortness of breath and wheezing  Cardiovascular: Negative for chest pain and leg swelling  Gastrointestinal: Negative for blood in stool, constipation, diarrhea, nausea and vomiting  Endocrine: Negative for cold intolerance, heat intolerance and polydipsia  Genitourinary: Positive for menstrual problem  Negative for dysuria, frequency, hematuria and pelvic pain  Heavy bleeding , difficultly urinating , incontinence   Musculoskeletal: Negative for arthralgias and back pain  Neurological: Negative for dizziness, seizures and headaches  Migraines   Psychiatric/Behavioral: The patient is not nervous/anxious                Objective     Vitals:    19 0916   BP: 120/80   Weight: 80 3 kg (177 lb)   Height: 5' 4" (1 626 m)       General:   alert and oriented, in no acute distress   Heart: regular rate and rhythm, S1, S2 normal, no murmur, click, rub or gallop   Lungs: clear to auscultation bilaterally   Abdomen: soft, non-tender, without masses or organomegaly   Vulva: normal   Vagina: normal mucosa   Cervix: no bleeding following Pap, no cervical motion tenderness and no lesions   Uterus: normal size, mobile, non-tender   Adnexa: normal adnexa and no mass, fullness, tenderness   Breast inspection negative, no nipple discharge or bleeding, no masses or nodularity palpable  Rectal negative, stool guaiac negative  Pupils equal round reactive to light and accommodation  Throat clear no lesions or findings  Thyroid normal no masses or nodules    Assessment   45year-old  with heavy menses and multiple risk factors generally caused by her mitochondrial disease  Plan   The patient is to return for endometrial biopsy and preop discussion for ablation  Pelvic u/s    Can only have NS for ivf

## 2019-12-10 ENCOUNTER — TELEPHONE (OUTPATIENT)
Dept: ENDOCRINOLOGY | Facility: CLINIC | Age: 44
End: 2019-12-10

## 2019-12-10 NOTE — TELEPHONE ENCOUNTER
[employer] faxed over request for last OV and A1c results  Records printed and faxed to [facility], [phone]

## 2019-12-11 LAB
HPV HR 12 DNA CVX QL NAA+PROBE: NEGATIVE
HPV16 DNA CVX QL NAA+PROBE: NEGATIVE
HPV18 DNA CVX QL NAA+PROBE: NEGATIVE

## 2019-12-12 LAB
LAB AP GYN PRIMARY INTERPRETATION: NORMAL
Lab: NORMAL

## 2019-12-17 ENCOUNTER — OFFICE VISIT (OUTPATIENT)
Dept: CARDIOLOGY CLINIC | Facility: CLINIC | Age: 44
End: 2019-12-17
Payer: COMMERCIAL

## 2019-12-17 VITALS
WEIGHT: 179 LBS | SYSTOLIC BLOOD PRESSURE: 114 MMHG | OXYGEN SATURATION: 98 % | HEART RATE: 76 BPM | BODY MASS INDEX: 30.56 KG/M2 | HEIGHT: 64 IN | DIASTOLIC BLOOD PRESSURE: 82 MMHG

## 2019-12-17 DIAGNOSIS — I95.1 DYSAUTONOMIA ORTHOSTATIC HYPOTENSION SYNDROME: ICD-10-CM

## 2019-12-17 DIAGNOSIS — E88.40 MITOCHONDRIAL METABOLISM DISORDER (HCC): ICD-10-CM

## 2019-12-17 DIAGNOSIS — I10 HYPERTENSION, UNSPECIFIED TYPE: Primary | ICD-10-CM

## 2019-12-17 DIAGNOSIS — I95.1 ORTHOSTATIC HYPOTENSION: ICD-10-CM

## 2019-12-17 PROCEDURE — 99214 OFFICE O/P EST MOD 30 MIN: CPT | Performed by: INTERNAL MEDICINE

## 2019-12-17 PROCEDURE — 93000 ELECTROCARDIOGRAM COMPLETE: CPT | Performed by: INTERNAL MEDICINE

## 2019-12-17 PROCEDURE — 3074F SYST BP LT 130 MM HG: CPT | Performed by: INTERNAL MEDICINE

## 2019-12-17 PROCEDURE — 3079F DIAST BP 80-89 MM HG: CPT | Performed by: INTERNAL MEDICINE

## 2019-12-17 NOTE — PATIENT INSTRUCTIONS
Recommendations:  1  Continue current medications  2  Continue IV saline boluses  3  Check ECG daily for underlying atrial fibrillation  4  Follow up in one year  Consider echo next year

## 2019-12-17 NOTE — PROGRESS NOTES
Cardiology   Joyce Maldonado 45 y o  female MRN: [id number]        Impression:  1  Orthostasis - likely due to autonomic dysfunction and possible POTS  May be precipitated by underlying malabsorption syndrome  Improving with IV saline  Has IV saline 1-2x/month  2  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  3  Headaches - unclear etiology     4  Possible genetic mutation for premature atrial fibrillation - would check ECG daily          Recommendations:  1  Continue current medications  2  Continue IV saline boluses  3  Check ECG daily for underlying atrial fibrillation  4  Follow up in one year  Consider echo next year  HPI: Joyce Maldonado is a 45y o  year old female with mitochondrial disease, gastric bypass surgery, and dysautonomia who presents for follow up  Since her gastric bypass surgery, has needed a central line and giving herself saline boluses - 1-2x/month  On midodrine 5mg 3x/day           Review of Systems   Constitutional: Negative  HENT: Negative  Eyes: Negative  Respiratory: Negative for chest tightness and shortness of breath  Cardiovascular: Negative for chest pain, palpitations and leg swelling  Gastrointestinal: Negative  Endocrine: Negative  Genitourinary: Negative  Musculoskeletal: Negative  Skin: Negative  Allergic/Immunologic: Negative  Neurological: Positive for light-headedness  Hematological: Negative  Psychiatric/Behavioral: Negative  All other systems reviewed and are negative  Past Medical History:   Diagnosis Date    Acid reflux     Constipated     Dysautonomia ([address] )     Esophageal abnormality     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   Gastrostomy tube in place [facility])     History of blood clots 2012    Left leg  Has IVC filter now   Occurred while on Lovenox    Iron deficiency     Malignant hyperthermia due to anesthesia     Patient's son has M H   States she needs precautions    Migraines     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HCC)     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HCC)     MTHFR (methylene THF reductase) deficiency and homocystinuria (HCC)     Muscle weakness (generalized)     Nausea     On total parenteral nutrition (TPN)     for 8 mts    PCOS (polycystic ovarian syndrome)     PONV (postoperative nausea and vomiting)     Postgastrectomy syndrome     malabsorption    Postsurgical malabsorption     Status post gastric bypass for obesity     Status post PICC central line placement     Right upper arm  Receives TPN    Stroke [facility]) 4290, 1158    Metabolic strokes  Right hemiparesis= minor now   Vomiting     When eating     Past Surgical History:   Procedure Laterality Date    ANKLE SURGERY Left     Has plate and screws    COLONOSCOPY      COSMETIC SURGERY      X14 as child post attack by dog  Arms, legs and face    DILATION AND CURETTAGE OF UTERUS      x2    ESOPHAGOGASTRODUODENOSCOPY N/A 1/27/2016    Procedure: ESOPHAGOGASTRODUODENOSCOPY (EGD); Surgeon: Ceci Turcios MD;  [facility]; Service:     FRACTURE SURGERY Left     Left ankle - hardware    GASTRIC BYPASS      IVC FILTER INSERTION      NISSEN FUNDOPLICATION      OVARY SURGERY Bilateral     "Drilling" due to multiple cysts- PCOS    UT EGD TRANSORAL BIOPSY SINGLE/MULTIPLE N/A 3/9/2016    Procedure: ESOPHAGOGASTRODUODENOSCOPY (EGD); Surgeon: Ceci Turcios MD;  [facility];   Service: Bariatrics    UT LAP,GASTROSTOMY,W/O TUBE CONSTR N/A 4/19/2016    Procedure: INSERTION GASTROSTOMY TUBE LAPAROSCOPIC WITH INTRAOP EGD;  Surgeon: Ceci Turcios MD;  [facility];  Service: Bariatrics    ULNAR NERVE TRANSPOSITION Right     WISDOM TOOTH EXTRACTION       Social History     Substance and Sexual Activity   Alcohol Use No     Social History     Substance and Sexual Activity   Drug Use No     Social History     Tobacco Use   Smoking Status Never Smoker   Smokeless Tobacco Never Used     Family History Problem Relation Age of Onset    Mitochondrial disorder Mother     Hypertension Mother     Thyroid cancer Mother 48    Clotting disorder Mother         MTFR   [facility] Mother 62        renal    Depression Father     Endocrinopathy Father     Clotting disorder Father         MTFR    Depression Brother     Narcolepsy Brother     Heart failure Maternal Grandmother 80    Coronary artery disease Maternal Grandmother     Mitochondrial disorder Son     Mitochondrial disorder Daughter     Stroke Family     Anuerysm Paternal Uncle         abdominal     Breast cancer Maternal Grandfather 55    Breast cancer Maternal Aunt 43    Heart attack Neg Hx     Arrhythmia Neg Hx     Sudden death Neg Hx         scd       Allergies: Allergies   Allergen Reactions    Sulfate Other (See Comments)    Sulfa Antibiotics      Arrhythmia     Guaifenesin      Arrhythmia    Other      Malignant hyperhermia precautions  NEVER use [place] Ringers       Medications:     Current Outpatient Medications:     acarbose (PRECOSE) 100 MG tablet, Take 1 tablet (100 mg total) by mouth 3 (three) times a day with meals for 300 days, Disp: 270 tablet, Rfl: 3    acetylcysteine (MUCOMYST) 200 mg/mL nebulizer solution, , Disp: , Rfl:     Alpha-Lipoic Acid 100 MG CAPS, Take by mouth, Disp: , Rfl:     amLODIPine (NORVASC) 5 mg tablet, 5 mg BID , Disp: 180 tablet, Rfl: 1    B Complex-Biotin-FA (TH VITAMIN B 50/B-COMPLEX PO), Take 1 tablet by mouth daily  , Disp: , Rfl:     Blood Glucose Monitoring Suppl (ONETOUCH VERIO) w/Device KIT, Dispense 1 meter, Disp: 1 kit, Rfl: 1    Calcium Carbonate Antacid 1250 mg/5 mL, Take by mouth, Disp: , Rfl:     COENZYME Q-10 PO, , Disp: , Rfl:     CREATINE MONOHYDRATE PO, Take 2 5 g by mouth, Disp: , Rfl:     Cyanocobalamin (B-12) 1000 MCG/ML KIT, Inject  as directed every 30 days  , Disp: , Rfl:     diphenhydrAMINE (BENADRYL) 25 mg tablet, Take by mouth, Disp: , Rfl:     docusate sodium (COLACE) 100 mg capsule, Take 200 mg by mouth 2 (two) times a day , Disp: , Rfl:     Galcanezumab-gnlm (EMGALITY) 120 MG/ML SOAJ, 1 subq injection every 30 days  , Disp: 1 mL, Rfl: 0    iron sucrose (VENOFER) 20 mg/mL, Venofer 200 mg iron/10 mL intravenous solution, Disp: , Rfl:     L-Arginine 1000 MG TABS, Take 6,000 mg by mouth 2 (two) times a day , Disp: , Rfl:     levOCARNitine (CARNITOR) 1 g/10 mL solution, Take 100 mg by mouth 4 (four) times a day (before meals and at bedtime), Disp: , Rfl:     linaCLOtide 145 MCG CAPS, Take 145 mcg by mouth 2 (two) times a day, Disp: , Rfl:     midodrine (PROAMATINE) 5 mg tablet, Take 1 tablet (5 mg total) by mouth 3 (three) times a day, Disp: 270 tablet, Rfl: 3    Multiple Vitamin (MULTIVITAMIN) tablet, Take 1 tablet by mouth daily  , Disp: , Rfl:     ONETOUCH DELICA LANCETS FINE MISC, Check BG 3x per day, Disp: 300 each, Rfl: 3    ONETOUCH VERIO test strip, Check BG 3x per day, Disp: 300 each, Rfl: 1    Oral Vehicles (PCCA-PLUS) SUSP, , Disp: , Rfl:     pantoprazole (PROTONIX) 40 mg tablet, , Disp: , Rfl: 3    polyethylene glycol (MIRALAX) 17 g packet, Take 17 g by mouth 2 (two) times a day Indications: 1 5 caps full 1-2 times/day   , Disp: , Rfl:     Riboflavin 100 MG TABS, Take 100 mg by mouth, Disp: , Rfl:     sucralfate (CARAFATE) 1 g/10 mL suspension, Take 10 mL (1 g total) by mouth 4 (four) times a day Must have liquid suspension, Disp: 420 mL, Rfl: 5    TRILYTE 420 g solution, , Disp: , Rfl: 5    Ubiquinol Liposomal 100 MG/5ML SYRP, Take 300 mg by mouth, Disp: , Rfl:     Ubiquinol POWD, , Disp: , Rfl:     vitamin E 100 UNIT capsule, Take 78 Units by mouth daily, Disp: , Rfl:       Wt Readings from Last 3 Encounters:   12/17/19 81 2 kg (179 lb)   12/09/19 80 3 kg (177 lb)   10/17/19 79 4 kg (175 lb)     Temp Readings from Last 3 Encounters:   12/03/19 98 7 °F (37 1 °C) (Oral)   10/31/19 98 7 °F (37 1 °C) (Temporal)   10/17/19 98 2 °F (36 8 °C) (Temporal)     BP Readings from Last 3 Encounters:   19 114/82   19 120/80   19 115/70     Pulse Readings from Last 3 Encounters:   19 76   19 71   10/31/19 82         Physical Exam   Constitutional: She is oriented to person, place, and time  She appears well-developed  HENT:   Head: Atraumatic  Eyes: EOM are normal    Neck: Normal range of motion  Cardiovascular: Normal rate, regular rhythm and normal heart sounds  Exam reveals no gallop  No murmur heard  Pulmonary/Chest: Effort normal and breath sounds normal    Abdominal: Soft  Musculoskeletal: Normal range of motion  Neurological: She is alert and oriented to person, place, and time  Skin: Skin is warm and dry  Psychiatric: She has a normal mood and affect           Laboratory Studies:  CMP:  Lab Results   Component Value Date     2016    K 3 8 10/31/2019     10/31/2019    CO2 30 10/31/2019    ANIONGAP 9 2016    BUN 11 10/31/2019    CREATININE 0 63 10/31/2019    GLUCOSE 76 2016    AST 18 10/31/2019    ALT 26 10/31/2019    BILITOT 0 32 2016    EGFR 111 10/31/2019       Lipid Profile:   Lab Results   Component Value Date    CHOL 113 2016     Lab Results   Component Value Date    HDL 80 10/31/2019     Lab Results   Component Value Date    LDLCALC 67 10/31/2019     Lab Results   Component Value Date    TRIG 63 10/31/2019       Cardiac testing:   EKG reviewed personally: NSR 68 Nml  Results for orders placed during the hospital encounter of 19   Echo complete with contrast if indicated    Narrative Alistair Gonzalez  [address]  [phone]    Transthoracic Echocardiogram  2D, M-mode, Doppler, and Color Doppler    Study date:  2019    Patient: Angelina Toledo  MR number: [id number]  Account number: [de-identified]  : 1975  Age: 41 years  Gender: Female  Status: Outpatient  [facility] [address] [place] [address] [facility]  Height: 64 in  Weight: 174 lb  BP: 122/ 68 mmHg    Indications: Cardiomyopathy    Diagnoses: I42 9 - Cardiomyopathy, unspecified    Sonographer:  SAM Mccrary  Referring Physician:  Radha Hinton MD  Group:  [id number]  Interpreting Physician:  Karolina Rankin MD    SUMMARY    LEFT VENTRICLE:  Systolic function was normal  Ejection fraction was estimated to be 60 %  There were no regional wall motion abnormalities  MITRAL VALVE:  There was trace regurgitation  TRICUSPID VALVE:  There was trace regurgitation  Pulmonary artery systolic pressure was within the normal range  HISTORY: PRIOR HISTORY: PICC, CVA, Gastric bypass, Mitochondrial disease, DVT, IVC filter    PROCEDURE: The study was performed in the [address]  This was a routine study  The transthoracic approach was used  The study included complete 2D imaging, M-mode, complete spectral Doppler, and color Doppler  The  heart rate was 68 bpm, at the start of the study  Images were obtained from the parasternal, apical, subcostal, and suprasternal notch acoustic windows  Image quality was adequate  LEFT VENTRICLE: Size was normal  Systolic function was normal  Ejection fraction was estimated to be 60 %  There were no regional wall motion abnormalities  Wall thickness was normal  No evidence of apical thrombus  DOPPLER: Left  ventricular diastolic function parameters were normal     RIGHT VENTRICLE: The size was normal  Systolic function was normal  Wall thickness was normal     LEFT ATRIUM: Size was normal     RIGHT ATRIUM: Size was normal     MITRAL VALVE: Valve structure was normal  There was normal leaflet separation  DOPPLER: The transmitral velocity was within the normal range  There was no evidence for stenosis  There was trace regurgitation  AORTIC VALVE: The valve was trileaflet  Leaflets exhibited normal thickness and normal cuspal separation   DOPPLER: Transaortic velocity was within the normal range  There was no evidence for stenosis  There was no significant  regurgitation  TRICUSPID VALVE: The valve structure was normal  There was normal leaflet separation  DOPPLER: The transtricuspid velocity was within the normal range  There was no evidence for stenosis  There was trace regurgitation  Pulmonary artery  systolic pressure was within the normal range  PULMONIC VALVE: Leaflets exhibited normal thickness, no calcification, and normal cuspal separation  DOPPLER: The transpulmonic velocity was within the normal range  There was no significant regurgitation  PERICARDIUM: There was no pericardial effusion  The pericardium was normal in appearance  AORTA: The root exhibited normal size  SYSTEMIC VEINS: IVC: The inferior vena cava was normal in size      SYSTEM MEASUREMENT TABLES    2D  %FS: 37 64 %  Ao Diam: 3 19 cm  EDV(Teich): 136 32 ml  EF(Cube): 75 74 %  EF(Teich): 67 25 %  ESV(Cube): 36 45 ml  ESV(Teich): 44 64 ml  IVSd: 0 55 cm  LA Area: 17 49 cm2  LA Diam: 3 2 cm  LVEDV MOD A4C: 88 62 ml  LVEF MOD A4C: 61 05 %  LVESV MOD A4C: 34 52 ml  LVIDd: 5 32 cm  LVIDs: 3 32 cm  LVLd A4C: 7 74 cm  LVLs A4C: 6 33 cm  LVPWd: 0 72 cm  RA Area: 13 91 cm2  RV Diam : 3 17 cm  SV MOD A4C: 54 1 ml  SV(Cube): 113 83 ml  SV(Teich): 91 68 ml    CW  TR Vmax: 2 16 m/s  TR maxP 68 mmHg    MM  TAPSE: 2 38 cm    PW  E': 0 12 m/s  E/E': 6 43  MV A Cesar: 0 56 m/s  MV Dec Chester: 5 45 m/s2  MV DecT: 144 86 ms  MV E Cesar: 0 79 m/s  MV E/A Ratio: 1 41    [employer] Accredited Echocardiography Laboratory    Prepared and electronically signed by    Karolina Rankin MD  Signed 10-Apr-2019 11:06:24

## 2020-01-03 ENCOUNTER — OFFICE VISIT (OUTPATIENT)
Dept: ENDOCRINOLOGY | Facility: CLINIC | Age: 45
End: 2020-01-03
Payer: COMMERCIAL

## 2020-01-03 ENCOUNTER — LAB (OUTPATIENT)
Dept: LAB | Facility: CLINIC | Age: 45
End: 2020-01-03
Payer: COMMERCIAL

## 2020-01-03 VITALS
DIASTOLIC BLOOD PRESSURE: 84 MMHG | SYSTOLIC BLOOD PRESSURE: 124 MMHG | WEIGHT: 178.8 LBS | BODY MASS INDEX: 30.69 KG/M2 | HEART RATE: 72 BPM

## 2020-01-03 DIAGNOSIS — E21.1 HYPERPARATHYROIDISM , SECONDARY, NON-RENAL (HCC): ICD-10-CM

## 2020-01-03 DIAGNOSIS — E53.8 B12 DEFICIENCY: ICD-10-CM

## 2020-01-03 DIAGNOSIS — E61.1 IRON DEFICIENCY: ICD-10-CM

## 2020-01-03 DIAGNOSIS — E16.2 HYPOGLYCEMIA: ICD-10-CM

## 2020-01-03 DIAGNOSIS — K91.2 HYPOGLYCEMIA AFTER GI (GASTROINTESTINAL) SURGERY: Primary | ICD-10-CM

## 2020-01-03 LAB — 25(OH)D3 SERPL-MCNC: 32.5 NG/ML (ref 30–100)

## 2020-01-03 PROCEDURE — 36415 COLL VENOUS BLD VENIPUNCTURE: CPT

## 2020-01-03 PROCEDURE — 99214 OFFICE O/P EST MOD 30 MIN: CPT | Performed by: INTERNAL MEDICINE

## 2020-01-03 PROCEDURE — 82306 VITAMIN D 25 HYDROXY: CPT

## 2020-01-03 PROCEDURE — 95251 CONT GLUC MNTR ANALYSIS I&R: CPT | Performed by: INTERNAL MEDICINE

## 2020-01-03 RX ORDER — ACARBOSE 100 MG/1
100 TABLET ORAL
Qty: 270 TABLET | Refills: 3 | Status: SHIPPED | OUTPATIENT
Start: 2020-01-03 | End: 2020-03-16 | Stop reason: SDUPTHER

## 2020-01-03 RX ORDER — CYANOCOBALAMIN 1000 UG/ML
1000 INJECTION INTRAMUSCULAR; SUBCUTANEOUS ONCE
Status: CANCELLED | OUTPATIENT
Start: 2020-01-07

## 2020-01-03 NOTE — PROGRESS NOTES
Assessment/Plan:    Hyperparathyroidism , secondary, non-renal ([address] )  This may be related to low vitamin-D 25 hydroxy vitamin-D  Check 25 hydroxy vitamin-D  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  This seems to be well controlled with acarbose  Continue continuous glucose monitoring since she does have hypoglycemia unawareness  Iron deficiency  Continue iron infusions every month  For B12 deficiency, continue monthly injections  Diagnoses and all orders for this visit:    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    Hyperparathyroidism , secondary, non-renal (HCC)  -     Vitamin D 25 hydroxy Lab Collect; Future    Iron deficiency    Hypoglycemia  -     acarbose (PRECOSE) 100 MG tablet; Take 1 tablet (100 mg total) by mouth 3 (three) times a day with meals          Subjective:      Patient ID: Duane Dove is a 45 y o  female  33-year-old woman with postsurgical hypoglycemia who is on tube feeds  She is on acarbose 3 times a day with meals and uses continuous glucose monitoring that once her about impending hypoglycemia  She states that the monitor his helped her significantly  She is not having as many episodes of hypoglycemia  For the iron deficiency, she was sees monthly iron infusions and B12 injections for B12 deficiency  She is noted to have secondary hyperparathyroidism  This may be related to vitamin-D deficiency  The following portions of the patient's history were reviewed and updated as appropriate: allergies, current medications, past family history, past medical history, past social history, past surgical history and problem list     Review of Systems   Constitutional: Negative for chills and fever  Respiratory: Negative for shortness of breath  Cardiovascular: Negative for chest pain  Gastrointestinal: Negative for constipation, diarrhea, nausea and vomiting  All other systems reviewed and are negative          Objective:      /84 Pulse 72   Wt 81 1 kg (178 lb 12 8 oz)   BMI 30 69 kg/m²          Physical Exam   Constitutional: She is oriented to person, place, and time  She appears well-developed and well-nourished  No distress  HENT:   Head: Normocephalic and atraumatic  Mouth/Throat: Oropharynx is clear and moist and mucous membranes are normal  No oropharyngeal exudate  Eyes: Conjunctivae, EOM and lids are normal  Right eye exhibits no discharge  Left eye exhibits no discharge  No scleral icterus  Neck: Neck supple  No thyromegaly present  Cardiovascular: Normal rate, regular rhythm and normal heart sounds  Exam reveals no gallop and no friction rub  No murmur heard  Pulmonary/Chest: Effort normal and breath sounds normal  No respiratory distress  She has no wheezes  Abdominal: Soft  Bowel sounds are normal  She exhibits no distension  There is no tenderness  Musculoskeletal: Normal range of motion  She exhibits no edema, tenderness or deformity  Lymphadenopathy:        Head (right side): No occipital adenopathy present  Head (left side): No occipital adenopathy present  Right: No supraclavicular adenopathy present  Left: No supraclavicular adenopathy present  Neurological: She is alert and oriented to person, place, and time  No cranial nerve deficit  Skin: Skin is warm and intact  No rash noted  She is not diaphoretic  No erythema  Psychiatric: She has a normal mood and affect  Her behavior is normal    Vitals reviewed  I reviewed her continuous glucose monitor download which shows that her average glucose is 113 mg/dL with standard deviation of 19 mg/dL  98% of the time she is in the target range with 1% of the time she is having hyperglycemia and 1% of the time she has hypoglycemia

## 2020-01-03 NOTE — ASSESSMENT & PLAN NOTE
This seems to be well controlled with acarbose  Continue continuous glucose monitoring since she does have hypoglycemia unawareness

## 2020-01-06 ENCOUNTER — TELEPHONE (OUTPATIENT)
Dept: ENDOCRINOLOGY | Facility: CLINIC | Age: 45
End: 2020-01-06

## 2020-01-07 ENCOUNTER — HOSPITAL ENCOUNTER (OUTPATIENT)
Dept: INFUSION CENTER | Facility: CLINIC | Age: 45
Discharge: HOME | End: 2020-01-07
Payer: COMMERCIAL

## 2020-01-07 ENCOUNTER — OFFICE VISIT (OUTPATIENT)
Dept: RHEUMATOLOGY | Facility: CLINIC | Age: 45
End: 2020-01-07
Payer: COMMERCIAL

## 2020-01-07 VITALS
TEMPERATURE: 98.1 F | DIASTOLIC BLOOD PRESSURE: 74 MMHG | SYSTOLIC BLOOD PRESSURE: 131 MMHG | HEART RATE: 83 BPM | RESPIRATION RATE: 18 BRPM

## 2020-01-07 VITALS
DIASTOLIC BLOOD PRESSURE: 76 MMHG | BODY MASS INDEX: 30.52 KG/M2 | WEIGHT: 178.79 LBS | HEART RATE: 84 BPM | HEIGHT: 64 IN | SYSTOLIC BLOOD PRESSURE: 128 MMHG

## 2020-01-07 DIAGNOSIS — E61.1 IRON DEFICIENCY: Primary | ICD-10-CM

## 2020-01-07 DIAGNOSIS — G56.90 NEUROPATHY OF HAND, UNSPECIFIED LATERALITY: ICD-10-CM

## 2020-01-07 DIAGNOSIS — I73.00 RAYNAUD'S DISEASE WITHOUT GANGRENE: Primary | ICD-10-CM

## 2020-01-07 DIAGNOSIS — Z79.899 LONG TERM USE OF DRUG: ICD-10-CM

## 2020-01-07 DIAGNOSIS — M25.541 ARTHRALGIA OF BOTH HANDS: ICD-10-CM

## 2020-01-07 DIAGNOSIS — M25.542 ARTHRALGIA OF BOTH HANDS: ICD-10-CM

## 2020-01-07 PROCEDURE — 96365 THER/PROPH/DIAG IV INF INIT: CPT

## 2020-01-07 PROCEDURE — 96372 THER/PROPH/DIAG INJ SC/IM: CPT

## 2020-01-07 PROCEDURE — 99214 OFFICE O/P EST MOD 30 MIN: CPT | Performed by: INTERNAL MEDICINE

## 2020-01-07 RX ORDER — NIFEDIPINE 30 MG/1
30 TABLET, EXTENDED RELEASE ORAL DAILY
Qty: 30 TABLET | Refills: 2 | Status: SHIPPED | OUTPATIENT
Start: 2020-01-07 | End: 2020-04-08 | Stop reason: SDUPTHER

## 2020-01-07 RX ORDER — SODIUM CHLORIDE 9 MG/ML
20 INJECTION, SOLUTION INTRAVENOUS ONCE
Status: COMPLETED | OUTPATIENT
Start: 2020-01-07 | End: 2020-01-07

## 2020-01-07 RX ORDER — SODIUM CHLORIDE 9 MG/ML
20 INJECTION, SOLUTION INTRAVENOUS ONCE
Status: CANCELLED | OUTPATIENT
Start: 2020-02-04

## 2020-01-07 RX ORDER — CYANOCOBALAMIN 1000 UG/ML
1000 INJECTION INTRAMUSCULAR; SUBCUTANEOUS ONCE
Status: COMPLETED | OUTPATIENT
Start: 2020-01-07 | End: 2020-01-07

## 2020-01-07 RX ORDER — CYANOCOBALAMIN 1000 UG/ML
1000 INJECTION INTRAMUSCULAR; SUBCUTANEOUS ONCE
Status: CANCELLED | OUTPATIENT
Start: 2020-02-04

## 2020-01-07 RX ADMIN — CYANOCOBALAMIN 1000 MCG: 1000 INJECTION, SOLUTION INTRAMUSCULAR at 08:57

## 2020-01-07 RX ADMIN — SODIUM CHLORIDE 20 ML/HR: 0.9 INJECTION, SOLUTION INTRAVENOUS at 08:45

## 2020-01-07 RX ADMIN — IRON SUCROSE 200 MG: 20 INJECTION, SOLUTION INTRAVENOUS at 08:54

## 2020-01-07 NOTE — PROGRESS NOTES
Assessment and Plan:   Patient is a 43-year-old female with a genetic mitochondrial disorder who presents for rheumatology follow-up regarding primary Raynaud phenomenon, currently with symptoms uncontrolled on amlodipine 10 mg daily  She was doing well up until the winter months  Her symptoms were stable on amlodipine through the summer and fall but now she is having uncontrolled pain in her hands and toes regardless of keeping warm  Her exam is overall unchanged and there is no skin breakdown on her fingers or toes  We discussed that we will try a different calcium channel blockers since the amlodipine was her 1st medication for the Raynaud's  We will try amlodipine starting at 30 mg daily with close monitoring of blood pressure  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 and also is aware of the symptoms of hypotension  She will let me know how she is doing and if she needs to increase the dose if the low-dose is not enough  We discussed if this is not helpful then we might want to consider something else like sildenafil but will consider that only if she fails the nifedipine  She will keep me posted otherwise we will follow-up in about 10 months  Plan:  Diagnoses and all orders for this visit:    Raynaud's disease without gangrene  -     NIFEdipine (PROCARDIA XL) 30 mg 24 hr tablet; Take 1 tablet (30 mg total) by mouth daily    Arthralgia of both hands    Neuropathy of hand, unspecified laterality    Long term use of drug        Follow-up plan: 10 months        Rheumatic Disease Summary  1  Primary Raynaud phenomenon   -Rheum eval 7/25/19 for raynauds, presented on amlodipine 10mg doing well; saw Dr Nathan Vargas previously and not dx with an AI disease   -Labs 7/30/19: negative BLAIRE, SSA, SSB, RF, CCP, APLs, hep panel, normal C3/4  -Visit 1/7/20: raynaud's sx uncontrolled on amlodipine 10mg, D/C'd and started Nifedipine 30mg   2   Comorbidities: mitochondrial genetic disorder (follows at [facility]), history of Nissen fundoplication and ultimately gastric bypass in 2015 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, GERD, chronic TPN use through PEG tube, iron deficiency anemia requiring IV replacement, secondary hyperparathyroidism, hypoglycemia, vitamin-D deficiency, 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, h/o DVT after foot fracture      ASHLEY Walsh is a 45 y o   female who presents for rheumatology follow-up regarding primary Raynaud phenomenon  We did additional autoimmune workup to make sure there were no markers of underlying autoimmune disease and this was all negative  When we initially met it was July and so she was pretty stable in terms of her symptoms  She was taking the amlodipine 10 mg at that time too  She reports she is still on the amlodipine 10 mg but is having much more difficulty in the winter  She is having very severe pain in her fingers and toes in the cold weather despite multiple layers and keeping warm  She is getting typical white and blue color changes when it is had but ultimately tries to prevent this by keeping out of the cold  The pain is the most debilitating part and she is wondering what other medication she can try  She denies any skin ulcers or skin breakdown  Her blood pressure has been stable on the amlodipine  This is the only medication she has tried for Raynaud's  No other new health changes  She recalls in the past trying medications like gabapentin for possible neuropathy but she did not tolerate the medication and is not interested these types of medications  She has discussed with her genetic specialist that neuropathy is a possible manifestation of her mitochondrial disorder  She is really not interested in an EMG study to further evaluate for this since it would not really  or outcome      The following portions of the patient's history were reviewed and updated as appropriate: allergies, current medications, past family history, past medical history, past social history, past surgical history and problem list     Review of Systems:   Review of Systems   Constitutional: Negative for chills, fatigue, fever and unexpected weight change  HENT: Negative for mouth sores and trouble swallowing  Eyes: Negative for pain and visual disturbance  Respiratory: Negative for cough and shortness of breath  Cardiovascular: Negative for chest pain and leg swelling  Gastrointestinal: Negative for abdominal pain, blood in stool, constipation, diarrhea and nausea  Genitourinary: Negative for hematuria  Musculoskeletal: Positive for arthralgias  Negative for back pain, joint swelling and myalgias  Skin: Positive for color change  Negative for rash  Neurological: Negative for weakness and numbness  Hematological: Negative for adenopathy  Psychiatric/Behavioral: Negative for sleep disturbance  Home Medications:    Current Outpatient Medications:     acarbose (PRECOSE) 100 MG tablet, Take 1 tablet (100 mg total) by mouth 3 (three) times a day with meals, Disp: 270 tablet, Rfl: 3    acetylcysteine (MUCOMYST) 200 mg/mL nebulizer solution, , Disp: , Rfl:     Alpha-Lipoic Acid 100 MG CAPS, Take by mouth, Disp: , Rfl:     amLODIPine (NORVASC) 5 mg tablet, 5 mg BID , Disp: 180 tablet, Rfl: 1    B Complex-Biotin-FA (TH VITAMIN B 50/B-COMPLEX PO), Take 1 tablet by mouth daily  , Disp: , Rfl:     Blood Glucose Monitoring Suppl (ONETOUCH VERIO) w/Device KIT, Dispense 1 meter, Disp: 1 kit, Rfl: 1    Calcium Carbonate Antacid 1250 mg/5 mL, Take by mouth, Disp: , Rfl:     COENZYME Q-10 PO, , Disp: , Rfl:     CREATINE MONOHYDRATE PO, Take 2 5 g by mouth, Disp: , Rfl:     Cyanocobalamin (B-12) 1000 MCG/ML KIT, Inject  as directed every 30 days  , Disp: , Rfl:     diphenhydrAMINE (BENADRYL) 25 mg tablet, Take by mouth, Disp: , Rfl:     docusate sodium (COLACE) 100 mg capsule, Take 200 mg by mouth 2 (two) times a day , Disp: , Rfl:     Galcanezumab-gnlm (EMGALITY) 120 MG/ML SOAJ, 1 subq injection every 30 days  , Disp: 1 mL, Rfl: 0    iron sucrose (VENOFER) 20 mg/mL, Venofer 200 mg iron/10 mL intravenous solution, Disp: , Rfl:     L-Arginine 1000 MG TABS, Take 6,000 mg by mouth 2 (two) times a day , Disp: , Rfl:     levOCARNitine (CARNITOR) 1 g/10 mL solution, Take 100 mg by mouth 4 (four) times a day (before meals and at bedtime), Disp: , Rfl:     linaCLOtide 145 MCG CAPS, Take 145 mcg by mouth 2 (two) times a day, Disp: , Rfl:     midodrine (PROAMATINE) 5 mg tablet, Take 1 tablet (5 mg total) by mouth 3 (three) times a day, Disp: 270 tablet, Rfl: 3    Multiple Vitamin (MULTIVITAMIN) tablet, Take 1 tablet by mouth daily  , Disp: , Rfl:     ONETOUCH DELICA LANCETS FINE MISC, Check BG 3x per day, Disp: 300 each, Rfl: 3    ONETOUCH VERIO test strip, Check BG 3x per day, Disp: 300 each, Rfl: 1    Oral Vehicles (PCCA-PLUS) SUSP, , Disp: , Rfl:     pantoprazole (PROTONIX) 40 mg tablet, , Disp: , Rfl: 3    polyethylene glycol (MIRALAX) 17 g packet, Take 17 g by mouth 2 (two) times a day Indications: 1 5 caps full 1-2 times/day   , Disp: , Rfl:     Riboflavin 100 MG TABS, Take 100 mg by mouth, Disp: , Rfl:     sucralfate (CARAFATE) 1 g/10 mL suspension, Take 10 mL (1 g total) by mouth 4 (four) times a day Must have liquid suspension, Disp: 420 mL, Rfl: 5    TRILYTE 420 g solution, , Disp: , Rfl: 5    Ubiquinol Liposomal 100 MG/5ML SYRP, Take 300 mg by mouth, Disp: , Rfl:     Ubiquinol POWD, , Disp: , Rfl:     vitamin E 100 UNIT capsule, Take 78 Units by mouth daily, Disp: , Rfl:     NIFEdipine (PROCARDIA XL) 30 mg 24 hr tablet, Take 1 tablet (30 mg total) by mouth daily, Disp: 30 tablet, Rfl: 2  No current facility-administered medications for this visit       Objective:    Vitals:    01/07/20 1422   BP: 128/76   BP Location: Left arm   Patient Position: Sitting   Cuff Size: Standard   Pulse: 84   Weight: 81 1 kg (178 lb 12 7 oz)   Height: 5' 4" (1 626 m)       Physical Exam   Constitutional: She appears well-developed and well-nourished  She is cooperative  HENT:   Head: Normocephalic and atraumatic  Eyes: Conjunctivae and EOM are normal  No scleral icterus  Neck: No spinous process tenderness and no muscular tenderness present  No thyromegaly present  Musculoskeletal:   Cold fingers  No active color change  No reproducible soft tissue or joint tenderness  No joint swelling or synovitis anywhere  Lymphadenopathy:     She has no cervical adenopathy  Neurological: She is alert  No sensory deficit  Skin: Skin is warm and dry  No rash noted  Nails show no clubbing  Psychiatric: She has a normal mood and affect  Imaging:    UGI 8/2019:  IMPRESSION:  1  Status post Rossy-en-Y gastric bypass  Reflux occurs between the gastric pouch and mid to distal esophagus  2  The esophagus is otherwise unremarkable in appearance    The anastomotic site and proximal visualized small bowel is also unremarkable      Labs:   Component      Latest Ref Rng & Units 7/30/2019   WBC      4 31 - 10 16 Thousand/uL 6 49   Red Blood Cell Count      3 81 - 5 12 Million/uL 4 35   Hemoglobin      11 5 - 15 4 g/dL 13 5   HCT      34 8 - 46 1 % 40 3   MCV      82 - 98 fL 93   MCH      26 8 - 34 3 pg 31 0   MCHC      31 4 - 37 4 g/dL 33 5   RDW      11 6 - 15 1 % 11 7   MPV      8 9 - 12 7 fL 9 9   Platelet Count      773 - 390 Thousands/uL 220   nRBC      /100 WBCs 0   Neutrophils %      43 - 75 % 73   Immat GRANS %      0 - 2 % 0   Lymphocytes Relative      14 - 44 % 18   Monocytes Relative      4 - 12 % 6   Eosinophils      0 - 6 % 2   Basophils Relative      0 - 1 % 1   Absolute Neutrophils      1 85 - 7 62 Thousands/µL 4 81   Immature Grans Absolute      0 00 - 0 20 Thousand/uL 0 01   Lymphocytes Absolute      0 60 - 4 47 Thousands/µL 1 17   Absolute Monocytes      0 17 - 1 22 Thousand/µL 0 36   Absolute Eosinophils 0 00 - 0 61 Thousand/µL 0 10   Basophils Absolute      0 00 - 0 10 Thousands/µL 0 04   Sodium      136 - 145 mmol/L 144   Potassium      3 5 - 5 3 mmol/L 3 4 (L)   Chloride      100 - 108 mmol/L 106   CO2      21 - 32 mmol/L 29   Anion Gap      4 - 13 mmol/L 9   BUN      5 - 25 mg/dL 16   Creatinine      0 60 - 1 30 mg/dL 0 92   Glucose, Random      65 - 140 mg/dL 93   Calcium      8 3 - 10 1 mg/dL 9 8   AST      5 - 45 U/L 17   ALT      12 - 78 U/L 26   Alkaline Phosphatase      46 - 116 U/L 86   Total Protein      6 4 - 8 2 g/dL 7 3   Albumin      3 5 - 5 0 g/dL 3 7   TOTAL BILIRUBIN      0 20 - 1 00 mg/dL 0 40   eGFR      ml/min/1 73sq m 77   PTT LUPUS ANTICOAGULANT      0 0 - 51 9 sec 29 4   DILUTE CHRISTINA VIPER VENOM TIME      0 0 - 47 0 sec 33 3   DILUTE PROTHROMBIN TIME(DPT)      0 0 - 55 0 sec 39 5   THROMBIN TIME (DRVW)      0 0 - 23 0 sec 16 9   DPT CONFIRM RATIO      0 00 - 1 40 Ratio 1 14   LUPUS REFLEX INTERPRETATION       Negative   HEPATITIS B SURFACE ANTIGEN      Non-reactive, NonReactive - Confirmed Non-reactive   HEPATITIS C ANTIBODY      Non-reactive Non-reactive   HEPATITIS B CORE IGM ANTIBODY      Non-reactive Non-reactive   HEPATITIS B CORE TOTAL ANTIBODY      Non-reactive Non-reactive   BETA-2 GLYCOPROTEIN 1 IGG ANTIBODY      0 - 20 GPI IgG units <9   BETA-2 GLYCOPROTEIN 1 IGA ANTIBODY      0 - 25 GPI IgA units <9   BETA-2 GLYCOPROTEIN 1 IGM ANTIBODY      0 - 32 GPI IgM units <9   ANTICARDIOLIPIN IGA ANTIBODY      0 - 11 APL U/mL <9   ANTICARDIOLIPIN IGG ANTIBODY      0 - 14 GPL U/mL <9   ANTICARDIOLIPIN IGM ANTIBODY      0 - 12 MPL U/mL <9   SSA (RO) ANTIBODY      0 0 - 0 9 AI <0 2   SSB (LA) ANTIBODY      0 0 - 0 9 AI <0 2   ANTI-NUCLEAR ANTIBODY (BLAIRE)      Negative Negative   C3 Complement      90 0 - 180 0 mg/dL 94 3   C4, COMPLEMENT      10 0 - 40 0 mg/dL 41 7   CYCLIC CITRULLINATED PEPTIDE ANTIBODY      0 - 19 units 2   Ferritin      8 - 388 ng/mL 184   RHEUMATOID FACTOR      Negative Negative

## 2020-01-16 ENCOUNTER — HOSPITAL ENCOUNTER (OUTPATIENT)
Dept: ULTRASOUND IMAGING | Facility: HOSPITAL | Age: 45
Discharge: HOME | End: 2020-01-16
Attending: OBSTETRICS & GYNECOLOGY
Payer: COMMERCIAL

## 2020-01-16 DIAGNOSIS — N92.0 MENORRHAGIA WITH REGULAR CYCLE: ICD-10-CM

## 2020-01-16 PROCEDURE — 76856 US EXAM PELVIC COMPLETE: CPT

## 2020-01-16 PROCEDURE — 76830 TRANSVAGINAL US NON-OB: CPT

## 2020-02-04 ENCOUNTER — HOSPITAL ENCOUNTER (OUTPATIENT)
Dept: INFUSION CENTER | Facility: CLINIC | Age: 45
Discharge: HOME | End: 2020-02-04
Payer: COMMERCIAL

## 2020-02-04 VITALS
SYSTOLIC BLOOD PRESSURE: 132 MMHG | RESPIRATION RATE: 18 BRPM | OXYGEN SATURATION: 99 % | DIASTOLIC BLOOD PRESSURE: 69 MMHG | TEMPERATURE: 98.2 F | HEART RATE: 71 BPM

## 2020-02-04 DIAGNOSIS — E61.1 IRON DEFICIENCY: Primary | ICD-10-CM

## 2020-02-04 PROCEDURE — 96372 THER/PROPH/DIAG INJ SC/IM: CPT

## 2020-02-04 PROCEDURE — 96365 THER/PROPH/DIAG IV INF INIT: CPT

## 2020-02-04 RX ORDER — SODIUM CHLORIDE 9 MG/ML
20 INJECTION, SOLUTION INTRAVENOUS ONCE
Status: CANCELLED | OUTPATIENT
Start: 2020-03-03

## 2020-02-04 RX ORDER — SODIUM CHLORIDE 9 MG/ML
20 INJECTION, SOLUTION INTRAVENOUS ONCE
Status: COMPLETED | OUTPATIENT
Start: 2020-02-04 | End: 2020-02-04

## 2020-02-04 RX ORDER — CYANOCOBALAMIN 1000 UG/ML
1000 INJECTION INTRAMUSCULAR; SUBCUTANEOUS ONCE
Status: COMPLETED | OUTPATIENT
Start: 2020-02-04 | End: 2020-02-04

## 2020-02-04 RX ORDER — CYANOCOBALAMIN 1000 UG/ML
1000 INJECTION INTRAMUSCULAR; SUBCUTANEOUS ONCE
Status: CANCELLED | OUTPATIENT
Start: 2020-03-03

## 2020-02-04 RX ADMIN — IRON SUCROSE 200 MG: 20 INJECTION, SOLUTION INTRAVENOUS at 08:45

## 2020-02-04 RX ADMIN — SODIUM CHLORIDE 20 ML/HR: 0.9 INJECTION, SOLUTION INTRAVENOUS at 08:30

## 2020-02-04 RX ADMIN — CYANOCOBALAMIN 1000 MCG: 1000 INJECTION, SOLUTION INTRAMUSCULAR; SUBCUTANEOUS at 09:45

## 2020-02-04 NOTE — PROGRESS NOTES
Patient to [facility] for Venofer / Vitamin B12: Offers no complaints at present time: Right PAC accessed by patient at home: Good blood return: Flushes without difficulty

## 2020-02-14 ENCOUNTER — OFFICE VISIT (OUTPATIENT)
Dept: BARIATRICS | Facility: CLINIC | Age: 45
End: 2020-02-14

## 2020-02-14 ENCOUNTER — OFFICE VISIT (OUTPATIENT)
Dept: BARIATRICS | Facility: CLINIC | Age: 45
End: 2020-02-14
Payer: COMMERCIAL

## 2020-02-14 VITALS
SYSTOLIC BLOOD PRESSURE: 112 MMHG | HEIGHT: 65 IN | WEIGHT: 178 LBS | BODY MASS INDEX: 29.66 KG/M2 | HEART RATE: 71 BPM | DIASTOLIC BLOOD PRESSURE: 60 MMHG | TEMPERATURE: 97.7 F

## 2020-02-14 DIAGNOSIS — Z98.84 S/P GASTRIC BYPASS: Primary | ICD-10-CM

## 2020-02-14 DIAGNOSIS — K91.2 POSTSURGICAL MALABSORPTION: ICD-10-CM

## 2020-02-14 DIAGNOSIS — K22.4 ESOPHAGEAL DYSMOTILITY: ICD-10-CM

## 2020-02-14 DIAGNOSIS — F32.A DEPRESSION: Primary | ICD-10-CM

## 2020-02-14 DIAGNOSIS — R13.19 ESOPHAGEAL DYSPHAGIA: ICD-10-CM

## 2020-02-14 PROCEDURE — 3008F BODY MASS INDEX DOCD: CPT | Performed by: SURGERY

## 2020-02-14 PROCEDURE — 3078F DIAST BP <80 MM HG: CPT | Performed by: SURGERY

## 2020-02-14 PROCEDURE — 99213 OFFICE O/P EST LOW 20 MIN: CPT | Performed by: SURGERY

## 2020-02-14 PROCEDURE — RECHECK

## 2020-02-14 PROCEDURE — 3074F SYST BP LT 130 MM HG: CPT | Performed by: SURGERY

## 2020-02-14 PROCEDURE — 1036F TOBACCO NON-USER: CPT | Performed by: SURGERY

## 2020-02-14 NOTE — PROGRESS NOTES
Patient seen at request of Dr Jaqueline Adams  Patient is employee of [address] [place] [address], [employer]  Called counseling services two days ago and has had no response  Patient is completely stressed out and overwhelmed by situation at home with sink hole discovered underneath their home and working to figure out how to correct it  Patient's two children have complex medical issues that she manages and oversees their care  Patient works in the infusion center at [employer]  Patient describes herself as the "one who holds the family together " Strong marriage of 21 years, good support from , however, difficult for her to share her feelings with others  Doesn't want them to worry or upset them  Looking for counseling resources  States she call [employer]  [employer] two days ago and had no response  Discussed EAP services provided by [address] [place] [address]  [employer] and will place order for psychological services  Patient thinks she will benefit having someone she can talk with  Will follow up with patient about resources  Order sent to [facility] for counseling services

## 2020-02-14 NOTE — PROGRESS NOTES
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   Gastrostomy tube in place [facility])     History of blood clots 2012    Left leg  Has IVC filter now  Occurred while on Lovenox    Iron deficiency     Malignant hyperthermia due to anesthesia     Patient's son has M H   States she needs precautions    Migraines     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[place] [address] )     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[place] [address] )     MTHFR (methylene THF reductase) deficiency and homocystinuria (HCC)     Muscle weakness (generalized)     Nausea     On total parenteral nutrition (TPN)     for 8 mts    PCOS (polycystic ovarian syndrome)     PONV (postoperative nausea and vomiting)     Postgastrectomy syndrome     malabsorption    Postsurgical malabsorption     Status post gastric bypass for obesity     Status post PICC central line placement     Right upper arm  Receives TPN    Stroke [facility]) 3903, 2779    Metabolic strokes  Right hemiparesis= minor now   Vomiting     When eating     Past Surgical History:   Procedure Laterality Date    ANKLE SURGERY Left     Has plate and screws    COLONOSCOPY      COSMETIC SURGERY      X14 as child post attack by dog  Arms, legs and face    DILATION AND CURETTAGE OF UTERUS      x2    ESOPHAGOGASTRODUODENOSCOPY N/A 1/27/2016    Procedure: ESOPHAGOGASTRODUODENOSCOPY (EGD);   Surgeon: Trudi Loving MD;  Location: [facility]; Service:     FRACTURE SURGERY Left     Left ankle - hardware    GASTRIC BYPASS      IVC FILTER INSERTION      NISSEN FUNDOPLICATION      OVARY SURGERY Bilateral     "Drilling" due to multiple cysts- PCOS    ND EGD TRANSORAL BIOPSY SINGLE/MULTIPLE N/A 3/9/2016    Procedure: ESOPHAGOGASTRODUODENOSCOPY (EGD); Surgeon: Thuy Avila MD;  Location: [facility]; Service: Bariatrics    ND LAP,GASTROSTOMY,W/O TUBE CONSTR N/A 4/19/2016    Procedure: INSERTION GASTROSTOMY TUBE LAPAROSCOPIC WITH INTRAOP EGD;  Surgeon: Thuy Avila MD;  Location: [facility] OR;  Service: [address] [place] [address] Right     WISDOM TOOTH EXTRACTION       Social History   Social History     Substance and Sexual Activity   Alcohol Use No     Social History     Substance and Sexual Activity   Drug Use No     Social History     Tobacco Use   Smoking Status Never Smoker   Smokeless Tobacco Never Used     Family History: non-contributory    Meds/Allergies   all medications and allergies reviewed  Allergies   Allergen Reactions    Sulfate Other (See Comments)    Sulfa Antibiotics      Arrhythmia     Guaifenesin      Arrhythmia    Other      Malignant hyperhermia precautions  NEVER use Lacted Ringers       Objective       Current Vitals:   Blood Pressure: 112/60 (02/14/20 1001)  Pulse: 71 (02/14/20 1001)  Temperature: 97 7 °F (36 5 °C) (02/14/20 1001)  Temp Source: Tympanic (02/14/20 1001)  Height: 5' 4 5" (163 8 cm) (02/14/20 1001)  Weight - Scale: 80 7 kg (178 lb) (02/14/20 1001)        Invasive Devices     Central Venous Catheter Line            Port A Cath Right Chest -- days          Peripheral Intravenous Line            Peripheral IV 04/25/18 Left Antecubital 659 days    Peripheral IV 05/08/18 Left Antecubital 646 days          Drain            Gastrostomy/Enterostomy Gastrostomy 20 Fr  LUQ 1395 days                Physical Exam   Constitutional: She is oriented to person, place, and time  She appears well-developed  No distress  HENT:   Head: Normocephalic and atraumatic  Right Ear: External ear normal    Left Ear: External ear normal    Eyes: Conjunctivae are normal  Right eye exhibits no discharge  Left eye exhibits no discharge  No scleral icterus  Abdominal: She exhibits no distension and no mass  There is no tenderness  There is no rebound and no guarding  VALENTIN tube in place   Neurological: She is alert and oriented to person, place, and time  Skin: She is not diaphoretic  Psychiatric: She has a normal mood and affect  Her behavior is normal  Judgment and thought content normal    Vitals reviewed  Lab Results: I have personally reviewed pertinent lab results  Imaging: I have personally reviewed pertinent reports  EKG, Pathology, and Other Studies: I have personally reviewed pertinent reports  Assessment/PLAN:    45 y o  female with a history 46-year-old female status post laparoscopic Rossy-en-Y gastric bypass after a Nissen takedown in October 2015  Garfield Ruiz has a complicated past medical history  Refer to my previous notes for further details  She has a 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CMS-HCC), 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 and follows up with the [facility]  I have placed a gastrostomy tube (Valentin-Key) Button in April of 2016 to assist with her nutritional requirements  From the bariatric point of view she is doing well  She has lost 74% excess weight loss and 32% total body  She has regained 13 lb since last year  Her heartburn and regurgitation is worsening and she is having more trouble with moving her bowels  She has developed Raynaud's and she has been seing Rheumatology for that      To complicate things more she is dealing with a personal crisis at home as she found to have a sink hole under her house      All this has stressed her enormously and has reached out to a therapist within [facility] but she has not heard back yet     I am going to make an appointment for her to talk to our  to help her in any way we can  We have also discussed that I would like her to meet with our dietitians to give her nutritional recommendations  Her case is being presented at a multi-disciplinary meeting with the specialists at [facility] and the GI motility expert has even suggested the possibility of a colectomy to help her with her dysmotility      She has not had any formal nutritional labs but her CBC and her vitamin-D are within normal limits  She told me that her doctor will be ordering a panel soon        We had a long discussion with regards to her progress and that she has regained some weight back  With all her GI issues this is not entirely bad      From the bariatric standpoint she is stable  I had a detailed discussion with her stressing the fact that long-term success is largely dependent on compliance and abidance to the recommendations of the program       She will follow up with us as scheduled      Thang Garica MD  2/14/2020  10:14 AM

## 2020-02-17 ENCOUNTER — PROCEDURE VISIT (OUTPATIENT)
Dept: OBGYN CLINIC | Facility: CLINIC | Age: 45
End: 2020-02-17
Payer: COMMERCIAL

## 2020-02-17 VITALS
BODY MASS INDEX: 30.56 KG/M2 | HEIGHT: 64 IN | SYSTOLIC BLOOD PRESSURE: 112 MMHG | WEIGHT: 179 LBS | DIASTOLIC BLOOD PRESSURE: 64 MMHG

## 2020-02-17 DIAGNOSIS — R87.619 ABNORMAL CERVICAL PAPANICOLAOU SMEAR, UNSPECIFIED ABNORMAL PAP FINDING: ICD-10-CM

## 2020-02-17 DIAGNOSIS — N92.0 MENORRHAGIA WITH REGULAR CYCLE: Primary | ICD-10-CM

## 2020-02-17 PROCEDURE — 88305 TISSUE EXAM BY PATHOLOGIST: CPT | Performed by: PATHOLOGY

## 2020-02-19 ENCOUNTER — TELEPHONE (OUTPATIENT)
Dept: OBGYN CLINIC | Facility: CLINIC | Age: 45
End: 2020-02-19

## 2020-02-19 PROCEDURE — 58100 BIOPSY OF UTERUS LINING: CPT | Performed by: OBSTETRICS & GYNECOLOGY

## 2020-02-19 NOTE — TELEPHONE ENCOUNTER
Called patient to let her know that her results were normal and she asked about when she would be able to schedule the procedure for a Ablation or D&C  I informed her I would look into it and someone would have to give her a call back  She said a call back tomorrow would be fine

## 2020-02-20 NOTE — PROGRESS NOTES
Subjective     Anju Talbot is a 45 y o   female here for a problem visit  Patient has heavy menses and is interested in endometrial ablation  Patient is here today for endometrial biopsy and was fully counseled to procedure risks and benefits of endometrial biopsy  Patient reports consent to biopsy  Patient tolerated very well  Also reviewed with patient options to treat menorrhagia, reviewed procedure risks and benefits of D&C hysteroscopy Xi ablation patient's questions all answered and surgical instructions reviewed  Patient desires to proceed  Patient is high risk for anesthesia but tolerates general anesthesia well  Patient has mitochondrial disorder  Patient has hypoglycemia and will need D5 half-normal saline cannot get lactated Ringer's  Patient often takes a long time to wake up and will need to be given that time  Patient has reflux and history of gastric bypass and should not have the preop carb drink  Patient has bowel dysmotility with chronic constipation, will do clean out prior to surgery, must straight cath to empty bladder usually at least twice daily  Does best with copious fluid hydration, okay to have Toradol  Patient has G-tube and connects to fluids every night  Personal health questionnaire reviewed: yes  Gynecologic History  Contraception: vasectomy  Last Pap:  2019  Results were: normal  Last mammogram:  2019  Results were: normal    Obstetric History  OB History    Para Term  AB Living   3 2 2   1     SAB TAB Ectopic Multiple Live Births   1              # Outcome Date GA Lbr Jose Cruz/2nd Weight Sex Delivery Anes PTL Lv   3 SAB      SAB      2 Term            1 Term              Past Medical History:   Diagnosis Date    Acid reflux     Constipated     Dysautonomia ([address] )     Esophageal abnormality     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      Gastrost[facility] tube in [facility])     History of blood clots     Left leg  Has IVC filter now  Occurred while on Lovenox    Iron deficiency     Malignant hyperthermia due to anesthesia     Patient's son has M H   States she needs precautions    Migraines     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     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     MTHFR (methylene THF reductase) deficiency and homocystinuria (HCC)     Muscle weakness (generalized)     Nausea     On total parenteral nutrition (TPN)     for 8 mts    PCOS (polycystic ovarian syndrome)     PONV (postoperative nausea and vomiting)     Postgastrectomy syndrome     malabsorption    Postsurgical malabsorption     Status post gastric bypass for obesity     Status post PICC central line placement     Right upper arm  Receives TPN    Stroke [facility]) 7943, 1652    Metabolic strokes  Right hemiparesis= minor now   Vomiting     When eating     Past Surgical History:   Procedure Laterality Date    ANKLE SURGERY Left     Has plate and screws    COLONOSCOPY      COSMETIC SURGERY      X14 as child post attack by dog  Arms, legs and face    DILATION AND CURETTAGE OF UTERUS      x2    ESOPHAGOGASTRODUODENOSCOPY N/A 1/27/2016    Procedure: ESOPHAGOGASTRODUODENOSCOPY (EGD); Surgeon: Corinne Pitt, MD;  Location: [facility]; Service:     FRACTURE SURGERY Left     Left ankle - hardware    GASTRIC BYPASS      IVC FILTER INSERTION      NISSEN FUNDOPLICATION      OVARY SURGERY Bilateral     "Drilling" due to multiple cysts- PCOS    RI EGD TRANSORAL BIOPSY SINGLE/MULTIPLE N/A 3/9/2016    Procedure: ESOPHAGOGASTRODUODENOSCOPY (EGD); Surgeon: Corinne Pitt, MD;  Location: [facility];   Service: Bariatrics    RI LAP,GASTROSTOMY,W/O TUBE CONSTR N/A 4/19/2016    Procedure: INSERTION GASTROSTOMY TUBE LAPAROSCOPIC WITH INTRAOP EGD;  Surgeon: Corinne Pitt, MD;  Location: [facility];  Service: Bariatrics    ULNAR NERVE TRANSPOSITION Right     WISDOM TOOTH EXTRACTION         Current Outpatient Medications:     acarbose (PRECOSE) 100 MG tablet, Take 1 tablet (100 mg total) by mouth 3 (three) times a day with meals, Disp: 270 tablet, Rfl: 3    acetylcysteine (MUCOMYST) 200 mg/mL nebulizer solution, , Disp: , Rfl:     Alpha-Lipoic Acid 100 MG CAPS, Take by mouth, Disp: , Rfl:     B Complex-Biotin-FA (TH VITAMIN B 50/B-COMPLEX PO), Take 1 tablet by mouth daily  , Disp: , Rfl:     Blood Glucose Monitoring Suppl (ONETOUCH VERIO) w/Device KIT, Dispense 1 meter, Disp: 1 kit, Rfl: 1    COENZYME Q-10 PO, , Disp: , Rfl:     CREATINE MONOHYDRATE PO, Take 2 5 g by mouth, Disp: , Rfl:     Cyanocobalamin (B-12) 1000 MCG/ML KIT, Inject  as directed every 30 days  , Disp: , Rfl:     diphenhydrAMINE (BENADRYL) 25 mg tablet, Take by mouth, Disp: , Rfl:     docusate sodium (COLACE) 100 mg capsule, Take 200 mg by mouth 2 (two) times a day , Disp: , Rfl:     Galcanezumab-gnlm (EMGALITY) 120 MG/ML SOAJ, 1 subq injection every 30 days  , Disp: 1 mL, Rfl: 0    iron sucrose (VENOFER) 20 mg/mL, Venofer 200 mg iron/10 mL intravenous solution, Disp: , Rfl:     L-Arginine 1000 MG TABS, Take 6,000 mg by mouth 2 (two) times a day , Disp: , Rfl:     levOCARNitine (CARNITOR) 1 g/10 mL solution, Take 100 mg by mouth 4 (four) times a day (before meals and at bedtime), Disp: , Rfl:     linaCLOtide 145 MCG CAPS, Take 145 mcg by mouth 2 (two) times a day, Disp: , Rfl:     midodrine (PROAMATINE) 5 mg tablet, Take 1 tablet (5 mg total) by mouth 3 (three) times a day, Disp: 270 tablet, Rfl: 3    Multiple Vitamin (MULTIVITAMIN) tablet, Take 1 tablet by mouth daily  , Disp: , Rfl:     NIFEdipine (PROCARDIA XL) 30 mg 24 hr tablet, Take 1 tablet (30 mg total) by mouth daily, Disp: 30 tablet, Rfl: 2    ONETOUCH DELICA LANCETS FINE MISC, Check BG 3x per day, Disp: 300 each, Rfl: 3    ONETOUCH VERIO test strip, Check BG 3x per day, Disp: 300 each, Rfl: 1    Oral Vehicles (PCCA-PLUS) SUSP, , Disp: , Rfl:     pantoprazole (PROTONIX) 40 mg tablet, , Disp: , Rfl: 3    polyethylene glycol (MIRALAX) 17 g packet, Take 17 g by mouth 2 (two) times a day Indications: 1 5 caps full 1-2 times/day   , Disp: , Rfl:     Prucalopride Succinate (Motegrity) 2 MG TABS, Take by mouth daily, Disp: , Rfl:     Riboflavin 100 MG TABS, Take 100 mg by mouth, Disp: , Rfl:     sucralfate (CARAFATE) 1 g/10 mL suspension, Take 10 mL (1 g total) by mouth 4 (four) times a day Must have liquid suspension, Disp: 420 mL, Rfl: 5    TRILYTE 420 g solution, , Disp: , Rfl: 5    Ubiquinol Liposomal 100 MG/5ML SYRP, Take 300 mg by mouth, Disp: , Rfl:     Ubiquinol POWD, , Disp: , Rfl:     vitamin E 100 UNIT capsule, Take 78 Units by mouth daily, Disp: , Rfl:     Allergies   Allergen Reactions    Sulfate Other (See Comments)    Sulfa Antibiotics      Arrhythmia     Guaifenesin      Arrhythmia    Other      Malignant hyperhermia precautions  NEVER use [place] [employer]       Social History     Tobacco Use    Smoking status: Never Smoker    Smokeless tobacco: Never Used   Substance Use Topics    Alcohol use: No    Drug use: No       Review of Systems  Review of Systems     Objective     /64 (BP Location: Right arm, Patient Position: Sitting, Cuff Size: Standard)   Ht 5' 4" (1 626 m)   Wt 81 2 kg (179 lb)   LMP  (LMP Unknown)   BMI 30 73 kg/m²   General appearance: alert and oriented, in no acute distress  Lungs: clear to auscultation bilaterally  Heart: regular rate and rhythm, S1, S2 normal, no murmur, click, rub or gallop  Abdomen: soft, non-tender; bowel sounds normal; no masses,  no organomegaly     Endometrial biopsy  Date/Time: 2/19/2020 8:51 PM  Performed by: Manuel Walsh MD  Authorized by: Manuel Walsh MD     Consent:     Consent obtained:  Verbal    Consent given by:  Patient    Patient questions answered: yes      Patient agrees, verbalizes understanding, and wants to proceed: yes    Indication:     Indications:  Other disorder of menstruation and other abnormal bleeding from female genital tract Procedure:     Procedure: endometrial biopsy with Pipelle      A bivalve speculum was placed in the vagina: yes      Cervix cleaned and prepped: yes      Uterus sound depth (cm):  9    Specimen collected: specimen collected and sent to pathology      Patient tolerated procedure well with no complications: yes    Findings:     Uterus size:  Non-gravid    Cervix: normal    Comments:      Cervix was cleansed with Betadine and grasped at 12:00 o'clock with Allis  Pipelle passed x2 with appropriate return of tissue  Patient tolerated very well  Assessment  46-year-old  with menorrhagia and planned for [facility]  hysteroscopy Xi ablation with some anesthesia/procedural risks  Considerations as denoted HPI         Plan  D&C hysteroscopy Xi ablation

## 2020-02-21 ENCOUNTER — HOSPITAL ENCOUNTER (EMERGENCY)
Facility: HOSPITAL | Age: 45
Discharge: HOME | End: 2020-02-21
Attending: EMERGENCY MEDICINE | Admitting: EMERGENCY MEDICINE
Payer: COMMERCIAL

## 2020-02-21 VITALS
DIASTOLIC BLOOD PRESSURE: 94 MMHG | OXYGEN SATURATION: 99 % | BODY MASS INDEX: 29.88 KG/M2 | WEIGHT: 175 LBS | TEMPERATURE: 97.9 F | RESPIRATION RATE: 16 BRPM | HEIGHT: 64 IN | SYSTOLIC BLOOD PRESSURE: 139 MMHG | HEART RATE: 97 BPM

## 2020-02-21 DIAGNOSIS — G43.909 MIGRAINE: Primary | ICD-10-CM

## 2020-02-21 DIAGNOSIS — E87.6 HYPOKALEMIA: ICD-10-CM

## 2020-02-21 LAB
ALBUMIN SERPL BCP-MCNC: 3.4 G/DL (ref 3.5–5)
ALP SERPL-CCNC: 74 U/L (ref 46–116)
ALT SERPL W P-5'-P-CCNC: 18 U/L (ref 12–78)
ANION GAP SERPL CALCULATED.3IONS-SCNC: 11 MMOL/L (ref 4–13)
AST SERPL W P-5'-P-CCNC: 12 U/L (ref 5–45)
BACTERIA UR QL AUTO: ABNORMAL /HPF
BASOPHILS # BLD AUTO: 0.04 THOUSANDS/ΜL (ref 0–0.1)
BASOPHILS NFR BLD AUTO: 1 % (ref 0–1)
BILIRUB SERPL-MCNC: 0.17 MG/DL (ref 0.2–1)
BILIRUB UR QL STRIP: NEGATIVE
BUN SERPL-MCNC: 14 MG/DL (ref 5–25)
CALCIUM SERPL-MCNC: 8.9 MG/DL (ref 8.3–10.1)
CHLORIDE SERPL-SCNC: 107 MMOL/L (ref 100–108)
CLARITY UR: CLEAR
CO2 SERPL-SCNC: 23 MMOL/L (ref 21–32)
COLOR UR: YELLOW
CREAT SERPL-MCNC: 0.57 MG/DL (ref 0.6–1.3)
EOSINOPHIL # BLD AUTO: 0.09 THOUSAND/ΜL (ref 0–0.61)
EOSINOPHIL NFR BLD AUTO: 1 % (ref 0–6)
ERYTHROCYTE [DISTWIDTH] IN BLOOD BY AUTOMATED COUNT: 12 % (ref 11.6–15.1)
FLUAV RNA NPH QL NAA+PROBE: NORMAL
FLUBV RNA NPH QL NAA+PROBE: NORMAL
GFR SERPL CREATININE-BSD FRML MDRD: 115 ML/MIN/1.73SQ M
GLUCOSE SERPL-MCNC: 91 MG/DL (ref 65–140)
GLUCOSE UR STRIP-MCNC: NEGATIVE MG/DL
HCT VFR BLD AUTO: 34.9 % (ref 34.8–46.1)
HGB BLD-MCNC: 11.6 G/DL (ref 11.5–15.4)
HGB UR QL STRIP.AUTO: NEGATIVE
IMM GRANULOCYTES # BLD AUTO: 0.03 THOUSAND/UL (ref 0–0.2)
IMM GRANULOCYTES NFR BLD AUTO: 0 % (ref 0–2)
KETONES UR STRIP-MCNC: ABNORMAL MG/DL
LEUKOCYTE ESTERASE UR QL STRIP: ABNORMAL
LYMPHOCYTES # BLD AUTO: 1.07 THOUSANDS/ΜL (ref 0.6–4.47)
LYMPHOCYTES NFR BLD AUTO: 15 % (ref 14–44)
MAGNESIUM SERPL-MCNC: 2 MG/DL (ref 1.6–2.6)
MCH RBC QN AUTO: 30.8 PG (ref 26.8–34.3)
MCHC RBC AUTO-ENTMCNC: 33.2 G/DL (ref 31.4–37.4)
MCV RBC AUTO: 93 FL (ref 82–98)
MONOCYTES # BLD AUTO: 0.53 THOUSAND/ΜL (ref 0.17–1.22)
MONOCYTES NFR BLD AUTO: 7 % (ref 4–12)
NEUTROPHILS # BLD AUTO: 5.52 THOUSANDS/ΜL (ref 1.85–7.62)
NEUTS SEG NFR BLD AUTO: 76 % (ref 43–75)
NITRITE UR QL STRIP: NEGATIVE
NON-SQ EPI CELLS URNS QL MICRO: ABNORMAL /HPF
NRBC BLD AUTO-RTO: 0 /100 WBCS
PH UR STRIP.AUTO: 5 [PH]
PLATELET # BLD AUTO: 159 THOUSANDS/UL (ref 149–390)
PMV BLD AUTO: 10.2 FL (ref 8.9–12.7)
POTASSIUM SERPL-SCNC: 3.3 MMOL/L (ref 3.5–5.3)
PROT SERPL-MCNC: 6.7 G/DL (ref 6.4–8.2)
PROT UR STRIP-MCNC: NEGATIVE MG/DL
RBC # BLD AUTO: 3.77 MILLION/UL (ref 3.81–5.12)
RBC #/AREA URNS AUTO: ABNORMAL /HPF
RSV RNA NPH QL NAA+PROBE: NORMAL
S PYO DNA THROAT QL NAA+PROBE: NORMAL
SODIUM SERPL-SCNC: 141 MMOL/L (ref 136–145)
SP GR UR STRIP.AUTO: 1.01 (ref 1–1.03)
UROBILINOGEN UR QL STRIP.AUTO: 0.2 E.U./DL
WBC # BLD AUTO: 7.28 THOUSAND/UL (ref 4.31–10.16)
WBC #/AREA URNS AUTO: ABNORMAL /HPF

## 2020-02-21 PROCEDURE — 83735 ASSAY OF MAGNESIUM: CPT | Performed by: INTERNAL MEDICINE

## 2020-02-21 PROCEDURE — 99285 EMERGENCY DEPT VISIT HI MDM: CPT

## 2020-02-21 PROCEDURE — 36415 COLL VENOUS BLD VENIPUNCTURE: CPT | Performed by: INTERNAL MEDICINE

## 2020-02-21 PROCEDURE — 96375 TX/PRO/DX INJ NEW DRUG ADDON: CPT

## 2020-02-21 PROCEDURE — 96365 THER/PROPH/DIAG IV INF INIT: CPT

## 2020-02-21 PROCEDURE — 81001 URINALYSIS AUTO W/SCOPE: CPT | Performed by: INTERNAL MEDICINE

## 2020-02-21 PROCEDURE — 87651 STREP A DNA AMP PROBE: CPT | Performed by: INTERNAL MEDICINE

## 2020-02-21 PROCEDURE — 80053 COMPREHEN METABOLIC PANEL: CPT | Performed by: INTERNAL MEDICINE

## 2020-02-21 PROCEDURE — 85025 COMPLETE CBC W/AUTO DIFF WBC: CPT | Performed by: INTERNAL MEDICINE

## 2020-02-21 PROCEDURE — 99283 EMERGENCY DEPT VISIT LOW MDM: CPT | Performed by: EMERGENCY MEDICINE

## 2020-02-21 PROCEDURE — 96366 THER/PROPH/DIAG IV INF ADDON: CPT

## 2020-02-21 PROCEDURE — 96368 THER/DIAG CONCURRENT INF: CPT

## 2020-02-21 PROCEDURE — 87631 RESP VIRUS 3-5 TARGETS: CPT | Performed by: INTERNAL MEDICINE

## 2020-02-21 RX ORDER — MAGNESIUM SULFATE HEPTAHYDRATE 40 MG/ML
2 INJECTION, SOLUTION INTRAVENOUS ONCE
Status: COMPLETED | OUTPATIENT
Start: 2020-02-21 | End: 2020-02-21

## 2020-02-21 RX ORDER — METOCLOPRAMIDE HYDROCHLORIDE 5 MG/ML
10 INJECTION INTRAMUSCULAR; INTRAVENOUS ONCE
Status: COMPLETED | OUTPATIENT
Start: 2020-02-21 | End: 2020-02-21

## 2020-02-21 RX ORDER — DIPHENHYDRAMINE HYDROCHLORIDE 50 MG/ML
12.5 INJECTION INTRAMUSCULAR; INTRAVENOUS ONCE
Status: COMPLETED | OUTPATIENT
Start: 2020-02-21 | End: 2020-02-21

## 2020-02-21 RX ORDER — KETOROLAC TROMETHAMINE 30 MG/ML
30 INJECTION, SOLUTION INTRAMUSCULAR; INTRAVENOUS ONCE
Status: COMPLETED | OUTPATIENT
Start: 2020-02-21 | End: 2020-02-21

## 2020-02-21 RX ORDER — POTASSIUM CHLORIDE 20 MEQ/1
40 TABLET, EXTENDED RELEASE ORAL ONCE
Status: COMPLETED | OUTPATIENT
Start: 2020-02-21 | End: 2020-02-21

## 2020-02-21 RX ADMIN — KETOROLAC TROMETHAMINE 30 MG: 30 INJECTION, SOLUTION INTRAMUSCULAR at 21:14

## 2020-02-21 RX ADMIN — DEXTROSE AND SODIUM CHLORIDE 500 ML: 5; .45 INJECTION, SOLUTION INTRAVENOUS at 21:20

## 2020-02-21 RX ADMIN — POTASSIUM CHLORIDE 40 MEQ: 1500 TABLET, EXTENDED RELEASE ORAL at 22:36

## 2020-02-21 RX ADMIN — MAGNESIUM SULFATE HEPTAHYDRATE 2 G: 40 INJECTION, SOLUTION INTRAVENOUS at 21:19

## 2020-02-21 RX ADMIN — DIPHENHYDRAMINE HYDROCHLORIDE 12.5 MG: 50 INJECTION INTRAMUSCULAR; INTRAVENOUS at 21:07

## 2020-02-21 RX ADMIN — METOCLOPRAMIDE HYDROCHLORIDE 10 MG: 5 INJECTION INTRAMUSCULAR; INTRAVENOUS at 21:09

## 2020-02-22 NOTE — ED ATTENDING ATTESTATION
2/21/2020  IVerito MD, saw and evaluated the patient  I have discussed the patient with the resident/non-physician practitioner and agree with the resident's/non-physician practitioner's findings, Plan of Care, and MDM as documented in the resident's/non-physician practitioner's note, except where noted  All available labs and Radiology studies were reviewed  I was present for key portions of any procedure(s) performed by the resident/non-physician practitioner and I was immediately available to provide assistance  At this point I agree with the current assessment done in the Emergency Department  I have conducted an independent evaluation of this patient a history and physical is as follows:    21-year-old female with reported mitochondrial disorder, chronic migraines presented for evaluation of migraine headache this evening as well as some sore throat, neck pain, lower back ache, low-grade temperature at home  On exam here she seemed uncomfortable but reported that her symptoms were typical of a bad migraine  She has not had no one similar to this for the last few months since starting emgality  No focal neurologic deficits  Lab work notable only for mild hypokalemia  Her flu and strep tests were negative  Had some improvement with migraine cocktail  Asked to go home, rest     ED Course  ED Course as of Feb 22 0115   Fri Feb 21, 2020   2219 Patient reports feeling somewhat better  Plan to leave after migraine cocktail              Critical Care Time  Procedures

## 2020-02-22 NOTE — ED PROVIDER NOTES
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 and began feeling weak, sore throat with white patches, headache and stiff neck, no fevers      Asad Méndez is a 45 old female presenting to [facility] ER complaining of 1 day history of headache, neck stiffness, low back pain, sore throat, and low-grade fever  Patient feels that she has strep throat and thought she "saw some white exudate" in the back of her throat  No cough present  Patient states that she also feels that her low back pain is more like "she got kicked in the back" and is located in the low back bilaterally  Patient also rates the headache as a 10/10 is stating that the headache wrapped around her head and goes down to the base of the neck along the shoulders  Patient also noted that yesterday, her temperature was measured at 100 2° F after which content she gave herself a migraine cocktail of Benadryl, IM Toradol, and saline, which she states mildly alleviated her symptoms  Patient also complains of photophobia, neck stiffness along with neck pain  Patient denies confusion, altered mentation hematuria, abdominal, nausea, vomiting, chest pain, shortness of breath, sinus pressure, sinus tenderness  Patient has a port present that she uses to administer intravenous medications  Patient also has a G-tube in place  Past medical history is notable for a "mitochondrial disease" of unspecified etiology  Patient states that her children have both been diagnosed with complex 1 and 4 deficiency mitochondrial disease  Prior to Admission Medications   Prescriptions Last Dose Informant Patient Reported? Taking? Alpha-Lipoic Acid 100 MG CAPS  Self Yes No   Sig: Take by mouth   B Complex-Biotin-FA (TH VITAMIN B 50/B-COMPLEX PO)  Self Yes No   Sig: Take 1 tablet by mouth daily       Blood Glucose Monitoring Suppl (Poppy Naranjo) w/Device KIT  Self No No   Sig: Dispense 1 meter   COENZYME Q-10 PO  Self Yes No   CREATINE MONOHYDRATE PO  Self Yes No   Sig: Take 2 5 g by mouth   Cyanocobalamin (B-12) 1000 MCG/ML KIT  Self Yes No   Sig: Inject  as directed every 30 days  Galcanezumab-gnlm (EMGALITY) 120 MG/ML SOAJ  Self No No   Si subq injection every 30 days  L-Arginine 1000 MG TABS  Self Yes No   Sig: Take 6,000 mg by mouth 2 (two) times a day    Multiple Vitamin (MULTIVITAMIN) tablet  Self Yes No   Sig: Take 1 tablet by mouth daily  NIFEdipine (PROCARDIA XL) 30 mg 24 hr tablet   No No   Sig: Take 1 tablet (30 mg total) by mouth daily   ONETOUCH DELICA LANCETS FINE MISC  Self No No   Sig: Check BG 3x per day   ONETOUCH VERIO test strip  Self No No   Sig: Check BG 3x per day   Oral Vehicles (PCCA-PLUS) SUSP  Self Yes No   Prucalopride Succinate (Motegrity) 2 MG TABS  Self Yes No   Sig: Take by mouth daily   Riboflavin 100 MG TABS  Self Yes No   Sig: Take 100 mg by mouth   TRILYTE 420 g solution  Self Yes No   Ubiquinol Liposomal 100 MG/5ML SYRP  Self Yes No   Sig: Take 300 mg by mouth   Ubiquinol POWD  Self Yes No   acarbose (PRECOSE) 100 MG tablet   No No   Sig: Take 1 tablet (100 mg total) by mouth 3 (three) times a day with meals   acetylcysteine (MUCOMYST) 200 mg/mL nebulizer solution  Self Yes No   diphenhydrAMINE (BENADRYL) 25 mg tablet  Self Yes No   Sig: Take by mouth   docusate sodium (COLACE) 100 mg capsule  Self Yes No   Sig: Take 200 mg by mouth 2 (two) times a day     iron sucrose (VENOFER) 20 mg/mL  Self Yes No   Sig: Venofer 200 mg iron/10 mL intravenous solution   levOCARNitine (CARNITOR) 1 g/10 mL solution  Self Yes No   Sig: Take 100 mg by mouth 4 (four) times a day (before meals and at bedtime)   linaCLOtide 145 MCG CAPS  Self Yes No   Sig: Take 145 mcg by mouth 2 (two) times a day   midodrine (PROAMATINE) 5 mg tablet  Self No No   Sig: Take 1 tablet (5 mg total) by mouth 3 (three) times a day   pantoprazole (PROTONIX) 40 mg tablet  Self Yes No polyethylene glycol (MIRALAX) 17 g packet  Self Yes No   Sig: Take 17 g by mouth 2 (two) times a day Indications: 1 5 caps full 1-2 times/day  sucralfate (CARAFATE) 1 g/10 mL suspension  Self No No   Sig: Take 10 mL (1 g total) by mouth 4 (four) times a day Must have liquid suspension   vitamin E 100 UNIT capsule  Self Yes No   Sig: Take 78 Units by mouth daily      Facility-Administered Medications: None       Past Medical History:   Diagnosis Date    Acid reflux     Constipated     Dysautonomia ([facility] )     Esophageal abnormality     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   Gastrostomy tube in place [facility])     History of blood clots 2012    Left leg  Has IVC filter now  Occurred while on Lovenox    Iron deficiency     Malignant hyperthermia due to anesthesia     Patient's son has M H   States she needs precautions    Migraines     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[facility] )     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[place] [address] )     MTHFR (methylene THF reductase) deficiency and homocystinuria (HCC)     Muscle weakness (generalized)     Nausea     On total parenteral nutrition (TPN)     for 8 mts    PCOS (polycystic ovarian syndrome)     PONV (postoperative nausea and vomiting)     Postgastrectomy syndrome     malabsorption    Postsurgical malabsorption     Status post gastric bypass for obesity     Status post PICC central line placement     Right upper arm  Receives TPN    Stroke [facility]) [phone], [phone]    Metabolic strokes  Right hemiparesis= minor now   Vomiting     When eating       Past Surgical History:   Procedure Laterality Date    ANKLE SURGERY Left     Has plate and screws    COLONOSCOPY      COSMETIC SURGERY      X14 as child post attack by dog  Arms, legs and face    DILATION AND CURETTAGE OF UTERUS      x2    ESOPHAGOGASTRODUODENOSCOPY N/A 1/27/2016    Procedure: ESOPHAGOGASTRODUODENOSCOPY (EGD); Surgeon: Jerica Green MD;  Location: [facility];   Service:     FRACTURE SURGERY Left     Left ankle - hardware    GASTRIC BYPASS      IVC FILTER INSERTION      NISSEN FUNDOPLICATION      OVARY SURGERY Bilateral     "Drilling" due to multiple cysts- PCOS    DC EGD TRANSORAL BIOPSY SINGLE/MULTIPLE N/A 3/9/2016    Procedure: ESOPHAGOGASTRODUODENOSCOPY (EGD); Surgeon: Joshua Arellano MD;  Location: [facility]; Service: Bariatrics    DC LAP,GASTROSTOMY,W/O TUBE CONSTR N/A 4/19/2016    Procedure: INSERTION GASTROSTOMY TUBE LAPAROSCOPIC WITH INTRAOP EGD;  Surgeon: Joshua Arellano MD;  Location: [facility];  Service: Bariatrics    ULNAR NERVE TRANSPOSITION Right     WISDOM TOOTH EXTRACTION         Family History   Problem Relation Age of Onset    Mitochondrial disorder Mother     Hypertension Mother     Thyroid cancer Mother 48    Clotting disorder Mother         [facility] Cancer Mother 62        renal    Depression Father     Endocrinopathy Father     Clotting disorder Father         MTFR    Depression Brother     Narcolepsy Brother     Heart failure Maternal Grandmother 80    Coronary artery disease Maternal Grandmother     Mitochondrial disorder Son     Mitochondrial disorder Daughter     Stroke Family     Anuerysm Paternal Uncle         abdominal     Breast cancer Maternal Grandfather 55    Breast cancer Maternal Aunt 43    Heart attack Neg Hx     Arrhythmia Neg Hx     Sudden death Neg Hx         scd     I have reviewed and agree with the history as documented  Social History     Tobacco Use    Smoking status: Never Smoker    Smokeless tobacco: Never Used   Substance Use Topics    Alcohol use: No    Drug use: No        Review of Systems   Constitutional: Negative for activity change and fever  HENT: Negative for congestion  Eyes: Positive for photophobia  Respiratory: Negative for cough, chest tightness, shortness of breath and wheezing  Cardiovascular: Negative for chest pain and leg swelling     Gastrointestinal: Negative for abdominal distention, abdominal pain, constipation, diarrhea, nausea and vomiting  Genitourinary: Negative for difficulty urinating, frequency, hematuria and urgency  Musculoskeletal: Positive for back pain, neck pain and neck stiffness  Negative for arthralgias and myalgias  Skin: Negative for rash  Neurological: Positive for headaches  Negative for dizziness, weakness and numbness  Psychiatric/Behavioral: Negative for confusion  All other systems reviewed and are negative  Physical Exam  ED Triage Vitals   Temperature Pulse Respirations Blood Pressure SpO2   02/21/20 1907 02/21/20 1907 02/21/20 1907 02/21/20 1907 02/21/20 1907   97 9 °F (36 6 °C) 95 18 151/91 99 %      Temp Source Heart Rate Source Patient Position - Orthostatic VS BP Location FiO2 (%)   02/21/20 1907 02/21/20 2110 02/21/20 2110 02/21/20 2110 --   Oral Monitor Lying Right arm       Pain Score       02/21/20 2137       6             Orthostatic Vital Signs  Vitals:    02/21/20 1907 02/21/20 2110   BP: 151/91 139/94   Pulse: 95 97   Patient Position - Orthostatic VS:  Lying       Physical Exam   Constitutional: She is oriented to person, place, and time  Vital signs are normal  She appears well-developed and well-nourished  She is cooperative  Non-toxic appearance  She does not have a sickly appearance  She does not appear ill  She appears distressed (moderate)  She is not intubated  HENT:   Head: Normocephalic and atraumatic  Right Ear: Hearing normal    Left Ear: Hearing normal    Nose: Nose normal    Mouth/Throat: Uvula is midline, oropharynx is clear and moist and mucous membranes are normal  No oral lesions  No uvula swelling  No oropharyngeal exudate or tonsillar abscesses  Eyes: Conjunctivae are normal  No scleral icterus  Neck: No tracheal deviation present  Cardiovascular: Normal rate, regular rhythm, S1 normal, S2 normal, normal heart sounds, intact distal pulses and normal pulses  Exam reveals no gallop and no friction rub     No murmur heard   Pulses:       Radial pulses are 2+ on the right side, and 2+ on the left side  Dorsalis pedis pulses are 2+ on the right side, and 2+ on the left side  Pulmonary/Chest: Effort normal and breath sounds normal  No stridor  No apnea  She is not intubated  No respiratory distress  She has no decreased breath sounds  She has no wheezes  She has no rales  She exhibits no tenderness  Abdominal: Soft  Bowel sounds are normal  She exhibits no distension and no mass  There is no tenderness  Musculoskeletal: She exhibits no edema, tenderness or deformity  Lymphadenopathy:     She has no cervical adenopathy  Neurological: She is alert and oriented to person, place, and time  Skin: Skin is warm, dry and intact  She is not diaphoretic  No erythema  No pallor  Psychiatric: She has a normal mood and affect  Her speech is normal and behavior is normal  Judgment and thought content normal  Cognition and memory are normal    Vitals reviewed    Kernig and Brudzinski signs negative    ED Medications  Medications   dextrose 5 % and sodium chloride 0 45 % bolus 500 mL (0 mL Intravenous Stopped 2/21/20 2320)   ketorolac (TORADOL) injection 30 mg (30 mg Intravenous Given 2/21/20 2114)   metoclopramide (REGLAN) injection 10 mg (10 mg Intravenous Given 2/21/20 2109)   diphenhydrAMINE (BENADRYL) injection 12 5 mg (12 5 mg Intravenous Given 2/21/20 2107)   magnesium sulfate 2 g/50 mL IVPB (premix) 2 g (0 g Intravenous Stopped 2/21/20 2319)   potassium chloride (K-DUR,KLOR-CON) CR tablet 40 mEq (40 mEq Oral Given 2/21/20 2236)       Diagnostic Studies  Results Reviewed     Procedure Component Value Units Date/Time    Urine Microscopic [[id number]]  (Abnormal) Collected:  02/21/20 2122    Lab Status:  Final result Specimen:  Urine, Clean Catch Updated:  02/21/20 2141     RBC, UA None Seen /hpf      WBC, UA 2-4 /hpf      Epithelial Cells Occasional /hpf      Bacteria, UA Occasional /hpf     UA w Reflex to Microscopic w Reflex to Culture [[id number]]  (Abnormal) Collected:  02/21/20 2122    Lab Status:  Final result Specimen:  Urine, Clean Catch Updated:  02/21/20 2134     Color, UA Yellow     Clarity, UA Clear     Specific Gravity, UA 1 015     pH, UA 5 0     Leukocytes, UA Small     Nitrite, UA Negative     Protein, UA Negative mg/dl      Glucose, UA Negative mg/dl      Ketones, UA Trace mg/dl      Urobilinogen, UA 0 2 E U /dl      Bilirubin, UA Negative     Blood, UA Negative    Influenza A/B and RSV PCR [[id number]]  (Normal) Collected:  02/21/20 2032    Lab Status:  Final result Specimen:  Nose Updated:  02/21/20 2130     INFLUENZA A PCR None Detected     INFLUENZA B PCR None Detected     RSV PCR None Detected    Strep A PCR [[id number]]  (Normal) Collected:  02/21/20 2036    Lab Status:  Final result Specimen:  Throat Updated:  02/21/20 2128     STREP A PCR None Detected    Comprehensive metabolic panel [[id number]]  (Abnormal) Collected:  02/21/20 2032    Lab Status:  Final result Specimen:  Blood from Central Venous Line Updated:  02/21/20 2107     Sodium 141 mmol/L      Potassium 3 3 mmol/L      Chloride 107 mmol/L      CO2 23 mmol/L      ANION GAP 11 mmol/L      BUN 14 mg/dL      Creatinine 0 57 mg/dL      Glucose 91 mg/dL      Calcium 8 9 mg/dL      AST 12 U/L      ALT 18 U/L      Alkaline Phosphatase 74 U/L      Total Protein 6 7 g/dL      Albumin 3 4 g/dL      Total Bilirubin 0 17 mg/dL      eGFR 115 ml/min/1 73sq m     Narrative:       Patti guidelines for Chronic Kidney Disease (CKD):     Stage 1 with normal or high GFR (GFR > 90 mL/min/1 73 square meters)    Stage 2 Mild CKD (GFR = 60-89 mL/min/1 73 square meters)    Stage 3A Moderate CKD (GFR = 45-59 mL/min/1 73 square meters)    Stage 3B Moderate CKD (GFR = 30-44 mL/min/1 73 square meters)    Stage 4 Severe CKD (GFR = 15-29 mL/min/1 73 square meters)    Stage 5 End Stage CKD (GFR <15 mL/min/1 73 square meters)  Note: GFR calculation is accurate only with a steady state creatinine    Magnesium [[id number]]  (Normal) Collected:  02/21/20 2032    Lab Status:  Final result Specimen:  Blood from Central Venous Line Updated:  02/21/20 2107     Magnesium 2 0 mg/dL     CBC and differential [[id number]]  (Abnormal) Collected:  02/21/20 2032    Lab Status:  Final result Specimen:  Blood from Central Venous Line Updated:  02/21/20 2048     WBC 7 28 Thousand/uL      RBC 3 77 Million/uL      Hemoglobin 11 6 g/dL      Hematocrit 34 9 %      MCV 93 fL      MCH 30 8 pg      MCHC 33 2 g/dL      RDW 12 0 %      MPV 10 2 fL      Platelets 932 Thousands/uL      nRBC 0 /100 WBCs      Neutrophils Relative 76 %      Immat GRANS % 0 %      Lymphocytes Relative 15 %      Monocytes Relative 7 %      Eosinophils Relative 1 %      Basophils Relative 1 %      Neutrophils Absolute 5 52 Thousands/µL      Immature Grans Absolute 0 03 Thousand/uL      Lymphocytes Absolute 1 07 Thousands/µL      Monocytes Absolute 0 53 Thousand/µL      Eosinophils Absolute 0 09 Thousand/µL      Basophils Absolute 0 04 Thousands/µL                  No orders to display         Procedures  Procedures      ED Course  ED Course as of Feb 21 2338   Fri Feb 21, 2020 2013 Ordered CBC, CMP, Flu/RSV, Mg, UA w/ reflex to culture      2026 Step A pcr ordered      2027 Migraine cocktail and D5W with 1/2 NS ordered      2032 wnl   Magnesium   2032 Hypokalemia at 3 3; gave Kdur 40 meq po   Comprehensive metabolic panel(!)   0407 negative   Strep A PCR   2113 CBC showed no leukocytosis, anemia, thromocytopenia   CBC and differential(!)   2122 Small leuk, trace ketones   UA w Reflex to Microscopic w Reflex to Culture(!)   2122 2-4 WBCs   Urine Microscopic(!)   2336 Patient stable for discharge                                    MDM        Disposition  Final diagnoses:   Migraine   Hypokalemia     Time reflects when diagnosis was documented in both MDM as applicable and the Disposition within this note     Time User Action Codes Description Comment    2/21/2020 10:58 PM Martha Dawkisn Add [O23 807] Migraine     2/21/2020 10:58 PM Martha Dawkins Add [E87 6] Hypokalemia       ED Disposition     ED Disposition Condition Date/Time Comment    Discharge Stable Fri Feb 21, 2020 11:35 PM Duane Dove discharge to home/self care  Follow-up Information     Follow up With Specialties Details Why Contact Info    Irvin Peña MD Internal Medicine In 1 week  [address] [place] [address],[address]  [place] [address]  [phone]            Patient's Medications   Discharge Prescriptions    No medications on file     No discharge procedures on file  ED Provider  Attending physically available and evaluated Duane Dove I managed the patient along with the ED Attending      Electronically Signed by         Aysha Boston DO  02/21/20 0844

## 2020-02-22 NOTE — DISCHARGE INSTRUCTIONS
You were given a migraine cocktail of Toradol, Reglan, and Benadryl, along with IV fluids and magnesium to replenish your electrolytes  You were also given additional potassium for mildly decreased potassium levels in your bloodwork  Please follow up with your PCP within 1-2 weeks for further management of your migraine symptoms  Please return to ER if symptoms worsen      Thank you for allowing us to be a part of your care team

## 2020-02-24 ENCOUNTER — OFFICE VISIT (OUTPATIENT)
Dept: INTERNAL MEDICINE CLINIC | Facility: CLINIC | Age: 45
End: 2020-02-24
Payer: COMMERCIAL

## 2020-02-24 VITALS
WEIGHT: 179.8 LBS | SYSTOLIC BLOOD PRESSURE: 120 MMHG | DIASTOLIC BLOOD PRESSURE: 68 MMHG | OXYGEN SATURATION: 98 % | HEIGHT: 64 IN | HEART RATE: 70 BPM | BODY MASS INDEX: 30.7 KG/M2 | RESPIRATION RATE: 18 BRPM | TEMPERATURE: 97.8 F

## 2020-02-24 DIAGNOSIS — J01.00 ACUTE NON-RECURRENT MAXILLARY SINUSITIS: ICD-10-CM

## 2020-02-24 DIAGNOSIS — N31.9 NEUROGENIC BLADDER: ICD-10-CM

## 2020-02-24 DIAGNOSIS — J02.9 SORE THROAT: Primary | ICD-10-CM

## 2020-02-24 DIAGNOSIS — K59.04 CHRONIC IDIOPATHIC CONSTIPATION: ICD-10-CM

## 2020-02-24 PROCEDURE — 99213 OFFICE O/P EST LOW 20 MIN: CPT | Performed by: INTERNAL MEDICINE

## 2020-02-24 PROCEDURE — 3008F BODY MASS INDEX DOCD: CPT | Performed by: INTERNAL MEDICINE

## 2020-02-24 PROCEDURE — 1036F TOBACCO NON-USER: CPT | Performed by: INTERNAL MEDICINE

## 2020-02-24 PROCEDURE — 3078F DIAST BP <80 MM HG: CPT | Performed by: INTERNAL MEDICINE

## 2020-02-24 PROCEDURE — 3074F SYST BP LT 130 MM HG: CPT | Performed by: INTERNAL MEDICINE

## 2020-02-24 RX ORDER — AMOXICILLIN AND CLAVULANATE POTASSIUM 875; 125 MG/1; MG/1
1 TABLET, FILM COATED ORAL EVERY 12 HOURS SCHEDULED
Qty: 14 TABLET | Refills: 0 | Status: SHIPPED | OUTPATIENT
Start: 2020-02-24 | End: 2020-03-02

## 2020-02-24 NOTE — PROGRESS NOTES
Assessment/Plan:    Neurogenic bladder  Continue self-cath, monitor residual urine  Consider rechecking U/A end of the week if symptoms do not improve  Constipation  May start bowel regimen if no improvement with antibiotics  S/P gastric bypass  She has adjusted her fluids  Migraine without aura and without status migrainosus, not intractable  Ongoing headache, alternates Tylenol and NSAIDs  Diagnoses and all orders for this visit:    Sore throat  Comments:  Start antibiotics, continue saline gargles, Tylenol/NSAIDs prn  Orders:  -     amoxicillin-clavulanate (AUGMENTIN) 875-125 mg per tablet; Take 1 tablet by mouth every 12 (twelve) hours for 7 days    Acute non-recurrent maxillary sinusitis  Comments:  Antibiotics as planned, monitor fevers  Chronic idiopathic constipation    Neurogenic bladder          Subjective:      Patient ID: Marly Potts is a 45 y o  female  Leigh complains of a sore throat  Symptoms started several days ago, went to the ER to be evaluated  She reports that she saw white spots at the back of her throat for the past few days  She is also experiencing nasal congestion and postnasal drip, with thick green discharge  She feels her body is shutting down since symptoms started  She also complains of ongoing headache, not as severe as it was  She continues to experience low-grade fevers, most recently it was 100 9  She has been alternating Tylenol and ibuprofen with some relief  She continues to have severe sore throat, has a difficult time with swallowing  She reports severe mid and lower back pain, feels her coat kidneys are shutting down    She had noticed that her urine output has decreased  She started to self-catheterize since yesterday, noted residual urine after each urination  She has increased her fluids  She has not had a bowel movement, had planned to start her bowel regimen in the next few days    She is frustrated since she was at the ER and was not given anything to treat the sore throat and sinus infection  The following portions of the patient's history were reviewed and updated as appropriate: allergies, current medications, past medical history, past social history and problem list     Review of Systems   Constitutional: Positive for fatigue and fever  HENT: Positive for congestion, sinus pressure and sinus pain  Respiratory: Negative for cough and shortness of breath  Cardiovascular: Negative for chest pain  Gastrointestinal: Positive for constipation  Negative for nausea and vomiting  Genitourinary: Positive for decreased urine volume and difficulty urinating  Negative for dysuria  Musculoskeletal: Positive for arthralgias and myalgias  Neurological: Positive for headaches  Negative for dizziness  Objective:      /68   Pulse 70   Temp 97 8 °F (36 6 °C) (Oral)   Resp 18   Ht 5' 4" (1 626 m)   Wt 81 6 kg (179 lb 12 8 oz)   LMP  (LMP Unknown)   SpO2 98%   BMI 30 86 kg/m²          Physical Exam   Constitutional: She appears well-developed and well-nourished  HENT:   Head: Normocephalic and atraumatic  Right Ear: Tympanic membrane, external ear and ear canal normal    Left Ear: Tympanic membrane, external ear and ear canal normal    Nose: Rhinorrhea present  No mucosal edema  Right sinus exhibits maxillary sinus tenderness  Left sinus exhibits maxillary sinus tenderness  Mouth/Throat: Mucous membranes are normal  Oropharyngeal exudate and posterior oropharyngeal erythema present  No posterior oropharyngeal edema  Tonsillar exudate  Eyes: Pupils are equal, round, and reactive to light  Conjunctivae are normal    Cardiovascular: Normal rate, regular rhythm and normal heart sounds  Pulmonary/Chest: Effort normal and breath sounds normal  She has no wheezes  She has no rales  Abdominal: Bowel sounds are decreased  Lymphadenopathy:     She has no cervical adenopathy  Neurological: She is alert  Skin: Skin is warm  Nursing note and vitals reviewed  Reviewed ER records, labs

## 2020-02-24 NOTE — ASSESSMENT & PLAN NOTE
Continue self-cath, monitor residual urine  Consider rechecking U/A end of the week if symptoms do not improve

## 2020-02-26 NOTE — PROGRESS NOTES
Bariatric Follow Up Nutrition Note    Type of surgery  Gastric bypass: laparoscopic  Surgery Date: 11/11/2015  4 years  post-op  Surgeon: Dr Theresa Hernandez    Patient requested follow up appointment for review of dietary intake     Nutrition Assessment   Leigh Chavez Parish  45 y o   female  LMP  (LMP Unknown)     [facility]  Equation:  BMR:  1452 kcal per day  Estimated calories for weight maintenance : 1743 kcal per day ( sedentary )  Estimated protein needs 67-80 grams per day (1 0-1 2 grams /kg IBW )       Weight on Day of Weight Loss Surgery: 226#  Weight in (lb) to have BMI = 25: 148 2#  Pre-Op Excess Wt: 77 8  Post-Op Wt Loss: 83 6#/ 78% EBWL in 5 year(s)    Review of History and Medications   Past Medical History:   Diagnosis Date    Acid reflux     Constipated     Dysautonomia ([facility] )     Esophageal abnormality     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   Gastrostomy tube in place [facility])     History of blood clots 2012    Left leg  Has IVC filter now  Occurred while on Lovenox    Iron deficiency     Malignant hyperthermia due to anesthesia     Patient's son has M H   States she needs precautions    Migraines     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[facility] )     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[facility] )     MTHFR (methylene THF reductase) deficiency and homocystinuria (HCC)     Muscle weakness (generalized)     Nausea     On total parenteral nutrition (TPN)     for 8 mts    PCOS (polycystic ovarian syndrome)     PONV (postoperative nausea and vomiting)     Postgastrectomy syndrome     malabsorption    Postsurgical malabsorption     Status post gastric bypass for obesity     Status post PICC central line placement     Right upper arm  Receives TPN    Stroke [facility]) 7892, 2888    Metabolic strokes  Right hemiparesis= minor now      Vomiting     When eating     Past Surgical History:   Procedure Laterality Date    ANKLE SURGERY Left     Has plate and screws    COLONOSCOPY      COSMETIC SURGERY      X14 as child post attack by dog  Arms, legs and face    DILATION AND CURETTAGE OF UTERUS      x2    ESOPHAGOGASTRODUODENOSCOPY N/A 1/27/2016    Procedure: ESOPHAGOGASTRODUODENOSCOPY (EGD); Surgeon: Atif Stevens MD;  Location: [facility]; Service:     FRACTURE SURGERY Left     Left ankle - hardware    GASTRIC BYPASS      IVC FILTER INSERTION      NISSEN FUNDOPLICATION      OVARY SURGERY Bilateral     "Drilling" due to multiple cysts- PCOS    VT EGD TRANSORAL BIOPSY SINGLE/MULTIPLE N/A 3/9/2016    Procedure: ESOPHAGOGASTRODUODENOSCOPY (EGD); Surgeon: Atif Stevens MD;  Location: [facility];   Service: Bariatrics    VT LAP,GASTROSTOMY,W/O TUBE CONSTR N/A 4/19/2016    Procedure: INSERTION GASTROSTOMY TUBE LAPAROSCOPIC WITH INTRAOP EGD;  Surgeon: Atif Stevens MD;  [facility]: [facility];  Service: Bariatrics    ULNAR NERVE TRANSPOSITION Right     WISDOM TOOTH EXTRACTION       Social History     Socioeconomic History    Marital status: /Civil Union     Spouse name: Not on file    Number of children: Not on file    Years of education: Not on file    Highest education level: Not on file   Occupational History    Occupation: infusion center   Social Needs    Financial resource strain: Not on file    Food insecurity:     Worry: Not on file     Inability: Not on file    Transportation needs:     Medical: Not on file     Non-medical: Not on file   Tobacco Use    Smoking status: Never Smoker    Smokeless tobacco: Never Used   Substance and Sexual Activity    Alcohol use: No    Drug use: No    Sexual activity: Yes     Partners: Male     Birth control/protection: Male Sterilization   Lifestyle    Physical activity:     Days per week: Not on file     Minutes per session: Not on file    Stress: Not on file   Relationships    Social connections:     Talks on phone: Not on file     Gets together: Not on file     Attends Restoration service: Not on file     Active member of club or organization: Not on file     Attends meetings of clubs or organizations: Not on file     Relationship status: Not on file    Intimate partner violence:     Fear of current or ex partner: Not on file     Emotionally abused: Not on file     Physically abused: Not on file     Forced sexual activity: Not on file   Other Topics Concern    Not on file   Social History Narrative        2 children - son and daughter with mitrochondrial diseas       Current Outpatient Medications:     acarbose (PRECOSE) 100 MG tablet, Take 1 tablet (100 mg total) by mouth 3 (three) times a day with meals, Disp: 270 tablet, Rfl: 3    acetylcysteine (MUCOMYST) 200 mg/mL nebulizer solution, , Disp: , Rfl:     Alpha-Lipoic Acid 100 MG CAPS, Take by mouth, Disp: , Rfl:     amoxicillin-clavulanate (AUGMENTIN) 875-125 mg per tablet, Take 1 tablet by mouth every 12 (twelve) hours for 7 days, Disp: 14 tablet, Rfl: 0    B Complex-Biotin-FA (TH VITAMIN B 50/B-COMPLEX PO), Take 1 tablet by mouth daily  , Disp: , Rfl:     Blood Glucose Monitoring Suppl (ONETOUCH VERIO) w/Device KIT, Dispense 1 meter, Disp: 1 kit, Rfl: 1    COENZYME Q-10 PO, , Disp: , Rfl:     CREATINE MONOHYDRATE PO, Take 2 5 g by mouth, Disp: , Rfl:     Cyanocobalamin (B-12) 1000 MCG/ML KIT, Inject  as directed every 30 days  , Disp: , Rfl:     diphenhydrAMINE (BENADRYL) 25 mg tablet, Take by mouth, Disp: , Rfl:     docusate sodium (COLACE) 100 mg capsule, Take 200 mg by mouth 2 (two) times a day , Disp: , Rfl:     Galcanezumab-gnlm (EMGALITY) 120 MG/ML SOAJ, 1 subq injection every 30 days  , Disp: 1 mL, Rfl: 0    iron sucrose (VENOFER) 20 mg/mL, Venofer 200 mg iron/10 mL intravenous solution, Disp: , Rfl:     L-Arginine 1000 MG TABS, Take 6,000 mg by mouth 2 (two) times a day , Disp: , Rfl:     levOCARNitine (CARNITOR) 1 g/10 mL solution, Take 100 mg by mouth 4 (four) times a day (before meals and at bedtime), Disp: , Rfl:     linaCLOtide 145 MCG CAPS, Take 145 mcg by mouth 2 (two) times a day, Disp: , Rfl:     midodrine (PROAMATINE) 5 mg tablet, Take 1 tablet (5 mg total) by mouth 3 (three) times a day, Disp: 270 tablet, Rfl: 3    Multiple Vitamin (MULTIVITAMIN) tablet, Take 1 tablet by mouth daily  , Disp: , Rfl:     NIFEdipine (PROCARDIA XL) 30 mg 24 hr tablet, Take 1 tablet (30 mg total) by mouth daily, Disp: 30 tablet, Rfl: 2    ONETOUCH DELICA LANCETS FINE MISC, Check BG 3x per day, Disp: 300 each, Rfl: 3    ONETOUCH VERIO test strip, Check BG 3x per day, Disp: 300 each, Rfl: 1    Oral Vehicles (PCCA-PLUS) SUSP, , Disp: , Rfl:     pantoprazole (PROTONIX) 40 mg tablet, , Disp: , Rfl: 3    polyethylene glycol (MIRALAX) 17 g packet, Take 17 g by mouth 2 (two) times a day Indications: 1 5 caps full 1-2 times/day   , Disp: , Rfl:     Prucalopride Succinate (Motegrity) 2 MG TABS, Take by mouth daily, Disp: , Rfl:     Riboflavin 100 MG TABS, Take 100 mg by mouth, Disp: , Rfl:     sucralfate (CARAFATE) 1 g/10 mL suspension, Take 10 mL (1 g total) by mouth 4 (four) times a day Must have liquid suspension, Disp: 420 mL, Rfl: 5    TRILYTE 420 g solution, , Disp: , Rfl: 5    Ubiquinol Liposomal 100 MG/5ML SYRP, Take 300 mg by mouth, Disp: , Rfl:     Ubiquinol POWD, , Disp: , Rfl:     vitamin E 100 UNIT capsule, Take 78 Units by mouth daily, Disp: , Rfl:     Food Intake and Lifestyle Assessment  Patient utilizes feeding tube as needed- not currently using Elecare   Prosource 0 carb 3-4 ounces per day- as needed to hit goal of 90 grams of protein per day   600 ml nightly Elecare Jr @ 75 ml/hour   Patient also has perma cath to run saline as needed to maintain BP    Food Intake Assessment completed via usual food intake   Patient can only tolerate 7 grams of carb per meal  East multiple small portions 6 times per day  Beverage intake: water  Diet texture/stage: regular  Protein supplement: none currently   Estimated protein intake per day: 60 grams  Per day ( 10 grams per meal )  Estimated fluid intake per day: 48-64 ounces - patient also has perma cath that she uses to infuse saline as needed to keep her BP up   Meals eaten away from home: rarely   Typical meal pattern: 3 meals per day and 3 snacks per day  Eating Behaviors: Appropriate diet advancement, Appropriate portion sizes and Does not drink with meals and waits 30-minutes after meal before resuming drinking    Patient is taking her vitamins and minerals as recommended  Food allergies or intolerances: cannot tolerate more than 7 grams of carbohydrate due to severe hypoglycemia   Cultural or Christianity considerations: n/a     Physical Assessment  Nutrition Related Findings  Constipation, Nausea, Headache, Dizziness, Taste Changes and due to severe mitochondrial disease, patient has esphageal dismotility , needs to take go lightly every 2 weeks to clear out bowel due to decrease bowel motility, sometimes needs cathetar to empty her bladder   Severe hypoglycemia - is followed by endo and prescribed medication   Followed by cardiology concerning low BP     Physical Activity  Types of exercise: None  Current physical limitations: instructed to not exercise due to increase in lactic acid in muscles with out ability to move out of her muscles, so can damage her muscles     Psychosocial Assessment   Support systems: spouse spouse and children friend(s) relative(s)  Socioeconomic factors: recently found out her house is on sink hole and will need to move - both children have mitochondrial disease, son is senior does not want to move  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  Nutrition Diagnosis  Diagnosis: Altered GI function (NC-1 4)  Related to: mitochondrial  disease   As Evidenced by: esosphageal dismotility, bowel dismotilitiy      Interventions and Teaching   Patient educated on post-op nutrition guidelines  Patient educated and handouts provided    Capacity of post-surgery stomach  Diet progression  Adequate hydration  Nutrition considerations after surgery  Appropriate carbohydrate, protein, and fat intake, and food/fluid choices to maximize safe weight loss, nutrient intake, and tolerance     Education provided to: patient    Barriers to learning: No barriers identified    Readiness to change: action and monitoring    Comprehension: demonstrated understanding and verbalizes understanding     Expected Compliance: excellent    Evaluation/Monitoring   Eating pattern as discussed Body weight Lab values Bowel pattern    Goals  Eat 3 meals per day and 3 snacks per day ( 6 mini meals )      Time Spent:   1 Hour

## 2020-02-27 ENCOUNTER — OFFICE VISIT (OUTPATIENT)
Dept: BARIATRICS | Facility: CLINIC | Age: 45
End: 2020-02-27

## 2020-02-27 VITALS — HEIGHT: 64 IN | WEIGHT: 175.8 LBS | BODY MASS INDEX: 30.01 KG/M2

## 2020-02-27 DIAGNOSIS — Z98.84 S/P GASTRIC BYPASS: Primary | ICD-10-CM

## 2020-02-27 DIAGNOSIS — K91.2 POSTSURGICAL MALABSORPTION: ICD-10-CM

## 2020-02-27 PROCEDURE — RECHECK: Performed by: DIETITIAN, REGISTERED

## 2020-02-28 PROBLEM — N92.0 MENORRHAGIA WITH REGULAR CYCLE: Status: ACTIVE | Noted: 2020-02-28

## 2020-02-28 PROBLEM — D64.9 ANEMIA: Status: ACTIVE | Noted: 2020-02-28

## 2020-03-03 ENCOUNTER — HOSPITAL ENCOUNTER (OUTPATIENT)
Dept: INFUSION CENTER | Facility: CLINIC | Age: 45
Discharge: HOME | End: 2020-03-03
Payer: COMMERCIAL

## 2020-03-03 VITALS
HEART RATE: 73 BPM | TEMPERATURE: 97.5 F | RESPIRATION RATE: 18 BRPM | SYSTOLIC BLOOD PRESSURE: 129 MMHG | DIASTOLIC BLOOD PRESSURE: 68 MMHG

## 2020-03-03 DIAGNOSIS — E61.1 IRON DEFICIENCY: Primary | ICD-10-CM

## 2020-03-03 PROCEDURE — 96372 THER/PROPH/DIAG INJ SC/IM: CPT

## 2020-03-03 PROCEDURE — 96365 THER/PROPH/DIAG IV INF INIT: CPT

## 2020-03-03 RX ORDER — SODIUM CHLORIDE 9 MG/ML
20 INJECTION, SOLUTION INTRAVENOUS ONCE
Status: CANCELLED | OUTPATIENT
Start: 2020-03-31

## 2020-03-03 RX ORDER — CYANOCOBALAMIN 1000 UG/ML
1000 INJECTION INTRAMUSCULAR; SUBCUTANEOUS ONCE
Status: CANCELLED | OUTPATIENT
Start: 2020-03-31

## 2020-03-03 RX ORDER — SODIUM CHLORIDE 9 MG/ML
20 INJECTION, SOLUTION INTRAVENOUS ONCE
Status: COMPLETED | OUTPATIENT
Start: 2020-03-03 | End: 2020-03-03

## 2020-03-03 RX ORDER — CYANOCOBALAMIN 1000 UG/ML
1000 INJECTION INTRAMUSCULAR; SUBCUTANEOUS ONCE
Status: COMPLETED | OUTPATIENT
Start: 2020-03-03 | End: 2020-03-03

## 2020-03-03 RX ADMIN — CYANOCOBALAMIN 1000 MCG: 1000 INJECTION, SOLUTION INTRAMUSCULAR; SUBCUTANEOUS at 10:31

## 2020-03-03 RX ADMIN — SODIUM CHLORIDE 20 ML/HR: 0.9 INJECTION, SOLUTION INTRAVENOUS at 09:09

## 2020-03-03 RX ADMIN — IRON SUCROSE 200 MG: 20 INJECTION, SOLUTION INTRAVENOUS at 09:25

## 2020-03-03 NOTE — PROGRESS NOTES
Patient is here for venofer and   B12  She offers no complaints at this time  Patient came with port already accessed  She accessed it her self at home yesterday   Port flushes well with excellent blood return

## 2020-03-03 NOTE — PROGRESS NOTES
Patient tolerated her venofer without any adverse reactions  B 12 given in left arm and she tolerated it well   Next appointment confirmed and she declined avs

## 2020-03-10 DIAGNOSIS — E16.1 REACTIVE HYPOGLYCEMIA: Primary | ICD-10-CM

## 2020-03-10 RX ORDER — BLOOD-GLUCOSE SENSOR
EACH MISCELLANEOUS
Qty: 3 EACH | Refills: 11 | Status: SHIPPED | OUTPATIENT
Start: 2020-03-10 | End: 2021-03-11

## 2020-03-10 RX ORDER — BLOOD-GLUCOSE TRANSMITTER
EACH MISCELLANEOUS
Qty: 1 EACH | Refills: 3 | Status: SHIPPED | OUTPATIENT
Start: 2020-03-10 | End: 2021-02-10

## 2020-03-16 DIAGNOSIS — E16.2 HYPOGLYCEMIA: ICD-10-CM

## 2020-03-16 RX ORDER — ACARBOSE 100 MG/1
100 TABLET ORAL
Qty: 270 TABLET | Refills: 1 | Status: SHIPPED | OUTPATIENT
Start: 2020-03-16 | End: 2020-09-01 | Stop reason: SDUPTHER

## 2020-03-31 ENCOUNTER — HOSPITAL ENCOUNTER (OUTPATIENT)
Dept: INFUSION CENTER | Facility: CLINIC | Age: 45
Discharge: HOME | End: 2020-03-31
Payer: COMMERCIAL

## 2020-03-31 VITALS
TEMPERATURE: 97.6 F | SYSTOLIC BLOOD PRESSURE: 128 MMHG | RESPIRATION RATE: 16 BRPM | HEART RATE: 72 BPM | DIASTOLIC BLOOD PRESSURE: 77 MMHG

## 2020-03-31 DIAGNOSIS — E61.1 IRON DEFICIENCY: Primary | ICD-10-CM

## 2020-03-31 PROCEDURE — 96372 THER/PROPH/DIAG INJ SC/IM: CPT

## 2020-03-31 PROCEDURE — 96365 THER/PROPH/DIAG IV INF INIT: CPT

## 2020-03-31 RX ORDER — CYANOCOBALAMIN 1000 UG/ML
1000 INJECTION INTRAMUSCULAR; SUBCUTANEOUS ONCE
Status: CANCELLED | OUTPATIENT
Start: 2020-04-28

## 2020-03-31 RX ORDER — SODIUM CHLORIDE 9 MG/ML
20 INJECTION, SOLUTION INTRAVENOUS ONCE
Status: COMPLETED | OUTPATIENT
Start: 2020-03-31 | End: 2020-03-31

## 2020-03-31 RX ORDER — CYANOCOBALAMIN 1000 UG/ML
1000 INJECTION INTRAMUSCULAR; SUBCUTANEOUS ONCE
Status: COMPLETED | OUTPATIENT
Start: 2020-03-31 | End: 2020-03-31

## 2020-03-31 RX ORDER — SODIUM CHLORIDE 9 MG/ML
20 INJECTION, SOLUTION INTRAVENOUS ONCE
Status: CANCELLED | OUTPATIENT
Start: 2020-04-28

## 2020-03-31 RX ADMIN — SODIUM CHLORIDE 20 ML/HR: 0.9 INJECTION, SOLUTION INTRAVENOUS at 09:21

## 2020-03-31 RX ADMIN — CYANOCOBALAMIN 1000 MCG: 1000 INJECTION, SOLUTION INTRAMUSCULAR; SUBCUTANEOUS at 09:25

## 2020-03-31 RX ADMIN — IRON SUCROSE 200 MG: 20 INJECTION, SOLUTION INTRAVENOUS at 09:22

## 2020-04-08 DIAGNOSIS — I73.00 RAYNAUD'S DISEASE WITHOUT GANGRENE: ICD-10-CM

## 2020-04-08 RX ORDER — NIFEDIPINE 30 MG/1
30 TABLET, EXTENDED RELEASE ORAL DAILY
Qty: 90 TABLET | Refills: 1 | Status: SHIPPED | OUTPATIENT
Start: 2020-04-08 | End: 2020-10-05 | Stop reason: SDUPTHER

## 2020-04-16 DIAGNOSIS — E88.40 MITOCHONDRIAL METABOLISM DISORDER (HCC): Primary | ICD-10-CM

## 2020-04-16 DIAGNOSIS — I95.1 ORTHOSTATIC HYPOTENSION: ICD-10-CM

## 2020-04-16 DIAGNOSIS — K91.2 POSTSURGICAL MALABSORPTION: ICD-10-CM

## 2020-04-16 DIAGNOSIS — K91.2 HYPOGLYCEMIA AFTER GI (GASTROINTESTINAL) SURGERY: ICD-10-CM

## 2020-04-16 DIAGNOSIS — E16.1 REACTIVE HYPOGLYCEMIA: ICD-10-CM

## 2020-04-16 DIAGNOSIS — K22.4 ESOPHAGEAL DYSMOTILITY: ICD-10-CM

## 2020-04-16 DIAGNOSIS — I95.1 DYSAUTONOMIA ORTHOSTATIC HYPOTENSION SYNDROME: ICD-10-CM

## 2020-04-16 DIAGNOSIS — Z90.3 POSTGASTRECTOMY MALABSORPTION: ICD-10-CM

## 2020-04-16 DIAGNOSIS — Z98.84 S/P GASTRIC BYPASS: ICD-10-CM

## 2020-04-16 DIAGNOSIS — R13.19 ESOPHAGEAL DYSPHAGIA: ICD-10-CM

## 2020-04-16 DIAGNOSIS — K91.2 POSTGASTRECTOMY MALABSORPTION: ICD-10-CM

## 2020-04-16 DIAGNOSIS — R13.10 DYSPHAGIA, UNSPECIFIED TYPE: ICD-10-CM

## 2020-04-25 ENCOUNTER — TELEPHONE (OUTPATIENT)
Dept: INFUSION CENTER | Facility: CLINIC | Age: 45
End: 2020-04-25

## 2020-04-28 ENCOUNTER — HOSPITAL ENCOUNTER (OUTPATIENT)
Dept: INFUSION CENTER | Facility: CLINIC | Age: 45
Discharge: HOME | End: 2020-04-28
Payer: COMMERCIAL

## 2020-04-28 VITALS
RESPIRATION RATE: 16 BRPM | SYSTOLIC BLOOD PRESSURE: 127 MMHG | TEMPERATURE: 98.3 F | HEART RATE: 86 BPM | DIASTOLIC BLOOD PRESSURE: 80 MMHG

## 2020-04-28 DIAGNOSIS — E61.1 IRON DEFICIENCY: Primary | ICD-10-CM

## 2020-04-28 PROCEDURE — 96372 THER/PROPH/DIAG INJ SC/IM: CPT

## 2020-04-28 PROCEDURE — 96365 THER/PROPH/DIAG IV INF INIT: CPT

## 2020-04-28 RX ORDER — CYANOCOBALAMIN 1000 UG/ML
1000 INJECTION INTRAMUSCULAR; SUBCUTANEOUS ONCE
Status: CANCELLED | OUTPATIENT
Start: 2020-05-26

## 2020-04-28 RX ORDER — SODIUM CHLORIDE 9 MG/ML
20 INJECTION, SOLUTION INTRAVENOUS ONCE
Status: COMPLETED | OUTPATIENT
Start: 2020-04-28 | End: 2020-04-28

## 2020-04-28 RX ORDER — CYANOCOBALAMIN 1000 UG/ML
1000 INJECTION INTRAMUSCULAR; SUBCUTANEOUS ONCE
Status: COMPLETED | OUTPATIENT
Start: 2020-04-28 | End: 2020-04-28

## 2020-04-28 RX ORDER — SODIUM CHLORIDE 9 MG/ML
20 INJECTION, SOLUTION INTRAVENOUS ONCE
Status: CANCELLED | OUTPATIENT
Start: 2020-05-26

## 2020-04-28 RX ADMIN — SODIUM CHLORIDE 20 ML/HR: 0.9 INJECTION, SOLUTION INTRAVENOUS at 09:10

## 2020-04-28 RX ADMIN — IRON SUCROSE 200 MG: 20 INJECTION, SOLUTION INTRAVENOUS at 09:22

## 2020-04-28 RX ADMIN — CYANOCOBALAMIN 1000 MCG: 1000 INJECTION, SOLUTION INTRAMUSCULAR; SUBCUTANEOUS at 09:23

## 2020-05-11 ENCOUNTER — TELEPHONE (OUTPATIENT)
Dept: OBGYN CLINIC | Facility: CLINIC | Age: 45
End: 2020-05-11

## 2020-05-13 ENCOUNTER — TELEPHONE (OUTPATIENT)
Dept: HEMATOLOGY ONCOLOGY | Facility: CLINIC | Age: 45
End: 2020-05-13

## 2020-05-13 ENCOUNTER — TELEPHONE (OUTPATIENT)
Dept: LABOR AND DELIVERY | Facility: HOSPITAL | Age: 45
End: 2020-05-13

## 2020-05-19 ENCOUNTER — APPOINTMENT (OUTPATIENT)
Dept: LAB | Facility: CLINIC | Age: 45
End: 2020-05-19
Payer: COMMERCIAL

## 2020-05-19 ENCOUNTER — OFFICE VISIT (OUTPATIENT)
Dept: INTERNAL MEDICINE CLINIC | Facility: CLINIC | Age: 45
End: 2020-05-19
Payer: COMMERCIAL

## 2020-05-19 VITALS
TEMPERATURE: 97.5 F | WEIGHT: 178 LBS | SYSTOLIC BLOOD PRESSURE: 112 MMHG | DIASTOLIC BLOOD PRESSURE: 80 MMHG | OXYGEN SATURATION: 99 % | BODY MASS INDEX: 30.39 KG/M2 | HEIGHT: 64 IN | HEART RATE: 73 BPM

## 2020-05-19 DIAGNOSIS — E61.1 IRON DEFICIENCY: ICD-10-CM

## 2020-05-19 DIAGNOSIS — R33.9 URINARY RETENTION: ICD-10-CM

## 2020-05-19 DIAGNOSIS — N92.0 MENORRHAGIA WITH REGULAR CYCLE: ICD-10-CM

## 2020-05-19 DIAGNOSIS — D64.9 ANEMIA, UNSPECIFIED TYPE: ICD-10-CM

## 2020-05-19 DIAGNOSIS — D21.9 FIBROID: Primary | ICD-10-CM

## 2020-05-19 DIAGNOSIS — K21.9 GASTROESOPHAGEAL REFLUX DISEASE WITHOUT ESOPHAGITIS: ICD-10-CM

## 2020-05-19 DIAGNOSIS — E88.40 MITOCHONDRIAL DISEASE (HCC): ICD-10-CM

## 2020-05-19 LAB
ABO GROUP BLD: NORMAL
ALBUMIN SERPL BCP-MCNC: 4 G/DL (ref 3.5–5)
ALP SERPL-CCNC: 75 U/L (ref 46–116)
ALT SERPL W P-5'-P-CCNC: 23 U/L (ref 12–78)
ANION GAP SERPL CALCULATED.3IONS-SCNC: 5 MMOL/L (ref 4–13)
AST SERPL W P-5'-P-CCNC: 17 U/L (ref 5–45)
BASOPHILS # BLD AUTO: 0.03 THOUSANDS/ΜL (ref 0–0.1)
BASOPHILS NFR BLD AUTO: 1 % (ref 0–1)
BILIRUB SERPL-MCNC: 0.39 MG/DL (ref 0.2–1)
BLD GP AB SCN SERPL QL: NEGATIVE
BUN SERPL-MCNC: 14 MG/DL (ref 5–25)
CALCIUM SERPL-MCNC: 9.9 MG/DL (ref 8.3–10.1)
CHLORIDE SERPL-SCNC: 105 MMOL/L (ref 100–108)
CO2 SERPL-SCNC: 30 MMOL/L (ref 21–32)
CREAT SERPL-MCNC: 0.83 MG/DL (ref 0.6–1.3)
EOSINOPHIL # BLD AUTO: 0.08 THOUSAND/ΜL (ref 0–0.61)
EOSINOPHIL NFR BLD AUTO: 2 % (ref 0–6)
ERYTHROCYTE [DISTWIDTH] IN BLOOD BY AUTOMATED COUNT: 12 % (ref 11.6–15.1)
EST. AVERAGE GLUCOSE BLD GHB EST-MCNC: 94 MG/DL
GFR SERPL CREATININE-BSD FRML MDRD: 87 ML/MIN/1.73SQ M
GLUCOSE P FAST SERPL-MCNC: 90 MG/DL (ref 65–99)
HBA1C MFR BLD: 4.9 %
HCT VFR BLD AUTO: 43.3 % (ref 34.8–46.1)
HGB BLD-MCNC: 13.9 G/DL (ref 11.5–15.4)
IMM GRANULOCYTES # BLD AUTO: 0.01 THOUSAND/UL (ref 0–0.2)
IMM GRANULOCYTES NFR BLD AUTO: 0 % (ref 0–2)
LYMPHOCYTES # BLD AUTO: 1.09 THOUSANDS/ΜL (ref 0.6–4.47)
LYMPHOCYTES NFR BLD AUTO: 32 % (ref 14–44)
MCH RBC QN AUTO: 30.5 PG (ref 26.8–34.3)
MCHC RBC AUTO-ENTMCNC: 32.1 G/DL (ref 31.4–37.4)
MCV RBC AUTO: 95 FL (ref 82–98)
MONOCYTES # BLD AUTO: 0.26 THOUSAND/ΜL (ref 0.17–1.22)
MONOCYTES NFR BLD AUTO: 8 % (ref 4–12)
NEUTROPHILS # BLD AUTO: 1.97 THOUSANDS/ΜL (ref 1.85–7.62)
NEUTS SEG NFR BLD AUTO: 57 % (ref 43–75)
NRBC BLD AUTO-RTO: 0 /100 WBCS
PLATELET # BLD AUTO: 229 THOUSANDS/UL (ref 149–390)
PMV BLD AUTO: 10.5 FL (ref 8.9–12.7)
POTASSIUM SERPL-SCNC: 4.2 MMOL/L (ref 3.5–5.3)
PROT SERPL-MCNC: 7.8 G/DL (ref 6.4–8.2)
RBC # BLD AUTO: 4.56 MILLION/UL (ref 3.81–5.12)
RH BLD: NEGATIVE
SODIUM SERPL-SCNC: 140 MMOL/L (ref 136–145)
SPECIMEN EXPIRATION DATE: NORMAL
T4 FREE SERPL-MCNC: 0.92 NG/DL (ref 0.76–1.46)
TSH SERPL DL<=0.05 MIU/L-ACNC: 1.48 UIU/ML (ref 0.36–3.74)
WBC # BLD AUTO: 3.44 THOUSAND/UL (ref 4.31–10.16)

## 2020-05-19 PROCEDURE — 84439 ASSAY OF FREE THYROXINE: CPT

## 2020-05-19 PROCEDURE — 3008F BODY MASS INDEX DOCD: CPT | Performed by: NURSE PRACTITIONER

## 2020-05-19 PROCEDURE — U0003 INFECTIOUS AGENT DETECTION BY NUCLEIC ACID (DNA OR RNA); SEVERE ACUTE RESPIRATORY SYNDROME CORONAVIRUS 2 (SARS-COV-2) (CORONAVIRUS DISEASE [COVID-19]), AMPLIFIED PROBE TECHNIQUE, MAKING USE OF HIGH THROUGHPUT TECHNOLOGIES AS DESCRIBED BY CMS-2020-01-R: HCPCS

## 2020-05-19 PROCEDURE — 36415 COLL VENOUS BLD VENIPUNCTURE: CPT

## 2020-05-19 PROCEDURE — 86900 BLOOD TYPING SEROLOGIC ABO: CPT

## 2020-05-19 PROCEDURE — 80053 COMPREHEN METABOLIC PANEL: CPT

## 2020-05-19 PROCEDURE — 86901 BLOOD TYPING SEROLOGIC RH(D): CPT

## 2020-05-19 PROCEDURE — 3079F DIAST BP 80-89 MM HG: CPT | Performed by: NURSE PRACTITIONER

## 2020-05-19 PROCEDURE — 1036F TOBACCO NON-USER: CPT | Performed by: NURSE PRACTITIONER

## 2020-05-19 PROCEDURE — 86850 RBC ANTIBODY SCREEN: CPT

## 2020-05-19 PROCEDURE — 3074F SYST BP LT 130 MM HG: CPT | Performed by: NURSE PRACTITIONER

## 2020-05-19 PROCEDURE — 84443 ASSAY THYROID STIM HORMONE: CPT

## 2020-05-19 PROCEDURE — 83036 HEMOGLOBIN GLYCOSYLATED A1C: CPT

## 2020-05-19 PROCEDURE — 85025 COMPLETE CBC W/AUTO DIFF WBC: CPT

## 2020-05-19 PROCEDURE — 99214 OFFICE O/P EST MOD 30 MIN: CPT | Performed by: NURSE PRACTITIONER

## 2020-05-20 LAB — SARS-COV-2 RNA SPEC QL NAA+PROBE: NOT DETECTED

## 2020-05-21 ENCOUNTER — ANESTHESIA EVENT (OUTPATIENT)
Dept: PERIOP | Facility: HOSPITAL | Age: 45
End: 2020-05-21
Payer: COMMERCIAL

## 2020-05-22 ENCOUNTER — ANESTHESIA (OUTPATIENT)
Dept: PERIOP | Facility: HOSPITAL | Age: 45
End: 2020-05-22
Payer: COMMERCIAL

## 2020-05-22 ENCOUNTER — HOSPITAL ENCOUNTER (OUTPATIENT)
Facility: HOSPITAL | Age: 45
Setting detail: OUTPATIENT SURGERY
Discharge: HOME | End: 2020-05-23
Attending: OBSTETRICS & GYNECOLOGY | Admitting: OBSTETRICS & GYNECOLOGY
Payer: COMMERCIAL

## 2020-05-22 DIAGNOSIS — N92.0 MENORRHAGIA WITH REGULAR CYCLE: Primary | ICD-10-CM

## 2020-05-22 DIAGNOSIS — D64.9 ANEMIA, UNSPECIFIED TYPE: ICD-10-CM

## 2020-05-22 LAB
GLUCOSE SERPL-MCNC: 183 MG/DL (ref 65–140)
GLUCOSE SERPL-MCNC: 83 MG/DL (ref 65–140)

## 2020-05-22 PROCEDURE — 82948 REAGENT STRIP/BLOOD GLUCOSE: CPT

## 2020-05-22 PROCEDURE — 99024 POSTOP FOLLOW-UP VISIT: CPT | Performed by: OBSTETRICS & GYNECOLOGY

## 2020-05-22 PROCEDURE — 88305 TISSUE EXAM BY PATHOLOGIST: CPT | Performed by: PATHOLOGY

## 2020-05-22 PROCEDURE — 58563 HYSTEROSCOPY ABLATION: CPT | Performed by: OBSTETRICS & GYNECOLOGY

## 2020-05-22 RX ORDER — PROPOFOL 10 MG/ML
INJECTION, EMULSION INTRAVENOUS AS NEEDED
Status: DISCONTINUED | OUTPATIENT
Start: 2020-05-22 | End: 2020-05-22 | Stop reason: SURG

## 2020-05-22 RX ORDER — DEXTROSE AND SODIUM CHLORIDE 5; .45 G/100ML; G/100ML
250 INJECTION, SOLUTION INTRAVENOUS CONTINUOUS
Status: DISCONTINUED | OUTPATIENT
Start: 2020-05-22 | End: 2020-05-22

## 2020-05-22 RX ORDER — POLYETHYLENE GLYCOL 3350 17 G/17G
17 POWDER, FOR SOLUTION ORAL 2 TIMES DAILY
Status: DISCONTINUED | OUTPATIENT
Start: 2020-05-22 | End: 2020-05-23 | Stop reason: HOSPADM

## 2020-05-22 RX ORDER — KETOROLAC TROMETHAMINE 30 MG/ML
30 INJECTION, SOLUTION INTRAMUSCULAR; INTRAVENOUS ONCE
Status: COMPLETED | OUTPATIENT
Start: 2020-05-22 | End: 2020-05-22

## 2020-05-22 RX ORDER — ONDANSETRON 2 MG/ML
INJECTION INTRAMUSCULAR; INTRAVENOUS AS NEEDED
Status: DISCONTINUED | OUTPATIENT
Start: 2020-05-22 | End: 2020-05-22 | Stop reason: SURG

## 2020-05-22 RX ORDER — SODIUM CHLORIDE 9 MG/ML
175 INJECTION, SOLUTION INTRAVENOUS CONTINUOUS
Status: DISCONTINUED | OUTPATIENT
Start: 2020-05-22 | End: 2020-05-23 | Stop reason: HOSPADM

## 2020-05-22 RX ORDER — SODIUM CHLORIDE 9 MG/ML
INJECTION, SOLUTION INTRAVENOUS CONTINUOUS PRN
Status: DISCONTINUED | OUTPATIENT
Start: 2020-05-22 | End: 2020-05-22 | Stop reason: SURG

## 2020-05-22 RX ORDER — PROMETHAZINE HYDROCHLORIDE 25 MG/ML
12.5 INJECTION, SOLUTION INTRAMUSCULAR; INTRAVENOUS EVERY 6 HOURS PRN
Status: DISCONTINUED | OUTPATIENT
Start: 2020-05-22 | End: 2020-05-23 | Stop reason: HOSPADM

## 2020-05-22 RX ORDER — PROMETHAZINE HYDROCHLORIDE 25 MG/ML
12.5 INJECTION, SOLUTION INTRAMUSCULAR; INTRAVENOUS EVERY 6 HOURS PRN
Status: DISCONTINUED | OUTPATIENT
Start: 2020-05-22 | End: 2020-05-22

## 2020-05-22 RX ORDER — DEXAMETHASONE SODIUM PHOSPHATE 10 MG/ML
INJECTION, SOLUTION INTRAMUSCULAR; INTRAVENOUS AS NEEDED
Status: DISCONTINUED | OUTPATIENT
Start: 2020-05-22 | End: 2020-05-22 | Stop reason: SURG

## 2020-05-22 RX ORDER — ACETAMINOPHEN 650 MG/1
650 SUPPOSITORY RECTAL EVERY 6 HOURS PRN
Status: DISCONTINUED | OUTPATIENT
Start: 2020-05-22 | End: 2020-05-23 | Stop reason: HOSPADM

## 2020-05-22 RX ORDER — SUCRALFATE ORAL 1 G/10ML
1000 SUSPENSION ORAL 4 TIMES DAILY
Status: DISCONTINUED | OUTPATIENT
Start: 2020-05-22 | End: 2020-05-23 | Stop reason: HOSPADM

## 2020-05-22 RX ORDER — MEPERIDINE HYDROCHLORIDE 25 MG/ML
12.5 INJECTION INTRAMUSCULAR; INTRAVENOUS; SUBCUTANEOUS ONCE
Status: COMPLETED | OUTPATIENT
Start: 2020-05-22 | End: 2020-05-22

## 2020-05-22 RX ORDER — MIDAZOLAM HYDROCHLORIDE 2 MG/2ML
INJECTION, SOLUTION INTRAMUSCULAR; INTRAVENOUS AS NEEDED
Status: DISCONTINUED | OUTPATIENT
Start: 2020-05-22 | End: 2020-05-22 | Stop reason: SURG

## 2020-05-22 RX ORDER — DOCUSATE SODIUM 100 MG/1
200 CAPSULE, LIQUID FILLED ORAL 2 TIMES DAILY
Status: DISCONTINUED | OUTPATIENT
Start: 2020-05-22 | End: 2020-05-23 | Stop reason: HOSPADM

## 2020-05-22 RX ORDER — SODIUM CHLORIDE 9 MG/ML
125 INJECTION, SOLUTION INTRAVENOUS CONTINUOUS
Status: DISCONTINUED | OUTPATIENT
Start: 2020-05-22 | End: 2020-05-22

## 2020-05-22 RX ORDER — KETAMINE HCL IN NACL, ISO-OSM 100MG/10ML
SYRINGE (ML) INJECTION AS NEEDED
Status: DISCONTINUED | OUTPATIENT
Start: 2020-05-22 | End: 2020-05-22 | Stop reason: SURG

## 2020-05-22 RX ORDER — NIFEDIPINE 30 MG/1
30 TABLET, EXTENDED RELEASE ORAL DAILY
Status: DISCONTINUED | OUTPATIENT
Start: 2020-05-23 | End: 2020-05-23 | Stop reason: HOSPADM

## 2020-05-22 RX ORDER — SODIUM CHLORIDE 9 MG/ML
500 INJECTION, SOLUTION INTRAVENOUS CONTINUOUS
Status: DISCONTINUED | OUTPATIENT
Start: 2020-05-22 | End: 2020-05-22

## 2020-05-22 RX ORDER — GLYCOPYRROLATE 0.2 MG/ML
INJECTION INTRAMUSCULAR; INTRAVENOUS AS NEEDED
Status: DISCONTINUED | OUTPATIENT
Start: 2020-05-22 | End: 2020-05-22 | Stop reason: SURG

## 2020-05-22 RX ORDER — ONDANSETRON 2 MG/ML
4 INJECTION INTRAMUSCULAR; INTRAVENOUS ONCE AS NEEDED
Status: DISCONTINUED | OUTPATIENT
Start: 2020-05-22 | End: 2020-05-22 | Stop reason: HOSPADM

## 2020-05-22 RX ORDER — MAGNESIUM HYDROXIDE 1200 MG/15ML
LIQUID ORAL AS NEEDED
Status: DISCONTINUED | OUTPATIENT
Start: 2020-05-22 | End: 2020-05-22 | Stop reason: HOSPADM

## 2020-05-22 RX ORDER — MECLIZINE HYDROCHLORIDE 25 MG/1
25 TABLET ORAL ONCE
Status: COMPLETED | OUTPATIENT
Start: 2020-05-22 | End: 2020-05-22

## 2020-05-22 RX ORDER — ACETAMINOPHEN 325 MG/1
650 TABLET ORAL EVERY 6 HOURS PRN
Status: DISCONTINUED | OUTPATIENT
Start: 2020-05-22 | End: 2020-05-22

## 2020-05-22 RX ORDER — KETOROLAC TROMETHAMINE 30 MG/ML
30 INJECTION, SOLUTION INTRAMUSCULAR; INTRAVENOUS EVERY 6 HOURS PRN
Status: DISCONTINUED | OUTPATIENT
Start: 2020-05-22 | End: 2020-05-23 | Stop reason: HOSPADM

## 2020-05-22 RX ORDER — DEXTROSE AND SODIUM CHLORIDE 5; .45 G/100ML; G/100ML
50 INJECTION, SOLUTION INTRAVENOUS CONTINUOUS
Status: DISCONTINUED | OUTPATIENT
Start: 2020-05-22 | End: 2020-05-22

## 2020-05-22 RX ORDER — PROPOFOL 10 MG/ML
INJECTION, EMULSION INTRAVENOUS CONTINUOUS PRN
Status: DISCONTINUED | OUTPATIENT
Start: 2020-05-22 | End: 2020-05-22 | Stop reason: SURG

## 2020-05-22 RX ORDER — HYDROMORPHONE HCL/PF 1 MG/ML
0.5 SYRINGE (ML) INJECTION
Status: DISCONTINUED | OUTPATIENT
Start: 2020-05-22 | End: 2020-05-22 | Stop reason: HOSPADM

## 2020-05-22 RX ORDER — ACARBOSE 100 MG/1
100 TABLET ORAL
Status: DISCONTINUED | OUTPATIENT
Start: 2020-05-22 | End: 2020-05-23 | Stop reason: HOSPADM

## 2020-05-22 RX ORDER — PROMETHAZINE HYDROCHLORIDE 25 MG/ML
12.5 INJECTION, SOLUTION INTRAMUSCULAR; INTRAVENOUS ONCE
Status: COMPLETED | OUTPATIENT
Start: 2020-05-22 | End: 2020-05-22

## 2020-05-22 RX ORDER — ALBUTEROL SULFATE 2.5 MG/3ML
2.5 SOLUTION RESPIRATORY (INHALATION) ONCE AS NEEDED
Status: DISCONTINUED | OUTPATIENT
Start: 2020-05-22 | End: 2020-05-22 | Stop reason: HOSPADM

## 2020-05-22 RX ORDER — FENTANYL CITRATE 50 UG/ML
INJECTION, SOLUTION INTRAMUSCULAR; INTRAVENOUS AS NEEDED
Status: DISCONTINUED | OUTPATIENT
Start: 2020-05-22 | End: 2020-05-22 | Stop reason: SURG

## 2020-05-22 RX ORDER — ONDANSETRON 2 MG/ML
4 INJECTION INTRAMUSCULAR; INTRAVENOUS EVERY 6 HOURS PRN
Status: DISCONTINUED | OUTPATIENT
Start: 2020-05-22 | End: 2020-05-23 | Stop reason: HOSPADM

## 2020-05-22 RX ORDER — MIDODRINE HYDROCHLORIDE 5 MG/1
5 TABLET ORAL 3 TIMES DAILY
Status: DISCONTINUED | OUTPATIENT
Start: 2020-05-22 | End: 2020-05-23 | Stop reason: HOSPADM

## 2020-05-22 RX ORDER — SODIUM CHLORIDE, SODIUM LACTATE, POTASSIUM CHLORIDE, CALCIUM CHLORIDE 600; 310; 30; 20 MG/100ML; MG/100ML; MG/100ML; MG/100ML
125 INJECTION, SOLUTION INTRAVENOUS CONTINUOUS
Status: DISCONTINUED | OUTPATIENT
Start: 2020-05-22 | End: 2020-05-22

## 2020-05-22 RX ORDER — METOCLOPRAMIDE HYDROCHLORIDE 5 MG/ML
10 INJECTION INTRAMUSCULAR; INTRAVENOUS ONCE AS NEEDED
Status: DISCONTINUED | OUTPATIENT
Start: 2020-05-22 | End: 2020-05-22 | Stop reason: HOSPADM

## 2020-05-22 RX ORDER — FENTANYL CITRATE/PF 50 MCG/ML
50 SYRINGE (ML) INJECTION
Status: DISCONTINUED | OUTPATIENT
Start: 2020-05-22 | End: 2020-05-22 | Stop reason: HOSPADM

## 2020-05-22 RX ADMIN — SODIUM CHLORIDE 500 ML/HR: 0.9 INJECTION, SOLUTION INTRAVENOUS at 12:02

## 2020-05-22 RX ADMIN — Medication 20 MG: at 08:17

## 2020-05-22 RX ADMIN — Medication 20 MG: at 08:04

## 2020-05-22 RX ADMIN — PROMETHAZINE HYDROCHLORIDE 12.5 MG: 25 INJECTION INTRAMUSCULAR; INTRAVENOUS at 14:19

## 2020-05-22 RX ADMIN — KETOROLAC TROMETHAMINE 30 MG: 30 INJECTION, SOLUTION INTRAMUSCULAR at 11:10

## 2020-05-22 RX ADMIN — MIDAZOLAM HYDROCHLORIDE 2 MG: 1 INJECTION, SOLUTION INTRAMUSCULAR; INTRAVENOUS at 07:57

## 2020-05-22 RX ADMIN — PROPOFOL 50 MG: 10 INJECTION, EMULSION INTRAVENOUS at 08:06

## 2020-05-22 RX ADMIN — FENTANYL CITRATE 50 MCG: 50 INJECTION INTRAMUSCULAR; INTRAVENOUS at 08:20

## 2020-05-22 RX ADMIN — FENTANYL CITRATE 25 MCG: 50 INJECTION INTRAMUSCULAR; INTRAVENOUS at 08:36

## 2020-05-22 RX ADMIN — POLYETHYLENE GLYCOL 3350 17 G: 17 POWDER, FOR SOLUTION ORAL at 17:51

## 2020-05-22 RX ADMIN — GLYCOPYRROLATE 0.2 MG: 0.2 INJECTION, SOLUTION INTRAMUSCULAR; INTRAVENOUS at 08:04

## 2020-05-22 RX ADMIN — MIDODRINE HYDROCHLORIDE 5 MG: 5 TABLET ORAL at 17:51

## 2020-05-22 RX ADMIN — ONDANSETRON 4 MG: 2 INJECTION INTRAMUSCULAR; INTRAVENOUS at 11:19

## 2020-05-22 RX ADMIN — FENTANYL CITRATE 50 MCG: 50 INJECTION INTRAMUSCULAR; INTRAVENOUS at 08:04

## 2020-05-22 RX ADMIN — ONDANSETRON 4 MG: 2 INJECTION INTRAMUSCULAR; INTRAVENOUS at 08:10

## 2020-05-22 RX ADMIN — PROPOFOL 100 MCG/KG/MIN: 10 INJECTION, EMULSION INTRAVENOUS at 08:08

## 2020-05-22 RX ADMIN — DOCUSATE SODIUM 200 MG: 100 CAPSULE, LIQUID FILLED ORAL at 17:51

## 2020-05-22 RX ADMIN — SODIUM CHLORIDE 175 ML/HR: 0.9 INJECTION, SOLUTION INTRAVENOUS at 16:46

## 2020-05-22 RX ADMIN — SODIUM CHLORIDE: 0.9 INJECTION, SOLUTION INTRAVENOUS at 08:10

## 2020-05-22 RX ADMIN — MECLIZINE HYDROCHLORIDE 25 MG: 25 TABLET ORAL at 12:25

## 2020-05-22 RX ADMIN — LINACLOTIDE 145 MCG: 145 CAPSULE, GELATIN COATED ORAL at 21:01

## 2020-05-22 RX ADMIN — SUCRALFATE 1000 MG: 1 SUSPENSION ORAL at 22:13

## 2020-05-22 RX ADMIN — DEXTROSE AND SODIUM CHLORIDE 250 ML/HR: 5; .45 INJECTION, SOLUTION INTRAVENOUS at 07:11

## 2020-05-22 RX ADMIN — DEXAMETHASONE SODIUM PHOSPHATE 8 MG: 10 INJECTION, SOLUTION INTRAMUSCULAR; INTRAVENOUS at 08:10

## 2020-05-22 RX ADMIN — PROMETHAZINE HYDROCHLORIDE 12.5 MG: 25 INJECTION INTRAMUSCULAR; INTRAVENOUS at 22:16

## 2020-05-22 RX ADMIN — MEPERIDINE HYDROCHLORIDE 12.5 MG: 25 INJECTION INTRAMUSCULAR; INTRAVENOUS; SUBCUTANEOUS at 09:59

## 2020-05-22 RX ADMIN — SUCRALFATE 1000 MG: 1 SUSPENSION ORAL at 17:51

## 2020-05-22 RX ADMIN — SODIUM CHLORIDE 175 ML/HR: 0.9 INJECTION, SOLUTION INTRAVENOUS at 17:54

## 2020-05-22 RX ADMIN — PROPOFOL 200 MG: 10 INJECTION, EMULSION INTRAVENOUS at 08:04

## 2020-05-23 VITALS
BODY MASS INDEX: 29.88 KG/M2 | TEMPERATURE: 99.7 F | RESPIRATION RATE: 16 BRPM | WEIGHT: 175 LBS | HEART RATE: 81 BPM | HEIGHT: 64 IN | OXYGEN SATURATION: 97 % | SYSTOLIC BLOOD PRESSURE: 119 MMHG | DIASTOLIC BLOOD PRESSURE: 64 MMHG

## 2020-05-23 PROCEDURE — 99024 POSTOP FOLLOW-UP VISIT: CPT | Performed by: OBSTETRICS & GYNECOLOGY

## 2020-05-23 RX ADMIN — POLYETHYLENE GLYCOL 3350 17 G: 17 POWDER, FOR SOLUTION ORAL at 09:00

## 2020-05-23 RX ADMIN — ACARBOSE 100 MG: 100 TABLET ORAL at 09:00

## 2020-05-23 RX ADMIN — SUCRALFATE 1000 MG: 1 SUSPENSION ORAL at 09:00

## 2020-05-23 RX ADMIN — DOCUSATE SODIUM 200 MG: 100 CAPSULE, LIQUID FILLED ORAL at 09:00

## 2020-05-23 RX ADMIN — NIFEDIPINE 30 MG: 30 TABLET, FILM COATED, EXTENDED RELEASE ORAL at 09:00

## 2020-05-23 RX ADMIN — SODIUM CHLORIDE 175 ML/HR: 0.9 INJECTION, SOLUTION INTRAVENOUS at 06:00

## 2020-05-23 RX ADMIN — SODIUM CHLORIDE 175 ML/HR: 0.9 INJECTION, SOLUTION INTRAVENOUS at 00:27

## 2020-05-23 RX ADMIN — MIDODRINE HYDROCHLORIDE 5 MG: 5 TABLET ORAL at 09:00

## 2020-06-02 RX ORDER — CYANOCOBALAMIN 1000 UG/ML
1000 INJECTION INTRAMUSCULAR; SUBCUTANEOUS ONCE
Status: CANCELLED | OUTPATIENT
Start: 2020-06-04

## 2020-06-02 RX ORDER — SODIUM CHLORIDE 9 MG/ML
20 INJECTION, SOLUTION INTRAVENOUS ONCE
Status: CANCELLED | OUTPATIENT
Start: 2020-06-04

## 2020-06-04 ENCOUNTER — HOSPITAL ENCOUNTER (OUTPATIENT)
Dept: INFUSION CENTER | Facility: CLINIC | Age: 45
Discharge: HOME | End: 2020-06-04
Payer: COMMERCIAL

## 2020-06-04 VITALS — HEART RATE: 73 BPM | SYSTOLIC BLOOD PRESSURE: 128 MMHG | DIASTOLIC BLOOD PRESSURE: 75 MMHG | RESPIRATION RATE: 18 BRPM

## 2020-06-04 DIAGNOSIS — E61.1 IRON DEFICIENCY: Primary | ICD-10-CM

## 2020-06-04 PROCEDURE — 96372 THER/PROPH/DIAG INJ SC/IM: CPT

## 2020-06-04 PROCEDURE — 96365 THER/PROPH/DIAG IV INF INIT: CPT

## 2020-06-04 RX ORDER — CYANOCOBALAMIN 1000 UG/ML
1000 INJECTION INTRAMUSCULAR; SUBCUTANEOUS ONCE
Status: CANCELLED | OUTPATIENT
Start: 2020-07-02

## 2020-06-04 RX ORDER — CYANOCOBALAMIN 1000 UG/ML
1000 INJECTION INTRAMUSCULAR; SUBCUTANEOUS ONCE
Status: COMPLETED | OUTPATIENT
Start: 2020-06-04 | End: 2020-06-04

## 2020-06-04 RX ORDER — SODIUM CHLORIDE 9 MG/ML
20 INJECTION, SOLUTION INTRAVENOUS ONCE
Status: COMPLETED | OUTPATIENT
Start: 2020-06-04 | End: 2020-06-04

## 2020-06-04 RX ORDER — SODIUM CHLORIDE 9 MG/ML
20 INJECTION, SOLUTION INTRAVENOUS ONCE
Status: CANCELLED | OUTPATIENT
Start: 2020-07-02

## 2020-06-04 RX ADMIN — IRON SUCROSE 200 MG: 20 INJECTION, SOLUTION INTRAVENOUS at 14:24

## 2020-06-04 RX ADMIN — SODIUM CHLORIDE 20 ML/HR: 0.9 INJECTION, SOLUTION INTRAVENOUS at 14:22

## 2020-06-04 RX ADMIN — CYANOCOBALAMIN 1000 MCG: 1000 INJECTION, SOLUTION INTRAMUSCULAR; SUBCUTANEOUS at 14:26

## 2020-06-09 ENCOUNTER — APPOINTMENT (OUTPATIENT)
Dept: LAB | Facility: CLINIC | Age: 45
End: 2020-06-09
Payer: COMMERCIAL

## 2020-06-09 ENCOUNTER — DOCUMENTATION (OUTPATIENT)
Dept: OBGYN CLINIC | Facility: CLINIC | Age: 45
End: 2020-06-09

## 2020-06-09 DIAGNOSIS — D50.8 OTHER IRON DEFICIENCY ANEMIA: ICD-10-CM

## 2020-06-09 DIAGNOSIS — D50.9 IRON DEFICIENCY ANEMIA, UNSPECIFIED IRON DEFICIENCY ANEMIA TYPE: Primary | ICD-10-CM

## 2020-06-09 DIAGNOSIS — D50.9 IRON DEFICIENCY ANEMIA, UNSPECIFIED IRON DEFICIENCY ANEMIA TYPE: ICD-10-CM

## 2020-06-09 LAB
ALBUMIN SERPL BCP-MCNC: 3.7 G/DL (ref 3.5–5)
ALP SERPL-CCNC: 68 U/L (ref 46–116)
ALT SERPL W P-5'-P-CCNC: 19 U/L (ref 12–78)
ANION GAP SERPL CALCULATED.3IONS-SCNC: 5 MMOL/L (ref 4–13)
AST SERPL W P-5'-P-CCNC: 16 U/L (ref 5–45)
BASOPHILS # BLD AUTO: 0.04 THOUSANDS/ΜL (ref 0–0.1)
BASOPHILS NFR BLD AUTO: 1 % (ref 0–1)
BILIRUB SERPL-MCNC: 0.49 MG/DL (ref 0.2–1)
BUN SERPL-MCNC: 16 MG/DL (ref 5–25)
CALCIUM SERPL-MCNC: 9.7 MG/DL (ref 8.3–10.1)
CHLORIDE SERPL-SCNC: 107 MMOL/L (ref 100–108)
CO2 SERPL-SCNC: 29 MMOL/L (ref 21–32)
CREAT SERPL-MCNC: 0.86 MG/DL (ref 0.6–1.3)
EOSINOPHIL # BLD AUTO: 0.09 THOUSAND/ΜL (ref 0–0.61)
EOSINOPHIL NFR BLD AUTO: 3 % (ref 0–6)
ERYTHROCYTE [DISTWIDTH] IN BLOOD BY AUTOMATED COUNT: 11.9 % (ref 11.6–15.1)
FERRITIN SERPL-MCNC: 433 NG/ML (ref 8–388)
GFR SERPL CREATININE-BSD FRML MDRD: 84 ML/MIN/1.73SQ M
GLUCOSE P FAST SERPL-MCNC: 77 MG/DL (ref 65–99)
HCT VFR BLD AUTO: 41.2 % (ref 34.8–46.1)
HGB BLD-MCNC: 13.6 G/DL (ref 11.5–15.4)
IMM GRANULOCYTES # BLD AUTO: 0.01 THOUSAND/UL (ref 0–0.2)
IMM GRANULOCYTES NFR BLD AUTO: 0 % (ref 0–2)
IRON SATN MFR SERPL: 58 %
IRON SERPL-MCNC: 170 UG/DL (ref 50–170)
LYMPHOCYTES # BLD AUTO: 1.12 THOUSANDS/ΜL (ref 0.6–4.47)
LYMPHOCYTES NFR BLD AUTO: 32 % (ref 14–44)
MCH RBC QN AUTO: 31.3 PG (ref 26.8–34.3)
MCHC RBC AUTO-ENTMCNC: 33 G/DL (ref 31.4–37.4)
MCV RBC AUTO: 95 FL (ref 82–98)
MONOCYTES # BLD AUTO: 0.25 THOUSAND/ΜL (ref 0.17–1.22)
MONOCYTES NFR BLD AUTO: 7 % (ref 4–12)
NEUTROPHILS # BLD AUTO: 1.95 THOUSANDS/ΜL (ref 1.85–7.62)
NEUTS SEG NFR BLD AUTO: 57 % (ref 43–75)
NRBC BLD AUTO-RTO: 0 /100 WBCS
PLATELET # BLD AUTO: 211 THOUSANDS/UL (ref 149–390)
PMV BLD AUTO: 10.3 FL (ref 8.9–12.7)
POTASSIUM SERPL-SCNC: 4.1 MMOL/L (ref 3.5–5.3)
PROT SERPL-MCNC: 7.1 G/DL (ref 6.4–8.2)
RBC # BLD AUTO: 4.34 MILLION/UL (ref 3.81–5.12)
SODIUM SERPL-SCNC: 141 MMOL/L (ref 136–145)
TIBC SERPL-MCNC: 293 UG/DL (ref 250–450)
WBC # BLD AUTO: 3.46 THOUSAND/UL (ref 4.31–10.16)

## 2020-06-09 PROCEDURE — 83540 ASSAY OF IRON: CPT

## 2020-06-09 PROCEDURE — 83918 ORGANIC ACIDS TOTAL QUANT: CPT

## 2020-06-09 PROCEDURE — 85025 COMPLETE CBC W/AUTO DIFF WBC: CPT

## 2020-06-09 PROCEDURE — 80053 COMPREHEN METABOLIC PANEL: CPT

## 2020-06-09 PROCEDURE — 83550 IRON BINDING TEST: CPT

## 2020-06-09 PROCEDURE — 82728 ASSAY OF FERRITIN: CPT

## 2020-06-09 PROCEDURE — 36415 COLL VENOUS BLD VENIPUNCTURE: CPT

## 2020-06-12 ENCOUNTER — OFFICE VISIT (OUTPATIENT)
Dept: OBGYN CLINIC | Facility: CLINIC | Age: 45
End: 2020-06-12

## 2020-06-12 VITALS
SYSTOLIC BLOOD PRESSURE: 118 MMHG | WEIGHT: 177 LBS | BODY MASS INDEX: 30.22 KG/M2 | HEIGHT: 64 IN | DIASTOLIC BLOOD PRESSURE: 78 MMHG

## 2020-06-12 DIAGNOSIS — Z12.39 SCREENING FOR MALIGNANT NEOPLASM OF BREAST: Primary | ICD-10-CM

## 2020-06-12 PROCEDURE — 99024 POSTOP FOLLOW-UP VISIT: CPT | Performed by: OBSTETRICS & GYNECOLOGY

## 2020-06-12 PROCEDURE — 3074F SYST BP LT 130 MM HG: CPT | Performed by: OBSTETRICS & GYNECOLOGY

## 2020-06-12 PROCEDURE — 3008F BODY MASS INDEX DOCD: CPT | Performed by: OBSTETRICS & GYNECOLOGY

## 2020-06-12 PROCEDURE — 3078F DIAST BP <80 MM HG: CPT | Performed by: OBSTETRICS & GYNECOLOGY

## 2020-06-12 RX ORDER — AMINOBENZOIC ACID
POWDER (GRAM) MISCELLANEOUS
COMMUNITY
Start: 2020-06-01

## 2020-06-15 ENCOUNTER — TELEPHONE (OUTPATIENT)
Dept: NEUROLOGY | Facility: CLINIC | Age: 45
End: 2020-06-15

## 2020-06-16 ENCOUNTER — OFFICE VISIT (OUTPATIENT)
Dept: HEMATOLOGY ONCOLOGY | Facility: CLINIC | Age: 45
End: 2020-06-16
Payer: COMMERCIAL

## 2020-06-16 VITALS
WEIGHT: 177 LBS | TEMPERATURE: 97.9 F | HEIGHT: 64 IN | RESPIRATION RATE: 16 BRPM | BODY MASS INDEX: 30.22 KG/M2 | SYSTOLIC BLOOD PRESSURE: 132 MMHG | DIASTOLIC BLOOD PRESSURE: 82 MMHG | HEART RATE: 77 BPM | OXYGEN SATURATION: 99 %

## 2020-06-16 DIAGNOSIS — D50.8 OTHER IRON DEFICIENCY ANEMIA: ICD-10-CM

## 2020-06-16 DIAGNOSIS — E61.1 IRON DEFICIENCY: Primary | ICD-10-CM

## 2020-06-16 LAB
METHYLMALONATE SERPL-SCNC: 177 NMOL/L (ref 0–378)
SL AMB DISCLAIMER: NORMAL

## 2020-06-16 PROCEDURE — 1036F TOBACCO NON-USER: CPT | Performed by: INTERNAL MEDICINE

## 2020-06-16 PROCEDURE — 3079F DIAST BP 80-89 MM HG: CPT | Performed by: INTERNAL MEDICINE

## 2020-06-16 PROCEDURE — 99214 OFFICE O/P EST MOD 30 MIN: CPT | Performed by: INTERNAL MEDICINE

## 2020-06-16 PROCEDURE — 3008F BODY MASS INDEX DOCD: CPT | Performed by: INTERNAL MEDICINE

## 2020-06-16 PROCEDURE — 3075F SYST BP GE 130 - 139MM HG: CPT | Performed by: INTERNAL MEDICINE

## 2020-06-16 RX ORDER — SODIUM CHLORIDE 9 MG/ML
20 INJECTION, SOLUTION INTRAVENOUS ONCE
Status: CANCELLED | OUTPATIENT
Start: 2020-08-27

## 2020-06-16 RX ORDER — CYANOCOBALAMIN 1000 UG/ML
1000 INJECTION INTRAMUSCULAR; SUBCUTANEOUS ONCE
Status: CANCELLED | OUTPATIENT
Start: 2020-08-27

## 2020-07-07 ENCOUNTER — OFFICE VISIT (OUTPATIENT)
Dept: ENDOCRINOLOGY | Facility: CLINIC | Age: 45
End: 2020-07-07
Payer: COMMERCIAL

## 2020-07-07 VITALS
SYSTOLIC BLOOD PRESSURE: 128 MMHG | BODY MASS INDEX: 30.39 KG/M2 | HEIGHT: 64 IN | DIASTOLIC BLOOD PRESSURE: 80 MMHG | HEART RATE: 68 BPM | TEMPERATURE: 98 F | WEIGHT: 178 LBS

## 2020-07-07 DIAGNOSIS — K91.2 POSTSURGICAL MALABSORPTION: ICD-10-CM

## 2020-07-07 DIAGNOSIS — K91.2 HYPOGLYCEMIA AFTER GI (GASTROINTESTINAL) SURGERY: Primary | ICD-10-CM

## 2020-07-07 DIAGNOSIS — E21.1 HYPERPARATHYROIDISM , SECONDARY, NON-RENAL (HCC): ICD-10-CM

## 2020-07-07 PROCEDURE — 99214 OFFICE O/P EST MOD 30 MIN: CPT | Performed by: INTERNAL MEDICINE

## 2020-07-07 PROCEDURE — 95251 CONT GLUC MNTR ANALYSIS I&R: CPT | Performed by: INTERNAL MEDICINE

## 2020-07-07 PROCEDURE — 3074F SYST BP LT 130 MM HG: CPT | Performed by: INTERNAL MEDICINE

## 2020-07-07 PROCEDURE — 3079F DIAST BP 80-89 MM HG: CPT | Performed by: INTERNAL MEDICINE

## 2020-07-07 PROCEDURE — 3008F BODY MASS INDEX DOCD: CPT | Performed by: INTERNAL MEDICINE

## 2020-07-07 PROCEDURE — 1036F TOBACCO NON-USER: CPT | Performed by: INTERNAL MEDICINE

## 2020-07-07 NOTE — ASSESSMENT & PLAN NOTE
She continues to require hydration through her G-tube  Most of her calories and nutrition is coming from oral intake

## 2020-07-07 NOTE — PROGRESS NOTES
Assessment/Plan:    Postsurgical malabsorption  She continues to require hydration through her G-tube  Most of her calories and nutrition is coming from oral intake  Reactive hypoglycemia  She is having very few episodes of hypoglycemia  Continue acarbose  Hyperparathyroidism , secondary, non-renal ([facility] )  Repeat intact PTH, calcium, phosphorus and albumin  Due to hyperparathyroidism and malabsorption, it would be reasonable to check a DEXA scan  Diagnoses and all orders for this visit:    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    Hyperparathyroidism , secondary, non-renal ([facility] )  -     DXA bone density spine hip and pelvis; Future  -     PTH, intact Lab Collect Lab Collect; Future  -     Calcium Lab Collect; Future  -     Albumin Lab Collect; Future  -     Phosphorus Lab Collect; Future    Postsurgical malabsorption          Subjective:      Patient ID: Rojelio Kat is a 45 y o  female  61-year-old woman with gastric surgery who has hypoglycemia  The hypoglycemia has improved with addition of acarbose 300 mg per day  She states that her hypoglycemia occurs rarely  It does occur when she has excessive carbohydrates  She has secondary hyperparathyroidism that has been stable  Would she denies any falls or fractures  Postsurgical malabsorption is stable on and she does require hydration through her G-tube  The following portions of the patient's history were reviewed and updated as appropriate: allergies, current medications, past family history, past medical history, past social history, past surgical history and problem list     Review of Systems   Constitutional: Negative for chills and fever  Respiratory: Negative for shortness of breath  Cardiovascular: Negative for chest pain  Gastrointestinal: Negative for constipation, diarrhea, nausea and vomiting  All other systems reviewed and are negative          Objective:      /80 (BP Location: Left arm, Patient Position: Sitting, Cuff Size: Standard)   Pulse 68   Temp 98 °F (36 7 °C) (Tympanic)   Ht 5' 4" (1 626 m)   Wt 80 7 kg (178 lb)   BMI 30 55 kg/m²          Physical Exam   Constitutional: She is oriented to person, place, and time  She appears well-developed and well-nourished  No distress  HENT:   Head: Normocephalic and atraumatic  Eyes: Conjunctivae, EOM and lids are normal  Right eye exhibits no discharge  Left eye exhibits no discharge  No scleral icterus  Neck: Neck supple  No thyromegaly present  Cardiovascular: Normal rate, regular rhythm and normal heart sounds  Exam reveals no gallop and no friction rub  No murmur heard  Pulmonary/Chest: Effort normal and breath sounds normal  No respiratory distress  She has no wheezes  Abdominal: Soft  Bowel sounds are normal  She exhibits no distension  There is no tenderness  Musculoskeletal: Normal range of motion  She exhibits no edema, tenderness or deformity  Lymphadenopathy:        Head (right side): No occipital adenopathy present  Head (left side): No occipital adenopathy present  Right: No supraclavicular adenopathy present  Left: No supraclavicular adenopathy present  Neurological: She is alert and oriented to person, place, and time  No cranial nerve deficit  Skin: Skin is warm and intact  No rash noted  She is not diaphoretic  No erythema  Psychiatric: She has a normal mood and affect  Her behavior is normal    Vitals reviewed  Gabino Karimi   Device used dex com  Home use       Indication   Hypoglycemia and hypoglycemia unawareness    More than 72 hours of data was reviewed  Report to be scanned to chart       Date Range:  June 24, 2020 to July 7, 2020    Analysis of data:   Average Glucose:  102 mg/dL  SD :  19 mg/dL   Time in Target Range:  97%   Time Above Range:  1%   Time Below Range:  1%     Interpretation of data:  Her blood sugar has been stable with rare hypo and hyperglycemic episodes

## 2020-07-07 NOTE — ASSESSMENT & PLAN NOTE
Repeat intact PTH, calcium, phosphorus and albumin  Due to hyperparathyroidism and malabsorption, it would be reasonable to check a DEXA scan

## 2020-07-09 ENCOUNTER — TRANSCRIBE ORDERS (OUTPATIENT)
Dept: LAB | Facility: CLINIC | Age: 45
End: 2020-07-09

## 2020-07-09 ENCOUNTER — LAB (OUTPATIENT)
Dept: LAB | Facility: CLINIC | Age: 45
End: 2020-07-09
Payer: COMMERCIAL

## 2020-07-09 ENCOUNTER — TELEPHONE (OUTPATIENT)
Dept: ENDOCRINOLOGY | Facility: CLINIC | Age: 45
End: 2020-07-09

## 2020-07-09 DIAGNOSIS — E21.1 HYPERPARATHYROIDISM , SECONDARY, NON-RENAL (HCC): ICD-10-CM

## 2020-07-09 LAB
ALBUMIN SERPL BCP-MCNC: 3.7 G/DL (ref 3.5–5)
CALCIUM SERPL-MCNC: 9.2 MG/DL (ref 8.3–10.1)
PHOSPHATE SERPL-MCNC: 2.8 MG/DL (ref 2.7–4.5)
PTH-INTACT SERPL-MCNC: 56.2 PG/ML (ref 18.4–80.1)

## 2020-07-09 PROCEDURE — 83970 ASSAY OF PARATHORMONE: CPT

## 2020-07-09 PROCEDURE — 82040 ASSAY OF SERUM ALBUMIN: CPT

## 2020-07-09 PROCEDURE — 84100 ASSAY OF PHOSPHORUS: CPT

## 2020-07-09 PROCEDURE — 82310 ASSAY OF CALCIUM: CPT

## 2020-07-09 PROCEDURE — 36415 COLL VENOUS BLD VENIPUNCTURE: CPT

## 2020-07-09 NOTE — TELEPHONE ENCOUNTER
----- Message from Winton Mortimer, MD sent at 7/9/2020  2:06 PM EDT -----  The laboratory testing results are normal

## 2020-07-21 ENCOUNTER — TELEPHONE (OUTPATIENT)
Dept: NEUROLOGY | Facility: CLINIC | Age: 45
End: 2020-07-21

## 2020-08-15 ENCOUNTER — HOSPITAL ENCOUNTER (OUTPATIENT)
Dept: RADIOLOGY | Age: 45
Discharge: HOME | End: 2020-08-15
Payer: COMMERCIAL

## 2020-08-15 DIAGNOSIS — E21.1 HYPERPARATHYROIDISM , SECONDARY, NON-RENAL (HCC): ICD-10-CM

## 2020-08-15 PROCEDURE — 77080 DXA BONE DENSITY AXIAL: CPT

## 2020-08-18 ENCOUNTER — TELEPHONE (OUTPATIENT)
Dept: ENDOCRINOLOGY | Facility: CLINIC | Age: 45
End: 2020-08-18

## 2020-08-27 ENCOUNTER — HOSPITAL ENCOUNTER (OUTPATIENT)
Dept: INFUSION CENTER | Facility: CLINIC | Age: 45
Discharge: HOME | End: 2020-08-27
Payer: COMMERCIAL

## 2020-08-27 VITALS
SYSTOLIC BLOOD PRESSURE: 116 MMHG | HEART RATE: 86 BPM | OXYGEN SATURATION: 99 % | DIASTOLIC BLOOD PRESSURE: 78 MMHG | RESPIRATION RATE: 14 BRPM | TEMPERATURE: 97.5 F

## 2020-08-27 DIAGNOSIS — E61.1 IRON DEFICIENCY: Primary | ICD-10-CM

## 2020-08-27 PROCEDURE — 96365 THER/PROPH/DIAG IV INF INIT: CPT

## 2020-08-27 PROCEDURE — 96372 THER/PROPH/DIAG INJ SC/IM: CPT

## 2020-08-27 RX ORDER — CYANOCOBALAMIN 1000 UG/ML
1000 INJECTION INTRAMUSCULAR; SUBCUTANEOUS ONCE
Status: COMPLETED | OUTPATIENT
Start: 2020-08-27 | End: 2020-08-27

## 2020-08-27 RX ORDER — SODIUM CHLORIDE 9 MG/ML
20 INJECTION, SOLUTION INTRAVENOUS ONCE
Status: CANCELLED | OUTPATIENT
Start: 2020-11-27

## 2020-08-27 RX ORDER — SODIUM CHLORIDE 9 MG/ML
20 INJECTION, SOLUTION INTRAVENOUS ONCE
Status: COMPLETED | OUTPATIENT
Start: 2020-08-27 | End: 2020-08-27

## 2020-08-27 RX ORDER — CYANOCOBALAMIN 1000 UG/ML
1000 INJECTION INTRAMUSCULAR; SUBCUTANEOUS ONCE
Status: CANCELLED | OUTPATIENT
Start: 2020-11-27

## 2020-08-27 RX ADMIN — IRON SUCROSE 200 MG: 20 INJECTION, SOLUTION INTRAVENOUS at 14:43

## 2020-08-27 RX ADMIN — SODIUM CHLORIDE 20 ML/HR: 0.9 INJECTION, SOLUTION INTRAVENOUS at 14:43

## 2020-08-27 RX ADMIN — CYANOCOBALAMIN 1000 MCG: 1000 INJECTION, SOLUTION INTRAMUSCULAR; SUBCUTANEOUS at 14:43

## 2020-08-27 NOTE — PROGRESS NOTES
Pt here for venofer infusion and vitamin b12 injection  Pt offers no complaints, resting comfortably  Vitals stable  Treatment tolerated well without any adverse reactions  Aware of next appointments   Declines AVS

## 2020-09-01 DIAGNOSIS — E16.1 REACTIVE HYPOGLYCEMIA: ICD-10-CM

## 2020-09-01 DIAGNOSIS — G43.719 INTRACTABLE CHRONIC MIGRAINE WITHOUT AURA AND WITHOUT STATUS MIGRAINOSUS: ICD-10-CM

## 2020-09-01 DIAGNOSIS — E16.2 HYPOGLYCEMIA: ICD-10-CM

## 2020-09-02 DIAGNOSIS — E16.1 REACTIVE HYPOGLYCEMIA: ICD-10-CM

## 2020-09-02 RX ORDER — BLOOD SUGAR DIAGNOSTIC
STRIP MISCELLANEOUS
Qty: 300 EACH | Refills: 0 | OUTPATIENT
Start: 2020-09-02

## 2020-09-02 RX ORDER — ACARBOSE 100 MG/1
100 TABLET ORAL
Qty: 270 TABLET | Refills: 0 | Status: SHIPPED | OUTPATIENT
Start: 2020-09-02 | End: 2020-12-01 | Stop reason: SDUPTHER

## 2020-09-03 RX ORDER — BLOOD SUGAR DIAGNOSTIC
1 STRIP MISCELLANEOUS 3 TIMES DAILY
Qty: 300 EACH | Refills: 1 | Status: SHIPPED | OUTPATIENT
Start: 2020-09-03 | End: 2022-02-24 | Stop reason: ALTCHOICE

## 2020-09-03 RX ORDER — GALCANEZUMAB 120 MG/ML
INJECTION, SOLUTION SUBCUTANEOUS
Qty: 1 ML | Refills: 11 | Status: SHIPPED | OUTPATIENT
Start: 2020-09-03 | End: 2022-02-24

## 2020-09-03 RX ORDER — LANCETS 33 GAUGE
EACH MISCELLANEOUS 3 TIMES DAILY
Qty: 300 EACH | Refills: 1 | Status: SHIPPED | OUTPATIENT
Start: 2020-09-03 | End: 2022-02-24 | Stop reason: ALTCHOICE

## 2020-09-15 ENCOUNTER — OFFICE VISIT (OUTPATIENT)
Dept: DERMATOLOGY | Facility: CLINIC | Age: 45
End: 2020-09-15
Payer: COMMERCIAL

## 2020-09-15 VITALS — BODY MASS INDEX: 30.39 KG/M2 | TEMPERATURE: 98.8 F | HEIGHT: 64 IN | WEIGHT: 178 LBS

## 2020-09-15 DIAGNOSIS — L72.0 EPIDERMAL INCLUSION CYST: ICD-10-CM

## 2020-09-15 DIAGNOSIS — Z85.820 HX OF MELANOMA OF SKIN: ICD-10-CM

## 2020-09-15 DIAGNOSIS — D48.5 NEOPLASM OF UNCERTAIN BEHAVIOR OF SKIN: Primary | ICD-10-CM

## 2020-09-15 DIAGNOSIS — D22.9 MULTIPLE NEVI: ICD-10-CM

## 2020-09-15 PROCEDURE — 11102 TANGNTL BX SKIN SINGLE LES: CPT | Performed by: STUDENT IN AN ORGANIZED HEALTH CARE EDUCATION/TRAINING PROGRAM

## 2020-09-15 PROCEDURE — 88342 IMHCHEM/IMCYTCHM 1ST ANTB: CPT | Performed by: STUDENT IN AN ORGANIZED HEALTH CARE EDUCATION/TRAINING PROGRAM

## 2020-09-15 PROCEDURE — 88305 TISSUE EXAM BY PATHOLOGIST: CPT | Performed by: STUDENT IN AN ORGANIZED HEALTH CARE EDUCATION/TRAINING PROGRAM

## 2020-09-15 PROCEDURE — 88341 IMHCHEM/IMCYTCHM EA ADD ANTB: CPT | Performed by: STUDENT IN AN ORGANIZED HEALTH CARE EDUCATION/TRAINING PROGRAM

## 2020-09-15 PROCEDURE — 99204 OFFICE O/P NEW MOD 45 MIN: CPT | Performed by: STUDENT IN AN ORGANIZED HEALTH CARE EDUCATION/TRAINING PROGRAM

## 2020-09-15 NOTE — PATIENT INSTRUCTIONS
INFORMED CONSENT DISCUSSION AND POST-OPERATIVE INSTRUCTIONS FOR PATIENT    I   RATIONALE FOR PROCEDURE  I understand that a skin biopsy allows the Dermatologist to test a lesion or rash under the microscope to obtain a diagnosis  It usually involves numbing the area with numbing medication and removing a small piece of skin; sometimes the area will be closed with sutures  In this specific procedure, sutures are not usually needed  If any sutures are placed, then they are usually need to be removed in 2 weeks or less  I understand that my Dermatologist recommends that a skin "shave" biopsy be performed today  A local anesthetic, similar to the kind that a dentist uses when filling a cavity, will be injected with a very small needle into the skin area to be sampled  The injected skin and tissue underneath "will go to sleep and become numb so no pain should be felt afterwards  An instrument shaped like a tiny "razor blade" (shave biopsy instrument) will be used to cut a small piece of tissue and skin from the area so that a sample of tissue can be taken and examined more closely under the microscope  A slight amount of bleeding will occur, but it will be stopped with direct pressure and a pressure bandage and any other appropriate methods  I understands that a scar will form where the wound was created  Surgical ointment will be applied to help protect the wound  Sutures are not usually needed      II   RISKS AND POTENTIAL COMPLICATIONS   I understand the risks and potential complications of a skin biopsy include but are not limited to the following:   Bleeding   Infection   Pain   Scar/keloid   Skin discoloration   Incomplete Removal   Recurrence   Nerve Damage/Numbness/Loss of Function   Allergic Reaction to Anesthesia   Biopsies are diagnostic procedures and based on findings additional treatment or evaluation may be required   Loss or destruction of specimen resulting in no additional findings    My Dermatologist has explained to me the nature of the condition, the nature of the procedure, and the benefits to be reasonably expected compared with alternative approaches  My Dermatologist has discussed the likelihood of major risks or complications of this procedure including the specific risks listed above, such as bleeding, infection, and scarring/keloid  I understand that a scar is expected after this procedure  I understand that my physician cannot predict if the scar will form a "keloid," which extends beyond the borders of the wound that is created  A keloid is a thick, painful, and bumpy scar  A keloid can be difficult to treat, as it does not always respond well to therapy, which includes injecting cortisone directly into the keloid every few weeks  While this usually reduces the pain and size of the scar, it does not eliminate it  I understand that photographs may be taken before and after the procedure  These will be maintained as part of the medical providers confidential records and may not be made available to me  I further authorize the medical provider to use the photographs for teaching purposes or to illustrate scientific papers, books, or lectures if in his/her judgment, medical research, education, or science may benefit from its use  I have had an opportunity to fully inquire about the risks and benefits of this procedure and its alternatives  I have been given ample time and opportunity to ask questions and to seek a second opinion if I wished to do so  I acknowledge that there have specifically been no guarantees as to the cosmetic results from the procedure  I am aware that with any procedure there is always the possibility of an unexpected complication  III  POST-PROCEDURAL CARE (WHAT YOU WILL NEED TO DO "AFTER THE BIOPSY" TO OPTIMIZE HEALING)     Keep the area clean and dry    Try NOT to remove the bandage or get it wet for the first 24 hours      Gently clean the area and apply surgical ointment (such as Vaseline petrolatum ointment, which is available "over the counter" and not a prescription) to the biopsy site for up to 2 weeks straight  This acts to protect the wound from the outside world   Sutures are not usually placed in this procedure  If any sutures were placed, return for suture removal as instructed (generally 1 week for the face, 2 weeks for the body)   Take Acetaminophen (Tylenol) for discomfort, if no contraindications  Ibuprofen or aspirin could make bleeding worse   Call our office immediately for signs of infection: fever, chills, increased redness, warmth, tenderness, discomfort/pain, or pus or foul smell coming from the wound  WHAT TO DO IF THERE IS ANY BLEEDING? If a small amount of bleeding is noticed, place a clean cloth over the area and apply firm pressure for ten minutes  Check the wound after 10 minutes of direct pressure  If bleeding persists, try one more time for an additional 10 minutes of direct pressure on the area  If the bleeding becomes heavier or does not stop after the second attempt, or if you have any other questions about this procedure, then please call your [facility]'s Dermatologist by calling [phone] ([phone])  I hereby acknowledge that I have reviewed and verified the site with my Dermatologist and have requested and authorized my Dermatologist to proceed with the procedure

## 2020-09-15 NOTE — PROGRESS NOTES
[facility] Note     Patient Name: Cody Kendall  Encounter Date: 09/15/2020     Have you been cared for by a [facility] in the last 3 years and, if so, which one? No    · Have you traveled outside of the [address] [place] [address] in the past 3 months or outside of the [facility] area in the last 2 weeks? No     May we call your Preferred Phone number to discuss your specific medical information? Yes     May we leave a detailed message that includes your specific medical information? Yes      Today's Chief Concerns:   Concern #1:  Full body exam   Concern #2: Hx of melanoma    Past Medical History:  Have you personally ever had or currently have any of the following? · Skin cancer (such as Melanoma, Basal Cell Carcinoma, Squamous Cell Carcinoma? (If Yes, please provide more detail)- YES, Melanoma; excised at 11years old  · Eczema: No  · Psoriasis: YES, left elbow  · HIV/AIDS: No  · Hepatitis B or C: No  · Tuberculosis: No  · Systemic Immunosuppression such as Diabetes, Biologic or Immunotherapy, Chemotherapy, Organ Transplantation, Bone Marrow Transplantation (If YES, please provide more detail): No  · Radiation Treatment (If YES, please provide more detail): No  · Any other major medical conditions/concerns? 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 Hiking, baking    Family History:  Have any of your "first degree relatives" (parent, brother, sister, or child) had any of the following       · Skin cancer such as Melanoma or Merkel Cell Carcinoma or Pancreatic Cancer? YES, paternal grandfather and father hx of Melanoma and basal cell carcinoma  · Eczema, Asthma, Hay Fever or Seasonal Allergies: No  · Psoriasis or Psoriatic Arthritis: YES, father hx of psoriasis  · Do any other medical conditions seem to run in your family?   If Yes, what condition and which relatives? YES, mother hx of thyroid, renal, and carcinoid cancer    Current Medications:   (please update all dermatological medications before printing patient's AVS!)      Current Outpatient Medications:     acarbose (PRECOSE) 100 MG tablet, Take 1 tablet (100 mg total) by mouth 3 (three) times a day with meals, Disp: 270 tablet, Rfl: 0    acetylcysteine (MUCOMYST) 200 mg/mL nebulizer solution, as needed , Disp: , Rfl:     Alpha-Lipoic Acid 100 MG CAPS, Take by mouth, Disp: , Rfl:     B Complex-Biotin-FA (TH VITAMIN B 50/B-COMPLEX PO), Take 1 tablet by mouth daily  , Disp: , Rfl:     Blood Glucose Monitoring Suppl (ONETOUCH VERIO) w/Device KIT, Dispense 1 meter, Disp: 1 kit, Rfl: 1    COENZYME Q-10 PO, , Disp: , Rfl:     Continuous Blood Gluc Sensor (DEXCOM G6 SENSOR) MISC, Use as directed for CGM - Change every 10 days, Disp: 3 each, Rfl: 11    Continuous Blood Gluc Transmit (DEXCOM G6 TRANSMITTER) MISC, Use as directed for CGM - Change every 3 months, Disp: 1 each, Rfl: 3    CREATINE MONOHYDRATE PO, Take 2 5 g by mouth, Disp: , Rfl:     Cyanocobalamin (B-12) 1000 MCG/ML KIT, Inject  as directed every 30 days  , Disp: , Rfl:     diphenhydrAMINE (BENADRYL) 25 mg tablet, Take by mouth, Disp: , Rfl:     docusate sodium (COLACE) 100 mg capsule, Take 200 mg by mouth 2 (two) times a day , Disp: , Rfl:     Galcanezumab-gnlm (Emgality) 120 MG/ML SOAJ, 1 subq injection every 30 days  , Disp: 1 mL, Rfl: 11    iron sucrose (VENOFER) 20 mg/mL, Venofer 200 mg iron/10 mL intravenous solution, Disp: , Rfl:     L-Arginine 1000 MG TABS, Take 6,000 mg by mouth 2 (two) times a day , Disp: , Rfl:     linaCLOtide 145 MCG CAPS, Take 145 mcg by mouth 2 (two) times a day, Disp: , Rfl:     midodrine (PROAMATINE) 5 mg tablet, Take 1 tablet (5 mg total) by mouth 3 (three) times a day, Disp: 270 tablet, Rfl: 3    Multiple Vitamin (MULTIVITAMIN) tablet, Take 1 tablet by mouth daily  , Disp: , Rfl:     NIFEdipine (PROCARDIA XL) 30 mg 24 hr tablet, Take 1 tablet (30 mg total) by mouth daily, Disp: 90 tablet, Rfl: 1    OneTouch Delica Lancets 32S MISC, by Does not apply route 3 (three) times a day, Disp: 300 each, Rfl: 1    OneTouch Verio test strip, 1 each by Other route 3 (three) times a day, Disp: 300 each, Rfl: 1    Oral Vehicles (PCCA-PLUS) SUSP, , Disp: , Rfl:     P-Aminobenzoic Acid (PARA-AMINOBENZOIC ACID) POWD, , Disp: , Rfl:     pantoprazole (PROTONIX) 40 mg tablet, , Disp: , Rfl: 3    polyethylene glycol (MIRALAX) 17 g packet, Take 17 g by mouth 2 (two) times a day Indications: 1 5 caps full 1-2 times/day   , Disp: , Rfl:     Prucalopride Succinate (Motegrity) 2 MG TABS, Take by mouth daily, Disp: , Rfl:     Riboflavin 100 MG TABS, Take 100 mg by mouth, Disp: , Rfl:     sucralfate (CARAFATE) 1 g/10 mL suspension, Take 10 mL (1 g total) by mouth 4 (four) times a day Must have liquid suspension, Disp: 420 mL, Rfl: 5    TRILYTE 420 g solution, , Disp: , Rfl: 5    Ubiquinol Liposomal 100 MG/5ML SYRP, Take 300 mg by mouth, Disp: , Rfl:     Ubiquinol POWD, , Disp: , Rfl:     vitamin E 100 UNIT capsule, Take 78 Units by mouth daily, Disp: , Rfl:       Review of Systems:  Have you recently had or currently have any of the following? If YES, what are you doing for the problem? · Fever, chills or unintended weight loss: No  · Sudden loss or change in your vision: No  · Nausea, vomiting or blood in your stool: No  · Painful or swollen joints: No  · Wheezing or cough: No  · Changing mole or non-healing wound: No  · Nosebleeds: YES, hx of deviated septum  · Excessive sweating: No  · Easy or prolonged bleeding? No  · Over the last 2 weeks, how often have you been bothered by the following problems?   · Taking little interest or pleasure in doing things: 1 - Not at All  · Feeling down, depressed, or hopeless: 1 - Not at All  · Rapid heartbeat with epinephrine:  No    · FEMALES ONLY:    · Are you pregnant or planning to become pregnant? No  · Are you currently or planning to be nursing or breast feeding? No    · Any known allergies? Allergies   Allergen Reactions    Sulfate Other (See Comments)    Sulfa Antibiotics      Arrhythmia     Guaifenesin      Arrhythmia    Other      Malignant hyperhermia precautions  NEVER use Lacted Ringers         Physical Exam:     Was a chaperone (Derm Clinical Assistant) present throughout the entire Physical Exam? Yes     Did the Dermatology Team specifically  the patient on the importance of a Full Skin Exam to be sure that nothing is missed clinically?  Yes}  o Did the patient ultimately request or accept a Full Skin Exam?  Yes  o Did the patient specifically refuse to have the areas "under-the-bra" examined by the Dermatologist? No  o Did the patient specifically refuse to have the areas "under-the-underwear" examined by the Dermatologist? No    CONSTITUTIONAL:   Vitals:    09/15/20 0906   Temp: 98 8 °F (37 1 °C)   TempSrc: Temporal   Weight: 80 7 kg (178 lb)   Height: 5' 3 5" (1 613 m)       PSYCH: Normal mood and affect  EYES: Normal conjunctiva  ENT: Normal lips and oral mucosa  CARDIOVASCULAR: No edema  RESPIRATORY: Normal respirations  HEME/LYMPH/IMMUNO:  No regional lymphadenopathy except as noted below in "ASSESSMENT AND PLAN BY DIAGNOSIS"    SKIN:  FULL ORGAN SYSTEM EXAM  Hair, Scalp, Ears, Face Normal except as noted below in Assessment   Neck, Cervical Chain Nodes Normal except as noted below in Assessment   Right Arm/Hand/Fingers Normal except as noted below in Assessment   Left Arm/Hand/Fingers Normal except as noted below in Assessment   Chest/Breasts/Axillae Viewed areas Normal except as noted below in Assessment   Abdomen, Umbilicus Normal except as noted below in Assessment   Back/Spine Normal except as noted below in Assessment   Groin/Genitalia/Buttocks NOT EXAMINED (PT DEFERRED)   Right Leg, Foot, Toes Normal except as noted below in Assessment   Left Leg, Foot, Toes Normal except as noted below in Assessment        Assessment and Plan by Diagnosis:    History of Present Condition:     Duration:  How long has this been an issue for you?    o  Years   Location Affected:  Where on the body is this affecting you?    o  Right neck and chest area   Quality:  Is there any bleeding, pain, itch, burning/irritation, or redness associated with the skin lesion?    o  Denies   Severity:  Describe any bleeding, pain, itch, burning/irritation, or redness on a scale of 1 to 10 (with 10 being the worst)  o  1   Timing:  Does this condition seem to be there pretty constantly or do you notice it more at specific times throughout the day?    o  Constantly   Context:  Have you ever noticed that this condition seems to be associated with specific activities you do?    o  Denies   Modifying Factors:    o Anything that seems to make the condition worse?    -  Denies  o What have you tried to do to make the condition better? -  Denies   Associated Signs and Symptoms:  Does this skin lesion seem to be associated with any of the following:  o  SL AMB DERM SIGNS AND SYMPTOMS: Pus or Discharge     HISTORY OF MELANOMA    Physical Exam:   Anatomic Location Affected:  Left upper arm   Morphological Description of Scar:  Well healed scar   Year Treated: Age 7/8   TNM Classification: N/A   Suspected Recurrence: no   Regional adenopathy: no    Additional History of Present Condition:  Patient states she had melanoma excised at age [age]years old  Assessment and Plan:  Based on a thorough discussion of this condition and the management approach to it (including a comprehensive discussion of the known risks, side effects and potential benefits of treatment), the patient (family) agrees to implement the following specific plan:   Continue routine full body skin exams  What happens at follow-up?   The main purpose of follow-up is to detect recurrences early (metastatic melanoma), but it also offers an opportunity to diagnose a new primary melanoma at the first possible opportunity  A second invasive melanoma occurs in 5-10% of melanoma patients and a new melanoma in situ is diagnosed in more than 20% of melanoma patients  Our practice makes the following recommendations for follow-up for patients with invasive melanoma   At-least "monthly" self-skin examinations    Routine skin checks by a board certified dermatologist   Follow-up intervals are "every 3 months" within 2 years of a new melanoma diagnosis; "every 6 months" between 2-4 years of a new melanoma diagnosis; and "annually" after 4 years of a new melanoma diagnosis   Individual patient's needs should be considered before an appropriate follow-up is offered   Provide education and support to help the patient adjust to their illness    Follow-up appointments should include:   A check of the scar where the primary melanoma was removed   Checking the regional lymph nodes   A general skin examination   A full physical examination at least annually by your primary care physician    In those with more advanced primary disease, follow-up may include:   Blood tests   Imaging: ultrasound, X-ray, CT, MRI and PET scan  Most tests are not worthwhile for patients with stage 1 or 2 melanoma unless there are signs or symptoms of disease recurrence or metastasis  No tests are necessary for healthy patients who have remained well for five years or longer after removal of their melanoma  What is the outlook for patients with melanoma?  Melanoma in situ is cured by excision because it has no potential to spread around the body   The risk of spread and ultimate death from invasive melanoma depends on several factors, but the main one is the Breslow thickness of the melanoma at the time it was surgically removed   Metastases are rare for melanomas < 0 75 mm and the risk for tumours 0 75-1 mm thick is about 5%   The risk steadily increases with thickness so that melanomas > 4 mm have a risk of metastasis of about 40%  Melanoma is a potentially serious type of skin cancer, in which there is uncontrolled growth of melanocytes (pigment cells)  Melanoma is sometimes called malignant melanoma  Normal melanocytes are found in the basal layer of the epidermis (the outer layer of skin)  Melanocytes produce a protein called melanin, which protects skin cells by absorbing ultraviolet (UV) radiation  Melanocytes are found in equal numbers in black and white skin, but melanocytes in black skin produce much more melanin  People with dark brown or black skin are very much less likely to be damaged by UV radiation than those with white skin  MELANOCYTIC NEVI ("Moles")    Physical Exam:   Anatomic Location Affected:   Mostly on sun-exposed areas of the bilateral upper and lower extremities   Morphological Description:  Scattered, 1-4mm round to ovoid, symmetrical-appearing, even bordered, skin colored to dark brown macules/papules, mostly in sun-exposed areas   Pertinent Positives:   Pertinent Negatives: Additional History of Present Condition:  Patient denies any changes to moles  Assessment and Plan:  Based on a thorough discussion of this condition and the management approach to it (including a comprehensive discussion of the known risks, side effects and potential benefits of treatment), the patient (family) agrees to implement the following specific plan:   Continue sun protection and monitor for any changes  Melanocytic Nevi  Melanocytic nevi ("moles") are tan or brown, raised or flat areas of the skin which have an increased number of melanocytes  Melanocytes are the cells in our body which make pigment and account for skin color  Some moles are present at birth (I e , "congenital nevi"), while others come up later in life (i e , "acquired nevi")  The sun can stimulate the body to make more moles    Sunburns are not the only thing that triggers more moles  Chronic sun exposure can do it too  Clinically distinguishing a healthy mole from melanoma may be difficult, even for experienced dermatologists  The "ABCDE's" of moles have been suggested as a means of helping to alert a person to a suspicious mole and the possible increased risk of melanoma  The suggestions for raising alert are as follows:    Asymmetry: Healthy moles tend to be symmetric, while melanomas are often asymmetric  Asymmetry means if you draw a line through the mole, the two halves do not match in color, size, shape, or surface texture  Asymmetry can be a result of rapid enlargement of a mole, the development of a raised area on a previously flat lesion, scaling, ulceration, bleeding or scabbing within the mole  Any mole that starts to demonstrate "asymmetry" should be examined promptly by a board certified dermatologist      Border: Healthy moles tend to have discrete, even borders  The border of a melanoma often blends into the normal skin and does not sharply delineate the mole from normal skin  Any mole that starts to demonstrate "uneven borders" should be examined promptly by a board certified dermatologist      Color: Healthy moles tend to be one color throughout  Melanomas tend to be made up of different colors ranging from dark black, blue, white, or red  Any mole that demonstrates a color change should be examined promptly by a board certified dermatologist      Diameter: Healthy moles tend to be smaller than 0 6 cm in size; an exception are "congenital nevi" that can be larger  Melanomas tend to grow and can often be greater than 0 6 cm (1/4 of an inch, or the size of a pencil eraser)  This is only a guideline, and many normal moles may be larger than 0 6 cm without being unhealthy    Any mole that starts to change in size (small to bigger or bigger to smaller) should be examined promptly by a board certified dermatologist      Evolving: Healthy moles tend to "stay the same "  Melanomas may often show signs of change or evolution such as a change in size, shape, color, or elevation  Any mole that starts to itch, bleed, crust, burn, hurt, or ulcerate or demonstrate a change or evolution should be examined promptly by a board certified dermatologist       Dysplastic Nevi  Dysplastic moles are moles that fit the ABCDE rules of melanoma but are not identified as melanomas when examined under the microscope  They may indicate an increased risk of melanoma in that person  If there is a family history of melanoma, most experts agree that the person may be at an increased risk for developing a melanoma  Experts still do not agree on what dysplastic moles mean in patients without a personal or family history of melanoma  Dysplastic moles are usually larger than common moles and have different colors within it with irregular borders  The appearance can be very similar to a melanoma  Biopsies of dysplastic moles may show abnormalities which are different from a regular mole  Melanoma  Malignant melanoma is a type of skin cancer that can be deadly if it spreads throughout the body  The incidence of melanoma in the [place] is growing faster than any other cancer  Melanoma usually grows near the surface of the skin for a period of time, and then begins to grow deeper into the skin  Once it grows deeper into the skin, the risk of spread to other organs greatly increases  Therefore, early detection and removal of a malignant melanoma may result in a better chance at a complete cure; removal after the tumor has spread may not be as effective, leading to worse clinical outcomes such as death  The true rate of nevus transformation into a melanoma is unknown   It has been estimated that the lifetime risk for any acquired melanocytic nevus on any 21year-old individual transforming into melanoma by age [de-identified] is 0 03% (1 in 3,164) for men and 0 009% (1 in 10,800) for women  The appearance of a "new mole" remains one of the most reliable methods for identifying a malignant melanoma  Occasionally, melanomas appear as rapidly growing, blue-black, dome-shaped bumps within a previous mole or previous area of normal skin  Other times, melanomas are suspected when a mole suddenly appears or changes  Itching, burning, or pain in a pigmented lesion should increase suspicion, but most patients with early melanoma have no skin discomfort whatsoever  Melanoma can occur anywhere on the skin, including areas that are difficult for self-examination  Many melanomas are first noticed by other family members  Suspicious-looking moles may be removed for microscopic examination  You may be able to prevent death from melanoma by doing two simple things:    1  Try to avoid unnecessary sun exposure and protect your skin when it is exposed to the sun  People who live near the equator, people who have intermittent exposures to large amounts of sun, and people who have had sunburns in childhood or adolescence have an increased risk for melanoma  Sun sense and vigilant sun protection may be keys to helping to prevent melanoma  We recommend wearing UPF-rated sun protective clothing and sunglasses whenever possible and applying a moisturizer-sunscreen combination product (SPF 50+) such as Neutrogena Daily Defense to sun exposed areas of skin at least three times a day  2  Have your moles regularly examined by a board certified dermatologist AND by yourself or a family member/friend at home  We recommend that you have your moles examined at least once a year by a board certified dermatologist   Use your birthday as an annual reminder to have your "Birthday Suit" (I e , your skin) examined; it is a nice birthday gift to yourself to know that your skin is healthy appearing! Additionally, at-home self examinations may be helpful for detecting a possible melanoma    Use the Sharon we discussed and check your moles once a month at home  EPIDERMAL INCLUSION CYSTS    Physical Exam:   Anatomic Location Affected:  Right neck and chest area   Morphological Description:  0 5cm x 0 5cm SQ mobile nodule    Pertinent Positives:   Pertinent Negatives: Additional History of Present Condition:  Patient states she occasionally squeezes cyst due to it being uncomfortable  Assessment and Plan:  Based on a thorough discussion of this condition and the management approach to it (including a comprehensive discussion of the known risks, side effects and potential benefits of treatment), the patient (family) agrees to implement the following specific plan:   Refer to Dr Sylwia Jacobson for removal     What are epidermal inclusion cysts? Epidermal inclusion cysts are the most common, benign cutaneous cysts  There are many different names for epidermal inclusion cysts, including epidermoid cyst, epidermal cyst, infundibular cyst, inclusion cyst, and keratin cyst  These cysts can occur anywhere on the body and typically present as nodules directly underneath the skin  There is often a visible pore or opening in the center  The cysts are freely moveable and can range from a few millimeters to several centimeters in diameter  The center of epidermoid cysts almost always contains keratin, which has a cheesy appearance, and not sebum  They also do not originate from sebaceous glands  Therefore, epidermal inclusion cysts are not the same as sebaceous cysts  Cysts may remain stable or progressively enlarge over time  There are no reliable predictive factors to tell if an epidermal inclusion cyst will enlarge, become inflamed, or remain quiescent  Infected cysts tend to become larger, turn red, and are more noticeable to the patient  There may be accompanying pain and discomfort  What causes epidermal inclusion cysts? Epidermal inclusion cysts often appear out of the blue and are not contagious   They are due to a proliferation of epidermal cells within the dermis and are more common in men than women  They occur more frequently in patients in their 20s to 45s  Epidermal inclusion cysts by themselves are usually not inherited, but they can be hereditary in rare syndromes such as Anderson syndrome, nodular elastosis with cysts and comedones (Favre-Racouchot syndrome), and basal cell nevus syndrome (Gorlin syndrome)  Elderly patients with chronic sun-damaged skin areas have a higher likelihood of developing epidermoid cysts  They often occur in areas where hair follicles have been inflamed or repeatedly irritated are more frequent in patients with acne vulgaris  In the  period, they are called milia  Patients on BRAF inhibitors such as imiquimod and cyclosporine have a higher incidence of epidermoid cysts of the face  How do we diagnose an epidermal inclusion cyst?  Epidermoid inclusion cysts are often diagnosed by history and physical exam  There is usually no need for biopsy prior to removal   Radiographic and laboratory exams, such as ultrasound studies, are unnecessary and not typically ordered unless the practitioner suspects a genetic condition  What is the treatment for an epidermal inclusion cyst?  Inflamed, uninfected epidermal inclusion cysts rarely resolve spontaneously without therapy or surgical intervention  Treatment is not emergent unless desired by the patient  Definitive treatment is via surgical excision with walls intact  This method will prevent recurrence  This is best done when the cyst is not inflamed, to decrease the probability of rupture during surgery  - A local anesthetic will be injected around the cyst  - A small incision is made in the skin overlying the cyst, and contents are expressed  - The incision is repaired with sutures    Another option is to use a 4mm punch biopsy with cyst extraction through the defect      Incision and drainage is often needed if the cyst is infected or inflamed  If there is surrounding cellulitis, oral antibiotic therapy may be necessary  The common agents used target methicillin sensitive Staphylococcal aureus and methicillin resistant S aureus in areas of high prevalence  NEOPLASM OF UNCERTAIN BEHAVIOR OF SKIN    Physical Exam:   (Anatomic Location); (Size and Morphological Description); (Differential Diagnosis):  o Right upper mid back; 0 3cm x 0 3cm round macule; Diff diagnosis nevus vs  Atypical nevus   Pertinent Positives:   Pertinent Negatives: Additional History of Present Condition:  Patient has a hx of melanoma  Assessment and Plan:   I have discussed with the patient that a sample of skin via a "skin biopsy would be potentially helpful to further make a specific diagnosis under the microscope   Based on a thorough discussion of this condition and the management approach to it (including a comprehensive discussion of the known risks, side effects and potential benefits of treatment), the patient (family) agrees to implement the following specific plan:    o Procedure:  Skin Biopsy  After a thorough discussion of treatment options and risk/benefits/alternatives (including but not limited to local pain, scarring, dyspigmentation, blistering, possible superinfection, and inability to confirm a diagnosis via histopathology), verbal and written consent were obtained and portion of the rash was biopsied for tissue sample  See below for consent that was obtained from patient and subsequent Procedure Note  PROCEDURE SHAVE BIOPSY NOTE:     Performing Physician: Dalton Wilson Anatomic Location; Clinical Description with size (cm); Pre-Op Diagnosis:   o Right upper mid back; 0 3cm x 0 3cm round macule;  Diff diagnosis nevus vs  Atypical nevus   Post-op diagnosis: Same      Local anesthesia: 1% xylocaine with epi       Topical anesthesia: None     Hemostasis: Aluminum chloride       After obtaining informed consent  at which time there was a discussion about the purpose of biopsy  and low risks of infection and bleeding  The area was prepped and draped in the usual fashion  Anesthesia was obtained with 1% lidocaine with epinephrine  A shave biopsy to an appropriate sampling depth was obtained with a sterile blade (such as a 15-blade or DermaBlade)  The resulting wound was covered with surgical ointment and bandaged appropriately  The patient tolerated the procedure well without complications and was without signs of functional compromise  Specimen has been sent for review by Dermatopathology  Standard post-procedure care has been explained and has been included in written form within the patient's copy of Informed Consent  INFORMED CONSENT DISCUSSION AND POST-OPERATIVE INSTRUCTIONS FOR PATIENT    I   RATIONALE FOR PROCEDURE  I understand that a skin biopsy allows the Dermatologist to test a lesion or rash under the microscope to obtain a diagnosis  It usually involves numbing the area with numbing medication and removing a small piece of skin; sometimes the area will be closed with sutures  In this specific procedure, sutures are not usually needed  If any sutures are placed, then they are usually need to be removed in 2 weeks or less  I understand that my Dermatologist recommends that a skin "shave" biopsy be performed today  A local anesthetic, similar to the kind that a dentist uses when filling a cavity, will be injected with a very small needle into the skin area to be sampled  The injected skin and tissue underneath "will go to sleep and become numb so no pain should be felt afterwards  An instrument shaped like a tiny "razor blade" (shave biopsy instrument) will be used to cut a small piece of tissue and skin from the area so that a sample of tissue can be taken and examined more closely under the microscope    A slight amount of bleeding will occur, but it will be stopped with direct pressure and a pressure bandage and any other appropriate methods  I understands that a scar will form where the wound was created  Surgical ointment will be applied to help protect the wound  Sutures are not usually needed  II   RISKS AND POTENTIAL COMPLICATIONS   I understand the risks and potential complications of a skin biopsy include but are not limited to the following:   Bleeding   Infection   Pain   Scar/keloid   Skin discoloration   Incomplete Removal   Recurrence   Nerve Damage/Numbness/Loss of Function   Allergic Reaction to Anesthesia   Biopsies are diagnostic procedures and based on findings additional treatment or evaluation may be required   Loss or destruction of specimen resulting in no additional findings    My Dermatologist has explained to me the nature of the condition, the nature of the procedure, and the benefits to be reasonably expected compared with alternative approaches  My Dermatologist has discussed the likelihood of major risks or complications of this procedure including the specific risks listed above, such as bleeding, infection, and scarring/keloid  I understand that a scar is expected after this procedure  I understand that my physician cannot predict if the scar will form a "keloid," which extends beyond the borders of the wound that is created  A keloid is a thick, painful, and bumpy scar  A keloid can be difficult to treat, as it does not always respond well to therapy, which includes injecting cortisone directly into the keloid every few weeks  While this usually reduces the pain and size of the scar, it does not eliminate it  I understand that photographs may be taken before and after the procedure  These will be maintained as part of the medical providers confidential records and may not be made available to me    I further authorize the medical provider to use the photographs for teaching purposes or to illustrate scientific papers, books, or lectures if in his/her judgment, medical research, education, or science may benefit from its use  I have had an opportunity to fully inquire about the risks and benefits of this procedure and its alternatives  I have been given ample time and opportunity to ask questions and to seek a second opinion if I wished to do so  I acknowledge that there have specifically been no guarantees as to the cosmetic results from the procedure  I am aware that with any procedure there is always the possibility of an unexpected complication  III  POST-PROCEDURAL CARE (WHAT YOU WILL NEED TO DO "AFTER THE BIOPSY" TO OPTIMIZE HEALING)     Keep the area clean and dry  Try NOT to remove the bandage or get it wet for the first 24 hours   Gently clean the area and apply surgical ointment (such as Vaseline petrolatum ointment, which is available "over the counter" and not a prescription) to the biopsy site for up to 2 weeks straight  This acts to protect the wound from the outside world   Sutures are not usually placed in this procedure  If any sutures were placed, return for suture removal as instructed (generally 1 week for the face, 2 weeks for the body)   Take Acetaminophen (Tylenol) for discomfort, if no contraindications  Ibuprofen or aspirin could make bleeding worse   Call our office immediately for signs of infection: fever, chills, increased redness, warmth, tenderness, discomfort/pain, or pus or foul smell coming from the wound  WHAT TO DO IF THERE IS ANY BLEEDING? If a small amount of bleeding is noticed, place a clean cloth over the area and apply firm pressure for ten minutes  Check the wound after 10 minutes of direct pressure  If bleeding persists, try one more time for an additional 10 minutes of direct pressure on the area    If the bleeding becomes heavier or does not stop after the second attempt, or if you have any other questions about this procedure, then please call your [facility] Dermatologist by calling [phone] (SKIN)  I hereby acknowledge that I have reviewed and verified the site with my Dermatologist and have requested and authorized my Dermatologist to proceed with the procedure        Scribe Attestation    I,:   Alejandra Ryan RN am acting as a scribe while in the presence of the attending physician :        I,:   Nirali Flores MD personally performed the services described in this documentation    as scribed in my presence :

## 2020-09-16 ENCOUNTER — PATIENT MESSAGE (OUTPATIENT)
Dept: GASTROENTEROLOGY | Facility: AMBULARY SURGERY CENTER | Age: 45
End: 2020-09-16

## 2020-09-21 NOTE — RESULT ENCOUNTER NOTE
Called patient about biopsy results and informed her of diagnosis  She understands   She is aware that if lesion returns, she should notify us and come back for removal

## 2020-10-05 ENCOUNTER — TELEPHONE (OUTPATIENT)
Dept: GASTROENTEROLOGY | Facility: CLINIC | Age: 45
End: 2020-10-05

## 2020-10-05 DIAGNOSIS — I73.00 RAYNAUD'S DISEASE WITHOUT GANGRENE: ICD-10-CM

## 2020-10-05 DIAGNOSIS — K59.9 COLONIC DYSMOTILITY: Primary | ICD-10-CM

## 2020-10-05 RX ORDER — NIFEDIPINE 30 MG/1
30 TABLET, EXTENDED RELEASE ORAL DAILY
Qty: 90 TABLET | Refills: 1 | Status: SHIPPED | OUTPATIENT
Start: 2020-10-05 | End: 2020-11-10 | Stop reason: DRUGHIGH

## 2020-10-30 DIAGNOSIS — I95.89 OTHER SPECIFIED HYPOTENSION: ICD-10-CM

## 2020-10-30 RX ORDER — MIDODRINE HYDROCHLORIDE 5 MG/1
5 TABLET ORAL 3 TIMES DAILY
Qty: 270 TABLET | Refills: 0 | Status: SHIPPED | OUTPATIENT
Start: 2020-10-30 | End: 2021-01-26 | Stop reason: SDUPTHER

## 2020-11-10 ENCOUNTER — OFFICE VISIT (OUTPATIENT)
Dept: RHEUMATOLOGY | Facility: CLINIC | Age: 45
End: 2020-11-10
Payer: COMMERCIAL

## 2020-11-10 VITALS
TEMPERATURE: 98.7 F | BODY MASS INDEX: 30.39 KG/M2 | DIASTOLIC BLOOD PRESSURE: 78 MMHG | HEIGHT: 64 IN | SYSTOLIC BLOOD PRESSURE: 118 MMHG | WEIGHT: 178 LBS

## 2020-11-10 DIAGNOSIS — I73.00 RAYNAUD'S DISEASE WITHOUT GANGRENE: Primary | ICD-10-CM

## 2020-11-10 DIAGNOSIS — G56.90 NEUROPATHY OF HAND, UNSPECIFIED LATERALITY: ICD-10-CM

## 2020-11-10 DIAGNOSIS — Z79.899 LONG TERM USE OF DRUG: ICD-10-CM

## 2020-11-10 PROCEDURE — 99214 OFFICE O/P EST MOD 30 MIN: CPT | Performed by: INTERNAL MEDICINE

## 2020-11-10 RX ORDER — NIFEDIPINE 60 MG/1
60 TABLET, EXTENDED RELEASE ORAL DAILY
Qty: 90 TABLET | Refills: 1 | Status: SHIPPED | OUTPATIENT
Start: 2020-11-10 | End: 2021-05-25 | Stop reason: SDUPTHER

## 2020-11-27 ENCOUNTER — HOSPITAL ENCOUNTER (OUTPATIENT)
Dept: INFUSION CENTER | Facility: CLINIC | Age: 45
Discharge: HOME | End: 2020-11-27
Payer: COMMERCIAL

## 2020-11-27 VITALS
DIASTOLIC BLOOD PRESSURE: 74 MMHG | OXYGEN SATURATION: 99 % | TEMPERATURE: 97.4 F | RESPIRATION RATE: 16 BRPM | HEART RATE: 78 BPM | SYSTOLIC BLOOD PRESSURE: 118 MMHG

## 2020-11-27 DIAGNOSIS — D50.8 OTHER IRON DEFICIENCY ANEMIA: ICD-10-CM

## 2020-11-27 DIAGNOSIS — E61.1 IRON DEFICIENCY: Primary | ICD-10-CM

## 2020-11-27 LAB
ALBUMIN SERPL BCP-MCNC: 3.6 G/DL (ref 3.5–5)
ALP SERPL-CCNC: 67 U/L (ref 46–116)
ALT SERPL W P-5'-P-CCNC: 22 U/L (ref 12–78)
ANION GAP SERPL CALCULATED.3IONS-SCNC: 7 MMOL/L (ref 4–13)
AST SERPL W P-5'-P-CCNC: 14 U/L (ref 5–45)
BASOPHILS # BLD AUTO: 0.04 THOUSANDS/ΜL (ref 0–0.1)
BASOPHILS NFR BLD AUTO: 1 % (ref 0–1)
BILIRUB SERPL-MCNC: 0.33 MG/DL (ref 0.2–1)
BUN SERPL-MCNC: 14 MG/DL (ref 5–25)
CALCIUM SERPL-MCNC: 9.4 MG/DL (ref 8.3–10.1)
CHLORIDE SERPL-SCNC: 102 MMOL/L (ref 100–108)
CO2 SERPL-SCNC: 28 MMOL/L (ref 21–32)
CREAT SERPL-MCNC: 0.66 MG/DL (ref 0.6–1.3)
EOSINOPHIL # BLD AUTO: 0.05 THOUSAND/ΜL (ref 0–0.61)
EOSINOPHIL NFR BLD AUTO: 1 % (ref 0–6)
ERYTHROCYTE [DISTWIDTH] IN BLOOD BY AUTOMATED COUNT: 11.9 % (ref 11.6–15.1)
FERRITIN SERPL-MCNC: 370 NG/ML (ref 8–388)
GFR SERPL CREATININE-BSD FRML MDRD: 109 ML/MIN/1.73SQ M
GLUCOSE SERPL-MCNC: 104 MG/DL (ref 65–140)
HCT VFR BLD AUTO: 39.2 % (ref 34.8–46.1)
HGB BLD-MCNC: 12.8 G/DL (ref 11.5–15.4)
IMM GRANULOCYTES # BLD AUTO: 0.02 THOUSAND/UL (ref 0–0.2)
IMM GRANULOCYTES NFR BLD AUTO: 0 % (ref 0–2)
IRON SATN MFR SERPL: 35 %
IRON SERPL-MCNC: 98 UG/DL (ref 50–170)
LYMPHOCYTES # BLD AUTO: 1.3 THOUSANDS/ΜL (ref 0.6–4.47)
LYMPHOCYTES NFR BLD AUTO: 23 % (ref 14–44)
MCH RBC QN AUTO: 30.3 PG (ref 26.8–34.3)
MCHC RBC AUTO-ENTMCNC: 32.7 G/DL (ref 31.4–37.4)
MCV RBC AUTO: 93 FL (ref 82–98)
MONOCYTES # BLD AUTO: 0.31 THOUSAND/ΜL (ref 0.17–1.22)
MONOCYTES NFR BLD AUTO: 5 % (ref 4–12)
NEUTROPHILS # BLD AUTO: 4.07 THOUSANDS/ΜL (ref 1.85–7.62)
NEUTS SEG NFR BLD AUTO: 70 % (ref 43–75)
NRBC BLD AUTO-RTO: 0 /100 WBCS
PLATELET # BLD AUTO: 212 THOUSANDS/UL (ref 149–390)
PMV BLD AUTO: 10 FL (ref 8.9–12.7)
POTASSIUM SERPL-SCNC: 3.6 MMOL/L (ref 3.5–5.3)
PROT SERPL-MCNC: 6.9 G/DL (ref 6.4–8.2)
RBC # BLD AUTO: 4.22 MILLION/UL (ref 3.81–5.12)
SODIUM SERPL-SCNC: 137 MMOL/L (ref 136–145)
TIBC SERPL-MCNC: 279 UG/DL (ref 250–450)
WBC # BLD AUTO: 5.79 THOUSAND/UL (ref 4.31–10.16)

## 2020-11-27 PROCEDURE — 80053 COMPREHEN METABOLIC PANEL: CPT

## 2020-11-27 PROCEDURE — 85025 COMPLETE CBC W/AUTO DIFF WBC: CPT

## 2020-11-27 PROCEDURE — 83540 ASSAY OF IRON: CPT

## 2020-11-27 PROCEDURE — 83918 ORGANIC ACIDS TOTAL QUANT: CPT

## 2020-11-27 PROCEDURE — 96372 THER/PROPH/DIAG INJ SC/IM: CPT

## 2020-11-27 PROCEDURE — 96365 THER/PROPH/DIAG IV INF INIT: CPT

## 2020-11-27 PROCEDURE — 82728 ASSAY OF FERRITIN: CPT

## 2020-11-27 PROCEDURE — 83550 IRON BINDING TEST: CPT

## 2020-11-27 RX ORDER — SODIUM CHLORIDE 9 MG/ML
20 INJECTION, SOLUTION INTRAVENOUS ONCE
Status: COMPLETED | OUTPATIENT
Start: 2020-11-27 | End: 2020-11-27

## 2020-11-27 RX ORDER — SODIUM CHLORIDE 9 MG/ML
20 INJECTION, SOLUTION INTRAVENOUS ONCE
Status: CANCELLED | OUTPATIENT
Start: 2021-02-19

## 2020-11-27 RX ORDER — CYANOCOBALAMIN 1000 UG/ML
1000 INJECTION INTRAMUSCULAR; SUBCUTANEOUS ONCE
Status: COMPLETED | OUTPATIENT
Start: 2020-11-27 | End: 2020-11-27

## 2020-11-27 RX ORDER — CYANOCOBALAMIN 1000 UG/ML
1000 INJECTION INTRAMUSCULAR; SUBCUTANEOUS ONCE
Status: CANCELLED | OUTPATIENT
Start: 2021-02-19

## 2020-11-27 RX ADMIN — CYANOCOBALAMIN 1000 MCG: 1000 INJECTION, SOLUTION INTRAMUSCULAR; SUBCUTANEOUS at 14:59

## 2020-11-27 RX ADMIN — IRON SUCROSE 200 MG: 20 INJECTION, SOLUTION INTRAVENOUS at 14:54

## 2020-11-27 RX ADMIN — SODIUM CHLORIDE 20 ML/HR: 0.9 INJECTION, SOLUTION INTRAVENOUS at 14:50

## 2020-12-01 DIAGNOSIS — E16.2 HYPOGLYCEMIA: ICD-10-CM

## 2020-12-01 RX ORDER — ACARBOSE 100 MG/1
100 TABLET ORAL
Qty: 270 TABLET | Refills: 0 | Status: SHIPPED | OUTPATIENT
Start: 2020-12-01 | End: 2021-03-11 | Stop reason: SDUPTHER

## 2020-12-03 LAB
METHYLMALONATE SERPL-SCNC: 179 NMOL/L (ref 0–378)
SL AMB DISCLAIMER: NORMAL

## 2020-12-15 ENCOUNTER — OFFICE VISIT (OUTPATIENT)
Dept: HEMATOLOGY ONCOLOGY | Facility: CLINIC | Age: 45
End: 2020-12-15
Payer: COMMERCIAL

## 2020-12-15 VITALS
OXYGEN SATURATION: 98 % | RESPIRATION RATE: 16 BRPM | DIASTOLIC BLOOD PRESSURE: 82 MMHG | SYSTOLIC BLOOD PRESSURE: 122 MMHG | WEIGHT: 174 LBS | TEMPERATURE: 98.4 F | HEIGHT: 64 IN | BODY MASS INDEX: 29.71 KG/M2 | HEART RATE: 92 BPM

## 2020-12-15 DIAGNOSIS — E61.1 IRON DEFICIENCY: Primary | ICD-10-CM

## 2020-12-15 DIAGNOSIS — D50.8 OTHER IRON DEFICIENCY ANEMIA: ICD-10-CM

## 2020-12-15 PROCEDURE — 99214 OFFICE O/P EST MOD 30 MIN: CPT | Performed by: INTERNAL MEDICINE

## 2020-12-30 ENCOUNTER — IMMUNIZATIONS (OUTPATIENT)
Dept: FAMILY MEDICINE CLINIC | Facility: HOSPITAL | Age: 45
End: 2020-12-30
Payer: COMMERCIAL

## 2020-12-30 DIAGNOSIS — Z23 ENCOUNTER FOR IMMUNIZATION: ICD-10-CM

## 2020-12-30 PROCEDURE — 0011A SARS-COV-2 / COVID-19 MRNA VACCINE (MODERNA) 100 MCG: CPT

## 2020-12-30 PROCEDURE — 91301 SARS-COV-2 / COVID-19 MRNA VACCINE (MODERNA) 100 MCG: CPT

## 2021-01-26 ENCOUNTER — IMMUNIZATIONS (OUTPATIENT)
Dept: FAMILY MEDICINE CLINIC | Facility: HOSPITAL | Age: 46
End: 2021-01-26

## 2021-01-26 DIAGNOSIS — I95.89 OTHER SPECIFIED HYPOTENSION: ICD-10-CM

## 2021-01-26 DIAGNOSIS — Z23 ENCOUNTER FOR IMMUNIZATION: Primary | ICD-10-CM

## 2021-01-26 PROCEDURE — 0012A SARS-COV-2 / COVID-19 MRNA VACCINE (MODERNA) 100 MCG: CPT

## 2021-01-26 PROCEDURE — 91301 SARS-COV-2 / COVID-19 MRNA VACCINE (MODERNA) 100 MCG: CPT

## 2021-01-26 RX ORDER — MIDODRINE HYDROCHLORIDE 5 MG/1
5 TABLET ORAL 3 TIMES DAILY
Qty: 270 TABLET | Refills: 3 | Status: SHIPPED | OUTPATIENT
Start: 2021-01-26 | End: 2022-01-12 | Stop reason: SDUPTHER

## 2021-01-29 ENCOUNTER — TELEPHONE (OUTPATIENT)
Dept: NEUROLOGY | Facility: CLINIC | Age: 46
End: 2021-01-29

## 2021-01-29 NOTE — TELEPHONE ENCOUNTER
Pt left a VM at 1644 regarding being OK w switch to [facility] for her at 1800 that I will be converting the appt and she should expect an e-mail with the link on Monday

## 2021-01-29 NOTE — TELEPHONE ENCOUNTER
Called patient LVM for patient to call back to change appointment to virtual visit due to up coming bad weather

## 2021-02-01 ENCOUNTER — TELEMEDICINE (OUTPATIENT)
Dept: NEUROLOGY | Facility: CLINIC | Age: 46
End: 2021-02-01
Payer: COMMERCIAL

## 2021-02-01 DIAGNOSIS — G43.709 CHRONIC MIGRAINE WITHOUT AURA WITHOUT STATUS MIGRAINOSUS, NOT INTRACTABLE: Primary | ICD-10-CM

## 2021-02-01 DIAGNOSIS — R41.3 MEMORY LOSS: ICD-10-CM

## 2021-02-01 DIAGNOSIS — G62.9 SENSORY NEUROPATHY: ICD-10-CM

## 2021-02-01 DIAGNOSIS — E88.40 MITOCHONDRIAL DISEASE (HCC): ICD-10-CM

## 2021-02-01 PROCEDURE — 99442 PR PHYS/QHP TELEPHONE EVALUATION 11-20 MIN: CPT | Performed by: PSYCHIATRY & NEUROLOGY

## 2021-02-01 RX ORDER — BACLOFEN 10 MG/1
5 TABLET ORAL
Qty: 10 TABLET | Refills: 1 | Status: SHIPPED | OUTPATIENT
Start: 2021-02-01 | End: 2022-02-24 | Stop reason: ALTCHOICE

## 2021-02-01 NOTE — PROGRESS NOTES
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 also      Other Visit Diagnoses     Chronic migraine without aura without status migrainosus, not intractable    -  Primary    Relevant Medications    baclofen 10 mg tablet  - Continue with emgality significantly helpful no side effects    Sensory neuropathy    - > mitochondrial etiology she reports worsening thus will obtain emg    Relevant Medications    baclofen 10 mg tablet for refractory pain  Discussed potential med a/e    Other Relevant Orders    Vitamin B6    EMG 1 Upper/1 Lower Neuropathy    Memory loss    - intermittent aphasia    Relevant Orders    Vitamin B12    Vitamin B1, whole blood    MRI brain NeuroQuant wo contrast    MRI angiogram head without contrast  CTA 5 2018 with no significant stenosis however right vertebral artery small vs congenitally diminutive  Would like to evaluate MRA to make sure no new stenosis         Further recommendations pending above  Follow up with me after emg        Reason for visit is migraine follow up  Chief Complaint   Patient presents with    Virtual Brief Visit        Encounter provider [address],     Provider located at [address] [id number] [facility]    Recent Visits  No visits were found meeting these conditions  Showing recent visits within past 7 days and meeting all other requirements     Today's Visits  Date Type Provider Dept   02/01/21 Telemedicine [address],  [facility]   Showing today's visits and meeting all other requirements     Future Appointments  No visits were found meeting these conditions     Showing future appointments within next 150 days and meeting all other requirements        After connecting through telephone and patient was informed that this may not be a secure, HIPAA-compliant platform  She agrees to proceed  , the patient was identified by name and date of birth  Ammon Dodge was informed that this is a telemedicine visit and that the visit is being conducted throughtelephone She agrees to proceed     My office door was closed  No one else was in the room  She acknowledged consent and understanding of privacy and security of the platform  The patient has agreed to participate and understands she can discontinue the visit at any time  It was my intent to perform this visit via video technology but the patient was not able to do a video connection so the visit was completed via audio telephone only  Patient is aware this is a billable service  Subjective    Ammon Dodge is a 39 y o  female  With history of chronic migraine, mitochondrial disease - follows with [facility] seen in follow-up  Patient states she continues to do well on Emgality with chronic migraine  To very happy with this  No significant adverse effects         He tells me that her balance and neuropathy is worsening  She states progressing upward  She also reports worsening memory especially with speech production that her family is also noticing  States her grandmother had dementia attributed to mitochondrial disease  She denies other significant cognitive deficits  HPI as above    Ten point review of systems completed and negative other than that noted above    Past Medical History:   Diagnosis Date    Acid reflux     Constipated     [address]ys[address] [address] )     Esophageal abnormality     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   Gastrostomy tube in place [facility])     Gastrostomy tube in place [facility])     History of blood clots 2012    Left leg  Has IVC filter now   Occurred while on Lovenox    Iron deficiency     Malignant hyperthermia due to anesthesia     Patient's son has M H   States she needs precautions    Migraines     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[facility] [address] )     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 [address] )     MTHFR (methylene THF reductase) deficiency and homocystinuria (HCC)     Muscle weakness (generalized)     Nausea     On total parenteral nutrition (TPN)     for 8 mts    PCOS (polycystic ovarian syndrome)     PONV (postoperative nausea and vomiting)     Postgastrectomy syndrome     malabsorption    Postsurgical malabsorption     Status post gastric bypass for obesity     Stroke [facility]) [id number], [id number]    Metabolic strokes  Right hemiparesis= minor now   Vomiting     When eating       Past Surgical History:   Procedure Laterality Date    ANKLE SURGERY Left     Has plate and screws    COLONOSCOPY      COSMETIC SURGERY      X14 as child post attack by dog  Arms, legs and face    DILATION AND CURETTAGE OF UTERUS      x2    ESOPHAGOGASTRODUODENOSCOPY N/A 1/27/2016    Procedure: ESOPHAGOGASTRODUODENOSCOPY (EGD); Surgeon: Joshua Arellano MD;  Location: [facility]; Service:     FRACTURE SURGERY Left     Left ankle - hardware    GASTRIC BYPASS      IVC FILTER INSERTION      NISSEN FUNDOPLICATION      OVARY SURGERY Bilateral     "Drilling" due to multiple cysts- PCOS    OR EGD TRANSORAL BIOPSY SINGLE/MULTIPLE N/A 3/9/2016    Procedure: ESOPHAGOGASTRODUODENOSCOPY (EGD); Surgeon: Joshua Arellano MD;  Location: [facility];   Service: Bariatrics    OR HYSTEROSCOPY,W/ENDO BX N/A 5/22/2020    Procedure: DILATATION AND CURETTAGE (D&C) WITH HYSTEROSCOPY;  Surgeon: Kwaku Siu MD;  [facility];  Service: Gynecology    OR HYSTEROSCOPY,W/ENDOMETRIAL ABLATION N/A 5/22/2020    Procedure: Dellar Needs;  Surgeon: Kwaku Siu MD;  [facility];  Service: Gynecology    OR LAP,GASTROSTOMY,W/O TUBE CONSTR N/A 4/19/2016    Procedure: INSERTION GASTROSTOMY TUBE LAPAROSCOPIC WITH INTRAOP EGD;  Surgeon: Joshua Arellano MD;  [facility]: [facility];  Service: Bariatrics    ULNAR NERVE TRANSPOSITION Right     WISDOM TOOTH EXTRACTION         Current Outpatient Medications Medication Sig Dispense Refill    acarbose (PRECOSE) 100 MG tablet Take 1 tablet (100 mg total) by mouth 3 (three) times a day with meals 270 tablet 0    acetylcysteine (MUCOMYST) 200 mg/mL nebulizer solution as needed       Alpha-Lipoic Acid 100 MG CAPS Take by mouth      B Complex-Biotin-FA (TH VITAMIN B 50/B-COMPLEX PO) Take 1 tablet by mouth daily   baclofen 10 mg tablet Take 0 5 tablets (5 mg total) by mouth daily at bedtime as needed for muscle spasms 10 tablet 1    Blood Glucose Monitoring Suppl (ONETOUCH VERIO) w/Device KIT Dispense 1 meter 1 kit 1    COENZYME Q-10 PO       Continuous Blood Gluc Sensor (DEXCOM G6 SENSOR) MISC Use as directed for CGM - Change every 10 days 3 each 11    Continuous Blood Gluc Transmit (DEXCOM G6 TRANSMITTER) MISC Use as directed for CGM - Change every 3 months 1 each 3    CREATINE MONOHYDRATE PO Take 2 5 g by mouth      Cyanocobalamin (B-12) 1000 MCG/ML KIT Inject  as directed every 30 days   diphenhydrAMINE (BENADRYL) 25 mg tablet Take by mouth      docusate sodium (COLACE) 100 mg capsule Take 200 mg by mouth 2 (two) times a day   Galcanezumab-gnlm (Emgality) 120 MG/ML SOAJ 1 subq injection every 30 days  1 mL 11    iron sucrose (VENOFER) 20 mg/mL Venofer 200 mg iron/10 mL intravenous solution      L-Arginine 1000 MG TABS Take 6,000 mg by mouth 2 (two) times a day       linaCLOtide 145 MCG CAPS Take 145 mcg by mouth 2 (two) times a day      midodrine (PROAMATINE) 5 mg tablet Take 1 tablet (5 mg total) by mouth 3 (three) times a day 270 tablet 3    Multiple Vitamin (MULTIVITAMIN) tablet Take 1 tablet by mouth daily        NIFEdipine (PROCARDIA XL) 60 mg 24 hr tablet Take 1 tablet (60 mg total) by mouth daily 90 tablet 1    OneTouch Delica Lancets 79R MISC by Does not apply route 3 (three) times a day 300 each 1    OneTouch Verio test strip 1 each by Other route 3 (three) times a day 300 each 1    Oral Vehicles (PCCA-PLUS) SUSP       P-Aminobenzoic Acid (PARA-AMINOBENZOIC ACID) POWD       pantoprazole (PROTONIX) 40 mg tablet   3    polyethylene glycol (MIRALAX) 17 g packet Take 17 g by mouth 2 (two) times a day Indications: 1 5 caps full 1-2 times/day   Prucalopride Succinate (Motegrity) 2 MG TABS Take by mouth daily      Riboflavin 100 MG TABS Take 100 mg by mouth      sucralfate (CARAFATE) 1 g/10 mL suspension Take 10 mL (1 g total) by mouth 4 (four) times a day Must have liquid suspension 420 mL 5    TRILYTE 420 g solution   5    Ubiquinol Liposomal 100 MG/5ML SYRP Take 300 mg by mouth      Ubiquinol POWD       vitamin E 100 UNIT capsule Take 78 Units by mouth daily       No current facility-administered medications for this visit  Allergies   Allergen Reactions    Sulfate Other (See Comments)    Sulfa Antibiotics      Arrhythmia     Guaifenesin      Arrhythmia    Other      Malignant hyperhermia precautions  NEVER use Lacted Ringers       Review of Systems   10 point review of systems completed and negative other than as noted above   limited evaluation as this is an audio visit however patient is alert and oriented to person place time and situation with no clear aphasia or dysarthria noted during the visit today        I spent 20 minutes directly with the patient during this visit    [address] acknowledges that she has consented to an online visit or consultation  She understands that the online visit is based solely on information provided by her, and that, in the absence of a face-to-face physical evaluation by the physician, the diagnosis she receives is both limited and provisional in terms of accuracy and completeness  This is not intended to replace a full medical face-to-face evaluation by the physician  Kyler Galan understands and accepts these terms

## 2021-02-04 ENCOUNTER — OFFICE VISIT (OUTPATIENT)
Dept: PODIATRY | Facility: CLINIC | Age: 46
End: 2021-02-04
Payer: COMMERCIAL

## 2021-02-04 VITALS — HEIGHT: 64 IN | BODY MASS INDEX: 29.71 KG/M2 | WEIGHT: 174 LBS

## 2021-02-04 DIAGNOSIS — M72.2 PLANTAR FASCIITIS: ICD-10-CM

## 2021-02-04 DIAGNOSIS — M79.672 PAIN OF LEFT FOOT: ICD-10-CM

## 2021-02-04 DIAGNOSIS — M20.62 ACQUIRED TOE DEFORMITY, LEFT: Primary | ICD-10-CM

## 2021-02-04 PROCEDURE — 99243 OFF/OP CNSLTJ NEW/EST LOW 30: CPT | Performed by: PODIATRIST

## 2021-02-04 NOTE — LETTER
February 9, 2021     Jaz Martell MD  [address]  [address] [place] [address]    Patient: Marilia Bravo   YOB: 1975   Date of Visit: 2/4/2021       Dear Dr Quispe Cancer: Thank you for referring Fadumo Martell to me for evaluation  Below are my notes for this consultation  If you have questions, please do not hesitate to call me  I look forward to following your patient along with you  Sincerely,        Tyrell Tinajero DPM        CC: No Recipients  [facility]  2/8/2021 10:59 AM  Signed                 PATIENT:  Marilia Bravo  1975       ASSESSMENT:     1  Acquired toe deformity, left  XR foot 3+ vw left   2  Plantar fasciitis     3  Pain of left foot               PLAN:  1  Patient was counseled and educated on the condition and the diagnosis  2  X-ray was obtained and personally reviewed  The radiological findings were discussed with the patient  3  The diagnosis, treatment options and prognosis were discussed with the patient  4  Her condition is due to deformity of toes  Discussed options including surgery  Surgical details and post-op course were discussed today  5  Start her on toe spacers and silopads  6  Discussed proper footwear for off-loading  7  Instructed stretching and arch support for plantar fasciitis  8  She will consider surgical options in the future and call the office if she is ready  Subjective:       HPI  The patient presents with chief complaint of left foot pain  She has some sharp pain with corn on left 4th and 5th toes  She also feels she has deformity in her toes  She had it for about 1 year  Pain increased in the last few months  She tried some OTC toe separator  Denied any swelling  No associated numbness or paresthesia  No significant weakness  She also has chronic plantar fasciitis on right foot  Her symptoms are manageable with supportive shoes and stretching exercise          The following portions of the patient's history were reviewed and updated as appropriate: allergies, current medications, past family history, past medical history, past social history, past surgical history and problem list   All pertinent labs and images were reviewed  Past Medical History  Past Medical History:   Diagnosis Date    Acid reflux     Constipated     D[facility]autonomia (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)     Gastrostomy tube in place 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) 3394, 2958    Metabolic strokes  Right hemiparesis= minor now   Vomiting     When eating       Past Surgical History  Past Surgical History:   Procedure Laterality Date    ANKLE SURGERY Left     Has plate and screws    COLONOSCOPY      COSMETIC SURGERY      X14 as child post attack by dog  Arms, legs and face    DILATION AND CURETTAGE OF UTERUS      x2    ESOPHAGOGASTRODUODENOSCOPY N/A 1/27/2016    Procedure: ESOPHAGOGASTRODUODENOSCOPY (EGD); Surgeon: Tete Novoa MD;  [facility];   Service:     FRACTURE SURGERY Left     Left ankle - hardware    GASTRIC BYPASS      IVC FILTER INSERTION      NISSEN FUNDOPLICATION      OVARY SURGERY Bilateral     "Drilling" due to multiple cysts- PCOS  ND EGD TRANSORAL BIOPSY SINGLE/MULTIPLE N/A 3/9/2016    Procedure: ESOPHAGOGASTRODUODENOSCOPY (EGD); Surgeon: Danyell Castaneda MD;  [facility]; Service: Bariatrics    ND HYSTEROSCOPY,W/ENDO BX N/A 5/22/2020    Procedure: DILATATION AND CURETTAGE (D&C) WITH HYSTEROSCOPY;  Surgeon: Jorge Bartlett MD;  [facility];  Service: Gynecology    ND HYSTEROSCOPY,W/ENDOMETRIAL ABLATION N/A 5/22/2020    Procedure: ABLATION ENDOMETRIAL MARISSA;  Surgeon: Jorge Bartlett MD;  [facility];  Service: Gynecology    ND LAP,GASTROSTOMY,W/O TUBE CONSTR N/A 4/19/2016    Procedure: INSERTION GASTROSTOMY TUBE LAPAROSCOPIC WITH INTRAOP EGD;  Surgeon: Danyell Castaneda MD;  [facility];  Service: Bariatrics    ULNAR NERVE TRANSPOSITION Right     WISDOM TOOTH EXTRACTION          Allergies:  Sulfate, Sulfa antibiotics, Guaifenesin, and Other    Medications:  Current Outpatient Medications   Medication Sig Dispense Refill    acarbose (PRECOSE) 100 MG tablet Take 1 tablet (100 mg total) by mouth 3 (three) times a day with meals 270 tablet 0    acetylcysteine (MUCOMYST) 200 mg/mL nebulizer solution as needed       Alpha-Lipoic Acid 100 MG CAPS Take by mouth      B Complex-Biotin-FA (TH VITAMIN B 50/B-COMPLEX PO) Take 1 tablet by mouth daily   baclofen 10 mg tablet Take 0 5 tablets (5 mg total) by mouth daily at bedtime as needed for muscle spasms 10 tablet 1    Blood Glucose Monitoring Suppl (ONETOUCH VERIO) w/Device KIT Dispense 1 meter 1 kit 1    COENZYME Q-10 PO       Continuous Blood Gluc Sensor (DEXCOM G6 SENSOR) MISC Use as directed for CGM - Change every 10 days 3 each 11    Continuous Blood Gluc Transmit (DEXCOM G6 TRANSMITTER) MISC Use as directed for CGM - Change every 3 months 1 each 3    CREATINE MONOHYDRATE PO Take 2 5 g by mouth      Cyanocobalamin (B-12) 1000 MCG/ML KIT Inject  as directed every 30 days        diphenhydrAMINE (BENADRYL) 25 mg tablet Take by mouth      docusate sodium (COLACE) 100 mg capsule Take 200 mg by mouth 2 (two) times a day   Galcanezumab-gnlm (Emgality) 120 MG/ML SOAJ 1 subq injection every 30 days  1 mL 11    iron sucrose (VENOFER) 20 mg/mL Venofer 200 mg iron/10 mL intravenous solution      L-Arginine 1000 MG TABS Take 6,000 mg by mouth 2 (two) times a day       linaCLOtide 145 MCG CAPS Take 145 mcg by mouth 2 (two) times a day      midodrine (PROAMATINE) 5 mg tablet Take 1 tablet (5 mg total) by mouth 3 (three) times a day 270 tablet 3    Multiple Vitamin (MULTIVITAMIN) tablet Take 1 tablet by mouth daily   NIFEdipine (PROCARDIA XL) 60 mg 24 hr tablet Take 1 tablet (60 mg total) by mouth daily 90 tablet 1    OneTouch Delica Lancets 51J MISC by Does not apply route 3 (three) times a day 300 each 1    OneTouch Verio test strip 1 each by Other route 3 (three) times a day 300 each 1    Oral Vehicles (PCCA-PLUS) SUSP       P-Aminobenzoic Acid (PARA-AMINOBENZOIC ACID) POWD       pantoprazole (PROTONIX) 40 mg tablet   3    polyethylene glycol (MIRALAX) 17 g packet Take 17 g by mouth 2 (two) times a day Indications: 1 5 caps full 1-2 times/day   Prucalopride Succinate (Motegrity) 2 MG TABS Take by mouth daily      Riboflavin 100 MG TABS Take 100 mg by mouth      sucralfate (CARAFATE) 1 g/10 mL suspension Take 10 mL (1 g total) by mouth 4 (four) times a day Must have liquid suspension 420 mL 5    TRILYTE 420 g solution   5    Ubiquinol Liposomal 100 MG/5ML SYRP Take 300 mg by mouth      Ubiquinol POWD       vitamin E 100 UNIT capsule Take 78 Units by mouth daily       No current facility-administered medications for this visit          Social History:  Social History     Socioeconomic History    Marital status: /Civil Union     Spouse name: None    Number of children: None    Years of education: None    Highest education level: None   Occupational History    Occupation: infusion center   [address] [place] [address] [place] resource strain: None    Food insecurity     Worry: None     Inability: None    Transportation needs     Medical: None     Non-medical: None   Tobacco Use    Smoking status: Never Smoker    Smokeless tobacco: Never Used   Substance and Sexual Activity    Alcohol use: No    Drug use: No    Sexual activity: Yes     Partners: Male     Birth control/protection: Male Sterilization   Lifestyle    Physical activity     Days per week: None     Minutes per session: None    Stress: None   Relationships    Social connections     Talks on phone: None     Gets together: None     Attends [employer] service: None     Active member of club or organization: None     Attends meetings of clubs or organizations: None     Relationship status: None    Intimate partner violence     Fear of current or ex partner: None     Emotionally abused: None     Physically abused: None     Forced sexual activity: None   Other Topics Concern    None   Social History Narrative        2 children - son and daughter with mitrochondrial diseas          Review of Systems   Constitutional: Negative for chills, fatigue and fever  HENT: Negative for sore throat  Respiratory: Negative for cough and shortness of breath  Cardiovascular: Negative for chest pain and leg swelling  Gastrointestinal: Negative for constipation, diarrhea and rectal pain  Musculoskeletal: Negative for joint swelling  Skin: Negative for rash and wound  Allergic/Immunologic: Negative for immunocompromised state  Neurological: Negative for weakness and numbness  Hematological: Does not bruise/bleed easily  Psychiatric/Behavioral: Negative for behavioral problems and confusion  Objective:      Ht 5' 4" (1 626 m)   Wt 78 9 kg (174 lb)   BMI 29 87 kg/m²          Physical Exam  Vitals signs reviewed  Constitutional:       General: She is not in acute distress  Appearance: Normal appearance  She is not ill-appearing     HENT:      Head: Normocephalic and atraumatic  Neck:      Musculoskeletal: Normal range of motion and neck supple  Cardiovascular:      Rate and Rhythm: Normal rate and regular rhythm  Pulses: Normal pulses  Dorsalis pedis pulses are 2+ on the right side and 2+ on the left side  Posterior tibial pulses are 2+ on the right side and 2+ on the left side  Comments: No ischemia  CRT WNL  Pulmonary:      Effort: Pulmonary effort is normal  No respiratory distress  Musculoskeletal:         General: Tenderness and deformity present  No swelling or signs of injury  Right lower leg: No edema  Left lower leg: No edema  Right foot: No bunion or foot drop  Left foot: No bunion or foot drop  Comments: Adductovarus deformity left 4th and 5th toe  Mild tenderness on medial band of right plantar fascia  Skin:     General: Skin is warm  Coloration: Skin is not cyanotic or mottled  Findings: No ecchymosis, erythema, petechiae, rash or wound  Rash is not purpuric  Nails: There is no clubbing  Comments: Kissing keratotic lesions on left 4th interspace  Neurological:      General: No focal deficit present  Mental Status: She is alert and oriented to person, place, and time  Cranial Nerves: No cranial nerve deficit  Sensory: No sensory deficit  Motor: No weakness  Coordination: Coordination normal       Gait: Gait normal    Psychiatric:         Mood and Affect: Mood normal          Behavior: Behavior normal          Thought Content:  Thought content normal          Judgment: Judgment normal

## 2021-02-04 NOTE — PROGRESS NOTES
PATIENT:  Vidya Arellano  1975       ASSESSMENT:     1  Acquired toe deformity, left  XR foot 3+ vw left   2  Plantar fasciitis     3  Pain of left foot               PLAN:  1  Patient was counseled and educated on the condition and the diagnosis  2  X-ray was obtained and personally reviewed  The radiological findings were discussed with the patient  3  The diagnosis, treatment options and prognosis were discussed with the patient  4  Her condition is due to deformity of toes  Discussed options including surgery  Surgical details and post-op course were discussed today  5  Start her on toe spacers and silopads  6  Discussed proper footwear for off-loading  7  Instructed stretching and arch support for plantar fasciitis  8  She will consider surgical options in the future and call the office if she is ready  Subjective:       HPI  The patient presents with chief complaint of left foot pain  She has some sharp pain with corn on left 4th and 5th toes  She also feels she has deformity in her toes  She had it for about 1 year  Pain increased in the last few months  She tried some OTC toe separator  Denied any swelling  No associated numbness or paresthesia  No significant weakness  She also has chronic plantar fasciitis on right foot  Her symptoms are manageable with supportive shoes and stretching exercise  The following portions of the patient's history were reviewed and updated as appropriate: allergies, current medications, past family history, past medical history, past social history, past surgical history and problem list   All pertinent labs and images were reviewed  Past Medical History  Past Medical History:   Diagnosis Date    Acid reflux     Constipated     Dysautonomia ([address] )     Esophageal abnormality     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      [facility]astrost[facility])     Gastrostomy tube in place [facility])     History of blood clots 2012    Left leg  Has IVC filter now  Occurred while on Lovenox    Iron deficiency     Malignant hyperthermia due to anesthesia     Patient's son has M H   States she needs precautions    Migraines     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     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 )     MTHFR (methylene THF reductase) deficiency and homocystinuria (HCC)     Muscle weakness (generalized)     Nausea     On total parenteral nutrition (TPN)     for 8 mts    PCOS (polycystic ovarian syndrome)     PONV (postoperative nausea and vomiting)     Postgastrectomy syndrome     malabsorption    Postsurgical malabsorption     Status post gastric bypass for obesity     Stroke [facility]) 6040, 9903    Metabolic strokes  Right hemiparesis= minor now   Vomiting     When eating       Past Surgical History  Past Surgical History:   Procedure Laterality Date    ANKLE SURGERY Left     Has plate and screws    COLONOSCOPY      COSMETIC SURGERY      X14 as child post attack by dog  Arms, legs and face    DILATION AND CURETTAGE OF UTERUS      x2    ESOPHAGOGASTRODUODENOSCOPY N/A 1/27/2016    Procedure: ESOPHAGOGASTRODUODENOSCOPY (EGD); Surgeon: Khanh Mireles MD;  [facility]; Service:     FRACTURE SURGERY Left     Left ankle - hardware    GASTRIC BYPASS      IVC FILTER INSERTION      NISSEN FUNDOPLICATION      OVARY SURGERY Bilateral     "Drilling" due to multiple cysts- PCOS    CA EGD TRANSORAL BIOPSY SINGLE/MULTIPLE N/A 3/9/2016    Procedure: ESOPHAGOGASTRODUODENOSCOPY (EGD); Surgeon: Khanh Mireles MD;  [facility];   Service: Bariatrics    CA HYSTEROSCOPY,W/ENDO BX N/A 5/22/2020    Procedure: DILATATION AND CURETTAGE (D&C) WITH HYSTEROSCOPY;  Surgeon: Gissell Olivares MD;  [facility];  Service: Gynecology    CA HYSTEROSCOPY,W/ENDOMETRIAL ABLATION N/A 5/22/2020    Procedure: Nelly Marques;  Surgeon: Gissell Olivares MD;  [facility];  Service: Gynecology    FL LAP,GASTROSTOMY,W/O TUBE CONSTR N/A 4/19/2016    Procedure: INSERTION GASTROSTOMY TUBE LAPAROSCOPIC WITH INTRAOP EGD;  Surgeon: Joshua Arellano MD;  [facility];  Service: Bariatrics    ULNAR NERVE TRANSPOSITION Right     WISDOM TOOTH EXTRACTION          Allergies:  Sulfate, Sulfa antibiotics, Guaifenesin, and Other    Medications:  Current Outpatient Medications   Medication Sig Dispense Refill    acarbose (PRECOSE) 100 MG tablet Take 1 tablet (100 mg total) by mouth 3 (three) times a day with meals 270 tablet 0    acetylcysteine (MUCOMYST) 200 mg/mL nebulizer solution as needed       Alpha-Lipoic Acid 100 MG CAPS Take by mouth      B Complex-Biotin-FA (TH VITAMIN B 50/B-COMPLEX PO) Take 1 tablet by mouth daily   baclofen 10 mg tablet Take 0 5 tablets (5 mg total) by mouth daily at bedtime as needed for muscle spasms 10 tablet 1    Blood Glucose Monitoring Suppl (ONETOUCH VERIO) w/Device KIT Dispense 1 meter 1 kit 1    COENZYME Q-10 PO       Continuous Blood Gluc Sensor (DEXCOM G6 SENSOR) MISC Use as directed for CGM - Change every 10 days 3 each 11    Continuous Blood Gluc Transmit (DEXCOM G6 TRANSMITTER) MISC Use as directed for CGM - Change every 3 months 1 each 3    CREATINE MONOHYDRATE PO Take 2 5 g by mouth      Cyanocobalamin (B-12) 1000 MCG/ML KIT Inject  as directed every 30 days   diphenhydrAMINE (BENADRYL) 25 mg tablet Take by mouth      docusate sodium (COLACE) 100 mg capsule Take 200 mg by mouth 2 (two) times a day   Galcanezumab-gnlm (Emgality) 120 MG/ML SOAJ 1 subq injection every 30 days   1 mL 11    iron sucrose (VENOFER) 20 mg/mL Venofer 200 mg iron/10 mL intravenous solution      L-Arginine 1000 MG TABS Take 6,000 mg by mouth 2 (two) times a day       linaCLOtide 145 MCG CAPS Take 145 mcg by mouth 2 (two) times a day      midodrine (PROAMATINE) 5 mg tablet Take 1 tablet (5 mg total) by mouth 3 (three) times a day 270 tablet 3    Multiple Vitamin (MULTIVITAMIN) tablet Take 1 tablet by mouth daily   NIFEdipine (PROCARDIA XL) 60 mg 24 hr tablet Take 1 tablet (60 mg total) by mouth daily 90 tablet 1    OneTouch Delica Lancets 87R MISC by Does not apply route 3 (three) times a day 300 each 1    OneTouch Verio test strip 1 each by Other route 3 (three) times a day 300 each 1    Oral Vehicles (PCCA-PLUS) SUSP       P-Aminobenzoic Acid (PARA-AMINOBENZOIC ACID) POWD       pantoprazole (PROTONIX) 40 mg tablet   3    polyethylene glycol (MIRALAX) 17 g packet Take 17 g by mouth 2 (two) times a day Indications: 1 5 caps full 1-2 times/day   Prucalopride Succinate (Motegrity) 2 MG TABS Take by mouth daily      Riboflavin 100 MG TABS Take 100 mg by mouth      sucralfate (CARAFATE) 1 g/10 mL suspension Take 10 mL (1 g total) by mouth 4 (four) times a day Must have liquid suspension 420 mL 5    TRILYTE 420 g solution   5    Ubiquinol Liposomal 100 MG/5ML SYRP Take 300 mg by mouth      Ubiquinol POWD       vitamin E 100 UNIT capsule Take 78 Units by mouth daily       No current facility-administered medications for this visit          Social History:  Social History     Socioeconomic History    Marital status: /Civil Union     Spouse name: None    Number of children: None    Years of education: None    Highest education level: None   Occupational History    Occupation: infusion center   Social Needs    Financial resource strain: None    Food insecurity     Worry: None     Inability: None    Transportation needs     Medical: None     Non-medical: None   Tobacco Use    Smoking status: Never Smoker    Smokeless tobacco: Never Used   Substance and Sexual Activity    Alcohol use: No    Drug use: No    Sexual activity: Yes     Partners: Male     Birth control/protection: Male Sterilization   Lifestyle    Physical activity     Days per week: None     Minutes per session: None    Stress: None   Relationships    Social connections Talks on phone: None     Gets together: None     Attends [employer] service: None     Active member of club or organization: None     Attends meetings of clubs or organizations: None     Relationship status: None    Intimate partner violence     Fear of current or ex partner: None     Emotionally abused: None     Physically abused: None     Forced sexual activity: None   Other Topics Concern    None   Social History Narrative        2 children - son and daughter with mitrochondrial diseas          Review of Systems   Constitutional: Negative for chills, fatigue and fever  HENT: Negative for sore throat  Respiratory: Negative for cough and shortness of breath  Cardiovascular: Negative for chest pain and leg swelling  Gastrointestinal: Negative for constipation, diarrhea and rectal pain  Musculoskeletal: Negative for joint swelling  Skin: Negative for rash and wound  Allergic/Immunologic: Negative for immunocompromised state  Neurological: Negative for weakness and numbness  Hematological: Does not bruise/bleed easily  Psychiatric/Behavioral: Negative for behavioral problems and confusion  Objective:      Ht 5' 4" (1 626 m)   Wt 78 9 kg (174 lb)   BMI 29 87 kg/m²          Physical Exam  Vitals signs reviewed  Constitutional:       General: She is not in acute distress  Appearance: Normal appearance  She is not ill-appearing  HENT:      Head: Normocephalic and atraumatic  Neck:      Musculoskeletal: Normal range of motion and neck supple  Cardiovascular:      Rate and Rhythm: Normal rate and regular rhythm  Pulses: Normal pulses  Dorsalis pedis pulses are 2+ on the right side and 2+ on the left side  Posterior tibial pulses are 2+ on the right side and 2+ on the left side  Comments: No ischemia  CRT WNL  Pulmonary:      Effort: Pulmonary effort is normal  No respiratory distress     Musculoskeletal:         General: Tenderness and deformity present  No swelling or signs of injury  Right lower leg: No edema  Left lower leg: No edema  Right foot: No bunion or foot drop  Left foot: No bunion or foot drop  Comments: Adductovarus deformity left 4th and 5th toe  Mild tenderness on medial band of right plantar fascia  Skin:     General: Skin is warm  Coloration: Skin is not cyanotic or mottled  Findings: No ecchymosis, erythema, petechiae, rash or wound  Rash is not purpuric  Nails: There is no clubbing  Comments: Kissing keratotic lesions on left 4th interspace  Neurological:      General: No focal deficit present  Mental Status: She is alert and oriented to person, place, and time  Cranial Nerves: No cranial nerve deficit  Sensory: No sensory deficit  Motor: No weakness  Coordination: Coordination normal       Gait: Gait normal    Psychiatric:         Mood and Affect: Mood normal          Behavior: Behavior normal          Thought Content:  Thought content normal          Judgment: Judgment normal

## 2021-02-09 DIAGNOSIS — E16.1 REACTIVE HYPOGLYCEMIA: ICD-10-CM

## 2021-02-10 RX ORDER — BLOOD-GLUCOSE TRANSMITTER
EACH MISCELLANEOUS
Qty: 1 EACH | Refills: 3 | Status: SHIPPED | OUTPATIENT
Start: 2021-02-10 | End: 2021-04-26

## 2021-02-10 RX ORDER — BLOOD-GLUCOSE TRANSMITTER
EACH MISCELLANEOUS
Qty: 1 EACH | Refills: 3 | Status: SHIPPED | OUTPATIENT
Start: 2021-02-10 | End: 2021-04-25 | Stop reason: SDUPTHER

## 2021-02-19 ENCOUNTER — OFFICE VISIT (OUTPATIENT)
Dept: BARIATRICS | Facility: CLINIC | Age: 46
End: 2021-02-19
Payer: COMMERCIAL

## 2021-02-19 ENCOUNTER — HOSPITAL ENCOUNTER (OUTPATIENT)
Dept: INFUSION CENTER | Facility: CLINIC | Age: 46
Discharge: HOME | End: 2021-02-19
Payer: COMMERCIAL

## 2021-02-19 VITALS
TEMPERATURE: 98.5 F | SYSTOLIC BLOOD PRESSURE: 110 MMHG | BODY MASS INDEX: 29.07 KG/M2 | WEIGHT: 174.5 LBS | HEIGHT: 65 IN | DIASTOLIC BLOOD PRESSURE: 78 MMHG | HEART RATE: 77 BPM

## 2021-02-19 VITALS
HEART RATE: 101 BPM | RESPIRATION RATE: 16 BRPM | DIASTOLIC BLOOD PRESSURE: 79 MMHG | TEMPERATURE: 97.9 F | SYSTOLIC BLOOD PRESSURE: 131 MMHG

## 2021-02-19 DIAGNOSIS — R13.19 ESOPHAGEAL DYSPHAGIA: ICD-10-CM

## 2021-02-19 DIAGNOSIS — Z98.84 S/P GASTRIC BYPASS: ICD-10-CM

## 2021-02-19 DIAGNOSIS — I73.00 RAYNAUD'S DISEASE WITHOUT GANGRENE: ICD-10-CM

## 2021-02-19 DIAGNOSIS — K91.2 POSTSURGICAL MALABSORPTION: ICD-10-CM

## 2021-02-19 DIAGNOSIS — Z98.84 S/P GASTRIC BYPASS: Primary | ICD-10-CM

## 2021-02-19 DIAGNOSIS — G62.9 SENSORY NEUROPATHY: ICD-10-CM

## 2021-02-19 DIAGNOSIS — K21.9 GASTROESOPHAGEAL REFLUX DISEASE WITHOUT ESOPHAGITIS: ICD-10-CM

## 2021-02-19 DIAGNOSIS — E61.1 IRON DEFICIENCY: Primary | ICD-10-CM

## 2021-02-19 DIAGNOSIS — R21 RASH: Primary | ICD-10-CM

## 2021-02-19 DIAGNOSIS — R41.3 MEMORY LOSS: ICD-10-CM

## 2021-02-19 DIAGNOSIS — K59.00 CONSTIPATION: ICD-10-CM

## 2021-02-19 LAB
CHOLEST SERPL-MCNC: 182 MG/DL (ref 50–200)
HDLC SERPL-MCNC: 85 MG/DL
LDLC SERPL CALC-MCNC: 72 MG/DL (ref 0–100)
NONHDLC SERPL-MCNC: 97 MG/DL
TRIGL SERPL-MCNC: 127 MG/DL
VIT B12 SERPL-MCNC: 493 PG/ML (ref 100–900)

## 2021-02-19 PROCEDURE — 96372 THER/PROPH/DIAG INJ SC/IM: CPT

## 2021-02-19 PROCEDURE — 84207 ASSAY OF VITAMIN B-6: CPT

## 2021-02-19 PROCEDURE — 99213 OFFICE O/P EST LOW 20 MIN: CPT | Performed by: SURGERY

## 2021-02-19 PROCEDURE — 96365 THER/PROPH/DIAG IV INF INIT: CPT

## 2021-02-19 PROCEDURE — 84425 ASSAY OF VITAMIN B-1: CPT

## 2021-02-19 PROCEDURE — 80061 LIPID PANEL: CPT

## 2021-02-19 PROCEDURE — 82607 VITAMIN B-12: CPT

## 2021-02-19 RX ORDER — NYSTATIN 100000 U/G
CREAM TOPICAL 2 TIMES DAILY
Qty: 30 G | Refills: 0 | Status: SHIPPED | OUTPATIENT
Start: 2021-02-19 | End: 2021-11-11 | Stop reason: SDUPTHER

## 2021-02-19 RX ORDER — CYANOCOBALAMIN 1000 UG/ML
1000 INJECTION INTRAMUSCULAR; SUBCUTANEOUS ONCE
Status: COMPLETED | OUTPATIENT
Start: 2021-02-19 | End: 2021-02-19

## 2021-02-19 RX ORDER — SODIUM CHLORIDE 9 MG/ML
20 INJECTION, SOLUTION INTRAVENOUS ONCE
Status: COMPLETED | OUTPATIENT
Start: 2021-02-19 | End: 2021-02-19

## 2021-02-19 RX ORDER — SODIUM CHLORIDE 9 MG/ML
20 INJECTION, SOLUTION INTRAVENOUS ONCE
Status: CANCELLED | OUTPATIENT
Start: 2021-05-18

## 2021-02-19 RX ORDER — NYSTATIN 100000 U/G
CREAM TOPICAL 2 TIMES DAILY
Qty: 30 G | Refills: 1 | Status: SHIPPED | OUTPATIENT
Start: 2021-02-19 | End: 2021-02-19 | Stop reason: CLARIF

## 2021-02-19 RX ORDER — CYANOCOBALAMIN 1000 UG/ML
1000 INJECTION INTRAMUSCULAR; SUBCUTANEOUS ONCE
Status: CANCELLED | OUTPATIENT
Start: 2021-05-18

## 2021-02-19 RX ADMIN — IRON SUCROSE 200 MG: 20 INJECTION, SOLUTION INTRAVENOUS at 14:33

## 2021-02-19 RX ADMIN — CYANOCOBALAMIN 1000 MCG: 1000 INJECTION, SOLUTION INTRAMUSCULAR; SUBCUTANEOUS at 14:34

## 2021-02-19 RX ADMIN — SODIUM CHLORIDE 20 ML/HR: 0.9 INJECTION, SOLUTION INTRAVENOUS at 14:33

## 2021-02-19 NOTE — PROGRESS NOTES
Pt tolerated venofer infusion/b12 injection without complications  Pt is aware of next appointment 5/18, AVS declined

## 2021-02-19 NOTE — PROGRESS NOTES
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   Gastrostomy tube in place [facility])     Gastrostomy tube in place [facility])     History of blood clots 2012    Left leg  Has IVC filter now  Occurred while on Lovenox    Iron deficiency     Malignant hyperthermia due to anesthesia     Patient's son has [facility]   States she needs precautions    Migraines     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[address] )     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[address] )     MTHFR (methylene THF reductase) deficiency and homocystinuria (HCC)     Muscle weakness (generalized)     Nausea     On total parenteral nutrition (TPN)     for 8 mts    PCOS (polycystic ovarian syndrome)     PONV (postoperative nausea and vomiting)     Postgastrectomy syndrome     malabsorption    Postsurgical malabsorption     Status post gastric bypass for obesity     Stroke [facility]) 3101, 2618    Metabolic strokes  Right hemiparesis= minor now   Vomiting     When eating     Past Surgical History:   Procedure Laterality Date    ANKLE SURGERY Left     Has plate and screws    COLONOSCOPY      COSMETIC SURGERY      X14 as child post attack by dog  Arms, legs and face    DILATION AND CURETTAGE OF UTERUS      x2    ESOPHAGOGASTRODUODENOSCOPY N/A 1/27/2016    Procedure: ESOPHAGOGASTRODUODENOSCOPY (EGD); Surgeon: Ceci Turcios MD;  Location: [facility];   Service:     FRACTURE SURGERY Left     Left ankle - hardware    GASTRIC BYPASS      IVC FILTER INSERTION      NISSEN FUNDOPLICATION      OVARY SURGERY Bilateral     "Drilling" due to multiple cysts- PCOS    CT EGD TRANSORAL BIOPSY SINGLE/MULTIPLE N/A 3/9/2016    Procedure: ESOPHAGOGASTRODUODENOSCOPY (EGD); Surgeon: Atif Stevens MD;  Location: [facility]; Service: Bariatrics    CT HYSTEROSCOPY,W/ENDO BX N/A 5/22/2020    Procedure: DILATATION AND CURETTAGE (D&C) WITH HYSTEROSCOPY;  Surgeon: Manuel Walsh MD;  [facility];  Service: Gynecology    CT HYSTEROSCOPY,W/ENDOMETRIAL ABLATION N/A 5/22/2020    Procedure: Schofield Passe;  Surgeon: Manuel Walsh MD;  [facility];  Service: Gynecology    CT LAP,GASTROSTOMY,W/O TUBE CONSTR N/A 4/19/2016    Procedure: INSERTION GASTROSTOMY TUBE LAPAROSCOPIC WITH INTRAOP EGD;  Surgeon: Atif Stevens MD;  [facility];  Service: [address] [place] [address] Right     WISDOM TOOTH EXTRACTION       Social History   Social History     Substance and Sexual Activity   Alcohol Use No     Social History     Substance and Sexual Activity   Drug Use No     Social History     Tobacco Use   Smoking Status Never Smoker   Smokeless Tobacco Never Used     Family History: non-contributory    Meds/Allergies   all medications and allergies reviewed  Allergies   Allergen Reactions    Sulfate Other (See Comments)    Sulfa Antibiotics      Arrhythmia     Guaifenesin      Arrhythmia    Other      Malignant hyperhermia precautions   NEVER use Lacted Ringers       Objective       Current Vitals:   Blood Pressure: 110/78 (02/19/21 0901)  Pulse: 77 (02/19/21 0901)  Temperature: 98 5 °F (36 9 °C) (02/19/21 0901)  Temp Source: Tympanic (02/19/21 0901)  Height: 5' 4 5" (163 8 cm) (02/19/21 0901)  Weight - Scale: 79 2 kg (174 lb 8 oz) (02/19/21 0901)        Invasive Devices     Central Venous Catheter Line            Port A Cath Right Chest -- days          Drain            Gastrostomy/Enterostomy Gastrostomy 20 Fr  LUQ 1766 days                Physical Exam  Vitals signs reviewed  Constitutional:       General: She is not in acute distress  Appearance: She is well-developed  She is not diaphoretic  HENT:      Head: Normocephalic and atraumatic  Right Ear: External ear normal       Left Ear: External ear normal    Eyes:      General: No scleral icterus  Right eye: No discharge  Left eye: No discharge  Conjunctiva/sclera: Conjunctivae normal    Abdominal:      General: Bowel sounds are normal  There is no distension  Palpations: Abdomen is soft  There is no mass  Tenderness: There is no abdominal tenderness  There is no guarding or rebound  Comments: Feeling G-tube in place  Some reactive erythema underneath the tube without evidence of infection   Neurological:      Mental Status: She is alert and oriented to person, place, and time  Psychiatric:         Behavior: Behavior normal          Thought Content: Thought content normal          Judgment: Judgment normal          Lab Results: I have personally reviewed pertinent lab results  Imaging: I have personally reviewed pertinent reports  EKG, Pathology, and Other Studies: I have personally reviewed pertinent reports  Assessment/PLAN:    39 y o  female status post laparoscopic Rossy-en-Y gastric bypass after a Nissen takedown in October 2015  Raisa Chowdhury has a complicated past medical history  Refer to my previous notes for further details  She has a 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CMS-HCC), 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 and follows up with the [facility]      I have placed a gastrostomy tube (Valentin-Key) Button in April of 2016 to assist with her nutritional requirements      From the bariatric point of view she is doing well  She has lost 7&% excess weight loss and 33% total body    She has lost 4 lb since last year       Her heartburn and regurgitation has been worsening over the years and she is having more trouble with moving her bowels      She continues to follow with the team at  [facility]  and she has decided to stay put for now with regards to other operations as the offered her at some point a colectomy  She is in much better spirits than the last time I saw her  I had a chance to review her nutritional labs that were done in November and within was pretty much within normal limits  She continues to supplement her hydration with her G-tube and gets iron infusions as needed  I am ordering nutritional labs for her and further recommendations will be given upon getting those levels back         She will follow up with us as scheduled      Yudi Groves MD  2/19/2021  9:35 AM

## 2021-02-21 ENCOUNTER — HOSPITAL ENCOUNTER (OUTPATIENT)
Dept: MRI IMAGING | Facility: HOSPITAL | Age: 46
Discharge: HOME | End: 2021-02-21
Attending: PSYCHIATRY & NEUROLOGY
Payer: COMMERCIAL

## 2021-02-21 DIAGNOSIS — R41.3 MEMORY LOSS: ICD-10-CM

## 2021-02-21 PROCEDURE — 70551 MRI BRAIN STEM W/O DYE: CPT

## 2021-02-21 PROCEDURE — 76377 3D RENDER W/INTRP POSTPROCES: CPT

## 2021-02-21 PROCEDURE — G1004 CDSM NDSC: HCPCS

## 2021-02-21 PROCEDURE — 70544 MR ANGIOGRAPHY HEAD W/O DYE: CPT

## 2021-02-23 LAB — VIT B1 BLD-SCNC: 215.2 NMOL/L (ref 66.5–200)

## 2021-02-24 LAB — VIT B6 SERPL-MCNC: 45 UG/L (ref 2–32.8)

## 2021-03-01 ENCOUNTER — PROCEDURE VISIT (OUTPATIENT)
Dept: NEUROLOGY | Facility: CLINIC | Age: 46
End: 2021-03-01
Payer: COMMERCIAL

## 2021-03-01 ENCOUNTER — TELEPHONE (OUTPATIENT)
Dept: UROLOGY | Facility: AMBULATORY SURGERY CENTER | Age: 46
End: 2021-03-01

## 2021-03-01 DIAGNOSIS — R33.9 URINARY RETENTION: Primary | ICD-10-CM

## 2021-03-01 DIAGNOSIS — G62.9 SENSORY NEUROPATHY: ICD-10-CM

## 2021-03-01 PROCEDURE — 95912 NRV CNDJ TEST 11-12 STUDIES: CPT | Performed by: PHYSICAL MEDICINE & REHABILITATION

## 2021-03-01 NOTE — PROGRESS NOTES
EMG 1 Upper/1 Lower Neuropathy     Date/Time 3/1/2021 1:07 PM     Performed by  Kerry Moise MD     Authorized by Silverio Newton DO      [facility] Protocol   Consent: Verbal consent obtained  Risks and benefits: risks, benefits and alternatives were discussed  Consent given by: patient  Patient understanding: patient states understanding of the procedure being performed  Patient consent: the patient's understanding of the procedure matches consent given               EMG LEFT UPPER EXTREMITY  AND RIGHT LOWER EXTREMITY    53-year-old right-handed female with history of mitochondrial disorder presents with complaints of neck pain, low back pain, tingling and numbness in the feet and hands that started more than a year ago and have been progressively worsening  She denies any weakness  Patient is being evaluated for a focal neuropathy versus peripheral neuropathy  On physical examination, motor strength is 5/5 throughout  Sensations are intact to light touch and pinprick bilaterally  Motor and sensory conduction studies were performed on the left median , ulnar,  right peroneal and tibial nerves  The distal motor latencies were normal  The motor action potential amplitudes were normal  Motor conduction velocities were normal including conduction velocity of the ulnar nerve across the elbow and peroneal nerve at the fibular head  The left  median and ulnar F waves were normal  The right peroneal and tibial F-waves were normal     The left median  sensory peak latency was prolonged at 4 1 milliseconds with a normal sensory action potential amplitude  The radial sensory action potential was normal   The ulnar sensory peak latency was prolonged at 3 7 milliseconds with a normal sensory action potential amplitude    The median and ulnar palmar evoked response was normal   The superficial peroneal and sural sensory action potentials were normal     The right and left H soleus response was normal     Concentric needle EMG was performed on various distal and proximal muscles of the left upper extremity including APB, FDI, pronator teres, deltoid, biceps, triceps , low cervical paraspinal region  and right lower extremity including gluteus medius, vastus lateralis, tibialis anterior, medial gastrocnemius,  abductor pollicis brevis,  lumbosacral paraspinals  There was no evidence of active denervation in any of the muscles tested  The compound motor unit action potentials were of normal configuration with interference patterns being full or full for effort  IMPRESSION:   There is electrophysiologic evidence of a:    1  Mild median nerve compression neuropathy at the wrist with demyelinative changes, consistent with a diagnosis of carpal tunnel syndrome  2   Mild ulnar sensory neuropathy at the wrist with demyelinating changes as evidenced by the prolonged ulnar sensory peak latency  3   There is no evidence of a cervical or lumbosacral radiculopathy  4   There is no evidence of myopathy or peripheral neuropathy  Clinical correlation is recommended      JON Gagnon

## 2021-03-01 NOTE — TELEPHONE ENCOUNTER
Patient managed by Dr Pita Rogers [facility] patient scheduled appt with Dr Pita Rogers 5/27/21 patient was asking if she can get a order for renal US because she self cath   Patient can be reached at [phone]

## 2021-03-02 ENCOUNTER — OFFICE VISIT (OUTPATIENT)
Dept: CARDIOLOGY CLINIC | Facility: CLINIC | Age: 46
End: 2021-03-02
Payer: COMMERCIAL

## 2021-03-02 VITALS
HEIGHT: 65 IN | DIASTOLIC BLOOD PRESSURE: 82 MMHG | OXYGEN SATURATION: 98 % | BODY MASS INDEX: 29.72 KG/M2 | HEART RATE: 65 BPM | SYSTOLIC BLOOD PRESSURE: 126 MMHG | TEMPERATURE: 97.8 F | WEIGHT: 178.4 LBS

## 2021-03-02 DIAGNOSIS — Z98.84 S/P GASTRIC BYPASS: ICD-10-CM

## 2021-03-02 DIAGNOSIS — E88.40 MITOCHONDRIAL DISEASE (HCC): ICD-10-CM

## 2021-03-02 DIAGNOSIS — E88.40 MITOCHONDRIAL METABOLISM DISORDER (HCC): Primary | ICD-10-CM

## 2021-03-02 DIAGNOSIS — I10 HYPERTENSION, UNSPECIFIED TYPE: ICD-10-CM

## 2021-03-02 DIAGNOSIS — I95.1 DYSAUTONOMIA ORTHOSTATIC HYPOTENSION SYNDROME: ICD-10-CM

## 2021-03-02 PROCEDURE — 99214 OFFICE O/P EST MOD 30 MIN: CPT | Performed by: INTERNAL MEDICINE

## 2021-03-02 PROCEDURE — 93000 ELECTROCARDIOGRAM COMPLETE: CPT | Performed by: INTERNAL MEDICINE

## 2021-03-02 NOTE — PROGRESS NOTES
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  Has feeding tube  3  Headaches - unclear etiology     4  Possible genetic mutation for premature atrial fibrillation - no episodes          Recommendations:  1  Continue current medications  2  Continue IV saline boluses  3  Follow up in one year  HPI: Becca Maldonado is a 39y o  year old female with mitochondrial disease, gastric bypass surgery, and dysautonomia who presents for follow up  Since her gastric bypass surgery, has needed a central line and giving herself saline boluses - 1-2x/month  Does get fluid through feeding tube  On midodrine 5mg 3x/day   Major issue is GI complaints  Does have Raynauds symptoms  Review of Systems   Constitutional: Negative  HENT: Negative  Eyes: Negative  Respiratory: Negative for chest tightness and shortness of breath  Cardiovascular: Negative for chest pain, palpitations and leg swelling  Gastrointestinal: Positive for abdominal pain  Endocrine: Negative  Genitourinary: Negative  Musculoskeletal: Negative  Skin: Negative  Allergic/Immunologic: Negative  Neurological: Positive for light-headedness  Hematological: Negative  Psychiatric/Behavioral: Negative  All other systems reviewed and are negative  Past Medical History:   Diagnosis Date    Acid reflux     Constipated     Dysautonomia ([address] )     Esophageal abnormality     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   Gastrostomy tube in place [facility])     Gastrostomy tube in place [facility])     History of blood clots 2012    Left leg  Has IVC filter now   Occurred while on Lovenox    Iron deficiency     Malignant hyperthermia due to anesthesia     Patient's son has M H   States she needs precautions    Migraines     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[facility]     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[facility]     MTHFR (methylene THF reductase) deficiency and homocystinuria (HCC)     Muscle weakness (generalized)     Nausea     On total parenteral nutrition (TPN)     for 8 mts    PCOS (polycystic ovarian syndrome)     PONV (postoperative nausea and vomiting)     Postgastrectomy syndrome     malabsorption    Postsurgical malabsorption     Status post gastric bypass for obesity     Stroke [facility]) 1349, 2036    Metabolic strokes  Right hemiparesis= minor now   Vomiting     When eating     Past Surgical History:   Procedure Laterality Date    ANKLE SURGERY Left     Has plate and screws    COLONOSCOPY      COSMETIC SURGERY      X14 as child post attack by dog  Arms, legs and face    DILATION AND CURETTAGE OF UTERUS      x2    ESOPHAGOGASTRODUODENOSCOPY N/A 1/27/2016    Procedure: ESOPHAGOGASTRODUODENOSCOPY (EGD); Surgeon: Walter Pringle MD;  [facility]; Service:     FRACTURE SURGERY Left     Left ankle - hardware    GASTRIC BYPASS      IVC FILTER INSERTION      NISSEN FUNDOPLICATION      OVARY SURGERY Bilateral     "Drilling" due to multiple cysts- PCOS    NM EGD TRANSORAL BIOPSY SINGLE/MULTIPLE N/A 3/9/2016    Procedure: ESOPHAGOGASTRODUODENOSCOPY (EGD); Surgeon: Walter Pringle MD;  [facility];   Service: Bariatrics    NM HYSTEROSCOPY,W/ENDO BX N/A 5/22/2020    Procedure: DILATATION AND CURETTAGE (D&C) WITH HYSTEROSCOPY;  Surgeon: Jannet Mcginnis MD;  [facility];  Service: Gynecology    NM HYSTEROSCOPY,W/ENDOMETRIAL ABLATION N/A 5/22/2020    Procedure: Holly Gaytan;  Surgeon: Jannet Mcginnis MD;  [facility];  Service: Gynecology    NM LAP,GASTROSTOMY,W/O TUBE CONSTR N/A 4/19/2016    Procedure: INSERTION GASTROSTOMY TUBE LAPAROSCOPIC WITH INTRAOP EGD;  Surgeon: Walter Pringle MD;  [facility];  Service: Bariatrics    ULNAR NERVE TRANSPOSITION Right     WISDOM TOOTH EXTRACTION       Social History     Substance and Sexual Activity   Alcohol Use No     Social History     Substance and Sexual Activity   Drug Use No     Social History     Tobacco Use   Smoking Status Never Smoker   Smokeless Tobacco Never Used     Family History   Problem Relation Age of Onset    Mitochondrial disorder Mother     Hypertension Mother     Thyroid cancer Mother 48    Clotting disorder Mother         MTFR    Cancer Mother 62        renal    Depression Father     Endocrinopathy Father     Clotting disorder Father         MTFR    Stroke Father     Aneurysm Father         brain    Depression Brother     Narcolepsy Brother     Heart failure Maternal Grandmother 80    Coronary artery disease Maternal Grandmother     Mitochondrial disorder Son     Mitochondrial disorder Daughter     Stroke Family     Anuerysm Paternal Uncle         abdominal     Breast cancer Maternal Grandfather 55    Breast cancer Maternal Aunt 43    Heart attack Neg Hx     Arrhythmia Neg Hx     Sudden death Neg Hx         scd       Allergies: Allergies   Allergen Reactions    Sulfate Other (See Comments)    Sulfa Antibiotics      Arrhythmia     Guaifenesin      Arrhythmia    Other      Malignant hyperhermia precautions  NEVER use [place] Ringers       Medications:     Current Outpatient Medications:     acarbose (PRECOSE) 100 MG tablet, Take 1 tablet (100 mg total) by mouth 3 (three) times a day with meals, Disp: 270 tablet, Rfl: 0    acetylcysteine (MUCOMYST) 200 mg/mL nebulizer solution, as needed , Disp: , Rfl:     Alpha-Lipoic Acid 100 MG CAPS, Take by mouth, Disp: , Rfl:     B Complex-Biotin-FA (TH VITAMIN B 50/B-COMPLEX PO), Take 1 tablet by mouth daily    , Disp: , Rfl:     baclofen 10 mg tablet, Take 0 5 tablets (5 mg total) by mouth daily at bedtime as needed for muscle spasms, Disp: 10 tablet, Rfl: 1    Blood Glucose Monitoring Suppl Tracie Tripp) w/Device KIT, Dispense 1 meter, Disp: 1 kit, Rfl: 1    COENZYME Q-10 PO, , Disp: , Rfl:     Continuous Blood Gluc Sensor (DEXCOM G6 SENSOR) MISC, Use as directed for CGM - Change every 10 days, Disp: 3 each, Rfl: 11    Continuous Blood Gluc Transmit (Dexcom G6 Transmitter) MISC, USE AS DIRECTED FOR CGM AND CHANGE EVERY 3 MONTHS, Disp: 1 each, Rfl: 3    Continuous Blood Gluc Transmit (Dexcom G6 Transmitter) MISC, Use as directed for CGM - Change every 3 months, Disp: 1 each, Rfl: 3    CREATINE MONOHYDRATE PO, Take 2 5 g by mouth, Disp: , Rfl:     Cyanocobalamin (B-12) 1000 MCG/ML KIT, Inject  as directed every 30 days  , Disp: , Rfl:     diphenhydrAMINE (BENADRYL) 25 mg tablet, Take by mouth, Disp: , Rfl:     docusate sodium (COLACE) 100 mg capsule, Take 200 mg by mouth 2 (two) times a day , Disp: , Rfl:     Galcanezumab-gnlm (Emgality) 120 MG/ML SOAJ, 1 subq injection every 30 days  , Disp: 1 mL, Rfl: 11    iron sucrose (VENOFER) 20 mg/mL, Venofer 200 mg iron/10 mL intravenous solution, Disp: , Rfl:     L-Arginine 1000 MG TABS, Take 6,000 mg by mouth 2 (two) times a day , Disp: , Rfl:     linaCLOtide 145 MCG CAPS, Take 145 mcg by mouth 2 (two) times a day, Disp: , Rfl:     midodrine (PROAMATINE) 5 mg tablet, Take 1 tablet (5 mg total) by mouth 3 (three) times a day, Disp: 270 tablet, Rfl: 3    Multiple Vitamin (MULTIVITAMIN) tablet, Take 1 tablet by mouth daily  , Disp: , Rfl:     NIFEdipine (PROCARDIA XL) 60 mg 24 hr tablet, Take 1 tablet (60 mg total) by mouth daily, Disp: 90 tablet, Rfl: 1    nystatin (MYCOSTATIN) cream, Apply topically 2 (two) times a day, Disp: 30 g, Rfl: 0    OneTouch Delica Lancets 90J MISC, by Does not apply route 3 (three) times a day, Disp: 300 each, Rfl: 1    OneTouch Verio test strip, 1 each by Other route 3 (three) times a day, Disp: 300 each, Rfl: 1    Oral Vehicles (PCCA-PLUS) SUSP, , Disp: , Rfl:     P-Aminobenzoic Acid (PARA-AMINOBENZOIC ACID) POWD, , Disp: , Rfl:     pantoprazole (PROTONIX) 40 mg tablet, , Disp: , Rfl: 3    polyethylene glycol (MIRALAX) 17 g packet, Take 17 g by mouth 2 (two) times a day Indications: 1 5 caps full 1-2 times/day   , Disp: , Rfl:     Prucalopride Succinate (Motegrity) 2 MG TABS, Take by mouth daily, Disp: , Rfl:     Riboflavin 100 MG TABS, Take 100 mg by mouth, Disp: , Rfl:     TRILYTE 420 g solution, , Disp: , Rfl: 5    Ubiquinol Liposomal 100 MG/5ML SYRP, Take 300 mg by mouth, Disp: , Rfl:     Ubiquinol POWD, , Disp: , Rfl:     vitamin E 100 UNIT capsule, Take 78 Units by mouth daily, Disp: , Rfl:     sucralfate (CARAFATE) 1 g/10 mL suspension, Take 10 mL (1 g total) by mouth 4 (four) times a day Must have liquid suspension (Patient not taking: Reported on 2/19/2021), Disp: 420 mL, Rfl: 5      Wt Readings from Last 3 Encounters:   03/02/21 80 9 kg (178 lb 6 4 oz)   02/19/21 79 2 kg (174 lb 8 oz)   02/04/21 78 9 kg (174 lb)     Temp Readings from Last 3 Encounters:   03/02/21 97 8 °F (36 6 °C) (Temporal)   02/19/21 97 9 °F (36 6 °C) (Temporal)   02/19/21 98 5 °F (36 9 °C) (Tympanic)     BP Readings from Last 3 Encounters:   03/02/21 126/82   02/19/21 131/79   02/19/21 110/78     Pulse Readings from Last 3 Encounters:   03/02/21 65   02/19/21 101   02/19/21 77         Physical Exam      Laboratory Studies:  CMP:  Lab Results   Component Value Date     01/06/2016    K 3 6 11/27/2020     11/27/2020    CO2 28 11/27/2020    ANIONGAP 9 01/06/2016    BUN 14 11/27/2020    CREATININE 0 66 11/27/2020    GLUCOSE 76 01/06/2016    AST 14 11/27/2020    ALT 22 11/27/2020    BILITOT 0 32 01/06/2016    EGFR 109 11/27/2020       Lipid Profile:   Lab Results   Component Value Date    CHOL 113 01/06/2016     Lab Results   Component Value Date    HDL 85 02/19/2021     Lab Results   Component Value Date    LDLCALC 72 02/19/2021     Lab Results   Component Value Date    TRIG 127 02/19/2021 Cardiac testing:   EKG reviewed personally: NSR 65 Nml  Results for orders placed during the hospital encounter of 19   Echo complete with contrast if indicated    Narrative [address]  [facility] [place], [address] [facility]  [phone]    Transthoracic Echocardiogram  2D, M-mode, Doppler, and Color Doppler    Study date:  2019    Patient: Beronica Crawford  MR number: [id number]  Account number: [de-identified]  : 1975  Age: 41 years  Gender: Female  Status: Outpatient  Location: [address] [place] [address] [facility]  Height: 64 in  Weight: 174 lb  BP: 122/ 68 mmHg    Indications: Cardiomyopathy    Diagnoses: I42 9 - Cardiomyopathy, unspecified    Sonographer:  SAM Dumont  Referring Physician:  Stefan Baker MD  Group:  [id number]  Interpreting Physician:  Maria R Boykin MD    SUMMARY    LEFT VENTRICLE:  Systolic function was normal  Ejection fraction was estimated to be 60 %  There were no regional wall motion abnormalities  MITRAL VALVE:  There was trace regurgitation  TRICUSPID VALVE:  There was trace regurgitation  Pulmonary artery systolic pressure was within the normal range  HISTORY: PRIOR HISTORY: PICC, CVA, Gastric bypass, Mitochondrial disease, DVT, IVC filter    PROCEDURE: The study was performed in the [address]  This was a routine study  The transthoracic approach was used  The study included complete 2D imaging, M-mode, complete spectral Doppler, and color Doppler  The  heart rate was 68 bpm, at the start of the study  Images were obtained from the parasternal, apical, subcostal, and suprasternal notch acoustic windows  Image quality was adequate  LEFT VENTRICLE: Size was normal  Systolic function was normal  Ejection fraction was estimated to be 60 %  There were no regional wall motion abnormalities  Wall thickness was normal  No evidence of apical thrombus   DOPPLER: Left  ventricular diastolic function parameters were normal     RIGHT VENTRICLE: The size was normal  Systolic function was normal  Wall thickness was normal     LEFT ATRIUM: Size was normal     RIGHT ATRIUM: Size was normal     MITRAL VALVE: Valve structure was normal  There was normal leaflet separation  DOPPLER: The transmitral velocity was within the normal range  There was no evidence for stenosis  There was trace regurgitation  AORTIC VALVE: The valve was trileaflet  Leaflets exhibited normal thickness and normal cuspal separation  DOPPLER: Transaortic velocity was within the normal range  There was no evidence for stenosis  There was no significant  regurgitation  TRICUSPID VALVE: The valve structure was normal  There was normal leaflet separation  DOPPLER: The transtricuspid velocity was within the normal range  There was no evidence for stenosis  There was trace regurgitation  Pulmonary artery  systolic pressure was within the normal range  PULMONIC VALVE: Leaflets exhibited normal thickness, no calcification, and normal cuspal separation  DOPPLER: The transpulmonic velocity was within the normal range  There was no significant regurgitation  PERICARDIUM: There was no pericardial effusion  The pericardium was normal in appearance  AORTA: The root exhibited normal size  SYSTEMIC VEINS: IVC: The inferior vena cava was normal in size      SYSTEM MEASUREMENT TABLES    2D  %FS: 37 64 %  Ao Diam: 3 19 cm  EDV(Teich): 136 32 ml  EF(Cube): 75 74 %  EF(Teich): 67 25 %  ESV(Cube): 36 45 ml  ESV(Teich): 44 64 ml  IVSd: 0 55 cm  LA Area: 17 49 cm2  LA Diam: 3 2 cm  LVEDV MOD A4C: 88 62 ml  LVEF MOD A4C: 61 05 %  LVESV MOD A4C: 34 52 ml  LVIDd: 5 32 cm  LVIDs: 3 32 cm  LVLd A4C: 7 74 cm  LVLs A4C: 6 33 cm  LVPWd: 0 72 cm  RA Area: 13 91 cm2  RV Diam : 3 17 cm  SV MOD A4C: 54 1 ml  SV(Cube): 113 83 ml  SV(Teich): 91 68 ml    CW  TR Vmax: 2 16 m/s  TR maxP 68 mmHg    MM  TAPSE: 2 38 cm    PW  E': 0 12 m/s  E/E': 6 43  MV A Cesar: 0 56 m/s  MV Dec [place]: 5 45 m/s2  MV DecT: 144 86 ms  MV E Cesar: 0 79 m/s  MV E/A Ratio: 1 41    [facility] Accredited [facility]    Prepared and electronically signed by    Pascual Warren MD  Signed 10-Apr-2019 11:06:24       No results found for this or any previous visit  No results found for this or any previous visit  No results found for this or any previous visit

## 2021-03-02 NOTE — PATIENT INSTRUCTIONS
Recommendations:  1  Continue current medications  2  Continue IV saline boluses  3  Follow up in one year

## 2021-03-03 ENCOUNTER — TELEPHONE (OUTPATIENT)
Dept: NEUROLOGY | Facility: CLINIC | Age: 46
End: 2021-03-03

## 2021-03-03 NOTE — TELEPHONE ENCOUNTER
----- Message from Ashley Rodrigues RN sent at 3/3/2021  7:59 AM EST -----    ----- Message -----  From: Rigo Cordon DO  Sent: 3/2/2021   6:10 PM EST  To: [facility]    Please let patient know that she has mild ulnar nerve and median nerve compression at wrist aka carpal tunnel no evidence of generalized peripheral neuropathy    Would recommend wearing nightly wrist brace available OTC for carpal tunnel and limit flexion extension activities  If this does not help or symptoms worsen I can refer to orthopedics for local steroid injection nerve release surgery in future    Thanks

## 2021-03-03 NOTE — TELEPHONE ENCOUNTER
Left patient detailed message regarding results below (okay per communication consent)   Instructed patient to call the office with any questions

## 2021-03-03 NOTE — TELEPHONE ENCOUNTER
----- Message from Wilber Avery RN sent at 3/3/2021  7:59 AM EST -----    ----- Message -----  From: Arash Galvan DO  Sent: 3/2/2021   6:41 PM EST  To: [facility] [place] [facility]    Please let patient know Vitamin B6 elevated, she can stop b6 supplementation, as high levels of b6 can cause sensory nerve damage  B1 is elevated also- this is not typically associated with any pathology   However she can stop (thiamine ) B1 supplementation or reduced it to every other day    thanks

## 2021-03-09 ENCOUNTER — OFFICE VISIT (OUTPATIENT)
Dept: SLEEP CENTER | Facility: CLINIC | Age: 46
End: 2021-03-09
Payer: COMMERCIAL

## 2021-03-09 VITALS
HEART RATE: 88 BPM | BODY MASS INDEX: 29.42 KG/M2 | WEIGHT: 176.6 LBS | DIASTOLIC BLOOD PRESSURE: 76 MMHG | HEIGHT: 65 IN | SYSTOLIC BLOOD PRESSURE: 120 MMHG

## 2021-03-09 DIAGNOSIS — G47.419 PRIMARY NARCOLEPSY WITHOUT CATAPLEXY: Primary | ICD-10-CM

## 2021-03-09 PROCEDURE — 99204 OFFICE O/P NEW MOD 45 MIN: CPT | Performed by: INTERNAL MEDICINE

## 2021-03-09 RX ORDER — CLONIDINE HYDROCHLORIDE 0.1 MG/1
0.1 TABLET ORAL
Qty: 30 TABLET | Refills: 2 | Status: SHIPPED | OUTPATIENT
Start: 2021-03-09 | End: 2021-05-03

## 2021-03-09 NOTE — PROGRESS NOTES
Consultation - [address] [place] [address] : 1975  MRN: [id number]      Assessment:  The patient has severe daytime sleepiness without clear cause  Her brother has narcolepsy as does her son, making that diagnosis more likely  She denies cataplexy or sleep paralysis, but does have  excessive and elaborate dreaming  Plan:   MSLT    Follow up:   after testing    History of Present Illness:   39 y  o female  With a 20 year history of excessive daytime sleepiness  Her symptoms started at age [de-identified], when she suffered an attack by dogs  She began having disturbing dreams at that time and by the time she was 22 experienced excessive daytime sleepiness  She has no history of snoring  Polysomnography performed over 10 years ago was within normal limits  Those results are not available at this time  She denies restless legs or periodic limb movements  She denies history of seizure  She has mild memory issues and cognitive dysfunction  Although she can sleep up to 8 hours at a time, her sleep feels on refreshing  She falls asleep easily for a nap at any time  She denies insomnia        Review of Systems      Genitourinary hot flashes at night   Cardiology none   Gastrointestinal frequent heartburn/acid reflux   Neurology frequent headaches, awaken with headache, forgetfulness, poor concentration or confusion,  and difficulty with memory   Constitutional fatigue   Integumentary none   Psychiatry none   Musculoskeletal none   Pulmonary none   ENT none   Endocrine none   Hematological none         I have reviewed and updated the review of systems as necessary    Historical Information    Past Medical History:    Mitochondrial disease, status post gastric bypass, GERD, esophageal dysmotility, migraine headaches, orthostatic hypotension, hypertension, dysautonomia, iron deficiency    Family History: Two first degree relatives with narcolepsy    Social History     Socioeconomic History    Marital status: /Civil [employer]     Spouse name: None    Number of children: None    Years of education: None    Highest education level: None   Occupational History    Occupation: infusion center   Social Needs    Financial resource strain: None    Food insecurity     Worry: None     Inability: None    Transportation needs     Medical: None     Non-medical: None   Tobacco Use    Smoking status: Never Smoker    Smokeless tobacco: Never Used   Substance and Sexual Activity    Alcohol use: No    Drug use: No    Sexual activity: Yes     Partners: Male     Birth control/protection: Male Sterilization   Lifestyle    Physical activity     Days per week: None     Minutes per session: None    Stress: None   Relationships    Social connections     Talks on phone: None     Gets together: None     Attends Judaism service: None     Active member of club or organization: None     Attends meetings of clubs or organizations: None     Relationship status: None    Intimate partner violence     Fear of current or ex partner: None     Emotionally abused: None     Physically abused: None     Forced sexual activity: None   Other Topics Concern    None   Social History Narrative        2 children - son and daughter with mitrochondrial diseas         Sleep Schedule: unremarkable    Snoring:   no    Witnessed Apnea:   no    Medications/Allergies:    Current Outpatient Medications:     acarbose (PRECOSE) 100 MG tablet, Take 1 tablet (100 mg total) by mouth 3 (three) times a day with meals, Disp: 270 tablet, Rfl: 0    acetylcysteine (MUCOMYST) 200 mg/mL nebulizer solution, as needed , Disp: , Rfl:     Alpha-Lipoic Acid 100 MG CAPS, Take by mouth, Disp: , Rfl:     B Complex-Biotin-FA (TH VITAMIN B 50/B-COMPLEX PO), Take 1 tablet by mouth daily    , Disp: , Rfl:     baclofen 10 mg tablet, Take 0 5 tablets (5 mg total) by mouth daily at bedtime as needed for muscle spasms, Disp: 10 tablet, Rfl: 1    Blood Glucose Monitoring Suppl (Krysten Darnell) w/Device KIT, Dispense 1 meter, Disp: 1 kit, Rfl: 1    COENZYME Q-10 PO, , Disp: , Rfl:     Continuous Blood Gluc Sensor (DEXCOM G6 SENSOR) MISC, Use as directed for CGM - Change every 10 days, Disp: 3 each, Rfl: 11    Continuous Blood Gluc Transmit (Dexcom G6 Transmitter) MISC, USE AS DIRECTED FOR CGM AND CHANGE EVERY 3 MONTHS, Disp: 1 each, Rfl: 3    Continuous Blood Gluc Transmit (Dexcom G6 Transmitter) MISC, Use as directed for CGM - Change every 3 months, Disp: 1 each, Rfl: 3    CREATINE MONOHYDRATE PO, Take 2 5 g by mouth, Disp: , Rfl:     Cyanocobalamin (B-12) 1000 MCG/ML KIT, Inject  as directed every 30 days  , Disp: , Rfl:     diphenhydrAMINE (BENADRYL) 25 mg tablet, Take by mouth, Disp: , Rfl:     docusate sodium (COLACE) 100 mg capsule, Take 200 mg by mouth 2 (two) times a day , Disp: , Rfl:     Galcanezumab-gnlm (Emgality) 120 MG/ML SOAJ, 1 subq injection every 30 days  , Disp: 1 mL, Rfl: 11    iron sucrose (VENOFER) 20 mg/mL, Venofer 200 mg iron/10 mL intravenous solution, Disp: , Rfl:     L-Arginine 1000 MG TABS, Take 6,000 mg by mouth 2 (two) times a day , Disp: , Rfl:     linaCLOtide 145 MCG CAPS, Take 145 mcg by mouth 2 (two) times a day, Disp: , Rfl:     midodrine (PROAMATINE) 5 mg tablet, Take 1 tablet (5 mg total) by mouth 3 (three) times a day, Disp: 270 tablet, Rfl: 3    Multiple Vitamin (MULTIVITAMIN) tablet, Take 1 tablet by mouth daily  , Disp: , Rfl:     NIFEdipine (PROCARDIA XL) 60 mg 24 hr tablet, Take 1 tablet (60 mg total) by mouth daily, Disp: 90 tablet, Rfl: 1    nystatin (MYCOSTATIN) cream, Apply topically 2 (two) times a day, Disp: 30 g, Rfl: 0    OneTouch Delica Lancets 49T MISC, by Does not apply route 3 (three) times a day, Disp: 300 each, Rfl: 1    OneTouch Verio test strip, 1 each by Other route 3 (three) times a day, Disp: 300 each, Rfl: 1    Oral Vehicles (PCCA-PLUS) SUSP, , Disp: , Rfl:     P-Aminobenzoic Acid (PARA-AMINOBENZOIC ACID) POWD, , Disp: , Rfl:     pantoprazole (PROTONIX) 40 mg tablet, , Disp: , Rfl: 3    polyethylene glycol (MIRALAX) 17 g packet, Take 17 g by mouth 2 (two) times a day Indications: 1 5 caps full 1-2 times/day   , Disp: , Rfl:     Prucalopride Succinate (Motegrity) 2 MG TABS, Take by mouth daily, Disp: , Rfl:     Riboflavin 100 MG TABS, Take 100 mg by mouth, Disp: , Rfl:     TRILYTE 420 g solution, , Disp: , Rfl: 5    Ubiquinol Liposomal 100 MG/5ML SYRP, Take 300 mg by mouth, Disp: , Rfl:     Ubiquinol POWD, , Disp: , Rfl:     vitamin E 100 UNIT capsule, Take 78 Units by mouth daily, Disp: , Rfl:     cloNIDine (CATAPRES) 0 1 mg tablet, Take 1 tablet (0 1 mg total) by mouth daily at bedtime, Disp: 30 tablet, Rfl: 2    sucralfate (CARAFATE) 1 g/10 mL suspension, Take 10 mL (1 g total) by mouth 4 (four) times a day Must have liquid suspension (Patient not taking: Reported on 3/9/2021), Disp: 420 mL, Rfl: 5        No notes on file                  Objective:    Vital Signs:   Vitals:    03/09/21 1034   BP: 120/76   Pulse: 88   Weight: 80 1 kg (176 lb 9 6 oz)   Height: 5' 4 5" (1 638 m)     Neck Circumference: 12 5      [place] Sleepiness Scale: Total score: 13    Physical Exam:    General: Alert, appropriate, cooperative,  Normal build    Head: NC/AT,  mild retrognathia    Nose: No septal deviation    Throat: Airway  patent, tongue base  normal, no tonsils visualized, presence of postnasal drip    Neck: Normal, no thyromegaly or lymphadenopathy, no JVD    Heart: RR, normal S1 and S2, no murmurs    Chest: Clear bilaterally    Extremity: No clubbing, cyanosis,  no edema    Skin: Warm, dry    Neuro: No motor abnormalities, cranial nerves appear intact    Sleep Study Results:    unavailable, but the patient  recalls that the study was normal      Counseling / Coordination of Care  A description of the counseling / coordination of care:   We discussed the differential diagnosis for hypersomnia      Board Certified Sleep Specialist    Portions of the record may have been created with voice recognition software  Occasional wrong word or "sound a like" substitutions may have occurred due to the inherent limitations of voice recognition software  Read the chart carefully and recognize, using context, where substitutions have occurred

## 2021-03-10 ENCOUNTER — TELEPHONE (OUTPATIENT)
Dept: OTHER | Facility: HOSPITAL | Age: 46
End: 2021-03-10

## 2021-03-10 DIAGNOSIS — E16.1 REACTIVE HYPOGLYCEMIA: Primary | ICD-10-CM

## 2021-03-11 DIAGNOSIS — E16.2 HYPOGLYCEMIA: ICD-10-CM

## 2021-03-11 DIAGNOSIS — E16.1 REACTIVE HYPOGLYCEMIA: ICD-10-CM

## 2021-03-11 RX ORDER — BLOOD-GLUCOSE SENSOR
EACH MISCELLANEOUS
Qty: 3 EACH | Refills: 0 | Status: SHIPPED | OUTPATIENT
Start: 2021-03-11 | End: 2021-03-11

## 2021-03-11 RX ORDER — BLOOD-GLUCOSE SENSOR
EACH MISCELLANEOUS
Qty: 3 EACH | Refills: 0 | Status: SHIPPED | OUTPATIENT
Start: 2021-03-11 | End: 2021-04-25 | Stop reason: SDUPTHER

## 2021-03-11 RX ORDER — ACARBOSE 100 MG/1
100 TABLET ORAL
Qty: 270 TABLET | Refills: 0 | Status: SHIPPED | OUTPATIENT
Start: 2021-03-11 | End: 2021-05-25 | Stop reason: SDUPTHER

## 2021-03-16 ENCOUNTER — TRANSCRIBE ORDERS (OUTPATIENT)
Dept: LAB | Facility: CLINIC | Age: 46
End: 2021-03-16

## 2021-03-16 ENCOUNTER — TRANSCRIBE ORDERS (OUTPATIENT)
Dept: LAB | Facility: HOSPITAL | Age: 46
End: 2021-03-16

## 2021-03-16 ENCOUNTER — APPOINTMENT (OUTPATIENT)
Dept: LAB | Facility: CLINIC | Age: 46
End: 2021-03-16
Payer: COMMERCIAL

## 2021-03-16 DIAGNOSIS — R41.3 MEMORY LOSS: ICD-10-CM

## 2021-03-16 DIAGNOSIS — R53.82 CHRONIC FATIGUE SYNDROME: ICD-10-CM

## 2021-03-16 DIAGNOSIS — R33.9 RETENTION OF URINE, UNSPECIFIED: ICD-10-CM

## 2021-03-16 DIAGNOSIS — I82.4Y9 ACUTE VENOUS EMBOLISM AND THROMBOSIS OF DEEP VESSELS OF PROXIMAL LOWER EXTREMITY, UNSPECIFIED LATERALITY (HCC): ICD-10-CM

## 2021-03-16 DIAGNOSIS — Z83.49 FAMILY HISTORY OF ENDOCRINE AND METABOLIC DISEASE: ICD-10-CM

## 2021-03-16 DIAGNOSIS — T88.59XD: ICD-10-CM

## 2021-03-16 DIAGNOSIS — K59.01 SLOW TRANSIT CONSTIPATION: ICD-10-CM

## 2021-03-16 DIAGNOSIS — G90.519 COMPLEX REGIONAL PAIN SYNDROME TYPE 1 OF UPPER EXTREMITY, UNSPECIFIED LATERALITY: ICD-10-CM

## 2021-03-16 DIAGNOSIS — E66.3 OVER WEIGHT: ICD-10-CM

## 2021-03-16 DIAGNOSIS — R13.10 PROBLEMS WITH SWALLOWING AND MASTICATION: ICD-10-CM

## 2021-03-16 DIAGNOSIS — R53.82 CHRONIC FATIGUE SYNDROME: Primary | ICD-10-CM

## 2021-03-16 DIAGNOSIS — L90.6 STRIAE ATROPHICAE: ICD-10-CM

## 2021-03-16 DIAGNOSIS — E16.2 HYPOGLYCEMIA, UNSPECIFIED: ICD-10-CM

## 2021-03-16 DIAGNOSIS — K22.4 DYSKINESIA OF ESOPHAGUS: ICD-10-CM

## 2021-03-16 DIAGNOSIS — Q74.2 UNSPECIFIED CONGENITAL ANOMALY OF LOWER LIMB: ICD-10-CM

## 2021-03-16 DIAGNOSIS — R53.83 FATIGUE, UNSPECIFIED TYPE: ICD-10-CM

## 2021-03-16 DIAGNOSIS — G90.1 DYSAUTONOMIA (HCC): ICD-10-CM

## 2021-03-16 DIAGNOSIS — R53.1 ASTHENIA: ICD-10-CM

## 2021-03-16 DIAGNOSIS — R68.89 DECREASED MOTOR ACTIVITY: ICD-10-CM

## 2021-03-16 DIAGNOSIS — K21.00 GASTROESOPHAGEAL REFLUX DISEASE WITH ESOPHAGITIS, UNSPECIFIED WHETHER HEMORRHAGE: ICD-10-CM

## 2021-03-16 DIAGNOSIS — H53.453 NASAL STEP VISUAL FIELD DEFECT OF BOTH EYES: ICD-10-CM

## 2021-03-16 DIAGNOSIS — F80.9 PROBLEMS WITH COMMUNICATION (INCLUDING SPEECH): ICD-10-CM

## 2021-03-16 DIAGNOSIS — G43.109 MIGRAINE WITH AURA AND WITHOUT STATUS MIGRAINOSUS, NOT INTRACTABLE: ICD-10-CM

## 2021-03-16 DIAGNOSIS — H53.60 NYCTALOPIA: ICD-10-CM

## 2021-03-16 DIAGNOSIS — Z98.84 BARIATRIC SURGERY STATUS: ICD-10-CM

## 2021-03-16 DIAGNOSIS — R11.0 NAUSEA: ICD-10-CM

## 2021-03-16 DIAGNOSIS — H35.52 RETINITIS PIGMENTOSA: ICD-10-CM

## 2021-03-16 LAB
25(OH)D3 SERPL-MCNC: 35.9 NG/ML (ref 30–100)
ALBUMIN SERPL BCP-MCNC: 4 G/DL (ref 3.5–5)
ALP SERPL-CCNC: 68 U/L (ref 46–116)
ALT SERPL W P-5'-P-CCNC: 24 U/L (ref 12–78)
ANION GAP SERPL CALCULATED.3IONS-SCNC: 5 MMOL/L (ref 4–13)
AST SERPL W P-5'-P-CCNC: 10 U/L (ref 5–45)
BASOPHILS # BLD AUTO: 0.04 THOUSANDS/ΜL (ref 0–0.1)
BASOPHILS NFR BLD AUTO: 1 % (ref 0–1)
BILIRUB SERPL-MCNC: 0.43 MG/DL (ref 0.2–1)
BILIRUB UR QL STRIP: NEGATIVE
BUN SERPL-MCNC: 14 MG/DL (ref 5–25)
CALCIUM SERPL-MCNC: 10.3 MG/DL (ref 8.3–10.1)
CHLORIDE SERPL-SCNC: 104 MMOL/L (ref 100–108)
CHOLEST SERPL-MCNC: 180 MG/DL (ref 50–200)
CLARITY UR: CLEAR
CO2 SERPL-SCNC: 30 MMOL/L (ref 21–32)
COLOR UR: YELLOW
CREAT SERPL-MCNC: 0.79 MG/DL (ref 0.6–1.3)
EOSINOPHIL # BLD AUTO: 0.07 THOUSAND/ΜL (ref 0–0.61)
EOSINOPHIL NFR BLD AUTO: 2 % (ref 0–6)
ERYTHROCYTE [DISTWIDTH] IN BLOOD BY AUTOMATED COUNT: 12 % (ref 11.6–15.1)
EST. AVERAGE GLUCOSE BLD GHB EST-MCNC: 94 MG/DL
FERRITIN SERPL-MCNC: 426 NG/ML (ref 8–388)
GFR SERPL CREATININE-BSD FRML MDRD: 92 ML/MIN/1.73SQ M
GLUCOSE P FAST SERPL-MCNC: 93 MG/DL (ref 65–99)
GLUCOSE UR STRIP-MCNC: NEGATIVE MG/DL
HBA1C MFR BLD: 4.9 %
HCT VFR BLD AUTO: 40.8 % (ref 34.8–46.1)
HDLC SERPL-MCNC: 89 MG/DL
HGB BLD-MCNC: 13.3 G/DL (ref 11.5–15.4)
HGB UR QL STRIP.AUTO: NEGATIVE
IMM GRANULOCYTES # BLD AUTO: 0.02 THOUSAND/UL (ref 0–0.2)
IMM GRANULOCYTES NFR BLD AUTO: 1 % (ref 0–2)
IRON SATN MFR SERPL: 34 %
IRON SERPL-MCNC: 108 UG/DL (ref 50–170)
KETONES UR STRIP-MCNC: NEGATIVE MG/DL
LDLC SERPL CALC-MCNC: 79 MG/DL (ref 0–100)
LEUKOCYTE ESTERASE UR QL STRIP: NEGATIVE
LYMPHOCYTES # BLD AUTO: 1.25 THOUSANDS/ΜL (ref 0.6–4.47)
LYMPHOCYTES NFR BLD AUTO: 29 % (ref 14–44)
MCH RBC QN AUTO: 30.5 PG (ref 26.8–34.3)
MCHC RBC AUTO-ENTMCNC: 32.6 G/DL (ref 31.4–37.4)
MCV RBC AUTO: 94 FL (ref 82–98)
MONOCYTES # BLD AUTO: 0.26 THOUSAND/ΜL (ref 0.17–1.22)
MONOCYTES NFR BLD AUTO: 6 % (ref 4–12)
NEUTROPHILS # BLD AUTO: 2.69 THOUSANDS/ΜL (ref 1.85–7.62)
NEUTS SEG NFR BLD AUTO: 61 % (ref 43–75)
NITRITE UR QL STRIP: NEGATIVE
NONHDLC SERPL-MCNC: 91 MG/DL
NRBC BLD AUTO-RTO: 0 /100 WBCS
PH UR STRIP.AUTO: 7.5 [PH]
PLATELET # BLD AUTO: 218 THOUSANDS/UL (ref 149–390)
PMV BLD AUTO: 10.5 FL (ref 8.9–12.7)
POTASSIUM SERPL-SCNC: 3.7 MMOL/L (ref 3.5–5.3)
PROT SERPL-MCNC: 7.4 G/DL (ref 6.4–8.2)
PROT UR STRIP-MCNC: NEGATIVE MG/DL
PTH-INTACT SERPL-MCNC: 66.6 PG/ML (ref 18.4–80.1)
RBC # BLD AUTO: 4.36 MILLION/UL (ref 3.81–5.12)
SODIUM SERPL-SCNC: 139 MMOL/L (ref 136–145)
SP GR UR STRIP.AUTO: 1.01 (ref 1–1.03)
T4 FREE SERPL-MCNC: 0.99 NG/DL (ref 0.76–1.46)
T4 SERPL-MCNC: 7.2 UG/DL (ref 4.7–13.3)
TIBC SERPL-MCNC: 317 UG/DL (ref 250–450)
TRIGL SERPL-MCNC: 59 MG/DL
UROBILINOGEN UR QL STRIP.AUTO: 0.2 E.U./DL
WBC # BLD AUTO: 4.33 THOUSAND/UL (ref 4.31–10.16)

## 2021-03-16 PROCEDURE — 83520 IMMUNOASSAY QUANT NOS NONAB: CPT

## 2021-03-16 PROCEDURE — 83550 IRON BINDING TEST: CPT

## 2021-03-16 PROCEDURE — 82128 AMINO ACIDS MULT QUAL: CPT

## 2021-03-16 PROCEDURE — 84436 ASSAY OF TOTAL THYROXINE: CPT

## 2021-03-16 PROCEDURE — 83919 ORGANIC ACIDS QUAL EACH: CPT

## 2021-03-16 PROCEDURE — 82128 AMINO ACIDS MULT QUAL: CPT | Performed by: MEDICAL GENETICS

## 2021-03-16 PROCEDURE — 83970 ASSAY OF PARATHORMONE: CPT

## 2021-03-16 PROCEDURE — 82542 COL CHROMOTOGRAPHY QUAL/QUAN: CPT

## 2021-03-16 PROCEDURE — 83540 ASSAY OF IRON: CPT

## 2021-03-16 PROCEDURE — 83036 HEMOGLOBIN GLYCOSYLATED A1C: CPT | Performed by: MEDICAL GENETICS

## 2021-03-16 PROCEDURE — 84439 ASSAY OF FREE THYROXINE: CPT

## 2021-03-16 PROCEDURE — 82728 ASSAY OF FERRITIN: CPT

## 2021-03-16 PROCEDURE — 82306 VITAMIN D 25 HYDROXY: CPT

## 2021-03-16 PROCEDURE — 80061 LIPID PANEL: CPT

## 2021-03-16 PROCEDURE — 84210 ASSAY OF PYRUVATE: CPT

## 2021-03-16 PROCEDURE — 80053 COMPREHEN METABOLIC PANEL: CPT

## 2021-03-16 PROCEDURE — 85025 COMPLETE CBC W/AUTO DIFF WBC: CPT

## 2021-03-16 PROCEDURE — 36415 COLL VENOUS BLD VENIPUNCTURE: CPT

## 2021-03-16 PROCEDURE — 81003 URINALYSIS AUTO W/O SCOPE: CPT | Performed by: MEDICAL GENETICS

## 2021-03-17 ENCOUNTER — APPOINTMENT (OUTPATIENT)
Dept: LAB | Facility: CLINIC | Age: 46
End: 2021-03-17
Payer: COMMERCIAL

## 2021-03-17 DIAGNOSIS — H91.90 HEARING LOSS, UNSPECIFIED HEARING LOSS TYPE, UNSPECIFIED LATERALITY: Primary | ICD-10-CM

## 2021-03-17 DIAGNOSIS — R53.82 CHRONIC FATIGUE SYNDROME: ICD-10-CM

## 2021-03-17 DIAGNOSIS — E88.40 MITOCHONDRIAL DISEASE (HCC): ICD-10-CM

## 2021-03-17 PROCEDURE — 82978 ASSAY OF GLUTATHIONE: CPT

## 2021-03-17 PROCEDURE — 36415 COLL VENOUS BLD VENIPUNCTURE: CPT

## 2021-03-18 LAB — PYRUVATE BLD-MCNC: 0.4 MG/DL (ref 0.3–0.7)

## 2021-03-19 LAB
(HCYS)2/CREAT UR-RTO: <0.3 UMOL/G CR (ref 0.3–1.4)
A-AMINOBUTYR SERPL-SCNC: 30.6 UMOL/L (ref 5.4–34.5)
A-AMINOBUTYR/CREAT UR-RTO: 14.7 UMOL/G CR (ref 1–34.6)
AAA SERPL-SCNC: 1.8 UMOL/L (ref 0–1.9)
AAA/CREAT UR-RTO: 63.2 UMOL/G CR (ref 0.5–146.7)
ALANINE SERPL-SCNC: 325.1 UMOL/L (ref 209.2–515.5)
ALANINE/CREAT UR-RTO: 430 UMOL/G CR (ref 77.9–1337)
ALLOISOLEUCINE SERPL-SCNC: 1.3 UMOL/L (ref 0–3.2)
ALLOISOLEUCINE/CREAT UR-RTO: 1.1 UMOL/G CR (ref 0.1–13.5)
AMINO ACID PAT SERPL-IMP: ABNORMAL
AMINO ACID PAT SERPL-IMP: ABNORMAL
ARGININE SERPL-SCNC: 103.3 UMOL/L (ref 36.3–119.2)
ARGININE/CREAT UR-RTO: 32.1 UMOL/G CR (ref 5–69.6)
ARGININOSUCCINATE SERPL-SCNC: 0.2 UMOL/L (ref 0–3)
ARGININOSUCCINATE/CREAT UR-RTO: 19.5 UMOL/G CR (ref 0.1–51.2)
ASPARAGINE SERPL-SCNC: 58.9 UMOL/L (ref 29.5–84.5)
ASPARAGINE/CREAT UR-RTO: 65.3 UMOL/G CR (ref 25.4–454.2)
ASPARTATE SERPL-SCNC: 1.1 UMOL/L (ref 0–7.4)
ASPARTATE/CREAT UR-RTO: 7.9 UMOL/G CR (ref 1–86.7)
B-AIB SERPL-SCNC: 2.4 UMOL/L (ref 0–4.3)
B-AIB/CREAT UR-RTO: 176.3 UMOL/G CR (ref 0.5–807.9)
B-ALANINE SERPL-SCNC: 2.6 UMOL/L (ref 1.1–9)
B-ALANINE/CREAT UR-RTO: 16.3 UMOL/G CR (ref 1–869.8)
CARN ESTERS/C0 SERPL-SRTO: 0.3 RATIO (ref 0–0.9)
CARNITINE FREE SERPL-SCNC: 35 UMOL/L (ref 20–55)
CARNITINE SERPL-SCNC: 44 UMOL/L (ref 27–73)
CITRULLINE SERPL-SCNC: 45.6 UMOL/L (ref 15.6–46.9)
CITRULLINE/CREAT UR-RTO: 5.8 UMOL/G CR (ref 1–27.4)
CYSTATHIONIN SERPL-SCNC: <0.5 UMOL/L (ref 0–0.7)
CYSTATHIONIN/CREAT UR-RTO: 3.7 UMOL/G CR (ref 0.5–80.8)
CYSTINE SERPL-SCNC: 41.3 UMOL/L (ref 15.8–47.3)
CYSTINE/CREAT UR-RTO: 32.1 UMOL/G CR (ref 0.3–223.8)
GABA SERPL-SCNC: <0.5 UMOL/L (ref 0–0.6)
GABA/CREAT UR-RTO: 3.7 UMOL/G CR (ref 0.5–13.1)
GLUTAMATE SERPL-SCNC: 20.6 UMOL/L (ref 18.1–155.9)
GLUTAMATE/CREAT UR-RTO: <5 UMOL/G CR (ref 5–92.4)
GLUTAMINE SERPL-SCNC: 689.3 UMOL/L (ref 372.8–701.4)
GLUTAMINE/CREAT UR-RTO: 426.8 UMOL/G CR (ref 5–1756.2)
GLYCINE SERPL-SCNC: 294.2 UMOL/L (ref 144–411)
GLYCINE/CREAT UR-RTO: 1083.7 UMOL/G CR (ref 277.3–7996.9)
HCYS SERPL-SCNC: <0.3 UMOL/L (ref 0–0.2)
HISTIDINE SERPL-SCNC: 95.3 UMOL/L (ref 47.2–98.5)
HISTIDINE/CREAT UR-RTO: 637.4 UMOL/G CR (ref 106.4–2534.2)
HOMOCITRULLINE SERPL-SCNC: 0.6 UMOL/L (ref 0–1.7)
HOMOCITRULLINE/CREAT UR-RTO: 24.2 UMOL/G CR (ref 0.5–80)
ISOLEUCINE SERPL-SCNC: 71.1 UMOL/L (ref 32.8–88.3)
ISOLEUCINE/CREAT UR-RTO: 11.6 UMOL/G CR (ref 5–48.1)
LAB DIRECTOR NAME PROVIDER: ABNORMAL
LAB DIRECTOR NAME PROVIDER: ABNORMAL
LEUCINE SERPL-SCNC: 136.9 UMOL/L (ref 66.7–165.7)
LEUCINE/CREAT UR-RTO: 34.7 UMOL/G CR (ref 5–129.1)
LYSINE SERPL-SCNC: 276.1 UMOL/L (ref 94–278)
LYSINE/CREAT UR-RTO: 85.3 UMOL/G CR (ref 15.3–1020.6)
METHIONINE SERPL-SCNC: 25.9 UMOL/L (ref 14.7–35.2)
METHIONINE/CREAT UR-RTO: 12.1 UMOL/G CR (ref 1–37.1)
OH-LYSINE SERPL-SCNC: 0.3 UMOL/L (ref 0.1–0.8)
OH-LYSINE/CREAT UR-RTO: 4.7 UMOL/G CR (ref 0.1–37.3)
OH-PROLINE SERPL-SCNC: 8.7 UMOL/L (ref 4.7–35.2)
OH-PROLINE/CREAT UR-RTO: <0.5 UMOL/G CR (ref 0.5–87.9)
ORNITHINE SERPL-SCNC: 93.5 UMOL/L (ref 30.1–101.3)
ORNITHINE/CREAT UR-RTO: <5 UMOL/G CR (ref 5–76.3)
PHE SERPL-SCNC: 66.8 UMOL/L (ref 35.8–76.9)
PHE/CREAT UR-RTO: 33.2 UMOL/G CR (ref 5–239)
PROLINE SERPL-SCNC: 380.8 UMOL/L (ref 84.8–352.5)
PROLINE/CREAT UR-RTO: <5 UMOL/G CR (ref 5–168.6)
REF LAB TEST METHOD: ABNORMAL
REF LAB TEST METHOD: ABNORMAL
SARCOSINE SERPL-SCNC: 1.6 UMOL/L (ref 0–4)
SARCOSINE/CREAT UR-RTO: <0.5 UMOL/G CR (ref 0.5–27.3)
SERINE SERPL-SCNC: 152.8 UMOL/L (ref 48.7–145.2)
SERINE/CREAT UR-RTO: 390 UMOL/G CR (ref 98.4–1052.8)
TAURINE SERPL-SCNC: 75.7 UMOL/L (ref 29.2–132.3)
TAURINE/CREAT UR-RTO: 584.2 UMOL/G CR (ref 24.2–5335.7)
THREONINE SERPL-SCNC: 148.2 UMOL/L (ref 67.8–211.6)
THREONINE/CREAT UR-RTO: 223.2 UMOL/G CR (ref 5–714.9)
TRYPTOPHAN SERPL-SCNC: 70.8 UMOL/L (ref 23.5–93)
TRYPTOPHAN/CREAT UR-RTO: 56.3 UMOL/G CR (ref 1–207.5)
TYROSINE SERPL-SCNC: 100.1 UMOL/L (ref 27.8–83.3)
TYROSINE/CREAT UR-RTO: 74.7 UMOL/G CR (ref 5–388.9)
VALINE SERPL-SCNC: 274.4 UMOL/L (ref 133–317.1)
VALINE/CREAT UR-RTO: 39.5 UMOL/G CR (ref 5–147.4)

## 2021-03-21 LAB — UBIQUINONE10 SERPL-MCNC: 0.68 UG/ML (ref 0.37–2.2)

## 2021-03-22 LAB
3OH-BUTYRCARN SERPL-SCNC: 0.04 UMOL/L (ref 0–0.09)
3OH-IV+2ME3OH-BUTYR CARN SERPL-SCNC: 0.04 UMOL/L (ref 0–0.06)
3OH-LINOLEOYLCARN SERPL-SCNC: 0.01 UMOL/L (ref 0–0.01)
3OH-OLEOYLCARN SERPL-SCNC: 0.01 UMOL/L (ref 0–0.02)
3OH-PALMITOLEYLCARN SERPL-SCNC: 0.01 UMOL/L (ref 0–0.02)
3OH-PALMITOYLCARN SERPL-SCNC: 0.01 UMOL/L (ref 0–0.02)
3OH-STEAROYLCARN SERPL-SCNC: 0 UMOL/L (ref 0–0.02)
3OH-TDECANOYLCARN SERPL-SCNC: 0.01 UMOL/L (ref 0–0.02)
ACETYLCARN SERPL-SCNC: 7.06 UMOL/L (ref 3.23–10.29)
ACYLCARNITINE PATTERN SERPL-IMP: NORMAL
AMINO ACID, QN URINE: NORMAL
BUTYRYL+ISOBUTYRYLCARN SERPL-SCNC: 0.25 UMOL/L (ref 0.08–0.32)
DECADIENOYLCARN SERPL-SCNC: 0.02 UMOL/L (ref 0–0.05)
DECANOYLCARN SERPL-SCNC: 0.14 UMOL/L (ref 0–0.38)
DECENOYLCARN SERPL-SCNC: 0.1 UMOL/L (ref 0.01–0.32)
DODECANOYLCARN SERPL-SCNC: 0.06 UMOL/L (ref 0–0.15)
GLUTARYLCARN SERPL-SCNC: 0.05 UMOL/L (ref 0–0.1)
HEXANOYLCARN SERPL-SCNC: 0.04 UMOL/L (ref 0–0.1)
ISOVALERYL+MEBUTYRYLCARN SERPL-SCNC: 0.13 UMOL/L (ref 0.01–0.21)
LAB DIRECTOR NAME PROVIDER: NORMAL
LINOLEOYLCARN SERPL-SCNC: 0.05 UMOL/L (ref 0–0.11)
MALONYLCARN SERPL-SCNC: 0.07 UMOL/L (ref 0.02–0.12)
ME-MALONYLCARN SERPL-SCNC: 0.03 UMOL/L (ref 0.01–0.07)
OCTANOYLCARN SERPL-SCNC: 0.11 UMOL/L (ref 0–0.27)
OLEOYLCARN SERPL-SCNC: 0.12 UMOL/L (ref 0.04–0.17)
PALMITOLEYLCARN SERPL-SCNC: 0.03 UMOL/L (ref 0–0.04)
PALMITOYLCARN SERPL-SCNC: 0.08 UMOL/L (ref 0.03–0.13)
PROPIONYLCARN SERPL-SCNC: 0.44 UMOL/L (ref 0.16–0.62)
REF LAB TEST METHOD: NORMAL
STEAROYLCARN SERPL-SCNC: 0.03 UMOL/L (ref 0–0.07)
TDECADIENOYLCARN SERPL-SCNC: 0.03 UMOL/L (ref 0–0.11)
TDECANOYLCARN SERPL-SCNC: 0.03 UMOL/L (ref 0–0.06)
TDECENOYLCARN SERPL-SCNC: 0.07 UMOL/L (ref 0–0.17)
TGLY+MTHYL (C5:1) SERPL-SCNC: 0.01 UMOL/L (ref 0–0.02)

## 2021-03-24 ENCOUNTER — HOSPITAL ENCOUNTER (OUTPATIENT)
Dept: SLEEP CENTER | Facility: CLINIC | Age: 46
Discharge: HOME | End: 2021-03-24
Payer: COMMERCIAL

## 2021-03-24 DIAGNOSIS — G47.419 PRIMARY NARCOLEPSY WITHOUT CATAPLEXY: ICD-10-CM

## 2021-03-24 PROCEDURE — 95810 POLYSOM 6/> YRS 4/> PARAM: CPT

## 2021-03-25 ENCOUNTER — HOSPITAL ENCOUNTER (OUTPATIENT)
Dept: SLEEP CENTER | Facility: CLINIC | Age: 46
Discharge: HOME | End: 2021-03-25
Payer: COMMERCIAL

## 2021-03-25 DIAGNOSIS — G47.419 PRIMARY NARCOLEPSY WITHOUT CATAPLEXY: ICD-10-CM

## 2021-03-25 PROCEDURE — 95805 MULTIPLE SLEEP LATENCY TEST: CPT

## 2021-03-25 PROCEDURE — 80307 DRUG TEST PRSMV CHEM ANLYZR: CPT

## 2021-03-25 NOTE — PROGRESS NOTES
Sleep Study Documentation    Pre-Sleep Study       Sleep testing procedure explained to patient:YES    Patient napped prior to study:NO    Caffeine:Dayshift worker after 12PM   Caffeine use:NO    Alcohol:Dayshift workers after 5PM: Alcohol use:NO    Typical day for patient:YES       Study Documentation    Sleep Study Indications: GERD, migraine, hypertension, excessive daytime sleepiness, suspected narcolepsy  Sleep Study: Diagnostic   Snore:None  Supplemental O2: no    O2 flow rate (L/min) range   O2 flow rate (L/min) final   Minimum SaO2 85%  Baseline SaO2 96%        Mode of Therapy:    EKG abnormalities: no     EEG abnormalities: no    Sleep Study Recorded < 2 hours: N/A    Sleep Study Recorded > 2 hours but incomplete study: N/A    Sleep Study Recorded 6 hours but no sleep obtained: NO    Patient classification: employed       Post-Sleep Study    Medication used at bedtime or during sleep study:YES prescription sleep aid    Patient reports time it took to fall asleep:greater than 60 minutes    Patient reports waking up during study:3 or more times  Patient reports returning to sleep in 10 to 30 minutes  Patient reports sleeping 4 to 6 hours with dreaming  Patient reports sleep during study:worse than usual    Patient rated sleepiness: Somewhat sleepy or tired    PAP treatment:no  Patient took sleep aid

## 2021-03-25 NOTE — PROGRESS NOTES
Pre-Sleep Study       Sleep testing procedure explained to patient:YES    Urine drug screen performed: Yes     Two-week sleep diary was completed      Study Documentation    Patient reports:    Nap: 1: Sleep no Dream no    Nap 2:  Sleep no Dream no    Nap 3:  Sleep no Dream no    Nap 4: Sleep no Dream no    Nap 5:  Sleep no Dream no    EKG abnormalities: no     EEG abnormalities: no    Naps completed: 5

## 2021-03-26 LAB
AMPHETAMINES UR QL SCN: NEGATIVE NG/ML
BARBITURATES UR QL SCN: NEGATIVE NG/ML
BENZODIAZ UR QL: NEGATIVE NG/ML
BZE UR QL: NEGATIVE NG/ML
CANNABINOIDS UR QL SCN: NEGATIVE NG/ML
METHADONE UR QL SCN: NEGATIVE NG/ML
MISCELLANEOUS LAB TEST RESULT: NORMAL
OPIATES UR QL: NEGATIVE NG/ML
PCP UR QL: NEGATIVE NG/ML
PROPOXYPH UR QL SCN: NEGATIVE NG/ML

## 2021-03-29 ENCOUNTER — APPOINTMENT (OUTPATIENT)
Dept: RADIOLOGY | Facility: AMBULARY SURGERY CENTER | Age: 46
End: 2021-03-29
Attending: ORTHOPAEDIC SURGERY
Payer: COMMERCIAL

## 2021-03-29 ENCOUNTER — TELEPHONE (OUTPATIENT)
Dept: SLEEP CENTER | Facility: CLINIC | Age: 46
End: 2021-03-29

## 2021-03-29 ENCOUNTER — OFFICE VISIT (OUTPATIENT)
Dept: OBGYN CLINIC | Facility: CLINIC | Age: 46
End: 2021-03-29
Payer: COMMERCIAL

## 2021-03-29 VITALS — WEIGHT: 175 LBS | HEIGHT: 65 IN | BODY MASS INDEX: 29.16 KG/M2

## 2021-03-29 DIAGNOSIS — M25.551 RIGHT HIP PAIN: Primary | ICD-10-CM

## 2021-03-29 DIAGNOSIS — M21.70 LEG LENGTH DISCREPANCY: ICD-10-CM

## 2021-03-29 DIAGNOSIS — M25.551 RIGHT HIP PAIN: ICD-10-CM

## 2021-03-29 DIAGNOSIS — M70.61 TROCHANTERIC BURSITIS OF RIGHT HIP: ICD-10-CM

## 2021-03-29 LAB — MISCELLANEOUS LAB TEST RESULT: NORMAL

## 2021-03-29 PROCEDURE — 73502 X-RAY EXAM HIP UNI 2-3 VIEWS: CPT

## 2021-03-29 PROCEDURE — 20610 DRAIN/INJ JOINT/BURSA W/O US: CPT | Performed by: ORTHOPAEDIC SURGERY

## 2021-03-29 PROCEDURE — 99244 OFF/OP CNSLTJ NEW/EST MOD 40: CPT | Performed by: ORTHOPAEDIC SURGERY

## 2021-03-29 RX ORDER — BETAMETHASONE SODIUM PHOSPHATE AND BETAMETHASONE ACETATE 3; 3 MG/ML; MG/ML
12 INJECTION, SUSPENSION INTRA-ARTICULAR; INTRALESIONAL; INTRAMUSCULAR; SOFT TISSUE
Status: COMPLETED | OUTPATIENT
Start: 2021-03-29 | End: 2021-03-29

## 2021-03-29 RX ORDER — LIDOCAINE HYDROCHLORIDE 10 MG/ML
1 INJECTION, SOLUTION INFILTRATION; PERINEURAL
Status: COMPLETED | OUTPATIENT
Start: 2021-03-29 | End: 2021-03-29

## 2021-03-29 RX ORDER — BUPIVACAINE HYDROCHLORIDE 2.5 MG/ML
1 INJECTION, SOLUTION INFILTRATION; PERINEURAL
Status: COMPLETED | OUTPATIENT
Start: 2021-03-29 | End: 2021-03-29

## 2021-03-29 RX ADMIN — BETAMETHASONE SODIUM PHOSPHATE AND BETAMETHASONE ACETATE 12 MG: 3; 3 INJECTION, SUSPENSION INTRA-ARTICULAR; INTRALESIONAL; INTRAMUSCULAR; SOFT TISSUE at 11:33

## 2021-03-29 RX ADMIN — LIDOCAINE HYDROCHLORIDE 1 ML: 10 INJECTION, SOLUTION INFILTRATION; PERINEURAL at 11:33

## 2021-03-29 RX ADMIN — BUPIVACAINE HYDROCHLORIDE 1 ML: 2.5 INJECTION, SOLUTION INFILTRATION; PERINEURAL at 11:33

## 2021-03-29 NOTE — PROGRESS NOTES
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[address] )    GERD (gastroesophageal reflux disease)    Migraine without aura and without status migrainosus, not intractable    History of Nissen fundoplication    Reactive hypoglycemia    Hyperparathyroidism , secondary, non-renal (HCC)    Hirsutism    Orthostatic hypotension    MTHFR mutation (methylenetetrahydrofolate reductase) (HCC)    History of malignant melanoma of skin    Chronic venous embolism and thrombosis of deep vessels of distal lower extremity (HCC)    Congenital anomaly of lower limb    Family history of endocrine and metabolic disease    Esophageal dysmotility    Dysautonomia orthostatic hypotension syndrome (HCC)    Fibroid    Urinary retention    Renal cyst, right    Retinitis pigmentosa    Polycystic ovarian syndrome    Neurogenic bladder    Nyctalopia    Hypertension    Raynaud's disease without gangrene    Bariatric surgery status    Complex regional pain syndrome type 1 of right upper extremity    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    Menorrhagia with regular cycle    Anemia    Gastroesophageal reflux disease without esophagitis    CAS (obstructive sleep apnea)    Primary narcolepsy without cataplexy           Plan      · The patiently displays symptoms and exam consistent with right trochanteric bursitis along with leg length discrepancy  · Right trochanteric bursa steroid injection is administered and tolerated well  · The patient is instructed to try right sided shoe insert  · Start physical therapy for right trochanteric bursitis   · Follow up in 6 weeks   · Patient to cancel if she has complete resolution  · Scanogram can be considered if pain persists  Subjective:     Patient ID:    Chief Complaint:Leigh Smart 39 y o  female      HPI    Patient presents for initial evaluation of right hip  She is here from her PCP  She has had symptoms for several weeks with no injury  Today she complains of right lateral hip pain  The pain progresses with the day  She rates her pain at 1-2/10 and 8/10 at its greatest   She describes the pain as an ache that can stab on occasion  Prolonged weight bearing aggravates while reclining on seat alleviates  She has used otc NSAIDs with some benefit yet avoids these medications due to past history of Gastric surgery  She denies past physical therapy, injections or surgery  History of strokes with right sided weakness          The following portions of the patient's history were reviewed and updated as appropriate: allergies, current medications, past family history, past social history, past surgical history and problem list         Social History     Socioeconomic History    Marital status: /Civil Union     Spouse name: Not on file    Number of children: Not on file    Years of education: Not on file    Highest education level: Not on file   Occupational History    Occupation: infusion center   Social Needs    Financial resource strain: Not on file    Food insecurity     Worry: Not on file     Inability: Not on file   [employer] needs     Medical: Not on file     Non-medical: Not on file   Tobacco Use    Smoking status: Never Smoker    Smokeless tobacco: Never Used   Substance and Sexual Activity    Alcohol use: No    Drug use: No    Sexual activity: Yes     Partners: Male     Birth control/protection: Male Sterilization   Lifestyle    Physical activity     Days per week: Not on file     Minutes per session: Not on file    Stress: Not on file   Relationships    Social connections     Talks on phone: Not on file     Gets together: Not on file     Attends [employer] service: Not on file     Active member of club or organization: Not on file     Attends meetings of clubs or organizations: Not on file     Relationship status: Not on file    Intimate partner violence     Fear of current or ex partner: Not on file     Emotionally abused: Not on file     Physically abused: Not on file     Forced sexual activity: Not on file   Other Topics Concern    Not on file   Social History Narrative        2 children - son and daughter with mitrochondrial diseas     Past Medical History:   Diagnosis Date    Acid reflux     Constipated     Dysautonomia ([facility] )     Esophageal abnormality     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   Gastrostomy tube in place [facility])     Gastrostomy tube in place [facility])     History of blood clots 2012    Left leg  Has IVC filter now  Occurred while on Lovenox    Iron deficiency     Malignant hyperthermia due to anesthesia     Patient's son has M H   States she needs precautions    Migraines     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[facility] )     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[place] [address] )     MTHFR (methylene THF reductase) deficiency and homocystinuria (HCC)     Muscle weakness (generalized)     Nausea     On total parenteral nutrition (TPN)     for 8 mts    PCOS (polycystic ovarian syndrome)     PONV (postoperative nausea and vomiting)     Postgastrectomy syndrome     malabsorption    Postsurgical malabsorption     Status post gastric bypass for obesity     Stroke [facility]) [id number], [id number]    Metabolic strokes  Right hemiparesis= minor now   Vomiting     When eating     Past Surgical History:   Procedure Laterality Date    ANKLE SURGERY Left     Has plate and screws    COLONOSCOPY      COSMETIC SURGERY      X14 as child post attack by dog   Arms, legs and face  DILATION AND CURETTAGE OF UTERUS      x2    ESOPHAGOGASTRODUODENOSCOPY N/A 1/27/2016    Procedure: ESOPHAGOGASTRODUODENOSCOPY (EGD); Surgeon: Bell Bingham MD;  [facility]: [facility]; Service:     FRACTURE SURGERY Left     Left ankle - hardware    GASTRIC BYPASS      IVC FILTER INSERTION      NISSEN FUNDOPLICATION      OVARY SURGERY Bilateral     "Drilling" due to multiple cysts- PCOS    WY EGD TRANSORAL BIOPSY SINGLE/MULTIPLE N/A 3/9/2016    Procedure: ESOPHAGOGASTRODUODENOSCOPY (EGD); Surgeon: Bell Bingham MD;  [facility]; Service: Bariatrics    WY HYSTEROSCOPY,W/ENDO BX N/A 5/22/2020    Procedure: DILATATION AND CURETTAGE (D&C) WITH HYSTEROSCOPY;  Surgeon: Tessa Murillo MD;  [facility];  Service: Gynecology    WY HYSTEROSCOPY,W/ENDOMETRIAL ABLATION N/A 5/22/2020    Procedure: Will Cover;  Surgeon: Tessa Murilol MD;  [facility];  Service: Gynecology    WY LAP,GASTROSTOMY,W/O TUBE CONSTR N/A 4/19/2016    Procedure: INSERTION GASTROSTOMY TUBE LAPAROSCOPIC WITH INTRAOP EGD;  Surgeon: Bell Bingham MD;  [facility];  Service: Bariatrics    ULNAR NERVE TRANSPOSITION Right     WISDOM TOOTH EXTRACTION       Allergies   Allergen Reactions    Sulfate Other (See Comments)    Sulfa Antibiotics      Arrhythmia     Guaifenesin      Arrhythmia    Other      Malignant hyperhermia precautions  NEVER use Lacted Ringers     Current Outpatient Medications on File Prior to Visit   Medication Sig Dispense Refill    acarbose (PRECOSE) 100 MG tablet Take 1 tablet (100 mg total) by mouth 3 (three) times a day with meals 270 tablet 0    acetylcysteine (MUCOMYST) 200 mg/mL nebulizer solution as needed       Alpha-Lipoic Acid 100 MG CAPS Take by mouth      B Complex-Biotin-FA (TH VITAMIN B 50/B-COMPLEX PO) Take 1 tablet by mouth daily          baclofen 10 mg tablet Take 0 5 tablets (5 mg total) by mouth daily at bedtime as needed for muscle spasms 10 tablet 1    Blood Glucose Monitoring Suppl (ONETOUCH VERIO) w/Device KIT Dispense 1 meter 1 kit 1    cloNIDine (CATAPRES) 0 1 mg tablet Take 1 tablet (0 1 mg total) by mouth daily at bedtime 30 tablet 2    COENZYME Q-10 PO       Continuous Blood Gluc Sensor (Dexcom G6 Sensor) MISC Use as directed for CGM - Change every 10 days 3 each 0    Continuous Blood Gluc Transmit (Dexcom G6 Transmitter) MISC USE AS DIRECTED FOR CGM AND CHANGE EVERY 3 MONTHS 1 each 3    Continuous Blood Gluc Transmit (Dexcom G6 Transmitter) MISC Use as directed for CGM - Change every 3 months 1 each 3    CREATINE MONOHYDRATE PO Take 2 5 g by mouth      Cyanocobalamin (B-12) 1000 MCG/ML KIT Inject  as directed every 30 days   diphenhydrAMINE (BENADRYL) 25 mg tablet Take by mouth      docusate sodium (COLACE) 100 mg capsule Take 200 mg by mouth 2 (two) times a day   Galcanezumab-gnlm (Emgality) 120 MG/ML SOAJ 1 subq injection every 30 days  1 mL 11    iron sucrose (VENOFER) 20 mg/mL Venofer 200 mg iron/10 mL intravenous solution      L-Arginine 1000 MG TABS Take 6,000 mg by mouth 2 (two) times a day       linaCLOtide 145 MCG CAPS Take 145 mcg by mouth 2 (two) times a day      midodrine (PROAMATINE) 5 mg tablet Take 1 tablet (5 mg total) by mouth 3 (three) times a day 270 tablet 3    Multiple Vitamin (MULTIVITAMIN) tablet Take 1 tablet by mouth daily        NIFEdipine (PROCARDIA XL) 60 mg 24 hr tablet Take 1 tablet (60 mg total) by mouth daily 90 tablet 1    nystatin (MYCOSTATIN) cream Apply topically 2 (two) times a day 30 g 0    OneTouch Delica Lancets 92W MISC by Does not apply route 3 (three) times a day 300 each 1    OneTouch Verio test strip 1 each by Other route 3 (three) times a day 300 each 1    Oral Vehicles (PCCA-PLUS) SUSP       P-Aminobenzoic Acid (PARA-AMINOBENZOIC ACID) POWD       pantoprazole (PROTONIX) 40 mg tablet   3    polyethylene glycol (MIRALAX) 17 g packet Take 17 g by mouth 2 (two) times a day Indications: 1 5 caps full 1-2 times/day   Prucalopride Succinate (Motegrity) 2 MG TABS Take by mouth daily      Riboflavin 100 MG TABS Take 100 mg by mouth      sitaGLIPtin (JANUVIA) 25 mg tablet Take 1 tablet (25 mg total) by mouth daily 30 tablet 3    sucralfate (CARAFATE) 1 g/10 mL suspension Take 10 mL (1 g total) by mouth 4 (four) times a day Must have liquid suspension (Patient not taking: Reported on 3/9/2021) 420 mL 5    TRILYTE 420 g solution   5    Ubiquinol Liposomal 100 MG/5ML SYRP Take 300 mg by mouth      Ubiquinol POWD       vitamin E 100 UNIT capsule Take 78 Units by mouth daily       No current facility-administered medications on file prior to visit  Objective:    Review of Systems   Constitutional: Negative for chills, fever and unexpected weight change  HENT: Negative for hearing loss, nosebleeds and sore throat  Eyes: Negative for pain, redness and visual disturbance  Respiratory: Negative for cough, shortness of breath and wheezing  Cardiovascular: Negative for chest pain, palpitations and leg swelling  Gastrointestinal: Negative for abdominal pain, nausea and vomiting  Genitourinary: Negative for dyspareunia, dysuria and frequency  Skin: Negative for rash and wound  Neurological: Negative for dizziness, numbness and headaches  Psychiatric/Behavioral: Negative for decreased concentration and suicidal ideas  The patient is not nervous/anxious  Ortho Exam   Lumbar:  Diffuse right sided weakness (history of stroke)  5/5 left sided strength L3-S1  Positive right slump test, buttock pain    Right hip:  Right leg slightly shorter than left in supine and knee flexed  Neg piriformis test  Neg Reji's test    TTP over greater trochanter    Physical Exam  Constitutional:       Appearance: She is well-developed  HENT:      Head: Normocephalic     Eyes:      Conjunctiva/sclera: Conjunctivae normal    Neck:      Musculoskeletal: Normal range of motion  Cardiovascular:      Rate and Rhythm: Normal rate  Pulmonary:      Effort: Pulmonary effort is normal    Skin:     General: Skin is warm and dry  Neurological:      Mental Status: She is alert and oriented to person, place, and time  Large joint arthrocentesis: R greater trochanteric bursa  Universal Protocol:  Consent: Verbal consent obtained  Risks and benefits: risks, benefits and alternatives were discussed  Consent given by: patient  Time out: Immediately prior to procedure a "time out" was called to verify the correct patient, procedure, equipment, support staff and site/side marked as required  Timeout called at: 3/29/2021 11:30 AM   Patient understanding: patient states understanding of the procedure being performed  Site marked: the operative site was marked  Patient identity confirmed: verbally with patient    Supporting Documentation  Indications: pain   Procedure Details  Location: hip - R greater trochanteric bursa  Preparation: Patient was prepped and draped in the usual sterile fashion  Needle size: 22 G  Ultrasound guidance: no  Approach: anterolateral  Medications administered: 1 mL lidocaine 1 %; 12 mg betamethasone acetate-betamethasone sodium phosphate 6 (3-3) mg/mL; 1 mL bupivacaine 0 25 % (1ml 0 5% bupivicaine (PF))    Patient tolerance: patient tolerated the procedure well with no immediate complications  Dressing:  Sterile dressing applied          None performed today    I have personally reviewed pertinent films in PACS  Right hip x-ray:  Left hip slightly higher than right  Minimal arthritic changes        Scribe Attestation    I,:  Jody Tinajero am acting as a scribe while in the presence of the attending physician :       I,:  Nish Lopes DO personally performed the services described in this documentation    as scribed in my presence :             Portions of the record may have been created with voice recognition software   Occasional wrong word or "sound a like" substitutions may have occurred due to the inherent limitations of voice recognition software   Read the chart carefully and recognize, using context, where substitutions have occurred

## 2021-03-29 NOTE — LETTER
March 29, 2021     Ganesh Chaney MD  [address]  [address] [address] [address]    Patient: Miky Esquivel   YOB: 1975   Date of Visit: 3/29/2021       Dear Dr Ricky Lopez: Thank you for referring Opal Daniels to me for evaluation  Below are my notes for this consultation  If you have questions, please do not hesitate to call me  I look forward to following your patient along with you  Sincerely,        Linh Olvera DO        CC: No Recipients  Linh Olvera DO  3/29/2021 11:50 AM  Signed  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[address] )    GERD (gastroesophageal reflux disease)    Migraine without aura and without status migrainosus, not intractable    History of Nissen fundoplication    Reactive hypoglycemia    Hyperparathyroidism , secondary, non-renal (HCC)    Hirsutism    Orthostatic hypotension    MTHFR mutation (methylenetetrahydrofolate reductase) (HCC)    History of malignant melanoma of skin    Chronic venous embolism and thrombosis of deep vessels of distal lower extremity (HCC)    Congenital anomaly of lower limb    Family history of endocrine and metabolic disease    Esophageal dysmotility    Dysautonomia orthostatic hypotension syndrome (HCC)    Fibroid    Urinary retention    Renal cyst, right    Retinitis pigmentosa    Polycystic ovarian syndrome    Neurogenic bladder    Nyctalopia    Hypertension    Raynaud's disease without gangrene    Bariatric surgery status  Complex regional pain syndrome type 1 of right upper extremity    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    Menorrhagia with regular cycle    Anemia    Gastroesophageal reflux disease without esophagitis    CAS (obstructive sleep apnea)    Primary narcolepsy without cataplexy           Plan      · The patiently displays symptoms and exam consistent with right trochanteric bursitis along with leg length discrepancy  · Right trochanteric bursa steroid injection is administered and tolerated well  · The patient is instructed to try right sided shoe insert  · Start physical therapy for right trochanteric bursitis   · Follow up in 6 weeks   · Patient to cancel if she has complete resolution  · Scanogram can be considered if pain persists  Subjective:     Patient ID:    Chief Complaint:Leigh Espinoza 39 y o  female      HPI    Patient presents for initial evaluation of right hip  She is here from her PCP  She has had symptoms for several weeks with no injury  Today she complains of right lateral hip pain  The pain progresses with the day  She rates her pain at 1-2/10 and 8/10 at its greatest   She describes the pain as an ache that can stab on occasion  Prolonged weight bearing aggravates while reclining on seat alleviates  She has used otc NSAIDs with some benefit yet avoids these medications due to past history of Gastric surgery  She denies past physical therapy, injections or surgery  History of strokes with right sided weakness          The following portions of the patient's history were reviewed and updated as appropriate: allergies, current medications, past family history, past social history, past surgical history and problem list         Social History     Socioeconomic History    Marital status: /Civil Union     Spouse name: Not on file    Number of children: Not on file    Years of education: Not on file    Highest education level: Not on file Occupational History    Occupation: infusion center   Social Needs    Financial resource strain: Not on file    Food insecurity     Worry: Not on file     Inability: Not on file   [employer] needs     Medical: Not on file     Non-medical: Not on file   Tobacco Use    Smoking status: Never Smoker    Smokeless tobacco: Never Used   Substance and Sexual Activity    Alcohol use: No    Drug use: No    Sexual activity: Yes     Partners: Male     Birth control/protection: Male Sterilization   Lifestyle    Physical activity     Days per week: Not on file     Minutes per session: Not on file    Stress: Not on file   Relationships    Social connections     Talks on phone: Not on file     Gets together: Not on file     Attends [employer] service: Not on file     Active member of club or organization: Not on file     Attends meetings of clubs or organizations: Not on file     Relationship status: Not on file    Intimate partner violence     Fear of current or ex partner: Not on file     Emotionally abused: Not on file     Physically abused: Not on file     Forced sexual activity: Not on file   Other Topics Concern    Not on file   Social History Narrative        2 children - son and daughter with mitrochondrial diseas     Past Medical History:   Diagnosis Date    Acid reflux     Constipated     Dysautonomia ([facility] )     Esophageal abnormality     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   Gastrostomy tube in place [facility])     Gastrostomy tube in place [facility])     History of blood clots 2012    Left leg  Has IVC filter now   Occurred while on Lovenox    Iron deficiency     Malignant hyperthermia due to anesthesia     Patient's son has M H   States she needs precautions    Migraines     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 )     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[facility] )     MTHFR (methylene THF reductase) deficiency and homocystinuria (HCC)     Muscle weakness (generalized)     Nausea     On total parenteral nutrition (TPN)     for 8 mts    PCOS (polycystic ovarian syndrome)     PONV (postoperative nausea and vomiting)     Postgastrectomy syndrome     malabsorption    Postsurgical malabsorption     Status post gastric bypass for obesity     Stroke [facility]) 6662, 0279    Metabolic strokes  Right hemiparesis= minor now   Vomiting     When eating     Past Surgical History:   Procedure Laterality Date    ANKLE SURGERY Left     Has plate and screws    COLONOSCOPY      COSMETIC SURGERY      X14 as child post attack by dog  Arms, legs and face    DILATION AND CURETTAGE OF UTERUS      x2    ESOPHAGOGASTRODUODENOSCOPY N/A 1/27/2016    Procedure: ESOPHAGOGASTRODUODENOSCOPY (EGD); Surgeon: Rommie Schlatter, MD;  Location: [facility]; Service:     FRACTURE SURGERY Left     Left ankle - hardware    GASTRIC BYPASS      IVC FILTER INSERTION      NISSEN FUNDOPLICATION      OVARY SURGERY Bilateral     "Drilling" due to multiple cysts- PCOS    [place] EGD TRANSORAL BIOPSY SINGLE/MULTIPLE N/A 3/9/2016    Procedure: ESOPHAGOGASTRODUODENOSCOPY (EGD); Surgeon: Rommie Schlatter, MD;  [facility]; Service: Bariatrics    [place] HYSTEROSCOPY,W/ENDO BX N/A 5/22/2020    Procedure: DILATATION AND CURETTAGE (D&C) WITH HYSTEROSCOPY;  Surgeon: Fani Cai MD;  [facility];  Service: Gynecology    [place] HYSTEROSCOPY,W/ENDOMETRIAL ABLATION N/A 5/22/2020    Procedure: Kaila Garcia;  Surgeon: Fani Cai MD;  [facility];  Service: Gynecology    [place] LAP,GASTROSTOMY,W/O TUBE CONSTR N/A 4/19/2016    Procedure: INSERTION GASTROSTOMY TUBE LAPAROSCOPIC WITH INTRAOP EGD;  Surgeon: Rommie Schlatter, MD;  [facility];  Service: Bariatrics    ULNAR NERVE TRANSPOSITION Right     WISDOM TOOTH EXTRACTION       Allergies   Allergen Reactions    Sulfate Other (See Comments)    Sulfa Antibiotics      Arrhythmia     Guaifenesin      Arrhythmia    Other      Malignant hyperhermia precautions   NEVER use Lacted Ringers     Current Outpatient Medications on File Prior to Visit   Medication Sig Dispense Refill    acarbose (PRECOSE) 100 MG tablet Take 1 tablet (100 mg total) by mouth 3 (three) times a day with meals 270 tablet 0    acetylcysteine (MUCOMYST) 200 mg/mL nebulizer solution as needed       Alpha-Lipoic Acid 100 MG CAPS Take by mouth      B Complex-Biotin-FA (TH VITAMIN B 50/B-COMPLEX PO) Take 1 tablet by mouth daily   baclofen 10 mg tablet Take 0 5 tablets (5 mg total) by mouth daily at bedtime as needed for muscle spasms 10 tablet 1    Blood Glucose Monitoring Suppl (ONETOUCH VERIO) w/Device KIT Dispense 1 meter 1 kit 1    cloNIDine (CATAPRES) 0 1 mg tablet Take 1 tablet (0 1 mg total) by mouth daily at bedtime 30 tablet 2    COENZYME Q-10 PO       Continuous Blood Gluc Sensor (Dexcom G6 Sensor) MISC Use as directed for CGM - Change every 10 days 3 each 0    Continuous Blood Gluc Transmit (Dexcom G6 Transmitter) MISC USE AS DIRECTED FOR CGM AND CHANGE EVERY 3 MONTHS 1 each 3    Continuous Blood Gluc Transmit (Dexcom G6 Transmitter) MISC Use as directed for CGM - Change every 3 months 1 each 3    CREATINE MONOHYDRATE PO Take 2 5 g by mouth      Cyanocobalamin (B-12) 1000 MCG/ML KIT Inject  as directed every 30 days   diphenhydrAMINE (BENADRYL) 25 mg tablet Take by mouth      docusate sodium (COLACE) 100 mg capsule Take 200 mg by mouth 2 (two) times a day   Galcanezumab-gnlm (Emgality) 120 MG/ML SOAJ 1 subq injection every 30 days  1 mL 11    iron sucrose (VENOFER) 20 mg/mL Venofer 200 mg iron/10 mL intravenous solution      L-Arginine 1000 MG TABS Take 6,000 mg by mouth 2 (two) times a day       linaCLOtide 145 MCG CAPS Take 145 mcg by mouth 2 (two) times a day      midodrine (PROAMATINE) 5 mg tablet Take 1 tablet (5 mg total) by mouth 3 (three) times a day 270 tablet 3    Multiple Vitamin (MULTIVITAMIN) tablet Take 1 tablet by mouth daily        NIFEdipine (PROCARDIA XL) 60 mg 24 hr tablet Take 1 tablet (60 mg total) by mouth daily 90 tablet 1    nystatin (MYCOSTATIN) cream Apply topically 2 (two) times a day 30 g 0    OneTouch Delica Lancets 30G MISC by Does not apply route 3 (three) times a day 300 each 1    OneTouch Verio test strip 1 each by Other route 3 (three) times a day 300 each 1    Oral Vehicles (PCCA-PLUS) SUSP       P-Aminobenzoic Acid (PARA-AMINOBENZOIC ACID) POWD       pantoprazole (PROTONIX) 40 mg tablet   3    polyethylene glycol (MIRALAX) 17 g packet Take 17 g by mouth 2 (two) times a day Indications: 1 5 caps full 1-2 times/day   Prucalopride Succinate (Motegrity) 2 MG TABS Take by mouth daily      Riboflavin 100 MG TABS Take 100 mg by mouth      sitaGLIPtin (JANUVIA) 25 mg tablet Take 1 tablet (25 mg total) by mouth daily 30 tablet 3    sucralfate (CARAFATE) 1 g/10 mL suspension Take 10 mL (1 g total) by mouth 4 (four) times a day Must have liquid suspension (Patient not taking: Reported on 3/9/2021) 420 mL 5    TRILYTE 420 g solution   5    Ubiquinol Liposomal 100 MG/5ML SYRP Take 300 mg by mouth      Ubiquinol POWD       vitamin E 100 UNIT capsule Take 78 Units by mouth daily       No current facility-administered medications on file prior to visit  Objective:    Review of Systems   Constitutional: Negative for chills, fever and unexpected weight change  HENT: Negative for hearing loss, nosebleeds and sore throat  Eyes: Negative for pain, redness and visual disturbance  Respiratory: Negative for cough, shortness of breath and wheezing  Cardiovascular: Negative for chest pain, palpitations and leg swelling  Gastrointestinal: Negative for abdominal pain, nausea and vomiting  Genitourinary: Negative for dyspareunia, dysuria and frequency  Skin: Negative for rash and wound  Neurological: Negative for dizziness, numbness and headaches     Psychiatric/Behavioral: Negative for decreased concentration and suicidal ideas  The patient is not nervous/anxious  Ortho Exam   Lumbar:  Diffuse right sided weakness (history of stroke)  5/5 left sided strength L3-S1  Positive right slump test, buttock pain    Right hip:  Right leg slightly shorter than left in supine and knee flexed  Neg piriformis test  Neg Reji's test    TTP over greater trochanter    Physical Exam  Constitutional:       Appearance: She is well-developed  HENT:      Head: Normocephalic  Eyes:      Conjunctiva/sclera: Conjunctivae normal    Neck:      Musculoskeletal: Normal range of motion  Cardiovascular:      Rate and Rhythm: Normal rate  Pulmonary:      Effort: Pulmonary effort is normal    Skin:     General: Skin is warm and dry  Neurological:      Mental Status: She is alert and oriented to person, place, and time  Large joint arthrocentesis: R greater trochanteric bursa  Universal Protocol:  Consent: Verbal consent obtained  Risks and benefits: risks, benefits and alternatives were discussed  Consent given by: patient  Time out: Immediately prior to procedure a "time out" was called to verify the correct patient, procedure, equipment, support staff and site/side marked as required    Timeout called at: 3/29/2021 11:30 AM   Patient understanding: patient states understanding of the procedure being performed  Site marked: the operative site was marked  Patient identity confirmed: verbally with patient    Supporting Documentation  Indications: pain   Procedure Details  Location: hip - R greater trochanteric bursa  Preparation: Patient was prepped and draped in the usual sterile fashion  Needle size: 22 G  Ultrasound guidance: no  Approach: anterolateral  Medications administered: 1 mL lidocaine 1 %; 12 mg betamethasone acetate-betamethasone sodium phosphate 6 (3-3) mg/mL; 1 mL bupivacaine 0 25 % (1ml 0 5% bupivicaine (PF))    Patient tolerance: patient tolerated the procedure well with no immediate complications  Dressing:  Sterile dressing applied          None performed today    I have personally reviewed pertinent films in PACS  Right hip x-ray:  Left hip slightly higher than right  Minimal arthritic changes  Scribe Attestation    I,:  Jolie Hidalgo am acting as a scribe while in the presence of the attending physician :       I,:  Dhiraj Rosales, DO personally performed the services described in this documentation    as scribed in my presence :             Portions of the record may have been created with voice recognition software   Occasional wrong word or "sound a like" substitutions may have occurred due to the inherent limitations of voice recognition software   Read the chart carefully and recognize, using context, where substitutions have occurred

## 2021-03-29 NOTE — TELEPHONE ENCOUNTER
Spoke with patient, advised sleep study did not reveal CAS or narcolepsy, will be discussed further at visit 4/15/2021

## 2021-04-02 ENCOUNTER — PATIENT MESSAGE (OUTPATIENT)
Dept: OBGYN CLINIC | Facility: CLINIC | Age: 46
End: 2021-04-02

## 2021-04-02 DIAGNOSIS — M70.61 TROCHANTERIC BURSITIS OF RIGHT HIP: ICD-10-CM

## 2021-04-02 DIAGNOSIS — M21.70 LEG LENGTH DISCREPANCY: Primary | ICD-10-CM

## 2021-04-07 ENCOUNTER — HOSPITAL ENCOUNTER (OUTPATIENT)
Dept: CT IMAGING | Facility: HOSPITAL | Age: 46
Discharge: HOME | End: 2021-04-07
Payer: COMMERCIAL

## 2021-04-07 DIAGNOSIS — M21.70 LEG LENGTH DISCREPANCY: ICD-10-CM

## 2021-04-07 DIAGNOSIS — M70.61 TROCHANTERIC BURSITIS OF RIGHT HIP: ICD-10-CM

## 2021-04-07 PROCEDURE — 77073 BONE LENGTH STUDIES: CPT

## 2021-04-08 ENCOUNTER — EVALUATION (OUTPATIENT)
Dept: PHYSICAL THERAPY | Facility: CLINIC | Age: 46
End: 2021-04-08
Payer: COMMERCIAL

## 2021-04-08 ENCOUNTER — TELEPHONE (OUTPATIENT)
Dept: OBGYN CLINIC | Facility: CLINIC | Age: 46
End: 2021-04-08

## 2021-04-08 DIAGNOSIS — M70.61 TROCHANTERIC BURSITIS OF RIGHT HIP: ICD-10-CM

## 2021-04-08 DIAGNOSIS — M25.551 RIGHT HIP PAIN: Primary | ICD-10-CM

## 2021-04-08 DIAGNOSIS — E88.40 MITOCHONDRIAL DISEASE (HCC): ICD-10-CM

## 2021-04-08 PROCEDURE — 97162 PT EVAL MOD COMPLEX 30 MIN: CPT | Performed by: PHYSICAL THERAPIST

## 2021-04-08 PROCEDURE — 97110 THERAPEUTIC EXERCISES: CPT | Performed by: PHYSICAL THERAPIST

## 2021-04-08 NOTE — TELEPHONE ENCOUNTER
Please call patient and let her know that the left lower extremity is 5 millimeters longer than the right lower extremity  She may continue with the shoe insert in the right shoe    If she feels that she is not getting relief from this or cannot get 1 that is approximately 5 millimeters then we can have her referred for custom shoe inserts

## 2021-04-08 NOTE — PROGRESS NOTES
PT Evaluation    Today's date: 2021   Patient name: Mario Connell  : 1975  MRN: [id number]  Referring provider: Kulwant Rogers DO  Dx:   Encounter Diagnosis     ICD-10-CM    1  Right hip pain  M25 551 Ambulatory referral to Physical Therapy   2  Trochanteric bursitis of right hip  M70 61 Ambulatory referral to Physical Therapy           Subjective Evaluation     History of Present Illness    Patient presents with c/o R hip pain from about 1 month ago from walking long durations  She was told she has a leg length discrepancy  She has R sided weakness from 2 strokes she has had  She feels like the bones are grinding when she moves  The cortisone shot made it worse  She used a shoe lift which caused LBP on 3/29 then tried another lift which made the LBP worse  She was then told it may be sciatic pain as she had pain c hip IR  She has trouble getting up in the AM as she is very stiff  She has taken motrin/tylenol s relief  PMHx: 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  She has foot drop, 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, mitochondrial disease, raynauds, gastric bypass sx, CRPS- R elbow, Metabolic Strokes- 7631 and  took a year to recover, aphasic when tired,   Neuro signs: none    Bowel and bladder sxs: none  Red flags: none  Occupation: Nurse      Pain   At best pain ratin  At worst pain ratin  Location: C- location on anterior/lateral hip down to isch tub and sacrum  Quality:ball of pain, like a rock, burning  Relieving factors: sitting in a reclined chair leaning to L, lying supine  Aggravating factors: sitting down, lying on it, walking- by noon at work, up steps    Social Support  Lives at home c her family and walks 3 miles/day      Patient Goals  Patient goals for therapy: walk c  and ride bike and work s pain    STGs  1  Decrease pain by 20% in 2-4 weeks to improve standing tolerance    2  Improve hip ROM by 10 degrees in 2-4 weeks to improve sit to stand  3  Improve hip strength by 1/3 grade in 2-4 weeks to improve stairs performance to going up c rail assist       LTGs  1  Decrease pain by 60% in 6-8 weeks  2  Improve walking/biking tolerance to >30 minutes in 6-8 weeks to perform community ambulation  3  Perform job/dressing/showering activities independently without pain in 6-8 weeks  4  Improve stairs tolerance to 1 flight s rail in 10 weeks  Objective Measurements:    Observation: ASIS and malleoli in supine at equal height  Balance: R SLB pain  Gait: pain c midstance an  Squat: valgus B  Reflexes: NT   Sensation: WFL  Myotomes: RLE weaker      Slump: -SLR:  R tight at 60 Britt: + Faddir: -  Hip distraction: felt better c mild pull,   Moderate pull painful   Elys: pain in glut Sacral thrust: -  BREANNA: hip lateral/lateral inf glides painful  Palpation:R TFL, post greater troch, piriformis, isch tuberosity TTP  TA: R unable to palpate contraction and painful  L slight sensation c activation  Improved contraction slightly felt c cue to blow candle slowly  Lumbar ROM L R Strength knee L R   Flex  WFL  Flex 4+ 4+   Ext WFL  Ext 4+ 4-   Rot WFL       SB WFL       Hip A/PROM        Flex  / 90 pain / Flex 5 3-p   ER at 90  Pain at end range/prone [facility] ER     IR at 90  WFL/prone painful IR     Abd  WFL Abd 5 4-p   Ext   Ext 4+ 4p           Knee A/PROM   Ankle great toe 4+ 4   Flex   DF 4+ 4   Ext   PF 5 reps 5 reps         Assessment:    Francesco Ley is a pleasant 39 y o  female who presents with R hip and LBP due to trunk weakness and RLE weakness still from her prior stroke  The mitochondrial disease  and hx of CRPS does have an impact on her presentation but there is an orthopedic factor to this which I believe we can help  The patient's greatest concern is getting back to her physical activities  Patient would benefit from further consultation to look at labs for CRP and ESR to rule out inflammatory arthritis if no better in 2-3 weeks       The primary movement impairment diagnosis is Decreased R hip flexion hypomobility due to decreased trunk stabilization resulting in pathoanatomical symptoms of R trochanteric bursitis and limiting her ability to walk longer distances, bike and work s limping  Impairments include:  1) Decreased trunk stability  2) Decreased RLE strength possibly due to strokes   3) decreased R hip ROM     Etiologic factors include continued activity c poor trunk and RLE strength causing compensation of TFL and other muscle groups  Negative prognostic indicators:mitochondrial disease, hx of CRPS  Positive prognostic indicators: good motivation and prior relief c PT  Please contact me if you have any further questions or recommendations  Thank you very much for the kind referral         Plan  Patient would benefit from:Skilled physical therapy  Planned therapy interventions: manual therapy, neuromuscular re-education, stretching, strengthening, therapeutic activities, therapeutic exercise, patient education, home exercise program, and activity modification      Frequency: 2x week  Duration in weeks: 6  Treatment plan discussed with: patient             Goals: Patient's goal is walk c  and ride bike and work s pain    Precautions: She has foot drop, 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, mitochondrial disease, raynauds, gastric bypass sx, CRPS- R elbow, Metabolic Strokes- 8747 and 2009 took a year to recover, aphasic when tired,   Dx: R trochanteric bursitis- hip flexion hypomobility- decreased trunk stab and RLE weakness from strokes      Daily Treatment Diary     Manuals 4/8/2021        R LAD grade 1-2         R hip PROM prn                           Ther Ex         Standing HR         Treadmill --> bike          S/l fouzia         Pain science education- nerve education                                                      Neuro Re-ed         TA activation 5x unable to feel R side        TA c candle flickering 95P         TA c pall of press         Seated pball a/p tilt                  Ther Activity         stairs                           Gait Training                           Modalities

## 2021-04-09 LAB — MISCELLANEOUS LAB TEST RESULT: NORMAL

## 2021-04-13 ENCOUNTER — OFFICE VISIT (OUTPATIENT)
Dept: AUDIOLOGY | Age: 46
End: 2021-04-13
Payer: COMMERCIAL

## 2021-04-13 ENCOUNTER — OFFICE VISIT (OUTPATIENT)
Dept: AUDIOLOGY | Age: 46
End: 2021-04-13

## 2021-04-13 DIAGNOSIS — H90.5 SENSORY HEARING LOSS, UNILATERAL: Primary | ICD-10-CM

## 2021-04-13 DIAGNOSIS — H90.5 SENSORY HEARING LOSS: Primary | ICD-10-CM

## 2021-04-13 PROCEDURE — 92567 TYMPANOMETRY: CPT | Performed by: AUDIOLOGIST

## 2021-04-13 PROCEDURE — 92557 COMPREHENSIVE HEARING TEST: CPT | Performed by: AUDIOLOGIST

## 2021-04-13 NOTE — PROGRESS NOTES
HEARING EVALUATION    Name:  Jimmy Barnett  :  1975  Age:  39 y o  Date of Evaluation: 21     History: failed screening  Reason for visit: Jimmy Barnett is being seen today at the request of Dr Stephen Shen for an evaluation of hearing  Patient reports she failed a hearing screen at the [facility]  Patient has a history of mitochondrial disease  Family history of hearing loss includes her daughter and her father, who was diagnosed with hearing loss and his 35s  The patient has a history of childhood ear infections  EVALUATION:    Otoscopic Evaluation:   Right Ear: Clear and healthy ear canal and tympanic membrane   Left Ear: Clear and healthy ear canal and tympanic membrane    Tympanometry:   Right: Type A - normal middle ear pressure and compliance   Left: Type A - normal middle ear pressure and compliance    Audiogram Results:  Normal hearing bilaterally  SRT is in agreement with PTA bilaterally indicating good test reliability  WRS is excellent bilaterally  *see attached audiogram      RECOMMENDATIONS:  Annual hearing eval, Return to [facility]  for F/U and Copy to Patient/Caregiver    PATIENT EDUCATION:   Discussed results and recommendations with patient  Questions were addressed and the patient was encouraged to contact our department should concerns arise        Erenstine Adler , CCC-A  Clinical Audiologist

## 2021-04-13 NOTE — PROGRESS NOTES
Progress Note    Name:  Hilton Esparza  :  1975  Age:  39 y o  Date of Evaluation: 21     HAE not performed today as patient had normal hearing bilaterally        Ernestine Smith Au D , CCC-A  Clinical Audiologist

## 2021-04-15 ENCOUNTER — OFFICE VISIT (OUTPATIENT)
Dept: SLEEP CENTER | Facility: CLINIC | Age: 46
End: 2021-04-15
Payer: COMMERCIAL

## 2021-04-15 ENCOUNTER — OFFICE VISIT (OUTPATIENT)
Dept: PHYSICAL THERAPY | Facility: CLINIC | Age: 46
End: 2021-04-15
Payer: COMMERCIAL

## 2021-04-15 VITALS
WEIGHT: 178 LBS | SYSTOLIC BLOOD PRESSURE: 114 MMHG | HEIGHT: 65 IN | BODY MASS INDEX: 29.66 KG/M2 | DIASTOLIC BLOOD PRESSURE: 76 MMHG

## 2021-04-15 DIAGNOSIS — E88.40 MITOCHONDRIAL DISEASE (HCC): ICD-10-CM

## 2021-04-15 DIAGNOSIS — M25.551 RIGHT HIP PAIN: Primary | ICD-10-CM

## 2021-04-15 DIAGNOSIS — F51.04 PSYCHOPHYSIOLOGICAL INSOMNIA: Primary | ICD-10-CM

## 2021-04-15 DIAGNOSIS — M70.61 TROCHANTERIC BURSITIS OF RIGHT HIP: ICD-10-CM

## 2021-04-15 PROCEDURE — 97112 NEUROMUSCULAR REEDUCATION: CPT | Performed by: PHYSICAL THERAPIST

## 2021-04-15 PROCEDURE — 97140 MANUAL THERAPY 1/> REGIONS: CPT | Performed by: PHYSICAL THERAPIST

## 2021-04-15 PROCEDURE — 97110 THERAPEUTIC EXERCISES: CPT | Performed by: PHYSICAL THERAPIST

## 2021-04-15 PROCEDURE — 99213 OFFICE O/P EST LOW 20 MIN: CPT | Performed by: INTERNAL MEDICINE

## 2021-04-15 RX ORDER — ACARBOSE 25 MG/1
TABLET ORAL
COMMUNITY
Start: 2021-03-11 | End: 2021-05-25

## 2021-04-15 RX ORDER — PLECANATIDE 3 MG/1
TABLET ORAL
COMMUNITY
Start: 2021-03-17 | End: 2021-09-09 | Stop reason: ALTCHOICE

## 2021-04-15 RX ORDER — TRAZODONE HYDROCHLORIDE 50 MG/1
TABLET ORAL
Qty: 90 TABLET | Refills: 6 | Status: SHIPPED | OUTPATIENT
Start: 2021-04-15 | End: 2021-05-02 | Stop reason: ALTCHOICE

## 2021-04-15 NOTE — PROGRESS NOTES
Progress Note - Sleep Center   Jeff Meek :1975 MRN: [id number]      Reason for Visit:    39 y  o female with vivid dreaming    Assessment:  The patient has not had any nighttime behavior outside of bed  She still talks in her sleep  She is on clonidine, which did not reduce her disturbing aspects of her dreams  Will discontinue  Plan:  I will try some REM suppressing agents, such as trazodone    Follow up:  Six months  The patient will call me sooner if the medication I have prescribed is ineffective  Consider other agents  History of Present Illness: The patient has disturbing dreams, which are unimproved on clonidine  Review of Systems      Genitourinary none   Cardiology none   Gastrointestinal frequent heartburn/acid reflux   Neurology frequent headaches and muscle weakness   Constitutional fatigue   Integumentary none   Psychiatry none   Musculoskeletal none   Pulmonary none   ENT none   Endocrine none   Hematological none           I have reviewed and updated the review of systems as necessary     Historical Information    Past Medical History:   Diagnosis Date    Acid reflux     Constipated     Dysautonomia ([place] [address] )     Esophageal abnormality     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   Gastrostomy tube in place [facility])     Gastrostomy tube in place [facility])     History of blood clots     Left leg  Has IVC filter now   Occurred while on Lovenox    Iron deficiency     Malignant hyperthermia due to anesthesia     Patient's son has M H   States she needs precautions    Migraines     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[address] )     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[address] )     MTHFR (methylene THF reductase) deficiency and homocystinuria (HCC)     Muscle weakness (generalized)     Nausea     On total parenteral nutrition (TPN)     for 8 mts    PCOS (polycystic ovarian syndrome)     PONV (postoperative nausea and vomiting)     Postgastrectomy syndrome     malabsorption    Postsurgical malabsorption     Status post gastric bypass for obesity     Stroke [facility]) 3008, 6470    Metabolic strokes  Right hemiparesis= minor now   Vomiting     When eating         Past Surgical History:   Procedure Laterality Date    ANKLE SURGERY Left     Has plate and screws    COLONOSCOPY      COSMETIC SURGERY      X14 as child post attack by dog  Arms, legs and face    DILATION AND CURETTAGE OF UTERUS      x2    ESOPHAGOGASTRODUODENOSCOPY N/A 1/27/2016    Procedure: ESOPHAGOGASTRODUODENOSCOPY (EGD); Surgeon: Asad Marina MD;  Location: [facility]; Service:     FRACTURE SURGERY Left     Left ankle - hardware    GASTRIC BYPASS      IVC FILTER INSERTION      NISSEN FUNDOPLICATION      OVARY SURGERY Bilateral     "Drilling" due to multiple cysts- PCOS    RI EGD TRANSORAL BIOPSY SINGLE/MULTIPLE N/A 3/9/2016    Procedure: ESOPHAGOGASTRODUODENOSCOPY (EGD); Surgeon: Asad Marina MD;  [facility];   Service: Bariatrics    RI HYSTEROSCOPY,W/ENDO BX N/A 5/22/2020    Procedure: DILATATION AND CURETTAGE (D&C) WITH HYSTEROSCOPY;  Surgeon: Debra Sharp MD;  [facility];  Service: Gynecology    RI HYSTEROSCOPY,W/ENDOMETRIAL ABLATION N/A 5/22/2020    Procedure: Valeriano Chu;  Surgeon: Debra Sharp MD;  [facility];  Service: Gynecology    RI LAP,GASTROSTOMY,W/O TUBE CONSTR N/A 4/19/2016    Procedure: INSERTION GASTROSTOMY TUBE LAPAROSCOPIC WITH INTRAOP EGD;  Surgeon: Asad Marina MD;  [facility];  Service: Bariatrics    ULNAR NERVE TRANSPOSITION Right     WISDOM TOOTH EXTRACTION           Social History     Socioeconomic History    Marital status: /Civil Union     Spouse name: None    Number of children: None    Years of education: None    Highest education level: None   Occupational History    Occupation: infusion center   Social Needs    Financial resource strain: None    Food insecurity     Worry: None     Inability: None    Transportation needs     Medical: None     Non-medical: None   Tobacco Use    Smoking status: Never Smoker    Smokeless tobacco: Never Used   Substance and Sexual Activity    Alcohol use: No    Drug use: No    Sexual activity: Yes     Partners: Male     Birth control/protection: Male Sterilization   Lifestyle    Physical activity     Days per week: None     Minutes per session: None    Stress: None   Relationships    Social connections     Talks on phone: None     Gets together: None     Attends Restoration service: None     Active member of club or organization: None     Attends meetings of clubs or organizations: None     Relationship status: None    Intimate partner violence     Fear of current or ex partner: None     Emotionally abused: None     Physically abused: None     Forced sexual activity: None   Other Topics Concern    None   Social History Narrative        2 children - son and daughter with mitrochondrial diseas           History   Alcohol use: Not on file       History   Smoking Status    Not on file   Smokeless Tobacco    Not on file       Family History:   Family History   Problem Relation Age of Onset    Mitochondrial disorder Mother     Hypertension Mother     Thyroid cancer Mother 48    Clotting disorder Mother         MTFR    Cancer Mother 62        renal    Depression Father     Endocrinopathy Father     Clotting disorder Father         MTFR    Stroke Father     Aneurysm Father         brain    Depression Brother     Narcolepsy Brother     Heart failure Maternal Grandmother 80    Coronary artery disease Maternal Grandmother     Mitochondrial disorder Son     Mitochondrial disorder Daughter     Stroke Family     Anuerysm Paternal Uncle         abdominal     Breast cancer Maternal Grandfather 55    Breast cancer Maternal Aunt 43    Heart attack Neg Hx     Arrhythmia Neg Hx     Sudden death Neg Hx         scd       Medications/Allergies:      Current Outpatient Medications:     acarbose (PRECOSE) 100 MG tablet, Take 1 tablet (100 mg total) by mouth 3 (three) times a day with meals, Disp: 270 tablet, Rfl: 0    acetylcysteine (MUCOMYST) 200 mg/mL nebulizer solution, as needed , Disp: , Rfl:     Alpha-Lipoic Acid 100 MG CAPS, Take by mouth, Disp: , Rfl:     B Complex-Biotin-FA (TH VITAMIN B 50/B-COMPLEX PO), Take 1 tablet by mouth daily  , Disp: , Rfl:     baclofen 10 mg tablet, Take 0 5 tablets (5 mg total) by mouth daily at bedtime as needed for muscle spasms, Disp: 10 tablet, Rfl: 1    Blood Glucose Monitoring Suppl (ONETOUCH VERIO) w/Device KIT, Dispense 1 meter, Disp: 1 kit, Rfl: 1    cloNIDine (CATAPRES) 0 1 mg tablet, Take 1 tablet (0 1 mg total) by mouth daily at bedtime, Disp: 30 tablet, Rfl: 2    COENZYME Q-10 PO, , Disp: , Rfl:     Continuous Blood Gluc Sensor (Dexcom G6 Sensor) MISC, Use as directed for CGM - Change every 10 days, Disp: 3 each, Rfl: 0    Continuous Blood Gluc Transmit (Dexcom G6 Transmitter) MISC, USE AS DIRECTED FOR CGM AND CHANGE EVERY 3 MONTHS, Disp: 1 each, Rfl: 3    Continuous Blood Gluc Transmit (Dexcom G6 Transmitter) MISC, Use as directed for CGM - Change every 3 months, Disp: 1 each, Rfl: 3    CREATINE MONOHYDRATE PO, Take 2 5 g by mouth, Disp: , Rfl:     Cyanocobalamin (B-12) 1000 MCG/ML KIT, Inject  as directed every 30 days  , Disp: , Rfl:     diphenhydrAMINE (BENADRYL) 25 mg tablet, Take by mouth, Disp: , Rfl:     docusate sodium (COLACE) 100 mg capsule, Take 200 mg by mouth 2 (two) times a day , Disp: , Rfl:     Galcanezumab-gnlm (Emgality) 120 MG/ML SOAJ, 1 subq injection every 30 days  , Disp: 1 mL, Rfl: 11    iron sucrose (VENOFER) 20 mg/mL, Venofer 200 mg iron/10 mL intravenous solution, Disp: , Rfl:     L-Arginine 1000 MG TABS, Take 6,000 mg by mouth 2 (two) times a day , Disp: , Rfl:     linaCLOtide 145 MCG CAPS, Take 145 mcg by mouth 2 (two) times a day, Disp: , Rfl:     midodrine (PROAMATINE) 5 mg tablet, Take 1 tablet (5 mg total) by mouth 3 (three) times a day, Disp: 270 tablet, Rfl: 3    Multiple Vitamin (MULTIVITAMIN) tablet, Take 1 tablet by mouth daily  , Disp: , Rfl:     NIFEdipine (PROCARDIA XL) 60 mg 24 hr tablet, Take 1 tablet (60 mg total) by mouth daily, Disp: 90 tablet, Rfl: 1    nystatin (MYCOSTATIN) cream, Apply topically 2 (two) times a day, Disp: 30 g, Rfl: 0    OneTouch Delica Lancets 93A MISC, by Does not apply route 3 (three) times a day, Disp: 300 each, Rfl: 1    OneTouch Verio test strip, 1 each by Other route 3 (three) times a day, Disp: 300 each, Rfl: 1    Oral Vehicles (PCCA-PLUS) SUSP, , Disp: , Rfl:     P-Aminobenzoic Acid (PARA-AMINOBENZOIC ACID) POWD, , Disp: , Rfl:     pantoprazole (PROTONIX) 40 mg tablet, , Disp: , Rfl: 3    Plecanatide 3 MG TABS, Take 3 mg by mouth, Disp: , Rfl:     polyethylene glycol (MIRALAX) 17 g packet, Take 17 g by mouth 2 (two) times a day Indications: 1 5 caps full 1-2 times/day   , Disp: , Rfl:     Prucalopride Succinate (Motegrity) 2 MG TABS, Take by mouth daily, Disp: , Rfl:     Riboflavin 100 MG TABS, Take 100 mg by mouth, Disp: , Rfl:     sitaGLIPtin (JANUVIA) 25 mg tablet, Take 1 tablet (25 mg total) by mouth daily, Disp: 30 tablet, Rfl: 3    sucralfate (CARAFATE) 1 g/10 mL suspension, Take 10 mL (1 g total) by mouth 4 (four) times a day Must have liquid suspension, Disp: 420 mL, Rfl: 5    TRILYTE 420 g solution, , Disp: , Rfl: 5    Trulance 3 MG TABS, , Disp: , Rfl:     Ubiquinol Liposomal 100 MG/5ML SYRP, Take 300 mg by mouth, Disp: , Rfl:     vitamin E 100 UNIT capsule, Take 78 Units by mouth daily, Disp: , Rfl:     acarbose (PRECOSE) 25 mg tablet, , Disp: , Rfl:     traZODone (DESYREL) 50 mg tablet, 1-3 po qhs, Disp: 90 tablet, Rfl: 6    Ubiquinol POWD, , Disp: , Rfl:       Objective    Vital Signs:   Vitals:    04/15/21 1205   BP: 114/76   Weight: 80 7 kg (178 lb)   Height: 5' 4 5" (1 638 m)     Kane Sleepiness Scale: Total score: 12    Physical Exam:    General: Alert, appropriate, cooperative, overweight    Head: NC/AT    Skin: Warm, dry    Neuro: No motor abnormalities, cranial nerves appear intact    Psych: Normal affect            JON Holliday    Board Certified Sleep Specialist

## 2021-04-15 NOTE — PROGRESS NOTES
Daily Note     Today's date: 4/15/2021  Patient name: Pilar Duran  : 1975  MRN: [id number]  Referring provider: Muriel Booth DO  Dx:   Encounter Diagnosis     ICD-10-CM    1  Right hip pain  M25 551    2  Trochanteric bursitis of right hip  M70 61    3  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[address] )  E88 40                   Subjective: She states she feels the same as last visit  She has questions about whether or not to get the shoe lift and the difference on the CT scan of  0 5 mm and 1 5 mm discrepancy  Objective: See treatment diary below  Standing- ASIS/iliac crest height WFL   R hip pain from midstance to terminal stance and was improved tactile cue for hip abduction which helped her pain  Assessment: Tolerated treatment well  Due to improvements c pain c cues this was addressed as our plan to improve her pain by strengthening R glut strength  She had significant fatigue after s/l clamshells  Patient would benefit from continued PT      Plan: Continue per plan of care        Goals: Patient's goal is walk c  and ride bike and work s pain    Precautions: She has foot drop, 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, mitochondrial disease, raynauds, gastric bypass sx, CRPS- R elbow, Metabolic Strokes- 38 and  took a year to recover, aphasic when tired,   Dx: R trochanteric bursitis- hip flexion hypomobility- decreased trunk stab and RLE weakness from strokes      Daily Treatment Diary     Manuals 2021 4/15       R LAD grade 1-2  8'       R hip PROM prn                           Ther Ex         Standing HR  20x       Treadmill --> bike   6'       S/l clamshells  2x10       Pain science education- nerve education  nv       Ankle 3 way GTB  20x       LAQ #2  20x                                  Neuro Re-ed         TA activation 5x unable to feel R side 10x       TA c candle flickering 97K  44S       TA c pall of press  20x       Seated pball a/p tilt  20x                Ther Activity         stairs  1 flight Gait Training                           Modalities

## 2021-04-19 ENCOUNTER — OFFICE VISIT (OUTPATIENT)
Dept: PHYSICAL THERAPY | Facility: CLINIC | Age: 46
End: 2021-04-19
Payer: COMMERCIAL

## 2021-04-19 DIAGNOSIS — M70.61 TROCHANTERIC BURSITIS OF RIGHT HIP: ICD-10-CM

## 2021-04-19 DIAGNOSIS — M25.551 RIGHT HIP PAIN: Primary | ICD-10-CM

## 2021-04-19 DIAGNOSIS — E88.40 MITOCHONDRIAL DISEASE (HCC): ICD-10-CM

## 2021-04-19 PROCEDURE — 97110 THERAPEUTIC EXERCISES: CPT | Performed by: PHYSICAL THERAPIST

## 2021-04-19 PROCEDURE — 97140 MANUAL THERAPY 1/> REGIONS: CPT | Performed by: PHYSICAL THERAPIST

## 2021-04-19 PROCEDURE — 97112 NEUROMUSCULAR REEDUCATION: CPT | Performed by: PHYSICAL THERAPIST

## 2021-04-19 NOTE — PROGRESS NOTES
Daily Note     Today's date: 2021  Patient name: Jeff Meek  : 1975  MRN: [id number]  Referring provider: Savilla Schaumann, DO  Dx:   Encounter Diagnosis     ICD-10-CM    1  Right hip pain  M25 551    2  Trochanteric bursitis of right hip  M70 61    3  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[address] )  E88 40                   Subjective: She states she has noticed a difference c TA contraction  She was on her feet a lot this weekend and does have some pain from WB  Objective: See treatment diary below    Assessment: Tolerated treatment well  She gets fatigued at 5 reps of clamshells and 12 reps of bridges  She needed facilitation of TA contraction  Consider using stability belt to help strength  Patient would benefit from continued PT      Plan: Continue per plan of care        Goals: Patient's goal is walk c  and ride bike and work s pain    Precautions: She has foot drop, 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, mitochondrial disease, raynauds, gastric bypass sx, CRPS- R elbow, Metabolic Strokes-  and  took a year to recover, aphasic when tired,   Dx: R trochanteric bursitis- hip flexion hypomobility- decreased trunk stab and RLE weakness from strokes      Daily Treatment Diary     Manuals 2021 4/15 4/19      R LAD grade 1-2  8' 8'      R hip PROM prn                           Ther Ex         Standing HR  20x 20x      Treadmill --> bike   6' 6'      S/l clamshells  2x10 2x10      Pain science education- nerve education  nv       Ankle 3 way GTB  20x 20x      LAQ #2  20x 20x #3      SL LP #45                           Neuro Re-ed         TA activation 5x unable to feel R side 10x       TA c candle flickering 63T  39L 29B      TA c pall of press  20x 20x      Seated pball a/p tilt  20x 20x      bridges   2x10      Ther Activity         stairs  1 flight np today      WB tolerance   65# pain/177 35%               Gait Training                           Modalities

## 2021-04-22 ENCOUNTER — OFFICE VISIT (OUTPATIENT)
Dept: PHYSICAL THERAPY | Facility: CLINIC | Age: 46
End: 2021-04-22
Payer: COMMERCIAL

## 2021-04-22 DIAGNOSIS — E88.40 MITOCHONDRIAL DISEASE (HCC): ICD-10-CM

## 2021-04-22 DIAGNOSIS — M25.551 RIGHT HIP PAIN: Primary | ICD-10-CM

## 2021-04-22 PROCEDURE — 97140 MANUAL THERAPY 1/> REGIONS: CPT | Performed by: PHYSICAL THERAPIST

## 2021-04-22 PROCEDURE — 97112 NEUROMUSCULAR REEDUCATION: CPT | Performed by: PHYSICAL THERAPIST

## 2021-04-22 PROCEDURE — 97110 THERAPEUTIC EXERCISES: CPT | Performed by: PHYSICAL THERAPIST

## 2021-04-22 NOTE — PROGRESS NOTES
Daily Note     Today's date: 2021  Patient name: Vimal Alvarez  : 1975  MRN: [id number]  Referring provider: Villa Hitchcock DO  Dx:   Encounter Diagnosis     ICD-10-CM    1  Right hip pain  M25 551    2  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[address] )  E88 40                   Subjective: She states she has noticed a difference c TA contraction  She was on her feet a lot this weekend and does have some pain from WB  Objective: See treatment diary below    Assessment: Tolerated treatment well  She had improved tolerance to exercises today s complaints of pain  WB tolerance #75 s pain  Patient would benefit from continued PT      Plan: Continue per plan of care        Goals: Patient's goal is walk c  and ride bike and work s pain    Precautions: She has foot drop, 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, mitochondrial disease, raynauds, gastric bypass sx, CRPS- R elbow, Metabolic Strokes- 87 and  took a year to recover, aphasic when tired,   Dx: R trochanteric bursitis- hip flexion hypomobility- decreased trunk stab and RLE weakness from strokes      Daily Treatment Diary     Manuals 2021 4/15 4/19 4/22     R LAD grade 1-2  8' 8' 8'     R hip PROM prn                           Ther Ex         Standing HR  20x 20x 20x     Treadmill --> bike   6' 6' 6'     S/l clamshells  2x10 2x10 2x10     Pain science education- nerve education  nv       Ankle 3 way GTB  20x 20x 20x     LAQ #2  20x 20x #3 20x #3     SL LP #45    2x10 #47 5                       Neuro Re-ed         TA activation 5x unable to feel R side 10x       TA c candle flickering 27C  27X 92V      TA c pall of press  20x 20x 20x     Seated pball a/p tilt  20x 20x 20x     bridges   2x10 2x10     Ther Activity         stairs  1 flight np today      WB tolerance   65# pain/177 35% #75              Gait Training                           Modalities

## 2021-04-23 ENCOUNTER — TELEPHONE (OUTPATIENT)
Dept: SLEEP CENTER | Facility: CLINIC | Age: 46
End: 2021-04-23

## 2021-04-23 NOTE — TELEPHONE ENCOUNTER
----- Message from Jenna Tinsley sent at 4/22/2021  1:07 PM EDT -----  Regarding: Non-Urgent Medical Question  Contact: [phone]  I have tried the trazadone (1 tab) twice now  Each time my sinuses completely closed and I could not breath out of my nose  Obviously I couldn't sleep all stuffed up so is there something else we can try? Thank you in advance

## 2021-04-25 DIAGNOSIS — E16.1 REACTIVE HYPOGLYCEMIA: ICD-10-CM

## 2021-04-26 ENCOUNTER — OFFICE VISIT (OUTPATIENT)
Dept: PHYSICAL THERAPY | Facility: CLINIC | Age: 46
End: 2021-04-26
Payer: COMMERCIAL

## 2021-04-26 DIAGNOSIS — E88.40 MITOCHONDRIAL DISEASE (HCC): ICD-10-CM

## 2021-04-26 DIAGNOSIS — M70.61 TROCHANTERIC BURSITIS OF RIGHT HIP: ICD-10-CM

## 2021-04-26 DIAGNOSIS — M25.551 RIGHT HIP PAIN: Primary | ICD-10-CM

## 2021-04-26 PROCEDURE — 97140 MANUAL THERAPY 1/> REGIONS: CPT | Performed by: PHYSICAL THERAPIST

## 2021-04-26 PROCEDURE — 97110 THERAPEUTIC EXERCISES: CPT | Performed by: PHYSICAL THERAPIST

## 2021-04-26 RX ORDER — BLOOD-GLUCOSE TRANSMITTER
EACH MISCELLANEOUS
Qty: 1 EACH | Refills: 0 | Status: SHIPPED | OUTPATIENT
Start: 2021-04-26 | End: 2021-09-01 | Stop reason: SDUPTHER

## 2021-04-26 RX ORDER — BLOOD-GLUCOSE SENSOR
EACH MISCELLANEOUS
Qty: 3 EACH | Refills: 0 | Status: SHIPPED | OUTPATIENT
Start: 2021-04-26 | End: 2021-05-25 | Stop reason: SDUPTHER

## 2021-04-26 NOTE — PROGRESS NOTES
Daily Note     Today's date: 2021   Patient name: Dawn Link  : 1975  MRN: [id number]  Referring provider: Akira Lackey DO  Dx:   Encounter Diagnosis     ICD-10-CM    1  Right hip pain  M25 551    2  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[facility] [address] )  E88 40    3  Trochanteric bursitis of right hip  M70 61                   Subjective: She states she is feeling a bit more sore today  Objective: See treatment diary below    Assessment: Tolerated treatment well  She had pain c bridges today in R groin and c ext/ER combo and no pain c IR  trialed anterior mobs which initially helped ER ROM but then caused burning pain  LAD performed c slight relief but overall made better c MHP today  Will trial hip abd mobs if sxs persist  Patient would benefit from continued PT      Plan: Continue per plan of care        Goals: Patient's goal is walk c  and ride bike and work s pain    Precautions: She has foot drop, 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, mitochondrial disease, raynauds, gastric bypass sx, CRPS- R elbow, Metabolic Strokes-  and  took a year to recover, aphasic when tired,   Dx: R trochanteric bursitis- hip flexion hypomobility- decreased trunk stab and RLE weakness from strokes      Daily Treatment Diary     Manuals 2021 4/15 4/19 4/22 4/26    R LAD grade 1-2  8' 8' 8' 5'    R hip PROM prn     2'    R hip ant mobs     3'             Ther Ex         Standing HR  20x 20x 20x     Treadmill --> bike   6' 6' 6' 6'    S/l clamshells  2x10 2x10 2x10 2x10    Pain science education- nerve education  nv       Ankle 3 way GTB  20x 20x 20x 20x    LAQ #2  20x 20x #3 20x #3     SL LP #45    2x10 #47 5     Supine/standing hip flexor st                  Neuro Re-ed         TA activation 5x unable to feel R side 10x       TA c candle flickering 89J  63U 88T      TA c pall of press  20x 20x 20x     Seated pball a/p tilt  20x 20x 20x     bridges   2x10 2x10 2x10    Ther Activity         stairs  1 flight np today      WB tolerance 65# pain/177 35% #75              Gait Training                           Modalities         mhp in s/l R hip     10'

## 2021-04-28 ENCOUNTER — TELEPHONE (OUTPATIENT)
Dept: SLEEP CENTER | Facility: CLINIC | Age: 46
End: 2021-04-28

## 2021-04-29 ENCOUNTER — OFFICE VISIT (OUTPATIENT)
Dept: PHYSICAL THERAPY | Facility: CLINIC | Age: 46
End: 2021-04-29
Payer: COMMERCIAL

## 2021-04-29 DIAGNOSIS — M70.61 TROCHANTERIC BURSITIS OF RIGHT HIP: ICD-10-CM

## 2021-04-29 DIAGNOSIS — M25.551 RIGHT HIP PAIN: Primary | ICD-10-CM

## 2021-04-29 DIAGNOSIS — E88.40 MITOCHONDRIAL DISEASE (HCC): ICD-10-CM

## 2021-04-29 PROCEDURE — 97140 MANUAL THERAPY 1/> REGIONS: CPT | Performed by: PHYSICAL THERAPIST

## 2021-04-29 PROCEDURE — 97110 THERAPEUTIC EXERCISES: CPT | Performed by: PHYSICAL THERAPIST

## 2021-04-29 NOTE — TELEPHONE ENCOUNTER
Patient returned my call  Asking if she does need to come in as she was just seen 2 weeks ago and Dr Divina Conklin advised he could only offer her medications  DR Divina Conklin, did you want to see her or do you want to try a different medication?

## 2021-04-29 NOTE — PROGRESS NOTES
Daily Note     Today's date: 2021   Patient name: Arlette Renee  : 1975  MRN: [id number]  Referring provider: John Mooney DO  Dx:   Encounter Diagnosis     ICD-10-CM    1  Right hip pain  M25 551    2  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[facility] [address] )  E88 40    3  Trochanteric bursitis of right hip  M70 61                   Subjective: She states she is feeling a bit more sore today and she was sore from last session  She voices frustration about her sxs and is wondering what else this could be  Objective: See treatment diary below    Assessment: Tolerated treatment well  She had less WB tolerance on scale today  Due to x-ray of hip being negative and high irritability, I believe her 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 causing significant R hip pain, poor WB on RLE, and poor endurance  She was treated for CRPS of her R elbow and she was instructed to perform similar treatments and incorporate self talk for R hip  Will trial hip abd mobs if sxs persist  Patient would benefit from continued PT      Plan: Continue per plan of care  Laterality test nv       Goals: Patient's goal is walk c  and ride bike and work s pain    Precautions: She has foot drop, 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, mitochondrial disease, raynauds, gastric bypass sx, CRPS- R elbow, Metabolic Strokes-  and  took a year to recover, aphasic when tired,   Dx: R trochanteric bursitis- hip flexion hypomobility- decreased trunk stab and RLE weakness from strokes      Daily Treatment Diary     Manuals 2021 4/15 4/19 4/22 4/26 4/29   R LAD grade 1-2  8' 8' 8' 5' 8- performed in slight abd and ER   R hip PROM prn     2'    R hip ant mobs     3'             Ther Ex         Standing HR  20x 20x 20x     Treadmill --> bike   6' 6' 6' 6' 6'   S/l clamshells  2x10 2x10 2x10 2x10 2x10 R fatigued at rep 12   Pain science education- nerve education  nv    8'- PNE cards brain- pain mapping   Ankle 3 way GTB  20x 20x 20x 20x    LAQ #2  20x 20x #3 20x #3  20x #3   SL LP #45    2x10 #47 5     Supine/standing hip flexor st         Standing hip abd      R 10x2   Neuro Re-ed         TA activation 5x unable to feel R side 10x       TA c candle flickering 64Q  85G 57V   10x   TA c pall of press  20x 20x 20x     Seated pball a/p tilt  20x 20x 20x     bridges   2x10 2x10 2x10 2x10   Ther Activity         stairs  1 flight np today      WB tolerance   65# pain/177 35% #75  #56            Gait Training                           Modalities         mhp in s/l R hip     10'

## 2021-05-02 DIAGNOSIS — F51.04 PSYCHOPHYSIOLOGICAL INSOMNIA: Primary | ICD-10-CM

## 2021-05-02 RX ORDER — DOXEPIN HYDROCHLORIDE 10 MG/1
10 CAPSULE ORAL
Qty: 30 CAPSULE | Refills: 5 | Status: SHIPPED | OUTPATIENT
Start: 2021-05-02 | End: 2022-02-24

## 2021-05-03 ENCOUNTER — OFFICE VISIT (OUTPATIENT)
Dept: ENDOCRINOLOGY | Facility: CLINIC | Age: 46
End: 2021-05-03
Payer: COMMERCIAL

## 2021-05-03 ENCOUNTER — OFFICE VISIT (OUTPATIENT)
Dept: PHYSICAL THERAPY | Facility: CLINIC | Age: 46
End: 2021-05-03
Payer: COMMERCIAL

## 2021-05-03 VITALS
HEIGHT: 65 IN | SYSTOLIC BLOOD PRESSURE: 128 MMHG | WEIGHT: 181.56 LBS | DIASTOLIC BLOOD PRESSURE: 88 MMHG | HEART RATE: 78 BPM | BODY MASS INDEX: 30.25 KG/M2

## 2021-05-03 DIAGNOSIS — K91.2 POSTSURGICAL MALABSORPTION: ICD-10-CM

## 2021-05-03 DIAGNOSIS — E16.1 REACTIVE HYPOGLYCEMIA: ICD-10-CM

## 2021-05-03 DIAGNOSIS — E88.40 MITOCHONDRIAL DISEASE (HCC): ICD-10-CM

## 2021-05-03 DIAGNOSIS — M70.61 TROCHANTERIC BURSITIS OF RIGHT HIP: ICD-10-CM

## 2021-05-03 DIAGNOSIS — M25.551 RIGHT HIP PAIN: Primary | ICD-10-CM

## 2021-05-03 DIAGNOSIS — K91.2 HYPOGLYCEMIA AFTER GI (GASTROINTESTINAL) SURGERY: Primary | ICD-10-CM

## 2021-05-03 DIAGNOSIS — E21.1 HYPERPARATHYROIDISM , SECONDARY, NON-RENAL (HCC): ICD-10-CM

## 2021-05-03 PROCEDURE — 97112 NEUROMUSCULAR REEDUCATION: CPT | Performed by: PHYSICAL THERAPIST

## 2021-05-03 PROCEDURE — 99213 OFFICE O/P EST LOW 20 MIN: CPT | Performed by: INTERNAL MEDICINE

## 2021-05-03 PROCEDURE — 97110 THERAPEUTIC EXERCISES: CPT | Performed by: PHYSICAL THERAPIST

## 2021-05-03 PROCEDURE — 97140 MANUAL THERAPY 1/> REGIONS: CPT | Performed by: PHYSICAL THERAPIST

## 2021-05-03 PROCEDURE — 95251 CONT GLUC MNTR ANALYSIS I&R: CPT | Performed by: INTERNAL MEDICINE

## 2021-05-03 NOTE — ASSESSMENT & PLAN NOTE
She is now on acarbose and Januvia  After starting Januvia, she has noticed less variability in the blood sugar

## 2021-05-03 NOTE — PROGRESS NOTES
Assessment/Plan:    Postsurgical malabsorption    This has been stable  Her calcium level has improved with calcium supplement chews  Reactive hypoglycemia    She is now on acarbose and Januvia  After starting Januvia, she has noticed less variability in the blood sugar  Hyperparathyroidism , secondary, non-renal ([address] )    This has improved with calcium supplementation  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    Continue with continuous glucose monitoring  Diagnoses and all orders for this visit:    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    Postsurgical malabsorption    Reactive hypoglycemia    Hyperparathyroidism , secondary, non-renal (HCC)          Subjective:      Patient ID: Essence Cruz is a 39 y o  female  51-year-old woman with post gastrectomy hypoglycemia  She is keeping her carbohydrates to less than 7 g per meal   She continues on acarbose and Januvia was added to her regimen after having multiple episodes of hypoglycemia  The addition of Januvia has improved the variability  4 secondary hyperparathyroidism, she is on calcium supplementation  The following portions of the patient's history were reviewed and updated as appropriate: allergies, current medications, past family history, past medical history, past social history, past surgical history and problem list     Review of Systems   Constitutional: Negative for chills and fever  Respiratory: Negative for shortness of breath  Cardiovascular: Negative for chest pain  Gastrointestinal: Negative for constipation, diarrhea, nausea and vomiting  All other systems reviewed and are negative  Objective:      /88 (BP Location: Left arm, Patient Position: Sitting, Cuff Size: Large)   Pulse 78   Ht 5' 4 5" (1 638 m)   Wt 82 4 kg (181 lb 9 oz)   BMI 30 68 kg/m²          Physical Exam  Vitals signs reviewed  Constitutional:       General: She is not in acute distress  Appearance: She is well-developed  She is not diaphoretic  HENT:      Head: Normocephalic and atraumatic  Eyes:      General: Lids are normal  No scleral icterus  Right eye: No discharge  Left eye: No discharge  Conjunctiva/sclera: Conjunctivae normal    Neck:      Musculoskeletal: Neck supple  Thyroid: No thyromegaly  Cardiovascular:      Rate and Rhythm: Normal rate and regular rhythm  Heart sounds: Normal heart sounds  No murmur  No friction rub  No gallop  Pulmonary:      Effort: Pulmonary effort is normal  No respiratory distress  Breath sounds: Normal breath sounds  No wheezing  Abdominal:      General: Bowel sounds are normal  There is no distension  Palpations: Abdomen is soft  Tenderness: There is no abdominal tenderness  Musculoskeletal: Normal range of motion  General: No tenderness or deformity  Lymphadenopathy:      Head:      Right side of head: No occipital adenopathy  Left side of head: No occipital adenopathy  Upper Body:      Right upper body: No supraclavicular adenopathy  Left upper body: No supraclavicular adenopathy  Skin:     General: Skin is warm  Findings: No erythema or rash  Neurological:      Mental Status: She is alert and oriented to person, place, and time  Cranial Nerves: No cranial nerve deficit  Psychiatric:         Mood and Affect: Mood normal          Behavior: Behavior normal            Henrene Cue   Device used   [facility] use       Indication    severe hypoglycemia    More than 72 hours of data was reviewed  Report to be scanned to chart  Date Range:  April 20, 2021 to May 3, 2021    Analysis of data:   Average Glucose:  100 mg/dL  SD :  19 mg/dL   Time in Target Range:  95%   Time Above Range:  Less than 1%   Time Below Range:  4%     Interpretation of data: There is hypoglycemia present that occurs in mid afternoon range  She manages this with protein shakes  There is no evidence of hyperglycemia

## 2021-05-03 NOTE — PROGRESS NOTES
Spoke with pharmacist, then pt  Midodrine can interact with Doxepin to cause increased activity of midodrine  The patient has never had a problem with BP being too high and doxepin is a low dose  Continue both medications

## 2021-05-03 NOTE — PROGRESS NOTES
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 40    3  Trochanteric bursitis of right hip  M70 61                   Subjective: She states she is feeling better today after taking a break of walking instensity  Objective: See treatment diary below    Assessment: Tolerated treatment well  She did fatigue after 7 reps of clamshells  Will trial hip abd mobs if sxs persist  Continue to give pain science ed nv  Patient would benefit from continued PT      Plan: Continue per plan of care  Laterality test nv       Goals: Patient's goal is walk c  and ride bike and work s pain    Precautions: She has foot drop, 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, mitochondrial disease, raynauds, gastric bypass sx, CRPS- R elbow, Metabolic Strokes- 6488 and  took a year to recover, aphasic when tired,   Dx: R trochanteric bursitis- hip flexion hypomobility- decreased trunk stab and RLE weakness from strokes      Daily Treatment Diary     Manuals 5/3 4/15 4/19 4/22 4/26 4/29   R LAD grade 1-2 8' 8' 8' 8' 5' 8- performed in slight abd and ER   R hip PROM prn     2'    R hip ant mobs     3'             Ther Ex         Standing HR 20x 20x 20x 20x     Treadmill --> bike  6' 6' 6' 6' 6' 6'   S/l clamshells  2x10 2x10 2x10 2x10 2x10 R fatigued at rep 12   Pain science education- nerve education  nv    8'- PNE cards brain- pain mapping   Ankle 3 way GTB  20x 20x 20x 20x    LAQ #2 20x #3 20x 20x #3 20x #3  20x #3   SL LP #47 5 20x   2x10 #47 5     Supine/standing hip flexor st nv        Standing hip abd nv     R 10x2   Neuro Re-ed         TA activation  10x       TA c candle flickering   45R 73Y   10x   TA c pall of press  20x 20x 20x     Seated pball a/p tilt  20x 20x 20x     bridges 2x10  2x10 2x10 2x10 2x10   Ther Activity         stairs  1 flight np today      WB tolerance   65# pain/177 35% #75  #56            Gait Training                           Modalities         mhp in s/l R hip     10'

## 2021-05-03 NOTE — TELEPHONE ENCOUNTER
Spoke with patient, advised DR Rosenthal called her this morning and discussed  I advised she will need visit in 2-3 months, patient states she is driving and will call office to schedule

## 2021-05-06 ENCOUNTER — OFFICE VISIT (OUTPATIENT)
Dept: PHYSICAL THERAPY | Facility: CLINIC | Age: 46
End: 2021-05-06
Payer: COMMERCIAL

## 2021-05-06 DIAGNOSIS — M70.61 TROCHANTERIC BURSITIS OF RIGHT HIP: ICD-10-CM

## 2021-05-06 DIAGNOSIS — E88.40 MITOCHONDRIAL DISEASE (HCC): ICD-10-CM

## 2021-05-06 DIAGNOSIS — M25.551 RIGHT HIP PAIN: Primary | ICD-10-CM

## 2021-05-06 PROCEDURE — 97140 MANUAL THERAPY 1/> REGIONS: CPT | Performed by: PHYSICAL THERAPIST

## 2021-05-06 PROCEDURE — 97110 THERAPEUTIC EXERCISES: CPT | Performed by: PHYSICAL THERAPIST

## 2021-05-06 PROCEDURE — 97112 NEUROMUSCULAR REEDUCATION: CPT | Performed by: PHYSICAL THERAPIST

## 2021-05-06 NOTE — PROGRESS NOTES
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 40    3  Trochanteric bursitis of right hip  M70 61                   Subjective: She states she is feeling better today after taking a break of walking instensity  Objective: See treatment diary below    Assessment: Tolerated treatment well  She did fatigue after 6 reps of clamshells  Laterality test 100% for feet  She was progressed c hip abduction c pain at 9 reps  Will trial hip abd mobs if sxs persist Patient would benefit from continued PT      Plan: Continue per plan of care         Goals: Patient's goal is walk c  and ride bike and work s pain    Precautions: She has foot drop, 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, mitochondrial disease, raynauds, gastric bypass sx, CRPS- R elbow, Metabolic Strokes- 53 and  took a year to recover, aphasic when tired,   Dx: R trochanteric bursitis- hip flexion hypomobility- decreased trunk stab and RLE weakness from strokes      Daily Treatment Diary     Manuals 5/3 5/6 4/19 4/22 4/26 4/29   R LAD grade 1-2 8' 8' 8' 8' 5' 8- performed in slight abd and ER   R hip PROM prn     2'    R hip ant mobs     3'             Ther Ex         Standing HR 20x 20x 20x 20x     Treadmill --> bike  6' 6' 6' 6' 6' 6'   S/l clamshells  2x10 2x10 2x10 2x10 2x10 R fatigued at rep 12   Pain science education- nerve education      8'- PNE cards brain- pain mapping   Ankle 3 way GTB   20x 20x 20x    LAQ #2 20x #3  20x #3 20x #3  20x #3   SL LP #47 5 20x 20x  2x10 #47 5     Supine/standing hip flexor st nv 4x :20       Standing hip abd nv 10x    R 10x2   Neuro Re-ed         TA activation  10x       TA c candle flickering   19B 17R   10x   TA c pall of press  20x 20x 20x     Seated pball a/p tilt  20x 20x 20x     bridges 2x10 2x10 2x10 2x10 2x10 2x10 Ther Activity         stairs   np today      WB tolerance   65# pain/177 35% #75  #56            Gait Training                           Modalities         mhp in s/l R hip     10'

## 2021-05-10 ENCOUNTER — OFFICE VISIT (OUTPATIENT)
Dept: PHYSICAL THERAPY | Facility: CLINIC | Age: 46
End: 2021-05-10
Payer: COMMERCIAL

## 2021-05-10 DIAGNOSIS — M70.61 TROCHANTERIC BURSITIS OF RIGHT HIP: ICD-10-CM

## 2021-05-10 DIAGNOSIS — E88.40 MITOCHONDRIAL DISEASE (HCC): ICD-10-CM

## 2021-05-10 DIAGNOSIS — M25.551 RIGHT HIP PAIN: Primary | ICD-10-CM

## 2021-05-10 PROCEDURE — 97140 MANUAL THERAPY 1/> REGIONS: CPT | Performed by: PHYSICAL THERAPIST

## 2021-05-10 PROCEDURE — 97112 NEUROMUSCULAR REEDUCATION: CPT | Performed by: PHYSICAL THERAPIST

## 2021-05-10 PROCEDURE — 97110 THERAPEUTIC EXERCISES: CPT | Performed by: PHYSICAL THERAPIST

## 2021-05-10 NOTE — PROGRESS NOTES
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  Objective: See treatment diary below    Assessment: Tolerated treatment well  She did fatigue after 9 reps of clamshells  She did have pain c TA and LTR but was better to the R  Will continue to progress this as tolerated to help her pushing her cart at work  Patient would benefit from continued PT      Plan: Continue per plan of care         Goals: Patient's goal is walk c  and ride bike and work s pain    Precautions: She has foot drop, 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, mitochondrial disease, raynauds, gastric bypass sx, CRPS- R elbow, Metabolic Strokes- 81 and  took a year to recover, aphasic when tired,   Dx: R trochanteric bursitis- hip flexion hypomobility- decreased trunk stab and RLE weakness from strokes      Daily Treatment Diary     Manuals 5/3 5/6 5/10 4/22 4/26 4/29   R LAD grade 1-2 8' 8' 8' 8' 5' 8- performed in slight abd and ER   R hip PROM prn     2'    R hip ant mobs     3'             Ther Ex         Standing HR 20x 20x 20x 20x     Treadmill --> bike  6' 6' 6' 6' 6' 6'   S/l clamshells  2x10 2x10 2x10 2x10 2x10 R fatigued at rep 12   Pain science education- nerve education      8'- PNE cards brain- pain mapping   Ankle 3 way GTB    20x 20x    LAQ #2 20x #3   20x #3  20x #3   SL LP #47 5 20x 20x 20x #50 2x10 #47 5 2x10 #50    Supine/standing hip flexor st nv 4x :20 4x :20      Standing hip abd nv 10x 10x2   R 10x2   Neuro Re-ed         TA activation c LTR  10x 10x      TA c candle flickering   40P    68M   TA c pall of press  20x 20x 20x 20x    Seated pball a/p tilt  20x 20x 20x     bridges 2x10 2x10 2x10 2x10 2x10 2x10   Ther Activity         stairs         WB tolerance   #95 #75  #56            Gait Training                           Modalities         mhp in s/l R hip     10'

## 2021-05-12 NOTE — PROGRESS NOTES
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[address] )  E88 40    3  Trochanteric bursitis of right hip  M70 61            Subjective Evaluation     History of Present Illness  She states she feels 80% better at this time and her pain is less constant  She does not have pain on days she does not work  She states it is triggered pain  She knows there are some CRPS sxs but she can talk it down  Hx of injury:   Patient presents with c/o R hip pain from about 1 month ago from walking long durations  She was told she has a leg length discrepancy  She has R sided weakness from 2 strokes she has had  She feels like the bones are grinding when she moves  The cortisone shot made it worse  She used a shoe lift which caused LBP on 3/29 then tried another lift which made the LBP worse  She was then told it may be sciatic pain as she had pain c hip IR  She has trouble getting up in the AM as she is very stiff  She has taken motrin/tylenol s relief  PMHx: 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   She has foot drop, 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, mitochondrial disease, raynauds, gastric bypass sx, CRPS- R elbow, Metabolic Strokes- 3949 and 2009 took a year to recover, aphasic when tired,   Neuro signs: none    Bowel and bladder sxs: none  Red flags: none  Occupation: Nurse      Pain   At best pain ratin  At worst pain ratin  Location: C- location on anterior/lateral hip down to isch tub and sacrum  Quality:ball of pain, like a rock, burning  Relieving factors: sitting in a reclined chair leaning to L, lying supine  Aggravating factors: sitting down, lying on it, walking on uneven surfaces,  (1 hr of work), up steps, twisting    Social Support  Lives at home c her family and walks 3 miles/day      Patient Goals  Patient goals for therapy: walk c  and ride bike and work s pain    STGs  1  Decrease pain by 20% in 2-4 weeks to improve standing tolerance -not met frequency is improved  2  Improve hip ROM by 10 degrees in 2-4 weeks to improve sit to stand  - met  3  Improve hip strength by 1/3 grade in 2-4 weeks to improve stairs performance to going up c rail assist - met      LTGs  1  Decrease pain by 60% in 6-8 weeks  2  Improve walking/biking tolerance to >30 minutes in 6-8 weeks to perform community ambulation  3  Perform job/dressing/showering activities independently without pain in 6-8 weeks  4  Improve stairs tolerance to 1 flight s rail in 10 weeks  Objective Measurements:    Observation: ASIS and malleoli in supine at equal height  Balance: R SLB pain  Gait: pain c midstance   Squat: valgus B  Reflexes: NT   Sensation: hypersensitive R lateral thigh  Myotomes: RLE weaker      Slump: -SLR:  R tight at [address]: + [place]: -  Hip distraction: felt better     Elys: pain in glut Sacral thrust: -  BREANNA: hip lateral/lateral inf glides painful  Palpation:R TFL, post greater troch, piriformis, isch tuberosity TTP  TA: R unable to palpate contraction and painful  L slight sensation c activation  Improved contraction slightly felt c cue to blow candle slowly  Lumbar ROM L R Strength knee L R   Flex  WFL  Flex 4+ 4+   Ext WFL  Ext 4+ 4+   Rot WFL       SB WFL       Hip A/PROM        Flex  / 112 pain / Flex 5 4+   ER at 90  40/prone WFL ER     IR at 90  p 25/prone painful IR     Abd  WFL Abd 5 4   Ext   Ext 4+ 3p           Knee A/PROM   Ankle great toe 4+ 4+   Flex   DF 4+ 4+   Ext   PF 5 reps 5 reps         Assessment:  Tiana Ortez has demonstrated overall improvement in ROM, strength, function, and a reduction in intensity of pain   Currently, she has made steady progress towards her goals, but continues to remain limited with hip ROM, WB tolerance, hip/lumbar rotation and trunk stability  At this time, skilled physical therapy is warranted to address the remaining impairments and aide in return to working s pain  Limiting factors to therapy mitochondrial disease and she  demonstrates good motivation  Plan  Patient would benefit from:Skilled physical therapy  Planned therapy interventions: manual therapy, neuromuscular re-education, stretching, strengthening, therapeutic activities, therapeutic exercise, patient education, home exercise program, and activity modification      Frequency: 2x week  Duration in weeks: 6  Treatment plan discussed with: patient             Goals: Patient's goal is walk c  and ride bike and work s pain    Precautions: She has foot drop, 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, mitochondrial disease, raynauds, gastric bypass sx, CRPS- R elbow, Metabolic Strokes- 2117 and 2009 took a year to recover, aphasic when tired,   Dx: R trochanteric bursitis- hip flexion hypomobility- decreased trunk stab and RLE weakness from strokes    Daily Treatment Diary   Manuals 5/3 5/6 5/10 5/12 4/26 4/29   R LAD grade 2-3 8' 8' 8' 8' 5' 8- performed in slight abd and ER   R hip PROM prn     2'    R hip abd mobs                  Ther Ex         Standing HR 20x 20x 20x 20x     Treadmill  6' 6' 6' 6' 6' 6'   S/l clamshells  2x10 2x10 2x10 2x10 2x10 R fatigued at rep 12   Pain science education- nerve education      8'- PNE cards brain- pain mapping   Ankle 3 way GTB    20x 20x    LAQ #2 20x #3     20x #3   SL LP #47 5 20x 20x 20x #50 nv 2x10 #50    Supine hip flexor st nv 4x :20 4x :20 4x :20     Standing hip abd nv 10x 10x2 nv  R 10x2   Neuro Re-ed         TA activation c LTR  10x 10x 10x              TA c pall of press  20x 20x  20x    Seated pball a/p tilt  20x 20x 20x     bridges 2x10 2x10 2x10 2x10 2x10 2x10   Ther Activity         stairs         WB tolerance   #95   #56            Gait Training                           Modalities         mhp in s/l R hip     10'

## 2021-05-13 ENCOUNTER — OFFICE VISIT (OUTPATIENT)
Dept: PHYSICAL THERAPY | Facility: CLINIC | Age: 46
End: 2021-05-13
Payer: COMMERCIAL

## 2021-05-13 DIAGNOSIS — M25.551 RIGHT HIP PAIN: Primary | ICD-10-CM

## 2021-05-13 DIAGNOSIS — M70.61 TROCHANTERIC BURSITIS OF RIGHT HIP: ICD-10-CM

## 2021-05-13 DIAGNOSIS — E88.40 MITOCHONDRIAL DISEASE (HCC): ICD-10-CM

## 2021-05-13 PROCEDURE — 97140 MANUAL THERAPY 1/> REGIONS: CPT | Performed by: PHYSICAL THERAPIST

## 2021-05-13 PROCEDURE — 97110 THERAPEUTIC EXERCISES: CPT | Performed by: PHYSICAL THERAPIST

## 2021-05-14 ENCOUNTER — OFFICE VISIT (OUTPATIENT)
Dept: URGENT CARE | Facility: CLINIC | Age: 46
End: 2021-05-14
Payer: COMMERCIAL

## 2021-05-14 VITALS
BODY MASS INDEX: 30.9 KG/M2 | OXYGEN SATURATION: 100 % | DIASTOLIC BLOOD PRESSURE: 91 MMHG | WEIGHT: 181 LBS | TEMPERATURE: 97.6 F | RESPIRATION RATE: 18 BRPM | SYSTOLIC BLOOD PRESSURE: 143 MMHG | HEART RATE: 76 BPM | HEIGHT: 64 IN

## 2021-05-14 DIAGNOSIS — J03.90 ACUTE TONSILLITIS, UNSPECIFIED ETIOLOGY: Primary | ICD-10-CM

## 2021-05-14 DIAGNOSIS — J06.9 UPPER RESPIRATORY TRACT INFECTION, UNSPECIFIED TYPE: ICD-10-CM

## 2021-05-14 LAB — S PYO AG THROAT QL: NEGATIVE

## 2021-05-14 PROCEDURE — G0382 LEV 3 HOSP TYPE B ED VISIT: HCPCS | Performed by: NURSE PRACTITIONER

## 2021-05-14 PROCEDURE — 87880 STREP A ASSAY W/OPTIC: CPT

## 2021-05-14 NOTE — PROGRESS NOTES
[facility] Now        NAME: Saba Thomas is a 39 y o  female  : 1975    MRN: [id number]  DATE: May 14, 2021  TIME: 7:35 PM    Assessment and Plan   Acute tonsillitis, unspecified etiology [J03 90]  1  Acute tonsillitis, unspecified etiology  POCT rapid strepA   2  Upper respiratory tract infection, unspecified type           Patient Instructions       --Rapid strep test negative  --Rest, drink plenty of fluids   --For nasal/sinus congestion, helpful measures include steam, warm compresses, an OTC saline nasal spray or Neti pot, or an OTC decongestant (such as Sudafed)  The decongestant should be avoided, however, if you are under 10years of age, or have a history of high blood pressure or heart disease  In addition, an OTC nasal steroid (Flonase, Nasocort) or nasal decongestant (Afrin, Meng-synephrine) may be taken  The nasal steroid should be used at bedtime, after the saline nasal spray  The nasal decongestant should not be taken more than 3 days consecutively in order to prevent rebound congestion  --For nasal drainage, postnasal drip, sneezing and itching, an OTC antihistamine (Allegra, Benadryl, etc) can be taken  --For cough, a spoonful of honey at bedtime may also be helpful, as may a prescription cough medicine  Also recommended is the use of a cool mist humidifier (with or without Vicks) in the bedroom at night  --For sore throat, you can take OTC lozenges, use warm gargles (salt water or apple cider vinegar and honey), herbal teas, or an OTC throat spray (Chloraseptic)  --You can take Tylenol or Motrin/Advil as needed for fever, headache, body aches  Motrin/Advil should be avoided, however, if you have a history of heart disease, bleeding ulcers, or if you take blood thinners  --You should contact your primary care provider and/or go to the ER if your symptoms are not improved or get worse over the next 7 days    This includes new onset fever, localized ear pain, sinus pain, as well as worsening cough, chest pain, shortness of breath, or significant weakness/fatigue  Chief Complaint     Chief Complaint   Patient presents with    Sore Throat     Sore Throat, Lt earache Pressure, Cough only when taking deep breath this morning  Took tylenol and Motrin this morning  Denies fever and positive exposure to Covid  Works in healthcare  History of Present Illness         Left tonsil sore since this morning, non-productive cough  No dyspnea, wheezing  No nasal congestion, rhinorrhea, anosmia  Some post nasal drip  Some left ear discomfort  No fever/chills, headache, myalgias  No abdominal pain, N/V/D  Taken Tylenol, Motrin  No recent flares of heartburn  Fully vaccinated against COVID  Second dose over 2 months ago  Review of Systems   Review of Systems   Constitutional: Negative for fever  HENT: Positive for sore throat  Negative for rhinorrhea and sinus pressure  Eyes: Negative for discharge  Respiratory: Positive for cough  Negative for shortness of breath and wheezing  Gastrointestinal: Negative for abdominal pain, diarrhea, nausea and vomiting  Musculoskeletal: Negative for myalgias  Neurological: Negative for headaches  Current Medications       Current Outpatient Medications:     acarbose (PRECOSE) 100 MG tablet, Take 1 tablet (100 mg total) by mouth 3 (three) times a day with meals, Disp: 270 tablet, Rfl: 0    acetylcysteine (MUCOMYST) 200 mg/mL nebulizer solution, as needed , Disp: , Rfl:     Alpha-Lipoic Acid 100 MG CAPS, Take by mouth, Disp: , Rfl:     B Complex-Biotin-FA (TH VITAMIN B 50/B-COMPLEX PO), Take 1 tablet by mouth daily    , Disp: , Rfl:     baclofen 10 mg tablet, Take 0 5 tablets (5 mg total) by mouth daily at bedtime as needed for muscle spasms, Disp: 10 tablet, Rfl: 1    COENZYME Q-10 PO, , Disp: , Rfl:     Continuous Blood Gluc Sensor (Dexcom G6 Sensor) MISC, Use as directed for CGM - Change every 10 days, Disp: 3 each, Rfl: 0    Continuous Blood Gluc Transmit (Dexcom G6 Transmitter) MISC, USE AS DIRECTED FOR CGM AND CHANGE EVERY 3 MONTHS, Disp: 1 each, Rfl: 0    CREATINE MONOHYDRATE PO, Take 2 5 g by mouth, Disp: , Rfl:     Cyanocobalamin (B-12) 1000 MCG/ML KIT, Inject  as directed every 30 days  , Disp: , Rfl:     diphenhydrAMINE (BENADRYL) 25 mg tablet, Take by mouth, Disp: , Rfl:     docusate sodium (COLACE) 100 mg capsule, Take 200 mg by mouth 2 (two) times a day , Disp: , Rfl:     doxepin (SINEquan) 10 mg capsule, Take 1 capsule (10 mg total) by mouth daily at bedtime, Disp: 30 capsule, Rfl: 5    Galcanezumab-gnlm (Emgality) 120 MG/ML SOAJ, 1 subq injection every 30 days  , Disp: 1 mL, Rfl: 11    iron sucrose (VENOFER) 20 mg/mL, Venofer 200 mg iron/10 mL intravenous solution, Disp: , Rfl:     L-Arginine 1000 MG TABS, Take 6,000 mg by mouth 2 (two) times a day , Disp: , Rfl:     linaCLOtide 145 MCG CAPS, Take 145 mcg by mouth 2 (two) times a day, Disp: , Rfl:     midodrine (PROAMATINE) 5 mg tablet, Take 1 tablet (5 mg total) by mouth 3 (three) times a day, Disp: 270 tablet, Rfl: 3    Multiple Vitamin (MULTIVITAMIN) tablet, Take 1 tablet by mouth daily  , Disp: , Rfl:     NIFEdipine (PROCARDIA XL) 60 mg 24 hr tablet, Take 1 tablet (60 mg total) by mouth daily, Disp: 90 tablet, Rfl: 1    nystatin (MYCOSTATIN) cream, Apply topically 2 (two) times a day, Disp: 30 g, Rfl: 0    Oral Vehicles (PCCA-PLUS) SUSP, , Disp: , Rfl:     P-Aminobenzoic Acid (PARA-AMINOBENZOIC ACID) POWD, , Disp: , Rfl:     pantoprazole (PROTONIX) 40 mg tablet, , Disp: , Rfl: 3    Plecanatide 3 MG TABS, Take 3 mg by mouth, Disp: , Rfl:     polyethylene glycol (MIRALAX) 17 g packet, Take 17 g by mouth 2 (two) times a day Indications: 1 5 caps full 1-2 times/day   , Disp: , Rfl:     Riboflavin 100 MG TABS, Take 100 mg by mouth, Disp: , Rfl:     sitaGLIPtin (JANUVIA) 25 mg tablet, Take 1 tablet (25 mg total) by mouth daily, Disp: 30 tablet, Rfl: 3    TRILYTE 420 g solution, , Disp: , Rfl: 5    Trulance 3 MG TABS, , Disp: , Rfl:     Ubiquinol Liposomal 100 MG/5ML SYRP, Take 300 mg by mouth, Disp: , Rfl:     vitamin E 100 UNIT capsule, Take 78 Units by mouth daily, Disp: , Rfl:     acarbose (PRECOSE) 25 mg tablet, , Disp: , Rfl:     OneTouch Delica Lancets 64B MISC, by Does not apply route 3 (three) times a day (Patient not taking: Reported on 5/3/2021), Disp: 300 each, Rfl: 1    OneTouch Verio test strip, 1 each by Other route 3 (three) times a day (Patient not taking: Reported on 5/3/2021), Disp: 300 each, Rfl: 1    Prucalopride Succinate (Motegrity) 2 MG TABS, Take by mouth daily, Disp: , Rfl:     sucralfate (CARAFATE) 1 g/10 mL suspension, Take 10 mL (1 g total) by mouth 4 (four) times a day Must have liquid suspension (Patient not taking: Reported on 5/3/2021), Disp: 420 mL, Rfl: 5    Ubiquinol POWD, , Disp: , Rfl:     Current Allergies     Allergies as of 05/14/2021 - Reviewed 05/14/2021   Allergen Reaction Noted    Sulfate Other (See Comments) 04/02/2019    Sulfa antibiotics  01/27/2016    Guaifenesin  01/27/2016    Other  07/07/2015            The following portions of the patient's history were reviewed and updated as appropriate: allergies, current medications, past family history, past medical history, past social history, past surgical history and problem list      Past Medical History:   Diagnosis Date    Acid reflux     Constipated     Dysautonomia ([facility] [address] )     Esophageal abnormality     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   Gastrostomy tube in place [facility])     Gastrostomy tube in place [facility])     History of blood clots 2012    Left leg  Has IVC filter now   Occurred while on Lovenox    Iron deficiency     Malignant hyperthermia due to anesthesia     Patient's son has M H   States she needs precautions    Migraines     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[address] )     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     MTHFR (methylene THF reductase) deficiency and homocystinuria (HCC)     Muscle weakness (generalized)     Nausea     On total parenteral nutrition (TPN)     for 8 mts    PCOS (polycystic ovarian syndrome)     PONV (postoperative nausea and vomiting)     Postgastrectomy syndrome     malabsorption    Postsurgical malabsorption     Status post gastric bypass for obesity     Stroke [facility]) 0272, 7753    Metabolic strokes  Right hemiparesis= minor now   Vomiting     When eating       Past Surgical History:   Procedure Laterality Date    ANKLE SURGERY Left     Has plate and screws    COLONOSCOPY      COSMETIC SURGERY      X14 as child post attack by dog  Arms, legs and face    DILATION AND CURETTAGE OF UTERUS      x2    ESOPHAGOGASTRODUODENOSCOPY N/A 1/27/2016    Procedure: ESOPHAGOGASTRODUODENOSCOPY (EGD); Surgeon: Linda Hernandez MD;  Location: [facility]; Service:     FRACTURE SURGERY Left     Left ankle - hardware    GASTRIC BYPASS      IVC FILTER INSERTION      NISSEN FUNDOPLICATION      OVARY SURGERY Bilateral     "Drilling" due to multiple cysts- PCOS    [facility] EGD TRANSORAL BIOPSY SINGLE/MULTIPLE N/A 3/9/2016    Procedure: ESOPHAGOGASTRODUODENOSCOPY (EGD); Surgeon: Linda Hernandez MD;  [facility]: [facility];   Service: Bariatrics    VA HYSTEROSCOPY,W/ENDO BX N/A 5/22/2020    Procedure: DILATATION AND CURETTAGE (D&C) WITH HYSTEROSCOPY;  Surgeon: Courtney Gaitan MD;  [facility];  Service: Gynecology    VA HYSTEROSCOPY,W/ENDOMETRIAL ABLATION N/A 5/22/2020    Procedure: Oje Jose;  Surgeon: Courtney Gaitan MD;  [facility];  Service: Gynecology    VA LAP,GASTROSTOMY,W/O TUBE CONSTR N/A 4/19/2016    Procedure: INSERTION GASTROSTOMY TUBE LAPAROSCOPIC WITH INTRAOP EGD;  Surgeon: Linda Hernandez MD;  [facility];  Service: Bariatrics    ULNAR NERVE TRANSPOSITION Right     WISDOM TOOTH EXTRACTION         Family History   Problem Relation Age of Onset    Mitochondrial disorder Mother     Hypertension Mother     Thyroid cancer Mother 48    Clotting disorder Mother         MTFR   Leonardo Dimmitt Cancer Mother 62        renal    Depression Father     Endocrinopathy Father     Clotting disorder Father         MTFR    Stroke Father     Aneurysm Father         brain    Depression Brother     Narcolepsy Brother     Heart failure Maternal Grandmother 80    Coronary artery disease Maternal Grandmother     Mitochondrial disorder Son     Mitochondrial disorder Daughter     Stroke Family     Anuerysm Paternal Uncle         abdominal     Breast cancer Maternal Grandfather 55    Breast cancer Maternal Aunt 43    Heart attack Neg Hx     Arrhythmia Neg Hx     Sudden death Neg Hx         scd         Medications have been verified  Objective   /91   Pulse 76   Temp 97 6 °F (36 4 °C)   Resp 18   Ht 5' 4" (1 626 m)   Wt 82 1 kg (181 lb)   SpO2 100%   BMI 31 07 kg/m²   No LMP recorded  Patient has had an ablation  Physical Exam     Physical Exam  Constitutional:       Appearance: She is well-developed  HENT:      Head: Normocephalic  Right Ear: External ear normal       Left Ear: External ear normal       Nose: Nose normal  No congestion or rhinorrhea  Mouth/Throat:      Pharynx: Posterior oropharyngeal erythema present  No oropharyngeal exudate  Comments: Tonsils 1+, mildly erythematous, without exudate  Eyes:      Conjunctiva/sclera: Conjunctivae normal       Pupils: Pupils are equal, round, and reactive to light  Neck:      Musculoskeletal: Neck supple  Cardiovascular:      Rate and Rhythm: Normal rate and regular rhythm  Heart sounds: Normal heart sounds  Pulmonary:      Effort: Pulmonary effort is normal  No respiratory distress  Breath sounds: Normal breath sounds  No stridor  No wheezing, rhonchi or rales  Comments: Breathing easy  No cough noted  Chest:      Chest wall: No tenderness  Lymphadenopathy:      Cervical: No cervical adenopathy  Skin:     General: Skin is warm and dry  Neurological:      Mental Status: She is alert and oriented to person, place, and time  Deep Tendon Reflexes: Reflexes are normal and symmetric

## 2021-05-14 NOTE — PATIENT INSTRUCTIONS
--Rapid strep test negative  --Rest, drink plenty of fluids   --For nasal/sinus congestion, helpful measures include steam, warm compresses, an OTC saline nasal spray or Neti pot, or an OTC decongestant (such as Sudafed)  The decongestant should be avoided, however, if you are under 10years of age, or have a history of high blood pressure or heart disease  In addition, an OTC nasal steroid (Flonase, Nasocort) or nasal decongestant (Afrin, Meng-synephrine) may be taken  The nasal steroid should be used at bedtime, after the saline nasal spray  The nasal decongestant should not be taken more than 3 days consecutively in order to prevent rebound congestion  --For nasal drainage, postnasal drip, sneezing and itching, an OTC antihistamine (Allegra, Benadryl, etc) can be taken  --For cough, a spoonful of honey at bedtime may also be helpful, as may a prescription cough medicine  Also recommended is the use of a cool mist humidifier (with or without Vicks) in the bedroom at night  --For sore throat, you can take OTC lozenges, use warm gargles (salt water or apple cider vinegar and honey), herbal teas, or an OTC throat spray (Chloraseptic)  --You can take Tylenol or Motrin/Advil as needed for fever, headache, body aches  Motrin/Advil should be avoided, however, if you have a history of heart disease, bleeding ulcers, or if you take blood thinners  --You should contact your primary care provider and/or go to the ER if your symptoms are not improved or get worse over the next 7 days  This includes new onset fever, localized ear pain, sinus pain, as well as worsening cough, chest pain, shortness of breath, or significant weakness/fatigue

## 2021-05-17 ENCOUNTER — OFFICE VISIT (OUTPATIENT)
Dept: PHYSICAL THERAPY | Facility: CLINIC | Age: 46
End: 2021-05-17
Payer: COMMERCIAL

## 2021-05-17 ENCOUNTER — APPOINTMENT (OUTPATIENT)
Dept: PHYSICAL THERAPY | Facility: CLINIC | Age: 46
End: 2021-05-17
Payer: COMMERCIAL

## 2021-05-17 DIAGNOSIS — M70.61 TROCHANTERIC BURSITIS OF RIGHT HIP: ICD-10-CM

## 2021-05-17 DIAGNOSIS — M25.551 RIGHT HIP PAIN: Primary | ICD-10-CM

## 2021-05-17 DIAGNOSIS — E88.40 MITOCHONDRIAL DISEASE (HCC): ICD-10-CM

## 2021-05-17 PROCEDURE — 97140 MANUAL THERAPY 1/> REGIONS: CPT | Performed by: PHYSICAL THERAPIST

## 2021-05-17 PROCEDURE — 97112 NEUROMUSCULAR REEDUCATION: CPT | Performed by: PHYSICAL THERAPIST

## 2021-05-17 PROCEDURE — 97110 THERAPEUTIC EXERCISES: CPT | Performed by: PHYSICAL THERAPIST

## 2021-05-17 NOTE — PROGRESS NOTES
Daily Note     Today's date: 2021  Patient name: Vimal Alvarez  : 1975  MRN: [id number]  Referring provider: Villa Hitchcock DO  Dx:   Encounter Diagnosis     ICD-10-CM    1  Right hip pain  M25 551    2  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[address] )  E88 40    3  Trochanteric bursitis of right hip  M70 61                   Subjective: Pain in anterior hip remains only when walking fast (taking big strides) or pushing something like her work cart   Variable muscle fatigue that frustrates her at times      Objective: See treatment diary below  Mild improvement in hip pain with MWM lateral glide with bridges      Assessment: Tolerated treatment well  Patient would benefit from continued PT      Plan: Continue per plan of care        Goals: Patient's goal is walk c  and ride bike and work s pain    Precautions: She has foot drop, 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, mitochondrial disease, raynauds, gastric bypass sx, CRPS- R elbow, Metabolic Strokes-  and  took a year to recover, aphasic when tired,   Dx: R trochanteric bursitis- hip flexion hypomobility- decreased trunk stab and RLE weakness from strokes    Daily Treatment Diary   Manuals 5/3 5/6 5/10 5/12 5/17 4/29   R LAD grade 2-3 8' 8' 8' 8' 8' 8- performed in slight abd and ER   R hip PROM prn         R hip abd mobs                  Ther Ex         Standing HR 20x 20x 20x 20x     Treadmill  6' 6' 6' 6' 6' 6'   S/l clamshells  2x10 2x10 2x10 2x10 2x10 R fatigued at rep 12   Pain science education- nerve education      8'- PNE cards brain- pain mapping   Ankle 3 way GTB    20x 20x    LAQ #2 20x #3     20x #3   SL LP #47 5 20x 20x 20x #50 nv 2x10 #50    Supine hip flexor st nv 4x :20 4x :20 4x :20 :20/4    Standing hip abd nv 10x 10x2 nv 20x R 10x2   Neuro Re-ed         TA activation c LTR  10x 10x 10x 10x             TA c pall of press  20x 20x  20x    Seated pball a/p tilt  20x 20x 20x 20x    bridges 2x10 2x10 2x10 2x10 2x10 2x10   Ther Activity         stairs WB tolerance   #95   #56            Gait Training                           Modalities         mhp in s/l R hip

## 2021-05-18 ENCOUNTER — HOSPITAL ENCOUNTER (OUTPATIENT)
Dept: INFUSION CENTER | Facility: CLINIC | Age: 46
Discharge: HOME | End: 2021-05-18
Payer: COMMERCIAL

## 2021-05-18 VITALS
RESPIRATION RATE: 16 BRPM | SYSTOLIC BLOOD PRESSURE: 135 MMHG | DIASTOLIC BLOOD PRESSURE: 79 MMHG | TEMPERATURE: 97.7 F | HEART RATE: 75 BPM

## 2021-05-18 DIAGNOSIS — E61.1 IRON DEFICIENCY: Primary | ICD-10-CM

## 2021-05-18 PROCEDURE — 96365 THER/PROPH/DIAG IV INF INIT: CPT

## 2021-05-18 PROCEDURE — 96372 THER/PROPH/DIAG INJ SC/IM: CPT

## 2021-05-18 RX ORDER — CYANOCOBALAMIN 1000 UG/ML
1000 INJECTION INTRAMUSCULAR; SUBCUTANEOUS ONCE
Status: COMPLETED | OUTPATIENT
Start: 2021-05-18 | End: 2021-05-18

## 2021-05-18 RX ORDER — CYANOCOBALAMIN 1000 UG/ML
1000 INJECTION INTRAMUSCULAR; SUBCUTANEOUS ONCE
Status: CANCELLED | OUTPATIENT
Start: 2021-08-27

## 2021-05-18 RX ORDER — SODIUM CHLORIDE 9 MG/ML
20 INJECTION, SOLUTION INTRAVENOUS ONCE
Status: CANCELLED | OUTPATIENT
Start: 2021-08-27

## 2021-05-18 RX ORDER — SODIUM CHLORIDE 9 MG/ML
20 INJECTION, SOLUTION INTRAVENOUS ONCE
Status: COMPLETED | OUTPATIENT
Start: 2021-05-18 | End: 2021-05-18

## 2021-05-18 RX ADMIN — IRON SUCROSE 200 MG: 20 INJECTION, SOLUTION INTRAVENOUS at 09:16

## 2021-05-18 RX ADMIN — CYANOCOBALAMIN 1000 MCG: 1000 INJECTION, SOLUTION INTRAMUSCULAR; SUBCUTANEOUS at 09:18

## 2021-05-18 RX ADMIN — SODIUM CHLORIDE 20 ML/HR: 0.9 INJECTION, SOLUTION INTRAVENOUS at 09:12

## 2021-05-18 NOTE — PROGRESS NOTES
Pt here for venofer and vitamin b12, offers no complaints, resting comfortably  Vitals stable upon admission  Pt had accessed PAC at home last night for other treatments  PAC flushed per protocol  B12 given in L deltoid without complications  Infusion tolerated well without any adverse reactions  Aware to schedule next appointment upon discharge   Sonam SIFUENTES

## 2021-05-20 ENCOUNTER — OFFICE VISIT (OUTPATIENT)
Dept: PHYSICAL THERAPY | Facility: CLINIC | Age: 46
End: 2021-05-20
Payer: COMMERCIAL

## 2021-05-20 DIAGNOSIS — E88.40 MITOCHONDRIAL DISEASE (HCC): ICD-10-CM

## 2021-05-20 DIAGNOSIS — M25.551 RIGHT HIP PAIN: Primary | ICD-10-CM

## 2021-05-20 DIAGNOSIS — M70.61 TROCHANTERIC BURSITIS OF RIGHT HIP: ICD-10-CM

## 2021-05-20 PROCEDURE — 97110 THERAPEUTIC EXERCISES: CPT | Performed by: PHYSICAL THERAPIST

## 2021-05-20 PROCEDURE — 97112 NEUROMUSCULAR REEDUCATION: CPT | Performed by: PHYSICAL THERAPIST

## 2021-05-20 PROCEDURE — 97140 MANUAL THERAPY 1/> REGIONS: CPT | Performed by: PHYSICAL THERAPIST

## 2021-05-20 NOTE — PROGRESS NOTES
Daily Note     Today's date: 2021  Patient name: Jeff Meek  : 1975  MRN: [id number]  Referring provider: Savilla Schaumann, DO  Dx:   Encounter Diagnosis     ICD-10-CM    1  Right hip pain  M25 551    2  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[address] )  E88 40    3  Trochanteric bursitis of right hip  M70 61                   Subjective: She still states anterior hip remains only when walking fast (taking big strides) or pushing her work cart  She feels satisfied if it were to be like this for a while but wishes to walk faster than her   Objective: See treatment diary below  Mild improvement in hip pain with MWM lateral glide with bridges      Assessment: Tolerated treatment well  Patient would benefit from continued PT      Plan: Continue per plan of care        Goals: Patient's goal is walk c  and ride bike and work s pain    Precautions: She has foot drop, 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, mitochondrial disease, raynauds, gastric bypass sx, CRPS- R elbow, Metabolic Strokes-  and  took a year to recover, aphasic when tired,   Dx: R trochanteric bursitis- hip flexion hypomobility- decreased trunk stab and RLE weakness from strokes    Daily Treatment Diary   Manuals 5/3 5/6 5/10 5/12 5/17 5/20   R LAD grade 2-3 8' 8' 8' 8' 8' 8-    R hip PROM prn         R hip abd mobs      10x  c bridges            Ther Ex         Standing HR 20x 20x 20x 20x     Treadmill  6' 6' 6' 6' 6' 6'   S/l clamshells  2x10 2x10 2x10 2x10 2x10    Pain science education- nerve education         Ankle 3 way GTB    20x 20x    LAQ #2 20x #3     20x #3   SL LP #47 5 20x 20x 20x #50 nv 2x10 #50 2x10 #50   Supine hip flexor st nv 4x :20 4x :20 4x :20 :20/4 nv   Standing hip abd R nv 10x 10x2 nv 20x R 10x2   Neuro Re-ed         TA activation c LTR  10x 10x 10x 10x             TA c pall of press  20x 20x  20x 20x   Seated pball a/p tilt  20x 20x 20x 20x green 20x   bridges 2x10 2x10 2x10 2x10 2x10 2x10   Ther Activity         stairs         WB tolerance   #95   #97            Gait Training                           Modalities         mhp in s/l R hip         Consider traction today

## 2021-05-25 ENCOUNTER — OFFICE VISIT (OUTPATIENT)
Dept: PHYSICAL THERAPY | Facility: CLINIC | Age: 46
End: 2021-05-25
Payer: COMMERCIAL

## 2021-05-25 ENCOUNTER — TELEPHONE (OUTPATIENT)
Dept: GASTROENTEROLOGY | Facility: CLINIC | Age: 46
End: 2021-05-25

## 2021-05-25 DIAGNOSIS — E16.2 HYPOGLYCEMIA: ICD-10-CM

## 2021-05-25 DIAGNOSIS — E88.40 MITOCHONDRIAL DISEASE (HCC): ICD-10-CM

## 2021-05-25 DIAGNOSIS — E16.1 REACTIVE HYPOGLYCEMIA: ICD-10-CM

## 2021-05-25 DIAGNOSIS — M25.551 RIGHT HIP PAIN: Primary | ICD-10-CM

## 2021-05-25 DIAGNOSIS — M70.61 TROCHANTERIC BURSITIS OF RIGHT HIP: ICD-10-CM

## 2021-05-25 DIAGNOSIS — I73.00 RAYNAUD'S DISEASE WITHOUT GANGRENE: ICD-10-CM

## 2021-05-25 PROCEDURE — 97112 NEUROMUSCULAR REEDUCATION: CPT | Performed by: PHYSICAL THERAPIST

## 2021-05-25 PROCEDURE — 97012 MECHANICAL TRACTION THERAPY: CPT | Performed by: PHYSICAL THERAPIST

## 2021-05-25 PROCEDURE — 97140 MANUAL THERAPY 1/> REGIONS: CPT | Performed by: PHYSICAL THERAPIST

## 2021-05-25 PROCEDURE — 97110 THERAPEUTIC EXERCISES: CPT | Performed by: PHYSICAL THERAPIST

## 2021-05-25 RX ORDER — BLOOD-GLUCOSE SENSOR
EACH MISCELLANEOUS
Qty: 3 EACH | Refills: 0 | Status: SHIPPED | OUTPATIENT
Start: 2021-05-25 | End: 2021-06-28 | Stop reason: SDUPTHER

## 2021-05-25 RX ORDER — NIFEDIPINE 60 MG/1
60 TABLET, EXTENDED RELEASE ORAL DAILY
Qty: 90 TABLET | Refills: 3 | Status: SHIPPED | OUTPATIENT
Start: 2021-05-25

## 2021-05-25 RX ORDER — ACARBOSE 100 MG/1
100 TABLET ORAL
Qty: 270 TABLET | Refills: 0 | Status: SHIPPED | OUTPATIENT
Start: 2021-05-25 | End: 2021-09-28 | Stop reason: SDUPTHER

## 2021-05-25 NOTE — TELEPHONE ENCOUNTER
----- Message from Lyla Wolfe sent at 5/25/2021  2:57 PM EDT -----  Regarding: FW: Non-Urgent Medical Question  Contact: [phone]    ----- Message -----  From: Cody Kendall  Sent: 5/25/2021   2:51 PM EDT  To: [facility]  Subject: Non-Urgent Medical Question                      I am having increasing difficulties swallowing and my GI at [facility] wants to see me June 4th  I need an updated out of network exception  Dr Enrique Aaron, GI motility specialist at [address]  Phone [phone] and fax [phone]  Thank you in advance  Please message me when this is completed

## 2021-05-25 NOTE — PROGRESS NOTES
Daily Note     Today's date: 2021  Patient name: Hilton Esparza  : 1975  MRN: [id number]  Referring provider: Salima King DO  Dx:   Encounter Diagnosis     ICD-10-CM    1  Right hip pain  M25 551    2  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[address] )  E88 40    3  Trochanteric bursitis of right hip  M70 61                   Subjective: She states she has quite a bit of burning in her buttock and R side of her leg to the lateral knee  She states it was upset after last time but calmed down in a few hours  She feels like her ANS is off and has taken IV fluids s improvement  She was in the pool at 90 deg on Sat and  and she felt very cold after this  Objective: See treatment diary below  98 4 deg F    Assessment: Tolerated treatment fairly well c distraction but still did cause burning  Elevation of RLE seemed to decrease burning so she was advised to perform this 10 min at home  Also to come up c a weaning schedule to get her in the pool due to appropriate thermoregulation  Patient would benefit from continued PT      Plan: Continue per plan of care        Goals: Patient's goal is walk c  and ride bike and work s pain    Precautions: She has foot drop, 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, mitochondrial disease, raynauds, gastric bypass sx, CRPS- R elbow, Metabolic Strokes- 1938 and  took a year to recover, aphasic when tired,   Dx: R trochanteric bursitis- hip flexion hypomobility- decreased trunk stab and RLE weakness from strokes    Daily Treatment Diary   Manuals 5/25 5/6 5/10 5/12 5/17 5/20   R LAD grade 2-3 8' 8' 8' 8' 8' 8-    R hip PROM prn         R hip abd mobs      10x  c bridges            Ther Ex         Standing HR 20x 20x 20x 20x     Treadmill   6' 6' 6' 6' 6'   S/l clamshells  2x10 2x10 2x10 2x10 2x10    Pain science education- nerve education temperature and elevation for blood circulation        Ankle 3 way GTB    20x 20x    LAQ #2      20x #3   SL LP #47 5  20x 20x #50 nv 2x10 #50 2x10 #50 Supine hip flexor st 4x :20 4x :20 4x :20 4x :20 :20/4 nv   Standing hip abd R  10x 10x2 nv 20x R 10x2   Neuro Re-ed         TA activation c LTR  10x 10x 10x 10x             TA c pall of press  20x 20x  20x 20x   Seated pball a/p tilt  20x 20x 20x 20x green 20x   bridges 2x10 2x10 2x10 2x10 2x10 2x10   Ther Activity         stairs         WB tolerance   #95   #97            Gait Training                           Modalities         mhp in s/l R hip         Consider traction today #75 lumbar intermittent 10'

## 2021-05-26 ENCOUNTER — TELEPHONE (OUTPATIENT)
Dept: UROLOGY | Facility: AMBULATORY SURGERY CENTER | Age: 46
End: 2021-05-26

## 2021-05-26 NOTE — TELEPHONE ENCOUNTER
Anesthesia Evaluation      Patient summary reviewed   No history of anesthetic complications     Airway   Mallampati: II  Neck ROM: full   Pulmonary - negative ROS    breath sounds clear to auscultation                         Cardiovascular   Exercise tolerance: > or = 4 METS  (-) hypertension, angina  Rhythm: regular  Rate: normal,         Neuro/Psych - negative ROS     Endo/Other    (+) pregnant  (-) no diabetes     GI/Hepatic/Renal            Dental - normal exam                        Anesthesia Plan  Planned anesthetic: epidural    ASA 2     Anesthetic plan and risks discussed with: patient    Post-op plan: routine recovery           Pt has an upcoming appointment on 05/03/2021

## 2021-05-26 NOTE — TELEPHONE ENCOUNTER
Left a voicemail to inform patient that we can still see her for her appointment on 5/27/2021 with Dr Elida Perez, and reminded her that an ultrasound was to be done prior to her visit

## 2021-05-27 ENCOUNTER — APPOINTMENT (OUTPATIENT)
Dept: PHYSICAL THERAPY | Facility: CLINIC | Age: 46
End: 2021-05-27
Payer: COMMERCIAL

## 2021-05-27 ENCOUNTER — TELEPHONE (OUTPATIENT)
Dept: UROLOGY | Facility: AMBULATORY SURGERY CENTER | Age: 46
End: 2021-05-27

## 2021-05-27 ENCOUNTER — OFFICE VISIT (OUTPATIENT)
Dept: UROLOGY | Facility: AMBULATORY SURGERY CENTER | Age: 46
End: 2021-05-27
Payer: COMMERCIAL

## 2021-05-27 VITALS
SYSTOLIC BLOOD PRESSURE: 118 MMHG | DIASTOLIC BLOOD PRESSURE: 72 MMHG | HEART RATE: 90 BPM | BODY MASS INDEX: 29.53 KG/M2 | WEIGHT: 173 LBS | HEIGHT: 64 IN

## 2021-05-27 DIAGNOSIS — N28.1 RENAL CYST: ICD-10-CM

## 2021-05-27 DIAGNOSIS — N31.9 NEUROGENIC BLADDER: Primary | ICD-10-CM

## 2021-05-27 PROCEDURE — 99244 OFF/OP CNSLTJ NEW/EST MOD 40: CPT | Performed by: UROLOGY

## 2021-05-27 RX ORDER — TOLTERODINE 4 MG/1
4 CAPSULE, EXTENDED RELEASE ORAL DAILY
Qty: 90 CAPSULE | Refills: 3 | Status: SHIPPED | OUTPATIENT
Start: 2021-05-27 | End: 2022-05-18 | Stop reason: SDUPTHER

## 2021-05-27 NOTE — PROGRESS NOTES
5/27/2021    Leigh Hi  1975  [id number]        Assessment   mitochondrial  Disease,  Urgency of urination with possible neurogenic bladder      Discussion   She wishes to hold on urodynamics at this time  She continues to perform clean intermittent catheterization once daily  Based on the amount of leakage that she has I recommend starting Detrol LA 4 mg daily and Augmentin her straight catheterization to 3 times per day  History of Present Illness  39 y o  female with a history of  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She was last seen greater than 4 years ago in started clean intermittent catheterization at that time  She occasionally leaks  She is usually catheterizing just once per day  A recent creatinine level is normal   She has not had had upper tract imaging for some time  She states that her children have mitochondrial disease and that her daughter had insertion of an InterStim by a pediatric urologist   She has a PEG tube as well as a port in place  She is not TPN dependent but often augments her nutrition through her PEG  she denies seeing gross hematuria  AUA Symptom Score      Review of Systems  Review of Systems   Constitutional: Negative  HENT: Negative  Eyes: Negative  Respiratory: Negative  Cardiovascular: Negative  Gastrointestinal: Negative  Endocrine: Negative  Genitourinary:        Per HPI   Musculoskeletal: Negative  Skin: Negative  Allergic/Immunologic: Negative  Neurological: Negative  Hematological: Negative  Psychiatric/Behavioral: Negative  Past Medical History  Past Medical History:   Diagnosis Date    Acid reflux     Constipated     Dysautonomia ([address] )     Esophageal abnormality     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   Gastrostomy tube in place [facility])     Gastrostomy tube in place [facility])     History of blood clots 2012    Left leg  Has IVC filter now   Occurred while on Lovenox    Iron deficiency     Malignant hyperthermia due to anesthesia     Patient's son has M H   States she needs precautions    Migraines     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     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     MTHFR (methylene THF reductase) deficiency and homocystinuria (HCC)     Muscle weakness (generalized)     Nausea     On total parenteral nutrition (TPN)     for 8 mts    PCOS (polycystic ovarian syndrome)     PONV (postoperative nausea and vomiting)     Postgastrectomy syndrome     malabsorption    Postsurgical malabsorption     Status post gastric bypass for obesity     Stroke [facility]) 3516, 0212    Metabolic strokes  Right hemiparesis= minor now   Vomiting     When eating       Past Social History  Past Surgical History:   Procedure Laterality Date    ANKLE SURGERY Left     Has plate and screws    COLONOSCOPY      COSMETIC SURGERY      X14 as child post attack by dog  Arms, legs and face    DILATION AND CURETTAGE OF UTERUS      x2    ESOPHAGOGASTRODUODENOSCOPY N/A 1/27/2016    Procedure: ESOPHAGOGASTRODUODENOSCOPY (EGD); Surgeon: Mariangel Jones MD;  [facility]; Service:     FRACTURE SURGERY Left     Left ankle - hardware    GASTRIC BYPASS      IVC FILTER INSERTION      NISSEN FUNDOPLICATION      OVARY SURGERY Bilateral     "Drilling" due to multiple cysts- PCOS    AR EGD TRANSORAL BIOPSY SINGLE/MULTIPLE N/A 3/9/2016    Procedure: ESOPHAGOGASTRODUODENOSCOPY (EGD); Surgeon: Mariangel Jones MD;  [facility];   Service: Bariatrics    AR HYSTEROSCOPY,W/ENDO BX N/A 5/22/2020    Procedure: DILATATION AND CURETTAGE (D&C) WITH HYSTEROSCOPY;  Surgeon: Abner Cunningham MD;  [facility];  Service: Gynecology    AR HYSTEROSCOPY,W/ENDOMETRIAL ABLATION N/A 5/22/2020    Procedure: Radha Hernandez;  Surgeon: Abner Cunningham MD;  [facility];  Service: Gynecology    AR LAP,GASTROSTOMY,W/O TUBE CONSTR N/A 4/19/2016    Procedure: INSERTION GASTROSTOMY TUBE LAPAROSCOPIC WITH INTRAOP EGD;  Surgeon: Guanaco Villalta MD;  [facility];  Service: [address] [place] [address] Right     WISDOM TOOTH EXTRACTION         Past Family History  Family History   Problem Relation Age of Onset    Mitochondrial disorder Mother     Hypertension Mother     Thyroid cancer Mother 48    Clotting disorder Mother         MTFR   [facility] Mother 62        renal    Depression Father     Endocrinopathy Father     Clotting disorder Father         MTFR    Stroke Father     Aneurysm Father         brain    Depression Brother     Narcolepsy Brother     Heart failure Maternal Grandmother 80    Coronary artery disease Maternal Grandmother     Mitochondrial disorder Son     Mitochondrial disorder Daughter     Stroke Family     Anuerysm Paternal Uncle         abdominal     Breast cancer Maternal Grandfather 55    Breast cancer Maternal Aunt 43    Heart attack Neg Hx     Arrhythmia Neg Hx     Sudden death Neg Hx         scd       Past Social history  Social History     Socioeconomic History    Marital status: /Civil Union     Spouse name: Not on file    Number of children: Not on file    Years of education: Not on file    Highest education level: Not on file   Occupational History    Occupation: infusion center   Social Needs    Financial resource strain: Not on file    Food insecurity     Worry: Not on file     Inability: Not on file   [employer] needs     Medical: Not on file     Non-medical: Not on file   Tobacco Use    Smoking status: Never Smoker    Smokeless tobacco: Never Used   Substance and Sexual Activity    Alcohol use: No    Drug use: No    Sexual activity: Yes     Partners: Male     Birth control/protection: Male Sterilization   Lifestyle    Physical activity     Days per week: Not on file     Minutes per session: Not on file    Stress: Not on file   Relationships    Social connections     Talks on phone: Not on file     Gets together: Not on file     Attends Sabianism service: Not on file     Active member of club or organization: Not on file     Attends meetings of clubs or organizations: Not on file     Relationship status: Not on file    Intimate partner violence     Fear of current or ex partner: Not on file     Emotionally abused: Not on file     Physically abused: Not on file     Forced sexual activity: Not on file   Other Topics Concern    Not on file   Social History Narrative        2 children - son and daughter with mitrochondrial diseas       Current Medications  Current Outpatient Medications   Medication Sig Dispense Refill    acarbose (PRECOSE) 100 MG tablet Take 1 tablet (100 mg total) by mouth 3 (three) times a day with meals 270 tablet 0    acetylcysteine (MUCOMYST) 200 mg/mL nebulizer solution as needed       Alpha-Lipoic Acid 100 MG CAPS Take by mouth      B Complex-Biotin-FA (TH VITAMIN B 50/B-COMPLEX PO) Take 1 tablet by mouth daily   baclofen 10 mg tablet Take 0 5 tablets (5 mg total) by mouth daily at bedtime as needed for muscle spasms 10 tablet 1    COENZYME Q-10 PO       Continuous Blood Gluc Sensor (Dexcom G6 Sensor) MISC Use as directed for CGM - Change every 10 days 3 each 0    Continuous Blood Gluc Transmit (Dexcom G6 Transmitter) MISC USE AS DIRECTED FOR CGM AND CHANGE EVERY 3 MONTHS 1 each 0    CREATINE MONOHYDRATE PO Take 2 5 g by mouth      Cyanocobalamin (B-12) 1000 MCG/ML KIT Inject  as directed every 30 days   diphenhydrAMINE (BENADRYL) 25 mg tablet Take by mouth      docusate sodium (COLACE) 100 mg capsule Take 200 mg by mouth 2 (two) times a day   doxepin (SINEquan) 10 mg capsule Take 1 capsule (10 mg total) by mouth daily at bedtime 30 capsule 5    Galcanezumab-gnlm (Emgality) 120 MG/ML SOAJ 1 subq injection every 30 days   1 mL 11    iron sucrose (VENOFER) 20 mg/mL Venofer 200 mg iron/10 mL intravenous solution      L-Arginine 1000 MG TABS Take 6,000 mg by mouth 2 (two) times a day       linaCLOtide 145 MCG CAPS Take 145 mcg by mouth 2 (two) times a day      midodrine (PROAMATINE) 5 mg tablet Take 1 tablet (5 mg total) by mouth 3 (three) times a day 270 tablet 3    Multiple Vitamin (MULTIVITAMIN) tablet Take 1 tablet by mouth daily   NIFEdipine (PROCARDIA XL) 60 mg 24 hr tablet Take 1 tablet (60 mg total) by mouth daily 90 tablet 3    nystatin (MYCOSTATIN) cream Apply topically 2 (two) times a day 30 g 0    OneTouch Delica Lancets 47D MISC by Does not apply route 3 (three) times a day 300 each 1    OneTouch Verio test strip 1 each by Other route 3 (three) times a day 300 each 1    Oral Vehicles (PCCA-PLUS) SUSP       P-Aminobenzoic Acid (PARA-AMINOBENZOIC ACID) POWD       pantoprazole (PROTONIX) 40 mg tablet   3    Plecanatide 3 MG TABS Take 3 mg by mouth      polyethylene glycol (MIRALAX) 17 g packet Take 17 g by mouth 2 (two) times a day Indications: 1 5 caps full 1-2 times/day   Riboflavin 100 MG TABS Take 100 mg by mouth      sitaGLIPtin (JANUVIA) 25 mg tablet Take 1 tablet (25 mg total) by mouth daily 30 tablet 3    sucralfate (CARAFATE) 1 g/10 mL suspension Take 10 mL (1 g total) by mouth 4 (four) times a day Must have liquid suspension 420 mL 5    TRILYTE 420 g solution   5    Trulance 3 MG TABS       Ubiquinol Liposomal 100 MG/5ML SYRP Take 300 mg by mouth      Ubiquinol POWD       vitamin E 100 UNIT capsule Take 78 Units by mouth daily      Prucalopride Succinate (Motegrity) 2 MG TABS Take by mouth daily       No current facility-administered medications for this visit  Allergies  Allergies   Allergen Reactions    Sulfate Other (See Comments)    Sulfa Antibiotics      Arrhythmia     Guaifenesin      Arrhythmia    Other      Malignant hyperhermia precautions  NEVER use Lacted Ringers       Past Medical History, Social History, Family History, medications and allergies were reviewed      Vitals  Vitals:    05/27/21 0938   BP: 118/72 BP Location: Left arm   Patient Position: Sitting   Cuff Size: Adult   Pulse: 90   Weight: 78 5 kg (173 lb)   Height: 5' 4" (1 626 m)       Physical Exam  Physical Exam      On examination she is in no acute distress  Gait normal   Affect normal    Results  No results found for: PSA  Lab Results   Component Value Date    GLUCOSE 76 01/06/2016    CALCIUM 10 3 (H) 03/16/2021     01/06/2016    K 3 7 03/16/2021    CO2 30 03/16/2021     03/16/2021    BUN 14 03/16/2021    CREATININE 0 79 03/16/2021     Lab Results   Component Value Date    WBC 4 33 03/16/2021    HGB 13 3 03/16/2021    HCT 40 8 03/16/2021    MCV 94 03/16/2021     03/16/2021         Office Urine Dip  No results found for this or any previous visit (from the past 1 hour(s))  ]      Total visit time was 40 minutes of which over 50% was spent on counseling

## 2021-05-28 NOTE — TELEPHONE ENCOUNTER
Called and spoke to patient she stated that we referred her to Dr Caroline Angeles at [facility] for her esophageal dysmotility on 06/04/2021 and since it's an out of network she will need a Out-Of-Network Referral    Called Patient's Insurance [phone] and spoke to [facility] she will be sending over a form to fill out and send back to get this approved

## 2021-06-02 DIAGNOSIS — E16.2 HYPOGLYCEMIA: Primary | ICD-10-CM

## 2021-06-02 RX ORDER — GLUCAGON 3 MG/1
POWDER NASAL
Qty: 2 EACH | Refills: 3 | Status: SHIPPED | OUTPATIENT
Start: 2021-06-02

## 2021-06-02 NOTE — TELEPHONE ENCOUNTER
Patient called in and advised that she uses Tripology compact plus 14F  3 1/2 inch no bag attached  She can be reached at [phone]   Please advise/

## 2021-06-02 NOTE — TELEPHONE ENCOUNTER
Attempted to Call [facility]  [phone] to see if they can send me a form to send order for catheter supplies

## 2021-06-02 NOTE — TELEPHONE ENCOUNTER
----- Message from Benita Corona MD sent at 6/2/2021  2:34 PM EDT -----  Regarding: FW: Non-Urgent Medical Question  Contact: [phone]    Let's prescribed Baqsimi  intranasal spray as needed for hypoglycemia  Quantity 2  with three refills  ----- Message -----  From: [facility]  Sent: 6/2/2021  11:57 AM EDT  To: Benita Corona MD  Subject: FW: Non-Urgent Medical Question                    ----- Message -----  From: Tiana Ortez  Sent: 6/2/2021  11:30 AM EDT  To: , #  Subject: Non-Urgent Medical Question                      At my last office visit we discussed me having glucagon on hand  Yesterday I dropped into the 30's while walking and I almost passed out  Can I get a script sent in for glucagon so I have it in case of an emergency?  Thankful in advance   -Leigh

## 2021-06-02 NOTE — TELEPHONE ENCOUNTER
Patient left a message on the Medication Refill voice mail line requesting a new prescription for catheter supplies to [facility] instead of the [facility]   She is a [employer] employee and the insurance plan told her it must be [facility]  She has supply preferences and wishes to discuss them further  Please reach out to the patient

## 2021-06-03 ENCOUNTER — HOSPITAL ENCOUNTER (OUTPATIENT)
Dept: ULTRASOUND IMAGING | Facility: HOSPITAL | Age: 46
Discharge: HOME | End: 2021-06-03
Attending: UROLOGY
Payer: COMMERCIAL

## 2021-06-03 ENCOUNTER — OFFICE VISIT (OUTPATIENT)
Dept: PHYSICAL THERAPY | Facility: CLINIC | Age: 46
End: 2021-06-03
Payer: COMMERCIAL

## 2021-06-03 DIAGNOSIS — E88.40 MITOCHONDRIAL DISEASE (HCC): ICD-10-CM

## 2021-06-03 DIAGNOSIS — M70.61 TROCHANTERIC BURSITIS OF RIGHT HIP: ICD-10-CM

## 2021-06-03 DIAGNOSIS — M25.551 RIGHT HIP PAIN: Primary | ICD-10-CM

## 2021-06-03 DIAGNOSIS — N31.9 NEUROGENIC BLADDER: ICD-10-CM

## 2021-06-03 PROCEDURE — 97140 MANUAL THERAPY 1/> REGIONS: CPT | Performed by: PHYSICAL THERAPIST

## 2021-06-03 PROCEDURE — 97112 NEUROMUSCULAR REEDUCATION: CPT | Performed by: PHYSICAL THERAPIST

## 2021-06-03 PROCEDURE — 76770 US EXAM ABDO BACK WALL COMP: CPT

## 2021-06-03 PROCEDURE — 97110 THERAPEUTIC EXERCISES: CPT | Performed by: PHYSICAL THERAPIST

## 2021-06-03 NOTE — PROGRESS NOTES
Daily Note     Today's date: 6/3/2021  Patient name: Gabino Karimi  : 1975  MRN: [id number]  Referring provider: Gonsalo Kirk DO  Dx:   Encounter Diagnosis     ICD-10-CM    1  Right hip pain  M25 551    2  Trochanteric bursitis of right hip  M70 61    3  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[address] )  E88 40                   Subjective: She states she hasn't felt any difference since last visit  She still has difficulty with walking  It does feel better c giving pressure to the R lateral hip when she walks  She did see urology and is now self catheterizing 3x/day and has a difficult time emptying  On Saturday when walking with her  her blood sugars dropped into the 40s and she felt faint and had to lie down while her  got juice for her  Objective: See treatment diary below  Stabilization belt used t/o session    Assessment: Tolerated treatment fairly well and had much improved WB tolerance c stability belt  She was amazed at how much better it felt when walking and she did not have anterior hip pain while doing bridges  Patient would benefit from continued PT      Plan: Continue per plan of care        Goals: Patient's goal is walk c  and ride bike and work s pain    Precautions: She has foot drop, 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, mitochondrial disease, raynauds, gastric bypass sx, CRPS- R elbow, Metabolic Strokes-  and  took a year to recover, aphasic when tired,   Dx: R trochanteric bursitis- hip flexion hypomobility- decreased trunk stab and RLE weakness from strokes    Daily Treatment Diary   Manuals 5/25 6/3 5/10 5/12 5/17 5/20   R LAD grade 2-3 8' 8' 8' 8' 8' 8-    R hip PROM prn         R hip abd mobs      10x  c bridges            Ther Ex         Standing HR 20x 20x 20x 20x     Treadmill   6' 6' 6' 6' 6'   S/l clamshells  2x10 2x10 2x10 2x10 2x10    Pain science education- nerve education temperature and elevation for blood circulation Energy conservation       Ankle 3 way GTB    20x 20x LAQ #2      20x #3   SL LP #47 5  20x 20x #50 nv 2x10 #50 2x10 #50   Supine hip flexor st 4x :20 4x :20 4x :20 4x :20 :20/4 nv   Standing hip abd R  10x 10x2 nv 20x R 10x2   Neuro Re-ed         TA activation c LTR  10x 10x 10x 10x             TA c pall of press  20x 20x  20x 20x   Seated pball a/p tilt   20x 20x 20x green 20x   bridges 2x10 2x10 2x10 2x10 2x10 2x10   Ther Activity         stairs         WB tolerance  #110 c belt #95   #97            Gait Training                           Modalities         mhp in s/l R hip         Consider traction today #75 lumbar intermittent 10' np

## 2021-06-04 NOTE — TELEPHONE ENCOUNTER
Attempted to call [facility] again to place order tried a different number -[phone] was on hold for 20 minutes  Spoke with Krishna Dumont - she gave me website p[employer]  [employer] and fax number [phone] placed order elizabeth's account # is: [id number] called back and told them 3 day instead of 2 and faxed order to [facility]

## 2021-06-10 ENCOUNTER — APPOINTMENT (OUTPATIENT)
Dept: PHYSICAL THERAPY | Facility: CLINIC | Age: 46
End: 2021-06-10
Payer: COMMERCIAL

## 2021-06-17 ENCOUNTER — OFFICE VISIT (OUTPATIENT)
Dept: PHYSICAL THERAPY | Facility: CLINIC | Age: 46
End: 2021-06-17
Payer: COMMERCIAL

## 2021-06-17 ENCOUNTER — OFFICE VISIT (OUTPATIENT)
Dept: OBGYN CLINIC | Facility: MEDICAL CENTER | Age: 46
End: 2021-06-17
Payer: COMMERCIAL

## 2021-06-17 VITALS
HEIGHT: 64 IN | DIASTOLIC BLOOD PRESSURE: 77 MMHG | HEART RATE: 89 BPM | WEIGHT: 173 LBS | SYSTOLIC BLOOD PRESSURE: 138 MMHG | BODY MASS INDEX: 29.53 KG/M2

## 2021-06-17 DIAGNOSIS — M25.559 HIP PAIN: Primary | ICD-10-CM

## 2021-06-17 DIAGNOSIS — M70.61 TROCHANTERIC BURSITIS OF RIGHT HIP: ICD-10-CM

## 2021-06-17 DIAGNOSIS — M25.551 RIGHT HIP PAIN: Primary | ICD-10-CM

## 2021-06-17 DIAGNOSIS — E88.40 MITOCHONDRIAL DISEASE (HCC): ICD-10-CM

## 2021-06-17 PROCEDURE — 97110 THERAPEUTIC EXERCISES: CPT | Performed by: PHYSICAL THERAPIST

## 2021-06-17 PROCEDURE — 97140 MANUAL THERAPY 1/> REGIONS: CPT | Performed by: PHYSICAL THERAPIST

## 2021-06-17 PROCEDURE — 99214 OFFICE O/P EST MOD 30 MIN: CPT | Performed by: PHYSICAL MEDICINE & REHABILITATION

## 2021-06-17 PROCEDURE — 97112 NEUROMUSCULAR REEDUCATION: CPT | Performed by: PHYSICAL THERAPIST

## 2021-06-17 NOTE — PROGRESS NOTES
PT Re-Evaluation and Discharge note    Today's date: 2021   Patient name: Alannah Corley  : 1975  MRN: [id number]  Referring provider: Perla Garcia DO  Dx:   No diagnosis found  Subjective Evaluation     History of Present Illness  She states she feels 80% better at this time and her pain is less constant  When she is not working she does not have pain  She is hoping that it is not related to RSD  Hx of injury:   Patient presents with c/o R hip pain from about 1 month ago from walking long durations  She was told she has a leg length discrepancy  She has R sided weakness from 2 strokes she has had  She feels like the bones are grinding when she moves  The cortisone shot made it worse  She used a shoe lift which caused LBP on 3/29 then tried another lift which made the LBP worse  She was then told it may be sciatic pain as she had pain c hip IR  She has trouble getting up in the AM as she is very stiff  She has taken motrin/tylenol s relief  PMHx: 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  She has foot drop, 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, mitochondrial disease, raynauds, gastric bypass sx, CRPS- R elbow, Metabolic Strokes- 4615 and  took a year to recover, aphasic when tired,   Neuro signs: none    Bowel and bladder sxs: none  Red flags: none  Occupation: Nurse      Pain   At best pain ratin  At worst pain ratin  Location: C- location on anterior/lateral hip down to isch tub and sacrum  Quality:ball of pain, like a rock, burning  Relieving factors: sitting in a reclined chair leaning to L, lying supine  Aggravating factors: sitting down, lying on it, walking on uneven surfaces,  ( 5 hr of work), up steps, twisting, elongating stride, and pushing    Social Support  Lives at home c her family and walks 3 miles/day      Patient Goals  Patient goals for therapy: walk c  and ride bike and work s pain    STGs  1   Decrease pain by 20% in 2-4 weeks to improve standing tolerance  -met  2  Improve hip ROM by 10 degrees in 2-4 weeks to improve sit to stand  - met  3  Improve hip strength by 1/3 grade in 2-4 weeks to improve stairs performance to going up c rail assist - met      LTGs  1  Decrease pain by 60% in 6-8 weeks  - not met  2  Improve walking/biking tolerance to >30 minutes in 6-8 weeks to perform community ambulation - met  3  Perform job/dressing/showering activities independently without pain in 6-8 weeks  - not met  4  Improve stairs tolerance to 1 flight s rail in 10 weeks  - met      Objective Measurements:    Observation: ASIS and malleoli in supine at equal height  Balance: R SLB pain  Gait: pain c midstance   Squat: valgus B  Reflexes: NT   Sensation: hypersensitive R lateral thigh  Myotomes: RLE weaker  WB tolerance: 97#    Slump: -SLR:  R tight at [address]: + [place]: -  Hip distraction: felt better     Elys: pain in glut Sacral thrust: -  BREANNA: hip lateral/lateral inf glides painful  Anterior glides slightly helped pain  Palpation:R TFL, post greater troch, piriformis, isch tuberosity TTP  TA: WFL    Lumbar ROM L R Strength knee L R   Flex  WFL  Flex 4+ 4+   Ext WFL  Ext 4+ 4+   Rot WFL       SB WFL       Hip A/PROM        Flex  / 131 pain / Flex 5 4+   ER at 90  44p/prone WFL ER     IR at 90  p 17/prone painful IR     Abd  WFL Abd 5 4   Ext   Ext 4+ 3p           Knee A/PROM   Ankle great toe 4+ 4+   Flex   DF 4+ 4+   Ext   PF 5 reps 5 reps         Assessment:  Gino Menon has demonstrated overall improvement in hip ROM, WB tolerance, function, and a reduction of pain  Currently, she has made steady progress towards her goals, but continues to remain limited with hip ROM, WB tolerance, hip/lumbar rotation and trunk stability  At this time, skilled physical therapy is warranted to address the remaining impairments and aide in return to working s pain although she is plateauing with progress   At this phase she will see sports med MD and continue c PT as an as needed basis  Plan  Patient would benefit from:Skilled physical therapy but she will follow up c MD before scheduling more visits  Planned therapy interventions: manual therapy, neuromuscular re-education, stretching, strengthening, therapeutic activities, therapeutic exercise, patient education, home exercise program, and activity modification      Frequency: 1x week  Duration in weeks: 4-5  Treatment plan discussed with: patient             Goals: Patient's goal is walk c  and ride bike and work s pain    Precautions: She has foot drop, 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, mitochondrial disease, raynauds, gastric bypass sx, CRPS- R elbow, Metabolic Strokes- 1023 and 2009 took a year to recover, aphasic when tired,   Dx: R trochanteric bursitis- hip flexion hypomobility- decreased trunk stab and RLE weakness from strokes    Daily Treatment Diary   Manuals 5/25 6/3 6/17 5/12 5/17 5/20   R LAD grade 2-3 8' 8' 8' 8' 8' 8-    R hip PROM prn         R hip abd mobs      10x  c bridges            Ther Ex         Standing HR 20x 20x 20x 20x     Treadmill   6' 6' 6' 6' 6'   S/l clamshells  2x10 2x10 2x10 2x10 2x10    Pain science education- nerve education temperature and elevation for blood circulation Energy conservation       Ankle 3 way GTB    20x 20x    LAQ #2      20x #3   SL LP #47 5  20x  nv 2x10 #50 2x10 #50   Supine hip flexor st 4x :20 4x :20 4x :20 4x :20 :20/4 nv   Standing hip abd R  10x 10x2 nv 20x R 10x2   Neuro Re-ed         TA activation c LTR  10x 10x 10x 10x             TA c pall of press  20x 20x  20x 20x   Seated pball a/p tilt   20x 20x 20x green 20x   bridges 2x10 2x10 2x10 2x10 2x10 2x10   Ther Activity         stairs         WB tolerance  #110 c belt #97   #97            Gait Training                           Modalities         mhp in s/l R hip         Consider traction today #75 lumbar intermittent 10' np

## 2021-06-17 NOTE — PROGRESS NOTES
1  Hip pain       No orders of the defined types were placed in this encounter  Impression:  Right hip pain likely multifactorial and secondary to hip flexor tendinopathy and possibly labral derangement  Patient also has slight discomfort along her greater trochanteric bursa consistent with the bursitis  The patient had a steroid injection into the right greater trochanteric bursa which did not provide her relief  Her examination is most consistent with hip flexor tendinopathy  She also has positive hip impingement signs  For both diagnostic and therapeutic reasons, we will be proceeding with an ultrasound-guided right hip joint steroid/anesthetic injection  If no relief, we will target her iliopsoas tendon afterwards  If she has good relief, she will get back into her activities  If she has minimal relief, can consider obtaining an MR arthrogram   Return for planned ultrasound-guided right hip joint steroid/anesthetic injection  Imaging Studies (I personally reviewed images in PACS and report):  Right hip x-rays most recent to this encounter reviewed  These images show no acute osseous abnormalities or severe degeneration  CT scanogram shows left leg total length to be 77 3 and right 76 8 cm  No follow-ups on file  HPI:  Ivet Chris is a 39 y o  female  who presents for evaluation of   Chief Complaint   Patient presents with    Right Hip - Pain       Onset/Mechanism: Pain started a few months ago without an injury  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  She was seeing Dr Sedrick Spears and had a steroid injection and a CT scanogram   Location: Anterolateral hip  Burning type of pain but also a deep muscle ache  Radiation: Denies  Provocative: Elongated strides  Pivoting on the leg  Severity: Continues to be as painful as when it first began  Associated Symptoms: Denies numbness/tingling/weakness in the lower extremities    She has back pain after wearing a shoe lift but had none prior to that when these symptoms started  Treatment so far: Steroid injection, shoe lift, quite a bit of physical therapy  Review of Systems   Constitutional: Positive for activity change  Negative for fever  HENT: Negative for sore throat  Eyes: Negative for visual disturbance  Respiratory: Negative for shortness of breath  Cardiovascular: Negative for chest pain  Gastrointestinal: Negative for abdominal pain  Endocrine: Negative for polydipsia  Genitourinary: Negative for difficulty urinating  Musculoskeletal: Positive for arthralgias  Skin: Negative for rash  Allergic/Immunologic: Negative for immunocompromised state  Neurological: Negative for numbness  Hematological: Does not bruise/bleed easily  Psychiatric/Behavioral: Negative for confusion  Following history reviewed and updated:  Past Medical History:   Diagnosis Date    Acid reflux     Constipated     Dysautonomia ([place] [address] )     Esophageal abnormality     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   Gastrostomy tube in [facility])     Gastrostomy tube in place [facility])     History of blood clots 2012    Left leg  Has IVC filter now  Occurred while on Lovenox    Iron deficiency     Malignant hyperthermia due to anesthesia     Patient's son has M H   States she needs precautions    Migraines     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[facility] )     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[address] )     MTHFR (methylene THF reductase) deficiency and homocystinuria (HCC)     Muscle weakness (generalized)     Nausea     On total parenteral nutrition (TPN)     for 8 mts    PCOS (polycystic ovarian syndrome)     PONV (postoperative nausea and vomiting)     Postgastrectomy syndrome     malabsorption    Postsurgical malabsorption     Status post gastric bypass for obesity     Stroke [facility]) 8291, 3260    Metabolic strokes  Right hemiparesis= minor now      Vomiting     When eating     Past Surgical History: Procedure Laterality Date    ANKLE SURGERY Left     Has plate and screws    COLONOSCOPY      COSMETIC SURGERY      X14 as child post attack by dog  Arms, legs and face    DILATION AND CURETTAGE OF UTERUS      x2    ESOPHAGOGASTRODUODENOSCOPY N/A 1/27/2016    Procedure: ESOPHAGOGASTRODUODENOSCOPY (EGD); Surgeon: Zoila Taylor MD;  [facility]; Service:     FRACTURE SURGERY Left     Left ankle - hardware    GASTRIC BYPASS      IVC FILTER INSERTION      NISSEN FUNDOPLICATION      OVARY SURGERY Bilateral     "Drilling" due to multiple cysts- PCOS    IL EGD TRANSORAL BIOPSY SINGLE/MULTIPLE N/A 3/9/2016    Procedure: ESOPHAGOGASTRODUODENOSCOPY (EGD); Surgeon: Zoila Taylor MD;  [facility];   Service: Bariatrics    IL HYSTEROSCOPY,W/ENDO BX N/A 5/22/2020    Procedure: DILATATION AND CURETTAGE (D&C) WITH HYSTEROSCOPY;  Surgeon: Nawaf Odom MD;  [facility];  Service: Gynecology    IL HYSTEROSCOPY,W/ENDOMETRIAL ABLATION N/A 5/22/2020    Procedure: Royer Loerat;  Surgeon: Nawaf Odom MD;  [facility];  Service: Gynecology    IL LAP,GASTROSTOMY,W/O TUBE CONSTR N/A 4/19/2016    Procedure: INSERTION GASTROSTOMY TUBE LAPAROSCOPIC WITH INTRAOP EGD;  Surgeon: Zoila Taylor MD;  [facility];  Service: [address] [place] [address] Right     WISDOM TOOTH EXTRACTION       Social History   Social History     Substance and Sexual Activity   Alcohol Use No     Social History     Substance and Sexual Activity   Drug Use No     Social History     Tobacco Use   Smoking Status Never Smoker   Smokeless Tobacco Never Used     Family History   Problem Relation Age of Onset    Mitochondrial disorder Mother     Hypertension Mother     Thyroid cancer Mother 48    Clotting disorder Mother         MTFR    Cancer Mother 62        renal    Depression Father     Endocrinopathy Father     Clotting disorder Father         MTFR    Stroke Father     Aneurysm Father         brain    Depression Brother     Narcolepsy Brother     Heart failure Maternal Grandmother 80    Coronary artery disease Maternal Grandmother     Mitochondrial disorder Son     Mitochondrial disorder Daughter     Stroke Family     Anuerysm Paternal Uncle         abdominal     Breast cancer Maternal Grandfather 55    Breast cancer Maternal Aunt 43    Heart attack Neg Hx     Arrhythmia Neg Hx     Sudden death Neg Hx         scd     Allergies   Allergen Reactions    Sulfate Other (See Comments)    Sulfa Antibiotics      Arrhythmia     Guaifenesin      Arrhythmia    Other      Malignant hyperhermia precautions  NEVER use Lacted Ringers        Constitutional:  /77 (BP Location: Left arm, Patient Position: Sitting, Cuff Size: Standard)   Pulse 89   Ht 5' 4" (1 626 m)   Wt 78 5 kg (173 lb)   BMI 29 70 kg/m²    General: NAD  Eyes: Anicteric sclerae  Neck: Supple  Lungs: Unlabored breathing  Cardiovascular: No lower extremity edema  Skin: Intact without erythema  Neurologic: Sensation intact to light touch  Psychiatric: Mood and affect are appropriate  Right Hip Exam     Tenderness   The patient is experiencing tenderness in the anterior  Range of Motion   Internal rotation: abnormal     Tests   Reji: positive    Other   Erythema: absent  Scars: absent  Sensation: normal  Pulse: present    Comments:  Positive Shine test   Positive Stinchfield test   Positive logroll maneuver  Positive hip scour maneuver  Hip internal rotation causes anterior hip pain  Back Exam     Tenderness   The patient is experiencing no tenderness  Muscle Strength   The patient has normal back strength      Tests   Straight leg raise right: negative  Straight leg raise left: negative    Reflexes   Patellar: normal  Achilles: normal    Other   Sensation: normal  Gait: normal   Erythema: no back redness  Scars: absent             Procedures

## 2021-06-28 DIAGNOSIS — E16.1 REACTIVE HYPOGLYCEMIA: ICD-10-CM

## 2021-06-28 RX ORDER — BLOOD-GLUCOSE SENSOR
EACH MISCELLANEOUS
Qty: 3 EACH | Refills: 0 | Status: SHIPPED | OUTPATIENT
Start: 2021-06-28 | End: 2021-07-28

## 2021-07-08 ENCOUNTER — OFFICE VISIT (OUTPATIENT)
Dept: OBGYN CLINIC | Facility: MEDICAL CENTER | Age: 46
End: 2021-07-08
Payer: COMMERCIAL

## 2021-07-08 VITALS
BODY MASS INDEX: 29.71 KG/M2 | HEART RATE: 99 BPM | DIASTOLIC BLOOD PRESSURE: 77 MMHG | SYSTOLIC BLOOD PRESSURE: 117 MMHG | WEIGHT: 174 LBS | HEIGHT: 64 IN

## 2021-07-08 DIAGNOSIS — M25.559 HIP PAIN: Primary | ICD-10-CM

## 2021-07-08 PROCEDURE — 20611 DRAIN/INJ JOINT/BURSA W/US: CPT | Performed by: PHYSICAL MEDICINE & REHABILITATION

## 2021-07-08 RX ORDER — TRIAMCINOLONE ACETONIDE 40 MG/ML
40 INJECTION, SUSPENSION INTRA-ARTICULAR; INTRAMUSCULAR
Status: COMPLETED | OUTPATIENT
Start: 2021-07-08 | End: 2021-07-08

## 2021-07-08 RX ORDER — LIDOCAINE HYDROCHLORIDE 10 MG/ML
3 INJECTION, SOLUTION INFILTRATION; PERINEURAL
Status: COMPLETED | OUTPATIENT
Start: 2021-07-08 | End: 2021-07-08

## 2021-07-08 RX ADMIN — TRIAMCINOLONE ACETONIDE 40 MG: 40 INJECTION, SUSPENSION INTRA-ARTICULAR; INTRAMUSCULAR at 10:08

## 2021-07-08 RX ADMIN — LIDOCAINE HYDROCHLORIDE 3 ML: 10 INJECTION, SOLUTION INFILTRATION; PERINEURAL at 10:08

## 2021-07-08 NOTE — PROGRESS NOTES
1  Hip pain  Large joint arthrocentesis: R hip joint     Orders Placed This Encounter   Procedures    Large joint arthrocentesis: R hip joint        Impression:  Patient is here in follow up of right hip pain likely multifactorial and secondary to hip flexor tendinopathy and possibly labral derangement  Patient also has slight discomfort along her greater trochanteric bursa consistent with greater trochanteric pain syndrome  The patient had a steroid injection into the right greater trochanteric bursa which did not provide her relief  Her examination is most consistent with hip flexor tendinopathy  She also has positive hip impingement signs  For both diagnostic and therapeutic reasons, proceeded with an ultrasound-guided right hip joint steroid/anesthetic injection  Patient had mild relief of her symptoms afterwards  She will call to let me know how she is doing in about 3-4 weeks  If she continues to have symptoms, we will obtain an MR arthrogram of her hip  Imaging Studies (I personally reviewed images in PACS and report):  Right hip x-rays most recent to this encounter reviewed  These images show no acute osseous abnormalities or severe degeneration      CT scanogram shows left leg total length to be 77 3 and right 76 8 cm  Return in about 4 weeks (around 8/5/2021)  HPI:  Tyler Oro is a 39 y o  female  who presents in follow up  Here for   Chief Complaint   Patient presents with    Right Hip - Follow-up       Date of injury: Pain started a few months ago without an injury  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  She was seeing Dr Aristeo Sierra and had a steroid injection and a CT scanogram   Trajectory of symptoms:  No change since last visit  Patient is accompanied by nobody  Review of Systems   Constitutional: Positive for activity change  Negative for fever  HENT: Negative for sore throat  Eyes: Negative for visual disturbance  Respiratory: Negative for shortness of breath  Cardiovascular: Negative for chest pain  Gastrointestinal: Negative for abdominal pain  Endocrine: Negative for polydipsia  Genitourinary: Negative for difficulty urinating  Musculoskeletal: Positive for arthralgias  Skin: Negative for rash  Allergic/Immunologic: Negative for immunocompromised state  Neurological: Negative for numbness  Hematological: Does not bruise/bleed easily  Psychiatric/Behavioral: Negative for confusion  Following history reviewed and updated:  Past Medical History:   Diagnosis Date    Acid reflux     Constipated     Dysautonomia ([place] [address] )     Esophageal abnormality     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)     Gastrost[facility] tube [facility] place 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) 1284, 4091    Metabolic strokes  Right hemiparesis= minor now   Vomiting     When eating     Past Surgical History:   Procedure Laterality Date    ANKLE SURGERY Left     Has plate and screws    COLONOSCOPY      COSMETIC SURGERY      X14 as child post attack by dog  Arms, legs and face    DILATION AND CURETTAGE OF UTERUS      x2    ESOPHAGOGASTRODUODENOSCOPY N/A 1/27/2016    Procedure: ESOPHAGOGASTRODUODENOSCOPY (EGD);   Surgeon: Carmelo Jorgensen Layo Glynn MD;  [facility]; Service:     FRACTURE SURGERY Left     Left ankle - hardware    GASTRIC BYPASS      IVC FILTER INSERTION      NISSEN FUNDOPLICATION      OVARY SURGERY Bilateral     "Drilling" due to multiple cysts- PCOS    MA EGD TRANSORAL BIOPSY SINGLE/MULTIPLE N/A 3/9/2016    Procedure: ESOPHAGOGASTRODUODENOSCOPY (EGD); Surgeon: Zoran Arambula MD;  [facility];   Service: Bariatrics    MA HYSTEROSCOPY,W/ENDO BX N/A 5/22/2020    Procedure: DILATATION AND CURETTAGE (D&C) WITH HYSTEROSCOPY;  Surgeon: Milind Kidd MD;  [facility];  Service: Gynecology    MA HYSTEROSCOPY,W/ENDOMETRIAL ABLATION N/A 5/22/2020    Procedure: ABLATION ENDOMETRIAL MARISSA;  Surgeon: Milind Kidd MD;  [facility];  Service: Gynecology    MA LAP,GASTROSTOMY,W/O TUBE CONSTR N/A 4/19/2016    Procedure: INSERTION GASTROSTOMY TUBE LAPAROSCOPIC WITH INTRAOP EGD;  Surgeon: Zoran Arambula MD;  [facility];  Service: [address] [place] [address] Right     WISDOM TOOTH EXTRACTION       Social History   Social History     Substance and Sexual Activity   Alcohol Use No     Social History     Substance and Sexual Activity   Drug Use No     Social History     Tobacco Use   Smoking Status Never Smoker   Smokeless Tobacco Never Used     Family History   Problem Relation Age of Onset    Mitochondrial disorder Mother     Hypertension Mother     Thyroid cancer Mother 48    Clotting disorder Mother         MTFR    Cancer Mother 62        renal    Depression Father     Endocrinopathy Father     Clotting disorder Father         MTFR    Stroke Father     Aneurysm Father         brain    Depression Brother     Narcolepsy Brother     Heart failure Maternal Grandmother 80    Coronary artery disease Maternal Grandmother     Mitochondrial disorder Son     Mitochondrial disorder Daughter     Stroke Family     Anuerysm Paternal Uncle         abdominal     Breast cancer Maternal Grandfather 55    Breast cancer Maternal Aunt 43    Heart attack Neg Hx     Arrhythmia Neg Hx     Sudden death Neg Hx         scd     Allergies   Allergen Reactions    Sulfate Other (See Comments)    Sulfa Antibiotics      Arrhythmia     Guaifenesin      Arrhythmia    Other      Malignant hyperhermia precautions  NEVER use Lacted Ringers        Constitutional:  /77 (BP Location: Left arm, Patient Position: Sitting, Cuff Size: Standard)   Pulse 99   Ht 5' 4" (1 626 m)   Wt 78 9 kg (174 lb)   BMI 29 87 kg/m²    General: NAD  Eyes: Clear sclerae  ENT: No inflammation, lesion, or mass of lips  No tracheal deviation  Musculoskeletal: As mentioned below  Integumentary: No visible rashes or skin lesions  Pulmonary/Chest: Effort normal  No respiratory distress  Neuro: CN's grossly intact, ALMANZA  Psych: Normal affect and judgement  Vascular: WWP  Right Hip Exam     Other   Erythema: absent    Comments:  Injection site was CDI  Large joint arthrocentesis: R hip joint  Universal Protocol:  Consent given by: patient  Timeout called at: 7/8/2021 9:48 AM   Site marked: the operative site was marked  Supporting Documentation  Indications: pain   Procedure Details  Location: hip - R hip joint  Ultrasound guidance: yes (US guidance was used to find the area of interest )  Medications administered: 40 mg triamcinolone acetonide 40 mg/mL; 3 mL lidocaine 1 %    Patient tolerance: patient tolerated the procedure well with no immediate complications  Dressing:  Sterile dressing applied    The right hip joint was visualized with ultrasound and injected with steroid/anesthetic solution as indicated  Prior to the injection, the ultrasound was used to evaluate for any neural or vascular structures  Care was taken to avoid these structures  The images (and video if taken) were saved to the [employer] ultrasound system      Prior to the procedure, the patient was informed of the following risks in layman terms:    - Risk of bleeding since a needle is involved  - Risk of infection (1/10,000 chance as per recent studies)  Signs/symptoms were discussed and they would prompt an urgent evaluation at an emergency department   - Risk of pigmentation or skin dimpling in the skin (2-3% chance as per recent studies) from the steroid  - Risk of increased pain from steroid flare (1% chance as per recent studies) that typically lasts 24-48 hours  - Risk of increased blood sugars from the steroid medication that can last for a few weeks  If the patient is a diabetic or pre-diabetic, they were encouraged to closely monitor their blood sugars and discuss with PCP if elevated more than usual or if having symptoms  After going over these risks, we decided that the benefits outweigh the risks and proceeded with the procedure

## 2021-07-09 DIAGNOSIS — E16.1 REACTIVE HYPOGLYCEMIA: ICD-10-CM

## 2021-07-26 DIAGNOSIS — M25.551 RIGHT HIP PAIN: Primary | ICD-10-CM

## 2021-07-30 ENCOUNTER — TELEPHONE (OUTPATIENT)
Dept: NEUROLOGY | Facility: CLINIC | Age: 46
End: 2021-07-30

## 2021-07-30 NOTE — TELEPHONE ENCOUNTER
Received fax from [facility] requesting emgality PA  Diaz#[id number]    Chart reviewed: PA  on 21    PA initiated on CMM   Awaiting determination

## 2021-08-03 ENCOUNTER — HOSPITAL ENCOUNTER (OUTPATIENT)
Dept: MAMMOGRAPHY | Facility: HOSPITAL | Age: 46
Discharge: HOME | End: 2021-08-03
Attending: OBSTETRICS & GYNECOLOGY
Payer: COMMERCIAL

## 2021-08-03 VITALS — BODY MASS INDEX: 29.71 KG/M2 | WEIGHT: 174 LBS | HEIGHT: 64 IN

## 2021-08-03 DIAGNOSIS — Z12.39 SCREENING FOR MALIGNANT NEOPLASM OF BREAST: ICD-10-CM

## 2021-08-03 PROCEDURE — 77067 SCR MAMMO BI INCL CAD: CPT

## 2021-08-03 PROCEDURE — 77063 BREAST TOMOSYNTHESIS BI: CPT

## 2021-08-04 NOTE — TELEPHONE ENCOUNTER
[facility] needs additional info  Spoke w/ Yana Mahajan  All questions answered  SB#[id number]  PA pending   Turnaround time 10 days    Awaiting determination

## 2021-08-09 NOTE — TELEPHONE ENCOUNTER
PA has been approved through 2/4/2022    [address] [place] [address] aware and verbalized understanding

## 2021-08-10 ENCOUNTER — HOSPITAL ENCOUNTER (OUTPATIENT)
Dept: RADIOLOGY | Facility: HOSPITAL | Age: 46
Discharge: HOME | End: 2021-08-10
Attending: PHYSICAL MEDICINE & REHABILITATION
Payer: COMMERCIAL

## 2021-08-10 ENCOUNTER — PATIENT MESSAGE (OUTPATIENT)
Dept: INTERNAL MEDICINE CLINIC | Facility: CLINIC | Age: 46
End: 2021-08-10

## 2021-08-10 DIAGNOSIS — M25.551 RIGHT HIP PAIN: ICD-10-CM

## 2021-08-10 PROCEDURE — G1004 CDSM NDSC: HCPCS

## 2021-08-10 PROCEDURE — 72148 MRI LUMBAR SPINE W/O DYE: CPT

## 2021-08-10 NOTE — TELEPHONE ENCOUNTER
I sent pt  a [employer] message stating that I will send a backdated referral for her with this code

## 2021-08-18 DIAGNOSIS — M25.551 RIGHT HIP PAIN: Primary | ICD-10-CM

## 2021-08-27 ENCOUNTER — HOSPITAL ENCOUNTER (OUTPATIENT)
Dept: INFUSION CENTER | Facility: CLINIC | Age: 46
Discharge: HOME | End: 2021-08-27
Payer: COMMERCIAL

## 2021-08-27 VITALS
RESPIRATION RATE: 14 BRPM | SYSTOLIC BLOOD PRESSURE: 126 MMHG | TEMPERATURE: 98 F | DIASTOLIC BLOOD PRESSURE: 70 MMHG | HEART RATE: 88 BPM | OXYGEN SATURATION: 99 %

## 2021-08-27 DIAGNOSIS — E61.1 IRON DEFICIENCY: Primary | ICD-10-CM

## 2021-08-27 DIAGNOSIS — D50.8 OTHER IRON DEFICIENCY ANEMIA: ICD-10-CM

## 2021-08-27 LAB
ALBUMIN SERPL BCP-MCNC: 3.7 G/DL (ref 3.5–5)
ALP SERPL-CCNC: 71 U/L (ref 46–116)
ALT SERPL W P-5'-P-CCNC: 22 U/L (ref 12–78)
ANION GAP SERPL CALCULATED.3IONS-SCNC: 6 MMOL/L (ref 4–13)
AST SERPL W P-5'-P-CCNC: 10 U/L (ref 5–45)
BASOPHILS # BLD AUTO: 0.04 THOUSANDS/ΜL (ref 0–0.1)
BASOPHILS NFR BLD AUTO: 1 % (ref 0–1)
BILIRUB SERPL-MCNC: 0.29 MG/DL (ref 0.2–1)
BUN SERPL-MCNC: 18 MG/DL (ref 5–25)
CALCIUM SERPL-MCNC: 9.7 MG/DL (ref 8.3–10.1)
CHLORIDE SERPL-SCNC: 107 MMOL/L (ref 100–108)
CO2 SERPL-SCNC: 29 MMOL/L (ref 21–32)
CREAT SERPL-MCNC: 0.93 MG/DL (ref 0.6–1.3)
EOSINOPHIL # BLD AUTO: 0.1 THOUSAND/ΜL (ref 0–0.61)
EOSINOPHIL NFR BLD AUTO: 2 % (ref 0–6)
ERYTHROCYTE [DISTWIDTH] IN BLOOD BY AUTOMATED COUNT: 12.3 % (ref 11.6–15.1)
FERRITIN SERPL-MCNC: 452 NG/ML (ref 8–388)
GFR SERPL CREATININE-BSD FRML MDRD: 75 ML/MIN/1.73SQ M
GLUCOSE SERPL-MCNC: 88 MG/DL (ref 65–140)
HCT VFR BLD AUTO: 40.1 % (ref 34.8–46.1)
HGB BLD-MCNC: 13.2 G/DL (ref 11.5–15.4)
IMM GRANULOCYTES # BLD AUTO: 0.02 THOUSAND/UL (ref 0–0.2)
IMM GRANULOCYTES NFR BLD AUTO: 0 % (ref 0–2)
IRON SATN MFR SERPL: 41 %
IRON SERPL-MCNC: 116 UG/DL (ref 50–170)
LYMPHOCYTES # BLD AUTO: 0.98 THOUSANDS/ΜL (ref 0.6–4.47)
LYMPHOCYTES NFR BLD AUTO: 19 % (ref 14–44)
MCH RBC QN AUTO: 31.1 PG (ref 26.8–34.3)
MCHC RBC AUTO-ENTMCNC: 32.9 G/DL (ref 31.4–37.4)
MCV RBC AUTO: 94 FL (ref 82–98)
MONOCYTES # BLD AUTO: 0.33 THOUSAND/ΜL (ref 0.17–1.22)
MONOCYTES NFR BLD AUTO: 6 % (ref 4–12)
NEUTROPHILS # BLD AUTO: 3.69 THOUSANDS/ΜL (ref 1.85–7.62)
NEUTS SEG NFR BLD AUTO: 72 % (ref 43–75)
NRBC BLD AUTO-RTO: 0 /100 WBCS
PLATELET # BLD AUTO: 193 THOUSANDS/UL (ref 149–390)
PMV BLD AUTO: 10.4 FL (ref 8.9–12.7)
POTASSIUM SERPL-SCNC: 4 MMOL/L (ref 3.5–5.3)
PROT SERPL-MCNC: 7.2 G/DL (ref 6.4–8.2)
RBC # BLD AUTO: 4.25 MILLION/UL (ref 3.81–5.12)
SODIUM SERPL-SCNC: 142 MMOL/L (ref 136–145)
TIBC SERPL-MCNC: 285 UG/DL (ref 250–450)
WBC # BLD AUTO: 5.16 THOUSAND/UL (ref 4.31–10.16)

## 2021-08-27 PROCEDURE — 82728 ASSAY OF FERRITIN: CPT

## 2021-08-27 PROCEDURE — 96365 THER/PROPH/DIAG IV INF INIT: CPT

## 2021-08-27 PROCEDURE — 80053 COMPREHEN METABOLIC PANEL: CPT

## 2021-08-27 PROCEDURE — 83540 ASSAY OF IRON: CPT

## 2021-08-27 PROCEDURE — 83550 IRON BINDING TEST: CPT

## 2021-08-27 PROCEDURE — 96372 THER/PROPH/DIAG INJ SC/IM: CPT

## 2021-08-27 PROCEDURE — 83918 ORGANIC ACIDS TOTAL QUANT: CPT

## 2021-08-27 PROCEDURE — 85025 COMPLETE CBC W/AUTO DIFF WBC: CPT

## 2021-08-27 RX ORDER — SODIUM CHLORIDE 9 MG/ML
20 INJECTION, SOLUTION INTRAVENOUS ONCE
Status: COMPLETED | OUTPATIENT
Start: 2021-08-27 | End: 2021-08-27

## 2021-08-27 RX ORDER — CYANOCOBALAMIN 1000 UG/ML
1000 INJECTION INTRAMUSCULAR; SUBCUTANEOUS ONCE
Status: CANCELLED | OUTPATIENT
Start: 2021-11-05 | End: 2021-11-05

## 2021-08-27 RX ORDER — SODIUM CHLORIDE 9 MG/ML
20 INJECTION, SOLUTION INTRAVENOUS ONCE
Status: CANCELLED | OUTPATIENT
Start: 2021-11-05

## 2021-08-27 RX ORDER — CYANOCOBALAMIN 1000 UG/ML
1000 INJECTION INTRAMUSCULAR; SUBCUTANEOUS ONCE
Status: COMPLETED | OUTPATIENT
Start: 2021-08-27 | End: 2021-08-27

## 2021-08-27 RX ADMIN — IRON SUCROSE 200 MG: 20 INJECTION, SOLUTION INTRAVENOUS at 13:25

## 2021-08-27 RX ADMIN — SODIUM CHLORIDE 20 ML/HR: 0.9 INJECTION, SOLUTION INTRAVENOUS at 13:05

## 2021-08-27 RX ADMIN — CYANOCOBALAMIN 1000 MCG: 1000 INJECTION, SOLUTION INTRAMUSCULAR; SUBCUTANEOUS at 14:35

## 2021-08-27 NOTE — PROGRESS NOTES
Patient to [facility] for Lab Testing / Venofer / Vitamin B12: Offers no complaints at present time: Right PAC accessed prior to appt: Good blood return noted: Labs drawn per MD order

## 2021-08-27 NOTE — PROGRESS NOTES
Tolerated infusion without incident: No adverse reactions noted: Vitamin B12 per MD order: Injection given in Left UA: Verified follow up appt with patient: AVS offered and declined

## 2021-08-31 LAB
METHYLMALONATE SERPL-SCNC: 196 NMOL/L (ref 0–378)
SL AMB DISCLAIMER: NORMAL

## 2021-09-01 DIAGNOSIS — E16.1 REACTIVE HYPOGLYCEMIA: ICD-10-CM

## 2021-09-01 RX ORDER — BLOOD-GLUCOSE TRANSMITTER
EACH MISCELLANEOUS
Qty: 1 EACH | Refills: 0 | Status: SHIPPED | OUTPATIENT
Start: 2021-09-01 | End: 2021-11-29 | Stop reason: SDUPTHER

## 2021-09-07 ENCOUNTER — HOSPITAL ENCOUNTER (OUTPATIENT)
Dept: RADIOLOGY | Facility: HOSPITAL | Age: 46
Discharge: HOME | End: 2021-09-07
Attending: PHYSICAL MEDICINE & REHABILITATION
Payer: COMMERCIAL

## 2021-09-07 DIAGNOSIS — M25.551 RIGHT HIP PAIN: ICD-10-CM

## 2021-09-07 PROCEDURE — 73721 MRI JNT OF LWR EXTRE W/O DYE: CPT

## 2021-09-07 PROCEDURE — G1004 CDSM NDSC: HCPCS

## 2021-09-09 ENCOUNTER — OFFICE VISIT (OUTPATIENT)
Dept: NEUROLOGY | Facility: CLINIC | Age: 46
End: 2021-09-09
Payer: COMMERCIAL

## 2021-09-09 VITALS
HEIGHT: 64 IN | SYSTOLIC BLOOD PRESSURE: 128 MMHG | DIASTOLIC BLOOD PRESSURE: 82 MMHG | WEIGHT: 167.4 LBS | HEART RATE: 97 BPM | BODY MASS INDEX: 28.58 KG/M2

## 2021-09-09 DIAGNOSIS — G43.719 INTRACTABLE CHRONIC MIGRAINE WITHOUT AURA AND WITHOUT STATUS MIGRAINOSUS: Primary | ICD-10-CM

## 2021-09-09 PROCEDURE — 99214 OFFICE O/P EST MOD 30 MIN: CPT | Performed by: PSYCHIATRY & NEUROLOGY

## 2021-09-09 NOTE — PROGRESS NOTES
Patient ID: Martita Rich is a 42 y o  female  Assessment/Plan:    No problem-specific Assessment & Plan notes found for this encounter  Diagnoses and all orders for this visit:    Intractable chronic migraine without aura and without status migrainosus  -     fremanezumab-vfrm (Ajovy) 225 MG/1 5ML auto-injector; Inject 4 5 mL (675 mg total) under the skin once for 1 dose-   Will discontinue Emgality  Due to significant wearing off and she is having increasing food associated headaches  Hoping to start Ajovy to see if patient responds better with no significant wearing off  Pending clinical course can consider Botox  Subjective:    HPI    Ms Francesco Kendall is a pleasant 41 yo female with mitochondrial disease- follows with [facility], seen in follow up for chronic migraines  Reports hx mitochondrial stroke and residual right sided sensory deficits and if fatigued weakness  States she continues to do better with Emgality and this wears off one week prior to next dose  She is on L- arginine- coQ10, ALA, B12 injections, iron infusions q3 months, PABA, vitamin E and ubiquinol managed by [facility]  States any carbohydrate intake including two grapes in are causing headaches now, the first week and half after Emgality is helpful with resolving the "sugar headaches" but states her quality of life is worsening as she cannot eat any fruit   Or other carbohydrates- even once piece  Had a sleep study no CAS  Denies other focal deficits    Has raynauds on procardia- follows with rheumatology  On midodrine for orthostasis    The following portions of the patient's history were reviewed and updated as appropriate: allergies, current medications, past family history, past medical history, past social history, past surgical history and problem list and ROS         Objective:    Height 5' 4" (1 626 m), weight 75 9 kg (167 lb 6 4 oz), not currently breastfeeding  Physical Exam  Vitals and nursing note reviewed  Constitutional:       Appearance: She is well-developed  HENT:      Head: Normocephalic and atraumatic  Eyes:      Extraocular Movements: Extraocular movements intact  Pupils: Pupils are equal, round, and reactive to light  Cardiovascular:      Rate and Rhythm: Normal rate and regular rhythm  Pulmonary:      Effort: Pulmonary effort is normal    Abdominal:      Palpations: Abdomen is soft  Musculoskeletal:      Cervical back: Normal range of motion  Neurological:      Mental Status: She is alert and oriented to person, place, and time  Cranial Nerves: No dysarthria  Coordination: Coordination is intact  Deep Tendon Reflexes: Strength normal and reflexes are normal and symmetric  Neurological Exam  Mental Status  Alert  Oriented to person, place, time and situation  no dysarthria present  Language is fluent with no aphasia  Attention and concentration are normal  Fund of knowledge is appropriate for level of education  Cranial Nerves  CN II: Visual fields full to confrontation  CN III, IV, VI: Extraocular movements intact bilaterally  Pupils equal round and reactive to light bilaterally  CN V: Facial sensation is normal   CN VII: Full and symmetric facial movement  CN VIII: Hearing is normal   CN IX, X: Palate elevates symmetrically  CN XI: Shoulder shrug strength is normal   CN XII: Tongue midline without atrophy or fasciculations  Motor   Normal muscle tone  Strength is 5/5 throughout all four extremities  Sensory  Light touch abnormality: Temperature abnormality:  No right-sided hemispatial neglect  No left-sided hemispatial neglect  Reduced sensation right hemisomachronic  Jase Glass Reflexes  Deep tendon reflexes are 2+ and symmetric in all four extremities with downgoing toes bilaterally  Coordination  Finger-to-nose, rapid alternating movements and heel-to-shin normal bilaterally without dysmetria      Gait  Casual gait is normal including stance, stride, and arm swing  ROS:    Review of Systems   Constitutional: Negative  HENT: Negative  Eyes: Negative  Respiratory: Negative  Cardiovascular: Negative  Gastrointestinal: Negative  Endocrine: Negative  Genitourinary: Negative  Musculoskeletal: Negative  Skin: Negative  Allergic/Immunologic: Negative  Neurological: Positive for weakness (Right Sided) and headaches  Hematological: Negative  Psychiatric/Behavioral: Negative

## 2021-09-22 ENCOUNTER — OFFICE VISIT (OUTPATIENT)
Dept: HEMATOLOGY ONCOLOGY | Facility: CLINIC | Age: 46
End: 2021-09-22
Payer: COMMERCIAL

## 2021-09-22 VITALS
OXYGEN SATURATION: 100 % | DIASTOLIC BLOOD PRESSURE: 82 MMHG | TEMPERATURE: 98 F | SYSTOLIC BLOOD PRESSURE: 118 MMHG | HEIGHT: 64 IN | WEIGHT: 170 LBS | HEART RATE: 77 BPM | BODY MASS INDEX: 29.02 KG/M2 | RESPIRATION RATE: 16 BRPM

## 2021-09-22 DIAGNOSIS — E61.1 IRON DEFICIENCY: Primary | ICD-10-CM

## 2021-09-22 PROCEDURE — 99214 OFFICE O/P EST MOD 30 MIN: CPT | Performed by: INTERNAL MEDICINE

## 2021-09-22 RX ORDER — SODIUM CHLORIDE 9 MG/ML
20 INJECTION, SOLUTION INTRAVENOUS ONCE
Status: CANCELLED | OUTPATIENT
Start: 2022-02-09

## 2021-09-22 RX ORDER — CYANOCOBALAMIN 1000 UG/ML
1000 INJECTION INTRAMUSCULAR; SUBCUTANEOUS ONCE
Status: CANCELLED | OUTPATIENT
Start: 2022-02-09 | End: 2022-02-11

## 2021-09-22 NOTE — PROGRESS NOTES
Hematology/Oncology Outpatient Follow- up Note  Kirstin Covert 42 y o  female MRN: @ Encounter: [id number]        Date:  9/22/2021    Presenting Complaint/Diagnosis :  Iron deficiency anemia in the setting of a gastric bypass and motility issues  the patient is TPN dependent      Previous Hematologic/ Oncologic History:    B12 and Venofer on multiple locations    Current Hematologic/ Oncologic Treatment:    Venofer and B12 every 3 months as maintenance  This will be changed every 6 months    Interval History:     the patient returns for follow-up visit  She is doing well  Her iron and B12 have been going well every 3 months but her iron levels have gone up so I will change this every 6 months  She is otherwise at baseline  Her 14 point review of systems today was negative  She has hip pain and may be going for surgery  She was discussing prophylactic anticoagulation  In the past she did have a DVT while on prophylactic Lovenox after ankle surgery  Does have an IVC filter in place  I recommended she consider Arixtra in the postoperative setting and I would recommend a treatment dose based on her history of having failed prophylactic dose Lovenox in the past   She has good kidney function so treatment dose of Arixtra continued until she is fully mobile I e  at least 3 months out from surgery would be the safest way to prevent any future blood clots  The patient agrees with this  I have documented this here so if she does go for surgery orthopedic surgeon can refer to this and put her on Arixtra at treatment doses instead of prophylactic doses postoperatively until she is fully mobile at least for few months  Test Results:    Imaging: MRI hip right wo contrast    Result Date: 9/8/2021  Narrative: MRI RIGHT HIP INDICATION:   M25 551: Pain in right hip   Hip pain for months, no injury, concern for greater trochanteric pain syndrome COMPARISON: X-ray 3/29/21, CT 4/25/18 TECHNIQUE:  MR sequences were obtained of the right hip and pelvis including: Localizer, axial T2 fat sat, coronal T1/STIR, cone-down axial oblique PD of the affected hip, coronal PD of the affected hip, sagittal T2 fat sat of the affected hip  Gadolinium was not used  FINDINGS: RIGHT HIP: -JOINT EFFUSION: None  -BONES: Normal marrow signal demonstrated without hip fracture or AVN  -ARTICULAR SURFACE:  Normal  -ACETABULAR LABRUM: Anterior /superior labral tear extending from L2-3 o'clock, best appreciated on series 8/7 -TROCHANTERIC BURSA: Tendinosis of the gluteus minima and anterior gluteus medius insertion  Mild bursitis  LEFT HIP: (please note dedicated small field of view images were not made of the left hip joint limiting its evaluation ) -JOINT EFFUSION: None  -BONES: Normal marrow signal demonstrated without hip fracture or AVN  -ARTICULAR SURFACE:  Normal  -ACETABULAR LABRUM: No gross abnormalities although evaluation is very limited  -TROCHANTERIC BURSA: Normal  REST OF PELVIS: -BONES: Normal  -SI JOINTS AND SYMPHYSIS PUBIS:  Intact  -VISUALIZED LUMBAR SPINE:  Unremarkable  -MUSCULATURE: Intact with intact hamstring origins bilaterally  -PELVIC SOFT TISSUES: 3 7 x 1 9 cm cystic structure located to the right of the cervix on series 5/15, likely peritoneal inclusion cyst -SUBCUTANEOUS TISSUES: Normal     Impression: 1  Mild right gluteal minimus and medius insertional tendinosis with adjacent greater trochanteric bursitis 2    Right anterior/superior labral tear Workstation performed: [id number]       Labs:   Lab Results   Component Value Date    WBC 5 16 08/27/2021    HGB 13 2 08/27/2021    HCT 40 1 08/27/2021    MCV 94 08/27/2021     08/27/2021     Lab Results   Component Value Date     01/06/2016    K 4 0 08/27/2021     08/27/2021    CO2 29 08/27/2021    ANIONGAP 9 01/06/2016    BUN 18 08/27/2021    CREATININE 0 93 08/27/2021    GLUCOSE 76 01/06/2016    GLUF 93 03/16/2021    CALCIUM 9 7 08/27/2021    AST 10 08/27/2021    ALT 22 08/27/2021    ALKPHOS 71 08/27/2021    PROT 6 5 01/06/2016    BILITOT 0 32 01/06/2016    EGFR 75 08/27/2021       Lab Results   Component Value Date    IRON 116 08/27/2021    TIBC 285 08/27/2021    FERRITIN 452 (H) 08/27/2021       Lab Results   Component Value Date    EWVODJED30 766 02/19/2021       ROS: As stated in the history of present illness otherwise his 14 point review of systems today was negative        Active Problems:   Patient Active Problem List   Diagnosis    Dysphagia    Postsurgical malabsorption    S/P gastric bypass    Iron deficiency    Constipation    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[address] )    GERD (gastroesophageal reflux disease)    Migraine without aura and without status migrainosus, not intractable    History of Nissen fundoplication    Reactive hypoglycemia    Hyperparathyroidism , secondary, non-renal (HCC)    Hirsutism    Orthostatic hypotension    MTHFR mutation (methylenetetrahydrofolate reductase)    History of malignant melanoma of skin    Chronic venous embolism and thrombosis of deep vessels of distal lower extremity (HCC)    Congenital anomaly of lower limb    Family history of endocrine and metabolic disease    Esophageal dysmotility    Dysautonomia orthostatic hypotension syndrome    Fibroid    Urinary retention    Renal cyst    Retinitis pigmentosa    Polycystic ovarian syndrome    Neurogenic bladder    Nyctalopia    Hypertension    Raynaud's disease without gangrene    Bariatric surgery status    Complex regional pain syndrome type 1 of right upper extremity    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    Menorrhagia with regular cycle    Anemia    Gastroesophageal reflux disease without esophagitis    Primary narcolepsy without cataplexy    Hip pain       Past Medical History:   Past Medical History:   Diagnosis Date    Acid reflux     Constipated     Dysautonomia ([address] )     Esophageal abnormality     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 [address]     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[facility]     MTHFR (methylene THF reductase) deficiency and homocystinuria (HCC)     Muscle weakness (generalized)     Nausea     On total parenteral nutrition (TPN)     for 8 mts    PCOS (polycystic ovarian syndrome)     PONV (postoperative nausea and vomiting)     Postgastrectomy syndrome     malabsorption    Postsurgical malabsorption     Status post gastric bypass for obesity     Stroke [facility]) 7653, [id number]    Metabolic strokes  Right hemiparesis= minor now   Vomiting     When eating       Surgical History:   Past Surgical History:   Procedure Laterality Date    ANKLE SURGERY Left     Has plate and screws    COLONOSCOPY      COSMETIC SURGERY      X14 as child post attack by dog  Arms, legs and face    DILATION AND CURETTAGE OF UTERUS      x2    ESOPHAGOGASTRODUODENOSCOPY N/A 1/27/2016    Procedure: ESOPHAGOGASTRODUODENOSCOPY (EGD); Surgeon: Sara Dodge MD;  [facility]: [facility]; Service:     FRACTURE SURGERY Left     Left ankle - hardware    GASTRIC BYPASS      IVC FILTER INSERTION      NISSEN FUNDOPLICATION      OVARY SURGERY Bilateral     "Drilling" due to multiple cysts- PCOS    IA EGD TRANSORAL BIOPSY SINGLE/MULTIPLE N/A 3/9/2016    Procedure: ESOPHAGOGASTRODUODENOSCOPY (EGD); Surgeon: Sara Dodge MD;  [facility];   Service: Bariatrics    IA HYSTEROSCOPY,W/ENDO BX N/A 5/22/2020    Procedure: DILATATION AND CURETTAGE (D&C) WITH HYSTEROSCOPY;  Surgeon: Melecio Leung MD;  [facility];  Service: Gynecology    IA HYSTEROSCOPY,W/ENDOMETRIAL ABLATION N/A 5/22/2020    Procedure: ABLATION ENDOMETRIAL MARISSA;  Surgeon: Jillian Campos MD;  [facility];  Service: Gynecology    NJ LAP,GASTROSTOMY,W/O TUBE CONSTR N/A 4/19/2016    Procedure: INSERTION GASTROSTOMY TUBE LAPAROSCOPIC WITH INTRAOP EGD;  Surgeon: Ila Troncoso MD;  [facility];  Service: Bariatrics    ULNAR NERVE TRANSPOSITION Right     WISDOM TOOTH EXTRACTION         Family History:    Family History   Problem Relation Age of Onset    Mitochondrial disorder Mother     Hypertension Mother     Thyroid cancer Mother 48    Clotting disorder Mother         MTFR   Aziza Justino Cancer Mother 62        renal    Depression Father     Endocrinopathy Father     Clotting disorder Father         MTFR    Stroke Father     Aneurysm Father         brain    Depression Brother     Narcolepsy Brother     Heart failure Maternal Grandmother 80    Coronary artery disease Maternal Grandmother     Mitochondrial disorder Son     Mitochondrial disorder Daughter     Stroke Family     Anuerysm Paternal Uncle         abdominal     Breast cancer Maternal Grandfather 55    Breast cancer Maternal Aunt 43    Heart attack Neg Hx     Arrhythmia Neg Hx     Sudden death Neg Hx         scd       Cancer-related family history includes Breast cancer (age of onset: 43) in her maternal aunt; Breast cancer (age of onset: 55) in her maternal grandfather; Cancer (age of onset: 62) in her mother; Thyroid cancer (age of onset: 48) in her mother      Social History:   Social History     Socioeconomic History    Marital status: /Civil Union     Spouse name: Not on file    Number of children: Not on file    Years of education: Not on file    Highest education level: Not on file   Occupational History    Occupation: infusion center   Tobacco Use    Smoking status: Never Smoker    Smokeless tobacco: Never Used   Vaping Use    Vaping Use: Never used   Substance and Sexual Activity    Alcohol use: No    Drug use: No    Sexual activity: Yes     Partners: Male     Birth control/protection: Male Sterilization   Other Topics Concern    Not on file   Social History Narrative        2 children - son and daughter with mitrochondrial diseas     Social Determinants of Health     Financial Resource Strain:     Difficulty of Paying Living Expenses:    Food Insecurity:     Worried About Running Out of Food in the Last Year:     [address] in the Last Year:    Transportation Needs:     Lack of Transportation (Medical):  Lack of Transportation (Non-Medical):    Physical Activity:     Days of Exercise per Week:     Minutes of Exercise per Session:    Stress:     Feeling of Stress :    Social Connections:     Frequency of Communication with Friends and Family:     Frequency of Social Gatherings with Friends and Family:     Attends Che[employer] Services:     Active Member of Clubs or Organizations:     Attends Club or Organization Meetings:     Marital Status:    Intimate Partner Violence:     Fear of Current or Ex-Partner:     Emotionally Abused:     Physically Abused:     Sexually Abused:        Current Medications:   Current Outpatient Medications   Medication Sig Dispense Refill    acarbose (PRECOSE) 100 MG tablet Take 1 tablet (100 mg total) by mouth 3 (three) times a day with meals 270 tablet 0    acetylcysteine (MUCOMYST) 200 mg/mL nebulizer solution as needed       Alpha-Lipoic Acid 100 MG CAPS Take by mouth      B Complex-Biotin-FA (TH VITAMIN B 50/B-COMPLEX PO) Take 1 tablet by mouth daily          baclofen 10 mg tablet Take 0 5 tablets (5 mg total) by mouth daily at bedtime as needed for muscle spasms 10 tablet 1    COENZYME Q-10 PO Dose varies      Continuous Blood Gluc Sensor (Dexcom G6 Sensor) MISC USE AS DIRECTED CMG - CHANGE EVERY 10 DAYS 3 each 2    Continuous Blood Gluc Transmit (Dexcom G6 Transmitter) MISC USE AS DIRECTED FOR CGM AND CHANGE EVERY 3 MONTHS 1 each 0    CREATINE MONOHYDRATE PO Take 2 5 g by mouth      Cyanocobalamin (B-12) 1000 MCG/ML KIT Inject  as directed every 30 days   diphenhydrAMINE (BENADRYL) 25 mg tablet Take by mouth      docusate sodium (COLACE) 100 mg capsule Take 200 mg by mouth 2 (two) times a day   doxepin (SINEquan) 10 mg capsule Take 1 capsule (10 mg total) by mouth daily at bedtime 30 capsule 5    Galcanezumab-gnlm (Emgality) 120 MG/ML SOAJ 1 subq injection every 30 days  1 mL 11    Glucagon (Baqsimi Two Pack) 3 MG/DOSE POWD Use as needed it for Hypoglycemia   2 each 3    iron sucrose (VENOFER) 20 mg/mL Venofer 200 mg iron/10 mL intravenous solution      L-Arginine 1000 MG TABS Take 6,000 mg by mouth 2 (two) times a day       linaCLOtide 145 MCG CAPS Take 145 mcg by mouth 2 (two) times a day      midodrine (PROAMATINE) 5 mg tablet Take 1 tablet (5 mg total) by mouth 3 (three) times a day 270 tablet 3    Multiple Vitamin (MULTIVITAMIN) tablet Take 1 tablet by mouth daily   NIFEdipine (PROCARDIA XL) 60 mg 24 hr tablet Take 1 tablet (60 mg total) by mouth daily 90 tablet 3    nystatin (MYCOSTATIN) cream Apply topically 2 (two) times a day 30 g 0    OneTouch Delica Lancets 40A MISC by Does not apply route 3 (three) times a day 300 each 1    OneTouch Verio test strip 1 each by Other route 3 (three) times a day 300 each 1    Oral Vehicles (PCCA-PLUS) SUSP       P-Aminobenzoic Acid (PARA-AMINOBENZOIC ACID) POWD       pantoprazole (PROTONIX) 40 mg tablet   3    Plecanatide 3 MG TABS Take 3 mg by mouth      polyethylene glycol (MIRALAX) 17 g packet Take 17 g by mouth 2 (two) times a day Indications: 1 5 caps full 1-2 times/day          Riboflavin 100 MG TABS Take 100 mg by mouth      sitaGLIPtin (JANUVIA) 25 mg tablet Take 1 tablet (25 mg total) by mouth daily 90 tablet 3    sucralfate (CARAFATE) 1 g/10 mL suspension Take 10 mL (1 g total) by mouth 4 (four) times a day Must have liquid suspension 420 mL 5    tolterodine (DETROL LA) 4 mg 24 hr capsule Take 1 capsule (4 mg total) by mouth daily 90 capsule 3    TRILYTE 420 g solution   5    Ubiquinol Liposomal 100 MG/5ML SYRP Take 300 mg by mouth      Ubiquinol POWD       vitamin E 100 UNIT capsule Take 78 Units by mouth daily       No current facility-administered medications for this visit  Allergies: Allergies   Allergen Reactions    Sulfate Other (See Comments)    Sulfa Antibiotics      Arrhythmia     Guaifenesin      Arrhythmia    Other      Malignant hyperhermia precautions  NEVER use Lacted Ringers       Physical Exam:    Body surface area is 1 83 meters squared  Wt Readings from Last 3 Encounters:   09/22/21 77 1 kg (170 lb)   09/09/21 75 9 kg (167 lb 6 4 oz)   08/03/21 78 9 kg (174 lb)        Temp Readings from Last 3 Encounters:   09/22/21 98 °F (36 7 °C)   08/27/21 98 °F (36 7 °C)   05/18/21 97 7 °F (36 5 °C) (Temporal)        BP Readings from Last 3 Encounters:   09/22/21 118/82   09/09/21 128/82   08/27/21 126/70         Pulse Readings from Last 3 Encounters:   09/22/21 77   09/09/21 97   08/27/21 88         Physical Exam     Constitutional   General appearance: No acute distress, well appearing and well nourished  Eyes   Conjunctiva and lids: No swelling, erythema or discharge  Pupils and irises: Equal, round and reactive to light  Ears, Nose, Mouth, and Throat   External inspection of ears and nose: Normal     Nasal mucosa, septum, and turbinates: Normal without edema or erythema  Oropharynx: Normal with no erythema, edema, exudate or lesions  Pulmonary   Respiratory effort: No increased work of breathing or signs of respiratory distress  Auscultation of lungs: Clear to auscultation  Cardiovascular   Palpation of heart: Normal PMI, no thrills  Auscultation of heart: Normal rate and rhythm, normal S1 and S2, without murmurs      Examination of extremities for edema and/or varicosities: Normal     Carotid pulses: Normal     Abdomen   Abdomen: Non-tender, no masses  Liver and spleen: No hepatomegaly or splenomegaly  Lymphatic   Palpation of lymph nodes in neck: No lymphadenopathy  Musculoskeletal   Gait and station: Normal     Digits and nails: Normal without clubbing or cyanosis  Inspection/palpation of joints, bones, and muscles: Normal     Skin   Skin and subcutaneous tissue: Normal without rashes or lesions  Neurologic   Cranial nerves: Cranial nerves 2-12 intact  Sensation: No sensory loss  Psychiatric   Orientation to person, place, and time: Normal     Mood and affect: Normal         Assessment / Plan:    The patient is a very pleasant 39year-old female with a past medical history mitochondrial disease status post Nissen fundoplication for motility issues and then a gastric bypass  She was mildly anemic but her iron studies revealed iron deficiency  We gave her Venofer 200 mg twice a week for a total of 8 doses  Numbers all corrected       At her last visit she told me her mitochondrial physicians wished for her ferritin to run in a higher range so I put her on maintenance Venofer  She gets 200 mg of Venofer and also gets her B12 every 3 months now  Her numbers have slowly gone higher  I will change this every 6 months  I will see her back in 12 months   Until then if she has any questions she will call our office  I have documented recommendations for postop prophylaxis for her if she does go for hip surgery  Above in this note  I would recommend Arixtra at treatment dose to be continued until she is fully mobile after her hip surgery  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

## 2021-09-23 ENCOUNTER — PATIENT MESSAGE (OUTPATIENT)
Dept: NEUROLOGY | Facility: CLINIC | Age: 46
End: 2021-09-23

## 2021-09-23 NOTE — TELEPHONE ENCOUNTER
PA for ajovy submitted via CM, Key: [id number], determination pending  Sent my chart message to patient to advise

## 2021-09-23 NOTE — TELEPHONE ENCOUNTER
From: Martita Rich  To: Laura Trevizo DO  Sent: 9/23/2021 1:50 PM EDT  Subject: Prescription Question    The Ajovy requires a prior auth  Insurance is saying that they haven't received this yet  I would appreciate if this could be handled as soon as possible   Thank you in advance,  Arnold Matos

## 2021-09-27 NOTE — TELEPHONE ENCOUNTER
denied as med is not formulary  pt must try and fail emgality and aimovig  per office note-Will discontinue Emgality  Due to significant wearing off and she is having increasing food associated headaches  Hoping to start Ajovy to see if patient responds better with no significant wearing off  also noted in chart-  pt has hx of constiptation so aimovig would be contraindicated  Called [facility] at [phone] and spoke to [place]  States that we can appeal and can do a reconsideration  Can fax appeal to [phone]  Monae Jack as Urgent-24 hour turn around time    Cover sheet written with above info    Also faxed last office note and article regarding no wearing off effect with quarterly or monthly dosing of ajovy

## 2021-09-28 DIAGNOSIS — E16.2 HYPOGLYCEMIA: ICD-10-CM

## 2021-09-28 RX ORDER — ACARBOSE 100 MG/1
100 TABLET ORAL
Qty: 270 TABLET | Refills: 3 | Status: SHIPPED | OUTPATIENT
Start: 2021-09-28 | End: 2022-07-25

## 2021-09-28 NOTE — TELEPHONE ENCOUNTER
ajovy approved from 9/27/21-9/27/22    Left message for pharm making them aware of approval    mychart message sent to pt regarding approval

## 2021-11-05 ENCOUNTER — HOSPITAL ENCOUNTER (OUTPATIENT)
Dept: MRI IMAGING | Facility: HOSPITAL | Age: 46
Discharge: HOME | End: 2021-11-05
Payer: COMMERCIAL

## 2021-11-05 DIAGNOSIS — R10.2 PELVIC AND PERINEAL PAIN: ICD-10-CM

## 2021-11-05 PROCEDURE — G1004 CDSM NDSC: HCPCS

## 2021-11-05 PROCEDURE — 72195 MRI PELVIS W/O DYE: CPT

## 2021-11-11 ENCOUNTER — OFFICE VISIT (OUTPATIENT)
Dept: RHEUMATOLOGY | Facility: CLINIC | Age: 46
End: 2021-11-11
Payer: COMMERCIAL

## 2021-11-11 ENCOUNTER — OFFICE VISIT (OUTPATIENT)
Dept: GASTROENTEROLOGY | Facility: CLINIC | Age: 46
End: 2021-11-11
Payer: COMMERCIAL

## 2021-11-11 VITALS
HEART RATE: 88 BPM | HEIGHT: 64 IN | TEMPERATURE: 97.4 F | WEIGHT: 172 LBS | DIASTOLIC BLOOD PRESSURE: 99 MMHG | SYSTOLIC BLOOD PRESSURE: 139 MMHG | BODY MASS INDEX: 29.37 KG/M2

## 2021-11-11 VITALS
HEIGHT: 64 IN | DIASTOLIC BLOOD PRESSURE: 80 MMHG | WEIGHT: 172.6 LBS | SYSTOLIC BLOOD PRESSURE: 144 MMHG | BODY MASS INDEX: 29.47 KG/M2

## 2021-11-11 DIAGNOSIS — E88.40 MITOCHONDRIAL DISEASE (HCC): ICD-10-CM

## 2021-11-11 DIAGNOSIS — R13.19 ESOPHAGEAL DYSPHAGIA: Primary | ICD-10-CM

## 2021-11-11 DIAGNOSIS — K21.9 GASTROESOPHAGEAL REFLUX DISEASE WITHOUT ESOPHAGITIS: ICD-10-CM

## 2021-11-11 DIAGNOSIS — I73.00 RAYNAUD'S DISEASE WITHOUT GANGRENE: Primary | ICD-10-CM

## 2021-11-11 DIAGNOSIS — K59.04 CHRONIC IDIOPATHIC CONSTIPATION: ICD-10-CM

## 2021-11-11 DIAGNOSIS — R21 RASH: ICD-10-CM

## 2021-11-11 DIAGNOSIS — Z79.899 LONG TERM USE OF DRUG: ICD-10-CM

## 2021-11-11 DIAGNOSIS — K22.4 ESOPHAGEAL DYSMOTILITY: ICD-10-CM

## 2021-11-11 PROCEDURE — 99213 OFFICE O/P EST LOW 20 MIN: CPT | Performed by: PHYSICIAN ASSISTANT

## 2021-11-11 PROCEDURE — 99214 OFFICE O/P EST MOD 30 MIN: CPT | Performed by: INTERNAL MEDICINE

## 2021-11-12 RX ORDER — NYSTATIN 100000 U/G
CREAM TOPICAL 2 TIMES DAILY
Qty: 30 G | Refills: 0 | Status: SHIPPED | OUTPATIENT
Start: 2021-11-12 | End: 2022-02-24 | Stop reason: ALTCHOICE

## 2021-11-15 ENCOUNTER — TELEPHONE (OUTPATIENT)
Dept: OBGYN CLINIC | Facility: HOSPITAL | Age: 46
End: 2021-11-15

## 2021-11-23 ENCOUNTER — OFFICE VISIT (OUTPATIENT)
Dept: ENDOCRINOLOGY | Facility: CLINIC | Age: 46
End: 2021-11-23
Payer: COMMERCIAL

## 2021-11-23 VITALS
HEART RATE: 104 BPM | HEIGHT: 64 IN | DIASTOLIC BLOOD PRESSURE: 80 MMHG | SYSTOLIC BLOOD PRESSURE: 118 MMHG | WEIGHT: 172.13 LBS | BODY MASS INDEX: 29.39 KG/M2

## 2021-11-23 DIAGNOSIS — E16.1 REACTIVE HYPOGLYCEMIA: Primary | ICD-10-CM

## 2021-11-23 DIAGNOSIS — K91.2 POSTSURGICAL MALABSORPTION: ICD-10-CM

## 2021-11-23 PROCEDURE — 99214 OFFICE O/P EST MOD 30 MIN: CPT | Performed by: INTERNAL MEDICINE

## 2021-11-23 RX ORDER — FREMANEZUMAB-VFRM 225 MG/1.5ML
675 INJECTION SUBCUTANEOUS
COMMUNITY

## 2021-11-29 DIAGNOSIS — E16.1 REACTIVE HYPOGLYCEMIA: ICD-10-CM

## 2021-11-29 RX ORDER — BLOOD-GLUCOSE TRANSMITTER
EACH MISCELLANEOUS
Qty: 1 EACH | Refills: 3 | Status: SHIPPED | OUTPATIENT
Start: 2021-11-29

## 2022-01-12 DIAGNOSIS — I95.89 OTHER SPECIFIED HYPOTENSION: ICD-10-CM

## 2022-01-12 RX ORDER — MIDODRINE HYDROCHLORIDE 5 MG/1
5 TABLET ORAL 3 TIMES DAILY
Qty: 270 TABLET | Refills: 3 | Status: SHIPPED | OUTPATIENT
Start: 2022-01-12

## 2022-02-09 ENCOUNTER — HOSPITAL ENCOUNTER (OUTPATIENT)
Dept: INFUSION CENTER | Facility: CLINIC | Age: 47
End: 2022-02-09

## 2022-02-17 ENCOUNTER — HOSPITAL ENCOUNTER (OUTPATIENT)
Dept: INFUSION CENTER | Facility: CLINIC | Age: 47
Discharge: HOME | End: 2022-02-17
Payer: COMMERCIAL

## 2022-02-17 VITALS
TEMPERATURE: 98 F | HEART RATE: 84 BPM | OXYGEN SATURATION: 100 % | DIASTOLIC BLOOD PRESSURE: 75 MMHG | RESPIRATION RATE: 18 BRPM | SYSTOLIC BLOOD PRESSURE: 126 MMHG

## 2022-02-17 DIAGNOSIS — E61.1 IRON DEFICIENCY: Primary | ICD-10-CM

## 2022-02-17 PROCEDURE — 96372 THER/PROPH/DIAG INJ SC/IM: CPT

## 2022-02-17 PROCEDURE — 96365 THER/PROPH/DIAG IV INF INIT: CPT

## 2022-02-17 RX ORDER — SODIUM CHLORIDE 9 MG/ML
20 INJECTION, SOLUTION INTRAVENOUS ONCE
Status: COMPLETED | OUTPATIENT
Start: 2022-02-17 | End: 2022-02-17

## 2022-02-17 RX ORDER — CYANOCOBALAMIN 1000 UG/ML
1000 INJECTION INTRAMUSCULAR; SUBCUTANEOUS ONCE
Status: COMPLETED | OUTPATIENT
Start: 2022-02-17 | End: 2022-02-17

## 2022-02-17 RX ORDER — SODIUM CHLORIDE 9 MG/ML
20 INJECTION, SOLUTION INTRAVENOUS ONCE
OUTPATIENT
Start: 2022-07-28

## 2022-02-17 RX ORDER — CYANOCOBALAMIN 1000 UG/ML
1000 INJECTION INTRAMUSCULAR; SUBCUTANEOUS ONCE
OUTPATIENT
Start: 2022-07-28 | End: 2022-07-28

## 2022-02-17 RX ADMIN — SODIUM CHLORIDE 20 ML/HR: 0.9 INJECTION, SOLUTION INTRAVENOUS at 08:40

## 2022-02-17 RX ADMIN — IRON SUCROSE 200 MG: 20 INJECTION, SOLUTION INTRAVENOUS at 08:40

## 2022-02-17 RX ADMIN — CYANOCOBALAMIN 1000 MCG: 1000 INJECTION, SOLUTION INTRAMUSCULAR; SUBCUTANEOUS at 08:42

## 2022-02-17 NOTE — PROGRESS NOTES
Pt to clinic for venofer infusion and B12  Pt tolerated B12 injection in left arm   Pt making next appointment when exiting, declined avs

## 2022-02-24 ENCOUNTER — OFFICE VISIT (OUTPATIENT)
Dept: INTERNAL MEDICINE CLINIC | Facility: CLINIC | Age: 47
End: 2022-02-24
Payer: COMMERCIAL

## 2022-02-24 ENCOUNTER — OFFICE VISIT (OUTPATIENT)
Dept: BARIATRICS | Facility: CLINIC | Age: 47
End: 2022-02-24
Payer: COMMERCIAL

## 2022-02-24 VITALS
HEART RATE: 87 BPM | WEIGHT: 172.5 LBS | SYSTOLIC BLOOD PRESSURE: 116 MMHG | DIASTOLIC BLOOD PRESSURE: 70 MMHG | BODY MASS INDEX: 29.45 KG/M2 | TEMPERATURE: 97.5 F | HEIGHT: 64 IN

## 2022-02-24 VITALS
WEIGHT: 174 LBS | HEART RATE: 75 BPM | BODY MASS INDEX: 29.71 KG/M2 | HEIGHT: 64 IN | SYSTOLIC BLOOD PRESSURE: 114 MMHG | OXYGEN SATURATION: 98 % | TEMPERATURE: 97.4 F | DIASTOLIC BLOOD PRESSURE: 86 MMHG

## 2022-02-24 DIAGNOSIS — Z48.815 ENCOUNTER FOR SURGICAL AFTERCARE FOLLOWING SURGERY ON THE DIGESTIVE SYSTEM: ICD-10-CM

## 2022-02-24 DIAGNOSIS — Z98.84 S/P GASTRIC BYPASS: ICD-10-CM

## 2022-02-24 DIAGNOSIS — E88.40 MITOCHONDRIAL DISEASE (HCC): ICD-10-CM

## 2022-02-24 DIAGNOSIS — K91.2 POSTSURGICAL MALABSORPTION: ICD-10-CM

## 2022-02-24 DIAGNOSIS — Z00.00 LABORATORY EXAMINATION ORDERED AS PART OF A ROUTINE GENERAL MEDICAL EXAMINATION: ICD-10-CM

## 2022-02-24 DIAGNOSIS — Z00.00 HEALTH MAINTENANCE EXAMINATION: Primary | ICD-10-CM

## 2022-02-24 DIAGNOSIS — K91.2 HYPOGLYCEMIA AFTER GI (GASTROINTESTINAL) SURGERY: ICD-10-CM

## 2022-02-24 DIAGNOSIS — M25.559 HIP PAIN: ICD-10-CM

## 2022-02-24 DIAGNOSIS — G43.009 MIGRAINE WITHOUT AURA AND WITHOUT STATUS MIGRAINOSUS, NOT INTRACTABLE: ICD-10-CM

## 2022-02-24 DIAGNOSIS — E66.3 OVERWEIGHT: ICD-10-CM

## 2022-02-24 DIAGNOSIS — K59.04 CHRONIC IDIOPATHIC CONSTIPATION: ICD-10-CM

## 2022-02-24 DIAGNOSIS — I73.00 RAYNAUD'S DISEASE WITHOUT GANGRENE: ICD-10-CM

## 2022-02-24 DIAGNOSIS — R42 VERTIGO: ICD-10-CM

## 2022-02-24 DIAGNOSIS — R33.9 URINARY RETENTION: ICD-10-CM

## 2022-02-24 DIAGNOSIS — E61.1 IRON DEFICIENCY: ICD-10-CM

## 2022-02-24 DIAGNOSIS — Z98.84 BARIATRIC SURGERY STATUS: Primary | ICD-10-CM

## 2022-02-24 DIAGNOSIS — K21.9 GASTROESOPHAGEAL REFLUX DISEASE WITHOUT ESOPHAGITIS: ICD-10-CM

## 2022-02-24 PROCEDURE — 99396 PREV VISIT EST AGE 40-64: CPT | Performed by: INTERNAL MEDICINE

## 2022-02-24 PROCEDURE — 99213 OFFICE O/P EST LOW 20 MIN: CPT | Performed by: SURGERY

## 2022-02-24 RX ORDER — PRUCALOPRIDE 2 MG/1
TABLET, FILM COATED ORAL
COMMUNITY
Start: 2022-02-05

## 2022-02-24 RX ORDER — SODIUM CHLORIDE 9 MG/ML
7 INJECTION INTRAMUSCULAR; INTRAVENOUS; SUBCUTANEOUS
COMMUNITY

## 2022-02-24 RX ORDER — BUPIVACAINE HYDROCHLORIDE 2.5 MG/ML
1 INJECTION, SOLUTION INFILTRATION; PERINEURAL
COMMUNITY

## 2022-02-24 NOTE — ASSESSMENT & PLAN NOTE
Reviwed recent MRI, now followed at [place] [id number]  Follow up as scheduled, s/p PRP  Surgery planned

## 2022-02-24 NOTE — PROGRESS NOTES
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  Iron deficiency  Receiving iron infusions, now q6 months  Menorrhagia with regular cycle  Improved s/p ablation  Raynaud's disease without gangrene  On nifedipine  Per rheum  Migraine without aura and without status migrainosus, not intractable  Switched to Ajovy, headaches improved from [facility]  Per neurology  GERD (gastroesophageal reflux disease)  On daily PPI, sucralfate prn only  Urinary retention  Taking tolterodine, per Urology  S/P gastric bypass  Ongoing IVF qHS via PEG  Labs due  Constipation  On daily Miralax, Linzess and Motegrity  Takes GoLytely weekly  Hip pain  Reviwed recent MRI, now followed at [place] [id number]  Follow up as scheduled, s/p PRP  Surgery planned  Diagnoses and all orders for this visit:    Health maintenance examination  Comments:  Vaccinations and screening updated  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[facility] 75 )    Migraine without aura and without status migrainosus, not intractable    Iron deficiency    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    Chronic idiopathic constipation    Gastroesophageal reflux disease without esophagitis    Hip pain    Postsurgical malabsorption  -     Vitamin B12  -     Vitamin D 25 hydroxy  -     Lipid panel  -     Hemoglobin A1C  -     Vitamin A    Laboratory examination ordered as part of a routine general medical examination  -     Vitamin B12  -     Vitamin D 25 hydroxy  -     Lipid panel  -     Hemoglobin A1C  -     Vitamin A    Raynaud's disease without gangrene    Urinary retention    S/P gastric bypass    Vertigo  Comments:  Avoid rapid head movements  Start home exercises, discussed vestibular therapy      Other orders  -     bupivacaine (MARCAINE) 0 25 %; 1 mL  -     Motegrity 2 MG TABS  - Saline Bacteriostatic (sodium chloride bacteriostatic) 0 9 %; 7 mL      Follow up in 1 year or as needed  Subjective:      Patient ID: Reyes Downy is a 42 y o  female here for a physical     She complains of occasional dizziness since a few weeks ago  It would occur with head movements, which feels slightly dizzy  No nausea or vomiting  This morning, she woke up and she felt dizzy a ready without even getting out of bed  She has not tried any over-the-counter medication  She has been having issues with her right hip  She did seek for second opinion, received PRP injections and has a follow-up appointment to discuss possible surgery  She has a trip planned to [place] [facility] in a few months  She reports no further vaginal bleeding  She continues to receive iron infusions every 6 months  She continues to hydrate every night 1500 cc via her PEG  Her headaches have improved since starting a job we, reports that it would last for a little over 2 months  She had a course of rifaximin which she had completed a few months ago, has her constipation cocktail which she takes daily  She reports her stool floats after each bowel movement, would need to flush several times  She has an appointment with bariatrics later today  The following portions of the patient's history were reviewed and updated as appropriate: allergies, current medications, past medical history, past social history and problem list     Review of Systems   Constitutional: Positive for activity change and appetite change  Negative for fatigue  HENT: Negative for congestion and postnasal drip  Eyes: Negative for visual disturbance  Respiratory: Negative for cough and shortness of breath  Cardiovascular: Negative for chest pain  Gastrointestinal: Positive for constipation  Negative for abdominal pain and diarrhea  Genitourinary: Negative for dysuria and frequency     Musculoskeletal: Positive for arthralgias and gait problem  Skin: Negative for rash and wound  Neurological: Positive for dizziness and light-headedness  Negative for weakness, numbness and headaches  Hematological: Does not bruise/bleed easily  Psychiatric/Behavioral: Negative for confusion  The patient is not nervous/anxious  Objective:      /86   Pulse 75   Temp (!) 97 4 °F (36 3 °C)   Ht 5' 4" (1 626 m)   Wt 78 9 kg (174 lb)   SpO2 98%   BMI 29 87 kg/m²          Physical Exam  Vitals and nursing note reviewed  Constitutional:       General: She is not in acute distress  Appearance: She is well-developed  HENT:      Head: Normocephalic and atraumatic  Right Ear: Tympanic membrane, ear canal and external ear normal       Left Ear: Tympanic membrane, ear canal and external ear normal    Eyes:      Pupils: Pupils are equal, round, and reactive to light  Cardiovascular:      Rate and Rhythm: Normal rate and regular rhythm  Heart sounds: Normal heart sounds  Pulmonary:      Effort: Pulmonary effort is normal       Breath sounds: Normal breath sounds  No wheezing  Chest:       Abdominal:      General: Bowel sounds are normal       Palpations: Abdomen is soft  Tenderness: There is no abdominal tenderness  Musculoskeletal:         General: No swelling  Right lower leg: No edema  Left lower leg: No edema  Skin:     General: Skin is warm  Findings: No rash  Neurological:      General: No focal deficit present  Mental Status: She is alert and oriented to person, place, and time  Comments: Mild horizontal nystagmus, bilateral   Psychiatric:         Mood and Affect: Mood and affect normal  Mood is not anxious or depressed  Behavior: Behavior normal            Labs & imaging results reviewed with patient  BMI Counseling: Body mass index is 29 87 kg/m²  The BMI is above normal  Exercise recommendations include strength training exercises

## 2022-02-24 NOTE — PROGRESS NOTES
POST-OP OFFICE VISIT - BARIATRIC SURGERY  Leigh Allen 42 y o  female MRN: [id number]  Unit/Bed#:  Encounter: [id number]      HPI:  Serafin Gudino is a 42 y o  female status post Nissen takedown and Laparoscopic RNYGB by in November 2015 by Dr Jamia Case presents to office for annual visit  Subjective     Patient presents to the office for post-op visit  Tolerating regular diet: Yes    Nausea/Vomiting/Constipation/Diarrhea: Yes, constipation  Eating at least 60 g of protein: Yes, needs about 90g per patient  Following 30/60 minute role with liquids: Yes  Drinking at least 64 oz of fluid: 1500mL hydration through g-tube  Taking vitamin/mineral supplements: Yes  Taking omeprazole: Prilosec BID and carafate   Reviewed and advised patient on vitamins and supplements  Exercising: Not regularly due to labrum tear in hip that needs surgery, exercise encouraged  Initial: 274 lb  Current weight:  172 lb  BMI:  29 87  Weight lost since surgery: 79%   Excess body weight loss thus far since surgery discussed with patient  She has lost two pounds since last visit on 2/19/21  Her heartburn and regurgitation has been worsening over the years and she is having more trouble with moving her bowels  She is on stool softeners and miralax along with other meds to help with constipation  She also complains of epigastric pain on occasion, not associated with food  She has complicated past medical history  Patient had gastrostomy tube (Valentin-Key) Button in April of 2016 to assist with her nutritional requirements  She continues to supplement her hydration with her G-tube and gets iron infusions as needed  She has a 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CMS-HCC), 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 and follows up with the [facility]    She continues to follow with the team at [facility] and she has decided to stay put for now with regards to other operations as the offered her at some point a colectomy  Next appt is in April of this year  Last EGD 10/17/19    CBC, CMP, Iron, PTH, TSH, Vitamin A, HgbA1C, lipids, Vit D, and Vit B12 labs already in system, Patient has not completed       Review of Systems   Constitutional: Negative for chills and fever  HENT: Negative for ear pain and sore throat  Eyes: Negative for pain and visual disturbance  Respiratory: Negative for cough and shortness of breath  Cardiovascular: Negative for chest pain and palpitations  Gastrointestinal: Positive for abdominal pain and constipation  Negative for vomiting  Heartburn   Genitourinary: Negative for dysuria and hematuria  Musculoskeletal: Negative for arthralgias and back pain  Skin: Negative for color change and rash  Neurological: Negative for seizures and syncope  All other systems reviewed and are negative  Historical Information   Past Medical History:   Diagnosis Date    Acid reflux     Constipated     Dysautonomia ([facility] )     Esophageal abnormality     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   Gastrostomy tube in place [facility])     Gastrostomy tube in place [facility])     History of blood clots 2012    Left leg  Has IVC filter now  Occurred while on Lovenox    Iron deficiency     Malignant hyperthermia due to anesthesia     Patient's son has M H   States she needs precautions    Migraines     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[facility] )     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[facility] )     MTHFR (methylene THF reductase) deficiency and homocystinuria (HCC)     Muscle weakness (generalized)     Nausea     On total parenteral nutrition (TPN)     for 8 mts    PCOS (polycystic ovarian syndrome)     PONV (postoperative nausea and vomiting)     Postgastrectomy syndrome     malabsorption    Postsurgical malabsorption     Status post gastric bypass for obesity     Stroke [facility]) 1116, 6330    Metabolic strokes  Right hemiparesis= minor now      Vomiting     When eating Past Surgical History:   Procedure Laterality Date    ANKLE SURGERY Left     Has plate and screws    COLONOSCOPY      COSMETIC SURGERY      X14 as child post attack by dog  Arms, legs and face    DILATION AND CURETTAGE OF UTERUS      x2    ESOPHAGOGASTRODUODENOSCOPY N/A 1/27/2016    Procedure: ESOPHAGOGASTRODUODENOSCOPY (EGD); Surgeon: Stephanie Barrientos MD;  Location: [facility]; Service:     FRACTURE SURGERY Left     Left ankle - hardware    GASTRIC BYPASS      IVC FILTER INSERTION      NISSEN FUNDOPLICATION      OVARY SURGERY Bilateral     "Drilling" due to multiple cysts- PCOS    NH EGD TRANSORAL BIOPSY SINGLE/MULTIPLE N/A 3/9/2016    Procedure: ESOPHAGOGASTRODUODENOSCOPY (EGD); Surgeon: Stephanie Barrientos MD;  [facility];   Service: Bariatrics    NH HYSTEROSCOPY,W/ENDO BX N/A 5/22/2020    Procedure: DILATATION AND CURETTAGE (D&C) WITH HYSTEROSCOPY;  Surgeon: Kvng Elizondo MD;  [facility];  Service: Gynecology    NH HYSTEROSCOPY,W/ENDOMETRIAL ABLATION N/A 5/22/2020    Procedure: Courtney Ibarra;  Surgeon: Kvng Elizondo MD;  [facility];  Service: Gynecology    NH LAP,GASTROSTOMY,W/O TUBE CONSTR N/A 4/19/2016    Procedure: INSERTION GASTROSTOMY TUBE LAPAROSCOPIC WITH INTRAOP EGD;  Surgeon: Stephanie Barrientos MD;  [facility];  Service: [address] [place] [address] Right     WISDOM TOOTH EXTRACTION       Social History   Social History     Substance and Sexual Activity   Alcohol Use No     Social History     Substance and Sexual Activity   Drug Use No     Social History     Tobacco Use   Smoking Status Never Smoker   Smokeless Tobacco Never Used       Objective       Current Vitals:   Blood Pressure: 116/70 (02/24/22 1256)  Pulse: 87 (02/24/22 1256)  Temperature: 97 5 °F (36 4 °C) (02/24/22 1256)  Temp Source: Tympanic (02/24/22 1256)  Height: 5' 4" (162 6 cm) (02/24/22 1256)  Weight - Scale: 78 2 kg (172 lb 8 oz) (02/24/22 1256)    Invasive Devices  Report    Central Venous Catheter Line            Port A Cath Right Chest -- days          Drain            Gastrostomy/Enterostomy Gastrostomy 20 Fr  LUQ 2136 days                Physical Exam  Constitutional:       Appearance: Normal appearance  She is obese  HENT:      Head: Normocephalic and atraumatic  Nose: Nose normal       Mouth/Throat:      Mouth: Mucous membranes are moist    Eyes:      Extraocular Movements: Extraocular movements intact  Pupils: Pupils are equal, round, and reactive to light  Cardiovascular:      Rate and Rhythm: Normal rate and regular rhythm  Pulses: Normal pulses  Heart sounds: Normal heart sounds  Pulmonary:      Effort: Pulmonary effort is normal       Breath sounds: Normal breath sounds  Abdominal:      Palpations: Abdomen is soft  Comments: Incisions healed well, button in place with no signs of infection   Musculoskeletal:      Cervical back: Normal range of motion  Skin:     General: Skin is warm  Neurological:      General: No focal deficit present  Mental Status: She is alert and oriented to person, place, and time  Psychiatric:         Mood and Affect: Mood normal          Behavior: Behavior normal              Assessment/PLAN:    Sandoval Teixeira is a 42 y o  female status post Laparoscopic RNYGB by in November 2015 presents to office for annual visit  Hx of Nissen takedown in October 2015  Stable  Patient is doing well from a bariatric standpoint considering everything going on  She is struggling with a torn labrum in right hip and constipation with occasional epigastric pain most likely muscular in nature  We may consider EGD with biopsies, possible dilation, in future in conjunction with specialist in hospital    She is to continue to follow with [facility] and keep us updated on their recommendations     Follow diet as discussed        Follow vitamin and mineral recommendations as reviewed with you  Bariatric vitamins are highly recommended  Vitamins are important for a life-time to avoid low levels which can lead to other medical problems  Exercise as tolerated  Continue Prilosec and carafate PRN   Complete labs pending in system, patient will get done at her convenience    Get lab work done prior to next appointment  It is recommended to check with your insurance BEFORE getting labs done to make sure they are covered by your policy  Make sure to HOLD any multivitamins that may contain biotin and any biotin supplements FOR 5 DAYS before any labs since it can affect the results  IMPORTANT: if you have a [facility]" account, you will receive a letter of your vitamin/mineral results through the computer  Please watch for an update to your chart since recommendations for supplement adjustments will be sent to you this way  Call the office if you have any problems with abdominal pain especially associated with fever, chills, nausea, vomiting or any other concerns  All Post-bariatric surgery patients should be aware that very small quantities of any alcohol can cause impairment and it is very possible not to feel the effect  The effect can be in the system for several hours and it is also a stomach irritant  It is advised to AVOID alcohol, Nonsteroidal anti-inflammatory drugs (NSAIDS) and nicotine of all forms  Any of these can cause stomach irritation/pain  Most, if not all, post-gastric surgery patients would benefit from having a regular counselor for at least 2 years post-op to help with need for multiple life-style changes  If you are interested in this and need help finding a regular counselor, you can also make a follow-up with our  to help you find one  Follow-up in 1 year  We kindly ask that you arrive 15 minutes before appointment time to allow for our staff to room you and check your vital signs and update your chart   We thank you for your patience at your visit       Mitchell Lewis PA-C  Bariatric Surgery  2/24/2022  1:03 PM

## 2022-03-31 ENCOUNTER — TELEPHONE (OUTPATIENT)
Dept: HEMATOLOGY ONCOLOGY | Facility: HOSPITAL | Age: 47
End: 2022-03-31

## 2022-03-31 NOTE — TELEPHONE ENCOUNTER
Called patient and spoke to patient regarding rescheduling F/U appt due to provider not being in  Patient confirmed new appt date and time

## 2022-04-12 ENCOUNTER — OFFICE VISIT (OUTPATIENT)
Dept: NEUROLOGY | Facility: CLINIC | Age: 47
End: 2022-04-12
Payer: COMMERCIAL

## 2022-04-12 VITALS
TEMPERATURE: 98.3 F | HEIGHT: 64 IN | WEIGHT: 179.8 LBS | BODY MASS INDEX: 30.7 KG/M2 | SYSTOLIC BLOOD PRESSURE: 133 MMHG | DIASTOLIC BLOOD PRESSURE: 83 MMHG | HEART RATE: 71 BPM

## 2022-04-12 DIAGNOSIS — G43.709 CHRONIC MIGRAINE WITHOUT AURA WITHOUT STATUS MIGRAINOSUS, NOT INTRACTABLE: Primary | ICD-10-CM

## 2022-04-12 DIAGNOSIS — E88.40 MITOCHONDRIAL DISEASE (HCC): ICD-10-CM

## 2022-04-12 PROCEDURE — 99213 OFFICE O/P EST LOW 20 MIN: CPT | Performed by: PSYCHIATRY & NEUROLOGY

## 2022-04-12 NOTE — PROGRESS NOTES
Patient ID: Kyler Galan is a 42 y o  female  Assessment/Plan:    No problem-specific Assessment & Plan notes found for this encounter  Diagnoses and all orders for this visit:    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[address] )-- following with [facility]    Chronic migraine without aura without status migrainosus, not intractable       Doing well on Ajovy 675 q3 months, no s/e  Mild wearing off a few weeks prior but better clinical response than Emgality   No s/e    Follow up with Dr Zhu in 8-9 months time per patient's preference  Subjective:    HPI       Ms Kimberley Mcadams is a pleasant 43 yo female with mitochondrial disease- follows with [facility], seen in follow up for chronic migraines    She is significantly improved from [address]- q3 month dosing  States if she has fluctuating blood sugars-- her migraines are worse  She is continuing to have worse GI dysmotility due to mitochondrial disease  Worsening raynauds as she cannot be on CCB as its worsening her GI issues  States when fatigued she is noting worse speech not associated with headache  She continues to follow with mitochondrial specialist at [address] is now part of the rare genome project- states her whole family is enrolled in it  Vision is okay  States uses her feeding tube nightly just for hydration and once a week uses IVF  States 90 oz of fluids daily    Prior hx:  Reports hx mitochondrial stroke and residual right sided sensory deficits and if fatigued weakness  She is on L- arginine- coQ10, ALA, B12 injections, iron infusions q3 months, PABA, vitamin E and ubiquinol managed by [facility]  Had a sleep study no CAS  Denies other focal deficits     Has raynauds on procardia- follows with rheumatology  On midodrine for orthostasis  Does tube feeds and IVF in addition to oral hydration  States now off duloxetine due to worse depression    Gabapentin with worse depression      The following portions of the patient's history were reviewed and updated as appropriate: allergies, current medications, past family history, past medical history, past social history, past surgical history and problem list and ROS         Objective:    Blood pressure 133/83, pulse 71, temperature 98 3 °F (36 8 °C), height 5' 4" (1 626 m), weight 81 6 kg (179 lb 12 8 oz), not currently breastfeeding  Physical Exam    Gen: NAD  HEENT: NCAT, EOMI  Resp: Symmetric chest rise bl  CVS: RRR  Ext: no edema    Neurological Exam    AO X 4 no aphasia or dysarthria  CN 2-12 grossly intact  Motor: 5/5 ext x 4  Sensation: Intact to LT x 4 ext  Reflexes: 2/4 biceps and patellar bl  Cerebellar: No dysmetria b/l  Gait: normal range      ROS:    Review of Systems   Constitutional: Negative  Negative for appetite change and fever  HENT: Negative  Negative for hearing loss, tinnitus, trouble swallowing and voice change  Eyes: Negative  Negative for photophobia and pain  Respiratory: Negative  Negative for shortness of breath  Cardiovascular: Negative  Negative for palpitations  Gastrointestinal: Negative  Negative for nausea and vomiting  Endocrine: Negative  Negative for cold intolerance  Genitourinary: Negative  Negative for dysuria, frequency and urgency  Musculoskeletal: Negative  Negative for myalgias and neck pain  Skin: Negative  Negative for rash  Neurological: Positive for headaches  Negative for dizziness, tremors, seizures, syncope, facial asymmetry, speech difficulty, weakness, light-headedness and numbness  Hematological: Negative  Does not bruise/bleed easily  Psychiatric/Behavioral: Negative  Negative for confusion, hallucinations and sleep disturbance

## 2022-04-28 ENCOUNTER — OFFICE VISIT (OUTPATIENT)
Dept: CARDIOLOGY CLINIC | Facility: CLINIC | Age: 47
End: 2022-04-28
Payer: COMMERCIAL

## 2022-04-28 VITALS
OXYGEN SATURATION: 100 % | HEART RATE: 75 BPM | BODY MASS INDEX: 30.4 KG/M2 | SYSTOLIC BLOOD PRESSURE: 112 MMHG | WEIGHT: 178.1 LBS | HEIGHT: 64 IN | DIASTOLIC BLOOD PRESSURE: 70 MMHG

## 2022-04-28 DIAGNOSIS — I95.1 ORTHOSTATIC HYPOTENSION: ICD-10-CM

## 2022-04-28 DIAGNOSIS — E88.40 MITOCHONDRIAL DISEASE (HCC): ICD-10-CM

## 2022-04-28 DIAGNOSIS — I10 HYPERTENSION, UNSPECIFIED TYPE: Primary | ICD-10-CM

## 2022-04-28 PROCEDURE — 99214 OFFICE O/P EST MOD 30 MIN: CPT | Performed by: INTERNAL MEDICINE

## 2022-04-28 PROCEDURE — 93000 ELECTROCARDIOGRAM COMPLETE: CPT | Performed by: INTERNAL MEDICINE

## 2022-04-28 RX ORDER — CALCIUM CITRATE 1040MG
TABLET ORAL
COMMUNITY

## 2022-04-28 RX ORDER — PROPRANOLOL/HYDROCHLOROTHIAZID 40 MG-25MG
TABLET ORAL DAILY
COMMUNITY

## 2022-04-28 NOTE — PROGRESS NOTES
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  Has feeding tube  3  Headaches - unclear etiology   Improved with hydration  4  Possible genetic mutation for premature atrial fibrillation - no episodes          Recommendations:  1  Continue current medications  2  Continue IV saline boluses  3  Follow up in one year             HPI: Lilliana Madison is a 42y o  year old female with mitochondrial disease, gastric bypass surgery, and dysautonomia who presents for follow up  Since her gastric bypass surgery, has needed a central line and giving herself saline boluses - 1-2x/month  Does get fluid through feeding tube  On midodrine 5mg 3x/day   Major issue is GI complaints  Does have Raynauds symptoms  Review of Systems   Constitutional: Negative  HENT: Negative  Eyes: Negative  Respiratory: Negative for chest tightness and shortness of breath  Cardiovascular: Negative for chest pain, palpitations and leg swelling  Gastrointestinal: Negative  Endocrine: Negative  Genitourinary: Negative  Musculoskeletal: Negative  Skin: Negative  Allergic/Immunologic: Negative  Neurological: Negative  Hematological: Negative  Psychiatric/Behavioral: Negative  All other systems reviewed and are negative  Past Medical History:   Diagnosis Date    Acid reflux     Constipated     Dysautonomia ([address] )     Esophageal abnormality     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   Gastrostomy tube in place [facility])     Gastrostomy tube in place [facility])     History of blood clots 2012    Left leg  Has IVC filter now   Occurred while on Lovenox    Iron deficiency     Malignant hyperthermia due to anesthesia     Patient's son has M H   States she needs precautions    Migraines     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 [address] [facility]     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     MTHFR (methylene THF reductase) deficiency and homocystinuria (HCC)     Muscle weakness (generalized)     Nausea     On total parenteral nutrition (TPN)     for 8 mts    PCOS (polycystic ovarian syndrome)     PONV (postoperative nausea and vomiting)     Postgastrectomy syndrome     malabsorption    Postsurgical malabsorption     Status post gastric bypass for obesity     Stroke [facility]) 1763, 0126    Metabolic strokes  Right hemiparesis= minor now   Vomiting     When eating     Past Surgical History:   Procedure Laterality Date    ANKLE SURGERY Left     Has plate and screws    COLONOSCOPY      COSMETIC SURGERY      X14 as child post attack by dog  Arms, legs and face    DILATION AND CURETTAGE OF UTERUS      x2    ESOPHAGOGASTRODUODENOSCOPY N/A 1/27/2016    Procedure: ESOPHAGOGASTRODUODENOSCOPY (EGD); Surgeon: Maddy hSine MD;  [facility]: [facility]; Service:     FRACTURE SURGERY Left     Left ankle - hardware    GASTRIC BYPASS      IVC FILTER INSERTION      NISSEN FUNDOPLICATION      OVARY SURGERY Bilateral     "Drilling" due to multiple cysts- PCOS    MI EGD TRANSORAL BIOPSY SINGLE/MULTIPLE N/A 3/9/2016    Procedure: ESOPHAGOGASTRODUODENOSCOPY (EGD); Surgeon: Maddy Shine MD;  [facility]: [facility];   Service: Bariatrics    MI HYSTEROSCOPY,W/ENDO BX N/A 5/22/2020    Procedure: DILATATION AND CURETTAGE (D&C) WITH HYSTEROSCOPY;  Surgeon: Nidia Garcia MD;  [facility];  Service: Gynecology    MI HYSTEROSCOPY,W/ENDOMETRIAL ABLATION N/A 5/22/2020    Procedure: [place]bourn Curet;  Surgeon: Nidia Garcia MD;  [facility];  Service: Gynecology    MI LAP,GASTROSTOMY,W/O TUBE CONSTR N/A 4/19/2016    Procedure: INSERTION GASTROSTOMY TUBE LAPAROSCOPIC WITH INTRAOP EGD;  Surgeon: Alan Cisneros MD;  Location[facility];  Service: [address] [place] [address] Right     WISDOM TOOTH EXTRACTION       Social History     Substance and Sexual Activity   Alcohol Use No     Social History     Substance and Sexual Activity   Drug Use No     Social History     Tobacco Use   Smoking Status Never Smoker   Smokeless Tobacco Never Used     Family History   Problem Relation Age of Onset    Mitochondrial disorder Mother     Hypertension Mother     Thyroid cancer Mother 48    Clotting disorder Mother         MTFR    Cancer Mother 62        renal    Depression Father     Endocrinopathy Father     Clotting disorder Father         MTFR    Stroke Father     Aneurysm Father         brain    Depression Brother     Narcolepsy Brother     Heart failure Maternal Grandmother 80    Coronary artery disease Maternal Grandmother     Mitochondrial disorder Son     Mitochondrial disorder Daughter     Stroke Family     Anuerysm Paternal Uncle         abdominal     Breast cancer Maternal Grandfather 55    Breast cancer Maternal Aunt 43    Heart attack Neg Hx     Arrhythmia Neg Hx     Sudden death Neg Hx         scd       Allergies: Allergies   Allergen Reactions    Sulfate Other (See Comments)    Sulfa Antibiotics      Arrhythmia     Guaifenesin      Arrhythmia    Other      Malignant hyperhermia precautions  NEVER use [place]ers       Medications:     Current Outpatient Medications:     acarbose (PRECOSE) 100 MG tablet, Take 1 tablet (100 mg total) by mouth 3 (three) times a day with meals, Disp: 270 tablet, Rfl: 3    acetylcysteine (MUCOMYST) 200 mg/mL nebulizer solution, as needed , Disp: , Rfl:     Alpha-Lipoic Acid 100 MG CAPS, Take by mouth, Disp: , Rfl:     B Complex-Biotin-FA (TH VITAMIN B 50/B-COMPLEX PO), Take 1 tablet by mouth daily    , Disp: , Rfl:     bupivacaine (MARCAINE) 0 25 %, 1 mL, Disp: , Rfl:     Calcium Citrate 1040 MG TABS, Take by mouth, Disp: , Rfl:     COENZYME Q-10 PO, Dose varies, Disp: , Rfl:     Continuous Blood Gluc Sensor (Dexcom G6 Sensor) MISC, USE AS DIRECTED CMG - CHANGE EVERY 10 DAYS, Disp: 9 each, Rfl: 3    Continuous Blood Gluc Transmit (Dexcom G6 Transmitter) MISC, Use daily as directed for CGM - Change every 3 months, Disp: 1 each, Rfl: 3    CREATINE MONOHYDRATE PO, Take 2 5 g by mouth, Disp: , Rfl:     Cyanocobalamin (B-12) 1000 MCG/ML KIT, Inject  as directed every 30 days  , Disp: , Rfl:     diphenhydrAMINE (BENADRYL) 25 mg tablet, Take by mouth, Disp: , Rfl:     docusate sodium (COLACE) 100 mg capsule, Take 200 mg by mouth 2 (two) times a day , Disp: , Rfl:     fremanezumab-vfrm (Ajovy) 225 MG/1 5ML auto-injector, Inject 675 mg under the skin every 3 (three) months  , Disp: , Rfl:     Glucagon (Baqsimi Two Pack) 3 MG/DOSE POWD, Use as needed it for Hypoglycemia  , Disp: 2 each, Rfl: 3    iron sucrose (Venofer) 20 mg/mL,  , Disp: , Rfl:     L-Arginine 1000 MG TABS, Take 6,000 mg by mouth 2 (two) times a day , Disp: , Rfl:     linaCLOtide 145 MCG CAPS, Take 145 mcg by mouth 2 (two) times a day, Disp: , Rfl:     midodrine (PROAMATINE) 5 mg tablet, Take 1 tablet (5 mg total) by mouth 3 (three) times a day, Disp: 270 tablet, Rfl: 3    Motegrity 2 MG TABS, , Disp: , Rfl:     Multiple Vitamin (MULTIVITAMIN) tablet, Take 1 tablet by mouth daily  , Disp: , Rfl:     NIFEdipine (PROCARDIA XL) 60 mg 24 hr tablet, Take 1 tablet (60 mg total) by mouth daily, Disp: 90 tablet, Rfl: 3    Oral Vehicles (PCCA-PLUS) SUSP, , Disp: , Rfl:     P-Aminobenzoic Acid (PARA-AMINOBENZOIC ACID) POWD, , Disp: , Rfl:     pantoprazole (PROTONIX) 40 mg tablet, , Disp: , Rfl: 3    polyethylene glycol (MIRALAX) 17 g packet, Take 17 g by mouth 2 (two) times a day Indications: 1 5 caps full 1-2 times/day   , Disp: , Rfl:     Riboflavin 100 MG TABS, Take 100 mg by mouth, Disp: , Rfl:     Saline Bacteriostatic (sodium chloride bacteriostatic) 0 9 %, 7 mL, Disp: , Rfl:     sitaGLIPtin (JANUVIA) 25 mg tablet, Take 1 tablet (25 mg total) by mouth daily, Disp: 90 tablet, Rfl: 3    sucralfate (CARAFATE) 1 g/10 mL suspension, Take 10 mL (1 g total) by mouth 4 (four) times a day Must have liquid suspension, Disp: 420 mL, Rfl: 5    tolterodine (DETROL LA) 4 mg 24 hr capsule, Take 1 capsule (4 mg total) by mouth daily, Disp: 90 capsule, Rfl: 3    TRILYTE 420 g solution, , Disp: , Rfl: 5    Turmeric 500 MG CAPS, Take by mouth daily, Disp: , Rfl:     Ubiquinol Liposomal 100 MG/5ML SYRP, Take 300 mg by mouth, Disp: , Rfl:     Ubiquinol POWD, , Disp: , Rfl:     vitamin E 100 UNIT capsule, Take 78 Units by mouth daily, Disp: , Rfl:       Wt Readings from Last 3 Encounters:   04/28/22 80 8 kg (178 lb 1 6 oz)   04/12/22 81 6 kg (179 lb 12 8 oz)   02/24/22 78 2 kg (172 lb 8 oz)     Temp Readings from Last 3 Encounters:   04/12/22 98 3 °F (36 8 °C)   02/24/22 97 5 °F (36 4 °C) (Tympanic)   02/24/22 (!) 97 4 °F (36 3 °C)     BP Readings from Last 3 Encounters:   04/12/22 133/83   02/24/22 116/70   02/24/22 114/86     Pulse Readings from Last 3 Encounters:   04/12/22 71   02/24/22 87   02/24/22 75         Physical Exam  HENT:      Head: Atraumatic  Mouth/Throat:      Mouth: Mucous membranes are moist    Eyes:      Extraocular Movements: Extraocular movements intact  Cardiovascular:      Rate and Rhythm: Normal rate and regular rhythm  Heart sounds: Normal heart sounds  Pulmonary:      Effort: Pulmonary effort is normal       Breath sounds: Normal breath sounds  Abdominal:      General: Abdomen is flat  Musculoskeletal:         General: Normal range of motion  Cervical back: Normal range of motion  Skin:     General: Skin is warm  Neurological:      General: No focal deficit present  Mental Status: She is alert and oriented to person, place, and time     Psychiatric:         Mood and Affect: Mood normal          Behavior: Behavior normal  Laboratory Studies:  CMP:  Lab Results   Component Value Date     2016    K 4 0 2021     2021    CO2 29 2021    ANIONGAP 9 2016    BUN 18 2021    CREATININE 0 93 2021    GLUCOSE 76 2016    AST 10 2021    ALT 22 2021    BILITOT 0 32 2016    EGFR 75 2021       Lipid Profile:   Lab Results   Component Value Date    CHOL 113 2016     Lab Results   Component Value Date    HDL 89 2021     Lab Results   Component Value Date    LDLCALC 79 2021     Lab Results   Component Value Date    TRIG 59 2021       Cardiac testing:   EKG reviewed personally: NSR 75 Nml  Results for orders placed during the hospital encounter of 19    Echo complete with contrast if indicated    Narrative  [address]  [phone]    Transthoracic Echocardiogram  2D, M-mode, Doppler, and Color Doppler    Study date:  2019    Patient: Arlette Rodgers  MR number: [id number]  Account number: [de-identified]  : 1975  Age: 41 years  Gender: Female  Status: Outpatient  Location: [address] [place] [address] [facility]  Height: 64 in  Weight: 174 lb  BP: 122/ 68 mmHg    Indications: Cardiomyopathy    Diagnoses: I42 9 - Cardiomyopathy, unspecified    Sonographer:  SAM Nicolas  Referring Physician:  Lesia Reynoso MD  Group:  [facility]  Interpreting Physician:  Giuseppe De La Rosa MD    SUMMARY    LEFT VENTRICLE:  Systolic function was normal  Ejection fraction was estimated to be 60 %  There were no regional wall motion abnormalities  MITRAL VALVE:  There was trace regurgitation  TRICUSPID VALVE:  There was trace regurgitation  Pulmonary artery systolic pressure was within the normal range      HISTORY: PRIOR HISTORY: PICC, CVA, Gastric bypass, Mitochondrial disease, DVT, IVC filter    PROCEDURE: The study was performed in the [address]  This was a routine study  The transthoracic approach was used  The study included complete 2D imaging, M-mode, complete spectral Doppler, and color Doppler  The  heart rate was 68 bpm, at the start of the study  Images were obtained from the parasternal, apical, subcostal, and suprasternal notch acoustic windows  Image quality was adequate  LEFT VENTRICLE: Size was normal  Systolic function was normal  Ejection fraction was estimated to be 60 %  There were no regional wall motion abnormalities  Wall thickness was normal  No evidence of apical thrombus  DOPPLER: Left  ventricular diastolic function parameters were normal     RIGHT VENTRICLE: The size was normal  Systolic function was normal  Wall thickness was normal     LEFT ATRIUM: Size was normal     RIGHT ATRIUM: Size was normal     MITRAL VALVE: Valve structure was normal  There was normal leaflet separation  DOPPLER: The transmitral velocity was within the normal range  There was no evidence for stenosis  There was trace regurgitation  AORTIC VALVE: The valve was trileaflet  Leaflets exhibited normal thickness and normal cuspal separation  DOPPLER: Transaortic velocity was within the normal range  There was no evidence for stenosis  There was no significant  regurgitation  TRICUSPID VALVE: The valve structure was normal  There was normal leaflet separation  DOPPLER: The transtricuspid velocity was within the normal range  There was no evidence for stenosis  There was trace regurgitation  Pulmonary artery  systolic pressure was within the normal range  PULMONIC VALVE: Leaflets exhibited normal thickness, no calcification, and normal cuspal separation  DOPPLER: The transpulmonic velocity was within the normal range  There was no significant regurgitation  PERICARDIUM: There was no pericardial effusion  The pericardium was normal in appearance  AORTA: The root exhibited normal size      SYSTEMIC VEINS: IVC: The inferior vena cava was normal in size  SYSTEM MEASUREMENT TABLES    2D  %FS: 37 64 %  Ao Diam: 3 19 cm  EDV(Teich): 136 32 ml  EF(Cube): 75 74 %  EF(Teich): 67 25 %  ESV(Cube): 36 45 ml  ESV(Teich): 44 64 ml  IVSd: 0 55 cm  LA Area: 17 49 cm2  LA Diam: 3 2 cm  LVEDV MOD A4C: 88 62 ml  LVEF MOD A4C: 61 05 %  LVESV MOD A4C: 34 52 ml  LVIDd: 5 32 cm  LVIDs: 3 32 cm  LVLd A4C: 7 74 cm  LVLs A4C: 6 33 cm  LVPWd: 0 72 cm  RA Area: 13 91 cm2  RV Diam : 3 17 cm  SV MOD A4C: 54 1 ml  SV(Cube): 113 83 ml  SV(Teich): 91 68 ml    CW  TR Vmax: 2 16 m/s  TR maxP 68 mmHg    MM  TAPSE: 2 38 cm    PW  E': 0 12 m/s  E/E': 6 43  MV A Cesar: 0 56 m/s  MV Dec Yancey: 5 45 m/s2  MV DecT: 144 86 ms  MV E Cesar: 0 79 m/s  MV E/A Ratio: 1 41    [facility] Accredited Ech[facility]    Prepared and electronically signed by    Liya De Dios MD  Signed 10-Apr-2019 11:06:24    No results found for this or any previous visit  No results found for this or any previous visit  No results found for this or any previous visit

## 2022-05-09 ENCOUNTER — OFFICE VISIT (OUTPATIENT)
Dept: INTERNAL MEDICINE CLINIC | Facility: CLINIC | Age: 47
End: 2022-05-09
Payer: COMMERCIAL

## 2022-05-09 ENCOUNTER — TELEPHONE (OUTPATIENT)
Dept: INTERNAL MEDICINE CLINIC | Facility: CLINIC | Age: 47
End: 2022-05-09

## 2022-05-09 VITALS
HEIGHT: 64 IN | WEIGHT: 178 LBS | DIASTOLIC BLOOD PRESSURE: 86 MMHG | OXYGEN SATURATION: 99 % | BODY MASS INDEX: 30.39 KG/M2 | SYSTOLIC BLOOD PRESSURE: 118 MMHG | HEART RATE: 84 BPM | TEMPERATURE: 97.8 F

## 2022-05-09 DIAGNOSIS — U07.1 COVID-19: Primary | ICD-10-CM

## 2022-05-09 LAB
SARS-COV-2 AG UPPER RESP QL IA: POSITIVE
VALID CONTROL: ABNORMAL

## 2022-05-09 PROCEDURE — 87811 SARS-COV-2 COVID19 W/OPTIC: CPT | Performed by: NURSE PRACTITIONER

## 2022-05-09 PROCEDURE — 99213 OFFICE O/P EST LOW 20 MIN: CPT | Performed by: NURSE PRACTITIONER

## 2022-05-09 NOTE — TELEPHONE ENCOUNTER
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  Thank you,  Garfield Ruiz

## 2022-05-09 NOTE — PROGRESS NOTES
Assessment/Plan:    Positive covid test in office  Recommend symptomatic treatment  Stay well hydrated  Continue quarantine for 5 days from symptoms onset as long as symptoms are improving  Diagnoses and all orders for this visit:    COVID-19  -     POCT Rapid Covid Ag          Subjective:      Patient ID: Jesse Naik is a 42 y o  female  Here today due to covid exposure  Eddie Rodriguez had a covid exposure 4 days ago  Last night, she started with symptoms  She has nasal congestion, body aches, chest tightness, cough, headache, and fatigue  Taking tylenol and motrin  The following portions of the patient's history were reviewed and updated as appropriate: allergies, current medications, past family history, past medical history, past social history, past surgical history and problem list     Review of Systems   Constitutional: Positive for fatigue  Negative for activity change and appetite change  HENT: Positive for congestion  Respiratory: Positive for cough  Negative for shortness of breath  Cardiovascular: Negative for chest pain  Gastrointestinal: Negative for abdominal pain, diarrhea, nausea and vomiting  Musculoskeletal: Positive for myalgias  Neurological: Positive for headaches  Negative for dizziness  Objective:      /86   Pulse 84   Temp 97 8 °F (36 6 °C)   Ht 5' 4" (1 626 m)   Wt 80 7 kg (178 lb)   SpO2 99%   BMI 30 55 kg/m²          Physical Exam  Vitals reviewed  Constitutional:       Appearance: Normal appearance  HENT:      Head: Normocephalic and atraumatic  Cardiovascular:      Rate and Rhythm: Normal rate and regular rhythm  Heart sounds: Normal heart sounds  Pulmonary:      Effort: Pulmonary effort is normal       Breath sounds: Normal breath sounds  Skin:     General: Skin is warm and dry  Neurological:      Mental Status: She is alert and oriented to person, place, and time     Psychiatric:         Mood and Affect: Mood normal          Behavior: Behavior normal

## 2022-05-18 DIAGNOSIS — N31.9 NEUROGENIC BLADDER: ICD-10-CM

## 2022-05-18 RX ORDER — TOLTERODINE 4 MG/1
4 CAPSULE, EXTENDED RELEASE ORAL DAILY
Qty: 90 CAPSULE | Refills: 3 | Status: SHIPPED | OUTPATIENT
Start: 2022-05-18

## 2022-05-24 ENCOUNTER — LAB (OUTPATIENT)
Dept: LAB | Facility: HOSPITAL | Age: 47
End: 2022-05-24
Attending: INTERNAL MEDICINE
Payer: COMMERCIAL

## 2022-05-24 DIAGNOSIS — E16.1 REACTIVE HYPOGLYCEMIA: ICD-10-CM

## 2022-05-24 LAB
25(OH)D3 SERPL-MCNC: 41.8 NG/ML (ref 30–100)
ALBUMIN SERPL BCP-MCNC: 4.2 G/DL (ref 3.5–5)
CALCIUM SERPL-MCNC: 10.5 MG/DL (ref 8.4–10.2)
CHOLEST SERPL-MCNC: 174 MG/DL
EST. AVERAGE GLUCOSE BLD GHB EST-MCNC: 97 MG/DL
HBA1C MFR BLD: 5 %
HDLC SERPL-MCNC: 70 MG/DL
LDLC SERPL CALC-MCNC: 91 MG/DL (ref 0–100)
NONHDLC SERPL-MCNC: 104 MG/DL
PHOSPHATE SERPL-MCNC: 2.6 MG/DL (ref 2.7–4.5)
PTH-INTACT SERPL-MCNC: 68.5 PG/ML (ref 18.4–80.1)
T4 FREE SERPL-MCNC: 0.93 NG/DL (ref 0.76–1.46)
TRIGL SERPL-MCNC: 66 MG/DL
TSH SERPL DL<=0.05 MIU/L-ACNC: 1.86 UIU/ML (ref 0.45–4.5)
VIT B12 SERPL-MCNC: 465 PG/ML (ref 100–900)

## 2022-05-24 PROCEDURE — 36415 COLL VENOUS BLD VENIPUNCTURE: CPT | Performed by: INTERNAL MEDICINE

## 2022-05-24 PROCEDURE — 84439 ASSAY OF FREE THYROXINE: CPT

## 2022-05-24 PROCEDURE — 82306 VITAMIN D 25 HYDROXY: CPT | Performed by: INTERNAL MEDICINE

## 2022-05-24 PROCEDURE — 82310 ASSAY OF CALCIUM: CPT

## 2022-05-24 PROCEDURE — 83036 HEMOGLOBIN GLYCOSYLATED A1C: CPT | Performed by: INTERNAL MEDICINE

## 2022-05-24 PROCEDURE — 84590 ASSAY OF VITAMIN A: CPT | Performed by: INTERNAL MEDICINE

## 2022-05-24 PROCEDURE — 84100 ASSAY OF PHOSPHORUS: CPT

## 2022-05-24 PROCEDURE — 84443 ASSAY THYROID STIM HORMONE: CPT

## 2022-05-24 PROCEDURE — 82040 ASSAY OF SERUM ALBUMIN: CPT

## 2022-05-24 PROCEDURE — 83970 ASSAY OF PARATHORMONE: CPT

## 2022-05-24 PROCEDURE — 82607 VITAMIN B-12: CPT | Performed by: INTERNAL MEDICINE

## 2022-05-24 PROCEDURE — 80061 LIPID PANEL: CPT | Performed by: INTERNAL MEDICINE

## 2022-05-25 ENCOUNTER — TELEPHONE (OUTPATIENT)
Dept: ENDOCRINOLOGY | Facility: CLINIC | Age: 47
End: 2022-05-25

## 2022-05-25 NOTE — TELEPHONE ENCOUNTER
----- Message from Mirlande Alfredo MD sent at 5/25/2022  1:49 PM EDT -----  Plan to discuss at the visit next week

## 2022-05-31 ENCOUNTER — OFFICE VISIT (OUTPATIENT)
Dept: ENDOCRINOLOGY | Facility: CLINIC | Age: 47
End: 2022-05-31
Payer: COMMERCIAL

## 2022-05-31 VITALS
DIASTOLIC BLOOD PRESSURE: 80 MMHG | BODY MASS INDEX: 30.77 KG/M2 | HEIGHT: 64 IN | WEIGHT: 180.25 LBS | SYSTOLIC BLOOD PRESSURE: 116 MMHG | HEART RATE: 70 BPM

## 2022-05-31 DIAGNOSIS — E21.1 HYPERPARATHYROIDISM , SECONDARY, NON-RENAL (HCC): Primary | ICD-10-CM

## 2022-05-31 DIAGNOSIS — K91.2 HYPOGLYCEMIA AFTER GI (GASTROINTESTINAL) SURGERY: ICD-10-CM

## 2022-05-31 DIAGNOSIS — K91.2 POSTSURGICAL MALABSORPTION: ICD-10-CM

## 2022-05-31 DIAGNOSIS — E16.1 REACTIVE HYPOGLYCEMIA: ICD-10-CM

## 2022-05-31 PROCEDURE — 99214 OFFICE O/P EST MOD 30 MIN: CPT | Performed by: PHYSICIAN ASSISTANT

## 2022-05-31 NOTE — PATIENT INSTRUCTIONS
Reduce calcium to 2500mg   I will call with any other med changes after discussing with Dr Aroldo Serrano

## 2022-05-31 NOTE — PROGRESS NOTES
Established Patient Progress Note       Chief Complaint   Patient presents with    Hypoglycemia        History of Present Illness:     Anju Talbot is a 42 y o  female with a history of hypoglycemia which developed after gastric bypass surgery  She reports that she keeps carb intake  less than 7g carbs per meal with large amounts of protein to avoid hypoglycemia  Also takes acarbose 100mg TID with meal and Januvia 25mg daily  She has been noticing heat or increased exercise may increase symptoms  Reporting weight gain due to increased treatment of hypoglycemia  Sometimes will get symptoms when her blood sugars are  in the 70s but oftentimes may not feel symptoms  until 50s or less  Uses Dexcom G6, but has confirmed with fingerstick  Currently  Getting   1 5 liters of Gatorade  G2 overnight through Gtube for hydration and occasionally needing fluid boluses through Port about 1-2x per week,  1 liter overnight  CGM Interpretation  Anju Talbot   Device used Dexcom G6, Home use   Indication: Hypoglycemia  More than 72 hours of data was reviewed  Report to be scanned to chart  Date Range: 5/18/2022-5/31/2022  Analysis of data:   Average Glucose: 99   SD : 20   Time in Target Range: 93%   Time Above Range: <1%   Time Below Range: 6% low, <1% very low   Interpretation of data:   She is getting frequent low alerts each day including "urgent low soon" alerts     She has history of secondary hyperparathyroidism  Taking Bariatric Chews  Taking Calcium Citrate 1000mg 3x per day   No hx kidney stones           Patient Active Problem List   Diagnosis    Dysphagia    Postsurgical malabsorption    S/P gastric bypass    Iron deficiency    Constipation    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[facility] [address] )    GERD (gastroesophageal reflux disease)    Migraine without aura and without status migrainosus, not intractable    History of Nissen fundoplication    Reactive hypoglycemia    Hyperparathyroidism , secondary, non-renal (HCC)    Hirsutism    Orthostatic hypotension    MTHFR mutation (methylenetetrahydrofolate reductase)    History of malignant melanoma of skin    Chronic venous embolism and thrombosis of deep vessels of distal lower extremity (HCC)    Congenital anomaly of lower limb    Family history of endocrine and metabolic disease    Esophageal dysmotility    Dysautonomia orthostatic hypotension syndrome    Fibroid    Urinary retention    Renal cyst    Retinitis pigmentosa    Polycystic ovarian syndrome    Neurogenic bladder    Nyctalopia    Hypertension    Raynaud's disease without gangrene    Bariatric surgery status    Complex regional pain syndrome type 1 of right upper extremity    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    Menorrhagia with regular cycle    Anemia    Primary narcolepsy without cataplexy    Hip pain      Past Medical History:   Diagnosis Date    Acid reflux     Constipated     Dysautonomia ([facility] )     Esophageal abnormality     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   Gastrostomy tube in place [facility])     Gastrostomy tube in place [facility])     History of blood clots 2012    Left leg  Has IVC filter now  Occurred while on Lovenox    Iron deficiency     Malignant hyperthermia due to anesthesia     Patient's son has M H   States she needs precautions    Migraines     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[facility] )     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[place] [address] )     MTHFR (methylene THF reductase) deficiency and homocystinuria (HCC)     Muscle weakness (generalized)     Nausea     On total parenteral nutrition (TPN)     for 8 mts    PCOS (polycystic ovarian syndrome)     PONV (postoperative nausea and vomiting)     Postgastrectomy syndrome     malabsorption    Postsurgical malabsorption     Status post gastric bypass for obesity     Stroke [facility]) 3703, 5092    Metabolic strokes  Right hemiparesis= minor now      Vomiting     When eating      Past Surgical History:   Procedure Laterality Date    ANKLE SURGERY Left     Has plate and screws    COLONOSCOPY      COSMETIC SURGERY      X14 as child post attack by dog  Arms, legs and face    DILATION AND CURETTAGE OF UTERUS      x2    ESOPHAGOGASTRODUODENOSCOPY N/A 1/27/2016    Procedure: ESOPHAGOGASTRODUODENOSCOPY (EGD); Surgeon: Adalberto Roque MD;  [facility]: [facility]; Service:     FRACTURE SURGERY Left     Left ankle - hardware    GASTRIC BYPASS      IVC FILTER INSERTION      NISSEN FUNDOPLICATION      OVARY SURGERY Bilateral     "Drilling" due to multiple cysts- PCOS    OR EGD TRANSORAL BIOPSY SINGLE/MULTIPLE N/A 3/9/2016    Procedure: ESOPHAGOGASTRODUODENOSCOPY (EGD); Surgeon: Adalberto Roque MD;  [facility];   Service: Bariatrics    OR HYSTEROSCOPY,W/ENDO BX N/A 5/22/2020    Procedure: DILATATION AND CURETTAGE (D&C) WITH HYSTEROSCOPY;  Surgeon: Dave Yen MD;  [facility];  Service: Gynecology    OR HYSTEROSCOPY,W/ENDOMETRIAL ABLATION N/A 5/22/2020    Procedure: ABLATION ENDOMETRIAL MARISSA;  Surgeon: Dave Yen MD;  [facility];  Service: Gynecology    OR LAP,GASTROSTOMY,W/O TUBE CONSTR N/A 4/19/2016    Procedure: INSERTION GASTROSTOMY TUBE LAPAROSCOPIC WITH INTRAOP EGD;  Surgeon: Adalberto Roque MD;  [facility];  Service: Bariatrics    ULNAR NERVE TRANSPOSITION Right     WISDOM TOOTH EXTRACTION        Family History   Problem Relation Age of Onset    Mitochondrial disorder Mother     Hypertension Mother     Thyroid cancer Mother 48    Clotting disorder Mother         MTFR   Neva Slipper Cancer Mother 62        renal    Depression Father     Endocrinopathy Father     Clotting disorder Father         MTFR    Stroke Father     Aneurysm Father         brain    Depression Brother     Narcolepsy Brother     Heart failure Maternal Grandmother 80    Coronary artery disease Maternal Grandmother     Mitochondrial disorder Son     Mitochondrial disorder Daughter     Stroke Family     Anuerysm Paternal Uncle         abdominal     Breast cancer Maternal Grandfather 55    Breast cancer Maternal Aunt 43    Heart attack Neg Hx     Arrhythmia Neg Hx     Sudden death Neg Hx         scd     Social History     Tobacco Use    Smoking status: Never Smoker    Smokeless tobacco: Never Used   Substance Use Topics    Alcohol use: No     Allergies   Allergen Reactions    Sulfate Other (See Comments)    Sulfa Antibiotics      Arrhythmia     Guaifenesin      Arrhythmia    Other      Malignant hyperhermia precautions  NEVER use Lacted Ringers       Current Outpatient Medications:     acarbose (PRECOSE) 100 MG tablet, Take 1 tablet (100 mg total) by mouth 3 (three) times a day with meals, Disp: 270 tablet, Rfl: 3    acetylcysteine (MUCOMYST) 200 mg/mL nebulizer solution, as needed , Disp: , Rfl:     Alpha-Lipoic Acid 100 MG CAPS, Take by mouth, Disp: , Rfl:     B Complex-Biotin-FA (TH VITAMIN B 50/B-COMPLEX PO), Take 1 tablet by mouth daily  , Disp: , Rfl:     bupivacaine (MARCAINE) 0 25 %, 1 mL, Disp: , Rfl:     Calcium Citrate 1040 MG TABS, Take by mouth, Disp: , Rfl:     COENZYME Q-10 PO, Dose varies, Disp: , Rfl:     Continuous Blood Gluc Sensor (Dexcom G6 Sensor) MISC, USE AS DIRECTED CMG - CHANGE EVERY 10 DAYS, Disp: 9 each, Rfl: 3    Continuous Blood Gluc Transmit (Dexcom G6 Transmitter) MISC, Use daily as directed for CGM - Change every 3 months, Disp: 1 each, Rfl: 3    CREATINE MONOHYDRATE PO, Take 2 5 g by mouth, Disp: , Rfl:     Cyanocobalamin (B-12) 1000 MCG/ML KIT, Inject  as directed every 30 days  , Disp: , Rfl:     diphenhydrAMINE (BENADRYL) 25 mg tablet, Take by mouth, Disp: , Rfl:     docusate sodium (COLACE) 100 mg capsule, Take 200 mg by mouth 2 (two) times a day , Disp: , Rfl:     fremanezumab-vfrm (Ajovy) 225 MG/1 5ML auto-injector, Inject 675 mg under the skin every 3 (three) months  , Disp: , Rfl:     iron sucrose (Venofer) 20 mg/mL,  , Disp: , Rfl:    L-Arginine 1000 MG TABS, Take 6,000 mg by mouth 2 (two) times a day , Disp: , Rfl:     linaCLOtide 145 MCG CAPS, Take 145 mcg by mouth 2 (two) times a day, Disp: , Rfl:     midodrine (PROAMATINE) 5 mg tablet, Take 1 tablet (5 mg total) by mouth 3 (three) times a day, Disp: 270 tablet, Rfl: 3    Motegrity 2 MG TABS, , Disp: , Rfl:     Multiple Vitamin (MULTIVITAMIN) tablet, Take 1 tablet by mouth daily  , Disp: , Rfl:     NIFEdipine (PROCARDIA XL) 60 mg 24 hr tablet, Take 1 tablet (60 mg total) by mouth daily, Disp: 90 tablet, Rfl: 3    Oral Vehicles (PCCA-PLUS) SUSP, , Disp: , Rfl:     P-Aminobenzoic Acid (PARA-AMINOBENZOIC ACID) POWD, , Disp: , Rfl:     pantoprazole (PROTONIX) 40 mg tablet, , Disp: , Rfl: 3    polyethylene glycol (MIRALAX) 17 g packet, Take 17 g by mouth 2 (two) times a day Indications: 1 5 caps full 1-2 times/day   , Disp: , Rfl:     Riboflavin 100 MG TABS, Take 100 mg by mouth, Disp: , Rfl:     Saline Bacteriostatic (sodium chloride bacteriostatic) 0 9 %, 7 mL, Disp: , Rfl:     sitaGLIPtin (JANUVIA) 50 mg tablet, 1 tab daily, Disp: , Rfl:     sucralfate (CARAFATE) 1 g/10 mL suspension, Take 10 mL (1 g total) by mouth 4 (four) times a day Must have liquid suspension, Disp: 420 mL, Rfl: 5    tolterodine (DETROL LA) 4 mg 24 hr capsule, Take 1 capsule (4 mg total) by mouth in the morning , Disp: 90 capsule, Rfl: 3    TRILYTE 420 g solution, , Disp: , Rfl: 5    Turmeric 500 MG CAPS, Take by mouth daily, Disp: , Rfl:     Ubiquinol Liposomal 100 MG/5ML SYRP, Take 300 mg by mouth, Disp: , Rfl:     Ubiquinol POWD, , Disp: , Rfl:     vitamin E 100 UNIT capsule, Take 78 Units by mouth daily, Disp: , Rfl:     Glucagon (Baqsimi Two Pack) 3 MG/DOSE POWD, Use as needed it for Hypoglycemia   (Patient not taking: Reported on 5/31/2022), Disp: 2 each, Rfl: 3    Review of Systems   Constitutional: Positive for unexpected weight change (gain)   Negative for activity change, appetite change, chills, diaphoresis, fatigue and fever  HENT: Negative for trouble swallowing and voice change  Eyes: Negative for visual disturbance  Respiratory: Negative for shortness of breath  Cardiovascular: Negative for chest pain and palpitations  Gastrointestinal: Negative for abdominal pain, constipation and diarrhea  Endocrine: Negative for cold intolerance, heat intolerance, polydipsia, polyphagia and polyuria  Genitourinary: Negative for frequency and menstrual problem  Musculoskeletal: Negative for arthralgias and myalgias  Skin: Negative for rash  Allergic/Immunologic: Negative for food allergies  Neurological: Positive for dizziness  Negative for tremors  Hematological: Negative for adenopathy  Psychiatric/Behavioral: Negative for sleep disturbance  All other systems reviewed and are negative  Physical Exam:  Body mass index is 30 94 kg/m²  /80   Pulse 70   Ht 5' 4" (1 626 m)   Wt 81 8 kg (180 lb 4 oz)   BMI 30 94 kg/m²    Wt Readings from Last 3 Encounters:   05/31/22 81 8 kg (180 lb 4 oz)   05/09/22 80 7 kg (178 lb)   04/28/22 80 8 kg (178 lb 1 6 oz)       Physical Exam  Vitals reviewed  Constitutional:       General: She is not in acute distress  Appearance: She is well-developed  HENT:      Head: Normocephalic and atraumatic  Eyes:      Conjunctiva/sclera: Conjunctivae normal       Pupils: Pupils are equal, round, and reactive to light  Neck:      Thyroid: No thyromegaly  Cardiovascular:      Rate and Rhythm: Normal rate and regular rhythm  Heart sounds: Normal heart sounds  Pulmonary:      Effort: Pulmonary effort is normal  No respiratory distress  Breath sounds: Normal breath sounds  No wheezing or rales  Abdominal:      General: Bowel sounds are normal  There is no distension  Palpations: Abdomen is soft  Tenderness: There is no abdominal tenderness  Musculoskeletal:         General: Normal range of motion        Cervical back: Normal range of motion and neck supple  Skin:     General: Skin is warm and dry  Neurological:      Mental Status: She is alert and oriented to person, place, and time  Labs:       Lab Results   Component Value Date    CREATININE 0 93 08/27/2021    CREATININE 0 79 03/16/2021    CREATININE 0 66 11/27/2020    BUN 18 08/27/2021     01/06/2016    K 4 0 08/27/2021     08/27/2021    CO2 29 08/27/2021     eGFR   Date Value Ref Range Status   08/27/2021 75 ml/min/1 73sq m Final       Lab Results   Component Value Date    CHOL 113 01/06/2016    HDL 70 05/24/2022    TRIG 66 05/24/2022       Lab Results   Component Value Date    ALT 22 08/27/2021    AST 10 08/27/2021    ALKPHOS 71 08/27/2021    BILITOT 0 32 01/06/2016       Lab Results   Component Value Date    FREET4 0 93 05/24/2022         Impression & Plan:    Problem List Items Addressed This Visit        Digestive    Postsurgical malabsorption    Relevant Orders    Comprehensive metabolic panel    CBC and differential    Reactive hypoglycemia    Relevant Medications    sitaGLIPtin (JANUVIA) 50 mg tablet       Endocrine    Hyperparathyroidism , secondary, non-renal ([address] ) - Primary     This has improved, but not calcium level is slightly high  Advised her to reduce total calcium from 3000mg daily to 2500mg daily  Repeat lab testing in about 1 month  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     She reports hypoglycemia has been worse despite following dietary restrictions and taking Acarbose and Januvia 25mg daily  Reviewed with Dr Shae Silva- for now will increase Januvia to 50mg daily and she will let us know if she notices an improvement of the hypoglycemia  Orders Placed This Encounter   Procedures    Comprehensive metabolic panel     This is a patient instruction: Patient fasting for 8 hours or longer recommended       Standing Status:   Future     Standing Expiration Date:   11/30/2022    CBC and differential     This is a patient instruction: This test is non-fasting  Please drink two glasses of water morning of bloodwork  Standing Status:   Future     Standing Expiration Date:   11/30/2022       Patient Instructions   Reduce calcium to 2500mg   I will call with any other med changes after discussing with Dr Flavio Bautista  Discussed with the patient and all questioned fully answered  She will call me if any problems arise  Follow-up appointment in 6 months       Counseled patient on diagnostic results, prognosis, risk and benefit of treatment options, instruction for management, importance of treatment compliance, Risk  factor reduction and impressions      Becca Nelson PA-C

## 2022-06-01 NOTE — ASSESSMENT & PLAN NOTE
She reports hypoglycemia has been worse despite following dietary restrictions and taking Acarbose and Januvia 25mg daily  Reviewed with Dr Quyen Toribio- for now will increase Januvia to 50mg daily and she will let us know if she notices an improvement of the hypoglycemia 
This has improved, but not calcium level is slightly high  Advised her to reduce total calcium from 3000mg daily to 2500mg daily  Repeat lab testing in about 1 month 
English

## 2022-06-02 LAB — VIT A SERPL-MCNC: 53.9 UG/DL (ref 20.1–62)

## 2022-06-27 ENCOUNTER — APPOINTMENT (OUTPATIENT)
Dept: PHYSICAL THERAPY | Facility: REHABILITATION | Age: 47
End: 2022-06-27
Payer: COMMERCIAL

## 2022-06-30 ENCOUNTER — EVALUATION (OUTPATIENT)
Dept: PHYSICAL THERAPY | Facility: REHABILITATION | Age: 47
End: 2022-06-30
Payer: COMMERCIAL

## 2022-06-30 DIAGNOSIS — M62.89 PELVIC FLOOR DYSFUNCTION: Primary | ICD-10-CM

## 2022-06-30 DIAGNOSIS — N39.8 DYSFUNCTIONAL VOIDING OF URINE: ICD-10-CM

## 2022-06-30 DIAGNOSIS — E16.1 REACTIVE HYPOGLYCEMIA: ICD-10-CM

## 2022-06-30 DIAGNOSIS — M25.551 RIGHT HIP PAIN: ICD-10-CM

## 2022-06-30 PROCEDURE — 97162 PT EVAL MOD COMPLEX 30 MIN: CPT | Performed by: PHYSICAL THERAPIST

## 2022-06-30 PROCEDURE — 97530 THERAPEUTIC ACTIVITIES: CPT | Performed by: PHYSICAL THERAPIST

## 2022-06-30 NOTE — PROGRESS NOTES
PT Evaluation     Today's date: 2022  Patient name: Ammon Dodge  : 1975  MRN: [id number]  Referring provider: Geoff Sinclair DO  Dx:   Encounter Diagnosis     ICD-10-CM    1  Pelvic floor dysfunction  M62 89    2  Dysfunctional voiding of urine  N39 8    3  Right hip pain  M25 551                   Assessment  Assessment details: The patient is a 42 y o  female with complaints of right hip pain, as well as mixed stress and urge incontinence of urine and incomplete emptying of urine  Her History includes two strokes, and a mitochondrial disorder  She does have a gastrostomy tube which she utilizes to help with hydration at times  She also experiences constipation which at times can increase bladder urgency and incontinence  She does also self catheterize at least twice a day to fully empty her bladder  Education provided today in regards to pelvic floor PT  She also was diagnosed with a labral tear in her right hip and reports right hip pain which causes her to modify her activities at times  Pelvic floor and hip exam not performed today due to time constraint  This will be completed next visit  She would benefit from pelvic floor physical therapy to help reduce/manage pain and symptoms, address impairments and maximize function and quality of life upon discharge  female will be given updated HEP throughout episode of care  Thank you for the referral     Therapeutic activities performed upon examination included education regarding pelvic floor anatomy, explanation of exam technique, explanation of exam findings and discussion of treatment plan as well as expectations of the patient to emphasize the importance of compliance and adherence to physical therapy visits                 Impairments: abnormal coordination, abnormal or restricted ROM, activity intolerance, impaired physical strength, lacks appropriate home exercise program, pain with function, poor posture  and poor body mechanics  Understanding of Dx/Px/POC: good  Goals  ST  The patient will reduce pelvic floor muscle tone by __ in 12 weeks  2  The patient will improve pelvic floor muscle strength 1 grade in 12 weeks  3  The patient will improve pelvic floor muscle endurance to 2 to 3 seconds in 12 weeks  4  The patient will reduce number of pads by 25% per day  5  The patient will reduce frequency and severity of R hip pain by 25% in 12 weeks  LT  The patient will be independent with HEP upon discharge  2  The patient will perform higher level activities without leaking upon discharge  3  The patient will control urinary urgency and minimize urge incontinence upon discharge  4  The patient will demonstrate more complete emptying of her bladder upon discharge  5  The patient will demonstrate more effective and efficient, (daily and soft, easy to pass) bowel movements upon discharge  Plan  Patient would benefit from: skilled physical therapy  Planned modality interventions: biofeedback  Planned therapy interventions: activity modification, abdominal trunk stabilization, behavior modification, body mechanics training, breathing training, coordination, flexibility, functional ROM exercises, IADL retraining, manual therapy, neuromuscular re-education, patient education, postural training, self care, strengthening, therapeutic activities and therapeutic exercise  Frequency: 1x week  Duration in visits: 12  Duration in weeks: 12  Plan of Care beginning date: 2022  Plan of Care expiration date: 2022  Treatment plan discussed with: patient        PT Pelvic Floor Subjective:   History of Present Illness: The patient notes that her right hip started very suddenly to hurt almost 2 years ago  She saw an orthopedist who referred her to PT and she notes that nothing was improving   She did also have her spine evaluated and an MRI on her spine and it did not show anything pathological in relation to her hip  She got a second opinion on her hip and had an MRI and MRA of her right hip, which showed a labral tear  She had had multiple cortisone injections in her hip which gave her no relief  She ended up seeing Dr Keny Torres at [facility]  She had another cortisone injection but only temporary relief  She was referred to a specialist at [facility] who initially wanted to perform an arthroscopy, but she is not a candidate for the surgery due to her mitochondrial disease  She ended up receiving a PRP injection into the R hip with Dr Keny Torres  She initially was in a lot of pain after the injection, but then felt some relief after some time  She notes that she does not let the pain stop her but she has modified her work and home spaces  She just got back from [facility] and did well overall  She did have to take breaks and rested and notes that she gets that way especially at the end of the day  When she is fatigued at times, she becomes aphagic  She reports that she experiences a lot of urinary retention, since her first stroke and it has gotten progressively worse  She will empty her bladder and stand up and she will leak more urine  She notes constant leakage of urine  She will self catheterize when needed  She will catheterize 2 times per day  She notes some urinary frequency and urgency  She sees a Urologist and she takes Detrol which helps  She has always been constipated since childhood  She is on Linzess and Motegrity, and takes Colace  She will do a clean out when needed  She typically does this once a month  She does have hypoglycemia from her gastric bypass surgery and she notes that because of this she needs 100 grams of protein a day an no more than 7 grams of sugars at a time  She does notice that when she is constipated it affects her bladder  She usually leaks more and is cathing larger volumes of urine     Social Support:     Work status: employed part time (oncology nurse)  Diet and Exercise:      Abhishek regularly  Co-morbidities:    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 (2005)  Gastric bypass surgery 6 years ago due to severe Reflux - patient notes that this did not help and made her symptoms worse in regards to her esophagus function (was on TPN for 8 months)  Patient had Gastrostomy tube for Hydration; gets Calories early  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  OB/ gyn History    Gestational History:     Number of prior pregnancies: 3    Number of term pregnancies: 2    Delivery Type: vaginal delivery      Number of vaginal deliveries: 2    Delivery Complications:  Children are 23and 15years old  Fast labors with both children  Episiotomy with first child     PCOS and heavy bleeding (was getting iron infusions)  Fibroids and Ovarian Cysts  Uterine ablation 2 years ago - has not had a period since    Follows up for annual examination    Menstrual History:      Menopausal: no menopause  Bladder Function:     Voiding Difficulties positive for: urgency, frequent urination, straining (slow stream; weak) and incomplete emptying      Voiding Difficulties comments:     Urinary frequency: at least every 2 hours  Urinary leakage: urine leakage    Urinary leakage aggravated by: coughing, sneezing, standing up, walking to the bathroom, post-void dribble and unknown    Nocturia (episodes per night): 0 and 1 (self catheterizes prior to bed)    Painful urination: No      Fluid Intake Type: Water and sports drinks    Intake (ounces):      Intake (ounces) comment: G2 through tube at night   80 ounces of water during the day  Incontinence Management:     Pads/Diaper Use:  Day    Pads/Diapers Additional Comments: at least 4-5 a day  Bowel Function:     Voiding DIfficulties: unfinished feeling after defecating and constipation      Bowel Function comments:  Occasional painful emptying  Hemorrhoids  Some urgency when it is loose  No incontinence of stools    Bowel frequency: daily and multiple times a day    Sear[place] Stool Scale: type 5, type 6 and type 1    Stool softener use: stool softeners  Sexual Function:     Sexually Active:  Sexually active    Pain during intercourse: Yes (R hip pain during or after intercourse)    Pain:     Current pain ratin    At best pain rating:  3    At worst pain ratin    Location:  No pelvic pain; occasional heaviness/fullness sensation; R hip pain     Quality:  Burning      Objective   Pelvic Floor Exam     General Perineum Exam:     General perineum exam comments: Pelvic floor verbal consent and written consent will be given next visit  Pelvic floor exam and hip exam to be performed next visit      Education provided today:   Time Spent on Patient Education: 15 min    Pelvic floor anatomy and function  Physiology/relationship of abdominal canister and pelvis/pelvic organs/pelvic floor muscles  Diaphragm and Diaphragmatic breathing  Bowel and Bladder anatomy and function    Pelvic Organ Prolapse - explanation and discussion of management of symptoms  Importance of body mechanics and ergonomics in regards to protecting against activities which increase IAP and pressure    Toileting posture and body mechanics  Constipation Education  Pelvic and chronic pain education  "The Poo in [facility] " Educational video (peds and family) on Youtube    Hormonal and MSK changes during pregnancy   Post partum posture and MSK changes  Hormonal changes during and after menopause  Surgical and post op considerations  PT exam and course of treatment  Bladder and Bowel diary     Future Education To be Provided:                     Precautions: mitochondrial disorder; hypoglycemic  Medbridge HEP:    Manuals             Scar mobilization             STM mobilization             Abdominal Tri Planar Myofascial release             Pelvic Floor Muscle Releases                                       Patient Education             Neuro Re-Ed Real Time Ultrasound             TA ADIM             PFMC slow holds             Diaphragmatic Breathing             DKC with Diaphragmatic Breathing             Child's Pose with DiaphragmaticBreathing             Body Scan for PFM release/relaxatoin             Pelvic Floor Drops in deep squat                                                                              Ther Ex             PPT             LTR                                                                                                                                               Ther Activity             EDUCATION/COUNSELING             Dilator Training             Gait Training                                       Modalities

## 2022-06-30 NOTE — LETTER
2022    Jessica Carrera DO  [facility] [address] [place] [address]    Patient: Marilia Bravo   YOB: 1975   Date of Visit: 2022     Encounter Diagnosis     ICD-10-CM    1  Pelvic floor dysfunction  M62 89    2  Dysfunctional voiding of urine  N39 8    3  Right hip pain  M25 551        Dear Dr Johana Carpenter:    Thank you for your recent referral of Marilia Bravo  Please review the attached evaluation summary from Leigh's recent visit  Please verify that you agree with the plan of care by signing the attached order  If you have any questions or concerns, please do not hesitate to call  I sincerely appreciate the opportunity to share in the care of one of your patients and hope to have another opportunity to work with you in the near future  Sincerely,    Jean Bradley, PT      Referring Provider:      I certify that I have read the below Plan of Care and certify the need for these services furnished under this plan of treatment while under my care  Jessica Carrera DO  [facility] [address] [place] [address]  Via Fax: [phone]          PT Evaluation     Today's date: 2022  Patient name: Marilia Bravo  : 1975  MRN: [id number]  Referring provider: Carleen Smyth DO  Dx:   Encounter Diagnosis     ICD-10-CM    1  Pelvic floor dysfunction  M62 89    2  Dysfunctional voiding of urine  N39 8    3  Right hip pain  M25 551                   Assessment  Assessment details: The patient is a 42 y o  female with complaints of right hip pain, as well as mixed stress and urge incontinence of urine and incomplete emptying of urine  Her History includes two strokes, and a mitochondrial disorder  She does have a gastrostomy tube which she utilizes to help with hydration at times  She also experiences constipation which at times can increase bladder urgency and incontinence  She does also self catheterize at least twice a day to fully empty her bladder  Education provided today in regards to pelvic floor PT  She also was diagnosed with a labral tear in her right hip and reports right hip pain which causes her to modify her activities at times  Pelvic floor and hip exam not performed today due to time constraint  This will be completed next visit  She would benefit from pelvic floor physical therapy to help reduce/manage pain and symptoms, address impairments and maximize function and quality of life upon discharge  female will be given updated HEP throughout episode of care  Thank you for the referral     Therapeutic activities performed upon examination included education regarding pelvic floor anatomy, explanation of exam technique, explanation of exam findings and discussion of treatment plan as well as expectations of the patient to emphasize the importance of compliance and adherence to physical therapy visits  Impairments: abnormal coordination, abnormal or restricted ROM, activity intolerance, impaired physical strength, lacks appropriate home exercise program, pain with function, poor posture  and poor body mechanics  Understanding of Dx/Px/POC: good  Goals  ST  The patient will reduce pelvic floor muscle tone by __ in 12 weeks  2  The patient will improve pelvic floor muscle strength 1 grade in 12 weeks  3  The patient will improve pelvic floor muscle endurance to 2 to 3 seconds in 12 weeks  4  The patient will reduce number of pads by 25% per day  5  The patient will reduce frequency and severity of R hip pain by 25% in 12 weeks  LT  The patient will be independent with HEP upon discharge  2  The patient will perform higher level activities without leaking upon discharge  3  The patient will control urinary urgency and minimize urge incontinence upon discharge  4  The patient will demonstrate more complete emptying of her bladder upon discharge     5  The patient will demonstrate more effective and efficient, (daily and soft, easy to pass) bowel movements upon discharge  Plan  Patient would benefit from: skilled physical therapy  Planned modality interventions: biofeedback  Planned therapy interventions: activity modification, abdominal trunk stabilization, behavior modification, body mechanics training, breathing training, coordination, flexibility, functional ROM exercises, IADL retraining, manual therapy, neuromuscular re-education, patient education, postural training, self care, strengthening, therapeutic activities and therapeutic exercise  Frequency: 1x week  Duration in visits: 12  Duration in weeks: 12  Plan of Care beginning date: 6/30/2022  Plan of Care expiration date: 9/22/2022  Treatment plan discussed with: patient        PT Pelvic Floor Subjective:   History of Present Illness: The patient notes that her right hip started very suddenly to hurt almost 2 years ago  She saw an orthopedist who referred her to PT and she notes that nothing was improving  She did also have her spine evaluated and an MRI on her spine and it did not show anything pathological in relation to her hip  She got a second opinion on her hip and had an MRI and MRA of her right hip, which showed a labral tear  She had had multiple cortisone injections in her hip which gave her no relief  She ended up seeing Dr Dorothy Price at [place] [address]  She had another cortisone injection but only temporary relief  She was referred to a specialist at [facility] who initially wanted to perform an arthroscopy, but she is not a candidate for the surgery due to her mitochondrial disease  She ended up receiving a PRP injection into the R hip with Dr Dorothy Price  She initially was in a lot of pain after the injection, but then felt some relief after some time  She notes that she does not let the pain stop her but she has modified her work and home spaces  She just got back from [facility] and did well overall   She did have to take breaks and rested and notes that she gets that way especially at the end of the day  When she is fatigued at times, she becomes aphagic  She reports that she experiences a lot of urinary retention, since her first stroke and it has gotten progressively worse  She will empty her bladder and stand up and she will leak more urine  She notes constant leakage of urine  She will self catheterize when needed  She will catheterize 2 times per day  She notes some urinary frequency and urgency  She sees a Urologist and she takes Detrol which helps  She has always been constipated since childhood  She is on Linzess and Motegrity, and takes Colace  She will do a clean out when needed  She typically does this once a month  She does have hypoglycemia from her gastric bypass surgery and she notes that because of this she needs 100 grams of protein a day an no more than 7 grams of sugars at a time  She does notice that when she is constipated it affects her bladder  She usually leaks more and is cathing larger volumes of urine     Social Support:     Work status: employed part time (oncology nurse)  Diet and Exercise:      Walks regularly  Co-morbidities:    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 (2005)  Gastric bypass surgery 6 years ago due to severe Reflux - patient notes that this did not help and made her symptoms worse in regards to her esophagus function (was on TPN for 8 months)  Patient had Gastrostomy tube for Hydration; gets Calories early  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  OB/ gyn History    Gestational History:     Number of prior pregnancies: 3    Number of term pregnancies: 2    Delivery Type: vaginal delivery      Number of vaginal deliveries: 2    Delivery Complications:  Children are 23and 15years old  Fast labors with both children  Episiotomy with first child     PCOS and heavy bleeding (was getting iron infusions)  Fibroids and Ovarian Cysts  Uterine ablation 2 years ago - has not had a period since    Follows up for annual examination    Menstrual History:      Menopausal: no menopause  Bladder Function:     Voiding Difficulties positive for: urgency, frequent urination, straining (slow stream; weak) and incomplete emptying      Voiding Difficulties comments:     Urinary frequency: at least every 2 hours  Urinary leakage: urine leakage    Urinary leakage aggravated by: coughing, sneezing, standing up, walking to the bathroom, post-void dribble and unknown    Nocturia (episodes per night): 0 and 1 (self catheterizes prior to bed)    Painful urination: No      Fluid Intake Type: Water and sports drinks    Intake (ounces):      Intake (ounces) comment: G2 through tube at night   80 ounces of water during the day  Incontinence Management:     Pads/Diaper Use:  Day    Pads/Diapers Additional Comments: at least 4-5 a day  Bowel Function:     Voiding DIfficulties: unfinished feeling after defecating and constipation      Bowel Function comments:  Occasional painful emptying  Hemorrhoids  Some urgency when it is loose  No incontinence of stools    Bowel frequency: daily and multiple times a day    [place]ynn Stool Scale: type 5, type 6 and type 1    Stool softener use: stool softeners  Sexual Function:     Sexually Active:  Sexually active    Pain during intercourse: Yes (R hip pain during or after intercourse)    Pain:     Current pain ratin    At best pain rating:  3    At worst pain ratin    Location:  No pelvic pain; occasional heaviness/fullness sensation; R hip pain     Quality:  Burning      Objective   Pelvic Floor Exam     General Perineum Exam:     General perineum exam comments: Pelvic floor verbal consent and written consent will be given next visit  Pelvic floor exam and hip exam to be performed next visit      Education provided today:   Time Spent on Patient Education: 15 min    Pelvic floor anatomy and function  Physiology/relationship of abdominal canister and pelvis/pelvic organs/pelvic floor muscles  Diaphragm and Diaphragmatic breathing  Bowel and Bladder anatomy and function    Pelvic Organ Prolapse - explanation and discussion of management of symptoms  Importance of body mechanics and ergonomics in regards to protecting against activities which increase IAP and pressure    Toileting posture and body mechanics  Constipation Education  Pelvic and chronic pain education  "The Poo in You" Educational video (peds and family) on Youtube    Hormonal and MSK changes during pregnancy   Post partum posture and MSK changes  Hormonal changes during and after menopause  Surgical and post op considerations  PT exam and course of treatment  Bladder and Bowel diary     Future Education To be Provided:                     Precautions: mitochondrial disorder; hypoglycemic  Medbridge HEP:    Manuals 6/30            Scar mobilization             STM mobilization             Abdominal Tri Planar Myofascial release             Pelvic Floor Muscle Releases                                       Patient Education             Neuro Re-Ed             Real Time Ultrasound             TA ADIM             PFMC slow holds             Diaphragmatic Breathing             DKC with Diaphragmatic Breathing             Child's Pose with DiaphragmaticBreathing             Body Scan for PFM release/relaxatoin             Pelvic Floor Drops in deep squat                                                                              Ther Ex             PPT             LTR                                                                                                                                               Ther Activity             EDUCATION/COUNSELING             Dilator Training             Gait Training                                       Modalities

## 2022-07-05 ENCOUNTER — OFFICE VISIT (OUTPATIENT)
Dept: PHYSICAL THERAPY | Facility: REHABILITATION | Age: 47
End: 2022-07-05
Payer: COMMERCIAL

## 2022-07-05 DIAGNOSIS — M62.89 PELVIC FLOOR DYSFUNCTION: Primary | ICD-10-CM

## 2022-07-05 DIAGNOSIS — N39.8 DYSFUNCTIONAL VOIDING OF URINE: ICD-10-CM

## 2022-07-05 DIAGNOSIS — M25.551 RIGHT HIP PAIN: ICD-10-CM

## 2022-07-05 PROCEDURE — 97530 THERAPEUTIC ACTIVITIES: CPT | Performed by: PHYSICAL THERAPIST

## 2022-07-05 PROCEDURE — 97140 MANUAL THERAPY 1/> REGIONS: CPT | Performed by: PHYSICAL THERAPIST

## 2022-07-05 NOTE — PROGRESS NOTES
Daily Note     Today's date: 2022  Patient name: Cody Kendall  : 1975  MRN: [id number]  Referring provider: Cullen Orlando DO  Dx:   Encounter Diagnosis     ICD-10-CM    1  Pelvic floor dysfunction  M62 89    2  Dysfunctional voiding of urine  N39 8    3  Right hip pain  M25 551        Start Time: 1000  Stop Time: 1100  Total time in clinic (min): 60 minutes    Subjective: The patient notes that she was busy over the weekend and is sore and tired because of this but not major complaints  Objective: See treatment diary below    Objective     Active Range of Motion   Left Hip   Flexion: 120 degrees   External rotation (90/90): 45 degrees   Internal rotation (90/90): 25 degrees     Right Hip   Flexion: 110 degrees   External rotation (90/90): 30 degrees   Internal rotation (90/90): 20 degrees   Left Knee   Flexion: 130 degrees   Prone flexion: 130 degrees     Right Knee   Flexion: 130 degrees   Prone flexion: 120 degrees     Strength/Myotome Testing     Left Hip   Planes of Motion   Flexion: 4-  Abduction: 4-  External rotation: 4-  Internal rotation: 4-    Right Hip   Planes of Motion   Flexion: 4  Abduction: 4  External rotation: 4  Internal rotation: 4    Tests     Left Hip   Positive scour  Right Hip   Positive scour  Pelvic Floor Exam   Abdominal assessment: Soft and non tender  Tenderness over right pubic crest    General Perineum Exam:   perineum intact     Negative for swelling, descent, lesion, gaping introitus, no pelvic organ prolapse at rest and perianal erythema    General perineum exam comments: Pelvic floor verbal consent and written consent signed and in chart      Education provided today:   Time Spent on Patient Education: 30 min    Pelvic floor anatomy and function  Physiology/relationship of abdominal canister and pelvis/pelvic organs/pelvic floor muscles  Diaphragm and Diaphragmatic breathing  Bowel and Bladder anatomy and function  PT exam and course of treatment  Bladder and Bowel diary       Patient noted feeling more pressure of palpation on the left than the right - almost a diminished sensation to touch internally; external dermatomal exam L4-S2 symmetrical and WNL         Visual Inspection of Perineum:   Excursion of perineal body in cephalad direction with contraction of pelvic floor muscles (PFM): weak  Excursion of perineal body in caudal direction with relaxation of pelvic floor muscles (PFM): weak  Involuntary contraction with coughing: no  Sensation: intact    Pelvic Organ Prolapse   no pelvic organ prolapseno pelvic organ prolapse at rest    Pelvic Floor Muscle Exam     Palpation   No increased muscle tension in the pubococcygeus and iIliococcygeus  Minimal increased muscle tension in the bulbospongiosus, ischiocavernosus, super transverse perineal, puborectalis, obturator internus and periurethral  No tenderness on right in the pubococcygeus, iliococcygeus and periurethral  Minimal tenderness on right in the bulbospongiosus, super transverse perineal, puborectalis and obturator internus  Moderate tenderness on right in the ischiocavernosus  No tenderness on left in the pubococcygeus, iliococcygeus and periurethral  Minimal tenderness on left in the bulbospongiosus, ischiocavernosus, puborectalis and obturator internus  Moderate tenderness on left in the super transverse perineal    Muscle Contraction: well isolated  Pelvic floor muscle relaxation is complete  PERFECT Score   Power right: 1/5  Power left: 2/5          Assessment: Tolerated treatment well  Patient demonstrates some mild hypertonicity in the right obturator internus and perineals  She reports right anterior hip pain with palpation of left perineals and bulbospongiosus  She does demonstrate weakness on both the right and the left pelvic floor today with assessment  Also some right sided hip weakness and limited range of motion compared to the left noted during assessment   Work on reducing any tension and restoring balance and function to pelvic floor and core to help with reduction of pain and functional tasks  She did not some soreness from exam in the right hip post treatment today  Plan: Continue per plan of care              Precautions: mitochondrial disorder; hypoglycemic  [employer] HEP:    Manuals 6/30 7/5           Scar mobilization             STM mobilization             Abdominal Tri Planar Myofascial release             Pelvic Floor Muscle Releases  10 min                                     Patient Education             Neuro Re-Ed             Real Time Ultrasound             TA ADIM             PFMC slow holds             Diaphragmatic Breathing             DKC with Diaphragmatic Breathing             Child's Pose with DiaphragmaticBreathing             Body Scan for PFM release/relaxatoin             Pelvic Floor Drops in deep squat                                                                              Ther Ex             PPT             LTR                                                                                                                                               Ther Activity             EDUCATION/COUNSELING  30 min           Dilator Training             Gait Training                                       Modalities

## 2022-07-18 DIAGNOSIS — E16.1 REACTIVE HYPOGLYCEMIA: ICD-10-CM

## 2022-07-19 ENCOUNTER — OFFICE VISIT (OUTPATIENT)
Dept: PHYSICAL THERAPY | Facility: REHABILITATION | Age: 47
End: 2022-07-19
Payer: COMMERCIAL

## 2022-07-19 DIAGNOSIS — N39.8 DYSFUNCTIONAL VOIDING OF URINE: ICD-10-CM

## 2022-07-19 DIAGNOSIS — M62.89 PELVIC FLOOR DYSFUNCTION: Primary | ICD-10-CM

## 2022-07-19 DIAGNOSIS — M25.551 RIGHT HIP PAIN: ICD-10-CM

## 2022-07-19 PROCEDURE — 97110 THERAPEUTIC EXERCISES: CPT | Performed by: PHYSICAL THERAPIST

## 2022-07-19 PROCEDURE — 97530 THERAPEUTIC ACTIVITIES: CPT | Performed by: PHYSICAL THERAPIST

## 2022-07-19 PROCEDURE — 97140 MANUAL THERAPY 1/> REGIONS: CPT | Performed by: PHYSICAL THERAPIST

## 2022-07-19 PROCEDURE — 97112 NEUROMUSCULAR REEDUCATION: CPT | Performed by: PHYSICAL THERAPIST

## 2022-07-19 NOTE — PROGRESS NOTES
Daily Note     Today's date: 2022  Patient name: Gabino Karimi  : 1975  MRN: [id number]  Referring provider: Mary Padilla DO  Dx:   Encounter Diagnosis     ICD-10-CM    1  Pelvic floor dysfunction  M62 89    2  Dysfunctional voiding of urine  N39 8    3  Right hip pain  M25 551        Start Time: 0800  Stop Time: 0900  Total time in clinic (min): 60 minutes    Subjective: The patient notes that she was sore for 2 5 days after her previous PT session  She states that co workers noticed that she was limping at work  She took Alleve for 3 days which helped to reduce and manage the pain  She was then back to baseline afterwards  Objective: See treatment diary below      Assessment: Tolerated treatment well  Patient had no complaints with stretches today  Worked on some pelvic floor muscle downtraining with diaphragmatic breathing  She has good overall awareness of her pelvic floor  She did have some tension in right obturator internus with some tendernss as well  She did have some palpable reduction in tone post treatment      Plan: Continue per plan of care              Precautions: mitochondrial disorder; hypoglycemic; CRPS  [employer] HEP:    Manuals           Scar mobilization             STM mobilization             Abdominal Tri Planar Myofascial release             Pelvic Floor Muscle Releases  10 min 10 min                                    Patient Education             Neuro Re-Ed             Real Time Ultrasound             TA ADIM             PFMC slow holds             Diaphragmatic Breathing   10 min          DKC with Diaphragmatic Breathing   30 sec x 3          Child's Pose with DiaphragmaticBreathing             Body Scan for PFM release/relaxatoin   5 min          Pelvic Floor Drops in deep squat                                                                              Ther Ex             PPT             LTR   5"x5 ea          DKC stretch   30"x3          SKC stretch   15"x4 ea          Seated hamstring stretch   15"x3 ea          Seated gastroc stretch with strap   15"x 3 ea                                                                                        Ther Activity             EDUCATION/COUNSELING  30 min 10 min          Dilator Training             Gait Training                                       Modalities

## 2022-07-22 ENCOUNTER — TELEPHONE (OUTPATIENT)
Dept: HEMATOLOGY ONCOLOGY | Facility: CLINIC | Age: 47
End: 2022-07-22

## 2022-07-22 NOTE — TELEPHONE ENCOUNTER
LVM to the patient in regards to her appt scheduled on 9/27/22 at 9:20am  Informed patient that the provider is unavailable at that time and I did reschedule her appt to 10/5/22 at 9:20am  Informed patient to give the office a call back to reschedule if needed at [phone]

## 2022-07-28 ENCOUNTER — OFFICE VISIT (OUTPATIENT)
Dept: PHYSICAL THERAPY | Facility: REHABILITATION | Age: 47
End: 2022-07-28
Payer: COMMERCIAL

## 2022-07-28 DIAGNOSIS — M25.551 RIGHT HIP PAIN: ICD-10-CM

## 2022-07-28 DIAGNOSIS — N39.8 DYSFUNCTIONAL VOIDING OF URINE: ICD-10-CM

## 2022-07-28 DIAGNOSIS — M62.89 PELVIC FLOOR DYSFUNCTION: Primary | ICD-10-CM

## 2022-07-28 PROCEDURE — 97112 NEUROMUSCULAR REEDUCATION: CPT | Performed by: PHYSICAL THERAPIST

## 2022-07-28 PROCEDURE — 97140 MANUAL THERAPY 1/> REGIONS: CPT | Performed by: PHYSICAL THERAPIST

## 2022-07-28 NOTE — PROGRESS NOTES
Daily Note     Today's date: 2022  Patient name: Reyes Downy  : 1975  MRN: [id number]  Referring provider: Musa Long DO  Dx:   Encounter Diagnosis     ICD-10-CM    1  Pelvic floor dysfunction  M62 89    2  Dysfunctional voiding of urine  N39 8    3  Right hip pain  M25 551        Start Time: 0800  Stop Time: 0900  Total time in clinic (min): 60 minutes    Subjective: The patient reports that she experienced right hip soreness for about a day after her previous treatment session  She also noticed some low back soreness and catching with flexion over the past week as well  Her lower back started to bother her while away in [facility] about one month ago and it calmed down a bit but seemed to be bothersome again  She notes no issues with stretches  She reports that it does not stop her from doing things but is noticeable  Objective: See treatment diary below      Assessment: Tolerated treatment well  She does have some tenderness over the lumbosacral region, more centrally  Patient continues to complain of reproduction of right hip pain with pelvic floor muscle releases today  She does have reproduction of right hip pain in the left pelvic floor and the right obturator internus created some soreness in the anterior hip region and groin  Worked on slow hold pelvic floor muscle engagement to help with motor control and coordination as well as ultimate goal of relaxing her pelvic floor muscles afterwards and she did well with this  Also worked on Sandra's  Assess response to next visit and work on engagement of deep core and gentle isometrics to help with stability and motor control  Plan: Continue per plan of care              Precautions: mitochondrial disorder; hypoglycemic; CRPS  Medbridge HEP:    Manuals          Scar mobilization             STM mobilization             Abdominal Tri Planar Myofascial release             Pelvic Floor Muscle Releases  10 min 10 min 10 min         STM to lumbosacral region    10 min                      Patient Education             Neuro Re-Ed             Real Time Ultrasound             TA ADIM    5"x10         PFMC slow holds    5"x6         Diaphragmatic Breathing   10 min          DKC with Diaphragmatic Breathing   30 sec x 3          Child's Pose with DiaphragmaticBreathing             Body Scan for PFM release/relaxatoin   5 min          Pelvic Floor Drops in deep squat                                                                              Ther Ex             PPT             LTR   5"x5 ea          DKC stretch   30"x3          SKC stretch   15"x4 ea          Seated hamstring stretch   15"x3 ea          Seated gastroc stretch with strap   15"x 3 ea                                                                                        Ther Activity             EDUCATION/COUNSELING  30 min 10 min          Dilator Training             Gait Training                                       Modalities

## 2022-08-01 ENCOUNTER — OFFICE VISIT (OUTPATIENT)
Dept: PHYSICAL THERAPY | Facility: REHABILITATION | Age: 47
End: 2022-08-01
Payer: COMMERCIAL

## 2022-08-01 DIAGNOSIS — M62.89 PELVIC FLOOR DYSFUNCTION: Primary | ICD-10-CM

## 2022-08-01 DIAGNOSIS — N39.8 DYSFUNCTIONAL VOIDING OF URINE: ICD-10-CM

## 2022-08-01 DIAGNOSIS — M25.551 RIGHT HIP PAIN: ICD-10-CM

## 2022-08-01 PROCEDURE — 97140 MANUAL THERAPY 1/> REGIONS: CPT | Performed by: PHYSICAL THERAPIST

## 2022-08-01 PROCEDURE — 97530 THERAPEUTIC ACTIVITIES: CPT | Performed by: PHYSICAL THERAPIST

## 2022-08-01 PROCEDURE — 97112 NEUROMUSCULAR REEDUCATION: CPT | Performed by: PHYSICAL THERAPIST

## 2022-08-01 NOTE — PROGRESS NOTES
Daily Note     Today's date: 2022  Patient name: Jazmine Rose  : 1975  MRN: [id number]  Referring provider: Martian Parekh DO  Dx:   Encounter Diagnosis     ICD-10-CM    1  Pelvic floor dysfunction  M62 89    2  Right hip pain  M25 551    3  Dysfunctional voiding of urine  N39 8        Start Time: 0800  Stop Time: 0900  Total time in clinic (min): 60 minutes    Subjective: The patient notes that her hip was sore the day of and after treatment  She mostly noticed soreness with taking a longer stride when she walked  She describes the pain as an ache and deep within her hip  Her went swimming yesterday and notes no hip pain after being in the pool for a good portion of the day  Her lower back has good days and bad days, but she does notice that it is exacerbated with lifting/holding with compression into her spine and postural related  Objective: See treatment diary below      Assessment: Tolerated treatment well  Patient notes some tenderness in the right hip with myofascial techniques  Did work on breathing, TA engagement, PFM engagement as well as relaxation/letting go  Also talked about posture and breathing with sitting on the toilet to empty her bladder and bowels  She notes that she does have to lean forward to produce pressure over there bladder to help with emptying but tries to avoid straining  Plan: Continue per plan of care              Precautions: mitochondrial disorder; hypoglycemic; CRPS  Medbridge HEP:    Manuals         Scar mobilization             STM mobilization             Abdominal Tri Planar Myofascial release     10 min        Pelvic Floor Muscle Releases  10 min 10 min 10 min         STM to lumbosacral region    10 min                      Patient Education             Neuro Re-Ed             Real Time Ultrasound             TA ADIM    5"x10 5"x10        PFMC slow holds    5"x6 5"x6        Diaphragmatic Breathing   10 min          DKC with Diaphragmatic Breathing   30 sec x 3          Child's Pose with DiaphragmaticBreathing             Body Scan for PFM release/relaxatoin   5 min          Pelvic Floor Drops in deep squat                                                                              Ther Ex             PPT             LTR   5"x5 ea          DKC stretch   30"x3          SKC stretch   15"x4 ea          Seated hamstring stretch   15"x3 ea          Seated gastroc stretch with strap   15"x 3 ea                                                                                        Ther Activity             EDUCATION/COUNSELING  30 min 10 min  25 min        Toilet posture and defecation mechanics     done        Gait Training                                       Modalities

## 2022-08-08 ENCOUNTER — OFFICE VISIT (OUTPATIENT)
Dept: PHYSICAL THERAPY | Facility: REHABILITATION | Age: 47
End: 2022-08-08
Payer: COMMERCIAL

## 2022-08-08 DIAGNOSIS — M25.551 RIGHT HIP PAIN: ICD-10-CM

## 2022-08-08 DIAGNOSIS — N39.8 DYSFUNCTIONAL VOIDING OF URINE: ICD-10-CM

## 2022-08-08 DIAGNOSIS — M62.89 PELVIC FLOOR DYSFUNCTION: Primary | ICD-10-CM

## 2022-08-08 PROCEDURE — 97112 NEUROMUSCULAR REEDUCATION: CPT | Performed by: PHYSICAL THERAPIST

## 2022-08-08 PROCEDURE — 97140 MANUAL THERAPY 1/> REGIONS: CPT | Performed by: PHYSICAL THERAPIST

## 2022-08-08 PROCEDURE — 97530 THERAPEUTIC ACTIVITIES: CPT | Performed by: PHYSICAL THERAPIST

## 2022-08-08 NOTE — PROGRESS NOTES
Daily Note     Today's date: 2022  Patient name: Steven Jennings  : 1975  MRN: [id number]  Referring provider: Conchita Hadley DO  Dx:   Encounter Diagnosis     ICD-10-CM    1  Pelvic floor dysfunction  M62 89    2  Dysfunctional voiding of urine  N39 8    3  Right hip pain  M25 551        Start Time: 1400  Stop Time: 1500  Total time in clinic (min): 60 minutes    Subjective: The patient notes that she had one bad day last week when she was at work  She notes that she had a lot of right hip pain while she was at work and radiated into her back and also into the left hip  Her bladder has been about the same overall  Objective: See treatment diary below      Assessment: Tolerated treatment well  Patient did well with exercises on Biofeedback today  She did well with isolating her PFM's and TA  Coordination was overall good  She did have some difficulty with feeling like she fully let go  She was able to return to resting baseline on Biofeedback consistently and this was around 3 0 - 3 5 mV overall  She was given updated HEP today  Plan: Continue per plan of care              Precautions: mitochondrial disorder; hypoglycemic; CRPS  Medbridge HEP:    Manuals        Scar mobilization             STM mobilization             Abdominal Tri Planar Myofascial release     10 min 10 min       Pelvic Floor Muscle Releases  10 min 10 min 10 min         STM to lumbosacral region    10 min         STM to anterior hip/thigh      8 min       Patient Education             Neuro Re-Ed             Real Time Ultrasound             TA ADIM    5"x10 5"x10 5"x10       PFMC slow holds    5"x6 5"x6 5"x10       TA + PFM      5"x8       Diaphragmatic Breathing   10 min                       DKC with Diaphragmatic Breathing   30 sec x 3          Child's Pose with DiaphragmaticBreathing             Body Scan for PFM release/relaxatoin   5 min          Pelvic Floor Drops in deep squat Ther Ex             PPT             LTR   5"x5 ea          DKC stretch   30"x3          SKC stretch   15"x4 ea          Seated hamstring stretch   15"x3 ea          Seated gastroc stretch with strap   15"x 3 ea                                                                                        Ther Activity             EDUCATION/COUNSELING  30 min 10 min  25 min 10 min       Toilet posture and defecation mechanics     done        Gait Training                                       Modalities

## 2022-08-25 ENCOUNTER — OFFICE VISIT (OUTPATIENT)
Dept: PHYSICAL THERAPY | Facility: REHABILITATION | Age: 47
End: 2022-08-25
Payer: COMMERCIAL

## 2022-08-25 DIAGNOSIS — M25.551 RIGHT HIP PAIN: ICD-10-CM

## 2022-08-25 DIAGNOSIS — M62.89 PELVIC FLOOR DYSFUNCTION: Primary | ICD-10-CM

## 2022-08-25 DIAGNOSIS — N39.8 DYSFUNCTIONAL VOIDING OF URINE: ICD-10-CM

## 2022-08-25 PROCEDURE — 97530 THERAPEUTIC ACTIVITIES: CPT | Performed by: PHYSICAL THERAPIST

## 2022-08-25 PROCEDURE — 97112 NEUROMUSCULAR REEDUCATION: CPT | Performed by: PHYSICAL THERAPIST

## 2022-08-25 PROCEDURE — 97110 THERAPEUTIC EXERCISES: CPT | Performed by: PHYSICAL THERAPIST

## 2022-08-25 NOTE — PROGRESS NOTES
Daily Note     Today's date: 2022  Patient name: Ivet Chris  : 1975  MRN: [id number]  Referring provider: Maurice Umana DO  Dx:   Encounter Diagnosis     ICD-10-CM    1  Pelvic floor dysfunction  M62 89    2  Right hip pain  M25 551    3  Dysfunctional voiding of urine  N39 8        Start Time: 0900  Stop Time: 1000  Total time in clinic (min): 60 minutes    Subjective: The patient has been practicing deep breathing while she is sitting on the toilet to try to empty her bladder  She does feel that this helps her to be more aware which can help at times with emptying her bladder  Her right hip continues to flare up depending on activity  She notes pain if she elongates her stride when she is walking  She also sometimes has pain during or after work  Her lower back continues to be painful and at times, tight  She does stretch with helps her to loosen up and heat over her lower lumbar/sacral region helps with the low back pain  Objective: See treatment diary below      Assessment: Tolerated treatment well  Patient performed Biofeedback today  She demonstrates resting baseline within a good range, indicating that her muscles are relaxed  She does recruit her muscles well  She has some limited recruitment and endurance  She lets go of her contractions well  She is challenged more in sitting and standing and she notes muscle fatigue more quickly in these two positions  Plan: Continue per plan of care              Precautions: mitochondrial disorder; hypoglycemic; CRPS  Medbridge HEP:    Manuals       Scar mobilization             STM mobilization             Abdominal Tri Planar Myofascial release     10 min 10 min       Pelvic Floor Muscle Releases  10 min 10 min 10 min         STM to lumbosacral region    10 min         STM to anterior hip/thigh      8 min       Patient Education             Neuro Re-Ed             Real Time Ultrasound             [facility] [place] 5"x10 5"x10 5"x10       PFMC slow holds    5"x6 5"x6 5"x10 5"2x5 supine  5"x5 seated  5"x5 standing      TA + PFM      5"x8       Diaphragmatic Breathing   10 min                       DKC with Diaphragmatic Breathing   30 sec x 3          Child's Pose with DiaphragmaticBreathing             Body Scan for PFM release/relaxatoin   5 min          Pelvic Floor Drops in deep squat                                                                              Ther Ex             PPT             LTR   5"x5 ea          DKC stretch   30"x3          SKC stretch   15"x4 ea          Seated hamstring stretch   15"x3 ea    15"x3 ea      Seated gastroc stretch with strap   15"x 3 ea    15"x3 ea                                                                                    Ther Activity             EDUCATION/COUNSELING  30 min 10 min  25 min 10 min 15 min      Toilet posture and defecation mechanics     done        [facility] sit<>stand       3x      Gait Training                                       Modalities

## 2022-09-06 ENCOUNTER — OFFICE VISIT (OUTPATIENT)
Dept: PHYSICAL THERAPY | Facility: REHABILITATION | Age: 47
End: 2022-09-06
Payer: COMMERCIAL

## 2022-09-06 DIAGNOSIS — M25.551 RIGHT HIP PAIN: ICD-10-CM

## 2022-09-06 DIAGNOSIS — N39.8 DYSFUNCTIONAL VOIDING OF URINE: ICD-10-CM

## 2022-09-06 DIAGNOSIS — M62.89 PELVIC FLOOR DYSFUNCTION: Primary | ICD-10-CM

## 2022-09-06 PROCEDURE — 97110 THERAPEUTIC EXERCISES: CPT | Performed by: PHYSICAL THERAPIST

## 2022-09-06 PROCEDURE — 97112 NEUROMUSCULAR REEDUCATION: CPT | Performed by: PHYSICAL THERAPIST

## 2022-09-06 NOTE — PROGRESS NOTES
Daily Note     Today's date: 2022  Patient name: Arlette Renee  : 1975  MRN: [id number]  Referring provider: Brenda Rdz DO  Dx:   Encounter Diagnosis     ICD-10-CM    1  Pelvic floor dysfunction  M62 89    2  Dysfunctional voiding of urine  N39 8    3  Right hip pain  M25 551        Start Time: 1000  Stop Time: 1100  Total time in clinic (min): 60 minutes    Subjective: The patient notes that her urinary symptoms are the same  She is compliant with exercises  She does utilize some breathing techniques to help with emptying which she does think helps  She notes that her hip pain is intermittent  Her back mostly bothers her after work after pushing her workstation on wheels around  Objective: See treatment diary below      Assessment: Tolerated treatment well  Patient was able to work through a comfortable range of motion with exercises today  Avoided painful range of motion  She does have pain into the groin with over extension of her spine and hip  She also noted some burning down the lateral thigh with posterior pelvic tilt  Updated HEP today  She will return in one week for next visit  Plan: Continue per plan of care              Precautions: mitochondrial disorder; hypoglycemic; CRPS  Medbridge HEP: [id number]       Manuals      Scar mobilization             STM mobilization             Abdominal Tri Planar Myofascial release     10 min 10 min       Pelvic Floor Muscle Releases  10 min 10 min 10 min         STM to lumbosacral region    10 min         STM to anterior hip/thigh      8 min       Patient Education             Neuro Re-Ed             Real Time Ultrasound             TA ADIM    5"x10 5"x10 5"x10  5"x10     PFMC slow holds    5"x6 5"x6 5"x10 5"2x5 supine  5"x5 seated  5"x5 standing      TA + PFM      5"x8       Diaphragmatic Breathing   10 min                       DKC with Diaphragmatic Breathing   30 sec x 3          Child's Pose with DiaphragmaticBreathing             Body Scan for PFM release/relaxatoin   5 min          Pelvic Floor Drops in deep squat                          TA + hip abd isometrics        5x     TA + add isometrics        5x                               Ther Ex             PPT        5"x10     bridges        1/2 range  2x5     Mini marches        4x5                               LTR   5"x5 ea          DKC stretch   30"x3          SKC stretch   15"x4 ea          Seated hamstring stretch   15"x3 ea    15"x3 ea 15"x3 ea     Seated gastroc stretch with strap   15"x 3 ea    15"x3 ea 15"x3 ea     Standing hip flexor stretch with step        10"x2 ea                                                                      Ther Activity             EDUCATION/COUNSELING  30 min 10 min  25 min 10 min 15 min      Toilet posture and defecation mechanics     done        [facility] sit<>stand       3x      Gait Training                                       Modalities

## 2022-09-15 ENCOUNTER — OFFICE VISIT (OUTPATIENT)
Dept: PHYSICAL THERAPY | Facility: REHABILITATION | Age: 47
End: 2022-09-15
Payer: COMMERCIAL

## 2022-09-15 DIAGNOSIS — M25.551 RIGHT HIP PAIN: ICD-10-CM

## 2022-09-15 DIAGNOSIS — M62.89 PELVIC FLOOR DYSFUNCTION: Primary | ICD-10-CM

## 2022-09-15 DIAGNOSIS — N39.8 DYSFUNCTIONAL VOIDING OF URINE: ICD-10-CM

## 2022-09-15 PROCEDURE — 97140 MANUAL THERAPY 1/> REGIONS: CPT | Performed by: PHYSICAL THERAPIST

## 2022-09-15 PROCEDURE — 97530 THERAPEUTIC ACTIVITIES: CPT | Performed by: PHYSICAL THERAPIST

## 2022-09-15 NOTE — PROGRESS NOTES
Daily Note     Today's date: 9/15/2022  Patient name: Pilar Duran  : 1975  MRN: [id number]  Referring provider: Chelo Pelaez DO  Dx:   Encounter Diagnosis     ICD-10-CM    1  Pelvic floor dysfunction  M62 89    2  Dysfunctional voiding of urine  N39 8    3  Right hip pain  M25 551        Start Time: 905  Stop Time: 1000  Total time in clinic (min): 55 minutes    Subjective: The patient notes that over the past week she has had right hip pain  She did have soreness that night after her previous treatment session  She felt that the hip abd isometrics were the most bothersome  Her pain persisted throughout the course of the week  She felt the pain down the lateral thigh, into her buttock at times, and at times from front to back inside the hip  She did continue to perform the exercises  She was busy over the course of the week and notes pain while at work and home  She feels some RSD-like symptoms of burning down the outside of the right hip but has been practicing techniques to try to reduce some of these symptoms  She did contact Dr May Siu to discuss another possible PRP injection as this did bring her some relief previously  Objective: See treatment diary below      Assessment: Tolerated treatment well  Patient tolerated light ROM of the right hip with gentle stretching in pain free ranges  Also performed some soft tissue work to the posterior hip/lumbosacral region  She had some pain along the left SIJ and near the posterior hip joint/inferior gluteal region  She did note after Soft tissue mobilization and some grade 1 inferior hip mobs in slight flexion some reduction in overall pain and RSD-like symptoms  She will return in 2 weeks for next visit  Asked patient to contact in between sessions if needed  Plan: Continue per plan of care              Precautions: mitochondrial disorder; hypoglycemic; CRPS  [employer] HEP: [id number]       Manuals  Scar mobilization             STM mobilization             Abdominal Tri Planar Myofascial release     10 min 10 min       Pelvic Floor Muscle Releases  10 min 10 min 10 min         STM to lumbosacral region    10 min     15 min    STM to anterior hip/thigh      8 min       Grade 1 inferior joint mobs in slight flexion         5 min    Patient Education             Neuro Re-Ed             Real Time Ultrasound             TA ADIM    5"x10 5"x10 5"x10  5"x10     PFMC slow holds    5"x6 5"x6 5"x10 5"2x5 supine  5"x5 seated  5"x5 standing      TA + PFM      5"x8       Diaphragmatic Breathing   10 min                       DKC with Diaphragmatic Breathing   30 sec x 3          Child's Pose with DiaphragmaticBreathing             Body Scan for PFM release/relaxatoin   5 min          Pelvic Floor Drops in deep squat                          TA + hip abd isometrics        5x     TA + add isometrics        5x                               Ther Ex             PPT        5"x10     bridges        1/2 range  2x5     Mini marches        4x5                               LTR   5"x5 ea          DKC stretch   30"x3          SKC stretch   15"x4 ea          Seated hamstring stretch   15"x3 ea    15"x3 ea 15"x3 ea     Seated gastroc stretch with strap   15"x 3 ea    15"x3 ea 15"x3 ea     Standing hip flexor stretch with step        10"x2 ea                                                                      Ther Activity             EDUCATION/COUNSELING  30 min 10 min  25 min 10 min 15 min  30 min    Toilet posture and defecation mechanics     done        [facility] sit<>stand       3x      Gait Training                                       Modalities

## 2022-09-22 ENCOUNTER — TELEPHONE (OUTPATIENT)
Dept: HEMATOLOGY ONCOLOGY | Facility: CLINIC | Age: 47
End: 2022-09-22

## 2022-09-22 NOTE — TELEPHONE ENCOUNTER
Patient is scheduled for and iron infusion with labs tomorrow  Can you please ensure that the patient has an iron panel drawn? I see an order from a year ago, but want to make sure its done  We will need to submit it to insurance with the authorization request  Patient can proceed with the infusion tomorrow

## 2022-09-23 ENCOUNTER — HOSPITAL ENCOUNTER (OUTPATIENT)
Dept: INFUSION CENTER | Facility: CLINIC | Age: 47
End: 2022-09-23
Payer: COMMERCIAL

## 2022-09-23 VITALS
RESPIRATION RATE: 16 BRPM | OXYGEN SATURATION: 100 % | TEMPERATURE: 99 F | DIASTOLIC BLOOD PRESSURE: 82 MMHG | SYSTOLIC BLOOD PRESSURE: 129 MMHG | HEART RATE: 94 BPM

## 2022-09-23 DIAGNOSIS — E61.1 IRON DEFICIENCY: Primary | ICD-10-CM

## 2022-09-23 LAB
ALBUMIN SERPL BCP-MCNC: 3.9 G/DL (ref 3.5–5)
ALP SERPL-CCNC: 64 U/L (ref 34–104)
ALT SERPL W P-5'-P-CCNC: 15 U/L (ref 7–52)
ANION GAP SERPL CALCULATED.3IONS-SCNC: 6 MMOL/L (ref 4–13)
AST SERPL W P-5'-P-CCNC: 17 U/L (ref 13–39)
BASOPHILS # BLD AUTO: 0.04 THOUSANDS/ΜL (ref 0–0.1)
BASOPHILS NFR BLD AUTO: 1 % (ref 0–1)
BILIRUB SERPL-MCNC: 0.34 MG/DL (ref 0.2–1)
BUN SERPL-MCNC: 14 MG/DL (ref 5–25)
CALCIUM SERPL-MCNC: 10 MG/DL (ref 8.4–10.2)
CHLORIDE SERPL-SCNC: 107 MMOL/L (ref 96–108)
CO2 SERPL-SCNC: 26 MMOL/L (ref 21–32)
CREAT SERPL-MCNC: 0.75 MG/DL (ref 0.6–1.3)
EOSINOPHIL # BLD AUTO: 0.06 THOUSAND/ΜL (ref 0–0.61)
EOSINOPHIL NFR BLD AUTO: 1 % (ref 0–6)
ERYTHROCYTE [DISTWIDTH] IN BLOOD BY AUTOMATED COUNT: 12.2 % (ref 11.6–15.1)
FERRITIN SERPL-MCNC: 406 NG/ML (ref 8–388)
GFR SERPL CREATININE-BSD FRML MDRD: 96 ML/MIN/1.73SQ M
GLUCOSE SERPL-MCNC: 80 MG/DL (ref 65–140)
HCT VFR BLD AUTO: 38.3 % (ref 34.8–46.1)
HGB BLD-MCNC: 12.7 G/DL (ref 11.5–15.4)
IMM GRANULOCYTES # BLD AUTO: 0 THOUSAND/UL (ref 0–0.2)
IMM GRANULOCYTES NFR BLD AUTO: 0 % (ref 0–2)
IRON SATN MFR SERPL: 31 % (ref 15–50)
IRON SERPL-MCNC: 76 UG/DL (ref 50–170)
LYMPHOCYTES # BLD AUTO: 1.17 THOUSANDS/ΜL (ref 0.6–4.47)
LYMPHOCYTES NFR BLD AUTO: 28 % (ref 14–44)
MCH RBC QN AUTO: 30.9 PG (ref 26.8–34.3)
MCHC RBC AUTO-ENTMCNC: 33.2 G/DL (ref 31.4–37.4)
MCV RBC AUTO: 93 FL (ref 82–98)
MONOCYTES # BLD AUTO: 0.3 THOUSAND/ΜL (ref 0.17–1.22)
MONOCYTES NFR BLD AUTO: 7 % (ref 4–12)
NEUTROPHILS # BLD AUTO: 2.64 THOUSANDS/ΜL (ref 1.85–7.62)
NEUTS SEG NFR BLD AUTO: 63 % (ref 43–75)
NRBC BLD AUTO-RTO: 0 /100 WBCS
PLATELET # BLD AUTO: 177 THOUSANDS/UL (ref 149–390)
PMV BLD AUTO: 10.5 FL (ref 8.9–12.7)
POTASSIUM SERPL-SCNC: 3.6 MMOL/L (ref 3.5–5.3)
PROT SERPL-MCNC: 6.6 G/DL (ref 6.4–8.4)
RBC # BLD AUTO: 4.11 MILLION/UL (ref 3.81–5.12)
SODIUM SERPL-SCNC: 139 MMOL/L (ref 135–147)
TIBC SERPL-MCNC: 248 UG/DL (ref 250–450)
WBC # BLD AUTO: 4.21 THOUSAND/UL (ref 4.31–10.16)

## 2022-09-23 PROCEDURE — 83918 ORGANIC ACIDS TOTAL QUANT: CPT

## 2022-09-23 PROCEDURE — 82728 ASSAY OF FERRITIN: CPT

## 2022-09-23 PROCEDURE — 85025 COMPLETE CBC W/AUTO DIFF WBC: CPT

## 2022-09-23 PROCEDURE — 83540 ASSAY OF IRON: CPT

## 2022-09-23 PROCEDURE — 80053 COMPREHEN METABOLIC PANEL: CPT

## 2022-09-23 PROCEDURE — 83550 IRON BINDING TEST: CPT

## 2022-09-23 PROCEDURE — 96365 THER/PROPH/DIAG IV INF INIT: CPT

## 2022-09-23 PROCEDURE — 96372 THER/PROPH/DIAG INJ SC/IM: CPT

## 2022-09-23 RX ORDER — CYANOCOBALAMIN 1000 UG/ML
1000 INJECTION, SOLUTION INTRAMUSCULAR; SUBCUTANEOUS ONCE
OUTPATIENT
Start: 2023-01-20 | End: 2023-01-20

## 2022-09-23 RX ORDER — CYANOCOBALAMIN 1000 UG/ML
1000 INJECTION, SOLUTION INTRAMUSCULAR; SUBCUTANEOUS ONCE
Status: COMPLETED | OUTPATIENT
Start: 2022-09-23 | End: 2022-09-23

## 2022-09-23 RX ORDER — SODIUM CHLORIDE 9 MG/ML
20 INJECTION, SOLUTION INTRAVENOUS ONCE
Status: COMPLETED | OUTPATIENT
Start: 2022-09-23 | End: 2022-09-23

## 2022-09-23 RX ORDER — SODIUM CHLORIDE 9 MG/ML
20 INJECTION, SOLUTION INTRAVENOUS ONCE
OUTPATIENT
Start: 2023-01-20

## 2022-09-23 RX ADMIN — SODIUM CHLORIDE 200 MG: 9 INJECTION, SOLUTION INTRAVENOUS at 13:56

## 2022-09-23 RX ADMIN — SODIUM CHLORIDE 20 ML/HR: 0.9 INJECTION, SOLUTION INTRAVENOUS at 13:56

## 2022-09-23 RX ADMIN — CYANOCOBALAMIN 1000 MCG: 1000 INJECTION, SOLUTION INTRAMUSCULAR; SUBCUTANEOUS at 13:58

## 2022-09-23 NOTE — PROGRESS NOTES
Patient here for iron infusion, with B12 and labs  Patient resting with no complaints  Vitals stable  Patient already accessed due to home infusion for hydration, patient to de-access herself when infusion complete  Patient tolerated infusion without issue  Patient aware to schedule next appointment, AVS declined

## 2022-09-27 ENCOUNTER — OFFICE VISIT (OUTPATIENT)
Dept: PHYSICAL THERAPY | Facility: REHABILITATION | Age: 47
End: 2022-09-27
Payer: COMMERCIAL

## 2022-09-27 DIAGNOSIS — M25.551 RIGHT HIP PAIN: ICD-10-CM

## 2022-09-27 DIAGNOSIS — N39.8 DYSFUNCTIONAL VOIDING OF URINE: ICD-10-CM

## 2022-09-27 DIAGNOSIS — M62.89 PELVIC FLOOR DYSFUNCTION: Primary | ICD-10-CM

## 2022-09-27 LAB — METHYLMALONATE SERPL-SCNC: 270 NMOL/L (ref 0–378)

## 2022-09-27 PROCEDURE — 97530 THERAPEUTIC ACTIVITIES: CPT | Performed by: PHYSICAL THERAPIST

## 2022-09-27 NOTE — PROGRESS NOTES
Daily Note and Discharge    Today's date: 2022  Patient name: Jorge Rubalcava  : 1975  MRN: [id number]  Referring provider: Katharina Rinaldi DO  Dx:   Encounter Diagnosis     ICD-10-CM    1  Pelvic floor dysfunction  M62 89    2  Dysfunctional voiding of urine  N39 8    3  Right hip pain  M25 551                   Subjective: The patient notes that overall she is not seeing improvements in her pain or function with PT  She notes that she overall has good days and then bad days in regards to pain  She notes that most often PT inflames her hip  She has been in touch with Dr Jose Alberto Gonzalez  She is not sure if she will be able to receive another PRP injection  She is looking into what her other options might be at this point  She also notes largely no improvements with her bladder as well  She has been utilizing tools and suggestions from PT to help with emptying  Objective: See treatment diary below      Assessment: Tolerated treatment well  Patient decided that at this point in time she is going to take a break from PT  She is going to follow up with Dr Jose Alberto Gonzalez  PT encouraged the patient to stay in touch and if at any point, PT would be beneficial, we can re-initiate  She will be discharged from outpatient PT at this time  Plan: Continue per plan of care              Precautions: mitochondrial disorder; hypoglycemic; CRPS  [employer] HEP: [id number]       Manuals    Scar mobilization             STM mobilization             Abdominal Tri Planar Myofascial release     10 min 10 min       Pelvic Floor Muscle Releases  10 min 10 min 10 min         STM to lumbosacral region    10 min     15 min    STM to anterior hip/thigh      8 min       Grade 1 inferior joint mobs in slight flexion         5 min    Patient Education             Neuro Re-Ed             Real Time Ultrasound             TA ADIM    5"x10 5"x10 5"x10  5"x10     PFMC slow holds    5"x6 5"x6 5"x10 5"2x5 supine  5"x5 seated  5"x5 standing      TA + PFM      5"x8       Diaphragmatic Breathing   10 min                       DKC with Diaphragmatic Breathing   30 sec x 3          Child's Pose with DiaphragmaticBreathing             Body Scan for PFM release/relaxatoin   5 min          Pelvic Floor Drops in deep squat                          TA + hip abd isometrics        5x     TA + add isometrics        5x                               Ther Ex             PPT        5"x10     bridges        1/2 range  2x5     Mini marches        4x5                               LTR   5"x5 ea          DKC stretch   30"x3          SKC stretch   15"x4 ea          Seated hamstring stretch   15"x3 ea    15"x3 ea 15"x3 ea     Seated gastroc stretch with strap   15"x 3 ea    15"x3 ea 15"x3 ea     Standing hip flexor stretch with step        10"x2 ea                                                                      Ther Activity             EDUCATION/COUNSELING  30 min 10 min  25 min 10 min 15 min  30 min 20 min   Toilet posture and defecation mechanics     done        [facility] sit<>stand       3x      Gait Training                                       Modalities

## 2022-10-16 ENCOUNTER — PATIENT MESSAGE (OUTPATIENT)
Dept: NEUROLOGY | Facility: CLINIC | Age: 47
End: 2022-10-16

## 2022-10-16 DIAGNOSIS — G43.719 INTRACTABLE CHRONIC MIGRAINE WITHOUT AURA AND WITHOUT STATUS MIGRAINOSUS: Primary | ICD-10-CM

## 2022-10-17 RX ORDER — FREMANEZUMAB-VFRM 225 MG/1.5ML
675 INJECTION SUBCUTANEOUS
Qty: 4.5 ML | Refills: 3 | Status: SHIPPED | OUTPATIENT
Start: 2022-10-17

## 2022-10-17 NOTE — PATIENT COMMUNICATION
Pt last saw dr Abelino Mckeon April 2022, scheduled to see you in dec    Please sign off on refill if agreeable

## 2022-11-01 DIAGNOSIS — E16.1 REACTIVE HYPOGLYCEMIA: ICD-10-CM

## 2022-11-01 DIAGNOSIS — E16.2 HYPOGLYCEMIA: ICD-10-CM

## 2022-11-01 RX ORDER — ACARBOSE 100 MG/1
100 TABLET ORAL
Qty: 270 TABLET | Refills: 0 | Status: SHIPPED | OUTPATIENT
Start: 2022-11-01 | End: 2023-08-28

## 2022-11-01 RX ORDER — BLOOD-GLUCOSE SENSOR
EACH MISCELLANEOUS
Qty: 9 EACH | Refills: 0 | Status: SHIPPED | OUTPATIENT
Start: 2022-11-01

## 2022-11-07 NOTE — PROGRESS NOTES
Assessment and Plan:   Patient is a 29-year-old female with a genetic mitochondrial disorder who presents for rheumatology follow-up regarding primary Raynaud phenomenon  She also has a suspected small fiber neuropathy that contributes to some of her ongoing complaints  She reports progressive cold intolerance in her hands and feet even in the fall weather  She gets severe pain despite being dressed in layers and in winter gear in the fall weather  This has worsened compared to last year  She did try coming off the nifedipine to see if this was actually helping her, and she did notice worsening of her symptoms when she stopped the nifedipine so she is back on the medication  We will continue with that since she has found that it definitely helps her  But we again discussed I suspect her symptoms she is describing are more so related to the possible small fiber neuropathy rather than truly Raynaud phenomenon  She will follow up with Neurology next month regarding this and any treatment options for this  Regarding her complaints about the PIP joint pain and swelling, we discussed that I do not see any evidence for swelling or synovitis in her joints today  I do appreciate bony hypertrophy and changes suggestive of OA which can be painful and flare  I suggested she try topical Voltaren when this happens  Plan:  Diagnoses and all orders for this visit:    Raynaud's disease without gangrene    Long term use of drug    Small fiber neuropathy        Follow-up plan: 1 year        Rheumatic Disease Summary  1   Primary Raynaud phenomenon   -Rheum eval 7/25/19 for raynaud’s, presented on amlodipine 10mg doing well; saw Dr Colby Sheridan previously and not dx with an AI disease   -Labs 7/30/19: negative BLAIRE, SSA, SSB, RF, CCP, APLs, hep panel, normal C3/4  -Visit 1/7/20: raynaud's sx uncontrolled on amlodipine 10mg, D/C'd and started Nifedipine 30mg   -Visit 11/10/20: doing better on nifedipine but still frequent raynaud's, increased to 60mg; advised talking to neuro about tx for neuropathy in hands  -Visit 11/11/21: doing better on nifedipine 60mg for raynaud's, still with other symptoms from a probable small fiber neuropathy   -Visit 11/8/22: continues to have cold intolerance, suspected to be related to a SFN; cont nifedipine, had worsening symptoms with trial off nifedipine; f/u with neuro  2  Comorbidities: mitochondrial genetic disorder (follows at [facility]), history of Nissen fundoplication and ultimately gastric bypass in 2015 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, GERD, chronic TPN use through PEG tube, iron deficiency anemia requiring IV replacement, secondary hyperparathyroidism, hypoglycemia, vitamin-D deficiency, 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, h/o DVT after foot fracture      HPI  Melonie Sanz is a 41 y o   female who presents for rheumatology follow-up for Raynaud phenomenon  At our visit last year, she was doing better on the increased dose of nifedipine 60 mg for the Raynaud phenomenon  She was having some issues with cold intolerance in her extremities which I felt was more so related to her small fiber neuropathy  Patient reports over the last 4 weeks she has had increased joint pain with associated swelling over her PIP joints  She will notice her knuckles getting larger with increased pain  Reports it is very sporadic with no specific pattern and affects various PIP joints  The most common and bothersome is the right 2nd PIP joint  She has no stiffness in the morning in her joints, feels worse at the end of her day  She also feels somewhat stiff in her hips and knees and lower back after sitting for a prolonged length of time when she starts walking again  However has no stiffness like this 1st thing in the morning  Reports she continues to have cold intolerance even in 60 degree weather     Reports it is profound discomfort and severe pain when she is in cooler weather  Has to wear winter gear for the fall weather and still feels freezing  This has progressively worsened and she feels at this point she needs to address it with some form of treatment  We discussed that we have suspected this is related to a small fiber neuropathy for which she has not had a biopsy but her previous neurologist suspected this  She has follow-up with Neurology in about 1 month  When she gets very cold her entire hands will turn a bright red as well  She did try going off the nifedipine to see if this was helping at all as she has had a longstanding diagnosis of Raynaud phenomenon  She did notice worsening symptoms off the nifedipine so she is back on the medication  However the symptoms are still persistent and very bothersome for her  The following portions of the patient's history were reviewed and updated as appropriate: allergies, current medications, past family history, past medical history, past social history, past surgical history and problem list     Review of Systems:   Review of Systems   Endocrine: Positive for cold intolerance  Musculoskeletal: Positive for arthralgias and joint swelling  Skin: Positive for color change  Negative for rash  Home Medications:    Current Outpatient Medications:   •  acarbose (PRECOSE) 100 MG tablet, Take 1 tablet (100 mg total) by mouth 3 (three) times a day with meals, Disp: 270 tablet, Rfl: 0  •  acetylcysteine (MUCOMYST) 200 mg/mL nebulizer solution, as needed , Disp: , Rfl:   •  Alpha-Lipoic Acid 100 MG CAPS, Take by mouth, Disp: , Rfl:   •  B Complex-Biotin-FA (TH VITAMIN B 50/B-COMPLEX PO), Take 1 tablet by mouth daily    , Disp: , Rfl:   •  bupivacaine (MARCAINE) 0 25 %, 1 mL, Disp: , Rfl:   •  Calcium Citrate 1040 MG TABS, Take by mouth, Disp: , Rfl:   •  COENZYME Q-10 PO, Dose varies, Disp: , Rfl:   •  Continuous Blood Gluc Sensor (Dexcom G6 Sensor) MISC, USE AS DIRECTED CMG - CHANGE EVERY 10 DAYS, Disp: 9 each, Rfl: 0  •  Continuous Blood Gluc Transmit (Dexcom G6 Transmitter) MISC, Use daily as directed for CGM - Change every 3 months, Disp: 1 each, Rfl: 3  •  CREATINE MONOHYDRATE PO, Take 2 5 g by mouth, Disp: , Rfl:   •  Cyanocobalamin (B-12) 1000 MCG/ML KIT, Inject  as directed every 30 days  , Disp: , Rfl:   •  diphenhydrAMINE (BENADRYL) 25 mg tablet, Take by mouth, Disp: , Rfl:   •  docusate sodium (COLACE) 100 mg capsule, Take 200 mg by mouth 2 (two) times a day , Disp: , Rfl:   •  fremanezumab-vfrm (Ajovy) 225 MG/1 5ML auto-injector, Inject 4 5 mL (675 mg total) under the skin every 3 (three) months, Disp: 4 5 mL, Rfl: 3  •  Glucagon (Baqsimi Two Pack) 3 MG/DOSE POWD, Use as needed it for Hypoglycemia   (Patient not taking: Reported on 5/31/2022), Disp: 2 each, Rfl: 3  •  iron sucrose (Venofer) 20 mg/mL,  , Disp: , Rfl:   •  L-Arginine 1000 MG TABS, Take 6,000 mg by mouth 2 (two) times a day , Disp: , Rfl:   •  linaCLOtide 145 MCG CAPS, Take 145 mcg by mouth 2 (two) times a day, Disp: , Rfl:   •  midodrine (PROAMATINE) 5 mg tablet, Take 1 tablet (5 mg total) by mouth 3 (three) times a day, Disp: 270 tablet, Rfl: 3  •  Motegrity 2 MG TABS, , Disp: , Rfl:   •  Multiple Vitamin (MULTIVITAMIN) tablet, Take 1 tablet by mouth daily  , Disp: , Rfl:   •  NIFEdipine (PROCARDIA XL) 60 mg 24 hr tablet, Take 1 tablet (60 mg total) by mouth daily, Disp: 90 tablet, Rfl: 3  •  Oral Vehicles (PCCA-PLUS) SUSP, , Disp: , Rfl:   •  P-Aminobenzoic Acid (PARA-AMINOBENZOIC ACID) POWD, , Disp: , Rfl:   •  pantoprazole (PROTONIX) 40 mg tablet, , Disp: , Rfl: 3  •  polyethylene glycol (MIRALAX) 17 g packet, Take 17 g by mouth 2 (two) times a day Indications: 1 5 caps full 1-2 times/day   , Disp: , Rfl:   •  Riboflavin 100 MG TABS, Take 100 mg by mouth, Disp: , Rfl:   •  Saline Bacteriostatic (sodium chloride bacteriostatic) 0 9 %, 7 mL, Disp: , Rfl:   •  sitaGLIPtin (JANUVIA) 50 mg tablet, 1 tab daily, Disp: 90 tablet, Rfl: 1  • sucralfate (CARAFATE) 1 g/10 mL suspension, Take 10 mL (1 g total) by mouth 4 (four) times a day Must have liquid suspension, Disp: 420 mL, Rfl: 5  •  tolterodine (DETROL LA) 4 mg 24 hr capsule, Take 1 capsule (4 mg total) by mouth in the morning , Disp: 90 capsule, Rfl: 3  •  TRILYTE 420 g solution, , Disp: , Rfl: 5  •  Turmeric 500 MG CAPS, Take by mouth daily, Disp: , Rfl:   •  Ubiquinol Liposomal 100 MG/5ML SYRP, Take 300 mg by mouth, Disp: , Rfl:   •  Ubiquinol POWD, , Disp: , Rfl:   •  vitamin E 100 UNIT capsule, Take 78 Units by mouth daily, Disp: , Rfl:     Objective:    Vitals:    11/08/22 0957   BP: 132/78   Pulse: 78   SpO2: 99%   Weight: 81 3 kg (179 lb 3 2 oz)   Height: 5' 4" (1 626 m)       Physical Exam  Constitutional:       General: She is not in acute distress  HENT:      Head: Normocephalic and atraumatic  Eyes:      Conjunctiva/sclera: Conjunctivae normal    Pulmonary:      Effort: Pulmonary effort is normal  No respiratory distress  Musculoskeletal:      Cervical back: Neck supple  Comments: Bony hypertrophy through the PIP joints particularly at the right 2nd PIP with some mild tenderness, no swelling or synovitis in any joints in the hands  Skin:     Coloration: Skin is not pale  Neurological:      Mental Status: She is alert  Mental status is at baseline     Psychiatric:         Mood and Affect: Mood normal          Behavior: Behavior normal          Labs:   Component      Latest Ref Rng & Units 9/23/2022   WBC      4 31 - 10 16 Thousand/uL 4 21 (L)   Red Blood Cell Count      3 81 - 5 12 Million/uL 4 11   Hemoglobin      11 5 - 15 4 g/dL 12 7   HCT      34 8 - 46 1 % 38 3   MCV      82 - 98 fL 93   MCH      26 8 - 34 3 pg 30 9   MCHC      31 4 - 37 4 g/dL 33 2   RDW      11 6 - 15 1 % 12 2   MPV      8 9 - 12 7 fL 10 5   Platelet Count      780 - 390 Thousands/uL 177   nRBC      /100 WBCs 0   Neutrophils %      43 - 75 % 63   Immat GRANS %      0 - 2 % 0   Lymphocytes Relative 14 - 44 % 28   Monocytes Relative      4 - 12 % 7   Eosinophils      0 - 6 % 1   Basophils Relative      0 - 1 % 1   Absolute Neutrophils      1 85 - 7 62 Thousands/µL 2 64   Immature Grans Absolute      0 00 - 0 20 Thousand/uL 0 00   Lymphocytes Absolute      0 60 - 4 47 Thousands/µL 1 17   Absolute Monocytes      0 17 - 1 22 Thousand/µL 0 30   Absolute Eosinophils      0 00 - 0 61 Thousand/µL 0 06   Basophils Absolute      0 00 - 0 10 Thousands/µL 0 04   Sodium      135 - 147 mmol/L 139   Potassium      3 5 - 5 3 mmol/L 3 6   Chloride      96 - 108 mmol/L 107   CO2      21 - 32 mmol/L 26   Anion Gap      4 - 13 mmol/L 6   BUN      5 - 25 mg/dL 14   Creatinine      0 60 - 1 30 mg/dL 0 75   Glucose, Random      65 - 140 mg/dL 80   Calcium      8 4 - 10 2 mg/dL 10 0   AST      13 - 39 U/L 17   ALT      7 - 52 U/L 15   Alkaline Phosphatase      34 - 104 U/L 64   Total Protein      6 4 - 8 4 g/dL 6 6   Albumin      3 5 - 5 0 g/dL 3 9   TOTAL BILIRUBIN      0 20 - 1 00 mg/dL 0 34   eGFR      ml/min/1 73sq m 96

## 2022-11-08 ENCOUNTER — OFFICE VISIT (OUTPATIENT)
Dept: RHEUMATOLOGY | Facility: CLINIC | Age: 47
End: 2022-11-08

## 2022-11-08 VITALS
BODY MASS INDEX: 30.59 KG/M2 | WEIGHT: 179.2 LBS | DIASTOLIC BLOOD PRESSURE: 78 MMHG | HEART RATE: 78 BPM | HEIGHT: 64 IN | SYSTOLIC BLOOD PRESSURE: 132 MMHG | OXYGEN SATURATION: 99 %

## 2022-11-08 DIAGNOSIS — G62.9 SMALL FIBER NEUROPATHY: ICD-10-CM

## 2022-11-08 DIAGNOSIS — I73.00 RAYNAUD'S DISEASE WITHOUT GANGRENE: Primary | ICD-10-CM

## 2022-11-08 DIAGNOSIS — Z79.899 LONG TERM USE OF DRUG: ICD-10-CM

## 2022-11-28 ENCOUNTER — ESTABLISHED COMPREHENSIVE EXAM (OUTPATIENT)
Dept: URBAN - METROPOLITAN AREA CLINIC 6 | Facility: CLINIC | Age: 47
End: 2022-11-28

## 2022-11-28 DIAGNOSIS — H35.52: ICD-10-CM

## 2022-11-28 DIAGNOSIS — Q14.1: ICD-10-CM

## 2022-11-28 DIAGNOSIS — H04.123: ICD-10-CM

## 2022-11-28 PROCEDURE — 92015 DETERMINE REFRACTIVE STATE: CPT

## 2022-11-28 PROCEDURE — 92014 COMPRE OPH EXAM EST PT 1/>: CPT

## 2022-11-28 ASSESSMENT — VISUAL ACUITY
OS_CC: 20/30
OD_CC: 20/30-2
OU_CC: J1
OU_CC: 20/30

## 2022-11-28 ASSESSMENT — TONOMETRY
OD_IOP_MMHG: 13
OS_IOP_MMHG: 15

## 2022-12-13 ENCOUNTER — OFFICE VISIT (OUTPATIENT)
Dept: NEUROLOGY | Facility: CLINIC | Age: 47
End: 2022-12-13

## 2022-12-13 VITALS
DIASTOLIC BLOOD PRESSURE: 83 MMHG | BODY MASS INDEX: 31.16 KG/M2 | TEMPERATURE: 97.8 F | HEART RATE: 74 BPM | HEIGHT: 64 IN | WEIGHT: 182.5 LBS | SYSTOLIC BLOOD PRESSURE: 137 MMHG

## 2022-12-13 DIAGNOSIS — I95.1 DYSAUTONOMIA ORTHOSTATIC HYPOTENSION SYNDROME: ICD-10-CM

## 2022-12-13 DIAGNOSIS — G43.719 INTRACTABLE CHRONIC MIGRAINE WITHOUT AURA AND WITHOUT STATUS MIGRAINOSUS: ICD-10-CM

## 2022-12-13 DIAGNOSIS — G43.009 MIGRAINE WITHOUT AURA AND WITHOUT STATUS MIGRAINOSUS, NOT INTRACTABLE: Primary | ICD-10-CM

## 2022-12-13 DIAGNOSIS — I95.1 ORTHOSTATIC HYPOTENSION: ICD-10-CM

## 2022-12-13 RX ORDER — FREMANEZUMAB-VFRM 225 MG/1.5ML
675 INJECTION SUBCUTANEOUS
Qty: 4.5 ML | Refills: 3 | Status: SHIPPED | OUTPATIENT
Start: 2022-12-13

## 2022-12-13 RX ORDER — RIMEGEPANT SULFATE 75 MG/75MG
TABLET, ORALLY DISINTEGRATING ORAL
Qty: 8 TABLET | Refills: 2 | Status: SHIPPED | OUTPATIENT
Start: 2022-12-13

## 2022-12-13 NOTE — PATIENT INSTRUCTIONS
Migraine: Leonel Shay presents for a follow-up evaluation with regard to a long history of migraines  Her migraines are reasonably well controlled on her current dose of Ajovy which is at the 90-day level  Unfortunately she does get frequent breakthrough migraines associated with carbohydrate intake which she controls as best as possible  Notably these headaches are fluid responsive to a degree  -We will plan to continue Ajovy at her current dose  -To abort the headache I would suggest pretreating when she is able to anticipate her carb load, but otherwise she can treat the headache as early as possible with a combination of 3 tablets of ibuprofen or 2 tablets of naproxen, and 1 tablet of Nurtec 75 mg  Neuropathy and peripheral vasoconstriction: She reports that aside from being continuously cold she actually has some significant autonomic symptoms associated with exposure and decreased temperature and pain that affects both the hands and feet on a regular basis even in normal ambient temperatures to a degree   -I will reach out to the cardiology and rheumatology groups in this regard  I would say that nifedipine is a reasonable option although more typically we would start with verapamil in this regard  She is on midodrine for blood pressure support however this may be exacerbating peripheral vasoconstriction  A transition to fludrocortisone/Florinef may provide adequate blood pressure support without causing the peripheral vasoconstriction component   -We will be back in touch with her as soon as possible once we are able to connect    She will return to the office to see me directly in 6 months time but I would be happy to see her sooner if the need should arise

## 2022-12-13 NOTE — PROGRESS NOTES
Patient ID: Gabino Karimi is a 41 y o  female who presents to the [address] [place] [address]    Assessment/Plan:   Patient Instructions   Migraine: Du Turner presents for a follow-up evaluation with regard to a long history of migraines  Her migraines are reasonably well controlled on her current dose of Ajovy which is at the 90-day level  Unfortunately she does get frequent breakthrough migraines associated with carbohydrate intake which she controls as best as possible  Notably these headaches are fluid responsive to a degree  -We will plan to continue Ajovy at her current dose  -To abort the headache I would suggest pretreating when she is able to anticipate her carb load, but otherwise she can treat the headache as early as possible with a combination of 3 tablets of ibuprofen or 2 tablets of naproxen, and 1 tablet of Nurtec 75 mg  Neuropathy and peripheral vasoconstriction: She reports that aside from being continuously cold she actually has some significant autonomic symptoms associated with exposure and decreased temperature and pain that affects both the hands and feet on a regular basis even in normal ambient temperatures to a degree   -I will reach out to the cardiology and rheumatology groups in this regard  I would say that nifedipine is a reasonable option although more typically we would start with verapamil in this regard  She is on midodrine for blood pressure support however this may be exacerbating peripheral vasoconstriction  A transition to fludrocortisone/Florinef may provide adequate blood pressure support without causing the peripheral vasoconstriction component   -We will be back in touch with her as soon as possible once we are able to connect    She will return to the office to see me directly in 6 months time but I would be happy to see her sooner if the need should arise         Diagnoses and all orders for this visit:    Migraine without aura and without status migrainosus, not intractable  -     Rimegepant Sulfate (Nurtec) 75 MG TBDP; One tab as needed for migraine, max one per day, max 3 days per week    Intractable chronic migraine without aura and without status migrainosus  -     fremanezumab-vfrm (Ajovy) 225 MG/1 5ML auto-injector; Inject 4 5 mL (675 mg total) under the skin every 3 (three) months    Orthostatic hypotension    Dysautonomia orthostatic hypotension syndrome        Subjective:    ASHLEY Nance is a 41 y o  woman who has a remote history of mitochondrial disease as well as extreme GI dysmotility with multiple prior surgical procedures  She has a port in place along with a G-tube for hydration  She has a long history of migraines which have now been controlled with her combination of Ajovy taken at a dose of 3 injections every 3 months  Unfortunately she continues to experience frequent breakthrough migraines consisting of bilateral frontal pain with concentration issues, light sensitivity, and more initial extreme pain that then settles into a lower level persistent pain with increased sensitivity for repeat triggers  This will occur a few times per week and is triggered by carbohydrates  She reports that her neuropathy is overall worsening with difficulty and using her hands and feet but she denies any burning pain/discomfort  This has been a longstanding problem for her and she does work with the rheumatology group  She notes that she is extremely cold sensitive  At one point taking a cool shower she had trembling, blue hands that eventually became more red, multiple additional changes  Just walking from a warm building to the car she would begin to notice pain in her feet  As result of dysautonomia and orthostatic hypotension she is treated with midodrine and more recently has started nifedipine to try and provide degree of vasodilation      Past Medical History:   Diagnosis Date   • Acid reflux    • Constipated    • Dysautonomia ([facility] [address] )    • Esophageal abnormality     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  • Gastrostomy tube in place [facility])    • Gastrostomy tube in place [facility])    • History of blood clots 2012    Left leg  Has IVC filter now  Occurred while on Lovenox   • Iron deficiency    • Malignant hyperthermia due to anesthesia     Patient's son has [facility]   States she needs precautions   • Migraines    • 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[facility] [address] )    • 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[facility] )    • MTHFR (methylene THF reductase) deficiency and homocystinuria (HCC)    • Muscle weakness (generalized)    • Nausea    • On total parenteral nutrition (TPN)     for 8 mts   • PCOS (polycystic ovarian syndrome)    • PONV (postoperative nausea and vomiting)    • Postgastrectomy syndrome     malabsorption   • Postsurgical malabsorption    • Status post gastric bypass for obesity    • Stroke [facility]) 1314, 3579    Metabolic strokes  Right hemiparesis= minor now  • Vomiting     When eating         Current Outpatient Medications:   •  acarbose (PRECOSE) 100 MG tablet, Take 1 tablet (100 mg total) by mouth 3 (three) times a day with meals, Disp: 270 tablet, Rfl: 0  •  acetylcysteine (MUCOMYST) 200 mg/mL nebulizer solution, as needed , Disp: , Rfl:   •  Alpha-Lipoic Acid 100 MG CAPS, Take by mouth, Disp: , Rfl:   •  B Complex-Biotin-FA (TH VITAMIN B 50/B-COMPLEX PO), Take 1 tablet by mouth daily  , Disp: , Rfl:   •  Calcium Citrate 1040 MG TABS, Take by mouth, Disp: , Rfl:   •  COENZYME Q-10 PO, Dose varies, Disp: , Rfl:   •  Continuous Blood Gluc Sensor (Dexcom G6 Sensor) MISC, USE AS DIRECTED CMG - CHANGE EVERY 10 DAYS, Disp: 9 each, Rfl: 0  •  Continuous Blood Gluc Transmit (Dexcom G6 Transmitter) MISC, Use daily as directed for CGM - Change every 3 months, Disp: 1 each, Rfl: 3  •  CREATINE MONOHYDRATE PO, Take 2 5 g by mouth, Disp: , Rfl:   •  Cyanocobalamin (B-12) 1000 MCG/ML KIT, Inject  as directed every 30 days  , Disp: , Rfl:   •  diphenhydrAMINE (BENADRYL) 25 mg tablet, Take by mouth, Disp: , Rfl:   •  docusate sodium (COLACE) 100 mg capsule, Take 200 mg by mouth 2 (two) times a day , Disp: , Rfl:   •  fremanezumab-vfrm (Ajovy) 225 MG/1 5ML auto-injector, Inject 4 5 mL (675 mg total) under the skin every 3 (three) months, Disp: 4 5 mL, Rfl: 3  •  Glucagon (Baqsimi Two Pack) 3 MG/DOSE POWD, Use as needed it for Hypoglycemia  , Disp: 2 each, Rfl: 3  •  iron sucrose (Venofer) 20 mg/mL,  , Disp: , Rfl:   •  L-Arginine 1000 MG TABS, Take 6,000 mg by mouth 2 (two) times a day , Disp: , Rfl:   •  linaCLOtide 145 MCG CAPS, Take 145 mcg by mouth 2 (two) times a day, Disp: , Rfl:   •  midodrine (PROAMATINE) 5 mg tablet, Take 1 tablet (5 mg total) by mouth 3 (three) times a day, Disp: 270 tablet, Rfl: 3  •  Motegrity 2 MG TABS, , Disp: , Rfl:   •  Multiple Vitamin (MULTIVITAMIN) tablet, Take 1 tablet by mouth daily  , Disp: , Rfl:   •  NIFEdipine (PROCARDIA XL) 60 mg 24 hr tablet, Take 1 tablet (60 mg total) by mouth daily, Disp: 90 tablet, Rfl: 3  •  Oral Vehicles (PCCA-PLUS) SUSP, , Disp: , Rfl:   •  P-Aminobenzoic Acid (PARA-AMINOBENZOIC ACID) POWD, , Disp: , Rfl:   •  pantoprazole (PROTONIX) 40 mg tablet, , Disp: , Rfl: 3  •  polyethylene glycol (MIRALAX) 17 g packet, Take 17 g by mouth 2 (two) times a day Indications: 1 5 caps full 1-2 times/day   , Disp: , Rfl:   •  Riboflavin 100 MG TABS, Take 100 mg by mouth, Disp: , Rfl:   •  Rimegepant Sulfate (Nurtec) 75 MG TBDP, One tab as needed for migraine, max one per day, max 3 days per week, Disp: 8 tablet, Rfl: 2  •  Saline Bacteriostatic (sodium chloride bacteriostatic) 0 9 %, 7 mL, Disp: , Rfl:   •  sitaGLIPtin (JANUVIA) 50 mg tablet, 1 tab daily, Disp: 90 tablet, Rfl: 1  •  sucralfate (CARAFATE) 1 g/10 mL suspension, Take 10 mL (1 g total) by mouth 4 (four) times a day Must have liquid suspension, Disp: 420 mL, Rfl: 5  •  tolterodine (DETROL LA) 4 mg 24 hr capsule, Take 1 capsule (4 mg total) by mouth in the morning , Disp: 90 capsule, Rfl: 3  •  TRILYTE 420 g solution, , Disp: , Rfl: 5  •  Turmeric 500 MG CAPS, Take by mouth daily, Disp: , Rfl:   •  Ubiquinol Liposomal 100 MG/5ML SYRP, Take 300 mg by mouth, Disp: , Rfl:   •  Ubiquinol POWD, , Disp: , Rfl:   •  vitamin E 100 UNIT capsule, Take 78 Units by mouth daily, Disp: , Rfl:   •  bupivacaine (MARCAINE) 0 25 %, 1 mL (Patient not taking: Reported on 12/13/2022), Disp: , Rfl:        Objective:    Blood pressure 137/83, pulse 74, temperature 97 8 °F (36 6 °C), temperature source Temporal, height 5' 4" (1 626 m), weight 82 8 kg (182 lb 8 oz), not currently breastfeeding  Physical Exam    Neurological Exam    At the time of my examination she was awake, alert, and in no distress  She is an excellent historian with no dysarthria or aphasia  Cranial nerves II through XII are symmetrically intact bilaterally and funduscopic exam reveals a little bit of irregularity around the optic disc but no evidence of papilledema  Strength is 5/5 in the bilateral upper and lower extremities  Pinprick sensation was diminished in the distal bilateral upper extremities to the level of the elbow  Vibration sensation was intact distally in bilateral upper extremities and also at the level of the ankle in the bilateral lower extremities without more distal testing  Proprioception was also intact in the bilateral upper extremities  There is no clear tremor or ataxia  Deep tendon reflexes are briskly 2+ and symmetric at the level of the biceps, patella, Achilles  ROS:    Review of Systems   Constitutional: Negative  Negative for appetite change and fever  HENT: Negative  Negative for hearing loss, tinnitus, trouble swallowing and voice change  Eyes: Positive for photophobia  Negative for pain and visual disturbance  Respiratory: Negative  Negative for shortness of breath  Cardiovascular: Negative  Negative for palpitations  Gastrointestinal: Negative  Negative for nausea and vomiting  Endocrine: Negative  Negative for cold intolerance  Genitourinary: Negative  Negative for dysuria, frequency and urgency  Musculoskeletal: Negative  Negative for gait problem, myalgias and neck pain  Skin: Negative  Negative for rash  Allergic/Immunologic: Negative  Neurological: Positive for dizziness and light-headedness  Negative for tremors, seizures, syncope, facial asymmetry, speech difficulty, weakness, numbness and headaches  Hematological: Negative  Does not bruise/bleed easily  Psychiatric/Behavioral: Negative  Negative for confusion, hallucinations and sleep disturbance  Reviewed ROS as entered by medical assistant  I have spent 50 minutes today on this case including chart review, performing history and exam, patient counseling, and documentation/communication

## 2022-12-14 ENCOUNTER — OFFICE VISIT (OUTPATIENT)
Dept: CARDIOLOGY CLINIC | Facility: CLINIC | Age: 47
End: 2022-12-14

## 2022-12-14 VITALS
HEART RATE: 79 BPM | BODY MASS INDEX: 31.02 KG/M2 | SYSTOLIC BLOOD PRESSURE: 124 MMHG | DIASTOLIC BLOOD PRESSURE: 82 MMHG | OXYGEN SATURATION: 99 % | WEIGHT: 181.7 LBS | HEIGHT: 64 IN

## 2022-12-14 DIAGNOSIS — E88.40 MITOCHONDRIAL DISEASE (HCC): ICD-10-CM

## 2022-12-14 DIAGNOSIS — I95.1 ORTHOSTATIC HYPOTENSION: Primary | ICD-10-CM

## 2022-12-14 RX ORDER — FLUDROCORTISONE ACETATE 0.1 MG/1
0.1 TABLET ORAL DAILY
Qty: 90 TABLET | Refills: 3 | Status: SHIPPED | OUTPATIENT
Start: 2022-12-14

## 2022-12-14 NOTE — PROGRESS NOTES
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   Has feeding tube    3  Headaches - unclear etiology   Improved with hydration  4  Possible genetic mutation for premature atrial fibrillation - no episodes          Recommendations:  1  Decrease midodrine to 2 5mg 3x/day  2  Start florinef 0 1mg daily  3  Continue IV saline boluses  4  Follow up in 6 months             HPI: Arlette Renee is a 41y o  year old female with mitochondrial disease, gastric bypass surgery, and dysautonomia who presents for follow up  Since her gastric bypass surgery, has needed a central line and giving herself saline boluses - 1-2x/month  Does get fluid through feeding tube   On midodrine 5mg 3x/day  Major complaint is hand pain from Raynaud's, and has headaches which are sensitive to hydration level  Review of Systems   Constitutional: Negative  HENT: Negative  Eyes: Negative  Respiratory: Negative for chest tightness and shortness of breath  Cardiovascular: Negative for chest pain, palpitations and leg swelling  Gastrointestinal: Negative  Endocrine: Negative  Genitourinary: Negative  Musculoskeletal: Negative  Skin: Negative  Allergic/Immunologic: Negative  Neurological: Negative  Hematological: Negative  Psychiatric/Behavioral: Negative  All other systems reviewed and are negative  Past Medical History:   Diagnosis Date   • Acid reflux    • Constipated    • Dysautonomia (HCC)    • Esophageal abnormality     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  • Gastrostomy tube in place [facility])    • Gastrostomy tube in place [facility])    • History of blood clots 2012    Left leg   Has IVC filter now  Occurred while on Lovenox   • Iron deficiency    • Malignant hyperthermia due to anesthesia     Patient's son has M H   States she needs precautions   • Migraines    • 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[facility] [address] )    • 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[facility] [address] )    • MTHFR (methylene THF reductase) deficiency and homocystinuria (HCC)    • Muscle weakness (generalized)    • Nausea    • On total parenteral nutrition (TPN)     for 8 mts   • PCOS (polycystic ovarian syndrome)    • PONV (postoperative nausea and vomiting)    • Postgastrectomy syndrome     malabsorption   • Postsurgical malabsorption    • Status post gastric bypass for obesity    • Stroke [facility]) 0841, [id number]    Metabolic strokes  Right hemiparesis= minor now  • Vomiting     When eating     Past Surgical History:   Procedure Laterality Date   • ANKLE SURGERY Left     Has plate and screws   • COLONOSCOPY     • COSMETIC SURGERY      X14 as child post attack by dog  Arms, legs and face   • DILATION AND CURETTAGE OF UTERUS      x2   • ESOPHAGOGASTRODUODENOSCOPY N/A 1/27/2016    Procedure: ESOPHAGOGASTRODUODENOSCOPY (EGD); Surgeon: Lory Carvalho MD;  Location: [facility]; Service:    • FRACTURE SURGERY Left     Left ankle - hardware   • GASTRIC BYPASS     • IVC FILTER INSERTION     • NISSEN FUNDOPLICATION     • OVARY SURGERY Bilateral     "Drilling" due to multiple cysts- PCOS   • TX EGD TRANSORAL BIOPSY SINGLE/MULTIPLE N/A 3/9/2016    Procedure: ESOPHAGOGASTRODUODENOSCOPY (EGD); Surgeon: Lory Carvalho MD;  Location: [facility];   Service: Bariatrics   • TX HYSTEROSCOPY,W/ENDO BX N/A 5/22/2020    Procedure: DILATATION AND CURETTAGE (D&C) WITH HYSTEROSCOPY;  Surgeon: Ofelia Sawyer MD;  [facility];  Service: Gynecology   • TX HYSTEROSCOPY,W/ENDOMETRIAL ABLATION N/A 5/22/2020    Procedure: Jaxon Teresa;  Surgeon: Ofelia Sawyer MD;  [facility];  Service: Gynecology   • TX LAP,GASTROSTOMY,W/O TUBE CONSTR N/A 4/19/2016    Procedure: Antoni Redmond GASTROSTOMY TUBE LAPAROSCOPIC WITH INTRAOP EGD;  Surgeon: Claudean Jubilee, MD;  Location: [facility];  Service: Bariatrics   • ULNAR NERVE TRANSPOSITION Right    • WISDOM TOOTH EXTRACTION       Social History     Substance and Sexual Activity   Alcohol Use No     Social History     Substance and Sexual Activity   Drug Use No     Social History     Tobacco Use   Smoking Status Never   Smokeless Tobacco Never     Family History   Problem Relation Age of Onset   • Mitochondrial disorder Mother    • Hypertension Mother    • Thyroid cancer Mother 48   • Clotting disorder Mother         MTFR   • Cancer Mother 62        renal   • Depression Father    • Endocrinopathy Father    • Clotting disorder Father         MTFR   • Stroke Father    • Aneurysm Father         brain   • Depression Brother    • Narcolepsy Brother    • Heart failure Maternal Grandmother 96   • Coronary artery disease Maternal Grandmother    • Mitochondrial disorder Son    • Mitochondrial disorder Daughter    • Stroke Family    • Anuerysm Paternal Uncle         abdominal    • Breast cancer Maternal Grandfather 55   • Breast cancer Maternal Aunt 42   • Heart attack Neg Hx    • Arrhythmia Neg Hx    • Sudden death Neg Hx         scd       Allergies: Allergies   Allergen Reactions   • Sulfate Other (See Comments)   • Sulfa Antibiotics      Arrhythmia    • Guaifenesin      Arrhythmia   • Other      Malignant hyperhermia precautions  NEVER use Lacted Ringers       Medications:     Current Outpatient Medications:   •  acarbose (PRECOSE) 100 MG tablet, Take 1 tablet (100 mg total) by mouth 3 (three) times a day with meals, Disp: 270 tablet, Rfl: 0  •  acetylcysteine (MUCOMYST) 200 mg/mL nebulizer solution, as needed , Disp: , Rfl:   •  Alpha-Lipoic Acid 100 MG CAPS, Take by mouth, Disp: , Rfl:   •  B Complex-Biotin-FA (TH VITAMIN B 50/B-COMPLEX PO), Take 1 tablet by mouth daily    , Disp: , Rfl:   •  Calcium Citrate 1040 MG TABS, Take by mouth, Disp: , Rfl:   • COENZYME Q-10 PO, Dose varies, Disp: , Rfl:   •  Continuous Blood Gluc Sensor (Dexcom G6 Sensor) MISC, USE AS DIRECTED CMG - CHANGE EVERY 10 DAYS, Disp: 9 each, Rfl: 0  •  Continuous Blood Gluc Transmit (Dexcom G6 Transmitter) MISC, Use daily as directed for CGM - Change every 3 months, Disp: 1 each, Rfl: 3  •  CREATINE MONOHYDRATE PO, Take 2 5 g by mouth, Disp: , Rfl:   •  Cyanocobalamin (B-12) 1000 MCG/ML KIT, Inject  as directed every 30 days  , Disp: , Rfl:   •  diphenhydrAMINE (BENADRYL) 25 mg tablet, Take by mouth, Disp: , Rfl:   •  docusate sodium (COLACE) 100 mg capsule, Take 200 mg by mouth 2 (two) times a day , Disp: , Rfl:   •  fremanezumab-vfrm (Ajovy) 225 MG/1 5ML auto-injector, Inject 4 5 mL (675 mg total) under the skin every 3 (three) months, Disp: 4 5 mL, Rfl: 3  •  Glucagon (Baqsimi Two Pack) 3 MG/DOSE POWD, Use as needed it for Hypoglycemia  , Disp: 2 each, Rfl: 3  •  iron sucrose (Venofer) 20 mg/mL,  , Disp: , Rfl:   •  L-Arginine 1000 MG TABS, Take 6,000 mg by mouth 2 (two) times a day , Disp: , Rfl:   •  linaCLOtide 145 MCG CAPS, Take 145 mcg by mouth 2 (two) times a day, Disp: , Rfl:   •  midodrine (PROAMATINE) 5 mg tablet, Take 1 tablet (5 mg total) by mouth 3 (three) times a day, Disp: 270 tablet, Rfl: 3  •  Motegrity 2 MG TABS, , Disp: , Rfl:   •  Multiple Vitamin (MULTIVITAMIN) tablet, Take 1 tablet by mouth daily  , Disp: , Rfl:   •  NIFEdipine (PROCARDIA XL) 60 mg 24 hr tablet, Take 1 tablet (60 mg total) by mouth daily, Disp: 90 tablet, Rfl: 3  •  Oral Vehicles (PCCA-PLUS) SUSP, , Disp: , Rfl:   •  P-Aminobenzoic Acid (PARA-AMINOBENZOIC ACID) POWD, , Disp: , Rfl:   •  pantoprazole (PROTONIX) 40 mg tablet, , Disp: , Rfl: 3  •  polyethylene glycol (MIRALAX) 17 g packet, Take 17 g by mouth 2 (two) times a day Indications: 1 5 caps full 1-2 times/day   , Disp: , Rfl:   •  Riboflavin 100 MG TABS, Take 100 mg by mouth, Disp: , Rfl:   •  Rimegepant Sulfate (Nurtec) 75 MG TBDP, One tab as needed for migraine, max one per day, max 3 days per week, Disp: 8 tablet, Rfl: 2  •  Saline Bacteriostatic (sodium chloride bacteriostatic) 0 9 %, 7 mL, Disp: , Rfl:   •  sitaGLIPtin (JANUVIA) 50 mg tablet, 1 tab daily, Disp: 90 tablet, Rfl: 1  •  tolterodine (DETROL LA) 4 mg 24 hr capsule, Take 1 capsule (4 mg total) by mouth in the morning , Disp: 90 capsule, Rfl: 3  •  TRILYTE 420 g solution, , Disp: , Rfl: 5  •  Turmeric 500 MG CAPS, Take by mouth daily, Disp: , Rfl:   •  Ubiquinol Liposomal 100 MG/5ML SYRP, Take 300 mg by mouth, Disp: , Rfl:   •  Ubiquinol POWD, , Disp: , Rfl:   •  vitamin E 100 UNIT capsule, Take 78 Units by mouth daily, Disp: , Rfl:   •  bupivacaine (MARCAINE) 0 25 %, 1 mL (Patient not taking: Reported on 12/13/2022), Disp: , Rfl:   •  sucralfate (CARAFATE) 1 g/10 mL suspension, Take 10 mL (1 g total) by mouth 4 (four) times a day Must have liquid suspension, Disp: 420 mL, Rfl: 5      Wt Readings from Last 3 Encounters:   12/14/22 82 4 kg (181 lb 11 2 oz)   12/13/22 82 8 kg (182 lb 8 oz)   11/08/22 81 3 kg (179 lb 3 2 oz)     Temp Readings from Last 3 Encounters:   12/13/22 97 8 °F (36 6 °C) (Temporal)   09/23/22 99 °F (37 2 °C) (Temporal)   05/09/22 97 8 °F (36 6 °C)     BP Readings from Last 3 Encounters:   12/14/22 124/82   12/13/22 137/83   11/08/22 132/78     Pulse Readings from Last 3 Encounters:   12/14/22 79   12/13/22 74   11/08/22 78         Physical Exam  HENT:      Head: Atraumatic  Mouth/Throat:      Mouth: Mucous membranes are moist    Eyes:      Extraocular Movements: Extraocular movements intact  Cardiovascular:      Rate and Rhythm: Normal rate and regular rhythm  Heart sounds: Normal heart sounds  Pulmonary:      Effort: Pulmonary effort is normal       Breath sounds: Normal breath sounds  Abdominal:      General: Abdomen is flat  Musculoskeletal:         General: Normal range of motion  Cervical back: Normal range of motion     Skin:     General: Skin is warm    Neurological:      General: No focal deficit present  Mental Status: She is alert and oriented to person, place, and time  Psychiatric:         Mood and Affect: Mood normal          Behavior: Behavior normal            Laboratory Studies:  CMP:  Lab Results   Component Value Date     2016    K 3 6 2022     2022    CO2 26 2022    ANIONGAP 9 2016    BUN 14 2022    CREATININE 0 75 2022    GLUCOSE 76 2016    AST 17 2022    ALT 15 2022    BILITOT 0 32 2016    EGFR 96 2022       Lipid Profile:   Lab Results   Component Value Date    CHOL 113 2016     Lab Results   Component Value Date    HDL 70 2022     Lab Results   Component Value Date    LDLCALC 91 2022     Lab Results   Component Value Date    TRIG 66 2022       Cardiac testing:   EKG reviewed personally:   Results for orders placed during the hospital encounter of 19    Echo complete with contrast if indicated    Michelle Sainz [address]  [address]  [phone]    Transthoracic Echocardiogram  2D, M-mode, Doppler, and Color Doppler    Study date:  2019    Patient: Sonia Ruggiero  MR number: [id number]  Account number: [de-identified]  : 1975  Age: 41 years  Gender: Female  Status: Outpatient  [facility] [address] [place] [address] [facility]  Height: 64 in  Weight: 174 lb  BP: 122/ 68 mmHg    Indications: Cardiomyopathy    Diagnoses: I42 9 - Cardiomyopathy, unspecified    Sonographer:  SAM Perez  Referring Physician:  Khanh Alexandra MD  Group:  [facility]  Interpreting Physician:  Breezy Ward MD    SUMMARY    LEFT VENTRICLE:  Systolic function was normal  Ejection fraction was estimated to be 60 %  There were no regional wall motion abnormalities  MITRAL VALVE:  There was trace regurgitation      TRICUSPID VALVE:  There was trace regurgitation  Pulmonary artery systolic pressure was within the normal range  HISTORY: PRIOR HISTORY: PICC, CVA, Gastric bypass, Mitochondrial disease, DVT, IVC filter    PROCEDURE: The study was performed in the [address]  This was a routine study  The transthoracic approach was used  The study included complete 2D imaging, M-mode, complete spectral Doppler, and color Doppler  The  heart rate was 68 bpm, at the start of the study  Images were obtained from the parasternal, apical, subcostal, and suprasternal notch acoustic windows  Image quality was adequate  LEFT VENTRICLE: Size was normal  Systolic function was normal  Ejection fraction was estimated to be 60 %  There were no regional wall motion abnormalities  Wall thickness was normal  No evidence of apical thrombus  DOPPLER: Left  ventricular diastolic function parameters were normal     RIGHT VENTRICLE: The size was normal  Systolic function was normal  Wall thickness was normal     LEFT ATRIUM: Size was normal     RIGHT ATRIUM: Size was normal     MITRAL VALVE: Valve structure was normal  There was normal leaflet separation  DOPPLER: The transmitral velocity was within the normal range  There was no evidence for stenosis  There was trace regurgitation  AORTIC VALVE: The valve was trileaflet  Leaflets exhibited normal thickness and normal cuspal separation  DOPPLER: Transaortic velocity was within the normal range  There was no evidence for stenosis  There was no significant  regurgitation  TRICUSPID VALVE: The valve structure was normal  There was normal leaflet separation  DOPPLER: The transtricuspid velocity was within the normal range  There was no evidence for stenosis  There was trace regurgitation  Pulmonary artery  systolic pressure was within the normal range  PULMONIC VALVE: Leaflets exhibited normal thickness, no calcification, and normal cuspal separation   DOPPLER: The transpulmonic velocity was within the normal range  There was no significant regurgitation  PERICARDIUM: There was no pericardial effusion  The pericardium was normal in appearance  AORTA: The root exhibited normal size  SYSTEMIC VEINS: IVC: The inferior vena cava was normal in size  SYSTEM MEASUREMENT TABLES    2D  %FS: 37 64 %  Ao Diam: 3 19 cm  EDV(Teich): 136 32 ml  EF(Cube): 75 74 %  EF(Teich): 67 25 %  ESV(Cube): 36 45 ml  ESV(Teich): 44 64 ml  IVSd: 0 55 cm  LA Area: 17 49 cm2  LA Diam: 3 2 cm  LVEDV MOD A4C: 88 62 ml  LVEF MOD A4C: 61 05 %  LVESV MOD A4C: 34 52 ml  LVIDd: 5 32 cm  LVIDs: 3 32 cm  LVLd A4C: 7 74 cm  LVLs A4C: 6 33 cm  LVPWd: 0 72 cm  RA Area: 13 91 cm2  RV Diam : 3 17 cm  SV MOD A4C: 54 1 ml  SV(Cube): 113 83 ml  SV(Teich): 91 68 ml    CW  TR Vmax: 2 16 m/s  TR maxP 68 mmHg    MM  TAPSE: 2 38 cm    PW  E': 0 12 m/s  E/E': 6 43  MV A Cesar: 0 56 m/s  MV Dec [place]: 5 45 m/s2  MV DecT: 144 86 ms  MV E Cesar: 0 79 m/s  MV E/A Ratio: 1 41    [employer] Accredited Echocardiography Laboratory    Prepared and electronically signed by    Breezy Ward MD  Signed 10-Apr-2019 11:06:24    No results found for this or any previous visit  No results found for this or any previous visit  No results found for this or any previous visit

## 2022-12-14 NOTE — PATIENT INSTRUCTIONS
Recommendations:  1  Decrease midodrine to 2 5mg 3x/day  2  Start florinef 0 1mg daily  3  Continue IV saline boluses  4  Follow up in 6 months

## 2022-12-26 DIAGNOSIS — E16.1 REACTIVE HYPOGLYCEMIA: ICD-10-CM

## 2022-12-27 RX ORDER — BLOOD-GLUCOSE TRANSMITTER
EACH MISCELLANEOUS
Qty: 1 EACH | Refills: 0 | Status: SHIPPED | OUTPATIENT
Start: 2022-12-27

## 2022-12-27 NOTE — TELEPHONE ENCOUNTER
Patient has appointment scheduled with Dr Kp Fitzgerald 2/22/2023 at 12:50pm in [facility] office

## 2023-01-11 ENCOUNTER — APPOINTMENT (INPATIENT)
Dept: MRI IMAGING | Facility: HOSPITAL | Age: 48
End: 2023-01-11

## 2023-01-11 ENCOUNTER — HOSPITAL ENCOUNTER (INPATIENT)
Facility: HOSPITAL | Age: 48
LOS: 4 days | Discharge: HOME | End: 2023-01-15
Attending: EMERGENCY MEDICINE | Admitting: INTERNAL MEDICINE

## 2023-01-11 ENCOUNTER — APPOINTMENT (EMERGENCY)
Dept: CT IMAGING | Facility: HOSPITAL | Age: 48
End: 2023-01-11

## 2023-01-11 DIAGNOSIS — G43.909 MIGRAINE: ICD-10-CM

## 2023-01-11 DIAGNOSIS — R29.90 STROKE-LIKE SYMPTOM: Primary | ICD-10-CM

## 2023-01-11 DIAGNOSIS — H57.02 ANISOCORIA: ICD-10-CM

## 2023-01-11 DIAGNOSIS — H57.04 FIXED DILATED PUPIL OF RIGHT EYE: ICD-10-CM

## 2023-01-11 PROBLEM — Z86.79 HISTORY OF ORTHOSTATIC HYPOTENSION: Status: ACTIVE | Noted: 2023-01-11

## 2023-01-11 LAB
ALBUMIN SERPL BCP-MCNC: 4.5 G/DL (ref 3.5–5)
ALP SERPL-CCNC: 63 U/L (ref 34–104)
ALT SERPL W P-5'-P-CCNC: 16 U/L (ref 7–52)
ANION GAP SERPL CALCULATED.3IONS-SCNC: 8 MMOL/L (ref 4–13)
APTT PPP: 25 SECONDS (ref 23–37)
AST SERPL W P-5'-P-CCNC: 17 U/L (ref 13–39)
ATRIAL RATE: 81 BPM
BILIRUB SERPL-MCNC: 0.48 MG/DL (ref 0.2–1)
BUN SERPL-MCNC: 18 MG/DL (ref 5–25)
CALCIUM SERPL-MCNC: 10.7 MG/DL (ref 8.4–10.2)
CARDIAC TROPONIN I PNL SERPL HS: <2 NG/L
CARDIAC TROPONIN I PNL SERPL HS: <2 NG/L
CHLORIDE SERPL-SCNC: 102 MMOL/L (ref 96–108)
CO2 SERPL-SCNC: 28 MMOL/L (ref 21–32)
CREAT SERPL-MCNC: 0.73 MG/DL (ref 0.6–1.3)
ERYTHROCYTE [DISTWIDTH] IN BLOOD BY AUTOMATED COUNT: 12.1 % (ref 11.6–15.1)
FLUAV RNA RESP QL NAA+PROBE: NEGATIVE
FLUBV RNA RESP QL NAA+PROBE: NEGATIVE
GFR SERPL CREATININE-BSD FRML MDRD: 99 ML/MIN/1.73SQ M
GLUCOSE SERPL-MCNC: 74 MG/DL (ref 65–140)
GLUCOSE SERPL-MCNC: 94 MG/DL (ref 65–140)
HCT VFR BLD AUTO: 41.6 % (ref 34.8–46.1)
HGB BLD-MCNC: 13.6 G/DL (ref 11.5–15.4)
INR PPP: 0.9 (ref 0.84–1.19)
LACTATE SERPL-SCNC: 0.7 MMOL/L (ref 0.5–2)
MCH RBC QN AUTO: 30.6 PG (ref 26.8–34.3)
MCHC RBC AUTO-ENTMCNC: 32.7 G/DL (ref 31.4–37.4)
MCV RBC AUTO: 94 FL (ref 82–98)
P AXIS: 66 DEGREES
PLATELET # BLD AUTO: 246 THOUSANDS/UL (ref 149–390)
PMV BLD AUTO: 10.1 FL (ref 8.9–12.7)
POTASSIUM SERPL-SCNC: 3.8 MMOL/L (ref 3.5–5.3)
PR INTERVAL: 160 MS
PROT SERPL-MCNC: 7.5 G/DL (ref 6.4–8.4)
PROTHROMBIN TIME: 12.3 SECONDS (ref 11.6–14.5)
QRS AXIS: 40 DEGREES
QRSD INTERVAL: 90 MS
QT INTERVAL: 370 MS
QTC INTERVAL: 429 MS
RBC # BLD AUTO: 4.45 MILLION/UL (ref 3.81–5.12)
RSV RNA RESP QL NAA+PROBE: NEGATIVE
SARS-COV-2 RNA RESP QL NAA+PROBE: NEGATIVE
SODIUM SERPL-SCNC: 138 MMOL/L (ref 135–147)
T WAVE AXIS: 59 DEGREES
VENTRICULAR RATE: 81 BPM
WBC # BLD AUTO: 5.98 THOUSAND/UL (ref 4.31–10.16)

## 2023-01-11 RX ORDER — MIDODRINE HYDROCHLORIDE 2.5 MG/1
2.5 TABLET ORAL 3 TIMES DAILY
Status: DISCONTINUED | OUTPATIENT
Start: 2023-01-11 | End: 2023-01-15 | Stop reason: HOSPADM

## 2023-01-11 RX ORDER — POLYETHYLENE GLYCOL 3350 17 G/17G
17 POWDER, FOR SOLUTION ORAL DAILY PRN
Status: DISCONTINUED | OUTPATIENT
Start: 2023-01-11 | End: 2023-01-13

## 2023-01-11 RX ORDER — ACARBOSE 100 MG/1
100 TABLET ORAL
Status: DISCONTINUED | OUTPATIENT
Start: 2023-01-11 | End: 2023-01-13

## 2023-01-11 RX ORDER — NIFEDIPINE 60 MG/1
60 TABLET, EXTENDED RELEASE ORAL DAILY
Status: DISCONTINUED | OUTPATIENT
Start: 2023-01-12 | End: 2023-01-15 | Stop reason: HOSPADM

## 2023-01-11 RX ORDER — DOCUSATE SODIUM 100 MG/1
200 CAPSULE, LIQUID FILLED ORAL 2 TIMES DAILY
Status: DISCONTINUED | OUTPATIENT
Start: 2023-01-11 | End: 2023-01-15 | Stop reason: HOSPADM

## 2023-01-11 RX ORDER — FLUDROCORTISONE ACETATE 0.1 MG/1
0.1 TABLET ORAL DAILY
Status: DISCONTINUED | OUTPATIENT
Start: 2023-01-12 | End: 2023-01-15 | Stop reason: HOSPADM

## 2023-01-11 RX ORDER — PANTOPRAZOLE SODIUM 40 MG/1
40 TABLET, DELAYED RELEASE ORAL
Status: DISCONTINUED | OUTPATIENT
Start: 2023-01-12 | End: 2023-01-15 | Stop reason: HOSPADM

## 2023-01-11 RX ORDER — ASPIRIN 81 MG/1
81 TABLET, CHEWABLE ORAL DAILY
Status: DISCONTINUED | OUTPATIENT
Start: 2023-01-12 | End: 2023-01-11

## 2023-01-11 RX ORDER — SUCRALFATE 1 G/1
1 TABLET ORAL 4 TIMES DAILY
Status: DISCONTINUED | OUTPATIENT
Start: 2023-01-11 | End: 2023-01-15 | Stop reason: HOSPADM

## 2023-01-11 RX ORDER — DEXTROSE AND SODIUM CHLORIDE 5; .9 G/100ML; G/100ML
125 INJECTION, SOLUTION INTRAVENOUS CONTINUOUS
Status: DISCONTINUED | OUTPATIENT
Start: 2023-01-11 | End: 2023-01-11

## 2023-01-11 RX ORDER — CHOLECALCIFEROL (VITAMIN D3) 10 MCG
1 TABLET ORAL
Status: DISCONTINUED | OUTPATIENT
Start: 2023-01-11 | End: 2023-01-15 | Stop reason: HOSPADM

## 2023-01-11 RX ORDER — OXYBUTYNIN CHLORIDE 5 MG/1
10 TABLET, EXTENDED RELEASE ORAL DAILY
Status: DISCONTINUED | OUTPATIENT
Start: 2023-01-12 | End: 2023-01-15 | Stop reason: HOSPADM

## 2023-01-11 RX ORDER — ATORVASTATIN CALCIUM 40 MG/1
40 TABLET, FILM COATED ORAL EVERY EVENING
Status: DISCONTINUED | OUTPATIENT
Start: 2023-01-11 | End: 2023-01-11

## 2023-01-11 RX ORDER — SODIUM CHLORIDE, SODIUM GLUCONATE, SODIUM ACETATE, POTASSIUM CHLORIDE, MAGNESIUM CHLORIDE, SODIUM PHOSPHATE, DIBASIC, AND POTASSIUM PHOSPHATE .53; .5; .37; .037; .03; .012; .00082 G/100ML; G/100ML; G/100ML; G/100ML; G/100ML; G/100ML; G/100ML
100 INJECTION, SOLUTION INTRAVENOUS CONTINUOUS
Status: DISCONTINUED | OUTPATIENT
Start: 2023-01-11 | End: 2023-01-15 | Stop reason: HOSPADM

## 2023-01-11 RX ORDER — NIACIN 100 MG
100 TABLET ORAL
Status: DISCONTINUED | OUTPATIENT
Start: 2023-01-11 | End: 2023-01-11

## 2023-01-11 RX ORDER — VIT C/B6/B5/MAGNESIUM/HERB 173 50-5-6-5MG
CAPSULE ORAL DAILY
Status: DISCONTINUED | OUTPATIENT
Start: 2023-01-12 | End: 2023-01-13 | Stop reason: RX

## 2023-01-11 RX ORDER — IBUPROFEN 600 MG/1
600 TABLET ORAL ONCE
Status: COMPLETED | OUTPATIENT
Start: 2023-01-11 | End: 2023-01-11

## 2023-01-11 RX ADMIN — SODIUM CHLORIDE, SODIUM GLUCONATE, SODIUM ACETATE, POTASSIUM CHLORIDE, MAGNESIUM CHLORIDE, SODIUM PHOSPHATE, DIBASIC, AND POTASSIUM PHOSPHATE 100 ML/HR: .53; .5; .37; .037; .03; .012; .00082 INJECTION, SOLUTION INTRAVENOUS at 22:57

## 2023-01-11 RX ADMIN — DEXTROSE AND SODIUM CHLORIDE 125 ML/HR: 5; .9 INJECTION, SOLUTION INTRAVENOUS at 15:49

## 2023-01-11 RX ADMIN — IBUPROFEN 600 MG: 600 TABLET, FILM COATED ORAL at 16:03

## 2023-01-11 RX ADMIN — SUCRALFATE 1 G: 1 TABLET ORAL at 23:16

## 2023-01-11 RX ADMIN — IOHEXOL 85 ML: 350 INJECTION, SOLUTION INTRAVENOUS at 15:07

## 2023-01-11 RX ADMIN — ARGININE HYDROCHLORIDE 41800 MG: 10 INJECTION, SOLUTION INTRAVENOUS at 20:19

## 2023-01-11 NOTE — H&P
[address] [facility] 1975, 41 y o  female MRN: [id number]  Unit/Bed#: S -01 Encounter: [id number]  Primary Care Provider: Tammy Desai MD   Date and time admitted to hospital: 1/11/2023  2:10 PM    * Fixed dilated pupil of right eye with associated strokelike symptoms  Assessment & Plan  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  ddx TIA vs Metabolic disorder- mitochondrial disease, concern for metabolic stroke vs hemiplegic migraine    PLAN; neurology recommendations appreeciated  Stroke pathway  MRI brain wo contrast pending  - Will defer Echo at this time  - Lipid Panel pending  - Hemoglobin A1c pending  - Start IV fluids for migraine headache  - Start L-arginine based on [facility] guidelines due to concern for possible metabolic stroke (IV L-arginine (500 mg/kg IV) to be repeated q24 hours x3-5 doses as needed, depending if ongoing symptoms  )  - Permissive HTN with SBP <180  - Euglycemic, normothermic goal  - Continue telemetry  - PT/OT/ST  - Continue to monitor and notify neurology with any changes    - Medical management and supportive care per primary team  Correction of any metabolic or infectious disturbanc   will add 100% 02 in case of cluster heacaches    Migraine  Assessment & Plan  Longstanding history of migraines- following with Genaro Roberts  Abortive therapy dose of 3 injections every 3 months  She has breakthrough headaches with her carb diet, advised to take 3 tablets of ibuprofen or 2 tablets of naproxen, and 1 tablet of Nurtec 75 mg during crisis     plan  Continue same medications as needed    Tylenol prn      History of orthostatic hypotension  Assessment & Plan  orthostatic hypotension due to dysautonomia  Follows with cardiology  On midodrine to 2 5mg 3x/day and florinef 0 1mg daily  Will continue IV s lfuid as needed  Carlos stockings    Raynaud's disease  Assessment & Plan  Continue Nifedipine 60 mg daily    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[facility] [address] )  Assessment & Plan  Remote hx of mitochondrial disease with Gi dysmoptilty    Follows at [facility] and has ongoing treatment  History of 2 metabolic strokes-managed with L-arginine and other medications followed by rehab  S/p multiple GI procedure-fundoplication and gastric bypass    VTE Pharmacologic Prophylaxis:   High Risk (Score >/= 5) - Pharmacological DVT Prophylaxis Ordered: enoxaparin (Lovenox)  Sequential Compression Devices Ordered  Code Status: Prior   Discussion with family: will be updated  Anticipated Length of Stay: Patient will be admitted on an inpatient basis with an anticipated length of stay of greater than 2 midnights secondary to metabolic stroke, vs migraine  Chief Complaint: right eye blurred vision and right lower leg heaviness    History of Present Illness:  Candy Lovell is a 41 y o  female with a PMH mitochondrial disease folowing at [facility] on treatment, hx of 2 metabolic strokes with neuro deficits now resolved , GI dysmotility and esophageal reflux status post fundoplication and gastric bypass, late onset dumping syndrome ,orthostatic hypotension, raynaud's dx presents with headaches, dilated right pupil and right leg weakness started around 2 pm today  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[place] [address], reports feeling a little fatigued today and more cold than usual  At around 2 pm she started developing blurry vision in the right eye, associated right frontal headaches  She used some eye lubricant without relieve   felt like headaches were not same as usual migraines  She denies photopia, phonophobia, loss of smell, neck stiffness  She also reported right leg heaviness as well  No tremors or seizure, no LOC  Patient occasionally has reactive hypoglycemia, checked her sugar which was in the 90s    Patient reports having used atropine drop for a patient 2 hours prior to symptoms, and so might have accidentally gotten into her eyes? ?  Coworker check her pupils which was very dilated and fixed, and asked her to come to ED  Patient was stroke alert, neuro consulted, NIHS 1 and out of window for TNK  No signs of raised ICP, mild right lower extremity drift   CTA no significant finding and blood work unremarkable  Suspicion of another metabolic stroke  Placed on stroke pathway and started on Dextrose as well  Also suggested to start Arginine infusion, fluids      Review of Systems:  Review of Systems   Constitutional: Positive for fatigue  Negative for appetite change, chills, fever and unexpected weight change  HENT: Negative for ear pain and sore throat  Eyes: Negative for pain and visual disturbance  Respiratory: Negative for cough and shortness of breath  Cardiovascular: Negative for chest pain and palpitations  Gastrointestinal: Negative for abdominal pain and vomiting  Endocrine: Negative for polydipsia, polyphagia and polyuria  Genitourinary: Negative for difficulty urinating, dysuria and hematuria  Musculoskeletal: Negative for arthralgias and back pain  Skin: Negative for color change and rash  Neurological: Negative for seizures and syncope  All other systems reviewed and are negative  Past Medical and Surgical History:   Past Medical History:   Diagnosis Date   • Acid reflux    • Constipated    • Dysautonomia ([address] )    • Esophageal abnormality     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     • Gastrostomy tube in place [facility])    • Gastrostomy tube in place [facility])    • History of blood clots 2012 Left leg  Has IVC filter now  Occurred while on Lovenox   • Iron deficiency    • Malignant hyperthermia due to anesthesia     Patient's son has M H   States she needs precautions   • Migraines    • 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[facility] [address] )    • 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[facility] [address] )    • MTHFR (methylene THF reductase) deficiency and homocystinuria (HCC)    • Muscle weakness (generalized)    • Nausea    • On total parenteral nutrition (TPN)     for 8 mts   • PCOS (polycystic ovarian syndrome)    • PONV (postoperative nausea and vomiting)    • Postgastrectomy syndrome     malabsorption   • Postsurgical malabsorption    • Status post gastric bypass for obesity    • Stroke [facility]) 4224, 8181    Metabolic strokes  Right hemiparesis= minor now  • Vomiting     When eating       Past Surgical History:   Procedure Laterality Date   • ANKLE SURGERY Left     Has plate and screws   • COLONOSCOPY     • COSMETIC SURGERY      X14 as child post attack by dog  Arms, legs and face   • DILATION AND CURETTAGE OF UTERUS      x2   • ESOPHAGOGASTRODUODENOSCOPY N/A 1/27/2016    Procedure: ESOPHAGOGASTRODUODENOSCOPY (EGD); Surgeon: Jessica Garcia MD;  Location: [facility]; Service:    • FRACTURE SURGERY Left     Left ankle - hardware   • GASTRIC BYPASS     • IVC FILTER INSERTION     • NISSEN FUNDOPLICATION     • OVARY SURGERY Bilateral     "Drilling" due to multiple cysts- PCOS   • AK EGD TRANSORAL BIOPSY SINGLE/MULTIPLE N/A 3/9/2016    Procedure: ESOPHAGOGASTRODUODENOSCOPY (EGD); Surgeon: Jessica Garcia MD;  Location: [facility];   Service: Bariatrics   • AK HYSTEROSCOPY BX ENDOMETRIUM&/POLYPC W/WO D&C N/A 5/22/2020    Procedure: DILATATION AND CURETTAGE (D&C) WITH HYSTEROSCOPY;  Surgeon: Lore Shepherd MD;  [facility];  Service: Gynecology   • AK HYSTEROSCOPY ENDOMETRIAL ABLATION N/A 5/22/2020    Procedure: Manuel Alcaraz;  Surgeon: Lore Shepherd MD;  [facility];  Service: Gynecology   • AK LAPS SURG GASTROSTOMY W/O CONSTJ GSTR TUBE SPX N/A 4/19/2016    Procedure: INSERTION GASTROSTOMY TUBE LAPAROSCOPIC WITH INTRAOP EGD;  Surgeon: Jessica Garcia MD;  Location: [facility];  Service: Bariatrics   • ULNAR NERVE TRANSPOSITION Right    • WISDOM TOOTH EXTRACTION         Meds/Allergies:  Prior to Admission medications    Medication Sig Start Date End Date Taking? Authorizing Provider   acarbose (PRECOSE) 100 MG tablet Take 1 tablet (100 mg total) by mouth 3 (three) times a day with meals 11/1/22 8/28/23  Carine Hernandez MD   acetylcysteine (MUCOMYST) 200 mg/mL nebulizer solution as needed  7/29/19   Historical Provider, MD   Alpha-Lipoic Acid 100 MG CAPS Take by mouth    Historical Provider, MD   B Complex-Biotin-FA (TH VITAMIN B 50/B-COMPLEX PO) Take 1 tablet by mouth daily  Historical Provider, MD   bupivacaine (MARCAINE) 0 25 % 1 mL  Patient not taking: Reported on 12/13/2022    Historical Provider, MD   Calcium Citrate 1040 MG TABS Take by mouth    Historical Provider, MD   COENZYME Q-10 PO Dose varies    Historical Provider, MD   Continuous Blood Gluc Sensor (Dexcom G6 Sensor) MISC USE AS DIRECTED CMG - CHANGE EVERY 10 DAYS 11/1/22   Carine Hernandez MD   Continuous Blood Gluc Transmit (Dexcom G6 Transmitter) MISC Use daily as directed for CGM - Change every 3 months 12/27/22   Carine Hernandez MD   CREATINE MONOHYDRATE PO Take 2 5 g by mouth 10/12/18   Historical Provider, MD   Cyanocobalamin (B-12) 1000 MCG/ML KIT Inject  as directed every 30 days  Historical Provider, MD   diphenhydrAMINE (BENADRYL) 25 mg tablet Take by mouth 10/25/19   Historical Provider, MD   docusate sodium (COLACE) 100 mg capsule Take 200 mg by mouth 2 (two) times a day      Historical Provider, MD   fludrocortisone (FLORINEF) 0 1 mg tablet Take 1 tablet (0 1 mg total) by mouth daily 12/14/22   Dale Hillman MD   fremanezumab-vfrm (Ajovy) 225 MG/1 5ML auto-injector Inject 4 5 mL (675 mg total) under the skin every 3 (three) months 12/13/22   Rory Bello Dedrick Espinoza MD   Glucagon (Baqsimi Two Pack) 3 MG/DOSE POWD Use as needed it for Hypoglycemia   6/2/21   Diana Church MD   iron sucrose (Venofer) 20 mg/mL      Historical Provider, MD   L-Arginine 1000 MG TABS Take 6,000 mg by mouth 2 (two) times a day     Historical Provider, MD   linaCLOtide 145 MCG CAPS Take 145 mcg by mouth 2 (two) times a day 12/7/19   Historical Provider, MD   midodrine (PROAMATINE) 5 mg tablet Take 1 tablet (5 mg total) by mouth 3 (three) times a day 1/12/22   Yancy Rico MD   Motegrity 2 MG TABS  2/5/22   Historical Provider, MD   Multiple Vitamin (MULTIVITAMIN) tablet Take 1 tablet by mouth daily  Historical Provider, MD   NIFEdipine (PROCARDIA XL) 60 mg 24 hr tablet Take 1 tablet (60 mg total) by mouth daily 11/1/22   Ivan Rubalcava,    Oral Vehicles (PCCA-PLUS) SUSP  2/6/19   Historical Provider, MD   P-Aminobenzoic Acid (PARA-AMINOBENZOIC ACID) POWD  6/1/20   Historical Provider, MD   pantoprazole (PROTONIX) 40 mg tablet  9/27/19   Historical Provider, MD   polyethylene glycol (MIRALAX) 17 g packet Take 17 g by mouth 2 (two) times a day Indications: 1 5 caps full 1-2 times/day  Historical Provider, MD   Riboflavin 100 MG TABS Take 100 mg by mouth 10/12/18   Historical Provider, MD   Rimegepant Sulfate (Nurtec) 75 MG TBDP One tab as needed for migraine, max one per day, max 3 days per week 12/13/22   Caity Armas MD   Saline Bacteriostatic (sodium chloride bacteriostatic) 0 9 % 7 mL    Historical Provider, MD   sitaGLIPtin (JANUVIA) 50 mg tablet 1 tab daily 7/18/22   Rosemarie Wan PA-C   sucralfate (CARAFATE) 1 g/10 mL suspension Take 10 mL (1 g total) by mouth 4 (four) times a day Must have liquid suspension 10/15/19   Merlin Oliva, PA-C   tolterodine (DETROL LA) 4 mg 24 hr capsule Take 1 capsule (4 mg total) by mouth in the morning   5/18/22   RAJESH Bui 420 g solution  12/3/19   Historical Provider, MD   Turmeric 500 MG CAPS Take by mouth daily    Historical Provider, MD   Ubiquinol Liposomal 100 MG/5ML SYRP Take 300 mg by mouth 10/12/18   Historical Provider, MD Gleason POWD  3/23/19   Historical Provider, MD   vitamin E 100 UNIT capsule Take 78 Units by mouth daily    Historical Provider, MD     I have reviewed home medications with patient personally  Allergies: Allergies   Allergen Reactions   • Sulfate Other (See Comments)   • Sulfa Antibiotics      Arrhythmia    • Guaifenesin      Arrhythmia   • Other      Malignant hyperhermia precautions   NEVER use Lacted Ringers       Social History:  Marital Status: /Civil Union   Occupation:   Patient Pre-hospital Living Situation: Home  Patient Pre-hospital Level of Mobility: walks  Patient Pre-hospital Diet Restrictions:   Substance Use History:   Social History     Substance and Sexual Activity   Alcohol Use No     Social History     Tobacco Use   Smoking Status Never   Smokeless Tobacco Never     Social History     Substance and Sexual Activity   Drug Use No       Family History:  Family History   Problem Relation Age of Onset   • Mitochondrial disorder Mother    • Hypertension Mother    • Thyroid cancer Mother 48   • Clotting disorder Mother         MTFR   • Cancer Mother 62        renal   • Depression Father    • Endocrinopathy Father    • Clotting disorder Father         MTFR   • Stroke Father    • Aneurysm Father         brain   • Depression Brother    • Narcolepsy Brother    • Heart failure Maternal Grandmother 96   • Coronary artery disease Maternal Grandmother    • Mitochondrial disorder Son    • Mitochondrial disorder Daughter    • Stroke Family    • Anuerysm Paternal Uncle         abdominal    • Breast cancer Maternal Grandfather 55   • Breast cancer Maternal Aunt 42   • Heart attack Neg Hx    • Arrhythmia Neg Hx    • Sudden death Neg Hx         scd       Physical Exam:     Vitals:   Blood Pressure: 120/77 (01/12/23 0439)  Pulse: 82 (01/12/23 0439)  Temperature: 97 6 °F (36 4 °C) (01/12/23 0439)  Temp Source: Oral (01/11/23 2200)  Respirations: 18 (01/11/23 2100)  Weight - Scale: 83 6 kg (184 lb 4 9 oz) (01/11/23 1413)  SpO2: 96 % (01/12/23 0439)    Physical Exam  Vitals and nursing note reviewed  Constitutional:       General: She is not in acute distress  Appearance: She is well-developed  HENT:      Head: Normocephalic and atraumatic  Eyes:      General: Visual field deficit present  Conjunctiva/sclera: Conjunctivae normal    Cardiovascular:      Rate and Rhythm: Normal rate and regular rhythm  Heart sounds: No murmur heard  Pulmonary:      Effort: Pulmonary effort is normal  No respiratory distress  Breath sounds: Normal breath sounds  Abdominal:      Palpations: Abdomen is soft  Tenderness: There is no abdominal tenderness  Musculoskeletal:         General: No swelling  Cervical back: Neck supple  Skin:     General: Skin is warm and dry  Capillary Refill: Capillary refill takes less than 2 seconds  Neurological:      Mental Status: She is alert and oriented to person, place, and time  GCS: GCS eye subscore is 4  GCS verbal subscore is 5  GCS motor subscore is 6  Cranial Nerves: Cranial nerves 2-12 are intact  No dysarthria or facial asymmetry  Motor: No weakness, atrophy or seizure activity        Comments: Very mild RLL drift, no deficit   Psychiatric:         Mood and Affect: Mood normal           Additional Data:     Lab Results:  Results from last 7 days   Lab Units 01/11/23  1456   WBC Thousand/uL 5 98   HEMOGLOBIN g/dL 13 6   HEMATOCRIT % 41 6   PLATELETS Thousands/uL 246     Results from last 7 days   Lab Units 01/11/23  1457   SODIUM mmol/L 138   POTASSIUM mmol/L 3 8   CHLORIDE mmol/L 102   CO2 mmol/L 28   BUN mg/dL 18   CREATININE mg/dL 0 73   ANION GAP mmol/L 8   CALCIUM mg/dL 10 7*   ALBUMIN g/dL 4 5   TOTAL BILIRUBIN mg/dL 0 48   ALK PHOS U/L 63   ALT U/L 16   AST U/L 17   GLUCOSE RANDOM mg/dL 94     Results from last 7 days   Lab Units 01/11/23  1456   INR  0 90     Results from last 7 days   Lab Units 01/11/23  2145   POC GLUCOSE mg/dl 74         Results from last 7 days   Lab Units 01/11/23  1457   LACTIC ACID mmol/L 0 7       Lines/Drains:  Invasive Devices     Central Venous Catheter Line  Duration           Port A Cath Right Chest -- days          Peripheral Intravenous Line  Duration           Peripheral IV 01/11/23 Left Antecubital <1 day          Drain  Duration           Gastrostomy/Enterostomy Gastrostomy 20 Fr  LUQ 2458 days                Central Line:  Goal for removal: Continuing for TPN  N/A - Chronic PICC porty present           Imaging: Reviewed radiology reports from this admission including: CT head  CTA stroke alert (head/neck)   Final Result by Sindhu Amador MD (01/11 1530)      1  No intracranial large vessel occlusion or critical stenosis  No aneurysm  2   No hemodynamically significant stenosis or dissection of cervical carotid and vertebral arteries  3   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  Findings were directly discussed with Arnulfo Beckwith at 3:15 PM           Workstation performed: [id number]         CT stroke alert brain   Final Result by Sindhu Amador MD (01/11 1527)      1  No acute intracranial CT abnormality  2   Redemonstrated partially empty sella which can be seen in intracranial hypertension  Correlate with clinical assessment and consider further assessment with lumbar puncture if clinically warranted  Findings were directly discussed with Arnulfo Beckwith at 3:15 PM       Workstation performed: [id number]         MRI brain wo contrast    (Results Pending)       EKG and Other Studies Reviewed on Admission:   · EKG: NSR  HR 81     ** Please Note: This note has been constructed using a voice recognition system   **

## 2023-01-11 NOTE — CONSULTS
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 prior to blurred vision onset  Patient does not recall getting any atropine in her eye  Upon arrival to the ED, /77  NIHSS 1 (visual impairment in the right eye)  CT head negative for acute intracranial abnormalities  CTA head/neck negative for LVO, significant stenosis, or aneurysm  Patient was not a TNKase candidate due to being outside the time window  On exam, patient's right pupil is 9 mm, round, and fixed with decreased vision in all fields in the right eye, however EOMs intact and no other neuro deficits  Etiology for pupil change likely due to atropine getting in her eye without her knowing, however cannot fully rule out stroke vs metabolic stroke especially given patient's complicated medical history  Plan:  - MRI brain wo contrast pending  - Will defer Echo at this time  - Lipid Panel pending  - Hemoglobin A1c pending  - Start IV fluids for migraine headache  - Start L-arginine based on [facility] guidelines due to concern for possible metabolic stroke (IV L-arginine (500 mg/kg IV) to be repeated q24 hours x3-5 doses as needed, depending if ongoing symptoms  )  - Permissive HTN with SBP <180  - Euglycemic, normothermic goal  - Continue telemetry  - PT/OT/ST  - Continue to monitor and notify neurology with any changes    - Medical management and supportive care per primary team  Correction of any metabolic or infectious disturbances  Thrombolytic Decision: Patient not a candidate  Unclear time of onset outside appropriate time window  Recommendations for outpatient neurological follow up have yet to be determined  History of Present Illness     Reason for Consult / Principal Problem: Stroke alert (fixed and dilated right pupil)  Hx and PE limited by: N/A  Patient last known well: 9 AM today  Stroke alert called: 2:51 PM  Neurology time of arrival: immediate  HPI: Laura Mitchell is a 41 y o  female with longstanding history of migraines currently on Ajovy, genetic mitochondrial disorder, prior metabolic strokes, small fiber neuropathy, RSD, dysautonomia, orthostatic hypotension, extreme GI dysmotility, and Raynaud's who presented to [facility] on 1/11/2023 due to headache, right eye blurred vision, and fixed dilated right pupil  Patient works in the infusion center  This morning while working, in the clinic was very cold and then she developed a headache  Patient states that headache was more severe than usual   She was giving a patient atropine and then washed her hands  Denies any atropine getting into her eye  Shortly later, she noticed blurred vision of the right eye and her colleague noted that her pupil was dilated and fixed, she came to the ED  She was also noting right leg heaviness  Upon arrival to the ED, /77  NIHSS 1 (visual impairment in the right eye)  CT head negative for acute intracranial abnormalities  CTA head/neck negative for LVO, significant stenosis, or aneurysm  Patient was not a TNKase candidate due to being outside the time window  Patient will be admitted for MRI brain, received IV fluids for the migraine headache, and start L-arginine per protocol from [facility]  Of note, patient follows with outpatient neurology for long history of migraines and neuropathy, last seen by Zulema Maldonado on 12/13/2022    At that time patient's migraines were noted to be reasonably well controlled with her current dose of Ajovy 3 injections every 3 months, however reported frequent breakthrough migraines associated with carbohydrate intake  Migraines described as bifrontal pain with concentration issues and photophobia  Migraines are initially an extreme pain which sounds into a persistent lower level of pain, occurring a few times a week  Patient is scheduled to have her next Ajovy injection in 5 days  For breakthrough migraines, patient will take Motrin and give herself IV fluids through her port  Per chart review, patient follows with a mitochondrial specialist at [facility]  Patient is on L-arginine, co-Q10, ALA, B12 injections, iron infusions every 3 months, PABA, vitamin D, and ubiquinol  Patient reports a history of mitochondrial stroke with residual right-sided sensory deficits and if fatigued experiences weakness  Patient also has dysautonomia and orthostatic hypotension and is currently being treated with midodrine and recently started on nifedipine to provide degree of vasodilation  Inpatient consult to Neurology  Consult performed by: Ann Bradford PA-C  Consult ordered by: Nithya Mccall MD          Review of Systems   Eyes: Positive for visual disturbance  Fixed dilated right pupil   Neurological: Positive for weakness and headaches  All other systems reviewed and are negative  Historical Information   Past Medical History:   Diagnosis Date   • Acid reflux    • Constipated    • Dysautonomia ([address] )    • Esophageal abnormality     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  • Gastrostomy tube in place [facility])    • Gastrostomy tube in place [facility])    • History of blood clots 2012    Left leg  Has IVC filter now   Occurred while on Lovenox   • Iron deficiency    • Malignant hyperthermia due to anesthesia     Patient's son has M H   States she needs precautions   • Migraines    • 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[facility] )    • 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[facility] )    • MTHFR (methylene THF reductase) deficiency and homocystinuria (HCC)    • Muscle weakness (generalized)    • Nausea    • On total parenteral nutrition (TPN)     for 8 mts   • PCOS (polycystic ovarian syndrome)    • PONV (postoperative nausea and vomiting)    • Postgastrectomy syndrome     malabsorption   • Postsurgical malabsorption    • Status post gastric bypass for obesity    • Stroke [facility]) 1830, 3316    Metabolic strokes  Right hemiparesis= minor now  • Vomiting     When eating     Past Surgical History:   Procedure Laterality Date   • ANKLE SURGERY Left     Has plate and screws   • COLONOSCOPY     • COSMETIC SURGERY      X14 as child post attack by dog  Arms, legs and face   • DILATION AND CURETTAGE OF UTERUS      x2   • ESOPHAGOGASTRODUODENOSCOPY N/A 1/27/2016    Procedure: ESOPHAGOGASTRODUODENOSCOPY (EGD); Surgeon: Veronica Alamo MD;  Location: [facility]; Service:    • FRACTURE SURGERY Left     Left ankle - hardware   • GASTRIC BYPASS     • IVC FILTER INSERTION     • NISSEN FUNDOPLICATION     • OVARY SURGERY Bilateral     "Drilling" due to multiple cysts- PCOS   • WI EGD TRANSORAL BIOPSY SINGLE/MULTIPLE N/A 3/9/2016    Procedure: ESOPHAGOGASTRODUODENOSCOPY (EGD); Surgeon: Veronica Alamo MD;  Location: [facility];   Service: Bariatrics   • WI HYSTEROSCOPY BX ENDOMETRIUM&/POLYPC W/WO D&C N/A 5/22/2020    Procedure: DILATATION AND CURETTAGE (D&C) WITH HYSTEROSCOPY;  Surgeon: Yamilet Bassett MD;  [facility];  Service: Gynecology   • WI HYSTEROSCOPY ENDOMETRIAL ABLATION N/A 5/22/2020    Procedure: Trevor Chen;  Surgeon: Yamilet Bassett MD;  [facility];  Service: Gynecology   • WI LAPS SURG GASTROSTOMY W/O CONSTJ GSTR TUBE SPX N/A 4/19/2016    Procedure: INSERTION GASTROSTOMY TUBE LAPAROSCOPIC WITH INTRAOP EGD;  Surgeon: Veronica Alamo MD;  [facility]: [facility];  Service: Bariatrics   • ULNAR NERVE TRANSPOSITION Right    • WISDOM TOOTH EXTRACTION       Social History   Social History     Substance and Sexual Activity   Alcohol Use No     Social History     Substance and Sexual Activity   Drug Use No     E-Cigarette/Vaping   • E-Cigarette Use Never User      E-Cigarette/Vaping Substances   • Nicotine No    • THC No    • CBD No    • Flavoring No    • Other No    • Unknown No      Social History     Tobacco Use   Smoking Status Never   Smokeless Tobacco Never     Family History:   Family History   Problem Relation Age of Onset   • Mitochondrial disorder Mother    • Hypertension Mother    • Thyroid cancer Mother 48   • Clotting disorder Mother         MTFR   • Cancer Mother 62        renal   • Depression Father    • Endocrinopathy Father    • Clotting disorder Father         MTFR   • Stroke Father    • Aneurysm Father         brain   • Depression Brother    • Narcolepsy Brother    • Heart failure Maternal Grandmother 96   • Coronary artery disease Maternal Grandmother    • Mitochondrial disorder Son    • Mitochondrial disorder Daughter    • Stroke Family    • Anuerysm Paternal Uncle         abdominal    • Breast cancer Maternal Grandfather 55   • Breast cancer Maternal Aunt 42   • Heart attack Neg Hx    • Arrhythmia Neg Hx    • Sudden death Neg Hx         scd       Review of previous medical records was completed  Meds/Allergies   all current active meds have been reviewed, current meds:   No current facility-administered medications for this encounter    , PTA meds:   Prior to Admission Medications   Prescriptions Last Dose Informant Patient Reported? Taking? Alpha-Lipoic Acid 100 MG CAPS   Yes No   Sig: Take by mouth   B Complex-Biotin-FA (TH VITAMIN B 50/B-COMPLEX PO)   Yes No   Sig: Take 1 tablet by mouth daily       COENZYME Q-10 PO   Yes No   Sig: Dose varies   CREATINE MONOHYDRATE PO   Yes No   Sig: Take 2 5 g by mouth   Calcium Citrate 1040 MG TABS   Yes No   Sig: Take by mouth   Continuous Blood Gluc Sensor (Dexcom G6 Sensor) MISC   No No   Sig: USE AS DIRECTED CMG - CHANGE EVERY 10 DAYS   Continuous Blood Gluc Transmit (Dexcom G6 Transmitter) MISC   No No   Sig: Use daily as directed for CGM - Change every 3 months   Cyanocobalamin (B-12) 1000 MCG/ML KIT   Yes No   Sig: Inject  as directed every 30 days  Glucagon (Baqsimi Two Pack) 3 MG/DOSE POWD   No No   Sig: Use as needed it for Hypoglycemia   L-Arginine 1000 MG TABS   Yes No   Sig: Take 6,000 mg by mouth 2 (two) times a day    Motegrity 2 MG TABS   Yes No   Multiple Vitamin (MULTIVITAMIN) tablet   Yes No   Sig: Take 1 tablet by mouth daily  NIFEdipine (PROCARDIA XL) 60 mg 24 hr tablet   No No   Sig: Take 1 tablet (60 mg total) by mouth daily   Oral Vehicles (PCCA-PLUS) SUSP   Yes No   P-Aminobenzoic Acid (PARA-AMINOBENZOIC ACID) POWD   Yes No   Riboflavin 100 MG TABS   Yes No   Sig: Take 100 mg by mouth   Rimegepant Sulfate (Nurtec) 75 MG TBDP   No No   Sig: One tab as needed for migraine, max one per day, max 3 days per week   Saline Bacteriostatic (sodium chloride bacteriostatic) 0 9 %   Yes No   Si mL   TRILYTE 420 g solution   Yes No   Turmeric 500 MG CAPS   Yes No   Sig: Take by mouth daily   Ubiquinol Liposomal 100 MG/5ML SYRP   Yes No   Sig: Take 300 mg by mouth   Ubiquinol POWD   Yes No   acarbose (PRECOSE) 100 MG tablet   No No   Sig: Take 1 tablet (100 mg total) by mouth 3 (three) times a day with meals   acetylcysteine (MUCOMYST) 200 mg/mL nebulizer solution   Yes No   Sig: as needed    bupivacaine (MARCAINE) 0 25 %   Yes No   Si mL   Patient not taking: Reported on 2022   diphenhydrAMINE (BENADRYL) 25 mg tablet   Yes No   Sig: Take by mouth   docusate sodium (COLACE) 100 mg capsule   Yes No   Sig: Take 200 mg by mouth 2 (two) times a day     fludrocortisone (FLORINEF) 0 1 mg tablet   No No   Sig: Take 1 tablet (0 1 mg total) by mouth daily   fremanezumab-vfrm (Ajovy) 225 MG/1 5ML auto-injector   No No   Sig: Inject 4 5 mL (675 mg total) under the skin every 3 (three) months   iron sucrose (Venofer) 20 mg/mL   Yes No   Sig:     linaCLOtide 145 MCG CAPS   Yes No   Sig: Take 145 mcg by mouth 2 (two) times a day   midodrine (PROAMATINE) 5 mg tablet   No No   Sig: Take 1 tablet (5 mg total) by mouth 3 (three) times a day   pantoprazole (PROTONIX) 40 mg tablet   Yes No   polyethylene glycol (MIRALAX) 17 g packet   Yes No   Sig: Take 17 g by mouth 2 (two) times a day Indications: 1 5 caps full 1-2 times/day  sitaGLIPtin (JANUVIA) 50 mg tablet   No No   Si tab daily   sucralfate (CARAFATE) 1 g/10 mL suspension   No No   Sig: Take 10 mL (1 g total) by mouth 4 (four) times a day Must have liquid suspension   tolterodine (DETROL LA) 4 mg 24 hr capsule   No No   Sig: Take 1 capsule (4 mg total) by mouth in the morning  vitamin E 100 UNIT capsule   Yes No   Sig: Take 78 Units by mouth daily      Facility-Administered Medications: None    and     Allergies   Allergen Reactions   • Sulfate Other (See Comments)   • Sulfa Antibiotics      Arrhythmia    • Guaifenesin      Arrhythmia   • Other      Malignant hyperhermia precautions  NEVER use Lacted Ringers       Objective   Vitals:Blood pressure 130/83, pulse 74, resp  rate 16, weight 83 6 kg (184 lb 4 9 oz), SpO2 99 %, not currently breastfeeding  ,Body mass index is 31 64 kg/m²  No intake or output data in the 24 hours ending 23 [id number]    Invasive Devices: Invasive Devices     Central Venous Catheter Line  Duration           Port A Cath Right Chest -- days          Peripheral Intravenous Line  Duration           Peripheral IV 23 Left Antecubital <1 day          Drain  Duration           Gastrostomy/Enterostomy Gastrostomy 20 Fr  LUQ 2457 days                Physical Exam  Vitals and nursing note reviewed  Constitutional:       Appearance: Normal appearance  Comments: Patient lying comfortably in bed in no acute distress   HENT:      Head: Normocephalic and atraumatic  Mouth/Throat:      Mouth: Mucous membranes are moist       Pharynx: Oropharynx is clear  Eyes:      Extraocular Movements: Extraocular movements intact  Conjunctiva/sclera: Conjunctivae normal       Pupils: Pupils are equal, round, and reactive to light  Comments: Right pupil 9 mm, round, and fixed  Left pupil 3 mm, round, reactive to light   Pulmonary:      Effort: Pulmonary effort is normal    Musculoskeletal:         General: Normal range of motion  Skin:     General: Skin is warm and dry  Neurological:      Mental Status: She is alert and oriented to person, place, and time  Deep Tendon Reflexes:      Reflex Scores:       Bicep reflexes are 2+ on the right side and 2+ on the left side  Brachioradialis reflexes are 2+ on the right side and 2+ on the left side  Patellar reflexes are 2+ on the right side and 2+ on the left side  Comments: See full neuro exam below       Neurologic Exam     Mental Status   Oriented to person, place, and time  Patient awake and alert  Oriented to person, place, month, and year  No dysarthria or aphasia  Able to follow simple midline and appendicular commands  Cranial Nerves     CN III, IV, VI   Pupils are equal, round, and reactive to light  Right pupil 9 mm, round, and fixed  Left pupil 3 mm, round, reactive to light  Funduscopic exam normal in both eyes per attending neurologist   EOMs intact without nystagmus  Blurred vision in all fields in the right eye  Facial sensation to sharp sense intact throughout  Facial expressions full and symmetric  Tongue midline  Motor Exam   Muscle bulk: normal  Overall muscle tone: normal  Right arm pronator drift: absent  Left arm pronator drift: absent  5/5 strength in bilateral upper and lower extremities  Sensory Exam   Sensation to sharp sense intact throughout       Gait, Coordination, and Reflexes     Reflexes   Right brachioradialis: 2+  Left brachioradialis: 2+  Right biceps: 2+  Left biceps: 2+  Right patellar: 2+  Left patellar: 2+  Slightly hesitant gait, however steady  No ataxia with finger to nose bilaterally  No resting or action tremor  No ankle clonus bilaterally  Downgoing toes bilaterally  NIHSS:  1a Level of Consciousness: 0 = Alert   1b  LOC Questions: 0 = Answers both correctly   1c  LOC Commands: 0 = Obeys both correctly   2  Best Gaze: 0 = Normal   3  Visual: 1 = Partial hemianopia   4  Facial Palsy: 0=Normal symmetric movement   5a  Motor Right Arm: 0=No drift, limb holds 90 (or 45) degrees for full 10 seconds   5b  Motor Left Arm: 0=No drift, limb holds 90 (or 45) degrees for full 10 seconds   6a  Motor Right Le=No drift, limb holds 90 (or 45) degrees for full 10 seconds   6b  Motor Left Le=No drift, limb holds 90 (or 45) degrees for full 10 seconds   7  Limb Ataxia:  0=Absent   8  Sensory: 0=Normal; no sensory loss   9  Best Language:  0=No aphasia, normal   10  Dysarthria: 0=Normal articulation   11  Extinction and Inattention (formerly Neglect): 0=No abnormality   Total Score: 1     Time NIHSS was completed: 3:05 PM    Modified Marilee Score:  0 (No baseline symptoms/disability)    Lab Results: I have personally reviewed pertinent reports  Recent Results (from the past 24 hour(s))   ECG 12 lead    Collection Time: 23  2:17 PM   Result Value Ref Range    Ventricular Rate 81 BPM    Atrial Rate 81 BPM    WA Interval 160 ms    QRSD Interval 90 ms    QT Interval 370 ms    QTC Interval 429 ms    P Axis 66 degrees    QRS Axis 40 degrees    T Wave Axis 59 degrees   ]  Imaging Studies: I have personally reviewed pertinent reports and I have personally reviewed pertinent films in PACS  EKG, Pathology, and Other Studies: I have personally reviewed pertinent reports      VTE Prophylaxis: Patient in the ED, will be placed on DVT prophylaxis once admitted to the floor    Please refer to attending's attestation for billing time

## 2023-01-11 NOTE — ASSESSMENT & PLAN NOTE
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 prior to blurred vision onset  Patient does not recall getting any atropine in her eye  Upon arrival to the ED, /77  NIHSS 1 (visual impairment in the right eye)  CT head negative for acute intracranial abnormalities  CTA head/neck negative for LVO, significant stenosis, or aneurysm  Patient was not a TNKase candidate due to being outside the time window  On exam, patient's right pupil is 9 mm, round, and fixed with decreased vision in all fields in the right eye, however EOMs intact and no other neuro deficits  MRI brain unremarkable for acute infarct  Etiology for pupil change likely due to atropine getting in her eye without her knowing  Given persistent (now improving) peripheral visual deficit and RLE heaviness, will continue L-arginine      Plan:  - Would not recommend echo at this time  - Lipid Panel: total cholesterol 169, triglycerides 82, HDL 70, LDL 83  - Hemoglobin A1c: 5 1  - Continue IV fluids for migraine headache  - Continue L-arginine based on [facility] guidelines (IV L-arginine (500 mg/kg IV) to be repeated q24 hours x3-5 doses as needed, depending if ongoing symptoms )  · Per discussion with mitochondrial specialist at [facility], recommend continuing L-arginine daily until symptoms resolve for a max of 5 doses  (Today is day 3)  - Goal normotension, euglycemia, normothermia   - Continue telemetry  - PT/OT/ST  - Continue to monitor and notify neurology with any changes    - Medical management and supportive care per primary team  Correction of any metabolic or infectious disturbances

## 2023-01-11 NOTE — ASSESSMENT & PLAN NOTE
orthostatic hypotension due to dysautonomia  Follows with cardiology  On midodrine to 2 5mg 3x/day and florinef 0 1mg daily    Will continue IV s lfuid as needed  Carlos stockings

## 2023-01-11 NOTE — ED PROVIDER NOTES
History  Chief Complaint   Patient presents with   • Headache     Pt with h/o migraines and metabolic stroke presents with HA for hours, felt blurred vision and thought they were dry and then notice right pupil dilated and fixed     Patient is a 42-year-old female presenting with headache and fixed dilated right pupil  Patient has a history of mitochondrial disorders and metabolic stroke presenting from due to having a headache and dry eyes after which she used eyedrops and coworker noticed that her right pupil was dilated and not reactive  Patient states the headache she is having is not like her typical migraines, describes it as the whole right side of her head, denies photophobia/phonophobia  Patient endorses dizziness when sitting up but denies any difficulty speaking, facial droop, weakness/decree sensation in her limbs, recent illness, fever, chills, chest pain, shortness of breath  Prior to Admission Medications   Prescriptions Last Dose Informant Patient Reported? Taking? Alpha-Lipoic Acid 100 MG CAPS 1/11/2023  Yes Yes   Sig: Take by mouth   B Complex-Biotin-FA (TH VITAMIN B 50/B-COMPLEX PO) 1/11/2023  Yes Yes   Sig: Take 1 tablet by mouth daily  COENZYME Q-10 PO 1/11/2023  Yes Yes   Sig: Dose varies   CREATINE MONOHYDRATE PO 1/11/2023  Yes Yes   Sig: Take 2 5 g by mouth   Calcium Citrate 1040 MG TABS 1/11/2023  Yes Yes   Sig: Take by mouth   Continuous Blood Gluc Sensor (Dexcom G6 Sensor) MISC   No No   Sig: USE AS DIRECTED CMG - CHANGE EVERY 10 DAYS   Continuous Blood Gluc Transmit (Dexcom G6 Transmitter) MISC 1/11/2023  No Yes   Sig: Use daily as directed for CGM - Change every 3 months   Cyanocobalamin (B-12) 1000 MCG/ML KIT 1/11/2023  Yes Yes   Sig: Inject  as directed every 30 days  Glucagon (Baqsimi Two Pack) 3 MG/DOSE POWD Unknown  No No   Sig: Use as needed it for Hypoglycemia      Patient not taking: Reported on 1/11/2023   L-Arginine 1000 MG TABS 1/11/2023  Yes Yes   Sig: Take 6,000 mg by mouth 2 (two) times a day    Motegrity 2 MG TABS 2023  Yes Yes   Multiple Vitamin (MULTIVITAMIN) tablet 2023  Yes Yes   Sig: Take 1 tablet by mouth daily  NIFEdipine (PROCARDIA XL) 60 mg 24 hr tablet 2023  No Yes   Sig: Take 1 tablet (60 mg total) by mouth daily   Oral Vehicles (PCCA-PLUS) SUSP 2023  Yes Yes   P-Aminobenzoic Acid (PARA-AMINOBENZOIC ACID) POWD 2023  Yes Yes   Riboflavin 100 MG TABS 2023  Yes Yes   Sig: Take 100 mg by mouth   Rimegepant Sulfate (Nurtec) 75 MG TBDP 2023  No Yes   Sig: One tab as needed for migraine, max one per day, max 3 days per week   Saline Bacteriostatic (sodium chloride bacteriostatic) 0 9 % Not Taking  Yes No   Si mL   Patient not taking: Reported on 2023   TRILYTE 420 g solution Past Month  Yes Yes   Turmeric 500 MG CAPS 2023  Yes Yes   Sig: Take by mouth daily   Ubiquinol Liposomal 100 MG/5ML SYRP 2023  Yes Yes   Sig: Take 300 mg by mouth   Ubiquinol POWD 2023  Yes Yes   acarbose (PRECOSE) 100 MG tablet 2023  No Yes   Sig: Take 1 tablet (100 mg total) by mouth 3 (three) times a day with meals   acetylcysteine (MUCOMYST) 200 mg/mL nebulizer solution 2023  Yes Yes   Sig: as needed    bupivacaine (MARCAINE) 0 25 % Not Taking  Yes No   Si mL   Patient not taking: Reported on 2022   diphenhydrAMINE (BENADRYL) 25 mg tablet Not Taking  Yes No   Sig: Take by mouth   Patient not taking: Reported on 2023   docusate sodium (COLACE) 100 mg capsule 2023  Yes Yes   Sig: Take 200 mg by mouth 2 (two) times a day     fludrocortisone (FLORINEF) 0 1 mg tablet 2023  No Yes   Sig: Take 1 tablet (0 1 mg total) by mouth daily   fremanezumab-vfrm (Ajovy) 225 MG/1 5ML auto-injector 2023  No Yes   Sig: Inject 4 5 mL (675 mg total) under the skin every 3 (three) months   iron sucrose (Venofer) 20 mg/mL Not Taking  Yes No   Sig:     Patient not taking: Reported on 2023   linaCLOtide 145 MCG CAPS 2023  Yes Yes   Sig: Take 145 mcg by mouth 2 (two) times a day   midodrine (PROAMATINE) 5 mg tablet 2023  No Yes   Sig: Take 1 tablet (5 mg total) by mouth 3 (three) times a day   pantoprazole (PROTONIX) 40 mg tablet 2023  Yes Yes   polyethylene glycol (MIRALAX) 17 g packet 2023  Yes Yes   Sig: Take 17 g by mouth 2 (two) times a day Indications: 1 5 caps full 1-2 times/day  sitaGLIPtin (JANUVIA) 50 mg tablet 2023  No Yes   Si tab daily   sucralfate (CARAFATE) 1 g/10 mL suspension   No No   Sig: Take 10 mL (1 g total) by mouth 4 (four) times a day Must have liquid suspension   tolterodine (DETROL LA) 4 mg 24 hr capsule 2023  No Yes   Sig: Take 1 capsule (4 mg total) by mouth in the morning  vitamin E 100 UNIT capsule 2023  Yes Yes   Sig: Take 78 Units by mouth daily      Facility-Administered Medications: None       Past Medical History:   Diagnosis Date   • Acid reflux    • Constipated    • Dysautonomia (HCC)    • Esophageal abnormality     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  • Gastrostomy tube in place [facility])    • Gastrostomy tube in place [facility])    • History of blood clots 2012    Left leg  Has IVC filter now  Occurred while on Lovenox   • Iron deficiency    • Malignant hyperthermia due to anesthesia     Patient's son has M H   States she needs precautions   • Migraines    • 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[facility] )    • 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[facility] )    • MTHFR (methylene THF reductase) deficiency and homocystinuria (HCC)    • Muscle weakness (generalized)    • Nausea    • On total parenteral nutrition (TPN)     for 8 mts   • PCOS (polycystic ovarian syndrome)    • PONV (postoperative nausea and vomiting)    • Postgastrectomy syndrome     malabsorption   • Postsurgical malabsorption    • Status post gastric bypass for obesity    • Stroke [facility]) 1019, 8573    Metabolic strokes  Right hemiparesis= minor now     • Vomiting     When eating       Past Surgical History: Procedure Laterality Date   • ANKLE SURGERY Left     Has plate and screws   • COLONOSCOPY     • COSMETIC SURGERY      X14 as child post attack by dog  Arms, legs and face   • DILATION AND CURETTAGE OF UTERUS      x2   • ESOPHAGOGASTRODUODENOSCOPY N/A 1/27/2016    Procedure: ESOPHAGOGASTRODUODENOSCOPY (EGD); Surgeon: Veronica Boykin MD;  Location: [facility]; Service:    • FRACTURE SURGERY Left     Left ankle - hardware   • GASTRIC BYPASS     • IVC FILTER INSERTION     • NISSEN FUNDOPLICATION     • OVARY SURGERY Bilateral     "Drilling" due to multiple cysts- PCOS   • NH EGD TRANSORAL BIOPSY SINGLE/MULTIPLE N/A 3/9/2016    Procedure: ESOPHAGOGASTRODUODENOSCOPY (EGD); Surgeon: Veronica Boykin MD;  Location: [facility];   Service: Bariatrics   • NH HYSTEROSCOPY BX ENDOMETRIUM&/POLYPC W/WO D&C N/A 5/22/2020    Procedure: DILATATION AND CURETTAGE (D&C) WITH HYSTEROSCOPY;  Surgeon: Joe Silva MD;  [facility];  Service: Gynecology   • NH HYSTEROSCOPY ENDOMETRIAL ABLATION N/A 5/22/2020    Procedure: Efrain Grippe;  Surgeon: Joe Silva MD;  [facility];  Service: Gynecology   • NH LAPS SURG GASTROSTOMY W/O CONSTJ GSTR TUBE [facility] N/A 4/19/2016    Procedure: INSERTION GASTROSTOMY TUBE LAPAROSCOPIC WITH INTRAOP EGD;  Surgeon: Veronica Boykin MD;  [facility]: [facility];  Service: Bariatrics   • ULNAR NERVE TRANSPOSITION Right    • WISDOM TOOTH EXTRACTION         Family History   Problem Relation Age of Onset   • Mitochondrial disorder Mother    • Hypertension Mother    • Thyroid cancer Mother 48   • Clotting disorder Mother         MTFR   • Cancer Mother 62        renal   • Depression Father    • Endocrinopathy Father    • Clotting disorder Father         MTFR   • Stroke Father    • Aneurysm Father         brain   • Depression Brother    • Narcolepsy Brother    • Heart failure Maternal Grandmother 96   • Coronary artery disease Maternal Grandmother    • Mitochondrial disorder Son • Mitochondrial disorder Daughter    • Stroke Family    • Anuerysm Paternal Uncle         abdominal    • Breast cancer Maternal Grandfather 55   • Breast cancer Maternal Aunt 42   • Heart attack Neg Hx    • Arrhythmia Neg Hx    • Sudden death Neg Hx         scd     I have reviewed and agree with the history as documented  E-Cigarette/Vaping   • E-Cigarette Use Never User      E-Cigarette/Vaping Substances   • Nicotine No    • THC No    • CBD No    • Flavoring No    • Other No    • Unknown No      Social History     Tobacco Use   • Smoking status: Never   • Smokeless tobacco: Never   Vaping Use   • Vaping Use: Never used   Substance Use Topics   • Alcohol use: No   • Drug use: No        Review of Systems   Constitutional: Negative for chills and fever  HENT: Negative for ear pain, rhinorrhea and sore throat  Eyes: Positive for visual disturbance  Negative for pain  Right pupil fixed dilated   Respiratory: Negative for cough and shortness of breath  Cardiovascular: Negative for chest pain and palpitations  Gastrointestinal: Negative for abdominal pain, constipation, diarrhea, nausea, rectal pain and vomiting  Genitourinary: Negative for dysuria and hematuria  Musculoskeletal: Negative for arthralgias, back pain, neck pain and neck stiffness  Skin: Negative for color change and rash  Neurological: Positive for dizziness and headaches  Negative for seizures, syncope, facial asymmetry, speech difficulty, weakness, light-headedness and numbness  All other systems reviewed and are negative        Physical Exam  ED Triage Vitals   Temperature Pulse Respirations Blood Pressure SpO2   01/11/23 1729 01/11/23 1413 01/11/23 1413 01/11/23 1413 01/11/23 1413   98 4 °F (36 9 °C) 81 20 152/77 100 %      Temp Source Heart Rate Source Patient Position - Orthostatic VS BP Location FiO2 (%)   01/11/23 2100 01/11/23 1413 01/11/23 1413 01/11/23 1413 --   Oral Monitor Sitting Right arm       Pain Score 01/11/23 1415       8             Orthostatic Vital Signs  Vitals:    01/12/23 0042 01/12/23 0241 01/12/23 0439 01/12/23 0732   BP: 117/66 130/78 120/77 132/77   Pulse: 67 70 82 70   Patient Position - Orthostatic VS:           Physical Exam  Vitals and nursing note reviewed  Constitutional:       General: She is not in acute distress  Appearance: She is well-developed  HENT:      Head: Normocephalic and atraumatic  Nose: Nose normal  No congestion  Mouth/Throat:      Mouth: Mucous membranes are moist       Pharynx: Oropharynx is clear  Eyes:      Extraocular Movements: Extraocular movements intact  Conjunctiva/sclera: Conjunctivae normal       Comments: Right eye fixed and dilated, consensual response preserved with light in right eye  Left eye constricts with direct light   Cardiovascular:      Rate and Rhythm: Normal rate and regular rhythm  Pulses: Normal pulses  Heart sounds: Normal heart sounds  No murmur heard  Pulmonary:      Effort: Pulmonary effort is normal  No respiratory distress  Breath sounds: Normal breath sounds  Chest:      Chest wall: No tenderness  Abdominal:      General: Abdomen is flat  Bowel sounds are normal       Palpations: Abdomen is soft  Tenderness: There is no abdominal tenderness  There is no right CVA tenderness or left CVA tenderness  Musculoskeletal:         General: No deformity or signs of injury  Normal range of motion  Cervical back: Normal range of motion and neck supple  No rigidity or tenderness  Skin:     General: Skin is warm and dry  Findings: No bruising, lesion or rash  Neurological:      General: No focal deficit present  Mental Status: She is alert and oriented to person, place, and time  Cranial Nerves: No cranial nerve deficit  Sensory: No sensory deficit  Motor: No weakness           ED Medications  Medications   sucralfate (CARAFATE) tablet 1 g (1 g Oral Given 1/12/23 0818) acarbose (PRECOSE) tablet 100 mg ( Oral Not Given 1/11/23 2300)   b complex-vitamin C-folic acid (NEPHROCAPS) capsule 1 capsule (1 capsule Oral Not Given 1/11/23 1915)   docusate sodium (COLACE) capsule 200 mg (200 mg Oral Given 1/12/23 0817)   fludrocortisone (FLORINEF) tablet 0 1 mg (0 1 mg Oral Given 1/12/23 0820)   fremanezumab-vfrm (Ajovy) auto-injector 675 mg ( Subcutaneous Not Given 1/11/23 2301)   linaCLOtide CAPS 145 mcg ( Oral Not Given 1/11/23 2302)   midodrine (PROAMATINE) tablet 2 5 mg (2 5 mg Oral Given 1/12/23 0817)   multivitamin stress formula tablet 1 tablet (1 tablet Oral Given 1/12/23 0818)   NIFEdipine (PROCARDIA XL) 24 hr tablet 60 mg (60 mg Oral Given 1/12/23 0818)   polyethylene glycol (MIRALAX) packet 17 g (has no administration in time range)   oxybutynin (DITROPAN-XL) 24 hr tablet 10 mg (10 mg Oral Given 1/12/23 0817)   Turmeric CAPS (has no administration in time range)   vitamin E capsule 100 Units (has no administration in time range)   Prucalopride Succinate TABS 2 mg (has no administration in time range)   pantoprazole (PROTONIX) EC tablet 40 mg (40 mg Oral Given 1/12/23 0819)   Rimegepant Sulfate (NURTEC) disintegrating tablet TBDP 75 mg (has no administration in time range)   multi-electrolyte (PLASMALYTE-A/ISOLYTE-S PH 7 4) IV solution (100 mL/hr Intravenous New Bag 1/12/23 0842)   sitaGLIPtin (JANUVIA) tablet 50 mg (50 mg Oral Given 1/12/23 0819)   patient supplied medication ( Per G Tube Given 1/12/23 0823)   arginine (L-arginine) (R-GENE 10) 10 % IV solution 41,800 mg (has no administration in time range)   iohexol (OMNIPAQUE) 350 MG/ML injection (SINGLE-DOSE) 85 mL (85 mL Intravenous Given 1/11/23 1507)   ibuprofen (MOTRIN) tablet 600 mg (600 mg Oral Given 1/11/23 0946)       Diagnostic Studies  Results Reviewed     Procedure Component Value Units Date/Time    HS Troponin I 2hr [[id number]] Collected: 01/11/23 1707    Lab Status: Final result Specimen: Blood from Arm, Left Updated: 01/11/23 1740     hs TnI 2hr <2 ng/L      Delta 2hr hsTnI --    HS Troponin I 4hr [[id number]]     Lab Status: No result Specimen: Blood     Comprehensive metabolic panel [[id number]]  (Abnormal) Collected: 01/11/23 1457    Lab Status: Final result Specimen: Blood from Arm, Right Updated: 01/11/23 1603     Sodium 138 mmol/L      Potassium 3 8 mmol/L      Chloride 102 mmol/L      CO2 28 mmol/L      ANION GAP 8 mmol/L      BUN 18 mg/dL      Creatinine 0 73 mg/dL      Glucose 94 mg/dL      Calcium 10 7 mg/dL      AST 17 U/L      ALT 16 U/L      Alkaline Phosphatase 63 U/L      Total Protein 7 5 g/dL      Albumin 4 5 g/dL      Total Bilirubin 0 48 mg/dL      eGFR 99 ml/min/1 73sq m     Narrative:      [facility] guidelines for Chronic Kidney Disease (CKD):   •  Stage 1 with normal or high GFR (GFR > 90 mL/min/1 73 square meters)  •  Stage 2 Mild CKD (GFR = 60-89 mL/min/1 73 square meters)  •  Stage 3A Moderate CKD (GFR = 45-59 mL/min/1 73 square meters)  •  Stage 3B Moderate CKD (GFR = 30-44 mL/min/1 73 square meters)  •  Stage 4 Severe CKD (GFR = 15-29 mL/min/1 73 square meters)  •  Stage 5 End Stage CKD (GFR <15 mL/min/1 73 square meters)  Note: GFR calculation is accurate only with a steady state creatinine    FLU/RSV/COVID - if FLU/RSV clinically relevant [[id number]]  (Normal) Collected: 01/11/23 1456    Lab Status: Final result Specimen: Nares from Nose Updated: 01/11/23 1548     SARS-CoV-2 Negative     INFLUENZA A PCR Negative     INFLUENZA B PCR Negative     RSV PCR Negative    Narrative:      FOR PEDIATRIC PATIENTS - copy/paste COVID Guidelines URL to browser: [email or web address]    SARS-CoV-2 assay is a Nucleic Acid Amplification assay intended for the  qualitative detection of nucleic acid from SARS-CoV-2 in nasopharyngeal  swabs  Results are for the presumptive identification of SARS-CoV-2 RNA      Positive results are indicative of infection with SARS-CoV-2, the virus  causing COVID-19, but do not rule out bacterial infection or co-infection  with other viruses  Laboratories within the [place] and its  territories are required to report all positive results to the appropriate  public health authorities  Negative results do not preclude SARS-CoV-2  infection and should not be used as the sole basis for treatment or other  patient management decisions  Negative results must be combined with  clinical observations, patient history, and epidemiological information  This test has not been FDA cleared or approved  This test has been authorized by FDA under an Emergency Use Authorization  (EUA)  This test is only authorized for the duration of time the  declaration that circumstances exist justifying the authorization of the  emergency use of an in vitro diagnostic tests for detection of SARS-CoV-2  virus and/or diagnosis of COVID-19 infection under section 564(b)(1) of  the Act, 21 U  S C  324EIQ-3(I)(0), unless the authorization is terminated  or revoked sooner  The test has been validated but independent review by FDA  and CLIA is pending  Test performed using [employer] GeneXpert: This RT-PCR assay targets N2,  a region unique to SARS-CoV-2  A conserved region in the E-gene was chosen  for pan-Sarbecovirus detection which includes SARS-CoV-2  According to CMS-2020-01-R, this platform meets the definition of high-throughput technology  HS Troponin 0hr (reflex protocol) [[id number]]  (Normal) Collected: 01/11/23 1456    Lab Status: Final result Specimen: Blood from Arm, Right Updated: 01/11/23 1532     hs TnI 0hr <2 ng/L     Lactic acid [[id number]]  (Normal) Collected: 01/11/23 1457    Lab Status: Final result Specimen: Blood from Arm, Right Updated: 01/11/23 1522     LACTIC ACID 0 7 mmol/L     Narrative:      Result may be elevated if tourniquet was used during collection      Protime-INR [[id number]]  (Normal) Collected: 01/11/23 1456    Lab Status: Final result Specimen: Blood from Arm, Right Updated: 01/11/23 1518     Protime 12 3 seconds      INR 0 90    APTT [[id number]]  (Normal) Collected: 01/11/23 1456    Lab Status: Final result Specimen: Blood from Arm, Right Updated: 01/11/23 1518     PTT 25 seconds     CBC and Platelet [[id number]]  (Normal) Collected: 01/11/23 1456    Lab Status: Final result Specimen: Blood from Arm, Right Updated: 01/11/23 1502     WBC 5 98 Thousand/uL      RBC 4 45 Million/uL      Hemoglobin 13 6 g/dL      Hematocrit 41 6 %      MCV 94 fL      MCH 30 6 pg      MCHC 32 7 g/dL      RDW 12 1 %      Platelets 985 Thousands/uL      MPV 10 1 fL                  MRI brain wo contrast   Final Result by Rustam Rob DO (01/12 1783)      No acute intracranial abnormality  Workstation performed: [id number]         CTA stroke alert (head/neck)   Final Result by Ray Gaming MD (01/11 1530)      1  No intracranial large vessel occlusion or critical stenosis  No aneurysm  2   No hemodynamically significant stenosis or dissection of cervical carotid and vertebral arteries  3   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  Findings were directly discussed with Sandi Jones at 3:15 PM           Workstation performed: [id number]         CT stroke alert brain   Final Result by Ray Gaming MD (01/11 1527)      1  No acute intracranial CT abnormality  2   Redemonstrated partially empty sella which can be seen in intracranial hypertension  Correlate with clinical assessment and consider further assessment with lumbar puncture if clinically warranted                    Findings were directly discussed with Sandi Jones at 3:15 PM       Workstation performed: [id number]               Procedures  ECG 12 Lead Documentation Only    Date/Time: 1/11/2023 3:30 PM  Performed by: Shefali Delgado MD  Authorized by: Ganesh Weathers Abby Dunham MD     Interpretation:     Interpretation: normal    Rate:     ECG rate:  81    ECG rate assessment: normal    Rhythm:     Rhythm: sinus rhythm    Ectopy:     Ectopy: none    QRS:     QRS intervals:  Normal  Conduction:     Conduction: normal    ST segments:     ST segments:  Normal  T waves:     T waves: normal            ED Course  ED Course as of 01/12/23 0903   Wed Jan 11, 2023   1330 CT stroke alert brain  1  No acute intracranial CT abnormality      2   Redemonstrated partially empty sella which can be seen in intracranial hypertension  Correlate with clinical assessment and consider further assessment with lumbar puncture if clinically warranted       1530 ECG 12 lead  NSR   1537 CTA stroke alert (head/neck)  1  No intracranial large vessel occlusion or critical stenosis  No aneurysm      2  No hemodynamically significant stenosis or dissection of cervical carotid and vertebral arteries       3   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  Stroke Assessment     Row Name 01/11/23 1459             NIH Stroke Scale    Interval --      Level of Consciousness (1a ) 0      LOC Questions (1b ) 0      LOC Commands (1c ) 0      Best Gaze (2 ) 0      Visual (3 ) 0      Facial Palsy (4 ) 0      Motor Arm, Left (5a ) 0      Motor Arm, Right (5b ) 0      Motor Leg, Left (6a ) 0      Motor Leg, Right (6b ) 0  feels heavy      Limb Ataxia (7 ) 0      Sensory (8 ) 0      Best Language (9 ) 0      Dysarthria (10 ) 0      Extinction and Inattention (11 ) (Formerly Neglect) 0      Total 0                                    Medical Decision Making  41 yoF presenting with anisocoria and right sided headache  Stroke alert called  CT and CTA unremarkable  Labs unremarkable  Symptoms likely due to pt hx of mitochondrial disorder/ metabolic stroke      Pt discussed with [facility] and admitted on stroke pathway in Dr Amanda Yancey service  Anisocoria: acute illness or injury  Stroke-like symptom: complicated acute illness or injury  Amount and/or Complexity of Data Reviewed  Labs: ordered  Decision-making details documented in ED Course  Radiology: ordered  Decision-making details documented in ED Course  ECG/medicine tests: ordered and independent interpretation performed  Decision-making details documented in ED Course  Risk  Prescription drug management  Decision regarding hospitalization  Disposition  Final diagnoses:   Stroke-like symptom   Anisocoria     Time reflects when diagnosis was documented in both MDM as applicable and the Disposition within this note     Time User Action Codes Description Comment    1/11/2023  2:52 PM Darliss Merl Add [R29 90] Stroke-like symptom     1/11/2023  3:57 PM Darliss Merl Add [H57 02] Anisocoria       ED Disposition     ED Disposition   Admit    Condition   Stable    Date/Time   Wed Jan 11, 2023  3:58 PM    Comment   Case was discussed with Dr Tammy Buckley and the patient's admission status was agreed to be Admission Status: inpatient status to the service of Dr Tammy Buckley  Follow-up Information    None         Current Discharge Medication List      CONTINUE these medications which have NOT CHANGED    Details   acarbose (PRECOSE) 100 MG tablet Take 1 tablet (100 mg total) by mouth 3 (three) times a day with meals  Qty: 270 tablet, Refills: 0    Associated Diagnoses: Hypoglycemia      acetylcysteine (MUCOMYST) 200 mg/mL nebulizer solution as needed       Alpha-Lipoic Acid 100 MG CAPS Take by mouth      B Complex-Biotin-FA (TH VITAMIN B 50/B-COMPLEX PO) Take 1 tablet by mouth daily          Calcium Citrate 1040 MG TABS Take by mouth      COENZYME Q-10 PO Dose varies      Continuous Blood Gluc Transmit (Dexcom G6 Transmitter) MISC Use daily as directed for CGM - Change every 3 months  Qty: 1 each, Refills: 0    Associated Diagnoses: Reactive hypoglycemia      CREATINE MONOHYDRATE PO Take 2 5 g by mouth      Cyanocobalamin (B-12) 1000 MCG/ML KIT Inject  as directed every 30 days  docusate sodium (COLACE) 100 mg capsule Take 200 mg by mouth 2 (two) times a day  fludrocortisone (FLORINEF) 0 1 mg tablet Take 1 tablet (0 1 mg total) by mouth daily  Qty: 90 tablet, Refills: 3    Associated Diagnoses: Orthostatic hypotension      fremanezumab-vfrm (Ajovy) 225 MG/1 5ML auto-injector Inject 4 5 mL (675 mg total) under the skin every 3 (three) months  Qty: 4 5 mL, Refills: 3    Associated Diagnoses: Intractable chronic migraine without aura and without status migrainosus      L-Arginine 1000 MG TABS Take 6,000 mg by mouth 2 (two) times a day       linaCLOtide 145 MCG CAPS Take 145 mcg by mouth 2 (two) times a day      midodrine (PROAMATINE) 5 mg tablet Take 1 tablet (5 mg total) by mouth 3 (three) times a day  Qty: 270 tablet, Refills: 3    Associated Diagnoses: Other specified hypotension      Motegrity 2 MG TABS       Multiple Vitamin (MULTIVITAMIN) tablet Take 1 tablet by mouth daily  NIFEdipine (PROCARDIA XL) 60 mg 24 hr tablet Take 1 tablet (60 mg total) by mouth daily  Qty: 90 tablet, Refills: 3    Associated Diagnoses: Raynaud's disease without gangrene      Oral Vehicles (PCCA-PLUS) SUSP       P-Aminobenzoic Acid (PARA-AMINOBENZOIC ACID) POWD       pantoprazole (PROTONIX) 40 mg tablet Refills: 3      polyethylene glycol (MIRALAX) 17 g packet Take 17 g by mouth 2 (two) times a day Indications: 1 5 caps full 1-2 times/day          Riboflavin 100 MG TABS Take 100 mg by mouth      Rimegepant Sulfate (Nurtec) 75 MG TBDP One tab as needed for migraine, max one per day, max 3 days per week  Qty: 8 tablet, Refills: 2    Associated Diagnoses: Migraine without aura and without status migrainosus, not intractable      sitaGLIPtin (JANUVIA) 50 mg tablet 1 tab daily  Qty: 90 tablet, Refills: 1    Associated Diagnoses: Reactive hypoglycemia      tolterodine (DETROL LA) 4 mg 24 hr capsule Take 1 capsule (4 mg total) by mouth in the morning  Qty: 90 capsule, Refills: 3    Associated Diagnoses: Neurogenic bladder      TRILYTE 420 g solution Refills: 5      Turmeric 500 MG CAPS Take by mouth daily      Ubiquinol Liposomal 100 MG/5ML SYRP Take 300 mg by mouth      Ubiquinol POWD       vitamin E 100 UNIT capsule Take 78 Units by mouth daily      bupivacaine (MARCAINE) 0 25 % 1 mL      Continuous Blood Gluc Sensor (Dexcom G6 Sensor) MISC USE AS DIRECTED [facility] - CHANGE EVERY 10 DAYS  Qty: 9 each, Refills: 0    Associated Diagnoses: Reactive hypoglycemia      diphenhydrAMINE (BENADRYL) 25 mg tablet Take by mouth      Glucagon (Baqsimi Two Pack) 3 MG/DOSE POWD Use as needed it for Hypoglycemia   Qty: 2 each, Refills: 3    Associated Diagnoses: Hypoglycemia      iron sucrose (Venofer) 20 mg/mL        Saline Bacteriostatic (sodium chloride bacteriostatic) 0 9 % 7 mL      sucralfate (CARAFATE) 1 g/10 mL suspension Take 10 mL (1 g total) by mouth 4 (four) times a day Must have liquid suspension  Qty: 420 mL, Refills: 5    Associated Diagnoses: Gastroesophageal reflux disease, esophagitis presence not specified           No discharge procedures on file  PDMP Review     None           ED Provider  Attending physically available and evaluated Candy Lovell I managed the patient along with the ED Attending      Electronically Signed by         Talia Vargas MD  01/12/23 5167

## 2023-01-11 NOTE — ED NOTES
BG 95  Pt states she feels shaky inside        Viet Doe, MANUELITO  01/11/23 1425       Viet Doe RN  01/11/23 1422

## 2023-01-11 NOTE — ASSESSMENT & PLAN NOTE
Longstanding history of migraines- following with [facility] Oconnor  Abortive therapy dose of 3 injections every 3 months  She has breakthrough headaches with her carb diet, advised to take 3 tablets of ibuprofen or 2 tablets of naproxen, and 1 tablet of Nurtec 75 mg during crisis     plan  Continue same medications as needed    Tylenol prn

## 2023-01-11 NOTE — ASSESSMENT & PLAN NOTE
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  ddx TIA vs Metabolic disorder- mitochondrial disease, concern for metabolic stroke vs hemiplegic migraine    PLAN; neurology recommendations appreeciated  Stroke pathway  MRI brain wo contrast pending  - Will defer Echo at this time  - Lipid Panel pending  - Hemoglobin A1c pending  - Start IV fluids for migraine headache  - Start L-arginine based on [facility] guidelines due to concern for possible metabolic stroke (IV L-arginine (500 mg/kg IV) to be repeated q24 hours x3-5 doses as needed, depending if ongoing symptoms  )  - Permissive HTN with SBP <180  - Euglycemic, normothermic goal  - Continue telemetry  - PT/OT/ST  - Continue to monitor and notify neurology with any changes    - Medical management and supportive care per primary team  Correction of any metabolic or infectious disturbanc   will add 100% 02 in case of cluster heacaches

## 2023-01-12 LAB
ALBUMIN SERPL BCP-MCNC: 3.8 G/DL (ref 3.5–5)
ALP SERPL-CCNC: 53 U/L (ref 34–104)
ALT SERPL W P-5'-P-CCNC: 13 U/L (ref 7–52)
ANION GAP SERPL CALCULATED.3IONS-SCNC: 4 MMOL/L (ref 4–13)
AST SERPL W P-5'-P-CCNC: 14 U/L (ref 13–39)
BASOPHILS # BLD AUTO: 0.03 THOUSANDS/ÂΜL (ref 0–0.1)
BASOPHILS NFR BLD AUTO: 1 % (ref 0–1)
BILIRUB SERPL-MCNC: 0.64 MG/DL (ref 0.2–1)
BUN SERPL-MCNC: 15 MG/DL (ref 5–25)
CALCIUM SERPL-MCNC: 9.7 MG/DL (ref 8.4–10.2)
CHLORIDE SERPL-SCNC: 108 MMOL/L (ref 96–108)
CHOLEST SERPL-MCNC: 169 MG/DL
CO2 SERPL-SCNC: 26 MMOL/L (ref 21–32)
CREAT SERPL-MCNC: 0.7 MG/DL (ref 0.6–1.3)
EOSINOPHIL # BLD AUTO: 0.08 THOUSAND/ÂΜL (ref 0–0.61)
EOSINOPHIL NFR BLD AUTO: 2 % (ref 0–6)
ERYTHROCYTE [DISTWIDTH] IN BLOOD BY AUTOMATED COUNT: 12 % (ref 11.6–15.1)
EST. AVERAGE GLUCOSE BLD GHB EST-MCNC: 100 MG/DL
GFR SERPL CREATININE-BSD FRML MDRD: 104 ML/MIN/1.73SQ M
GLUCOSE SERPL-MCNC: 101 MG/DL (ref 65–140)
GLUCOSE SERPL-MCNC: 113 MG/DL (ref 65–140)
GLUCOSE SERPL-MCNC: 90 MG/DL (ref 65–140)
GLUCOSE SERPL-MCNC: 95 MG/DL (ref 65–140)
GLUCOSE SERPL-MCNC: 95 MG/DL (ref 65–140)
HBA1C MFR BLD: 5.1 %
HCT VFR BLD AUTO: 41 % (ref 34.8–46.1)
HDLC SERPL-MCNC: 70 MG/DL
HGB BLD-MCNC: 13.8 G/DL (ref 11.5–15.4)
IMM GRANULOCYTES # BLD AUTO: 0.01 THOUSAND/UL (ref 0–0.2)
IMM GRANULOCYTES NFR BLD AUTO: 0 % (ref 0–2)
LDLC SERPL CALC-MCNC: 83 MG/DL (ref 0–100)
LYMPHOCYTES # BLD AUTO: 0.99 THOUSANDS/ÂΜL (ref 0.6–4.47)
LYMPHOCYTES NFR BLD AUTO: 23 % (ref 14–44)
MAGNESIUM SERPL-MCNC: 2 MG/DL (ref 1.9–2.7)
MCH RBC QN AUTO: 31.6 PG (ref 26.8–34.3)
MCHC RBC AUTO-ENTMCNC: 33.7 G/DL (ref 31.4–37.4)
MCV RBC AUTO: 94 FL (ref 82–98)
MONOCYTES # BLD AUTO: 0.25 THOUSAND/ÂΜL (ref 0.17–1.22)
MONOCYTES NFR BLD AUTO: 6 % (ref 4–12)
NEUTROPHILS # BLD AUTO: 2.93 THOUSANDS/ÂΜL (ref 1.85–7.62)
NEUTS SEG NFR BLD AUTO: 68 % (ref 43–75)
NRBC BLD AUTO-RTO: 0 /100 WBCS
PHOSPHATE SERPL-MCNC: 2.4 MG/DL (ref 2.7–4.5)
PLATELET # BLD AUTO: 206 THOUSANDS/UL (ref 149–390)
PMV BLD AUTO: 9.8 FL (ref 8.9–12.7)
POTASSIUM SERPL-SCNC: 4.4 MMOL/L (ref 3.5–5.3)
PROT SERPL-MCNC: 6.4 G/DL (ref 6.4–8.4)
RBC # BLD AUTO: 4.37 MILLION/UL (ref 3.81–5.12)
SODIUM SERPL-SCNC: 138 MMOL/L (ref 135–147)
TRIGL SERPL-MCNC: 82 MG/DL
WBC # BLD AUTO: 4.29 THOUSAND/UL (ref 4.31–10.16)

## 2023-01-12 RX ORDER — AMOXICILLIN 250 MG
1 CAPSULE ORAL 2 TIMES DAILY
Status: DISCONTINUED | OUTPATIENT
Start: 2023-01-12 | End: 2023-01-15 | Stop reason: HOSPADM

## 2023-01-12 RX ORDER — BUTALBITAL, ACETAMINOPHEN AND CAFFEINE 50; 325; 40 MG/1; MG/1; MG/1
1 TABLET ORAL EVERY 4 HOURS PRN
Status: DISCONTINUED | OUTPATIENT
Start: 2023-01-12 | End: 2023-01-15 | Stop reason: HOSPADM

## 2023-01-12 RX ORDER — POLYETHYLENE GLYCOL 3350 17 G/17G
17 POWDER, FOR SOLUTION ORAL DAILY
Status: DISCONTINUED | OUTPATIENT
Start: 2023-01-12 | End: 2023-01-15 | Stop reason: HOSPADM

## 2023-01-12 RX ADMIN — SUCRALFATE 1 G: 1 TABLET ORAL at 17:39

## 2023-01-12 RX ADMIN — SITAGLIPTIN 50 MG: 50 TABLET, FILM COATED ORAL at 08:19

## 2023-01-12 RX ADMIN — SUCRALFATE 1 G: 1 TABLET ORAL at 11:10

## 2023-01-12 RX ADMIN — SUCRALFATE 1 G: 1 TABLET ORAL at 21:10

## 2023-01-12 RX ADMIN — MIDODRINE HYDROCHLORIDE 2.5 MG: 2.5 TABLET ORAL at 21:11

## 2023-01-12 RX ADMIN — POLYETHYLENE GLYCOL 3350 17 G: 17 POWDER, FOR SOLUTION ORAL at 11:06

## 2023-01-12 RX ADMIN — FLUDROCORTISONE ACETATE 0.1 MG: 0.1 TABLET ORAL at 08:20

## 2023-01-12 RX ADMIN — NIFEDIPINE 60 MG: 60 TABLET, FILM COATED, EXTENDED RELEASE ORAL at 08:18

## 2023-01-12 RX ADMIN — DOCUSATE SODIUM 200 MG: 100 CAPSULE, LIQUID FILLED ORAL at 08:17

## 2023-01-12 RX ADMIN — SODIUM CHLORIDE, SODIUM GLUCONATE, SODIUM ACETATE, POTASSIUM CHLORIDE, MAGNESIUM CHLORIDE, SODIUM PHOSPHATE, DIBASIC, AND POTASSIUM PHOSPHATE 100 ML/HR: .53; .5; .37; .037; .03; .012; .00082 INJECTION, SOLUTION INTRAVENOUS at 08:42

## 2023-01-12 RX ADMIN — PANTOPRAZOLE SODIUM 40 MG: 40 TABLET, DELAYED RELEASE ORAL at 17:39

## 2023-01-12 RX ADMIN — B-COMPLEX W/ C & FOLIC ACID TAB 1 TABLET: TAB at 08:18

## 2023-01-12 RX ADMIN — ARGININE HYDROCHLORIDE 41800 MG: 10 INJECTION, SOLUTION INTRAVENOUS at 21:00

## 2023-01-12 RX ADMIN — SODIUM PHOSPHATE, MONOBASIC, MONOHYDRATE 21 MMOL: 276; 142 INJECTION, SOLUTION INTRAVENOUS at 22:39

## 2023-01-12 RX ADMIN — SODIUM CHLORIDE, SODIUM GLUCONATE, SODIUM ACETATE, POTASSIUM CHLORIDE, MAGNESIUM CHLORIDE, SODIUM PHOSPHATE, DIBASIC, AND POTASSIUM PHOSPHATE 100 ML/HR: .53; .5; .37; .037; .03; .012; .00082 INJECTION, SOLUTION INTRAVENOUS at 18:27

## 2023-01-12 RX ADMIN — OXYBUTYNIN CHLORIDE 10 MG: 5 TABLET, EXTENDED RELEASE ORAL at 08:17

## 2023-01-12 RX ADMIN — BUTALBITAL, ACETAMINOPHEN AND CAFFEINE 1 TABLET: 50; 325; 40 TABLET ORAL at 11:10

## 2023-01-12 RX ADMIN — Medication 1 CAPSULE: at 17:39

## 2023-01-12 RX ADMIN — SUCRALFATE 1 G: 1 TABLET ORAL at 08:18

## 2023-01-12 RX ADMIN — MIDODRINE HYDROCHLORIDE 2.5 MG: 2.5 TABLET ORAL at 08:17

## 2023-01-12 RX ADMIN — DOCUSATE SODIUM 200 MG: 100 CAPSULE, LIQUID FILLED ORAL at 17:39

## 2023-01-12 RX ADMIN — SENNOSIDES AND DOCUSATE SODIUM 1 TABLET: 8.6; 5 TABLET ORAL at 21:10

## 2023-01-12 RX ADMIN — MIDODRINE HYDROCHLORIDE 2.5 MG: 2.5 TABLET ORAL at 17:39

## 2023-01-12 RX ADMIN — SENNOSIDES AND DOCUSATE SODIUM 1 TABLET: 8.6; 5 TABLET ORAL at 11:06

## 2023-01-12 RX ADMIN — PANTOPRAZOLE SODIUM 40 MG: 40 TABLET, DELAYED RELEASE ORAL at 08:19

## 2023-01-12 NOTE — PLAN OF CARE
Problem: Potential for Falls  Goal: Patient will remain free of falls  Description: INTERVENTIONS:  - Educate patient/family on patient safety including physical limitations  - Instruct patient to call for assistance with activity   - Consult OT/PT to assist with strengthening/mobility   - Keep Call bell within reach  - Keep bed low and locked with side rails adjusted as appropriate  - Keep care items and personal belongings within reach  - Initiate and maintain comfort rounds  - Make Fall Risk Sign visible to staff  - Offer Toileting every 2 Hours, in advance of need  - Initiate/Maintain BED alarm  - Apply yellow socks and bracelet for high fall risk patients  - Consider moving patient to room near nurses station  Outcome: Progressing     Problem: Neurological Deficit  Goal: Neurological status is stable or improving  Description: Interventions:  - Monitor and assess patient's level of consciousness, motor function, sensory function, and level of assistance needed for ADLs  - Monitor and report changes from baseline  Collaborate with interdisciplinary team to initiate plan and implement interventions as ordered  - Provide and maintain a safe environment  - Consider seizure precautions  - Consider fall precautions  - Consider aspiration precautions  - Consider bleeding precautions  Outcome: Progressing     Problem: Activity Intolerance/Impaired Mobility  Goal: Mobility/activity is maintained at optimum level for patient  Description: Interventions:  - Assess and monitor patient  barriers to mobility and need for assistive/adaptive devices  - Assess patient's emotional response to limitations  - Collaborate with interdisciplinary team and initiate plans and interventions as ordered  - Encourage independent activity per ability   - Maintain proper body alignment  - Perform active/passive rom as tolerated/ordered    - Plan activities to conserve energy   - Turn patient as appropriate  Outcome: Progressing     Problem: Communication Impairment  Goal: Ability to express needs and understand communication  Description: Assess patient's communication skills and ability to understand information  Patient will demonstrate use of effective communication techniques, alternative methods of communication and understanding even if not able to speak  - Encourage communication and provide alternate methods of communication as needed  - Collaborate with case management/ for discharge needs  - Include patient/family/caregiver in decisions related to communication  Outcome: Progressing     Problem: Potential for Aspiration  Goal: Non-ventilated patient's risk of aspiration is minimized  Description: Assess and monitor vital signs, respiratory status, and labs (WBC)  Monitor for signs of aspiration (tachypnea, cough, rales, wheezing, cyanosis, fever)  - Assess and monitor patient's ability to swallow  - Place patient up in chair to eat if possible  - HOB up at 90 degrees to eat if unable to get patient up into chair   - Supervise patient during oral intake  - Instruct patient/ family to take small bites  - Instruct patient/ family to take small single sips when taking liquids  - Follow patient-specific strategies generated by speech pathologist   Outcome: Progressing  Goal: Ventilated patient's risk of aspiration is minimized  Description: Assess and monitor vital signs, respiratory status, airway cuff pressure, and labs (WBC)  Monitor for signs of aspiration (tachypnea, cough, rales, wheezing, cyanosis, fever)  - Elevate head of bed 30 degrees if patient has tube feeding   - Monitor tube feeding    Outcome: Progressing     Problem: Nutrition  Goal: Nutrition/Hydration status is improving  Description: Monitor and assess patient's nutrition/hydration status for malnutrition (ex- brittle hair, bruises, dry skin, pale skin and conjunctiva, muscle wasting, smooth red tongue, and disorientation)  Collaborate with interdisciplinary team and initiate plan and interventions as ordered  Monitor patient's weight and dietary intake as ordered or per policy  Utilize nutrition screening tool and intervene per policy  Determine patient's food preferences and provide high-protein, high-caloric foods as appropriate  - Assist patient with eating   - Allow adequate time for meals   - Encourage patient to take dietary supplement as ordered  - Collaborate with clinical nutritionist   - Include patient/family/caregiver in decisions related to nutrition  Outcome: Progressing     Problem: Knowledge Deficit  Goal: Patient/family/caregiver demonstrates understanding of disease process, treatment plan, medications, and discharge instructions  Description: Complete learning assessment and assess knowledge base    Interventions:  - Provide teaching at level of understanding  - Provide teaching via preferred learning methods  Outcome: Progressing     Problem: MOBILITY - ADULT  Goal: Maintain or return to baseline ADL function  Description: INTERVENTIONS:  -  Assess patient's ability to carry out ADLs; assess patient's baseline for ADL function and identify physical deficits which impact ability to perform ADLs (bathing, care of mouth/teeth, toileting, grooming, dressing, etc )  - Assess/evaluate cause of self-care deficits   - Assess range of motion  - Assess patient's mobility; develop plan if impaired  - Assess patient's need for assistive devices and provide as appropriate  - Encourage maximum independence but intervene and supervise when necessary  - Involve family in performance of ADLs  - Assess for home care needs following discharge   - Consider OT consult to assist with ADL evaluation and planning for discharge  - Provide patient education as appropriate  Outcome: Progressing  Goal: Maintains/Returns to pre admission functional level  Description: INTERVENTIONS:  - Perform BMAT or MOVE assessment daily    - Set and communicate daily mobility goal to care team and patient/family/caregiver  - Collaborate with rehabilitation services on mobility goals if consulted  - Perform Range of Motion 3 times a day  - Reposition patient every 2 hours    - Dangle patient 3 times a day  - Stand patient 3 times a day  - Ambulate patient 3 times a day  - Out of bed to chair 3 times a day   - Out of bed for meals 3 times a day  - Out of bed for toileting  - Record patient progress and toleration of activity level   Outcome: Progressing

## 2023-01-12 NOTE — ASSESSMENT & PLAN NOTE
Longstanding history of migraines- following with Jaylin Valera  Abortive therapy dose of 3 injections every 3 months  She has breakthrough headaches with her carb diet, advised to take 3 tablets of ibuprofen or 2 tablets of naproxen, and 1 tablet of Nurtec 75 mg during crisis     plan  Continue same medications as needed    Tylenol prn

## 2023-01-12 NOTE — ASSESSMENT & PLAN NOTE
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  MRI- There is no discrete mass, mass effect or midline shift  There is no intracranial hemorrhage  There is no evidence of acute infarction and diffusion imaging is unremarkable  There are no white matter changes in the cerebral   hemispheres  1/12- Patient is s/p one dose of L arginine infusion day - feels subtle improvement in vision and        PLAN; Neurology recommendations appreeciated    Stroke pathway       -Recommend L- Arginine infusion for 3-5 days until resolution of symptoms  - Will defer Echo at this time  - Lipid Panel pending- normal  - Hemoglobin A1c- normal  - IV fluids for migraine headache  - Permissive HTN with SBP <180  - Euglycemic, normothermic goal  - Continue telemetry  - PT/OT/ST  - Continue to monitor and notify neurology with any changes    - Medical management and supportive care per primary team  Correction of any metabolic or infectious disturbanc   will add 100% 02 in case of cluster heacaches

## 2023-01-12 NOTE — PLAN OF CARE
Problem: Potential for Falls  Goal: Patient will remain free of falls  Description: INTERVENTIONS:  - Educate patient/family on patient safety including physical limitations  - Instruct patient to call for assistance with activity   - Consult OT/PT to assist with strengthening/mobility   - Keep Call bell within reach  - Keep bed low and locked with side rails adjusted as appropriate  - Keep care items and personal belongings within reach  - Initiate and maintain comfort rounds  - Make Fall Risk Sign visible to staff  - Offer Toileting every Hours, in advance of need  - Initiate/Maintain alarm  - Obtain necessary fall risk management equipment:  - Apply yellow socks and bracelet for high fall risk patients  - Consider moving patient to room near nurses station  Outcome: Progressing     Problem: Neurological Deficit  Goal: Neurological status is stable or improving  Description: Interventions:  - Monitor and assess patient's level of consciousness, motor function, sensory function, and level of assistance needed for ADLs  - Monitor and report changes from baseline  Collaborate with interdisciplinary team to initiate plan and implement interventions as ordered  - Provide and maintain a safe environment  - Consider seizure precautions  - Consider fall precautions  - Consider aspiration precautions  - Consider bleeding precautions  Outcome: Progressing     Problem: Activity Intolerance/Impaired Mobility  Goal: Mobility/activity is maintained at optimum level for patient  Description: Interventions:  - Assess and monitor patient  barriers to mobility and need for assistive/adaptive devices  - Assess patient's emotional response to limitations  - Collaborate with interdisciplinary team and initiate plans and interventions as ordered  - Encourage independent activity per ability   - Maintain proper body alignment  - Perform active/passive rom as tolerated/ordered  - Plan activities to conserve energy    - Turn patient as appropriate  Outcome: Progressing     Problem: Communication Impairment  Goal: Ability to express needs and understand communication  Description: Assess patient's communication skills and ability to understand information  Patient will demonstrate use of effective communication techniques, alternative methods of communication and understanding even if not able to speak  - Encourage communication and provide alternate methods of communication as needed  - Collaborate with case management/ for discharge needs  - Include patient/family/caregiver in decisions related to communication  Outcome: Progressing     Problem: Potential for Aspiration  Goal: Non-ventilated patient's risk of aspiration is minimized  Description: Assess and monitor vital signs, respiratory status, and labs (WBC)  Monitor for signs of aspiration (tachypnea, cough, rales, wheezing, cyanosis, fever)  - Assess and monitor patient's ability to swallow  - Place patient up in chair to eat if possible  - HOB up at 90 degrees to eat if unable to get patient up into chair   - Supervise patient during oral intake  - Instruct patient/ family to take small bites  - Instruct patient/ family to take small single sips when taking liquids  - Follow patient-specific strategies generated by speech pathologist   Outcome: Progressing  Goal: Ventilated patient's risk of aspiration is minimized  Description: Assess and monitor vital signs, respiratory status, airway cuff pressure, and labs (WBC)  Monitor for signs of aspiration (tachypnea, cough, rales, wheezing, cyanosis, fever)  - Elevate head of bed 30 degrees if patient has tube feeding   - Monitor tube feeding    Outcome: Progressing     Problem: Nutrition  Goal: Nutrition/Hydration status is improving  Description: Monitor and assess patient's nutrition/hydration status for malnutrition (ex- brittle hair, bruises, dry skin, pale skin and conjunctiva, muscle wasting, smooth red tongue, and disorientation)  Collaborate with interdisciplinary team and initiate plan and interventions as ordered  Monitor patient's weight and dietary intake as ordered or per policy  Utilize nutrition screening tool and intervene per policy  Determine patient's food preferences and provide high-protein, high-caloric foods as appropriate  - Assist patient with eating   - Allow adequate time for meals   - Encourage patient to take dietary supplement as ordered  - Collaborate with clinical nutritionist   - Include patient/family/caregiver in decisions related to nutrition  Outcome: Progressing     Problem: Knowledge Deficit  Goal: Patient/family/caregiver demonstrates understanding of disease process, treatment plan, medications, and discharge instructions  Description: Complete learning assessment and assess knowledge base    Interventions:  - Provide teaching at level of understanding  - Provide teaching via preferred learning methods  Outcome: Progressing

## 2023-01-12 NOTE — UTILIZATION REVIEW
Initial Clinical Review    Admission: Date/Time/Statement:   Admission Orders (From admission, onward)     Ordered        01/11/23 1559  INPATIENT ADMISSION  Once                      Orders Placed This Encounter   Procedures   • INPATIENT ADMISSION     Standing Status:   Standing     Number of Occurrences:   1     Order Specific Question:   Level of Care     Answer:   Med Surg [16]     Order Specific Question:   Estimated length of stay     Answer:   More than 2 Midnights     Order Specific Question:   Certification     Answer:   I certify that inpatient services are medically necessary for this patient for a duration of greater than two midnights  See H&P and MD Progress Notes for additional information about the patient's course of treatment  ED Arrival Information     Expected   -    Arrival   1/11/2023 14:10    Acuity   Emergent            Means of arrival   -    Escorted by   -    Service   Hospitalist    Admission type   Emergency            Arrival complaint   -           Chief Complaint   Patient presents with   • Headache     Pt with h/o migraines and metabolic stroke presents with HA for hours, felt blurred vision and thought they were dry and then notice right pupil dilated and fixed     Initial Presentation: 41 y o  female presents to the ED from work c/o headaches, blurry vision, dilated R pupil, RL weakness since 1400 on day of admit  She felt fatigued at work and more cold than usual   She used Atropine for a pt earlier and may have had some in her own eye  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, hx of 2 metabolic strokes with neuro deficits now resolved , GI dysmotility and esophageal reflux status post fundoplication and gastric bypass, late onset dumping syndrome ,orthostatic hypotension, raynaud's dx, migraines  In the ED she has normal VS, MRI brain shows no acute disease    CTA head/neck shows developmentally 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  She is treated with IV Fluids, Ibuprofen  On exam she has visual field deficit, very mild RLL drift, no deficit  She is admitted to INPATIENT status with Fixed dilated pupil of right eye with associated strokelike symptoms - ddx TIA vs Metabolic disorder- mitochondrial disease, concern for metabolic stroke vs hemiplegic migraine -  Stroke pathway - NIHSS 1, not a TPk candidate, MRI Brain, IV fluids, IV LArginine, permissive HTN, tele, therapy evals, 100% oxygen PRN for cluster headaches  Migraine - home tx 3 tablets of ibuprofen or 2 tablets of naproxen, and 1 tablet of Nurtec 75 mg during crisis, Tylenol PRN  Orthostatic hypotension - on Midodrine and Florinef, TEDs  1/11 Neuro Consult - R side headache,  followed by blurred vision in the right eye for which she used  moisturizing eyedrops, and was noted to have a dilated right pupil  in ED had fixed dilate R pupil w/o other findings  Had negative CTA head, neck - no further stroke intervention  Will give IV fluids, IV L-Arginine infusion, ibuprofen, tylenol PRN  Will get MRI Brain  Date: 1/12   Day 2:   Per Neuro today - pupil is now reactive  But she reports haziness and has difficulty emptying her bladder and has posterior central headache different from normal headaches  She is A&O x 3, MRI brain unremarkable for acute infarct  Etiology for pupil change likely due to atropine getting in her eye without her knowing  Remains on IV fluids, L Arginine for 3-5 days per [facility] providers until symptoms resolve  Continue tele, therapy, medical mgmt          ED Triage Vitals   Temperature Pulse Respirations Blood Pressure SpO2   01/11/23 1729 01/11/23 1413 01/11/23 1413 01/11/23 1413 01/11/23 1413   98 4 °F (36 9 °C) 81 20 152/77 100 %      Temp Source Heart Rate Source Patient Position - Orthostatic VS BP Location FiO2 (%)   01/11/23 2100 01/11/23 1413 01/11/23 1413 01/11/23 1413 --   Oral Monitor Sitting Right arm       Pain Score 01/11/23 1415       8          Wt Readings from Last 1 Encounters:   01/11/23 83 6 kg (184 lb 4 9 oz)     Additional Vital Signs:   01/12/23 07:32:36 98 2 °F (36 8 °C) 70 -- 132/77 95 99 % -- --   01/12/23 04:39:19 97 6 °F (36 4 °C) 82 -- 120/77 91 96 % -- --   01/12/23 02:41:08 97 6 °F (36 4 °C) 70 -- 130/78 95 98 % -- --   01/12/23 00:42:49 98 °F (36 7 °C) 67 -- 117/66 83 97 % -- --   01/12/23 0000 -- -- -- -- -- 96 % -- --   01/11/23 2200 98 °F (36 7 °C) 68 -- 113/72 -- 97 % -- --   01/11/23 2100 98 °F (36 7 °C) 62 18 130/81 -- 97 % -- --   01/11/23 20:31:25 98 1 °F (36 7 °C) 72 -- 128/80 96 96 % -- --   01/11/23 20:13:41 98 2 °F (36 8 °C) 74 -- 134/87 103 98 % -- --   01/11/23 17:29:22 98 4 °F (36 9 °C) 78 -- 124/82 96 97 % -- --   01/11/23 1555 -- 86 -- -- -- 100 % -- --   01/11/23 1550 -- 83 -- -- -- 100 % -- --   01/11/23 1545 -- 92 16 120/73 -- 100 % -- --   01/11/23 1540 -- 84 -- -- -- 99 % -- --   01/11/23 1537 -- -- -- 144/91 -- -- -- --   01/11/23 1535 -- 81 -- -- -- 100 % -- --   01/11/23 1530 -- 83 -- -- -- 98 % -- --   01/11/23 1525 -- 85 -- -- -- 100 % -- --   01/11/23 1520 -- 83 16 145/82 -- 100 % None (Room air) --   01/11/23 1515 -- 84 16 145/70 -- 100 % None (Room air) --   01/11/23 1510 -- 90 16 145/82 -- 100 % None (Room air) --   01/11/23 1505 -- 82 -- -- -- 98 % -- --   01/11/23 1500 -- 82 16 145/87 -- 100 % None (Room air) Lying   01/11/23 1455 -- 81 -- -- -- 100 % -- --   01/11/23 1450 -- 83 -- -- -- 98 % -- --   01/11/23 1445 -- 74 16 130/83 101 99 % None (Room air) Lying   01/11/23 1430 -- 83 16 149/89 115 100 % None (Room air) Lying   01/11/23 1415 -- 87 16 152/77 107 100 % None (Room air) Lying     Pertinent Labs/Diagnostic Test Results:     1/11 ECG - Normal sinus rhythm  Nonspecific ST abnormality  Abnormal ECG    MRI brain wo contrast   Final Result by Anamaria 43, DO (01/12 4894)      No acute intracranial abnormality        Workstation performed: [id number] CTA stroke alert (head/neck)   Final Result by Guru Gavin MD (01/11 1530)      1  No intracranial large vessel occlusion or critical stenosis  No aneurysm  2   No hemodynamically significant stenosis or dissection of cervical carotid and vertebral arteries  3   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  Findings were directly discussed with Saman Rosa at 3:15 PM           Workstation performed: [id number]         CT stroke alert brain   Final Result by Guru Gavin MD (01/11 1527)      1  No acute intracranial CT abnormality  2   Redemonstrated partially empty sella which can be seen in intracranial hypertension  Correlate with clinical assessment and consider further assessment with lumbar puncture if clinically warranted                    Findings were directly discussed with Saman Rosa at 3:15 PM       Workstation performed: [id number]           Results from last 7 days   Lab Units 01/11/23  1456   SARS-COV-2  Negative     Results from last 7 days   Lab Units 01/12/23  0838 01/11/23  1456   WBC Thousand/uL 4 29* 5 98   HEMOGLOBIN g/dL 13 8 13 6   HEMATOCRIT % 41 0 41 6   PLATELETS Thousands/uL 206 246   NEUTROS ABS Thousands/µL 2 93  --          Results from last 7 days   Lab Units 01/12/23  0838 01/11/23  1457   SODIUM mmol/L 138 138   POTASSIUM mmol/L 4 4 3 8   CHLORIDE mmol/L 108 102   CO2 mmol/L 26 28   ANION GAP mmol/L 4 8   BUN mg/dL 15 18   CREATININE mg/dL 0 70 0 73   EGFR ml/min/1 73sq m 104 99   CALCIUM mg/dL 9 7 10 7*   MAGNESIUM mg/dL 2 0  --    PHOSPHORUS mg/dL 2 4*  --      Results from last 7 days   Lab Units 01/12/23  0838 01/11/23  1457   AST U/L 14 17   ALT U/L 13 16   ALK PHOS U/L 53 63   TOTAL PROTEIN g/dL 6 4 7 5   ALBUMIN g/dL 3 8 4 5   TOTAL BILIRUBIN mg/dL 0 64 0 48     Results from last 7 days   Lab Units 01/12/23  1613 01/12/23  1047 01/12/23  0738 01/11/23  2145   POC GLUCOSE mg/dl 95 90 95 74     Results from last 7 days   Lab Units 01/12/23  0838 01/11/23  1457   GLUCOSE RANDOM mg/dL 113 94     Results from last 7 days   Lab Units 01/11/23  1707 01/11/23  1456   HS TNI 0HR ng/L  --  <2   HS TNI 2HR ng/L <2  --          Results from last 7 days   Lab Units 01/11/23  1456   PROTIME seconds 12 3   INR  0 90   PTT seconds 25             Results from last 7 days   Lab Units 01/11/23  1457   LACTIC ACID mmol/L 0 7     Results from last 7 days   Lab Units 01/11/23  1456   INFLUENZA A PCR  Negative   INFLUENZA B PCR  Negative   RSV PCR  Negative     ED Treatment:   Medication Administration from 01/11/2023 1410 to 01/11/2023 1728       Date/Time Order Dose Route Action     01/11/2023 1507 EST iohexol (OMNIPAQUE) 350 MG/ML injection (SINGLE-DOSE) 85 mL 85 mL Intravenous Given     01/11/2023 1549 EST dextrose 5 % and sodium chloride 0 9 % infusion 125 mL/hr Intravenous New Bag     01/11/2023 1603 EST ibuprofen (MOTRIN) tablet 600 mg 600 mg Oral Given        Past Medical History:   Diagnosis Date   • Acid reflux    • Constipated    • Dysautonomia (HCC)    • Esophageal abnormality     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  • Gastrostomy tube in place [facility])    • Gastrostomy tube in place [facility])    • History of blood clots 2012    Left leg  Has IVC filter now   Occurred while on Lovenox   • Iron deficiency    • Malignant hyperthermia due to anesthesia     Patient's son has M H   States she needs precautions   • Migraines    • 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[facility] [address] )    • 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    • MTHFR (methylene THF reductase) deficiency and homocystinuria (HCC)    • Muscle weakness (generalized)    • Nausea    • On total parenteral nutrition (TPN)     for 8 mts   • PCOS (polycystic ovarian syndrome)    • PONV (postoperative nausea and vomiting)    • Postgastrectomy syndrome     malabsorption   • Postsurgical malabsorption    • Status post gastric bypass for obesity    • Stroke [facility]) 2004, 2009 Metabolic strokes  Right hemiparesis= minor now  • Vomiting     When eating     Present on Admission:  • Postsurgical malabsorption  • Constipation      Admitting Diagnosis: Anisocoria [H57 02]  Stroke-like symptom [R29 90]  Age/Sex: 41 y o  female  Admission Orders:  Scheduled Medications:  acarbose, 100 mg, Oral, TID With Meals  arginine, 41 8 g, Intravenous, Q24H  b complex-vitamin C-folic acid, 1 capsule, Oral, Daily With Dinner  docusate sodium, 200 mg, Oral, BID  fludrocortisone, 0 1 mg, Oral, Daily  fremanezumab-vfrm, 675 mg, Subcutaneous, Q3 Months  linaCLOtide, 145 mcg, Oral, BID  midodrine, 2 5 mg, Oral, TID  multivitamin stress formula, 1 tablet, Oral, Daily  NIFEdipine, 60 mg, Oral, Daily  oxybutynin, 10 mg, Oral, Daily  pantoprazole, 40 mg, Oral, BID AC  patient supplied medication, , Per G Tube, BID  polyethylene glycol, 17 g, Oral, Daily  Prucalopride Succinate, 2 mg, Oral, QAM  senna-docusate sodium, 1 tablet, Oral, BID  sitaGLIPtin, 50 mg, Oral, Daily  sucralfate, 1 g, Oral, 4x Daily  Turmeric, , Oral, Daily  vitamin E, 100 Units, Oral, Daily      Continuous IV Infusions:  multi-electrolyte, 100 mL/hr, Intravenous, Continuous      PRN Meds:  butalbital-acetaminophen-caffeine, 1 tablet, Oral, Q4H PRN  polyethylene glycol, 17 g, Oral, Daily PRN - x 1 1/11  Rimegepant Sulfate, 75 mg, Oral, PRN    Oxygen 2L NC  freq Neuro checks  Tele  Access Port  POC GLUCOSE q 8 hr  WITH SSI COVERAGE   IP CONSULT TO NEUROLOGY    Network Utilization Review Department  ATTENTION: Please call with any questions or concerns to [phone] and carefully listen to the prompts so that you are directed to the right person  All voicemails are confidential   Carmelo Baum all requests for admission clinical reviews, approved or denied determinations and any other requests to dedicated fax number below belonging to the campus where the patient is receiving treatment   List of dedicated fax numbers for the Facilities:  [address] FAX NUMBER   ADMISSION DENIALS (Administrative/Medical Necessity) [phone]   [address] (Maternity/NICU/Pediatrics) Nit[address] Sandra Bonds  [phone]   [address] [address] [facility] [place] [place] [address] Angie Yung [facility] [address] [facility] [address] [facility] [phone]

## 2023-01-12 NOTE — PROGRESS NOTES
Progress Note - Neurology   Mari Devries 41 y o  female MRN: [id number]  Unit/Bed#: S -01 Encounter: [id number]      Assessment/Plan   * Fixed dilated pupil of right eye with associated strokelike symptoms  Assessment & Plan  Mrai Devries is a 41 y o  female with longstanding history of migraines currently on Ajovy, genetic mitochondrial disorder, prior metabolic strokes, small fiber neuropathy, RSD, dysautonomia, orthostatic hypotension, extreme GI dysmotility, and Raynaud's who presented to [facility] on 1/11/2023 due to headache, right leg heaviness, right eye blurred vision, and fixed dilated right pupil  Of note, patient is an infusion nurse and gave the patient atropine this morning prior to blurred vision onset  Patient does not recall getting any atropine in her eye  Upon arrival to the ED, /77  NIHSS 1 (visual impairment in the right eye)  CT head negative for acute intracranial abnormalities  CTA head/neck negative for LVO, significant stenosis, or aneurysm  Patient was not a TNKase candidate due to being outside the time window  On exam, patient's right pupil is 9 mm, round, and fixed with decreased vision in all fields in the right eye, however EOMs intact and no other neuro deficits  MRI brain unremarkable for acute infarct  Etiology for pupil change likely due to atropine getting in her eye without her knowing  Plan:  - Would not recommend echo at this time  - Lipid Panel: total cholesterol 169, triglycerides 82, HDL 70, LDL 83  - Hemoglobin A1c pending  - Start IV fluids for migraine headache  - Start L-arginine based on [facility] guidelines due to concern for possible metabolic stroke (IV L-arginine (500 mg/kg IV) to be repeated q24 hours x3-5 doses as needed, depending if ongoing symptoms )  · Attempted to contact patients mitochondrial specialist at [address] [facility], waiting to hear back   For the time being, will continue L-arginine daily for 3 days unless recommendations from specialist differs  · Will defer to attending neurologist for final recommendations after speaking with [facility]  - Goal normotension, euglycemia, normothermia   - Continue telemetry  - PT/OT/ST  - Continue to monitor and notify neurology with any changes  - Medical management and supportive care per primary team  Correction of any metabolic or infectious disturbances  London Majano will need follow up in at the next regular appointment with headache attending or advance practitioner  She will not require outpatient neurological testing  Subjective: Today patient states her eye is now reactive, however continues to report haziness  She also reports difficulty with emptying her bladder  Patient states she continue to experience a posterior central headache which she describes as different quality from her normal headaches  Vitals: Blood pressure 132/77, pulse 70, temperature 98 2 °F (36 8 °C), resp  rate 18, weight 83 6 kg (184 lb 4 9 oz), SpO2 97 %, not currently breastfeeding  ,Body mass index is 31 64 kg/m²  Physical Exam  Vitals and nursing note reviewed  Constitutional:       General: She is not in acute distress  Appearance: She is normal weight  She is not ill-appearing, toxic-appearing or diaphoretic  HENT:      Head: Normocephalic and atraumatic  Eyes:      General: No scleral icterus  Right eye: No discharge  Left eye: No discharge  Extraocular Movements: Extraocular movements intact and EOM normal       Conjunctiva/sclera: Conjunctivae normal    Musculoskeletal:         General: Normal range of motion  Cervical back: Normal range of motion and neck supple  Comments: No tenderness to palpation of spinous processes and bilateral paraspinal muscles    Skin:     General: Skin is warm and dry  Coloration: Skin is not jaundiced or pale  Findings: No bruising or erythema  Neurological:      Mental Status: She is alert        Gait: Gait is intact  Psychiatric:         Mood and Affect: Mood normal          Behavior: Behavior normal          Thought Content: Thought content normal          Judgment: Judgment normal        Neurologic Exam     Mental Status   Patient is alert, sitting up in bed  Oriented x 3  No dysarthria or aphasia noted  Able to follow central and appendicular commands, and answers all questions appropriately      Cranial Nerves     CN III, IV, VI   Extraocular motions are normal    Nystagmus: none   Upgaze: normal  Downgaze: normal  Conjugate gaze: present    CN VII   Facial expression full, symmetric  CN VIII   Hearing: intact  Right pupil is 2 5 mm, sluggishly reactive   Able to count fingers in left upper and lower quadrants; reports hazy movement in right upper and lower quadrants      Motor Exam   Muscle bulk: normal  Overall muscle tone: normal  Right arm pronator drift: absent  Left arm pronator drift: absent  Mild proximal RUE weakness 5-/5 when compared to LUE 5/5    Mild proximal RLE weakness 5-/5 when compared to LLE 5/5     Sensory Exam   Pinprick intact throughout except RLE which she describes as duller sensation     No evidence of extinction with bilateral simultaneous stimulation      Gait, Coordination, and Reflexes     Gait  Gait: normal    Tremor   Resting tremor: absent  No involuntary movements or rhythmic seizure like activity noted throughout exam     Lab Results: I have personally reviewed pertinent reports    Recent Results (from the past 24 hour(s))   ECG 12 lead    Collection Time: 01/11/23  2:17 PM   Result Value Ref Range    Ventricular Rate 81 BPM    Atrial Rate 81 BPM    CT Interval 160 ms    QRSD Interval 90 ms    QT Interval 370 ms    QTC Interval 429 ms    P Axis 66 degrees    QRS Axis 40 degrees    T Wave Axis 59 degrees   CBC and Platelet    Collection Time: 01/11/23  2:56 PM   Result Value Ref Range    WBC 5 98 4 31 - 10 16 Thousand/uL    RBC 4 45 3 81 - 5 12 Million/uL    Hemoglobin 13 6 11 5 - 15 4 g/dL    Hematocrit 41 6 34 8 - 46 1 %    MCV 94 82 - 98 fL    MCH 30 6 26 8 - 34 3 pg    MCHC 32 7 31 4 - 37 4 g/dL    RDW 12 1 11 6 - 15 1 %    Platelets 031 074 - 237 Thousands/uL    MPV 10 1 8 9 - 12 7 fL   Protime-INR    Collection Time: 01/11/23  2:56 PM   Result Value Ref Range    Protime 12 3 11 6 - 14 5 seconds    INR 0 90 0 84 - 1 19   APTT    Collection Time: 01/11/23  2:56 PM   Result Value Ref Range    PTT 25 23 - 37 seconds   HS Troponin 0hr (reflex protocol)    Collection Time: 01/11/23  2:56 PM   Result Value Ref Range    hs TnI 0hr <2 "Refer to ACS Flowchart"- see link ng/L   FLU/RSV/COVID - if FLU/RSV clinically relevant    Collection Time: 01/11/23  2:56 PM    Specimen: Nose; Nares   Result Value Ref Range    SARS-CoV-2 Negative Negative    INFLUENZA A PCR Negative Negative    INFLUENZA B PCR Negative Negative    RSV PCR Negative Negative   Lactic acid    Collection Time: 01/11/23  2:57 PM   Result Value Ref Range    LACTIC ACID 0 7 0 5 - 2 0 mmol/L   Comprehensive metabolic panel    Collection Time: 01/11/23  2:57 PM   Result Value Ref Range    Sodium 138 135 - 147 mmol/L    Potassium 3 8 3 5 - 5 3 mmol/L    Chloride 102 96 - 108 mmol/L    CO2 28 21 - 32 mmol/L    ANION GAP 8 4 - 13 mmol/L    BUN 18 5 - 25 mg/dL    Creatinine 0 73 0 60 - 1 30 mg/dL    Glucose 94 65 - 140 mg/dL    Calcium 10 7 (H) 8 4 - 10 2 mg/dL    AST 17 13 - 39 U/L    ALT 16 7 - 52 U/L    Alkaline Phosphatase 63 34 - 104 U/L    Total Protein 7 5 6 4 - 8 4 g/dL    Albumin 4 5 3 5 - 5 0 g/dL    Total Bilirubin 0 48 0 20 - 1 00 mg/dL    eGFR 99 ml/min/1 73sq m   HS Troponin I 2hr    Collection Time: 01/11/23  5:07 PM   Result Value Ref Range    hs TnI 2hr <2 "Refer to ACS Flowchart"- see link ng/L    Delta 2hr hsTnI     Fingerstick Glucose (POCT)    Collection Time: 01/11/23  9:45 PM   Result Value Ref Range    POC Glucose 74 65 - 140 mg/dl   Lipid Panel with Direct LDL reflex    Collection Time: 01/12/23  4:54 AM   Result Value Ref Range    Cholesterol 169 See Comment mg/dL    Triglycerides 82 See Comment mg/dL    HDL, Direct 70 >=50 mg/dL    LDL Calculated 83 0 - 100 mg/dL   Hemoglobin A1c w/EAG Estimation    Collection Time: 01/12/23  4:54 AM   Result Value Ref Range    Hemoglobin A1C 5 1 Normal 3 8-5 6%; PreDiabetic 5 7-6 4%;  Diabetic >=6 5%; Glycemic control for adults with diabetes <7 0% %     mg/dl   Fingerstick Glucose (POCT)    Collection Time: 01/12/23  7:38 AM   Result Value Ref Range    POC Glucose 95 65 - 140 mg/dl   CBC and differential    Collection Time: 01/12/23  8:38 AM   Result Value Ref Range    WBC 4 29 (L) 4 31 - 10 16 Thousand/uL    RBC 4 37 3 81 - 5 12 Million/uL    Hemoglobin 13 8 11 5 - 15 4 g/dL    Hematocrit 41 0 34 8 - 46 1 %    MCV 94 82 - 98 fL    MCH 31 6 26 8 - 34 3 pg    MCHC 33 7 31 4 - 37 4 g/dL    RDW 12 0 11 6 - 15 1 %    MPV 9 8 8 9 - 12 7 fL    Platelets 072 155 - 142 Thousands/uL    nRBC 0 /100 WBCs    Neutrophils Relative 68 43 - 75 %    Immat GRANS % 0 0 - 2 %    Lymphocytes Relative 23 14 - 44 %    Monocytes Relative 6 4 - 12 %    Eosinophils Relative 2 0 - 6 %    Basophils Relative 1 0 - 1 %    Neutrophils Absolute 2 93 1 85 - 7 62 Thousands/µL    Immature Grans Absolute 0 01 0 00 - 0 20 Thousand/uL    Lymphocytes Absolute 0 99 0 60 - 4 47 Thousands/µL    Monocytes Absolute 0 25 0 17 - 1 22 Thousand/µL    Eosinophils Absolute 0 08 0 00 - 0 61 Thousand/µL    Basophils Absolute 0 03 0 00 - 0 10 Thousands/µL   Comprehensive metabolic panel    Collection Time: 01/12/23  8:38 AM   Result Value Ref Range    Sodium 138 135 - 147 mmol/L    Potassium 4 4 3 5 - 5 3 mmol/L    Chloride 108 96 - 108 mmol/L    CO2 26 21 - 32 mmol/L    ANION GAP 4 4 - 13 mmol/L    BUN 15 5 - 25 mg/dL    Creatinine 0 70 0 60 - 1 30 mg/dL    Glucose 113 65 - 140 mg/dL    Calcium 9 7 8 4 - 10 2 mg/dL    AST 14 13 - 39 U/L    ALT 13 7 - 52 U/L    Alkaline Phosphatase 53 34 - 104 U/L    Total Protein 6 4 6 4 - 8 4 g/dL Albumin 3 8 3 5 - 5 0 g/dL    Total Bilirubin 0 64 0 20 - 1 00 mg/dL    eGFR 104 ml/min/1 73sq m   Magnesium    Collection Time: 01/12/23  8:38 AM   Result Value Ref Range    Magnesium 2 0 1 9 - 2 7 mg/dL   Phosphorus    Collection Time: 01/12/23  8:38 AM   Result Value Ref Range    Phosphorus 2 4 (L) 2 7 - 4 5 mg/dL   Fingerstick Glucose (POCT)    Collection Time: 01/12/23 10:47 AM   Result Value Ref Range    POC Glucose 90 65 - 140 mg/dl   ]  Imaging Studies: I have personally reviewed pertinent reports and I have personally reviewed pertinent films in PACS  EKG, Pathology, and Other Studies: I have personally reviewed pertinent reports  Counseling / Coordination of Care  Total time spent today 28 minutes  Greater than 50% of total time was spent with the patient and/or family counseling and/or coordination of care  A description of the counseling/coordination of care:  Patient was seen and evaluated  Discussed with attending  Chart reviewed thoroughly including laboratory and imaging studies  Plan of care discussed with patient and primary team  Discussed MRI brain with patient and plan to contact [facility] specialist to finalize plan  Dictation voice to text software has been used in the creation of this document  Please consider this in light of any contextual or grammatical errors

## 2023-01-12 NOTE — ASSESSMENT & PLAN NOTE
Remote hx of mitochondrial disease with Gi dysmoptilty    Follows at [facility] and has ongoing treatment  History of 2 metabolic strokes-managed with L-arginine and other medications followed by rehab  S/p multiple GI procedure-fundoplication and gastric bypass

## 2023-01-13 LAB
CARDIAC TROPONIN I PNL SERPL HS: <2 NG/L (ref 8–18)
ERYTHROCYTE [DISTWIDTH] IN BLOOD BY AUTOMATED COUNT: 12 % (ref 11.6–15.1)
GLUCOSE SERPL-MCNC: 100 MG/DL (ref 65–140)
GLUCOSE SERPL-MCNC: 100 MG/DL (ref 65–140)
GLUCOSE SERPL-MCNC: 77 MG/DL (ref 65–140)
GLUCOSE SERPL-MCNC: 94 MG/DL (ref 65–140)
HCT VFR BLD AUTO: 38.8 % (ref 34.8–46.1)
HGB BLD-MCNC: 12.8 G/DL (ref 11.5–15.4)
MAGNESIUM SERPL-MCNC: 1.9 MG/DL (ref 1.9–2.7)
MCH RBC QN AUTO: 31.1 PG (ref 26.8–34.3)
MCHC RBC AUTO-ENTMCNC: 33 G/DL (ref 31.4–37.4)
MCV RBC AUTO: 94 FL (ref 82–98)
PHOSPHATE SERPL-MCNC: 3 MG/DL (ref 2.7–4.5)
PLATELET # BLD AUTO: 200 THOUSANDS/UL (ref 149–390)
PMV BLD AUTO: 10.3 FL (ref 8.9–12.7)
RBC # BLD AUTO: 4.11 MILLION/UL (ref 3.81–5.12)
WBC # BLD AUTO: 5.03 THOUSAND/UL (ref 4.31–10.16)

## 2023-01-13 RX ORDER — BISACODYL 10 MG
10 SUPPOSITORY, RECTAL RECTAL DAILY
Status: DISCONTINUED | OUTPATIENT
Start: 2023-01-13 | End: 2023-01-15 | Stop reason: HOSPADM

## 2023-01-13 RX ORDER — ACARBOSE 100 MG/1
100 TABLET ORAL
Status: DISCONTINUED | OUTPATIENT
Start: 2023-01-13 | End: 2023-01-15 | Stop reason: HOSPADM

## 2023-01-13 RX ADMIN — SUCRALFATE 1 G: 1 TABLET ORAL at 20:48

## 2023-01-13 RX ADMIN — SUCRALFATE 1 G: 1 TABLET ORAL at 11:06

## 2023-01-13 RX ADMIN — PANTOPRAZOLE SODIUM 40 MG: 40 TABLET, DELAYED RELEASE ORAL at 16:26

## 2023-01-13 RX ADMIN — B-COMPLEX W/ C & FOLIC ACID TAB 1 TABLET: TAB at 11:03

## 2023-01-13 RX ADMIN — ACARBOSE 100 MG: 100 TABLET ORAL at 11:12

## 2023-01-13 RX ADMIN — PRUCALOPRIDE 2 MG: 2 TABLET, FILM COATED ORAL at 11:14

## 2023-01-13 RX ADMIN — NIFEDIPINE 60 MG: 60 TABLET, FILM COATED, EXTENDED RELEASE ORAL at 11:15

## 2023-01-13 RX ADMIN — PANTOPRAZOLE SODIUM 40 MG: 40 TABLET, DELAYED RELEASE ORAL at 11:43

## 2023-01-13 RX ADMIN — SUCRALFATE 1 G: 1 TABLET ORAL at 16:37

## 2023-01-13 RX ADMIN — SODIUM CHLORIDE, SODIUM GLUCONATE, SODIUM ACETATE, POTASSIUM CHLORIDE, MAGNESIUM CHLORIDE, SODIUM PHOSPHATE, DIBASIC, AND POTASSIUM PHOSPHATE 100 ML/HR: .53; .5; .37; .037; .03; .012; .00082 INJECTION, SOLUTION INTRAVENOUS at 11:01

## 2023-01-13 RX ADMIN — DOCUSATE SODIUM 200 MG: 100 CAPSULE, LIQUID FILLED ORAL at 16:37

## 2023-01-13 RX ADMIN — SENNOSIDES AND DOCUSATE SODIUM 1 TABLET: 8.6; 5 TABLET ORAL at 11:43

## 2023-01-13 RX ADMIN — Medication 1 CAPSULE: at 16:26

## 2023-01-13 RX ADMIN — MIDODRINE HYDROCHLORIDE 2.5 MG: 2.5 TABLET ORAL at 16:26

## 2023-01-13 RX ADMIN — ARGININE HYDROCHLORIDE 41800 MG: 10 INJECTION, SOLUTION INTRAVENOUS at 20:45

## 2023-01-13 RX ADMIN — OXYBUTYNIN CHLORIDE 10 MG: 5 TABLET, EXTENDED RELEASE ORAL at 11:43

## 2023-01-13 RX ADMIN — SENNOSIDES AND DOCUSATE SODIUM 1 TABLET: 8.6; 5 TABLET ORAL at 20:48

## 2023-01-13 RX ADMIN — FLUDROCORTISONE ACETATE 0.1 MG: 0.1 TABLET ORAL at 11:15

## 2023-01-13 RX ADMIN — ACARBOSE 100 MG: 100 TABLET ORAL at 16:29

## 2023-01-13 RX ADMIN — POLYETHYLENE GLYCOL 3350 17 G: 17 POWDER, FOR SOLUTION ORAL at 11:43

## 2023-01-13 RX ADMIN — DOCUSATE SODIUM 200 MG: 100 CAPSULE, LIQUID FILLED ORAL at 11:11

## 2023-01-13 RX ADMIN — MIDODRINE HYDROCHLORIDE 2.5 MG: 2.5 TABLET ORAL at 20:48

## 2023-01-13 RX ADMIN — MIDODRINE HYDROCHLORIDE 2.5 MG: 2.5 TABLET ORAL at 11:03

## 2023-01-13 RX ADMIN — SITAGLIPTIN 50 MG: 50 TABLET, FILM COATED ORAL at 11:10

## 2023-01-13 NOTE — OCCUPATIONAL THERAPY NOTE
Occupational Therapy Screen       01/13/23 0800   Note Type   Note type Screen   Additional Comments OT orders received and chart review performed  Pt admitted with stroke-like symptoms  Spoke to [address] [place] [address] who reports pt has been (I) with ADLs and mobility this admission  Pt reports that her right leg becomes "heavy" after she is standing or walking for a while, but she has a history of right hip problems  Pt with no concern for safe DC to home and with no acute OT needs at this time  Pt to be DC from IPOT casleoad  Please reconsult if functional status changes       John Crawford OTR/L

## 2023-01-13 NOTE — CASE MANAGEMENT
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 2023   • History of orthostatic hypotension 2023   • Migraine 2023   • Hip pain 2021   • Primary narcolepsy without cataplexy    • Menorrhagia with regular cycle 2020   • Anemia 2020   • 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 2020   • Complex regional pain syndrome type 1 of right upper extremity 2019   • Bariatric surgery status 2019   • Raynaud's disease 2019   • History of malignant melanoma of skin 10/30/2018   • MTHFR mutation (methylenetetrahydrofolate reductase) 2018   • Orthostatic hypotension 04/10/2018   • Reactive hypoglycemia 2018   • Hyperparathyroidism , secondary, non-renal ([facility] [address] ) 2018   • Hirsutism 2018   • Fibroid 2017   • Renal cyst 2017   • Neurogenic bladder 2017   • Dysautonomia orthostatic hypotension syndrome 2017   • Postsurgical malabsorption 06/10/2016   • S/P gastric bypass 06/10/2016   • Iron deficiency 06/10/2016   • Constipation 06/10/2016   • 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[address] ) 06/10/2016   • GERD (gastroesophageal reflux disease) 06/10/2016   • Migraine without aura and without status migrainosus, not intractable 06/10/2016   • History of Nissen fundoplication    • Dysphagia 2016   • Congenital anomaly of lower limb 2015   • Family history of endocrine and metabolic disease    • Esophageal dysmotility 2015   • Urinary retention 2015   • Retinitis pigmentosa 2015   • Nyctalopia 2015   • Polycystic ovarian syndrome 03/12/2013   • Hypertension 02/06/2013   • Chronic venous embolism and thrombosis of deep vessels of distal lower extremity ([facility] [address] ) 07/10/2012      LOS (days): 2  Geometric Mean LOS (GMLOS) (days): 2 00  Days to GMLOS:0     OBJECTIVE:    Risk of Unplanned Readmission Score: 14 9         Current admission status: Inpatient       Preferred Pharmacy:   [address] [facility]) [facility]  [address] [facility] [address]  Phone: [phone] Fax: [phone]    [address] [address]  [address] [address]  Phone: [phone] Fax: [phone]    Primary Care Provider: Migdalia Tilley MD    Primary Insurance: CAPITAL  Secondary Insurance:     ASSESSMENT:  [address] [facility], [facility] - Spouse   Primary Phone: [phone] (Mobile)  Home Phone: [phone]               Advance Directives  Does patient have a [address]?: Yes  Does patient have Advance Directives?: No         Readmission Root Cause  30 Day Readmission: No    Patient Information  Admitted from[de-identified] Home  Mental Status: Alert  During Assessment patient was accompanied by: Not accompanied during assessment  Assessment information provided by[de-identified] Patient  Primary Caregiver: Self  Support Systems: Spouse/significant other, Debbie Mench[address] Dr of Residence: [address] [place] [address] do you live in?: [place] entry access options  Select all that apply : Stairs  Number of steps to enter home  : 1  Do the steps have railings?: No  Type of Current Residence: [address] home  Upon entering residence, is there a bedroom on the main floor (no further steps)?: No  A bedroom is located on the following floor levels of residence (select all that apply):: 2nd Floor  Upon entering residence, is there a bathroom on the main floor (no further steps)?: Yes  Number of steps to 2nd floor from main floor: One Flight  In the last 12 months, was there a time when you were not able to pay the mortgage or rent on time?: No  In the last 12 months, how many places have you lived?: 1  In the last 12 months, was there a time when you did not have a steady place to sleep or slept in a shelter (including now)?: No  Homeless/housing insecurity resource given?: N/A  Living Arrangements: Lives w/ Spouse/significant other  Is patient a ?: No    Activities of Daily Living Prior to Admission  Functional Status: Independent  Completes ADLs independently?: Yes  Ambulates independently?: Yes  Does patient use assisted devices?: No  Does patient currently own DME?: Yes  What DME does the patient currently own?: Shower Chair, Straight Cane  Does patient have a history of Outpatient Therapy (PT/OT)?: No  Does the patient have a history of Short-Term Rehab?: No  Does patient have a history of HHC?: No  Does patient currently have [employer]?: No         Patient Information Continued  Income Source: Employed  Does patient have prescription coverage?: Yes  Within the past 12 months, you worried that your food would run out before you got the money to buy more : Never true  Within the past 12 months, the food you bought just didn't last and you didn't have money to get more : Never true  Food insecurity resource given?: N/A  Does patient receive dialysis treatments?: No  Does patient have a history of substance abuse?: No  Does patient have a history of Mental Health Diagnosis?: No    PHQ 2/9 Screening   Reviewed PHQ 2/9 Depression Screening Score?: No    Means of Transportation  Means of Transport to Appts[de-identified] Drives Self  In the past 12 months, has lack of transportation kept you from medical appointments or from getting medications?: No  In the past 12 months, has lack of transportation kept you from meetings, work, or from getting things needed for daily living?: No  Was application for public transport provided?: N/A        DISCHARGE DETAILS:    Discharge planning discussed with[de-identified] Patient  Freedom of Choice: Yes         Requested [address]         Is the patient interested in [place] [address] at discharge?: No    DME Referral Provided  Referral made for DME?: No    Other Referral/Resources/Interventions Provided:  Interventions:  (TBD)       Treatment Team Recommendation:  (TBD)  Discharge Destination Plan[de-identified]  (TBD)  Transport at Discharge : Family

## 2023-01-13 NOTE — PROGRESS NOTES
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  Assessment & Plan  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 prior to blurred vision onset  Patient does not recall getting any atropine in her eye  Upon arrival to the ED, /77  NIHSS 1 (visual impairment in the right eye)  CT head negative for acute intracranial abnormalities  CTA head/neck negative for LVO, significant stenosis, or aneurysm  Patient was not a TNKase candidate due to being outside the time window  On exam, patient's right pupil is 9 mm, round, and fixed with decreased vision in all fields in the right eye, however EOMs intact and no other neuro deficits  MRI brain unremarkable for acute infarct  Etiology for pupil change likely due to atropine getting in her eye without her knowing   Given persistent (now improving) peripheral visual deficit and RLE heaviness, will continue L-arginine      Plan:  - Would not recommend echo at this time  - Lipid Panel: total cholesterol 169, triglycerides 82, HDL 70, LDL 83  - Hemoglobin A1c: 5 1  - Continue IV fluids for migraine headache  - Continue L-arginine based on [facility]OP guidelines (IV L-arginine (500 mg/kg IV) to be repeated q24 hours x3-5 doses as needed, depending if ongoing symptoms )  · Per discussion with mitochondrial specialist at [facility], recommend continuing L-arginine daily until symptoms resolve for a max of 5 doses  (Today is day 3)  - Goal normotension, euglycemia, normothermia   - Continue telemetry  - PT/OT/ST  - Continue to monitor and notify neurology with any changes  - Medical management and supportive care per primary team  Correction of any metabolic or infectious disturbances  Zeny Valente will need follow up in at the next regular appointment with headache attending or advance practitioner  She will not require outpatient neurological testing  Subjective: Today patient reports improvement in her right eye, stating she is able to see more peripherally  She states he is continuing to have the heaviness and fatigue in her right leg, stating she feels like she needs to walk around more  Patient continues to have the difficulty emptying her bladder as well as trouble having a bowel movement  Admits to some abdominal discomfort from this  Denies chest pain, SOB, N/V, new visual changes, new weakness, new numbness  ROS:  12 point ROS performed, as stated above, all others negative  Vitals: Blood pressure 136/90, pulse 75, temperature 98 3 °F (36 8 °C), resp  rate 16, weight 83 6 kg (184 lb 4 9 oz), SpO2 96 %, not currently breastfeeding  ,Body mass index is 31 64 kg/m²  Physical Exam  Vitals and nursing note reviewed  Constitutional:       General: She is not in acute distress  Appearance: She is normal weight  She is not ill-appearing, toxic-appearing or diaphoretic  HENT:      Head: Normocephalic and atraumatic  Eyes:      General: No scleral icterus  Right eye: No discharge  Left eye: No discharge  Conjunctiva/sclera: Conjunctivae normal    Musculoskeletal:         General: Normal range of motion  Cervical back: Normal range of motion and neck supple  Skin:     General: Skin is warm and dry  Coloration: Skin is not jaundiced or pale  Neurological:      Mental Status: She is alert  Gait: Gait is intact     Psychiatric: Mood and Affect: Mood normal          Behavior: Behavior normal          Thought Content: Thought content normal          Judgment: Judgment normal        Neurologic Exam     Mental Status   Patient is alert, sitting up in chair  Oriented x 3  No dysarthria or aphasia noted  Able to follow central and appendicular commands, and answers all questions appropriately  Cranial Nerves   Pupils equal, 3 mm, round, reactive bilaterally   EOMs intact, however does have subtle lateral deviation of right eye with upward gaze   No clear visual field deficits in all quadrants bilaterally  Facial sensation intact  Hearing intact  No facial asymmetry noted   Tongue midline      Motor Exam   Muscle bulk: normal  Overall muscle tone: normal  No drift or pronation noted in bilateral upper extremities    Bilateral upper and lower extremity strength testing 5/5 throughout  Distal right lower extremity strength testing with delayed ROM, however strength full     Sensory Exam   Light touch normal      Gait, Coordination, and Reflexes     Gait  Gait: normal    Tremor   Resting tremor: absent  No ataxia or dysmetria in bilateral upper extremity finger-nose testing    No involuntary movements or rhythmic seizure-like activity noted throughout exam     Lab Results: I have personally reviewed pertinent reports    Recent Results (from the past 24 hour(s))   Fingerstick Glucose (POCT)    Collection Time: 01/12/23  4:13 PM   Result Value Ref Range    POC Glucose 95 65 - 140 mg/dl   Fingerstick Glucose (POCT)    Collection Time: 01/12/23  8:05 PM   Result Value Ref Range    POC Glucose 101 65 - 140 mg/dl   Phosphorus    Collection Time: 01/13/23  6:26 AM   Result Value Ref Range    Phosphorus 3 0 2 7 - 4 5 mg/dL   Magnesium    Collection Time: 01/13/23  6:26 AM   Result Value Ref Range    Magnesium 1 9 1 9 - 2 7 mg/dL   CBC and Platelet    Collection Time: 01/13/23  6:26 AM   Result Value Ref Range    WBC 5 03 4 31 - 10 16 Thousand/uL    RBC 4  11 3 81 - 5 12 Million/uL    Hemoglobin 12 8 11 5 - 15 4 g/dL    Hematocrit 38 8 34 8 - 46 1 %    MCV 94 82 - 98 fL    MCH 31 1 26 8 - 34 3 pg    MCHC 33 0 31 4 - 37 4 g/dL    RDW 12 0 11 6 - 15 1 %    Platelets 774 259 - 822 Thousands/uL    MPV 10 3 8 9 - 12 7 fL   Fingerstick Glucose (POCT)    Collection Time: 01/13/23  7:14 AM   Result Value Ref Range    POC Glucose 100 65 - 140 mg/dl   Fingerstick Glucose (POCT)    Collection Time: 01/13/23 11:19 AM   Result Value Ref Range    POC Glucose 94 65 - 140 mg/dl   ]  Imaging Studies: I have personally reviewed pertinent reports and I have personally reviewed pertinent films in PACS  EKG, Pathology, and Other Studies: I have personally reviewed pertinent reports  VTE Prophylaxis: Sequential compression device (Venodyne)     Counseling / Coordination of Care  Total time spent today 25 minutes  Greater than 50% of total time was spent with the patient and/or family counseling and/or coordination of care  A description of the counseling/coordination of care:  Patient was seen and evaluated  Discussed with attending  Chart reviewed thoroughly including laboratory and imaging studies  Plan of care discussed with patient and primary team  Discussed recommendations form CHOP with patient and plan to continue L-arginine  Dictation voice to text software has been used in the creation of this document  Please consider this in light of any contextual or grammatical errors

## 2023-01-13 NOTE — ASSESSMENT & PLAN NOTE
Remote hx of mitochondrial disease with Gi dysmoptilty    Follows at [facility]/[place] and has ongoing treatment  History of 2 metabolic strokes-managed with L-arginine and other medications followed by rehab  S/p multiple GI procedure-fundoplication and gastric bypass

## 2023-01-13 NOTE — PROGRESS NOTES
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  Assessment & Plan  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  MRI- There is no discrete mass, mass effect or midline shift  There is no intracranial hemorrhage  There is no evidence of acute infarction and diffusion imaging is unremarkable  There are no white matter changes in the cerebral   hemispheres  1/12- Patient is s/p one dose of L arginine infusion day - feels subtle improvement in vision and        PLAN; Neurology recommendations appreeciated           -Recommend L- Arginine infusion for 3-5 days until resolution of symptoms, they will reach out to team at [facility] for further recommendations on duration of treatment    -Per discussion with mitochondrial specialist at [facility], recommend continuing L-arginine daily until symptoms resolve for a max of 5 doses   (Today is day 3)  - IV fluids for migraine headache  - normotension  - Euglycemic, normothermic goal  - Continue telemetry  - PT/OT/ST      Migraine  Assessment & Plan  Longstanding history of migraines- following with Juan [facility] Barbosa  Abortive therapy dose of 3 injections every 3 months  She has breakthrough headaches with her carb diet, advised to take 3 tablets of ibuprofen or 2 tablets of naproxen, and 1 tablet of Nurtec 75 mg during crisis     plan  Continue same medications as needed    Tylenol prn      History of orthostatic hypotension  Assessment & Plan  orthostatic hypotension due to dysautonomia  Follows with cardiology  On midodrine to 2 5mg 3x/day and florinef 0 1mg daily  Will continue IV s lfuid as needed  Carlos stockings    Raynaud's disease  Assessment & Plan  Continue Nifedipine 60 mg daily    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[facility] [address] )  Assessment & Plan  Remote hx of mitochondrial disease with Gi dysmoptilty    Follows at [facility] and has ongoing treatment  History of 2 metabolic strokes-managed with L-arginine and other medications followed by rehab  S/p multiple GI procedure-fundoplication and gastric bypass    Constipation  Assessment & Plan  Continue Roscoe Castro  Will add bowel regimen      Postsurgical malabsorption  Assessment & Plan  Continue Januvia and acarbose 50 tid  accuchecks q 6 hs        VTE Pharmacologic Prophylaxis:   Low Risk (Score 0-2) - Encourage Ambulation  Patient Centered Rounds: I performed bedside rounds with nursing staff today  Discussions with Specialists or Other Care Team Provider: neuology    Education and Discussions with Family / Patient: patient says she will updatate her   Current Length of Stay: 2 day(s)  Current Patient Status: Inpatient   Discharge Plan: likely 24-48h  Code Status: Prior    Subjective:   Patient seen at bedside today, feels somet improvement in right eye vision  She feels like peripheral vision is getting better  No diplopia, eye tearing or pain  Her leg weakness feels very mildly improved but she feels leg will give way on ambulation   She continues to experience constipation and some delayed urine passage- improved with self cath    Objective:     Vitals:   Temp (24hrs), Av 2 °F (36 8 °C), Min:97 9 °F (36 6 °C), Max:98 6 °F (37 °C)    Temp:  [97 9 °F (36 6 °C)-98 6 °F (37 °C)] 98 3 °F (36 8 °C)  HR:  [61-90] 75  Resp:  [16-18] 16  BP: (109-145)/(70-93) 136/90  SpO2:  [95 %-98 %] 96 %  Body mass index is 31 64 kg/m²  Input and Output Summary (last 24 hours): Intake/Output Summary (Last 24 hours) at 1/13/2023 1439  Last data filed at 1/12/2023 1900  Gross per 24 hour   Intake 1225 ml   Output --   Net 1225 ml       Physical Exam:   Physical Exam  Vitals and nursing note reviewed  Constitutional:       General: She is not in acute distress  Appearance: She is well-developed  HENT:      Head: Normocephalic and atraumatic  Eyes:      Conjunctiva/sclera: Conjunctivae normal    Cardiovascular:      Rate and Rhythm: Normal rate and regular rhythm  Heart sounds: No murmur heard  Pulmonary:      Effort: Pulmonary effort is normal  No respiratory distress  Breath sounds: Normal breath sounds  Abdominal:      Palpations: Abdomen is soft  Tenderness: There is no abdominal tenderness  Musculoskeletal:         General: No swelling  Cervical back: Neck supple  Skin:     General: Skin is warm and dry  Capillary Refill: Capillary refill takes less than 2 seconds  Neurological:      Mental Status: She is alert  GCS: GCS eye subscore is 4  GCS verbal subscore is 5  GCS motor subscore is 6  Cranial Nerves: Cranial nerves 2-12 are intact  Motor: No weakness, tremor, atrophy, abnormal muscle tone or pronator drift     Psychiatric:         Mood and Affect: Mood normal          Additional Data:     Labs:  Results from last 7 days   Lab Units 01/13/23  0626 01/12/23  0838   WBC Thousand/uL 5 03 4 29*   HEMOGLOBIN g/dL 12 8 13 8   HEMATOCRIT % 38 8 41 0   PLATELETS Thousands/uL 200 206   NEUTROS PCT %  --  68   LYMPHS PCT %  --  23   MONOS PCT %  --  6   EOS PCT %  --  2     Results from last 7 days   Lab Units 01/12/23  0838   SODIUM mmol/L 138   POTASSIUM mmol/L 4 4   CHLORIDE mmol/L 108   CO2 mmol/L 26   BUN mg/dL 15   CREATININE mg/dL 0 70   ANION GAP mmol/L 4   CALCIUM mg/dL 9 7   ALBUMIN g/dL 3 8   TOTAL BILIRUBIN mg/dL 0 64   ALK PHOS U/L 53   ALT U/L 13   AST U/L 14   GLUCOSE RANDOM mg/dL 113     Results from last 7 days   Lab Units 01/11/23  1456   INR  0 90     Results from last 7 days   Lab Units 01/13/23  1119 01/13/23  0714 01/12/23  2005 01/12/23  1613 01/12/23  1047 01/12/23  0738 01/11/23  2145   POC GLUCOSE mg/dl 94 100 101 95 90 95 74     Results from last 7 days   Lab Units 01/12/23  0454   HEMOGLOBIN A1C % 5 1     Results from last 7 days   Lab Units 01/11/23  1457   LACTIC ACID mmol/L 0 7       Lines/Drains:  Invasive Devices     Central Venous Catheter Line  Duration           Port A Cath Right Chest -- days          Drain  Duration           Gastrostomy/Enterostomy Gastrostomy 20 Fr  LUQ 2459 days                Central Line:  Goal for removal: patientr has port         Telemetry:  Telemetry Orders (From admission, onward)             48 Hour Telemetry Monitoring  Continuous x 48 hours        References:    Telemetry Guidelines   Question:  Reason for 48 Hour Telemetry  Answer:  Acute CVA (<24 hrs old, hemispheric strokes, selected brainstem strokes, cardiac arrhythmias)                 Telemetry Reviewed: Normal Sinus Rhythm  Indication for Continued Telemetry Use: No indication for continued use  Will discontinue                Imaging: Reviewed radiology reports from this admission including: MRI brain    Recent Cultures (last 7 days):         Last 24 Hours Medication List:   Current Facility-Administered Medications   Medication Dose Route Frequency Provider Last Rate   • acarbose  100 mg Oral TID With Meals Rafaela Cosby MD     • arginine  41 8 g Intravenous Q24H Nat Ferris MD 41,800 mg (01/12/23 2100)   • b complex-vitamin C-folic acid  1 capsule Oral Daily With Gavi Joseph MD     • butalbital-acetaminophen-caffeine  1 tablet Oral Q4H PRN Sergo Krishnan MD     • docusate sodium  200 mg Oral BID Sergo Krishnan MD     • fludrocortisone  0 1 mg Oral Daily Sergo Krishnan MD     • linaCLOtide  145 mcg Oral Daily Sergo Krishnan MD     • midodrine  2 5 mg Oral TID Sergo Krishnan MD     • multi-electrolyte  100 mL/hr Intravenous Continuous Sergo Krishnan  mL/hr (01/13/23 1101)   • multivitamin stress formula  1 tablet Oral Daily Sergo Krishnan MD     • NIFEdipine  60 mg Oral Daily Sergo Krishnan MD     • oxybutynin  10 mg Oral Daily Sergo Krishnan MD     • pantoprazole  40 mg Oral BID AC Sergo Krishnan MD     • patient supplied medication   Per G Tube BID Karely Pastor MD     • polyethylene glycol  17 g Oral Daily PRN Sergo Krishnan MD     • polyethylene glycol  17 g Oral Daily Sergo Krishnan MD     • Prucalopride Succinate  2 mg Oral QAM Sergo Krishnan MD     • senna-docusate sodium  1 tablet Oral BID Sergo Krishnan MD     • sitaGLIPtin  50 mg Oral Daily Sergo Krishnan MD     • sucralfate  1 g Oral 4x Daily Sergo Krishnan MD          Today, Patient Was Seen By: Sergo Krishnan MD    **Please Note: This note may have been constructed using a voice recognition system  **

## 2023-01-13 NOTE — PROGRESS NOTES
[facility]  Progress Note - May Smaller 1975, 41 y o  female MRN: [id number]  Unit/Bed#: S -01 Encounter: [id number]  Primary Care Provider: Lucero Mike MD   Date and time admitted to hospital: 1/11/2023  2:10 PM    * Fixed dilated pupil of right eye with associated strokelike symptoms  Assessment & Plan  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  MRI- There is no discrete mass, mass effect or midline shift  There is no intracranial hemorrhage  There is no evidence of acute infarction and diffusion imaging is unremarkable  There are no white matter changes in the cerebral   hemispheres  1/12- Patient is s/p one dose of L arginine infusion day - feels subtle improvement in vision and        PLAN; Neurology recommendations appreeciated    Stroke pathway       -Recommend L- Arginine infusion for 3-5 days until resolution of symptoms  - Will defer Echo at this time  - Lipid Panel pending- normal  - Hemoglobin A1c- normal  - IV fluids for migraine headache  - Permissive HTN with SBP <180  - Euglycemic, normothermic goal  - Continue telemetry  - PT/OT/ST  - Continue to monitor and notify neurology with any changes    - Medical management and supportive care per primary team  Correction of any metabolic or infectious disturbanc   will add 100% 02 in case of cluster lexus    Migraine  Assessment & Plan  Longstanding history of migraines- following with Donxander Soni  Abortive therapy dose of 3 injections every 3 months  She has breakthrough headaches with her carb diet, advised to take 3 tablets of ibuprofen or 2 tablets of naproxen, and 1 tablet of Nurtec 75 mg during crisis     plan  Continue same medications as needed    Tylenol prn      History of orthostatic hypotension  Assessment & Plan  orthostatic hypotension due to dysautonomia  Follows with cardiology  On midodrine to 2 5mg 3x/day and florinef 0 1mg daily  Will continue IV s lfuid as needed  Carlos stockings    Raynaud's disease  Assessment & Plan  Continue Nifedipine 60 mg daily    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[facility] [address] )  Assessment & Plan  Remote hx of mitochondrial disease with Gi dysmoptilty    Follows at [facility] and has ongoing treatment  History of 2 metabolic strokes-managed with L-arginine and other medications followed by rehab  S/p multiple GI procedure-fundoplication and gastric bypass    Constipation  Assessment & Plan  Continue Bhavna Orts  Will add bowel regimen      Postsurgical malabsorption  Assessment & Plan  Continue Januvia and acarbose 50 tid            VTE Pharmacologic Prophylaxis:   Moderate Risk (Score 3-4) - Pharmacological DVT Prophylaxis Contraindicated  Sequential Compression Devices Ordered  Patient Centered Rounds: I performed bedside rounds with nursing staff today  Discussions with Specialists or Other Care Team Provider: Neurology    Education and Discussions with Family / Patient:     Current Length of Stay: 1 day(s)  Current Patient Status: Inpatient   Discharge Plan: Anticipate discharge in 48-72 hrs to home  Code Status: Prior    Subjective:   Patient seen at bedside this morning, reports some slight improvement in vision       Objective:     Vitals:   Temp (24hrs), Av °F (36 7 °C), Min:97 6 °F (36 4 °C), Max:98 2 °F (36 8 °C)    Temp:  [97 6 °F (36 4 °C)-98 2 °F (36 8 °C)] 98 1 °F (36 7 °C)  HR:  [62-82] 74  Resp:  [18] 18  BP: (113-134)/(66-87) 131/80  SpO2:  [96 %-99 %] 98 %  Body mass index is 31 64 kg/m²  Input and Output Summary (last 24 hours): Intake/Output Summary (Last 24 hours) at 1/12/2023 1925  Last data filed at 1/12/2023 1827  Gross per 24 hour   Intake 2418 ml   Output --   Net 2418 ml       Physical Exam:   Physical Exam  Vitals and nursing note reviewed  Constitutional:       General: She is not in acute distress  Appearance: She is well-developed  HENT:      Head: Normocephalic and atraumatic  Eyes:      General: Visual field deficit present  Conjunctiva/sclera: Conjunctivae normal    Cardiovascular:      Rate and Rhythm: Normal rate and regular rhythm  Heart sounds: No murmur heard  Pulmonary:      Effort: Pulmonary effort is normal  No respiratory distress  Breath sounds: Normal breath sounds  Abdominal:      Palpations: Abdomen is soft  Tenderness: There is no abdominal tenderness  Musculoskeletal:         General: No swelling  Cervical back: Neck supple  Skin:     General: Skin is warm and dry  Capillary Refill: Capillary refill takes less than 2 seconds  Neurological:      Mental Status: She is alert and oriented to person, place, and time  GCS: GCS eye subscore is 4  GCS verbal subscore is 5  GCS motor subscore is 6  Cranial Nerves: No cranial nerve deficit or dysarthria  Sensory: Sensation is intact  No sensory deficit        Comments: Unequal pupil size, right pupil is dilated sluggish reaction to light  Very subtle/discrete right-sided drift   Psychiatric:         Mood and Affect: Mood normal          Additional Data:     Labs:  Results from last 7 days   Lab Units 01/12/23  0838   WBC Thousand/uL 4 29*   HEMOGLOBIN g/dL 13 8   HEMATOCRIT % 41 0   PLATELETS Thousands/uL 206   NEUTROS PCT % 68   LYMPHS PCT % 23   MONOS PCT % 6   EOS PCT % 2     Results from last 7 days   Lab Units 01/12/23  0036 SODIUM mmol/L 138   POTASSIUM mmol/L 4 4   CHLORIDE mmol/L 108   CO2 mmol/L 26   BUN mg/dL 15   CREATININE mg/dL 0 70   ANION GAP mmol/L 4   CALCIUM mg/dL 9 7   ALBUMIN g/dL 3 8   TOTAL BILIRUBIN mg/dL 0 64   ALK PHOS U/L 53   ALT U/L 13   AST U/L 14   GLUCOSE RANDOM mg/dL 113     Results from last 7 days   Lab Units 01/11/23  1456   INR  0 90     Results from last 7 days   Lab Units 01/12/23  1613 01/12/23  1047 01/12/23  0738 01/11/23  2145   POC GLUCOSE mg/dl 95 90 95 74     Results from last 7 days   Lab Units 01/12/23  0454   HEMOGLOBIN A1C % 5 1     Results from last 7 days   Lab Units 01/11/23  1457   LACTIC ACID mmol/L 0 7       Lines/Drains:  Invasive Devices     Central Venous Catheter Line  Duration           Port A Cath Right Chest -- days          Peripheral Intravenous Line  Duration           Peripheral IV 01/11/23 Left Antecubital 1 day          Drain  Duration           Gastrostomy/Enterostomy Gastrostomy 20 Fr  LUQ 2459 days                Central Line:  Goal for removal: N/A - Chronic PICC         Telemetry:  Telemetry Orders (From admission, onward)             48 Hour Telemetry Monitoring  Continuous x 48 hours        References:    Telemetry Guidelines   Question:  Reason for 48 Hour Telemetry  Answer:  Acute CVA (<24 hrs old, hemispheric strokes, selected brainstem strokes, cardiac arrhythmias)                 Telemetry Reviewed: Normal Sinus Rhythm  Indication for Continued Telemetry Use: Acute CVA             Imaging: Reviewed radiology reports from this admission including: MRI brain    Recent Cultures (last 7 days):         Last 24 Hours Medication List:   Current Facility-Administered Medications   Medication Dose Route Frequency Provider Last Rate   • acarbose  100 mg Oral TID With Meals Shahbaz Ortez MD     • arginine  41 8 g Intravenous Q24H Shahbaz Ortez MD     • b complex-vitamin C-folic acid  1 capsule Oral Daily With Dinner Nat Mcconnell Max Lundberg MD     • butalbital-acetaminophen-caffeine  1 tablet Oral Q4H PRN Shahbaz Ortez MD     • docusate sodium  200 mg Oral BID Shahbaz Ortez MD     • fludrocortisone  0 1 mg Oral Daily Shahbaz Ortez MD     • fremanezumab-vfrm  675 mg Subcutaneous Q3 Months Shahbaz Ortez MD     • linaCLOtide  145 mcg Oral BID Shahbaz Ortez MD     • midodrine  2 5 mg Oral TID Shahbaz Ortez MD     • multi-electrolyte  100 mL/hr Intravenous Continuous Shahbaz Ortez  mL/hr (01/12/23 8036)   • multivitamin stress formula  1 tablet Oral Daily Shahbaz Ortez MD     • NIFEdipine  60 mg Oral Daily Shahbaz Ortez MD     • oxybutynin  10 mg Oral Daily Shahbaz Ortez MD     • pantoprazole  40 mg Oral BID AC Shahbaz Ortez MD     • patient supplied medication   Per G Tube BID Johanna Crawford MD     • polyethylene glycol  17 g Oral Daily PRN Shahbaz Ortez MD     • polyethylene glycol  17 g Oral Daily Shahbaz Ortez MD     • Prucalopride Succinate  2 mg Oral QAM Shahbaz Ortez MD     • Rimegepant Sulfate  75 mg Oral PRN Shahbaz Ortez MD     • senna-docusate sodium  1 tablet Oral BID Shahbaz Ortez MD     • sitaGLIPtin  50 mg Oral Daily Shahbaz Ortez MD     • sucralfate  1 g Oral 4x Daily Shahbaz Ortez MD     • Turmeric   Oral Daily Shahbaz Ortez MD     • vitamin E  100 Units Oral Daily Shahbaz Ortez MD          Today, Patient Was Seen By: Shahbaz Ortez MD    **Please Note: This note may have been constructed using a voice recognition system  **

## 2023-01-13 NOTE — PHYSICAL THERAPY NOTE
Physical Therapy Screen Note       01/13/23 0753   Note Type   Note type Screen   Additional Comments referral received for PT eval and tx  Randa Rg states pt has been ambulating independently  pt states that her right light becomes "heavy" after she is standing or walking for a while, but she has a history of right hip problems  pt states ambulating w/o difficulty and may be interested in receiving outpatient PT  she does not feel inpatient PT is indicated  inpatient PT discontinued per pt's wishes  notified Divya PENA of screening pt for inpatient PT services       Jasvir Rojas, PT

## 2023-01-13 NOTE — PLAN OF CARE
Problem: Potential for Falls  Goal: Patient will remain free of falls  Description: INTERVENTIONS:  - Educate patient/family on patient safety including physical limitations  - Instruct patient to call for assistance with activity   - Consult OT/PT to assist with strengthening/mobility   - Keep Call bell within reach  - Keep bed low and locked with side rails adjusted as appropriate  - Keep care items and personal belongings within reach  - Initiate and maintain comfort rounds  - Make Fall Risk Sign visible to staff  - Apply yellow socks and bracelet for high fall risk patients  - Consider moving patient to room near nurses station  Outcome: Progressing     Problem: Neurological Deficit  Goal: Neurological status is stable or improving  Description: Interventions:  - Monitor and assess patient's level of consciousness, motor function, sensory function, and level of assistance needed for ADLs  - Monitor and report changes from baseline  Collaborate with interdisciplinary team to initiate plan and implement interventions as ordered  - Provide and maintain a safe environment  - Consider seizure precautions  - Consider fall precautions  - Consider aspiration precautions  - Consider bleeding precautions  Outcome: Progressing     Problem: Activity Intolerance/Impaired Mobility  Goal: Mobility/activity is maintained at optimum level for patient  Description: Interventions:  - Assess and monitor patient  barriers to mobility and need for assistive/adaptive devices  - Assess patient's emotional response to limitations  - Collaborate with interdisciplinary team and initiate plans and interventions as ordered  - Encourage independent activity per ability   - Maintain proper body alignment  - Perform active/passive rom as tolerated/ordered    - Plan activities to conserve energy   - Turn patient as appropriate  Outcome: Progressing     Problem: Communication Impairment  Goal: Ability to express needs and understand communication  Description: Assess patient's communication skills and ability to understand information  Patient will demonstrate use of effective communication techniques, alternative methods of communication and understanding even if not able to speak  - Encourage communication and provide alternate methods of communication as needed  - Collaborate with case management/ for discharge needs  - Include patient/family/caregiver in decisions related to communication  Outcome: Progressing     Problem: Potential for Aspiration  Goal: Non-ventilated patient's risk of aspiration is minimized  Description: Assess and monitor vital signs, respiratory status, and labs (WBC)  Monitor for signs of aspiration (tachypnea, cough, rales, wheezing, cyanosis, fever)  - Assess and monitor patient's ability to swallow  - Place patient up in chair to eat if possible  - HOB up at 90 degrees to eat if unable to get patient up into chair   - Supervise patient during oral intake  - Instruct patient/ family to take small bites  - Instruct patient/ family to take small single sips when taking liquids  - Follow patient-specific strategies generated by speech pathologist   Outcome: Progressing  Goal: Ventilated patient's risk of aspiration is minimized  Description: Assess and monitor vital signs, respiratory status, airway cuff pressure, and labs (WBC)  Monitor for signs of aspiration (tachypnea, cough, rales, wheezing, cyanosis, fever)  - Elevate head of bed 30 degrees if patient has tube feeding   - Monitor tube feeding  Outcome: Progressing     Problem: Nutrition  Goal: Nutrition/Hydration status is improving  Description: Monitor and assess patient's nutrition/hydration status for malnutrition (ex- brittle hair, bruises, dry skin, pale skin and conjunctiva, muscle wasting, smooth red tongue, and disorientation)   Collaborate with interdisciplinary team and initiate plan and interventions as ordered  Monitor patient's weight and dietary intake as ordered or per policy  Utilize nutrition screening tool and intervene per policy  Determine patient's food preferences and provide high-protein, high-caloric foods as appropriate  - Assist patient with eating   - Allow adequate time for meals   - Encourage patient to take dietary supplement as ordered  - Collaborate with clinical nutritionist   - Include patient/family/caregiver in decisions related to nutrition  Outcome: Progressing     Problem: Knowledge Deficit  Goal: Patient/family/caregiver demonstrates understanding of disease process, treatment plan, medications, and discharge instructions  Description: Complete learning assessment and assess knowledge base  Interventions:  - Provide teaching at level of understanding  - Provide teaching via preferred learning methods  Outcome: Progressing     Problem: MOBILITY - ADULT  Goal: Maintain or return to baseline ADL function  Description: INTERVENTIONS:  -  Assess patient's ability to carry out ADLs; assess patient's baseline for ADL function and identify physical deficits which impact ability to perform ADLs (bathing, care of mouth/teeth, toileting, grooming, dressing, etc )  - Assess/evaluate cause of self-care deficits   - Assess range of motion  - Assess patient's mobility; develop plan if impaired  - Assess patient's need for assistive devices and provide as appropriate  - Encourage maximum independence but intervene and supervise when necessary  - Involve family in performance of ADLs  - Assess for home care needs following discharge   - Consider OT consult to assist with ADL evaluation and planning for discharge  - Provide patient education as appropriate  Outcome: Progressing  Goal: Maintains/Returns to pre admission functional level  Description: INTERVENTIONS:  - Perform BMAT or MOVE assessment daily    - Set and communicate daily mobility goal to care team and patient/family/caregiver     - Collaborate with rehabilitation services on mobility goals if consulted  - Record patient progress and toleration of activity level   Outcome: Progressing

## 2023-01-13 NOTE — ASSESSMENT & PLAN NOTE
Longstanding history of migraines- following with Dedrick Espinoza  Abortive therapy dose of 3 injections every 3 months  She has breakthrough headaches with her carb diet, advised to take 3 tablets of ibuprofen or 2 tablets of naproxen, and 1 tablet of Nurtec 75 mg during crisis     plan  Continue same medications as needed    Tylenol prn

## 2023-01-13 NOTE — ASSESSMENT & PLAN NOTE
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  MRI- There is no discrete mass, mass effect or midline shift  There is no intracranial hemorrhage  There is no evidence of acute infarction and diffusion imaging is unremarkable  There are no white matter changes in the cerebral   hemispheres  1/12- Patient is s/p one dose of L arginine infusion day - feels subtle improvement in vision and        PLAN; Neurology recommendations appreeciated           -Recommend L- Arginine infusion for 3-5 days until resolution of symptoms, they will reach out to team at [facility] for further recommendations on duration of treatment    -Per discussion with mitochondrial specialist at [facility], recommend continuing L-arginine daily until symptoms resolve for a max of 5 doses   (Today is day 3)  - IV fluids for migraine headache  - normotension  - Euglycemic, normothermic goal  - Continue telemetry  - PT/OT/ST

## 2023-01-13 NOTE — UTILIZATION REVIEW
NOTIFICATION OF INPATIENT ADMISSION   AUTHORIZATION REQUEST   SERVICING FACILITY:   [address] [place] [facility], [address] [place] [address]  Tax ID: [id number]  NPI: [id number]   ATTENDING PROVIDER:  Attending Name and NPI#: Jody Arzate Md [[id number]]  Address: [address] [place] [address] [place]  [place], [address] [place] [address]  Phone: [phone]     ADMISSION INFORMATION:  [facility]patient [facility]  [address]  Place of Service Code: 21  Inpatient Admission Date/Time: 1/11/23  3:59 PM  Discharge Date/Time: No discharge date for patient encounter  Admitting Diagnosis Code/Description:  Anisocoria [H57 02]  Stroke-like symptom [R29 90]     UTILIZATION REVIEW CONTACT:  Duaine Kussmaul, Utilization   Network Utilization Review Department  Phone: [phone]  Fax: [phone]  Email: [email or web address]  Contact for approvals/pending authorizations, clinical reviews, and discharge  PHYSICIAN ADVISORY SERVICES:  Medical Necessity Denial & Mtye-es-Efjq Review  Phone: [phone]  Fax: [phone]  Email: [email or web address]

## 2023-01-14 LAB
ERYTHROCYTE [DISTWIDTH] IN BLOOD BY AUTOMATED COUNT: 12.1 % (ref 11.6–15.1)
GLUCOSE SERPL-MCNC: 84 MG/DL (ref 65–140)
GLUCOSE SERPL-MCNC: 87 MG/DL (ref 65–140)
GLUCOSE SERPL-MCNC: 88 MG/DL (ref 65–140)
GLUCOSE SERPL-MCNC: 90 MG/DL (ref 65–140)
HCT VFR BLD AUTO: 36.6 % (ref 34.8–46.1)
HGB BLD-MCNC: 12.2 G/DL (ref 11.5–15.4)
MAGNESIUM SERPL-MCNC: 1.9 MG/DL (ref 1.9–2.7)
MCH RBC QN AUTO: 31.2 PG (ref 26.8–34.3)
MCHC RBC AUTO-ENTMCNC: 33.3 G/DL (ref 31.4–37.4)
MCV RBC AUTO: 94 FL (ref 82–98)
PHOSPHATE SERPL-MCNC: 2.4 MG/DL (ref 2.7–4.5)
PLATELET # BLD AUTO: 186 THOUSANDS/UL (ref 149–390)
PMV BLD AUTO: 10.4 FL (ref 8.9–12.7)
RBC # BLD AUTO: 3.91 MILLION/UL (ref 3.81–5.12)
WBC # BLD AUTO: 6.13 THOUSAND/UL (ref 4.31–10.16)

## 2023-01-14 RX ORDER — MAGNESIUM SULFATE HEPTAHYDRATE 40 MG/ML
2 INJECTION, SOLUTION INTRAVENOUS ONCE
Status: COMPLETED | OUTPATIENT
Start: 2023-01-14 | End: 2023-01-14

## 2023-01-14 RX ADMIN — LINACLOTIDE 145 MCG: 145 CAPSULE, GELATIN COATED ORAL at 16:41

## 2023-01-14 RX ADMIN — MAGNESIUM SULFATE HEPTAHYDRATE 2 G: 40 INJECTION, SOLUTION INTRAVENOUS at 12:23

## 2023-01-14 RX ADMIN — PRUCALOPRIDE 2 MG: 2 TABLET, FILM COATED ORAL at 09:03

## 2023-01-14 RX ADMIN — Medication 1 CAPSULE: at 16:41

## 2023-01-14 RX ADMIN — SUCRALFATE 1 G: 1 TABLET ORAL at 12:22

## 2023-01-14 RX ADMIN — B-COMPLEX W/ C & FOLIC ACID TAB 1 TABLET: TAB at 08:58

## 2023-01-14 RX ADMIN — MIDODRINE HYDROCHLORIDE 2.5 MG: 2.5 TABLET ORAL at 16:41

## 2023-01-14 RX ADMIN — SITAGLIPTIN 50 MG: 50 TABLET, FILM COATED ORAL at 09:00

## 2023-01-14 RX ADMIN — SUCRALFATE 1 G: 1 TABLET ORAL at 18:10

## 2023-01-14 RX ADMIN — FLUDROCORTISONE ACETATE 0.1 MG: 0.1 TABLET ORAL at 09:00

## 2023-01-14 RX ADMIN — SODIUM CHLORIDE, SODIUM GLUCONATE, SODIUM ACETATE, POTASSIUM CHLORIDE, MAGNESIUM CHLORIDE, SODIUM PHOSPHATE, DIBASIC, AND POTASSIUM PHOSPHATE 100 ML/HR: .53; .5; .37; .037; .03; .012; .00082 INJECTION, SOLUTION INTRAVENOUS at 19:51

## 2023-01-14 RX ADMIN — SUCRALFATE 1 G: 1 TABLET ORAL at 08:58

## 2023-01-14 RX ADMIN — DOCUSATE SODIUM 200 MG: 100 CAPSULE, LIQUID FILLED ORAL at 18:10

## 2023-01-14 RX ADMIN — SUCRALFATE 1 G: 1 TABLET ORAL at 20:42

## 2023-01-14 RX ADMIN — ACARBOSE 100 MG: 100 TABLET ORAL at 16:40

## 2023-01-14 RX ADMIN — ACARBOSE 100 MG: 100 TABLET ORAL at 07:39

## 2023-01-14 RX ADMIN — ARGININE HYDROCHLORIDE 41800 MG: 10 INJECTION, SOLUTION INTRAVENOUS at 20:43

## 2023-01-14 RX ADMIN — NIFEDIPINE 60 MG: 60 TABLET, FILM COATED, EXTENDED RELEASE ORAL at 09:00

## 2023-01-14 RX ADMIN — PANTOPRAZOLE SODIUM 40 MG: 40 TABLET, DELAYED RELEASE ORAL at 16:41

## 2023-01-14 RX ADMIN — SENNOSIDES AND DOCUSATE SODIUM 1 TABLET: 8.6; 5 TABLET ORAL at 20:42

## 2023-01-14 RX ADMIN — MIDODRINE HYDROCHLORIDE 2.5 MG: 2.5 TABLET ORAL at 20:42

## 2023-01-14 RX ADMIN — PANTOPRAZOLE SODIUM 40 MG: 40 TABLET, DELAYED RELEASE ORAL at 06:46

## 2023-01-14 RX ADMIN — DOCUSATE SODIUM 200 MG: 100 CAPSULE, LIQUID FILLED ORAL at 09:04

## 2023-01-14 RX ADMIN — OXYBUTYNIN CHLORIDE 10 MG: 5 TABLET, EXTENDED RELEASE ORAL at 08:58

## 2023-01-14 RX ADMIN — MIDODRINE HYDROCHLORIDE 2.5 MG: 2.5 TABLET ORAL at 08:58

## 2023-01-14 RX ADMIN — SODIUM CHLORIDE, SODIUM GLUCONATE, SODIUM ACETATE, POTASSIUM CHLORIDE, MAGNESIUM CHLORIDE, SODIUM PHOSPHATE, DIBASIC, AND POTASSIUM PHOSPHATE 100 ML/HR: .53; .5; .37; .037; .03; .012; .00082 INJECTION, SOLUTION INTRAVENOUS at 09:10

## 2023-01-14 RX ADMIN — POTASSIUM & SODIUM PHOSPHATES POWDER PACK 280-160-250 MG 1 PACKET: 280-160-250 PACK at 12:22

## 2023-01-14 RX ADMIN — SENNOSIDES AND DOCUSATE SODIUM 1 TABLET: 8.6; 5 TABLET ORAL at 09:04

## 2023-01-14 RX ADMIN — POLYETHYLENE GLYCOL 3350 17 G: 17 POWDER, FOR SOLUTION ORAL at 08:57

## 2023-01-14 RX ADMIN — ACARBOSE 100 MG: 100 TABLET ORAL at 12:22

## 2023-01-14 NOTE — ASSESSMENT & PLAN NOTE
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  MRI- There is no discrete mass, mass effect or midline shift  There is no intracranial hemorrhage  There is no evidence of acute infarction and diffusion imaging is unremarkable  There are no white matter changes in the cerebral   hemispheres  1/12- Patient is s/p one dose of L arginine infusion day - feels subtle improvement in vision and        PLAN; Neurology recommendations appreeciated           -Recommend L- Arginine infusion for 3-5 days until resolution of symptoms, they will reach out to team at [facility] for further recommendations on duration of treatment   Discussed at length with patient she will stay for at least another dose of L-arginine      - IV fluids for migraine headache- resolved  - Euglycemic, normothermic goal  - Continue telemetry  - PT/OT/ST

## 2023-01-14 NOTE — PLAN OF CARE
Problem: Potential for Falls  Goal: Patient will remain free of falls  Description: INTERVENTIONS:  - Educate patient/family on patient safety including physical limitations  - Instruct patient to call for assistance with activity   - Consult OT/PT to assist with strengthening/mobility   - Keep Call bell within reach  - Keep bed low and locked with side rails adjusted as appropriate  - Keep care items and personal belongings within reach  - Initiate and maintain comfort rounds  - Make Fall Risk Sign visible to staff  - Offer Toileting every  Hours, in advance of need  - Initiate/Maintain alarm  - Obtain necessary fall risk management equipment  - Apply yellow socks and bracelet for high fall risk patients  - Consider moving patient to room near nurses station  Outcome: Progressing     Problem: Neurological Deficit  Goal: Neurological status is stable or improving  Description: Interventions:  - Monitor and assess patient's level of consciousness, motor function, sensory function, and level of assistance needed for ADLs  - Monitor and report changes from baseline  Collaborate with interdisciplinary team to initiate plan and implement interventions as ordered  - Provide and maintain a safe environment  - Consider seizure precautions  - Consider fall precautions  - Consider aspiration precautions  - Consider bleeding precautions  Outcome: Progressing     Problem: Activity Intolerance/Impaired Mobility  Goal: Mobility/activity is maintained at optimum level for patient  Description: Interventions:  - Assess and monitor patient  barriers to mobility and need for assistive/adaptive devices  - Assess patient's emotional response to limitations  - Collaborate with interdisciplinary team and initiate plans and interventions as ordered  - Encourage independent activity per ability   - Maintain proper body alignment  - Perform active/passive rom as tolerated/ordered  - Plan activities to conserve energy    - Turn patient as appropriate  Outcome: Progressing     Problem: Communication Impairment  Goal: Ability to express needs and understand communication  Description: Assess patient's communication skills and ability to understand information  Patient will demonstrate use of effective communication techniques, alternative methods of communication and understanding even if not able to speak  - Encourage communication and provide alternate methods of communication as needed  - Collaborate with case management/ for discharge needs  - Include patient/family/caregiver in decisions related to communication  Outcome: Progressing     Problem: Potential for Aspiration  Goal: Non-ventilated patient's risk of aspiration is minimized  Description: Assess and monitor vital signs, respiratory status, and labs (WBC)  Monitor for signs of aspiration (tachypnea, cough, rales, wheezing, cyanosis, fever)  - Assess and monitor patient's ability to swallow  - Place patient up in chair to eat if possible  - HOB up at 90 degrees to eat if unable to get patient up into chair   - Supervise patient during oral intake  - Instruct patient/ family to take small bites  - Instruct patient/ family to take small single sips when taking liquids  - Follow patient-specific strategies generated by speech pathologist   Outcome: Progressing  Goal: Ventilated patient's risk of aspiration is minimized  Description: Assess and monitor vital signs, respiratory status, airway cuff pressure, and labs (WBC)  Monitor for signs of aspiration (tachypnea, cough, rales, wheezing, cyanosis, fever)  - Elevate head of bed 30 degrees if patient has tube feeding   - Monitor tube feeding    Outcome: Progressing     Problem: Nutrition  Goal: Nutrition/Hydration status is improving  Description: Monitor and assess patient's nutrition/hydration status for malnutrition (ex- brittle hair, bruises, dry skin, pale skin and conjunctiva, muscle wasting, smooth red tongue, and disorientation)  Collaborate with interdisciplinary team and initiate plan and interventions as ordered  Monitor patient's weight and dietary intake as ordered or per policy  Utilize nutrition screening tool and intervene per policy  Determine patient's food preferences and provide high-protein, high-caloric foods as appropriate  - Assist patient with eating   - Allow adequate time for meals   - Encourage patient to take dietary supplement as ordered  - Collaborate with clinical nutritionist   - Include patient/family/caregiver in decisions related to nutrition  Outcome: Progressing     Problem: Knowledge Deficit  Goal: Patient/family/caregiver demonstrates understanding of disease process, treatment plan, medications, and discharge instructions  Description: Complete learning assessment and assess knowledge base    Interventions:  - Provide teaching at level of understanding  - Provide teaching via preferred learning methods  Outcome: Progressing     Problem: MOBILITY - ADULT  Goal: Maintain or return to baseline ADL function  Description: INTERVENTIONS:  -  Assess patient's ability to carry out ADLs; assess patient's baseline for ADL function and identify physical deficits which impact ability to perform ADLs (bathing, care of mouth/teeth, toileting, grooming, dressing, etc )  - Assess/evaluate cause of self-care deficits   - Assess range of motion  - Assess patient's mobility; develop plan if impaired  - Assess patient's need for assistive devices and provide as appropriate  - Encourage maximum independence but intervene and supervise when necessary  - Involve family in performance of ADLs  - Assess for home care needs following discharge   - Consider OT consult to assist with ADL evaluation and planning for discharge  - Provide patient education as appropriate  Outcome: Progressing  Goal: Maintains/Returns to pre admission functional level  Description: INTERVENTIONS:  - Perform BMAT or MOVE assessment daily    - Set and communicate daily mobility goal to care team and patient/family/caregiver  - Collaborate with rehabilitation services on mobility goals if consulted  - Perform Range of Motion  times a day  - Reposition patient every  hours    - Dangle patient  times a day  - Stand patient  times a day  - Ambulate patient  times a day  - Out of bed to chair  times a day   - Out of bed for meals  times a day  - Out of bed for toileting  - Record patient progress and toleration of activity level   Outcome: Progressing

## 2023-01-14 NOTE — QUICK NOTE
Patient exam nonfocal today  Improvement of right leg symptoms  No visual deficits noted today  Patient currently on arginine day 4/5 and feel her symptoms are improving  Patient to followup with a headache attending/advanced practitioner at next regular appointment per previous note  No further inpatient neurology recommendations  Please contact us if further questions

## 2023-01-14 NOTE — ASSESSMENT & PLAN NOTE
Longstanding history of migraines- following with Richelle Lipps  Abortive therapy dose of 3 injections every 3 months  She has breakthrough headaches with her carb diet, advised to take 3 tablets of ibuprofen or 2 tablets of naproxen, and 1 tablet of Nurtec 75 mg during crisis     plan  Continue same medications as needed    Tylenol prn  Resolved

## 2023-01-14 NOTE — ASSESSMENT & PLAN NOTE
Remote hx of mitochondrial disease with Gi dysmoptilty    Follows at [facility]/[place] and has ongoing treatment  History of 2 metabolic strokes-managed with L-arginine and other medications followed by rehab  S/p multiple GI procedure-fundoplication and gastric bypass

## 2023-01-14 NOTE — PROGRESS NOTES
[facility]  Progress Note - Joel Elliott 1975, 41 y o  female MRN: [id number]  Unit/Bed#: S -01 Encounter: [id number]  Primary Care Provider: Mary Herrera MD   Date and time admitted to hospital: 1/11/2023  2:10 PM    Migraine  Assessment & Plan  Longstanding history of migraines- following with [facility]  Abortive therapy dose of 3 injections every 3 months  She has breakthrough headaches with her carb diet, advised to take 3 tablets of ibuprofen or 2 tablets of naproxen, and 1 tablet of Nurtec 75 mg during crisis     plan  Continue same medications as needed    Tylenol prn  Resolved        History of orthostatic hypotension  Assessment & Plan  orthostatic hypotension due to dysautonomia  Follows with cardiology  On midodrine to 2 5mg 3x/day and florinef 0 1mg daily    Will continue IV s lfuid as needed  Carlos stockings    Raynaud's disease  Assessment & Plan  Continue Nifedipine 60 mg daily    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[facility] [address] )  Assessment & Plan  Remote hx of mitochondrial disease with Gi dysmoptilty    Follows at [facility]/[place] and has ongoing treatment  History of 2 metabolic strokes-managed with L-arginine and other medications followed by rehab  S/p multiple GI procedure-fundoplication and gastric bypass    Constipation  Assessment & Plan  Resolved        Postsurgical malabsorption  Assessment & Plan  Continue Januvia and acarbose 50 tid  accuchecks q 6 hs  Will give phophorus      * Fixed dilated pupil of right eye with associated strokelike symptoms  Assessment & Plan  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  MRI- There is no discrete mass, mass effect or midline shift  There is no intracranial hemorrhage  There is no evidence of acute infarction and diffusion imaging is unremarkable  There are no white matter changes in the cerebral   hemispheres  1/12- Patient is s/p one dose of L arginine infusion day - feels subtle improvement in vision and        PLAN; Neurology recommendations appreeciated           -Recommend L- Arginine infusion for 3-5 days until resolution of symptoms, they will reach out to team at [facility] for further recommendations on duration of treatment  Discussed at length with patient she will stay for at least another dose of L-arginine      - IV fluids for migraine headache- resolved  - Euglycemic, normothermic goal  - Continue telemetry  - PT/OT/ST            VTE Pharmacologic Prophylaxis:   Moderate Risk (Score 3-4) - Pharmacological DVT Prophylaxis Ordered: heparin  Patient Centered Rounds: I performed bedside rounds with nursing staff today  Discussions with Specialists or Other Care Team Provider: discussed with neurology they are signing off but they are also okay with continuing through with 1-2 more doses of L-arginine  Education and Discussions with Family / Patient: Patient declined call to   Time Spent for Care: 30 minutes  More than 50% of total time spent on counseling and coordination of care as described above  Current Length of Stay: 3 day(s)  Current Patient Status: Inpatient   Certification Statement: The patient will continue to require additional inpatient hospital stay due to additional L-arginine  Discharge Plan: Anticipate discharge in 24-48 hrs to home      Code Status: Prior    Subjective:   Patient seen and examined  Feeling "better"  Eye sight "almost back to normal"    Objective:     Vitals:   Temp (24hrs), Av °F (36 7 °C), Min:97 8 °F (36 6 °C), Max:98 2 °F (36 8 °C)    Temp:  [97 8 °F (36 6 °C)-98 2 °F (36 8 °C)] 97 9 °F (36 6 °C)  HR:  [61-86] 61  Resp:  [17] 17  BP: (114-126)/(75-86) 114/82  SpO2:  [96 %-98 %] 98 %  Body mass index is 31 64 kg/m²  Input and Output Summary (last 24 hours):   No intake or output data in the 24 hours ending 23 1139    Physical Exam:   Physical Exam  Constitutional:       General: She is not in acute distress  Appearance: She is not ill-appearing, toxic-appearing or diaphoretic  HENT:      Head: Normocephalic  Mouth/Throat:      Mouth: Mucous membranes are moist    Eyes:      General: No scleral icterus  Right eye: No discharge  Left eye: No discharge  Pupils: Pupils are equal, round, and reactive to light  Cardiovascular:      Rate and Rhythm: Normal rate  Abdominal:      General: There is no distension  Palpations: There is no mass  Tenderness: There is no abdominal tenderness  There is no right CVA tenderness, left CVA tenderness or guarding  Hernia: No hernia is present  Skin:     Coloration: Skin is pale  Neurological:      Mental Status: She is alert  Cranial Nerves: No cranial nerve deficit  Sensory: No sensory deficit  Motor: Weakness present  Coordination: Coordination normal       Gait: Gait normal       Deep Tendon Reflexes: Reflexes normal       Comments: Right le and UE weakness  Pronator drift   Pupils are equal and reactive to light today   Psychiatric:         Mood and Affect: Mood normal           Additional Data:     Labs:  Results from last 7 days   Lab Units 23  0829 23  0626 23  0838   WBC Thousand/uL 6 13   < > 4 29*   HEMOGLOBIN g/dL 12 2   < > 13 8   HEMATOCRIT % 36 6   < > 41 0   PLATELETS Thousands/uL 186   < > 206   NEUTROS PCT %  --   --  68   LYMPHS PCT %  --   --  23 MONOS PCT %  --   --  6   EOS PCT %  --   --  2    < > = values in this interval not displayed  Results from last 7 days   Lab Units 01/12/23  0838   SODIUM mmol/L 138   POTASSIUM mmol/L 4 4   CHLORIDE mmol/L 108   CO2 mmol/L 26   BUN mg/dL 15   CREATININE mg/dL 0 70   ANION GAP mmol/L 4   CALCIUM mg/dL 9 7   ALBUMIN g/dL 3 8   TOTAL BILIRUBIN mg/dL 0 64   ALK PHOS U/L 53   ALT U/L 13   AST U/L 14   GLUCOSE RANDOM mg/dL 113     Results from last 7 days   Lab Units 01/11/23  1456   INR  0 90     Results from last 7 days   Lab Units 01/14/23  1116 01/14/23  0729 01/14/23  0156 01/13/23  2145 01/13/23  1635 01/13/23  1119 01/13/23  0714 01/12/23  2005 01/12/23  1613 01/12/23  1047 01/12/23  0738 01/11/23  2145   POC GLUCOSE mg/dl 84 87 90 100 77 94 100 101 95 90 95 74     Results from last 7 days   Lab Units 01/12/23  0454   HEMOGLOBIN A1C % 5 1     Results from last 7 days   Lab Units 01/11/23  1457   LACTIC ACID mmol/L 0 7       Lines/Drains:  Invasive Devices     Central Venous Catheter Line  Duration           Port A Cath Right Chest -- days          Drain  Duration           Gastrostomy/Enterostomy Gastrostomy 20 Fr  LUQ 2460 days                Central Line:  Goal for removal: NA             Imaging: No pertinent imaging reviewed      Recent Cultures (last 7 days):         Last 24 Hours Medication List:   Current Facility-Administered Medications   Medication Dose Route Frequency Provider Last Rate   • acarbose  100 mg Oral TID With Meals Anival Mireles MD     • b complex-vitamin C-folic acid  1 capsule Oral Daily With Qasim Houston MD     • bisacodyl  10 mg Rectal Daily Lavell Santos MD     • butalbital-acetaminophen-caffeine  1 tablet Oral Q4H PRN Lavell Santos MD     • docusate sodium  200 mg Oral BID Lavell Santos MD     • fludrocortisone  0 1 mg Oral Daily Lavell Santos MD     • linaCLOtide  145 mcg Oral Daily Lavell Santos MD     • midodrine  2 5 mg Oral TID Lavell Santos MD     • multi-electrolyte  100 mL/hr Intravenous Continuous Lavell Santos  mL/hr (01/14/23 0910)   • multivitamin stress formula  1 tablet Oral Daily Lavell Santos MD     • NIFEdipine  60 mg Oral Daily Lavell Santos MD     • oxybutynin  10 mg Oral Daily Lavell Santos MD     • pantoprazole  40 mg Oral BID AC Lavell Santos MD     • patient supplied medication   Per G Tube BID Paras Murillo MD     • polyethylene glycol  17 g Oral Daily Lavell Santos MD     • Prucalopride Succinate  2 mg Oral QAM Lavell Santos MD     • senna-docusate sodium  1 tablet Oral BID Lavell Santos MD     • sitaGLIPtin  50 mg Oral Daily Lavell Santos MD     • sucralfate  1 g Oral 4x Daily Lavell Santos MD          Today, Patient Was Seen By: Anival Mireles MD    **Please Note: This note may have been constructed using a voice recognition system  **

## 2023-01-15 VITALS
BODY MASS INDEX: 31.64 KG/M2 | RESPIRATION RATE: 17 BRPM | TEMPERATURE: 98.3 F | HEART RATE: 81 BPM | OXYGEN SATURATION: 98 % | WEIGHT: 184.3 LBS | SYSTOLIC BLOOD PRESSURE: 133 MMHG | DIASTOLIC BLOOD PRESSURE: 87 MMHG

## 2023-01-15 LAB
ANION GAP SERPL CALCULATED.3IONS-SCNC: 4 MMOL/L (ref 4–13)
BUN SERPL-MCNC: 22 MG/DL (ref 5–25)
CALCIUM SERPL-MCNC: 8.8 MG/DL (ref 8.4–10.2)
CHLORIDE SERPL-SCNC: 112 MMOL/L (ref 96–108)
CO2 SERPL-SCNC: 21 MMOL/L (ref 21–32)
CREAT SERPL-MCNC: 0.62 MG/DL (ref 0.6–1.3)
GFR SERPL CREATININE-BSD FRML MDRD: 109 ML/MIN/1.73SQ M
GLUCOSE SERPL-MCNC: 94 MG/DL (ref 65–140)
MAGNESIUM SERPL-MCNC: 2 MG/DL (ref 1.9–2.7)
POTASSIUM SERPL-SCNC: 3.5 MMOL/L (ref 3.5–5.3)
SODIUM SERPL-SCNC: 137 MMOL/L (ref 135–147)

## 2023-01-15 RX ORDER — HEPARIN SODIUM (PORCINE) LOCK FLUSH IV SOLN 100 UNIT/ML 100 UNIT/ML
300 SOLUTION INTRAVENOUS ONCE
Status: COMPLETED | OUTPATIENT
Start: 2023-01-15 | End: 2023-01-15

## 2023-01-15 RX ADMIN — DOCUSATE SODIUM 200 MG: 100 CAPSULE, LIQUID FILLED ORAL at 08:18

## 2023-01-15 RX ADMIN — NIFEDIPINE 60 MG: 60 TABLET, FILM COATED, EXTENDED RELEASE ORAL at 08:24

## 2023-01-15 RX ADMIN — ACARBOSE 100 MG: 100 TABLET ORAL at 08:19

## 2023-01-15 RX ADMIN — SODIUM CHLORIDE, SODIUM GLUCONATE, SODIUM ACETATE, POTASSIUM CHLORIDE, MAGNESIUM CHLORIDE, SODIUM PHOSPHATE, DIBASIC, AND POTASSIUM PHOSPHATE 100 ML/HR: .53; .5; .37; .037; .03; .012; .00082 INJECTION, SOLUTION INTRAVENOUS at 05:02

## 2023-01-15 RX ADMIN — B-COMPLEX W/ C & FOLIC ACID TAB 1 TABLET: TAB at 08:18

## 2023-01-15 RX ADMIN — HEPARIN 300 UNITS: 100 SYRINGE at 12:02

## 2023-01-15 RX ADMIN — FLUDROCORTISONE ACETATE 0.1 MG: 0.1 TABLET ORAL at 08:20

## 2023-01-15 RX ADMIN — SENNOSIDES AND DOCUSATE SODIUM 1 TABLET: 8.6; 5 TABLET ORAL at 08:18

## 2023-01-15 RX ADMIN — MIDODRINE HYDROCHLORIDE 2.5 MG: 2.5 TABLET ORAL at 08:17

## 2023-01-15 RX ADMIN — PRUCALOPRIDE 2 MG: 2 TABLET, FILM COATED ORAL at 08:21

## 2023-01-15 RX ADMIN — POLYETHYLENE GLYCOL 3350 17 G: 17 POWDER, FOR SOLUTION ORAL at 08:18

## 2023-01-15 RX ADMIN — SITAGLIPTIN 50 MG: 50 TABLET, FILM COATED ORAL at 08:19

## 2023-01-15 RX ADMIN — OXYBUTYNIN CHLORIDE 10 MG: 5 TABLET, EXTENDED RELEASE ORAL at 08:18

## 2023-01-15 RX ADMIN — PANTOPRAZOLE SODIUM 40 MG: 40 TABLET, DELAYED RELEASE ORAL at 08:18

## 2023-01-15 RX ADMIN — SUCRALFATE 1 G: 1 TABLET ORAL at 08:18

## 2023-01-15 NOTE — ASSESSMENT & PLAN NOTE
Longstanding history of migraines- following with [facility]  Abortive therapy dose of 3 injections every 3 months  She has breakthrough headaches with her carb diet, advised to take 3 tablets of ibuprofen or 2 tablets of naproxen, and 1 tablet of Nurtec 75 mg during crisis     plan  Continue same medications as needed    Tylenol prn  Resolved

## 2023-01-15 NOTE — ASSESSMENT & PLAN NOTE
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  MRI- There is no discrete mass, mass effect or midline shift  There is no intracranial hemorrhage  There is no evidence of acute infarction and diffusion imaging is unremarkable  There are no white matter changes in the cerebral   hemispheres  1/15- Patient is s/p one dose of L arginine infusion day - feels subtle improvement in vision and    Vision back to normal, patient experiences mild      PLAN; Neurology recommendations appreeciated           -Recommend L- Arginine infusion for 3-5 days until resolution of symptoms, they will reach out to team at [facility] for further recommendations on duration of treatment  Discussed at length with patient she will stay for at least another dose of L-arginine       Patient is discharged, will follow up with her neurologist  Will continue with ambulatory physical therapy

## 2023-01-15 NOTE — ASSESSMENT & PLAN NOTE
Remote hx of mitochondrial disease with Gi dysmoptilty    Follows at [facility]/[place] and has ongoing treatment  History of 2 metabolic strokes-managed with L-arginine and other medications followed by rehab  S/p multiple GI procedure-fundoplication and gastric bypass    Patient will follow-up at [facility] with her neurologist

## 2023-01-15 NOTE — DISCHARGE SUMMARY
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  Assessment & Plan  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  MRI- There is no discrete mass, mass effect or midline shift  There is no intracranial hemorrhage  There is no evidence of acute infarction and diffusion imaging is unremarkable  There are no white matter changes in the cerebral   hemispheres  1/15- Patient is s/p one dose of L arginine infusion day - feels subtle improvement in vision and    Vision back to normal, patient experiences mild      PLAN; Neurology recommendations appreeciated           -Recommend L- Arginine infusion for 3-5 days until resolution of symptoms, they will reach out to team at [facility] for further recommendations on duration of treatment  Discussed at length with patient she will stay for at least another dose of L-arginine       Patient is discharged, will follow up with her neurologist  Will continue with ambulatory physical therapy    Migraine  Assessment & Plan  Longstanding history of migraines- following with Blair Mccall  Abortive therapy dose of 3 injections every 3 months  She has breakthrough headaches with her carb diet, advised to take 3 tablets of ibuprofen or 2 tablets of naproxen, and 1 tablet of Nurtec 75 mg during crisis     plan  Continue same medications as needed    Tylenol prn  Resolved        History of orthostatic hypotension  Assessment & Plan  orthostatic hypotension due to dysautonomia  Follows with cardiology  On midodrine to 2 5mg 3x/day and florinef 0 1mg daily    Will continue IV s lfuid as needed  Carlos stockings    Raynaud's disease  Assessment & Plan  Continue Nifedipine 60 mg daily    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[facility] [address] )  Assessment & Plan  Remote hx of mitochondrial disease with Gi dysmoptilty    Follows at [facility] and has ongoing treatment  History of 2 metabolic strokes-managed with L-arginine and other medications followed by rehab  S/p multiple GI procedure-fundoplication and gastric bypass    Patient will follow-up at [facility] with her neurologist    Constipation  Assessment & Plan  Resolved        Postsurgical malabsorption  Assessment & Plan  Continue Januvia and acarbose 50 tid  accuchecks q 6 hs  Will give phophorus      Medical Problems     Resolved Problems  Date Reviewed: 1/14/2023   None       Discharging Resident: Barbie Blair MD  Discharging Attending: No att  providers found  PCP: Temitope Wilhelm MD  Admission Date:   Admission Orders (From admission, onward)     Ordered        01/11/23 3162  INPATIENT ADMISSION  Once                      Discharge Date: 01/15/23    Consultations During Hospital Stay:  · Neurology    Procedures Performed:   · none    Significant Findings / Test Results:   · Mild hypophosphatemia-corrected  · Head CT MRI-normal    Incidental Findings:   · none      Test Results Pending at Discharge (will require follow up):  · none     Outpatient Tests Requested:  · none    Complications:  none    Reason for Admission: Metabolic stroke i secondary to known mitochondrial disease    Hospital Course:   Myriam aguilar a 41 y o  female patient past medical history of hereditary mitochondrial disease folowing at [facility] on treatment, hx of 2 metabolic strokes with neuro deficits now resolved , GI dysmotility and esophageal reflux status post fundoplication and gastric bypass, late onset dumping syndrome ,orthostatic hypotension, raynaud's dx presents, migraine headaches following with neurology  originally presented to the hospital on 1/11/2023 due to blurred vision and heaviness of right lower limb  Patient reported symptom onset at work during a busy day and she felt more cold than usual, ONset of blurry vision in the right and feeling her right leg behavior  Patient had no headaches no neck stiffness she seizures or vomiting  Colleague noticed significantly dilated pupil and patient was sent to the ED for evaluation  At the ED, patient had an NIH score of 1, fixed dilated pupil and right-sided pronator drift  Neurology consulted and patient was placed on the stroke pathway, out of tenecteplase window  CT head and MRI were unremarkable  Patient was managed for recurrence of metabolic stroke due to her mitochondrial disease  [facility] neurolog[facility] contacted her specialist team at [facility] and patient was started on L-arginine at 500 mg/kg daily patient had a total of 4 doses with almost complete resolution of symptoms  She also had some physical therapy  Patient is stable for discharge and will follow up with her neurology team and PCP  Also advised to continue physical therapy and getting enough rest to avoid precipitation of episodes      Please see above list of diagnoses and related plan for additional information  Condition at Discharge: good    Discharge Day Visit / Exam:   Subjective: Patient seen at bedside this morning, reports normal vision  She denies any headache  She occasionally feels her leg is heavy when she moves around    No new symptoms noted and patient has had a bowel movement  Vitals: Blood Pressure: 133/87 (01/15/23 6987)  Pulse: 81 (01/15/23 0700)  Temperature: 98 3 °F (36 8 °C) (01/15/23 0700)  Temp Source: Oral (01/15/23 0700)  Respirations: 17 (01/13/23 2039)  Weight - Scale: 83 6 kg (184 lb 4 9 oz) (01/11/23 1413)  SpO2: 98 % (01/15/23 0700)  Exam:   Physical Exam  Vitals and nursing note reviewed  Constitutional:       General: She is not in acute distress  Appearance: She is well-developed  HENT:      Head: Normocephalic and atraumatic  Eyes:      Conjunctiva/sclera: Conjunctivae normal    Cardiovascular:      Rate and Rhythm: Normal rate and regular rhythm  Heart sounds: No murmur heard  Pulmonary:      Effort: Pulmonary effort is normal  No respiratory distress  Breath sounds: Normal breath sounds  Abdominal:      Palpations: Abdomen is soft  Tenderness: There is no abdominal tenderness  Musculoskeletal:         General: No swelling  Cervical back: Neck supple  Skin:     General: Skin is warm and dry  Capillary Refill: Capillary refill takes less than 2 seconds  Neurological:      Mental Status: She is alert  Psychiatric:         Mood and Affect: Mood normal          Discussion with Family: Patient updated her   Discharge instructions/Information to patient and family:   See after visit summary for information provided to patient and family  Provisions for Follow-Up Care:  See after visit summary for information related to follow-up care and any pertinent home health orders  Disposition:   Home    Planned Readmission: NO    Discharge Medications:  See after visit summary for reconciled discharge medications provided to patient and/or family        **Please Note: This note may have been constructed using a voice recognition system**

## 2023-01-15 NOTE — DISCHARGE INSTR - AVS FIRST PAGE
Dear Candy Lovell,     It was our pleasure to care for you here at [facility]  It is our hope that we were always able to exceed the expected standards for your care during your stay  You were hospitalized due to metabolic stroke  You were cared for on the [facility] floor by Jeremy De La O MD under the service of Shon Mccann MD with the [facility] who covers for your primary care physician (PCP), Jennifer Carbajal MD, while you were hospitalized  If you have any questions or concerns related to this hospitalization, you may contact us at [phone]  For follow up as well as any medication refills, we recommend that you follow up with your primary care physician  A registered nurse will reach out to you by phone within a few days after your discharge to answer any additional questions that you may have after going home  However, at this time we provide for you here, the most important instructions / recommendations at discharge:     Notable Medication Adjustments -   Continue all home medications as prescribed  Testing Required after Discharge -   none  Important follow up information -   Please follow-up with [address] neurology as indicated  Follow-up with your PCP in 1 week  Ambulatory referral to PT  Other Instructions -   Close to the ED or call the doctor if you have any severe headaches, blurred vision, weakness in limbs  Please review this entire after visit summary as additional general instructions including medication list, appointments, activity, diet, any pertinent wound care, and other additional recommendations from your care team that may be provided for you        Sincerely,     Jeremy De La O MD

## 2023-01-15 NOTE — PLAN OF CARE
Problem: Potential for Falls  Goal: Patient will remain free of falls  Description: INTERVENTIONS:  - Educate patient/family on patient safety including physical limitations  - Instruct patient to call for assistance with activity   - Consult OT/PT to assist with strengthening/mobility   - Keep Call bell within reach  - Keep bed low and locked with side rails adjusted as appropriate  - Keep care items and personal belongings within reach  - Initiate and maintain comfort rounds  - Make Fall Risk Sign visible to staff  - Offer Toileting every 2 Hours, in advance of need  - Initiate/Maintain bed alarm  - Obtain necessary fall risk management equipment: BED ALARM  - Apply yellow socks and bracelet for high fall risk patients  - Consider moving patient to room near nurses station  Outcome: Progressing     Problem: Neurological Deficit  Goal: Neurological status is stable or improving  Description: Interventions:  - Monitor and assess patient's level of consciousness, motor function, sensory function, and level of assistance needed for ADLs  - Monitor and report changes from baseline  Collaborate with interdisciplinary team to initiate plan and implement interventions as ordered  - Provide and maintain a safe environment  - Consider seizure precautions  - Consider fall precautions  - Consider aspiration precautions  - Consider bleeding precautions  Outcome: Progressing     Problem: Activity Intolerance/Impaired Mobility  Goal: Mobility/activity is maintained at optimum level for patient  Description: Interventions:  - Assess and monitor patient  barriers to mobility and need for assistive/adaptive devices  - Assess patient's emotional response to limitations  - Collaborate with interdisciplinary team and initiate plans and interventions as ordered  - Encourage independent activity per ability   - Maintain proper body alignment  - Perform active/passive rom as tolerated/ordered    - Plan activities to conserve energy   - Turn patient as appropriate  Outcome: Progressing

## 2023-01-16 ENCOUNTER — TELEPHONE (OUTPATIENT)
Dept: NEUROLOGY | Facility: CLINIC | Age: 48
End: 2023-01-16

## 2023-01-16 ENCOUNTER — TRANSITIONAL CARE MANAGEMENT (OUTPATIENT)
Dept: INTERNAL MEDICINE CLINIC | Facility: CLINIC | Age: 48
End: 2023-01-16

## 2023-01-16 NOTE — UTILIZATION REVIEW
NOTIFICATION OF ADMISSION DISCHARGE   This is a Notification of Discharge from [facility]  Please be advised that this patient has been discharge from our facility  Below you will find the admission and discharge date and time including the patient’s disposition  UTILIZATION REVIEW CONTACT:  Alondra Lamar  Utilization   Network Utilization Review Department  Phone: [phone] x carefully listen to the prompts  All voicemails are confidential   Email: [email or web address]     ADMISSION INFORMATION  PRESENTATION DATE: 1/11/2023  2:10 PM  OBERVATION ADMISSION DATE:   INPATIENT ADMISSION DATE: 1/11/23  3:59 PM   DISCHARGE DATE: 1/15/2023 12:52 PM   DISPOSITION:Home/Self Care    IMPORTANT INFORMATION:  Send all requests for admission clinical reviews, approved or denied determinations and any other requests to dedicated fax number below belonging to the campus where the patient is receiving treatment   List of dedicated fax numbers:  [address] DENIALS (Administrative/Medical Necessity) [phone]   [address] (Maternity/NICU/Pediatrics) [phone]   [facility] [phone]   [facility] 87 [phone]   [address] [phone]   220 [facility] [phone]   90 [facility] [phone]   [facility] [place] [address] [phone]   [facility]  [phone]   [address] [facility] [phone]   [facility] [address] [facility] [phone]

## 2023-01-16 NOTE — TELEPHONE ENCOUNTER
[employer]  ID [id number]  [facility] [id number]  pcn m  grp Priscilla [facility]  [phone]

## 2023-01-16 NOTE — TELEPHONE ENCOUNTER
Pt left message  Pt stated that she needs PA for Ajovy ,stated that she is 5 days late and has been having daily headache    #[phone]

## 2023-01-18 ENCOUNTER — OFFICE VISIT (OUTPATIENT)
Dept: INTERNAL MEDICINE CLINIC | Facility: CLINIC | Age: 48
End: 2023-01-18

## 2023-01-18 ENCOUNTER — APPOINTMENT (OUTPATIENT)
Dept: LAB | Facility: CLINIC | Age: 48
End: 2023-01-18

## 2023-01-18 VITALS
SYSTOLIC BLOOD PRESSURE: 122 MMHG | HEART RATE: 78 BPM | BODY MASS INDEX: 31.41 KG/M2 | HEIGHT: 64 IN | OXYGEN SATURATION: 99 % | WEIGHT: 184 LBS | DIASTOLIC BLOOD PRESSURE: 86 MMHG | TEMPERATURE: 96.5 F

## 2023-01-18 DIAGNOSIS — R33.9 URINARY RETENTION: ICD-10-CM

## 2023-01-18 DIAGNOSIS — E61.1 IRON DEFICIENCY: ICD-10-CM

## 2023-01-18 DIAGNOSIS — K59.04 CHRONIC IDIOPATHIC CONSTIPATION: ICD-10-CM

## 2023-01-18 DIAGNOSIS — I95.1 ORTHOSTATIC HYPOTENSION: ICD-10-CM

## 2023-01-18 DIAGNOSIS — K91.2 POSTSURGICAL MALABSORPTION: ICD-10-CM

## 2023-01-18 DIAGNOSIS — Z98.84 S/P GASTRIC BYPASS: ICD-10-CM

## 2023-01-18 DIAGNOSIS — Z12.31 ENCOUNTER FOR SCREENING MAMMOGRAM FOR BREAST CANCER: ICD-10-CM

## 2023-01-18 DIAGNOSIS — I10 HYPERTENSION, UNSPECIFIED TYPE: ICD-10-CM

## 2023-01-18 DIAGNOSIS — E88.40 MITOCHONDRIAL DISEASE (HCC): ICD-10-CM

## 2023-01-18 DIAGNOSIS — E87.6 HYPOKALEMIA: Primary | ICD-10-CM

## 2023-01-18 DIAGNOSIS — G43.009 MIGRAINE WITHOUT AURA AND WITHOUT STATUS MIGRAINOSUS, NOT INTRACTABLE: ICD-10-CM

## 2023-01-18 DIAGNOSIS — H57.04 FIXED DILATED PUPIL OF RIGHT EYE: Primary | ICD-10-CM

## 2023-01-18 DIAGNOSIS — K21.9 GASTROESOPHAGEAL REFLUX DISEASE WITHOUT ESOPHAGITIS: ICD-10-CM

## 2023-01-18 DIAGNOSIS — I73.00 RAYNAUD'S DISEASE WITHOUT GANGRENE: ICD-10-CM

## 2023-01-18 PROBLEM — G43.909 MIGRAINE: Status: RESOLVED | Noted: 2023-01-11 | Resolved: 2023-01-18

## 2023-01-18 PROBLEM — N92.0 MENORRHAGIA WITH REGULAR CYCLE: Status: RESOLVED | Noted: 2020-02-28 | Resolved: 2023-01-18

## 2023-01-18 PROBLEM — Z86.79 HISTORY OF ORTHOSTATIC HYPOTENSION: Status: RESOLVED | Noted: 2023-01-11 | Resolved: 2023-01-18

## 2023-01-18 LAB
ALBUMIN SERPL BCP-MCNC: 4.3 G/DL (ref 3.5–5)
ALP SERPL-CCNC: 60 U/L (ref 34–104)
ALT SERPL W P-5'-P-CCNC: 17 U/L (ref 7–52)
ANION GAP SERPL CALCULATED.3IONS-SCNC: 5 MMOL/L (ref 4–13)
AST SERPL W P-5'-P-CCNC: 16 U/L (ref 13–39)
BILIRUB SERPL-MCNC: 0.43 MG/DL (ref 0.2–1)
BUN SERPL-MCNC: 14 MG/DL (ref 5–25)
CALCIUM SERPL-MCNC: 10.7 MG/DL (ref 8.4–10.2)
CHLORIDE SERPL-SCNC: 106 MMOL/L (ref 96–108)
CO2 SERPL-SCNC: 29 MMOL/L (ref 21–32)
CREAT SERPL-MCNC: 0.68 MG/DL (ref 0.6–1.3)
GFR SERPL CREATININE-BSD FRML MDRD: 105 ML/MIN/1.73SQ M
GLUCOSE SERPL-MCNC: 92 MG/DL (ref 65–140)
MAGNESIUM SERPL-MCNC: 1.9 MG/DL (ref 1.9–2.7)
PHOSPHATE SERPL-MCNC: 3 MG/DL (ref 2.7–4.5)
POTASSIUM SERPL-SCNC: 3.3 MMOL/L (ref 3.5–5.3)
PROT SERPL-MCNC: 7.3 G/DL (ref 6.4–8.4)
SODIUM SERPL-SCNC: 140 MMOL/L (ref 135–147)
TSH SERPL DL<=0.05 MIU/L-ACNC: 1.41 UIU/ML (ref 0.45–4.5)
VIT B12 SERPL-MCNC: 457 PG/ML (ref 100–900)

## 2023-01-18 NOTE — PROGRESS NOTES
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  Assessment & Plan:  Still with mild R sided weakness  Consider physical therapy  2  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[address] )  Assessment & Plan:  s/p arginine infusion x 4  Follow up at [facility]  Orders:  -     TSH, 3rd generation with Free T4 reflex  -     Magnesium  -     Phosphorus; Future    3  Orthostatic hypotension  Assessment & Plan:  Taking midodrine 2 5 mg tid, also on Florinef  Per cardiology  ongoing IV infusions  4  Postsurgical malabsorption  Assessment & Plan: On Januvia, acarbose  Per Endo  Orders:  -     TSH, 3rd generation with Free T4 reflex  -     Magnesium  -     Phosphorus; Future  -     Vitamin B12    5  Migraine without aura and without status migrainosus, not intractable  Assessment & Plan:  On Ajovy  Per neurology  6  Raynaud's disease without gangrene  Assessment & Plan: On nifedipine  7  Gastroesophageal reflux disease without esophagitis  Assessment & Plan:  Taking PPI bid  8  Chronic idiopathic constipation  Assessment & Plan:  On Linzess  9  Iron deficiency  Assessment & Plan:  Previously on iron infusions  10  Hypertension, unspecified type  -     TSH, 3rd generation with Free T4 reflex  -     Comprehensive metabolic panel; Future    11  S/P gastric bypass  Assessment & Plan:  Gained 10 lbs since a year ago  12  Urinary retention  Assessment & Plan:  On tolterodine  15  Encounter for screening mammogram for breast cancer  -     Mammo screening bilateral w 3d & cad; Future; Expected date: 01/18/2023         Subjective     Transitional Care Management Review:   Dawn Poole is a 41 y o  female here for TCM follow up       During the TCM phone call patient stated:  TCM Call     Date and time call was made  1/16/2023 11:10 AM    Hospital care reviewed  Records reviewed    Patient was hospitialized at  [address] [place] [address]    Date of Admission  01/11/23    Date of discharge  01/15/23    Diagnosis  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 No    Scheduled for follow up? Yes    Did you obtain your prescribed medications  Yes    Do you need help managing your prescriptions or medications  No    Is transportation to your appointment needed  No    I have advised the patient to call PCP with any new or worsening symptoms  Jody Arzate        She was at work and started not feeling well  She experienced right sided headache and right leg weakness  She developed right eye dilatation which recovered after 4 or 5 days  She continues to expericne some right sided leg weakness, if walking or doing too much  No headaches, chills  She received 4 doses of the arginine, did not get the last one  She did not think it made a big difference  She continues to take arginine through her  Mix  She was doing her fluids every 2 to 3 weeks only, now back to a few days a week  She continues to experience intermittent bilateral hand pain, feels cold all the time  She had gained weight since last year  Her last stroke like symptoms were about 12 years ago  She is worried if it happens again and she won't receive the arginine infusion if she is non verbal     Review of Systems   Constitutional: Negative for appetite change and fatigue  HENT: Negative for congestion  Eyes: Negative for visual disturbance  Respiratory: Negative for cough and shortness of breath  Cardiovascular: Negative for chest pain, palpitations and leg swelling  Gastrointestinal: Positive for constipation  Negative for abdominal pain, diarrhea and nausea  Genitourinary: Negative for dysuria, frequency and urgency  Musculoskeletal: Positive for gait problem   Negative for arthralgias, back pain and myalgias  Skin: Negative for rash and wound  Neurological: Positive for weakness  Negative for dizziness, light-headedness, numbness and headaches  Psychiatric/Behavioral: Negative for confusion  The patient is not nervous/anxious  Objective     /86   Pulse 78   Temp (!) 96 5 °F (35 8 °C)   Ht 5' 4" (1 626 m)   Wt 83 5 kg (184 lb)   SpO2 99%   BMI 31 58 kg/m²      Physical Exam  Vitals and nursing note reviewed  Constitutional:       General: She is not in acute distress  Appearance: She is well-developed  HENT:      Head: Normocephalic and atraumatic  Eyes:      Extraocular Movements: Extraocular movements intact  Conjunctiva/sclera: Conjunctivae normal       Pupils: Pupils are equal, round, and reactive to light  Cardiovascular:      Rate and Rhythm: Normal rate and regular rhythm  Heart sounds: No murmur heard  Pulmonary:      Effort: Pulmonary effort is normal  No respiratory distress  Breath sounds: Normal breath sounds  Abdominal:      Palpations: Abdomen is soft  Tenderness: There is no abdominal tenderness  Musculoskeletal:         General: No swelling  Cervical back: Neck supple  Skin:     General: Skin is warm and dry  Neurological:      Mental Status: She is alert  Sensory: No sensory deficit  Motor: Motor function is intact  No weakness  Comments: Normal gait   Psychiatric:         Mood and Affect: Mood normal        Medications have been reviewed by provider in current encounter    Rebecca Raphael MD     Reviewed hospital records  Labs & imaging results reviewed with patient

## 2023-01-19 ENCOUNTER — TELEPHONE (OUTPATIENT)
Dept: NEUROLOGY | Facility: CLINIC | Age: 48
End: 2023-01-19

## 2023-01-19 NOTE — TELEPHONE ENCOUNTER
KASSANDRA/          NOTES: Dawn Poole will need follow up in at the next regular appointment with headache attending or advance practitioner  She will not require outpatient neurological testing  SCHEDULED: 2/27/23 @ 8:45AM W/TAMIA IN [place]  PROVIDED ALL APPOINTMENT DETAILS/OFFICE LOCATION

## 2023-01-19 NOTE — TELEPHONE ENCOUNTER
Mari Devries will need follow up in at the next regular appointment with headache attending or advance practitioner   She will not require outpatient neurological testing

## 2023-01-23 NOTE — TELEPHONE ENCOUNTER
Pt left message   I been waiting for PA for my Ajovy and Nurtec ,I called the pharmacy and they said they didn't received anything yet  I have been without my Ajovy now for 2 weeks now and I been miserable    The fax # to send that Prior Daisha Reeys is [phone]

## 2023-01-24 NOTE — TELEPHONE ENCOUNTER
I called [facility], [facility] ([phone], answered additional questions on ajovy; determination pending  Initiated PA for nurtec, verbal; determination pending, faxed clinicals [phone], case #[id number]    I called patient to advise, reached , left message as above

## 2023-01-31 NOTE — TELEPHONE ENCOUNTER
katy approved 1/16/2023 - 1/16/2024, called patient to advise; reached , left detailed message  [facility]; determination still pending

## 2023-02-02 NOTE — TELEPHONE ENCOUNTER
I called insurance to f/u on status of nurtec and was told is still under review; may take up to 15 calendar days  I requested urgent review at this time, will have response 72 hours

## 2023-02-03 ENCOUNTER — APPOINTMENT (OUTPATIENT)
Dept: LAB | Facility: CLINIC | Age: 48
End: 2023-02-03

## 2023-02-03 DIAGNOSIS — E87.6 HYPOKALEMIA: ICD-10-CM

## 2023-02-03 LAB
ANION GAP SERPL CALCULATED.3IONS-SCNC: 1 MMOL/L (ref 4–13)
BUN SERPL-MCNC: 14 MG/DL (ref 5–25)
CALCIUM SERPL-MCNC: 10.3 MG/DL (ref 8.4–10.2)
CHLORIDE SERPL-SCNC: 101 MMOL/L (ref 96–108)
CO2 SERPL-SCNC: 33 MMOL/L (ref 21–32)
CREAT SERPL-MCNC: 0.83 MG/DL (ref 0.6–1.3)
GFR SERPL CREATININE-BSD FRML MDRD: 85 ML/MIN/1.73SQ M
GLUCOSE SERPL-MCNC: 91 MG/DL (ref 65–140)
POTASSIUM SERPL-SCNC: 3.6 MMOL/L (ref 3.5–5.3)
SODIUM SERPL-SCNC: 135 MMOL/L (ref 135–147)

## 2023-02-10 DIAGNOSIS — E16.1 REACTIVE HYPOGLYCEMIA: ICD-10-CM

## 2023-02-10 NOTE — TELEPHONE ENCOUNTER
I called insurance to check determination and was told [facility] approved 8 for 20 days 2/2/2023 - 1/24/2024    Called patient to advise: reached , left detailed message

## 2023-02-16 NOTE — TELEPHONE ENCOUNTER
I will prescribe doxepin and stop trazodone  Please make her regis in 1-2 months  Copy of HCP provided

## 2023-02-22 ENCOUNTER — OFFICE VISIT (OUTPATIENT)
Dept: ENDOCRINOLOGY | Facility: CLINIC | Age: 48
End: 2023-02-22

## 2023-02-22 VITALS
WEIGHT: 181 LBS | BODY MASS INDEX: 30.9 KG/M2 | DIASTOLIC BLOOD PRESSURE: 82 MMHG | SYSTOLIC BLOOD PRESSURE: 120 MMHG | HEIGHT: 64 IN | HEART RATE: 105 BPM

## 2023-02-22 DIAGNOSIS — E16.1 REACTIVE HYPOGLYCEMIA: Primary | ICD-10-CM

## 2023-02-22 NOTE — ASSESSMENT & PLAN NOTE
Her blood sugar has been much more stable after increasing Januvia to 50 mg/day  She is eating a lot of protein and very little carbohydrates  At this time, continue current regimen  If she has an increase in the frequency of her hypoglycemia, we can certainly increase the Januvia up to 100 mg/day

## 2023-02-22 NOTE — PROGRESS NOTES
Assessment/Plan:    Reactive hypoglycemia  Her blood sugar has been much more stable after increasing Januvia to 50 mg/day  She is eating a lot of protein and very little carbohydrates  At this time, continue current regimen  If she has an increase in the frequency of her hypoglycemia, we can certainly increase the Januvia up to 100 mg/day  Diagnoses and all orders for this visit:    Reactive hypoglycemia          Subjective:      Patient ID: Francesco Ley is a 41 y o  female  37-year-old woman with history of gastric surgery presents for follow-up related to reactive hypoglycemia  She states that since increasing the Januvia to 50 mg/day, her blood sugars have been much more stable  The stability is also been improved with higher protein and less carbohydrate in the diet  The following portions of the patient's history were reviewed and updated as appropriate: allergies, current medications, past family history, past medical history, past social history, past surgical history and problem list     Review of Systems   Constitutional: Negative for chills and fever  Respiratory: Negative for shortness of breath  Cardiovascular: Negative for chest pain  Gastrointestinal: Negative for constipation, diarrhea, nausea and vomiting  All other systems reviewed and are negative  Objective:      /82   Pulse 105   Ht 5' 4" (1 626 m)   Wt 82 1 kg (181 lb)   BMI 31 07 kg/m²          Physical Exam  Vitals reviewed  Constitutional:       General: She is not in acute distress  Appearance: She is well-developed  She is not diaphoretic  HENT:      Head: Normocephalic and atraumatic  Mouth/Throat:      Pharynx: No oropharyngeal exudate  Eyes:      General: Lids are normal  No scleral icterus  Right eye: No discharge  Left eye: No discharge  Conjunctiva/sclera: Conjunctivae normal    Neck:      Thyroid: No thyromegaly     Cardiovascular:      Rate and Rhythm: Normal rate and regular rhythm  Heart sounds: Normal heart sounds  No murmur heard  No friction rub  No gallop  Pulmonary:      Effort: Pulmonary effort is normal  No respiratory distress  Breath sounds: Normal breath sounds  No wheezing  Abdominal:      General: Bowel sounds are normal  There is no distension  Palpations: Abdomen is soft  Tenderness: There is no abdominal tenderness  Musculoskeletal:         General: No tenderness or deformity  Normal range of motion  Cervical back: Neck supple  Lymphadenopathy:      Head:      Right side of head: No occipital adenopathy  Left side of head: No occipital adenopathy  Upper Body:      Right upper body: No supraclavicular adenopathy  Left upper body: No supraclavicular adenopathy  Skin:     General: Skin is warm  Findings: No erythema or rash  Neurological:      Mental Status: She is alert and oriented to person, place, and time  Cranial Nerves: No cranial nerve deficit  Psychiatric:         Mood and Affect: Mood normal          Behavior: Behavior normal            Teenalincoln Reidfabiola   Device used Dexcom  Home use       Indication   Hypoglycemia    More than 72 hours of data was reviewed  Report to be scanned to chart  Date Range: February 9, 2023 to February 22, 2023    Analysis of data:   % time CGM used: 86%  Average Glucose: 150 mg/dL   SD : 18 mg/dL  Time in Target Range: 98%  Time Above Range: 1%  Time Below Range: Less than 1%    Interpretation of data: Her blood sugars have been stable  Continue current regimen

## 2023-02-23 ENCOUNTER — OFFICE VISIT (OUTPATIENT)
Dept: BARIATRICS | Facility: CLINIC | Age: 48
End: 2023-02-23

## 2023-02-23 VITALS
WEIGHT: 179 LBS | HEART RATE: 97 BPM | TEMPERATURE: 98.1 F | HEIGHT: 65 IN | DIASTOLIC BLOOD PRESSURE: 82 MMHG | BODY MASS INDEX: 29.82 KG/M2 | SYSTOLIC BLOOD PRESSURE: 118 MMHG

## 2023-02-23 DIAGNOSIS — Z09 S/P GASTROSTOMY TUBE (G TUBE) PLACEMENT, FOLLOW-UP EXAM: ICD-10-CM

## 2023-02-23 DIAGNOSIS — Z48.815 ENCOUNTER FOR SURGICAL AFTERCARE FOLLOWING SURGERY OF DIGESTIVE SYSTEM: Primary | ICD-10-CM

## 2023-02-23 DIAGNOSIS — Z98.84 BARIATRIC SURGERY STATUS: ICD-10-CM

## 2023-02-23 DIAGNOSIS — K91.2 POSTSURGICAL MALABSORPTION: ICD-10-CM

## 2023-02-23 RX ORDER — NYSTATIN 100000 U/G
CREAM TOPICAL 2 TIMES DAILY
Qty: 30 G | Refills: 0 | Status: SHIPPED | OUTPATIENT
Start: 2023-02-23

## 2023-02-23 NOTE — PROGRESS NOTES
POST-OP OFFICE VISIT - BARIATRIC SURGERY  Leigh Roy 41 y o  female MRN: [id number]  Unit/Bed#:  Encounter: [id number]      HPI:  Derian Jay is a 41 y o  female status post Nissen takedown and Laparoscopic RNYGB by in November 2015 by Dr Debby Barrios presents to office for annual visit  Subjective   Since her last annual visit, she states that there have not been any new symptoms or issues  Her heartburn persists and has been unchanged since the last visit  She is on stool softeners and miralax along with other meds to help with constipation      She has complicated past medical history  Patient had gastrostomy tube (CARLIN-Key) Button in April of 2016 to assist with her nutritional requirements  She continues to supplement her hydration with her G-tube and gets iron infusions as needed  She has a 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CMS-HCC), 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 and follows up with the [facility]  She continues to follow with the team at [facility] and she has decided to stay put for now with regards to other operations as the offered her at some point a colectomy  Patient presents to the office for post-op visit  Tolerating regular diet: Yes    Nausea/Vomiting/Constipation/Diarrhea: Some regurgitation  Eating at least 60 g of protein: Yes  Following 30/60 minute role with liquids: Yes  Drinking at least 64 oz of fluid: Yes, supplementing with G-tube and infiusions  Taking vitamin/mineral supplements:Yes  Taking omeprazole: Prilosec BID and Carafate  Reviewed and advised patient on vitamins and supplements  Exercising: Not regularly due to labrum tear in hip that needs surgery, exercise encouraged  Initial: 274 lbs  Current weight:  179 lbs  BMI:  30 25  Weight lost since surgery: 78%   Excess body weight loss thus far since surgery discussed with patient  Review of Systems   Constitutional: Negative  Respiratory: Negative  Cardiovascular: Negative  Gastrointestinal: Positive for nausea  Negative for abdominal distention, abdominal pain and blood in stool  Historical Information   Past Medical History:   Diagnosis Date   • Acid reflux    • Constipated    • Dysautonomia ([place] [address] )    • Esophageal abnormality     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)    • Gastrostomy tube in place [facility])    • History of blood clots 2012    Left leg  Has IVC filter now  Occurred while on Lovenox   • Iron deficiency    • Malignant hyperthermia due to anesthesia     Patient's son has M H   States she needs precautions   • Migraines    • 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[place] [address] )    • 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[place] [address] )    • MTHFR (methylene THF reductase) deficiency and homocystinuria (HCC)    • Muscle weakness (generalized)    • Nausea    • On total parenteral nutrition (TPN)     for 8 mts   • PCOS (polycystic ovarian syndrome)    • PONV (postoperative nausea and vomiting)    • Postgastrectomy syndrome     malabsorption   • Postsurgical malabsorption    • Status post gastric bypass for obesity    • Stroke [facility]) 9630, 0731    Metabolic strokes  Right hemiparesis= minor now  • Vomiting     When eating     Past Surgical History:   Procedure Laterality Date   • ANKLE SURGERY Left     Has plate and screws   • COLONOSCOPY     • COSMETIC SURGERY      X14 as child post attack by dog  Arms, legs and face   • DILATION AND CURETTAGE OF UTERUS      x2   • ESOPHAGOGASTRODUODENOSCOPY N/A 1/27/2016    Procedure: ESOPHAGOGASTRODUODENOSCOPY (EGD); Surgeon: Song Cordova MD;  Location: [facility];   Service:    • FRACTURE SURGERY Left     Left ankle - hardware   • GASTRIC BYPASS     • IVC FILTER INSERTION     • NISSEN FUNDOPLICATION     • OVARY SURGERY Bilateral     "Drilling" due to multiple cysts- PCOS   • VT EGD TRANSORAL BIOPSY SINGLE/MULTIPLE N/A 3/9/2016    Procedure: ESOPHAGOGASTRODUODENOSCOPY (EGD); Surgeon: Ophelia Velasco MD;  Location: [facility]; Service: Bariatrics   • KS HYSTEROSCOPY BX ENDOMETRIUM&/POLYPC W/WO D&C N/A 5/22/2020    Procedure: DILATATION AND CURETTAGE (D&C) WITH HYSTEROSCOPY;  Surgeon: Cuong Rodriguez MD;  [facility];  Service: Gynecology   • KS HYSTEROSCOPY ENDOMETRIAL ABLATION N/A 5/22/2020    Procedure: Ainsley Reyes;  Surgeon: Cuong Rodriguez MD;  [facility];  Service: Gynecology   • KS LAPS SURG GASTROSTOMY W/O CONSTJ GSTR TUBE SPX N/A 4/19/2016    Procedure: INSERTION GASTROSTOMY TUBE LAPAROSCOPIC WITH INTRAOP EGD;  Surgeon: Ophelia Velasco MD;  [facility];  Service: Bariatrics   • ULNAR NERVE TRANSPOSITION Right    • WISDOM TOOTH EXTRACTION       Social History   Social History     Substance and Sexual Activity   Alcohol Use No     Social History     Substance and Sexual Activity   Drug Use No     Social History     Tobacco Use   Smoking Status Never   Smokeless Tobacco Never       Objective       Current Vitals:   Blood Pressure: 118/82 (02/23/23 0857)  Pulse: 97 (02/23/23 0857)  Temperature: 98 1 °F (36 7 °C) (02/23/23 0857)  Temp Source: Tympanic (02/23/23 0857)  Height: 5' 4 5" (163 8 cm) (02/23/23 0857)  Weight - Scale: 81 2 kg (179 lb) (02/23/23 0857)    Invasive Devices     Central Venous Catheter Line  Duration           Port A Cath Right Chest -- days          Drain  Duration           Gastrostomy/Enterostomy Gastrostomy 20 Fr  LUQ 2500 days                Physical Exam  Constitutional:       General: She is not in acute distress  Appearance: Normal appearance  She is not toxic-appearing  Cardiovascular:      Rate and Rhythm: Normal rate and regular rhythm  Pulses: Normal pulses  Heart sounds: Normal heart sounds  Pulmonary:      Effort: Pulmonary effort is normal  No respiratory distress  Breath sounds: Normal breath sounds  No wheezing  Abdominal:      General: There is no distension        Palpations: Abdomen is soft  Tenderness: There is no abdominal tenderness  Comments: G tube side is clean without any signs of infection   Skin:     Comments: Post site CDI without swelling or erythema   Neurological:      Mental Status: She is alert  Assessment/PLAN:    Steven Jennings is a 41 y o  female status post Nissen takedown and Laparoscopic RNYGB by in November 2015 by Dr Kathleen Miller presents to office for annual visit  Follow diet as discussed  Labs ordered for next visit  Get lab work done prior to next appointment  It is recommended to check with your insurance BEFORE getting labs done to make sure they are covered by your policy  Make sure to HOLD any multivitamins that may contain biotin and any biotin supplements FOR 5 DAYS before any labs since it can affect the results  IMPORTANT: if you have a [facility] "[employer]" account, you will receive a letter of your vitamin/mineral results through the computer  Please watch for an update to your chart since recommendations for supplement adjustments will be sent to you this way  Follow vitamin and mineral recommendations as reviewed with you  Bariatric vitamins are highly recommended  Vitamins are important for a life-time to avoid low levels which can lead to other medical problems  Exercise as tolerated  Call the office if you have any problems with abdominal pain especially associated with fever, chills, nausea, vomiting or any other concerns  All Post-bariatric surgery patients should be aware that very small quantities of any alcohol can cause impairment and it is very possible not to feel the effect  The effect can be in the system for several hours and it is also a stomach irritant  It is advised to AVOID alcohol, Nonsteroidal anti-inflammatory drugs (NSAIDS) and nicotine of all forms  Any of these can cause stomach irritation/pain      Most, if not all, post-gastric surgery patients would benefit from having a regular counselor for at least 2 years post-op to help with need for multiple life-style changes  If you are interested in this and need help finding a regular counselor, you can also make a follow-up with our  to help you find one  Follow-up in 1 year  We kindly ask that you arrive 15 minutes before appointment time to allow for our staff to room you and check your vital signs and update your chart  We thank you for your patience at your visit      Alma Rosa Power, DO  Bariatric Surgery  2/23/2023  9:37 AM

## 2023-02-27 ENCOUNTER — OFFICE VISIT (OUTPATIENT)
Dept: NEUROLOGY | Facility: CLINIC | Age: 48
End: 2023-02-27

## 2023-02-27 VITALS
HEIGHT: 65 IN | BODY MASS INDEX: 30.6 KG/M2 | HEART RATE: 98 BPM | TEMPERATURE: 98.2 F | WEIGHT: 183.7 LBS | DIASTOLIC BLOOD PRESSURE: 78 MMHG | SYSTOLIC BLOOD PRESSURE: 112 MMHG

## 2023-02-27 DIAGNOSIS — H57.04 FIXED DILATED PUPIL OF RIGHT EYE: Primary | ICD-10-CM

## 2023-02-27 NOTE — ASSESSMENT & PLAN NOTE
-Since her stroke-like symptoms she reports to be back to her baseline regarding her right-sided weakness  She is experiencing some difficulty with brain fog that was present prior to this episode but has since worsened  We discussed things that are good memory and thinking and she would like to hold on starting new medications at this time which is reasonable  She should monitor this for any worsening   -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managed by [facility]  She should continue her medications as prescribed  Reports she receives all her supplements in 1 liquid dose which she administers herself through her PEG tube  -She has no other major concerns today  Neurological exam is unremarkable    -Considering this is a metabolic process  She can continue off Aspirin at this time  Would also advise she remain off statin therapy  -Migraines overall controlled  Has yet to trial Nurtec as needed as she was concerned about possible side effects  Discussed side effects of Nurtec with her today  Continue on current migraine regimen with Ajovy and Nurtec  We will plan for follow-up with Dr Debra Orlando in 4 months  However we would be happy to see her sooner should the need arise  She should call EMS immediately with any new strokelike symptoms or concerning episodes

## 2023-02-27 NOTE — PROGRESS NOTES
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  -Since her stroke-like symptoms she reports to be back to her baseline regarding her right-sided weakness  She is experiencing some difficulty with brain fog that was present prior to this episode but has since worsened  We discussed things that are good memory and thinking and she would like to hold on starting new medications at this time which is reasonable  She should monitor this for any worsening   -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managed by [facility]  She should continue her medications as prescribed  Reports she receives all her supplements in 1 liquid dose which she administers herself through her PEG tube  -She has no other major concerns today  Neurological exam is unremarkable    -Considering this is a metabolic process  She can continue off Aspirin at this time  Would also advise she remain off statin therapy  -Migraines overall controlled  Has yet to trial Nurtec as needed as she was concerned about possible side effects  Discussed side effects of Nurtec with her today  Continue on current migraine regimen with Ajovy and Nurtec  We will plan for follow-up with Dr Leigh Ann Segovia in 4 months  However we would be happy to see her sooner should the need arise  She should call EMS immediately with any new strokelike symptoms or concerning episodes  Subjective:    HPI    Tented to the ED on 1/11/2023 with a fixed pupil, decreased vision in all fields, and right-sided weakness  Imaging:  -CT head: No acute intracranial abnormality, partially empty sella  -CTA head/neck:No intracranial large vessel occlusion or critical stenosis  No aneurysm  No hemodynamically significant stenosis or dissection of cervical carotid and vertebral arteries   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  MRI brain: No acute intracranial abnormality  History of metabolic disease managed by [facility], case discussed with [facility] during hospitalization  She completed 4 days of L arginine infusions with improvement of symptoms  Since her hospitalization, she does not endorse any new strokelike symptoms  She finds that since the stroke happened she has had more brain fog and trouble word-findings  She feels things are getting more jumbled and not thinking right  She has always had some of this when she is more fatigued, but since this is happened it has been more noticeable  She feels like she can't think and get her brain to focus  Her migraines have been okay  If she eats anything with cards in it she will get a "sugar" headache and will get an intense headache for about 30 minutes and then will go away and she will also experience extreme fatigue with it  She finally just got Nurtec approved for her headache but has not tried it  The whole event that led to her ED visit started after being at work and she reports it was so cold, she had to use hand warmers and wrap herself in a blanket and she started not feeling well  She then got a low-grade headache with some right-sided weakness  Then her right eye started getting really blurry and she thought theu were dry and she put in her refresh eye drops  A co-worker noticed that her pupil was very dilated and she went to the ED  The pupil lasted about 3 days  She did L-arginine infusions and her weakness was halted  She now back to her baseline  Her last stroke-like episode was in 2015  She follows with [facility] and has a next office in July  They believe that metabolically she was not able to break down the amino acids and glucose to feed her brain enough for what she was burning being cold  Has a feeding tube and port in place and will do IV infusions at home         The following portions of the patient's history were reviewed and updated as appropriate: allergies, current medications, past family history, past medical history, past social history, past surgical history and problem list           Objective:    Blood pressure 112/78, pulse 98, temperature 98 2 °F (36 8 °C), temperature source Temporal, height 5' 4 5" (1 638 m), weight 83 3 kg (183 lb 11 2 oz), not currently breastfeeding  Lab Results   Component Value Date/Time    CHOLESTEROL 169 01/12/2023 04:54 AM     Lab Results   Component Value Date/Time    TRIG 82 01/12/2023 04:54 AM    TRIG 70 01/06/2016 09:00 AM     Lab Results   Component Value Date/Time    HDL 70 01/12/2023 04:54 AM    HDL 42 01/06/2016 09:00 AM     Lab Results   Component Value Date/Time    LDLCALC 83 01/12/2023 04:54 AM    LDLCALC 57 01/06/2016 09:00 AM       Lab Results   Component Value Date/Time    HGBA1C 5 1 01/12/2023 04:54 AM    HGBA1C 5 3 12/22/2015 05:25 PM     Lab Results   Component Value Date/Time     01/12/2023 04:54 AM     12/22/2015 05:25 PM     Neurological Exam  Awake, alert, oriented, and in no apparent distress  Mood is appropriate to situation  Speech is fluent with no dysarthria or aphasia  Cranial nerves 2-12 were symmetrically intact bilaterally  Motor testing reveals 5/5 strength in the bilateral upper and lower extremities  Sensation is intact to light touch in the bilateral upper and lower extremities  Coordination intact, no drift present  Finger-to-nose absent for tremor, dysmetria, or ataxia  DTRs are symmetric and intact bilaterally  Able to rise without assistance, gait is stable  Review of Systems   Constitutional: Positive for fatigue  Negative for appetite change and fever  HENT: Negative  Negative for hearing loss, tinnitus, trouble swallowing and voice change  Eyes: Negative  Negative for photophobia, pain and visual disturbance  Respiratory: Negative  Negative for shortness of breath  Cardiovascular: Negative  Negative for palpitations  Gastrointestinal: Negative  Negative for nausea and vomiting  Endocrine: Negative  Negative for cold intolerance  Genitourinary: Negative  Negative for dysuria, frequency and urgency  Musculoskeletal: Negative  Negative for gait problem, myalgias and neck pain  Skin: Negative  Negative for rash  Allergic/Immunologic: Negative  Neurological: Positive for speech difficulty  Negative for dizziness, tremors, seizures, syncope, facial asymmetry, weakness, light-headedness, numbness and headaches  Hematological: Negative  Does not bruise/bleed easily  Psychiatric/Behavioral: Negative for confusion, hallucinations and sleep disturbance  Brainfog     I personally reviewed the ROS that was entered by the medical assistant  I have spent 35 minutes with Patient  today in which greater than 50% of this time was spent in counseling/coordination of care regarding Diagnostic results, Prognosis, Risks and benefits of tx options, Intructions for management, Patient and family education, Importance of tx compliance, Risk factor reductions, Impressions and Plan of care as above

## 2023-03-02 DIAGNOSIS — E16.1 REACTIVE HYPOGLYCEMIA: ICD-10-CM

## 2023-03-02 DIAGNOSIS — E16.2 HYPOGLYCEMIA: ICD-10-CM

## 2023-03-02 RX ORDER — ACARBOSE 100 MG/1
100 TABLET ORAL
Qty: 270 TABLET | Refills: 0 | Status: SHIPPED | OUTPATIENT
Start: 2023-03-02 | End: 2023-12-27

## 2023-03-02 RX ORDER — BLOOD-GLUCOSE SENSOR
EACH MISCELLANEOUS
Qty: 9 EACH | Refills: 1 | Status: SHIPPED | OUTPATIENT
Start: 2023-03-02

## 2023-03-06 ENCOUNTER — HOSPITAL ENCOUNTER (OUTPATIENT)
Dept: RADIOLOGY | Facility: HOSPITAL | Age: 48
Discharge: HOME | End: 2023-03-06

## 2023-03-06 DIAGNOSIS — N31.9 NEUROGENIC BLADDER: ICD-10-CM

## 2023-03-16 ENCOUNTER — ANNUAL EXAM (OUTPATIENT)
Dept: OBGYN CLINIC | Facility: CLINIC | Age: 48
End: 2023-03-16

## 2023-03-16 VITALS
DIASTOLIC BLOOD PRESSURE: 74 MMHG | SYSTOLIC BLOOD PRESSURE: 118 MMHG | BODY MASS INDEX: 29.82 KG/M2 | HEIGHT: 65 IN | WEIGHT: 179 LBS

## 2023-03-16 DIAGNOSIS — Z01.419 ENCOUNTER FOR GYNECOLOGICAL EXAMINATION WITHOUT ABNORMAL FINDING: Primary | ICD-10-CM

## 2023-03-16 NOTE — PROGRESS NOTES
Subjective      Maria Isabel Louise is a 41 y o  G3, P2 female who presents for annual well woman exam   Menses is absent since ablation  No intermenstrual bleeding, spotting, or discharge  Patient reports No hot flashes/night sweats, No pain problems intercourse, No vaginal dryness, sleeping fairly well  Having some significant urinary leakage and retention still self cathing having difficulty emptying at times but also having leakage  Patient has urodynamics set up with Dr Pepe Miller  Patient had recent metabolic stroke generally recovering well vision is returning to normal and weakness is improved  Current contraception: vasectomy  History of abnormal Pap smear: no  Family history of uterine or ovarian cancer: no  Regular self breast exam: yes  History of abnormal mammogram: no  Family history of breast cancer: yes -normal grandfather age 55 and maternal aunt age 43    Menstrual History:  OB History        3    Para   2    Term   2            AB   1    Living           SAB   1    IAB        Ectopic        Multiple        Live Births                    No LMP recorded  The following portions of the patient's history were reviewed and updated as appropriate: allergies, current medications, past family history, past medical history, past social history, past surgical history and problem list   Past Medical History:   Diagnosis Date   • Acid reflux    • Constipated    • Dysautonomia ([facility] )    • Esophageal abnormality     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  • Gastrostomy tube in place [facility])    • Gastrostomy tube in place [facility])    • History of blood clots 2012    Left leg  Has IVC filter now   Occurred while on Lovenox   • Iron deficiency    • Malignant hyperthermia due to anesthesia     Patient's son has M H   States she needs precautions   • Migraines    • 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[facility] )    • 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[facility] )    • MTHFR (methylene THF reductase) deficiency and homocystinuria (HCC)    • Muscle weakness (generalized)    • Nausea    • On total parenteral nutrition (TPN)     for 8 mts   • PCOS (polycystic ovarian syndrome)    • PONV (postoperative nausea and vomiting)    • Postgastrectomy syndrome     malabsorption   • Postsurgical malabsorption    • Status post gastric bypass for obesity    • Stroke [facility]) 1143, 1532    Metabolic strokes  Right hemiparesis= minor now  • Vomiting     When eating     Past Surgical History:   Procedure Laterality Date   • ANKLE SURGERY Left     Has plate and screws   • COLONOSCOPY     • COSMETIC SURGERY      X14 as child post attack by dog  Arms, legs and face   • DILATION AND CURETTAGE OF UTERUS      x2   • ESOPHAGOGASTRODUODENOSCOPY N/A 2016    Procedure: ESOPHAGOGASTRODUODENOSCOPY (EGD); Surgeon: Stephanie Barrientos MD;  [facility]; Service:    • FRACTURE SURGERY Left     Left ankle - hardware   • GASTRIC BYPASS     • IVC FILTER INSERTION     • NISSEN FUNDOPLICATION     • OVARY SURGERY Bilateral     "Drilling" due to multiple cysts- PCOS   • MS EGD TRANSORAL BIOPSY SINGLE/MULTIPLE N/A 3/9/2016    Procedure: ESOPHAGOGASTRODUODENOSCOPY (EGD); Surgeon: Stephanie Barrientos MD;  [facility];   Service: Bariatrics   • MS HYSTEROSCOPY BX ENDOMETRIUM&/POLYPC W/WO D&C N/A 2020    Procedure: DILATATION AND CURETTAGE (D&C) WITH HYSTEROSCOPY;  Surgeon: Kvng Elizondo MD;  [facility];  Service: Gynecology   • MS HYSTEROSCOPY ENDOMETRIAL ABLATION N/A 2020    Procedure: Courtney Ibarra;  Surgeon: Kvng Elizondo MD;  [facility];  Service: Gynecology   • MS LAPS SURG GASTROSTOMY W/O CONSTJ GSTR TUBE SPX N/A 2016    Procedure: INSERTION GASTROSTOMY TUBE LAPAROSCOPIC WITH INTRAOP EGD;  Surgeon: Stephanie Barrientos MD;  [facility];  Service: Bariatrics   • ULNAR NERVE TRANSPOSITION Right    • WISDOM TOOTH EXTRACTION       OB History        3    Para   2    Term   2     AB   1    Living           SAB   1    IAB        Ectopic        Multiple        Live Births                     Review of Systems  Review of Systems   Constitutional: Negative  Negative for chills, fatigue, fever and unexpected weight change  HENT: Negative  Negative for dental problem, sinus pressure and sinus pain  Eyes: Negative  Negative for visual disturbance  Respiratory: Negative  Negative for cough, shortness of breath and wheezing  Cardiovascular: Negative  Negative for chest pain and leg swelling  Gastrointestinal: Positive for constipation  Negative for diarrhea, nausea and vomiting  Genitourinary: Negative for menstrual problem, pelvic pain and urgency  Musculoskeletal: Negative  Negative for back pain  Allergic/Immunologic: Positive for environmental allergies  Neurological: Negative for dizziness and headaches  Migraines     Objective     Vitals:    23 0901   BP: 118/74   BP Location: Left arm   Patient Position: Sitting   Cuff Size: Standard   Weight: 81 2 kg (179 lb)   Height: 5' 4 5" (1 638 m)       General:   alert and oriented, in no acute distress   Heart: regular rate and rhythm, S1, S2 normal, no murmur, click, rub or gallop   Lungs: clear to auscultation bilaterally   Abdomen: soft, non-tender, without masses or organomegaly   Vulva: normal   Vagina: normal mucosa   Cervix: no cervical motion tenderness and no lesions   Uterus: normal size, mobile, non-tender   Adnexa: normal adnexa and no mass, fullness, tenderness   Breast inspection negative, no nipple discharge or bleeding, no masses or nodularity palpable  Rectal deferred  Thyroid normal no masses or nodules     Assessment   41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

## 2023-03-23 ENCOUNTER — HOSPITAL ENCOUNTER (OUTPATIENT)
Dept: RADIOLOGY | Facility: HOSPITAL | Age: 48
Discharge: HOME | End: 2023-03-23

## 2023-03-23 VITALS — HEIGHT: 65 IN | BODY MASS INDEX: 29.82 KG/M2 | WEIGHT: 179 LBS

## 2023-03-23 DIAGNOSIS — Z12.31 ENCOUNTER FOR SCREENING MAMMOGRAM FOR BREAST CANCER: ICD-10-CM

## 2023-03-24 LAB
LAB AP GYN PRIMARY INTERPRETATION: NORMAL
Lab: NORMAL

## 2023-05-09 ENCOUNTER — TELEPHONE (OUTPATIENT)
Dept: UROLOGY | Facility: MEDICAL CENTER | Age: 48
End: 2023-05-09

## 2023-05-09 NOTE — TELEPHONE ENCOUNTER
[facility]/ Bora Adriel (verified) - Urodynamics - NO prior auth required    [facility] 5/9/23

## 2023-05-31 DIAGNOSIS — E16.2 HYPOGLYCEMIA: ICD-10-CM

## 2023-05-31 DIAGNOSIS — E16.1 REACTIVE HYPOGLYCEMIA: ICD-10-CM

## 2023-05-31 RX ORDER — PROCHLORPERAZINE 25 MG/1
SUPPOSITORY RECTAL
Qty: 9 EACH | Refills: 0 | Status: SHIPPED | OUTPATIENT
Start: 2023-05-31

## 2023-05-31 RX ORDER — ACARBOSE 100 MG/1
100 TABLET ORAL
Qty: 270 TABLET | Refills: 0 | Status: SHIPPED | OUTPATIENT
Start: 2023-05-31 | End: 2024-03-26

## 2023-05-31 NOTE — TELEPHONE ENCOUNTER
Requested medication(s) are due for refill today: Yes  Patient has already received a courtesy refill: No  Other reason request has been forwarded to provider: Protocol failure

## 2023-06-01 ENCOUNTER — HOSPITAL ENCOUNTER (OUTPATIENT)
Dept: MRI IMAGING | Facility: HOSPITAL | Age: 48
Discharge: HOME | End: 2023-06-01
Attending: PODIATRIST

## 2023-06-01 ENCOUNTER — PROCEDURE VISIT (OUTPATIENT)
Dept: UROLOGY | Facility: CLINIC | Age: 48
End: 2023-06-01

## 2023-06-01 VITALS
WEIGHT: 168 LBS | OXYGEN SATURATION: 100 % | DIASTOLIC BLOOD PRESSURE: 90 MMHG | SYSTOLIC BLOOD PRESSURE: 122 MMHG | BODY MASS INDEX: 27.99 KG/M2 | HEIGHT: 65 IN | HEART RATE: 65 BPM

## 2023-06-01 DIAGNOSIS — G57.62 LESION OF LEFT PLANTAR NERVE: ICD-10-CM

## 2023-06-01 DIAGNOSIS — R33.9 INCOMPLETE EMPTYING OF BLADDER: ICD-10-CM

## 2023-06-01 DIAGNOSIS — N36.44 DETRUSOR SPHINCTER DYSSYNERGIA: ICD-10-CM

## 2023-06-01 DIAGNOSIS — N31.9 NEUROGENIC BLADDER: Primary | ICD-10-CM

## 2023-06-01 LAB
SL AMB  POCT GLUCOSE, UA: NEGATIVE
SL AMB LEUKOCYTE ESTERASE,UA: NEGATIVE
SL AMB POCT BILIRUBIN,UA: NEGATIVE
SL AMB POCT BLOOD,UA: NEGATIVE
SL AMB POCT CLARITY,UA: CLEAR
SL AMB POCT COLOR,UA: YELLOW
SL AMB POCT KETONES,UA: NEGATIVE
SL AMB POCT NITRITE,UA: NEGATIVE
SL AMB POCT PH,UA: 6
SL AMB POCT SPECIFIC GRAVITY,UA: 1
SL AMB POCT URINE PROTEIN: NEGATIVE
SL AMB POCT UROBILINOGEN: NEGATIVE

## 2023-06-01 NOTE — PROGRESS NOTES
"CC:\"I leak constantly and I catheterize myself at least 3 times a day  I do urinate on my own, but my urge is usually where I have to go now\"    Does have ALEXANDRE and also leakage without feeling it happen    Uroflow:       Voided volume:     628 5   ml       Max flow rate:        13 4 ml/sec       Average flow rate:      6 7 ml/sec       PVR:   250  ml       Patient felt volume voided felt normal    CMG:       Position:  sitting           First urge:          462 ml,   pdet 1cmH2O            Normal urge:    582 ml,     pdet 1 cmH2O            Must urge:         Not reached        Permission to void:    582  ml,     pdet 1cm                             H2O        Max pdet during void   27  cm H2O       Voided volume:    413 ml    Bladder stability:   stable          Compliance:  normal        Sphincter function   No ALEXANDRE provoked    EMG activity:       Normal during filling,        abnormal during voiding      Comments:   Decreased sensation   No ALEXANDRE noted   + EMG activity during voiding (DESD)   Incomplete emptying   Calculated PVR post test was 168 ml   Patient declined straight catheterization to empty completely, will void again when gets home   Patient initially voided a volume of 500 ml  at 483 ml, however computer did not record the void, so test and fill was restarted and patient was then instructed to void with normal urge which was then at 582 ml and voided 413 ml    Urodynamics    Date/Time: 6/1/2023 9:00 AM    Performed by: Joe Carrasco RN  Authorized by: Tito Posadas MD  Universal Protocol:  Consent: Verbal consent obtained  Written consent obtained    Risks and benefits: risks, benefits and alternatives were discussed  Consent given by: patient  Patient understanding: patient states understanding of the procedure being performed  Patient consent: the patient's understanding of the procedure matches consent given  Procedure consent: procedure consent matches procedure scheduled  Patient identity " confirmed: verbally with patient    Procedure - Urodynamics:  Procedure details: CMG and EMG      Voiding Pressure Study: Yes    Intra-abdominal Voiding Pressure Study: Yes    Post-procedure:     Patient tolerance: Patient tolerated procedure well with no immediate complications

## 2023-06-20 ENCOUNTER — OFFICE VISIT (OUTPATIENT)
Dept: UROLOGY | Facility: AMBULATORY SURGERY CENTER | Age: 48
End: 2023-06-20
Payer: COMMERCIAL

## 2023-06-20 VITALS
HEART RATE: 95 BPM | OXYGEN SATURATION: 98 % | WEIGHT: 173 LBS | BODY MASS INDEX: 29.24 KG/M2 | SYSTOLIC BLOOD PRESSURE: 122 MMHG | DIASTOLIC BLOOD PRESSURE: 76 MMHG

## 2023-06-20 DIAGNOSIS — N39.42 URINARY INCONTINENCE WITHOUT SENSORY AWARENESS: Primary | ICD-10-CM

## 2023-06-20 PROCEDURE — 99214 OFFICE O/P EST MOD 30 MIN: CPT | Performed by: UROLOGY

## 2023-06-20 NOTE — LETTER
June 20, 2023     Mellissa Salguero MD  [address]  [address] [place] [address]    Patient: Gloria Gabriel   YOB: 1975   Date of Visit: 6/20/2023       Dear Dr Blake Resendiz: Thank you for referring Annette Faria to me for evaluation  Below are my notes for this consultation  If you have questions, please do not hesitate to call me  I look forward to following your patient along with you  Sincerely,        Zahra Bose MD        CC: MD Zahra Beard MD  6/20/2023  9:50 AM  Sign when Signing Visit  6/20/2023    Gloria Gabriel  1975  [id number]        Assessment  Mitochondrial syndrome, decreased bladder sensation continence      Discussion  As Saskia Goodman states she is wearing up to 7 pads a day and is leaking even with clean intermittent catheterization 3-4 times per day, I recommended referral to Dr Karen Castillo at [facility] who is a specialist in female and neurologic urology  In the interim she has discontinued her oxybutynin  I recommend that she continue to straight catheterize as many times per day as needed  I recommend that she return in 6 months time with me after her evaluation by Dr Syeda Garcia  History of Present Illness  41 y o  female with a history of mitochondrial disease  I last saw her in the spring 2021  We discussed her urgency of urination and a possible neurogenic bladder at that time  She had been performing clean intermittent catheterization  She was last seen by the advanced practitioner on March 2, 2023 for urodynamics  The patient tolerated a capacity of 582 cc and then voided 628 cc at a peak flow rate of 13 cc/s  Her PVR was then noted to be 250 cc  She did not have a first urge until she was full to 462 cc consistent with decreased sensation      Her most recent upper tract imaging is a renal bladder ultrasound from March 2023 which reveals a right extrarenal pelvis versus simple cyst  There is otherwise no sign of hydronephrosis, mass, or lesion  She has both a port and feeding tube in place  She uses both regularly to maintain her hydration  She is catheterizing 3-4 times per day  She is wearing 7 or more pads per day  AUA Symptom Score      Review of Systems  Review of Systems   Constitutional: Negative  HENT: Negative  Eyes: Negative  Respiratory: Negative  Cardiovascular: Negative  Gastrointestinal: Negative  Endocrine: Negative  Genitourinary:        HPI   Musculoskeletal: Negative  Skin: Negative  Allergic/Immunologic: Negative  Neurological: Negative  Hematological: Negative  Psychiatric/Behavioral: Negative  Past Medical History  Past Medical History:   Diagnosis Date   • Acid reflux    • Clotting disorder (HCC)     MTHFR   • Constipated    • Dysautonomia ([facility])    • Esophageal abnormality     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  • Gastrostomy tube in place [facility])    • Gastrostomy tube in place [facility])    • History of blood clots 2012    Left leg  Has IVC filter now  Occurred while on Lovenox   • Iron deficiency    • Malignant hyperthermia due to anesthesia     Patient's son has M H   States she needs precautions   • Migraines    • 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[facility] )    • 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[place] [address] )    • MTHFR (methylene THF reductase) deficiency and homocystinuria (HCC)    • Muscle weakness (generalized)    • Nausea    • On total parenteral nutrition (TPN)     for 8 mts   • PCOS (polycystic ovarian syndrome)    • PONV (postoperative nausea and vomiting)    • Postgastrectomy syndrome     malabsorption   • Postsurgical malabsorption    • Status post gastric bypass for obesity    • Stroke [facility]) 0633, 5921    Metabolic strokes  Right hemiparesis= minor now     • Urinary incontinence    • Urinary retention    • Vomiting     When eating       Past Social History  Past Surgical History:   Procedure Laterality Date   • ANKLE SURGERY Left "   Has plate and screws   • COLONOSCOPY     • COSMETIC SURGERY      X14 as child post attack by dog  Arms, legs and face   • DILATION AND CURETTAGE OF UTERUS      x2   • ESOPHAGOGASTRODUODENOSCOPY N/A 1/27/2016    Procedure: ESOPHAGOGASTRODUODENOSCOPY (EGD); Surgeon: Desean Ozuna MD;  [facility]; Service:    • FRACTURE SURGERY Left     Left ankle - hardware   • GASTRIC BYPASS     • IVC FILTER INSERTION     • NISSEN FUNDOPLICATION     • OVARY SURGERY Bilateral     \"Drilling\" due to multiple cysts- PCOS   • KS EGD TRANSORAL BIOPSY SINGLE/MULTIPLE N/A 3/9/2016    Procedure: ESOPHAGOGASTRODUODENOSCOPY (EGD); Surgeon: Desaen Ozuna MD;  [facility];   Service: Bariatrics   • KS HYSTEROSCOPY BX ENDOMETRIUM&/POLYPC W/WO D&C N/A 5/22/2020    Procedure: DILATATION AND CURETTAGE (D&C) WITH HYSTEROSCOPY;  Surgeon: Ravi Quezada MD;  [facility];  Service: Gynecology   • KS HYSTEROSCOPY ENDOMETRIAL ABLATION N/A 5/22/2020    Procedure: Nemo Moise;  Surgeon: Ravi Quezada MD;  [facility];  Service: Gynecology   • KS LAPS SURG GASTROSTOMY W/O CONSTJ GSTR TUBE 100 [facility] N/A 4/19/2016    Procedure: INSERTION GASTROSTOMY TUBE LAPAROSCOPIC WITH INTRAOP EGD;  Surgeon: Desean Ozuna MD;  [facility];  Service: Bariatrics   • ULNAR NERVE TRANSPOSITION Right    • WISDOM TOOTH EXTRACTION         Past Family History  Family History   Problem Relation Age of Onset   • Mitochondrial disorder Mother    • Hypertension Mother    • Thyroid cancer Mother 48   • Clotting disorder Mother         MTFR   • Cancer Mother         Thyroid, carcinoid, renal   • Kidney cancer Mother    • Depression Father    • Endocrinopathy Father    • Clotting disorder Father         MTFR   • Stroke Father    • Aneurysm Father         brain   • Anesthesia problems Father    • Depression Brother    • Narcolepsy Brother    • Heart failure Maternal Grandmother 96   • Coronary artery disease Maternal " Grandmother    • Stroke Maternal Grandmother    • Mitochondrial disorder Son    • Mitochondrial disorder Daughter    • Stroke Family    • Anuerysm Paternal Uncle         abdominal    • Breast cancer Maternal Grandfather 55   • Cancer Maternal Grandfather          at 55 of breast cancer that went to lungs   • Breast cancer Maternal Aunt 42   • Heart attack Neg Hx    • Arrhythmia Neg Hx    • Sudden death Neg Hx         scd       Past Social history  Social History     Socioeconomic History   • Marital status: /Civil Union     Spouse name: Not on file   • Number of children: Not on file   • Years of education: Not on file   • Highest education level: Not on file   Occupational History   • Occupation: infusion center   Tobacco Use   • Smoking status: Never   • Smokeless tobacco: Never   Vaping Use   • Vaping Use: Never used   Substance and Sexual Activity   • Alcohol use: No   • Drug use: No   • Sexual activity: Yes     Partners: Male     Birth control/protection: Male Sterilization   Other Topics Concern   • Not on file   Social History Narrative        2 children - son and daughter with mitrochondrial diseas     Social Determinants of Health     Financial Resource Strain: Not on file   Food Insecurity: No Food Insecurity (2023)    Hunger Vital Sign    • Worried About Running Out of Food in the Last Year: Never true    • Ran Out of Food in the Last Year: Never true   Transportation Needs: No Transportation Needs (2023)    PRAPARE - Transportation    • Lack of Transportation (Medical): No    • Lack of Transportation (Non-Medical):  No   Physical Activity: Not on file   Stress: Not on file   Social Connections: Not on file   Intimate Partner Violence: Not on file   Housing Stability: Low Risk  (2023)    Housing Stability Vital Sign    • Unable to Pay for Housing in the Last Year: No    • Number of Places Lived in the Last Year: 1    • Unstable Housing in the Last Year: No       Current Medications  Current Outpatient Medications   Medication Sig Dispense Refill   • acarbose (PRECOSE) 100 MG tablet Take 1 tablet (100 mg total) by mouth 3 (three) times a day with meals 270 tablet 0   • acetylcysteine (MUCOMYST) 200 mg/mL nebulizer solution as needed      • Alpha-Lipoic Acid 100 MG CAPS Take by mouth 2 (two) times a day     • B Complex-Biotin-FA (TH VITAMIN B 50/B-COMPLEX PO) Take 1 tablet by mouth daily  • Calcium Citrate 1040 MG TABS Take by mouth     • COENZYME Q-10 PO Dose varies     • Continuous Blood Gluc Sensor (Dexcom G6 Sensor) MISC USE AS DIRECTED CMG - CHANGE EVERY 10 DAYS 9 each 0   • Continuous Blood Gluc Transmit (Dexcom G6 Transmitter) MISC Use daily as directed for CGM - Change every 3 months 1 each 3   • CREATINE MONOHYDRATE PO Take 2 5 g by mouth     • Cyanocobalamin (B-12) 1000 MCG/ML KIT Inject  as directed every 30 days  • docusate sodium (COLACE) 100 mg capsule Take 200 mg by mouth 2 (two) times a day  • fludrocortisone (FLORINEF) 0 1 mg tablet Take 1 tablet (0 1 mg total) by mouth daily 90 tablet 3   • fremanezumab-vfrm (Ajovy) 225 MG/1 5ML auto-injector Inject 4 5 mL (675 mg total) under the skin every 3 (three) months 4 5 mL 3   • L-Arginine 1000 MG TABS Take 6,000 mg by mouth 2 (two) times a day      • linaCLOtide 145 MCG CAPS Take 145 mcg by mouth 2 (two) times a day     • midodrine (PROAMATINE) 5 mg tablet Take 1 tablet (5 mg total) by mouth 3 (three) times a day 270 tablet 3   • Motegrity 2 MG TABS      • Multiple Vitamin (MULTIVITAMIN) tablet Take 1 tablet by mouth daily       • NIFEdipine (PROCARDIA XL) 60 mg 24 hr tablet Take 1 tablet (60 mg total) by mouth daily 90 tablet 3   • nystatin (MYCOSTATIN) cream Apply topically 2 (two) times a day 30 g 0   • Oral Vehicles (PCCA-PLUS) SUSP      • P-Aminobenzoic Acid (PARA-AMINOBENZOIC ACID) POWD      • pantoprazole (PROTONIX) 40 mg tablet   3   • polyethylene glycol (MIRALAX) 17 g packet Take 17 g by mouth 2 "(two) times a day Indications: 1 5 caps full 1-2 times/day  • Riboflavin 100 MG TABS Take 100 mg by mouth     • Rimegepant Sulfate (Nurtec) 75 MG TBDP One tab as needed for migraine, max one per day, max 3 days per week 8 tablet 2   • sitaGLIPtin (JANUVIA) 50 mg tablet 1 tab daily 90 tablet 0   • TRILYTE 420 g solution   5   • Turmeric 500 MG CAPS Take by mouth daily     • Ubiquinol Liposomal 100 MG/5ML SYRP Take 300 mg by mouth     • Ubiquinol POWD      • vitamin E 100 UNIT capsule Take 78 Units by mouth daily       No current facility-administered medications for this visit  Allergies  Allergies   Allergen Reactions   • Sulfate Other (See Comments)   • Sulfa Antibiotics      Arrhythmia    • Guaifenesin      Arrhythmia   • Other      Malignant hyperhermia precautions  NEVER use Lacted Ringers       Past Medical History, Social History, Family History, medications and allergies were reviewed  Vitals  Vitals:    06/20/23 0916   BP: 122/76   BP Location: Left arm   Patient Position: Sitting   Cuff Size: Large   Pulse: 95   SpO2: 98%   Weight: 78 5 kg (173 lb)       Physical Exam  Physical Exam    Examination she is in no acute distress    Gait normal   Affect normal    Results  No results found for: \"PSA\"  Lab Results   Component Value Date    GLUCOSE 76 01/06/2016    CALCIUM 10 3 (H) 02/03/2023     01/06/2016    K 3 6 02/03/2023    CO2 33 (H) 02/03/2023     02/03/2023    BUN 14 02/03/2023    CREATININE 0 83 02/03/2023     Lab Results   Component Value Date    WBC 6 13 01/14/2023    HGB 12 2 01/14/2023    HCT 36 6 01/14/2023    MCV 94 01/14/2023     01/14/2023         Office Urine Dip  No results found for this or any previous visit (from the past 1 hour(s)) ]      "

## 2023-06-20 NOTE — PROGRESS NOTES
6/20/2023    Leigh Daniels  1975  [id number]        Assessment  Mitochondrial syndrome, decreased bladder sensation continence      Discussion  As Susanne Moctezuma states she is wearing up to 7 pads a day and is leaking even with clean intermittent catheterization 3-4 times per day, I recommended referral to Dr Quiroga Monday at [facility] who is a specialist in female and neurologic urology  In the interim she has discontinued her oxybutynin  I recommend that she continue to straight catheterize as many times per day as needed  I recommend that she return in 6 months time with me after her evaluation by Dr Val Quintero  History of Present Illness  41 y o  female with a history of mitochondrial disease  I last saw her in the spring 2021  We discussed her urgency of urination and a possible neurogenic bladder at that time  She had been performing clean intermittent catheterization  She was last seen by the advanced practitioner on March 2, 2023 for urodynamics  The patient tolerated a capacity of 582 cc and then voided 628 cc at a peak flow rate of 13 cc/s  Her PVR was then noted to be 250 cc  She did not have a first urge until she was full to 462 cc consistent with decreased sensation  Her most recent upper tract imaging is a renal bladder ultrasound from March 2023 which reveals a right extrarenal pelvis versus simple cyst   There is otherwise no sign of hydronephrosis, mass, or lesion  She has both a port and feeding tube in place  She uses both regularly to maintain her hydration  She is catheterizing 3-4 times per day  She is wearing 7 or more pads per day  AUA Symptom Score      Review of Systems  Review of Systems   Constitutional: Negative  HENT: Negative  Eyes: Negative  Respiratory: Negative  Cardiovascular: Negative  Gastrointestinal: Negative  Endocrine: Negative  Genitourinary:        HPI   Musculoskeletal: Negative  Skin: Negative  "  Allergic/Immunologic: Negative  Neurological: Negative  Hematological: Negative  Psychiatric/Behavioral: Negative  Past Medical History  Past Medical History:   Diagnosis Date   • Acid reflux    • Clotting disorder (HCC)     MTHFR   • Constipated    • Dysautonomia (HCC)    • Esophageal abnormality     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  • Gastrostomy tube in place [facility])    • Gastrostomy tube in place [facility])    • History of blood clots 2012    Left leg  Has IVC filter now  Occurred while on Lovenox   • Iron deficiency    • Malignant hyperthermia due to anesthesia     Patient's son has M H   States she needs precautions   • Migraines    • 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[facility] [address] )    • 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[facility] )    • MTHFR (methylene THF reductase) deficiency and homocystinuria (HCC)    • Muscle weakness (generalized)    • Nausea    • On total parenteral nutrition (TPN)     for 8 mts   • PCOS (polycystic ovarian syndrome)    • PONV (postoperative nausea and vomiting)    • Postgastrectomy syndrome     malabsorption   • Postsurgical malabsorption    • Status post gastric bypass for obesity    • Stroke [facility]) 0150, 8428    Metabolic strokes  Right hemiparesis= minor now  • Urinary incontinence    • Urinary retention    • Vomiting     When eating       Past Social History  Past Surgical History:   Procedure Laterality Date   • ANKLE SURGERY Left     Has plate and screws   • COLONOSCOPY     • COSMETIC SURGERY      X14 as child post attack by dog  Arms, legs and face   • DILATION AND CURETTAGE OF UTERUS      x2   • ESOPHAGOGASTRODUODENOSCOPY N/A 1/27/2016    Procedure: ESOPHAGOGASTRODUODENOSCOPY (EGD); Surgeon: Scott Magallanes MD;  Location: [facility];   Service:    • FRACTURE SURGERY Left     Left ankle - hardware   • GASTRIC BYPASS     • IVC FILTER INSERTION     • NISSEN FUNDOPLICATION     • OVARY SURGERY Bilateral     \"Drilling\" due to multiple cysts- PCOS   • [place] EGD TRANSORAL BIOPSY SINGLE/MULTIPLE " N/A 3/9/2016    Procedure: ESOPHAGOGASTRODUODENOSCOPY (EGD); Surgeon: Suyapa Brasher MD;  Location: [facility];   Service: Bariatrics   • OR HYSTEROSCOPY BX ENDOMETRIUM&/POLYPC W/WO D&C N/A 2020    Procedure: DILATATION AND CURETTAGE (D&C) WITH HYSTEROSCOPY;  Surgeon: Juanita Polk MD;  [facility];  Service: Gynecology   • OR HYSTEROSCOPY ENDOMETRIAL ABLATION N/A 2020    Procedure: ABLATION ENDOMETRIAL MARISSA;  Surgeon: Juanita Polk MD;  [facility];  Service: Gynecology   • OR LAPS SURG GASTROSTOMY W/O CONSTJ GSTR TUBE [facility] N/A 2016    Procedure: INSERTION GASTROSTOMY TUBE LAPAROSCOPIC WITH INTRAOP EGD;  Surgeon: Suyapa Brasher MD;  [facility];  Service: Bariatrics   • ULNAR NERVE TRANSPOSITION Right    • WISDOM TOOTH EXTRACTION         Past Family History  Family History   Problem Relation Age of Onset   • Mitochondrial disorder Mother    • Hypertension Mother    • Thyroid cancer Mother 48   • Clotting disorder Mother         MTFR   • Cancer Mother         Thyroid, carcinoid, renal   • Kidney cancer Mother    • Depression Father    • Endocrinopathy Father    • Clotting disorder Father         MTFR   • Stroke Father    • Aneurysm Father         brain   • Anesthesia problems Father    • Depression Brother    • Narcolepsy Brother    • Heart failure Maternal Grandmother 96   • Coronary artery disease Maternal Grandmother    • Stroke Maternal Grandmother    • Mitochondrial disorder Son    • Mitochondrial disorder Daughter    • Stroke Family    • Anuerysm Paternal Uncle         abdominal    • Breast cancer Maternal Grandfather 55   • Cancer Maternal Grandfather          at 55 of breast cancer that went to lungs   • Breast cancer Maternal Aunt 42   • Heart attack Neg Hx    • Arrhythmia Neg Hx    • Sudden death Neg Hx         scd       Past Social history  Social History     Socioeconomic History   • Marital status: /Civil Union     Spouse name: Not on file • Number of children: Not on file   • Years of education: Not on file   • Highest education level: Not on file   Occupational History   • Occupation: infusion center   Tobacco Use   • Smoking status: Never   • Smokeless tobacco: Never   Vaping Use   • Vaping Use: Never used   Substance and Sexual Activity   • Alcohol use: No   • Drug use: No   • Sexual activity: Yes     Partners: Male     Birth control/protection: Male Sterilization   Other Topics Concern   • Not on file   Social History Narrative        2 children - son and daughter with mitrochondrial diseas     Social Determinants of Health     Financial Resource Strain: Not on file   Food Insecurity: No Food Insecurity (1/13/2023)    Hunger Vital Sign    • Worried About Running Out of Food in the Last Year: Never true    • Ran Out of Food in the Last Year: Never true   Transportation Needs: No Transportation Needs (1/13/2023)    PRAPARE - Transportation    • Lack of Transportation (Medical): No    • Lack of Transportation (Non-Medical): No   Physical Activity: Not on file   Stress: Not on file   Social Connections: Not on file   Intimate Partner Violence: Not on file   Housing Stability: Low Risk  (1/13/2023)    Housing Stability Vital Sign    • Unable to Pay for Housing in the Last Year: No    • Number of Places Lived in the Last Year: 1    • Unstable Housing in the Last Year: No       Current Medications  Current Outpatient Medications   Medication Sig Dispense Refill   • acarbose (PRECOSE) 100 MG tablet Take 1 tablet (100 mg total) by mouth 3 (three) times a day with meals 270 tablet 0   • acetylcysteine (MUCOMYST) 200 mg/mL nebulizer solution as needed      • Alpha-Lipoic Acid 100 MG CAPS Take by mouth 2 (two) times a day     • B Complex-Biotin-FA (TH VITAMIN B 50/B-COMPLEX PO) Take 1 tablet by mouth daily         • Calcium Citrate 1040 MG TABS Take by mouth     • COENZYME Q-10 PO Dose varies     • Continuous Blood Gluc Sensor (Dexcom G6 Sensor) MISC USE AS DIRECTED CMG - CHANGE EVERY 10 DAYS 9 each 0   • Continuous Blood Gluc Transmit (Dexcom G6 Transmitter) MISC Use daily as directed for CGM - Change every 3 months 1 each 3   • CREATINE MONOHYDRATE PO Take 2 5 g by mouth     • Cyanocobalamin (B-12) 1000 MCG/ML KIT Inject  as directed every 30 days  • docusate sodium (COLACE) 100 mg capsule Take 200 mg by mouth 2 (two) times a day  • fludrocortisone (FLORINEF) 0 1 mg tablet Take 1 tablet (0 1 mg total) by mouth daily 90 tablet 3   • fremanezumab-vfrm (Ajovy) 225 MG/1 5ML auto-injector Inject 4 5 mL (675 mg total) under the skin every 3 (three) months 4 5 mL 3   • L-Arginine 1000 MG TABS Take 6,000 mg by mouth 2 (two) times a day      • linaCLOtide 145 MCG CAPS Take 145 mcg by mouth 2 (two) times a day     • midodrine (PROAMATINE) 5 mg tablet Take 1 tablet (5 mg total) by mouth 3 (three) times a day 270 tablet 3   • Motegrity 2 MG TABS      • Multiple Vitamin (MULTIVITAMIN) tablet Take 1 tablet by mouth daily  • NIFEdipine (PROCARDIA XL) 60 mg 24 hr tablet Take 1 tablet (60 mg total) by mouth daily 90 tablet 3   • nystatin (MYCOSTATIN) cream Apply topically 2 (two) times a day 30 g 0   • Oral Vehicles (PCCA-PLUS) SUSP      • P-Aminobenzoic Acid (PARA-AMINOBENZOIC ACID) POWD      • pantoprazole (PROTONIX) 40 mg tablet   3   • polyethylene glycol (MIRALAX) 17 g packet Take 17 g by mouth 2 (two) times a day Indications: 1 5 caps full 1-2 times/day         • Riboflavin 100 MG TABS Take 100 mg by mouth     • Rimegepant Sulfate (Nurtec) 75 MG TBDP One tab as needed for migraine, max one per day, max 3 days per week 8 tablet 2   • sitaGLIPtin (JANUVIA) 50 mg tablet 1 tab daily 90 tablet 0   • TRILYTE 420 g solution   5   • Turmeric 500 MG CAPS Take by mouth daily     • Ubiquinol Liposomal 100 MG/5ML SYRP Take 300 mg by mouth     • Ubiquinol POWD      • vitamin E 100 UNIT capsule Take 78 Units by mouth daily       No current facility-administered medications "for this visit  Allergies  Allergies   Allergen Reactions   • Sulfate Other (See Comments)   • Sulfa Antibiotics      Arrhythmia    • Guaifenesin      Arrhythmia   • Other      Malignant hyperhermia precautions  NEVER use Lacted Ringers       Past Medical History, Social History, Family History, medications and allergies were reviewed  Vitals  Vitals:    06/20/23 0916   BP: 122/76   BP Location: Left arm   Patient Position: Sitting   Cuff Size: Large   Pulse: 95   SpO2: 98%   Weight: 78 5 kg (173 lb)       Physical Exam  Physical Exam    Examination she is in no acute distress    Gait normal   Affect normal    Results  No results found for: \"PSA\"  Lab Results   Component Value Date    GLUCOSE 76 01/06/2016    CALCIUM 10 3 (H) 02/03/2023     01/06/2016    K 3 6 02/03/2023    CO2 33 (H) 02/03/2023     02/03/2023    BUN 14 02/03/2023    CREATININE 0 83 02/03/2023     Lab Results   Component Value Date    WBC 6 13 01/14/2023    HGB 12 2 01/14/2023    HCT 36 6 01/14/2023    MCV 94 01/14/2023     01/14/2023         Office Urine Dip  No results found for this or any previous visit (from the past 1 hour(s)) ]      "

## 2023-06-28 DIAGNOSIS — E16.1 REACTIVE HYPOGLYCEMIA: ICD-10-CM

## 2023-06-29 RX ORDER — PROCHLORPERAZINE 25 MG/1
SUPPOSITORY RECTAL
Qty: 1 EACH | Refills: 0 | Status: SHIPPED | OUTPATIENT
Start: 2023-06-29

## 2023-07-25 ENCOUNTER — OFFICE VISIT (OUTPATIENT)
Dept: NEUROLOGY | Facility: CLINIC | Age: 48
End: 2023-07-25
Payer: COMMERCIAL

## 2023-07-25 VITALS
WEIGHT: 180.2 LBS | SYSTOLIC BLOOD PRESSURE: 128 MMHG | BODY MASS INDEX: 30.02 KG/M2 | TEMPERATURE: 97.6 F | DIASTOLIC BLOOD PRESSURE: 74 MMHG | HEIGHT: 65 IN

## 2023-07-25 DIAGNOSIS — I95.1 DYSAUTONOMIA ORTHOSTATIC HYPOTENSION SYNDROME: ICD-10-CM

## 2023-07-25 DIAGNOSIS — G43.009 MIGRAINE WITHOUT AURA AND WITHOUT STATUS MIGRAINOSUS, NOT INTRACTABLE: Primary | ICD-10-CM

## 2023-07-25 DIAGNOSIS — I95.1 ORTHOSTATIC HYPOTENSION: ICD-10-CM

## 2023-07-25 PROCEDURE — 99214 OFFICE O/P EST MOD 30 MIN: CPT | Performed by: PSYCHIATRY & NEUROLOGY

## 2023-07-25 RX ORDER — INDOMETHACIN 50 MG/1
50 CAPSULE ORAL
Qty: 90 CAPSULE | Refills: 3 | Status: SHIPPED | OUTPATIENT
Start: 2023-07-25

## 2023-07-25 RX ORDER — DIVALPROEX SODIUM 500 MG/1
TABLET, EXTENDED RELEASE ORAL
Qty: 14 TABLET | Refills: 0 | Status: SHIPPED | OUTPATIENT
Start: 2023-07-25

## 2023-07-25 NOTE — PROGRESS NOTES
Patient ID: Isiah Collier is a 41 y.o. female. Assessment/Plan:   Patient Instructions   Migraine headache: Dilma Muñoz presents for a follow-up evaluation with regard to significant headaches and at this time is actually in the midst of an episode of more or less status migrainosus with significantly increased sensitivity to any intake of carbohydrates. Her neurologic exam remains reasonably reassuring in the office today with the exception of her known neuropathy.  -We will plan to continue Ajovy at her current dose at this time along with Nurtec for acute treatment  -We will use a Depakote bridge. She can take 1 or 1.5 tablets at bedtime once per night for the next 7 days. Potentially she should start at just 1 tablet at bedtime to see how she feels. The medication could potentially be split but must not be crushed.  -To attempt to get this under better long-term control we will start indomethacin 50 mg capsules taken 3 times per day with food. She should continue her use of Carafate and proton pump inhibitors to help reduce the risk of any kind of ulceration.  -I would like for her to contact me in no more than 2 weeks to report on her progress. If she is not making significant headway at that time we will consider the possibility of Botox versus Vyepti or may consider other additional agents  -I would like for her to reach out to her cardiologist with regard to the possibility of a Rayshawn Sabot as an adjunct to her current combination of midodrine and Florinef           Diagnoses and all orders for this visit:    Migraine without aura and without status migrainosus, not intractable  -     divalproex sodium (DEPAKOTE ER) 500 mg 24 hr tablet; 1-1.5 tabs QHS X 7 days  -     indomethacin (INDOCIN) 50 mg capsule;  Take 1 capsule (50 mg total) by mouth 3 (three) times a day with meals    Dysautonomia orthostatic hypotension syndrome    Orthostatic hypotension           Subjective:    HPI   Have you had any new stroke like symptoms that were not previously present (Sudden loss of vision, difficulty speaking or swallowing,  vertigo/room spinning that did not quickly resolve, weakness or numbness affecting one side of the body)? no    Are you taking all of your medications as prescribed? Yes    Any side effects including bleeding/bruising? no    Started in January at the time of her headache/autonomic symptosm    Constant headache: Center of the head shooting back. Nearly no headache free time. 2-3/10 at typical baseline, flares up to 6-7/10 with any sugar or any kind of carb. Max 8/10. Bending and standing a lot and eating are the biggest things causing a flare up. Headaches are worse with too much saline/iv fluids which is unusual as this has been a go-to for her in terms of treating her autonomic symptoms    Overall condition has been steadily worsening. Noted incidental finding of partially empty sella on her MRI which has been present since 2018 and is not likely pathological or contributing to her symptoms, clinical features are not consistent with IIH. She feels her balance is worsening. In particular if anyone turns out the lights, particularly without warning, she finds a sense of significant disorientation and imbalance that may cause her to fall if she does not hold on to things. The same is not true if she just closes her eyes as she is able to maintain a mental picture of the room, the issue is one of surprise. We discussed a few different options in the office today. If she does not feel better on the indomethacin following her Depakote bridge we will consider Botox or Vyepti and potentially a trial of Nerivio. Also discussed her dysautonomia. If she is having suboptimal relief from the combination of Florinef and Midodrine consideration could be given to [address]. She can discuss this with the team managing her autonomic symptoms.         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 • Gastrostomy tube in place [facility])    • Gastrostomy tube in place [facility])    • History of blood clots 2012    Left leg. Has IVC filter now. Occurred while on Lovenox   • Iron deficiency    • Malignant hyperthermia due to anesthesia     Patient's son has M.H.  States she needs precautions   • Migraines    • 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[address])    • 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[address])    • MTHFR (methylene THF reductase) deficiency and homocystinuria (HCC)    • Muscle weakness (generalized)    • Nausea    • On total parenteral nutrition (TPN)     for 8 mts   • PCOS (polycystic ovarian syndrome)    • PONV (postoperative nausea and vomiting)    • Postgastrectomy syndrome     malabsorption   • Postsurgical malabsorption    • Status post gastric bypass for obesity    • Stroke [facility]) 7820, 5711    Metabolic strokes. Right hemiparesis= minor now.    • Urinary incontinence    • Urinary retention    • Vomiting     When eating       Social History     Socioeconomic History   • Marital status: /Civil Union     Spouse name: None   • Number of children: None   • Years of education: None   • Highest education level: None   Occupational History   • Occupation: infusion center   Tobacco Use   • Smoking status: Never   • Smokeless tobacco: Never   Vaping Use   • Vaping Use: Never used   Substance and Sexual Activity   • Alcohol use: No   • Drug use: No   • Sexual activity: Yes     Partners: Male     Birth control/protection: Male Sterilization   Other Topics Concern   • None   Social History Narrative        2 children - son and daughter with mitrochondrial diseas     Social Determinants of Health     Financial Resource Strain: Not on file   Food Insecurity: No Food Insecurity (1/13/2023)    Hunger Vital Sign    • Worried About Running Out of Food in the Last Year: Never true • Ran Out of Food in the Last Year: Never true   Transportation Needs: No Transportation Needs (1/13/2023)    PRAPARE - Transportation    • Lack of Transportation (Medical): No    • Lack of Transportation (Non-Medical): No   Physical Activity: Not on file   Stress: Not on file   Social Connections: Not on file   Intimate Partner Violence: Not on file   Housing Stability: Low Risk  (1/13/2023)    Housing Stability Vital Sign    • Unable to Pay for Housing in the Last Year: No    • Number of Places Lived in the Last Year: 1    • Unstable Housing in the Last Year: No         Current Outpatient Medications:   •  acarbose (PRECOSE) 100 MG tablet, Take 1 tablet (100 mg total) by mouth 3 (three) times a day with meals, Disp: 270 tablet, Rfl: 0  •  acetylcysteine (MUCOMYST) 200 mg/mL nebulizer solution, as needed , Disp: , Rfl:   •  Alpha-Lipoic Acid 100 MG CAPS, Take by mouth 2 (two) times a day, Disp: , Rfl:   •  B Complex-Biotin-FA (TH VITAMIN B 50/B-COMPLEX PO), Take 1 tablet by mouth daily. , Disp: , Rfl:   •  Calcium Citrate 1040 MG TABS, Take by mouth, Disp: , Rfl:   •  COENZYME Q-10 PO, Dose varies, Disp: , Rfl:   •  Continuous Blood Gluc Sensor (Dexcom G6 Sensor) MISC, USE AS DIRECTED CMG - CHANGE EVERY 10 DAYS, Disp: 9 each, Rfl: 0  •  Continuous Blood Gluc Transmit (Dexcom G6 Transmitter) MISC, Use daily as directed for CGM - Change every 3 months, Disp: 1 each, Rfl: 0  •  CREATINE MONOHYDRATE PO, Take 2.5 g by mouth, Disp: , Rfl:   •  Cyanocobalamin (B-12) 1000 MCG/ML KIT, Inject  as directed every 30 days. , Disp: , Rfl:   •  divalproex sodium (DEPAKOTE ER) 500 mg 24 hr tablet, 1-1.5 tabs QHS X 7 days, Disp: 14 tablet, Rfl: 0  •  docusate sodium (COLACE) 100 mg capsule, Take 200 mg by mouth 2 (two) times a day., Disp: , Rfl:   •  fludrocortisone (FLORINEF) 0.1 mg tablet, Take 1 tablet (0.1 mg total) by mouth daily, Disp: 90 tablet, Rfl: 3  •  fremanezumab-vfrm (Ajovy) 225 MG/1.5ML auto-injector, Inject 4.5 mL (675 mg total) under the skin every 3 (three) months, Disp: 4.5 mL, Rfl: 3  •  indomethacin (INDOCIN) 50 mg capsule, Take 1 capsule (50 mg total) by mouth 3 (three) times a day with meals, Disp: 90 capsule, Rfl: 3  •  L-Arginine 1000 MG TABS, Take 6,000 mg by mouth 2 (two) times a day , Disp: , Rfl:   •  linaCLOtide 145 MCG CAPS, Take 145 mcg by mouth 2 (two) times a day, Disp: , Rfl:   •  midodrine (PROAMATINE) 5 mg tablet, Take 1 tablet (5 mg total) by mouth 3 (three) times a day, Disp: 270 tablet, Rfl: 3  •  Motegrity 2 MG TABS, , Disp: , Rfl:   •  Multiple Vitamin (MULTIVITAMIN) tablet, Take 1 tablet by mouth daily. , Disp: , Rfl:   •  NIFEdipine (PROCARDIA XL) 60 mg 24 hr tablet, Take 1 tablet (60 mg total) by mouth daily, Disp: 90 tablet, Rfl: 3  •  nystatin (MYCOSTATIN) cream, Apply topically 2 (two) times a day, Disp: 30 g, Rfl: 0  •  Oral Vehicles (PCCA-PLUS) SUSP, , Disp: , Rfl:   •  P-Aminobenzoic Acid (PARA-AMINOBENZOIC ACID) POWD, , Disp: , Rfl:   •  pantoprazole (PROTONIX) 40 mg tablet, , Disp: , Rfl: 3  •  polyethylene glycol (MIRALAX) 17 g packet, Take 17 g by mouth 2 (two) times a day Indications: 1.5 caps full 1-2 times/day.  , Disp: , Rfl:   •  Riboflavin 100 MG TABS, Take 100 mg by mouth, Disp: , Rfl:   •  Rimegepant Sulfate (Nurtec) 75 MG TBDP, One tab as needed for migraine, max one per day, max 3 days per week, Disp: 8 tablet, Rfl: 2  •  sitaGLIPtin (JANUVIA) 50 mg tablet, 1 tab daily, Disp: 90 tablet, Rfl: 0  •  TRILYTE 420 g solution, , Disp: , Rfl: 5  •  Turmeric 500 MG CAPS, Take by mouth daily, Disp: , Rfl:   •  Ubiquinol Liposomal 100 MG/5ML SYRP, Take 300 mg by mouth, Disp: , Rfl:   •  Ubiquinol POWD, , Disp: , Rfl:   •  vitamin E 100 UNIT capsule, Take 78 Units by mouth daily, Disp: , Rfl:     Allergies   Allergen Reactions   • Sulfate Other (See Comments)   • Sulfa Antibiotics      Arrhythmia    • Guaifenesin      Arrhythmia   • Other      Malignant hyperhermia precautions.  NEVER use Lacted Ringers         Objective:    Blood pressure 128/74, temperature 97.6 °F (36.4 °C), temperature source Temporal, height 5' 4.5" (1.638 m), weight 81.7 kg (180 lb 3.2 oz), not currently breastfeeding. Physical Exam    Neurological Exam    At the time of my exam she was awake and alert, she was an excellent historian with no dysarthria or aphasia. There were no clear cranial neuropathies or lateralizing signs. Romberg sign was absent. ROS:    Review of Systems   Constitutional: Negative for appetite change, fatigue and fever. HENT: Negative. Negative for hearing loss, tinnitus, trouble swallowing and voice change. Eyes: Positive for visual disturbance (black vision after bending ). Negative for photophobia and pain. Respiratory: Negative. Negative for shortness of breath. Cardiovascular: Negative. Negative for palpitations. Gastrointestinal: Negative. Negative for nausea and vomiting. Endocrine: Negative. Negative for cold intolerance. Genitourinary: Negative. Negative for dysuria, frequency and urgency. Musculoskeletal: Positive for neck pain. Negative for back pain, gait problem and myalgias. Skin: Negative. Negative for rash. Allergic/Immunologic: Negative. Neurological: Positive for headaches. Negative for dizziness, tremors, seizures, syncope, facial asymmetry, speech difficulty, weakness, light-headedness and numbness. Hematological: Negative. Does not bruise/bleed easily. Psychiatric/Behavioral: Negative. Negative for confusion, hallucinations and sleep disturbance.

## 2023-07-25 NOTE — PATIENT INSTRUCTIONS
Migraine headache: Sergo Mejia presents for a follow-up evaluation with regard to significant headaches and at this time is actually in the midst of an episode of more or less status migrainosus with significantly increased sensitivity to any intake of carbohydrates. Her neurologic exam remains reasonably reassuring in the office today with the exception of her known neuropathy.  -We will plan to continue Ajovy at her current dose at this time along with Nurtec for acute treatment  -We will use a Depakote bridge. She can take 1 or 1.5 tablets at bedtime once per night for the next 7 days. Potentially she should start at just 1 tablet at bedtime to see how she feels. The medication could potentially be split but must not be crushed.  -To attempt to get this under better long-term control we will start indomethacin 50 mg capsules taken 3 times per day with food. She should continue her use of Carafate and proton pump inhibitors to help reduce the risk of any kind of ulceration.  -I would like for her to contact me in no more than 2 weeks to report on her progress.   If she is not making significant headway at that time we will consider the possibility of Botox versus Vyepti or may consider other additional agents  -I would like for her to reach out to her cardiologist with regard to the possibility of a Marely Rumple as an adjunct to her current combination of midodrine and Florinef

## 2023-07-31 DIAGNOSIS — G43.901 STATUS MIGRAINOSUS: Primary | ICD-10-CM

## 2023-07-31 RX ORDER — PREDNISONE 20 MG/1
TABLET ORAL
Qty: 18 TABLET | Refills: 0 | Status: SHIPPED | OUTPATIENT
Start: 2023-07-31

## 2023-08-01 ENCOUNTER — DOCUMENTATION (OUTPATIENT)
Dept: OTHER | Facility: HOSPITAL | Age: 48
End: 2023-08-01

## 2023-08-02 ENCOUNTER — TELEPHONE (OUTPATIENT)
Dept: UROLOGY | Facility: AMBULATORY SURGERY CENTER | Age: 48
End: 2023-08-02

## 2023-08-02 NOTE — PROGRESS NOTES
Received a direct message from the patient on 7/31 that she was quite unable to tolerate indomethacin, even taking roughly half of the powder and an indomethacin capsule. She has not derive significant benefit from Depakote. After reviewing several options ultimately the decision was made to pursue bridging therapy with steroids. She has been on prednisone in the past.  We will plan for an 8-day course starting at 80 mg and tapering by 10 mg/day.

## 2023-08-02 NOTE — TELEPHONE ENCOUNTER
Patient calling in stating she was ref to Tony Scruggs by Jessica Polanco. Tony Scruggs called her today and told her they will need the video of the urodynamics that were done. He has the report but would like to see the video. Please call patient back for more information.  This is time sensitive as her apt is Friday morning at [address] [place] [address] Drive: [phone]

## 2023-08-03 NOTE — TELEPHONE ENCOUNTER
Called patient regarding request from Mario Mc for video of her urodynamics. Spoke to patient and informed that I did not do video urodynamics. Mario Mc has access to [employer] and informed patient they can view the graphs which are in her chart. The graphs are located in Media and they need to go to the  Date (6/1/23) and my name Re Wells RN) and they will see the graphs. Patient will call Mario Mc and let them know.

## 2023-08-14 DIAGNOSIS — G43.009 MIGRAINE WITHOUT AURA AND WITHOUT STATUS MIGRAINOSUS, NOT INTRACTABLE: Primary | ICD-10-CM

## 2023-08-14 RX ORDER — SODIUM CHLORIDE 9 MG/ML
20 INJECTION, SOLUTION INTRAVENOUS ONCE
OUTPATIENT
Start: 2023-08-14

## 2023-08-23 ENCOUNTER — TELEPHONE (OUTPATIENT)
Dept: NEUROLOGY | Facility: CLINIC | Age: 48
End: 2023-08-23

## 2023-08-23 NOTE — TELEPHONE ENCOUNTER
Will work on PA for vyepti when patient confirms infusion location. I called patient to discuss; reached vm, left message to please cb with preferred infusion site; also sent information from Dr. Osman Mclean for her review.

## 2023-08-23 NOTE — TELEPHONE ENCOUNTER
----- Message from Shen Zuñiga MD sent at 8/14/2023  7:42 PM EDT -----  Regarding: FW: Update  Contact: [phone]  Please call Leigh,    I am glad she got a little bit of relief from the steroids but obviously that is completely inadequate. Vyepti is a fine option. This would take the place of the Ajovy listed on her chart as they are both CGRP inhibitors. I entered the therapy plan and send it to the infusion center. Please we will make sure we have any authorizations necessary and help her get set with her first appointment there? Things to watch out for could be any kind of dizziness or hypersensitivity during the infusion or rash, but we do not expect much with regard to side effects. These infusions tend to be relatively effective after only a short time so hopefully within a few days or a little longer after this initial infusion she should start to see some benefit with reduce migraine frequency and severity. Since she was already on a CGRP inhibitor I am going to skip the 100 mg dose and go right to the 300 mg dose to try and get things moving a little faster than we otherwise might. If she prefers to start just a little more conservatively at the 100 mg dose there is no particular downside other than the fact that we would have to wait 3 months to give an increased dose if it is necessary.   If she does prefer that let me know and I will change the prescription.  ----- Message -----  From: Georgia Álvarez RN  Sent: 8/14/2023   7:43 AM EDT  To: Shen Zuñiga MD  Subject: FW: Update                                         ----- Message -----  From: Edyta Chahal RN  Sent: 8/10/2023   3:54 PM EDT  To: [facility] Team 4  Subject: FW: Update                                         ----- Message -----  From: Yun Jose  Sent: 8/10/2023   3:40 PM EDT  To: [facility]  Subject: Update                                           Let’s try the [facility] first

## 2023-08-24 ENCOUNTER — APPOINTMENT (OUTPATIENT)
Dept: LAB | Facility: CLINIC | Age: 48
End: 2023-08-24
Payer: COMMERCIAL

## 2023-08-24 DIAGNOSIS — Z83.49 FAMILY HISTORY OF ENDOCRINE AND METABOLIC DISEASE: ICD-10-CM

## 2023-08-24 DIAGNOSIS — H35.52 RETINITIS PIGMENTOSA: ICD-10-CM

## 2023-08-24 DIAGNOSIS — N39.41 URGE INCONTINENCE: ICD-10-CM

## 2023-08-24 DIAGNOSIS — H53.453 DECREASED PERIPHERAL VISION OF BOTH EYES: ICD-10-CM

## 2023-08-24 DIAGNOSIS — G90.1 FAMILIAL DYSAUTONOMIA (HCC): ICD-10-CM

## 2023-08-24 DIAGNOSIS — R68.89 EXERCISE INTOLERANCE: ICD-10-CM

## 2023-08-24 DIAGNOSIS — Z01.818 OTHER SPECIFIED PRE-OPERATIVE EXAMINATION: ICD-10-CM

## 2023-08-24 DIAGNOSIS — R41.3 MEMORY LOSS: ICD-10-CM

## 2023-08-24 DIAGNOSIS — R53.83 OTHER FATIGUE: ICD-10-CM

## 2023-08-24 LAB
25(OH)D3 SERPL-MCNC: 55.9 NG/ML (ref 30–100)
ALBUMIN SERPL BCP-MCNC: 4.4 G/DL (ref 3.5–5)
ALP SERPL-CCNC: 62 U/L (ref 34–104)
ALT SERPL W P-5'-P-CCNC: 13 U/L (ref 7–52)
ANION GAP SERPL CALCULATED.3IONS-SCNC: 6 MMOL/L
AST SERPL W P-5'-P-CCNC: 14 U/L (ref 13–39)
BASOPHILS # BLD AUTO: 0.03 THOUSANDS/ÂΜL (ref 0–0.1)
BASOPHILS NFR BLD AUTO: 1 % (ref 0–1)
BILIRUB SERPL-MCNC: 0.65 MG/DL (ref 0.2–1)
BUN SERPL-MCNC: 11 MG/DL (ref 5–25)
CALCIUM SERPL-MCNC: 10.8 MG/DL (ref 8.4–10.2)
CHLORIDE SERPL-SCNC: 104 MMOL/L (ref 96–108)
CHOLEST SERPL-MCNC: 190 MG/DL
CO2 SERPL-SCNC: 29 MMOL/L (ref 21–32)
CREAT SERPL-MCNC: 0.68 MG/DL (ref 0.6–1.3)
EOSINOPHIL # BLD AUTO: 0.05 THOUSAND/ÂΜL (ref 0–0.61)
EOSINOPHIL NFR BLD AUTO: 1 % (ref 0–6)
ERYTHROCYTE [DISTWIDTH] IN BLOOD BY AUTOMATED COUNT: 12.6 % (ref 11.6–15.1)
EST. AVERAGE GLUCOSE BLD GHB EST-MCNC: 103 MG/DL
FERRITIN SERPL-MCNC: 327 NG/ML (ref 11–307)
GFR SERPL CREATININE-BSD FRML MDRD: 105 ML/MIN/1.73SQ M
GLUCOSE P FAST SERPL-MCNC: 98 MG/DL (ref 65–99)
HBA1C MFR BLD: 5.2 %
HCT VFR BLD AUTO: 42.5 % (ref 34.8–46.1)
HDLC SERPL-MCNC: 87 MG/DL
HGB BLD-MCNC: 14 G/DL (ref 11.5–15.4)
IMM GRANULOCYTES # BLD AUTO: 0.01 THOUSAND/UL (ref 0–0.2)
IMM GRANULOCYTES NFR BLD AUTO: 0 % (ref 0–2)
IRON SATN MFR SERPL: 61 % (ref 15–50)
IRON SERPL-MCNC: 177 UG/DL (ref 50–212)
LDLC SERPL CALC-MCNC: 89 MG/DL (ref 0–100)
LYMPHOCYTES # BLD AUTO: 0.88 THOUSANDS/ÂΜL (ref 0.6–4.47)
LYMPHOCYTES NFR BLD AUTO: 19 % (ref 14–44)
MCH RBC QN AUTO: 30.9 PG (ref 26.8–34.3)
MCHC RBC AUTO-ENTMCNC: 32.9 G/DL (ref 31.4–37.4)
MCV RBC AUTO: 94 FL (ref 82–98)
MONOCYTES # BLD AUTO: 0.27 THOUSAND/ÂΜL (ref 0.17–1.22)
MONOCYTES NFR BLD AUTO: 6 % (ref 4–12)
NEUTROPHILS # BLD AUTO: 3.45 THOUSANDS/ÂΜL (ref 1.85–7.62)
NEUTS SEG NFR BLD AUTO: 73 % (ref 43–75)
NONHDLC SERPL-MCNC: 103 MG/DL
NRBC BLD AUTO-RTO: 0 /100 WBCS
PLATELET # BLD AUTO: 216 THOUSANDS/UL (ref 149–390)
PMV BLD AUTO: 10.2 FL (ref 8.9–12.7)
POTASSIUM SERPL-SCNC: 4.2 MMOL/L (ref 3.5–5.3)
PROT SERPL-MCNC: 7.3 G/DL (ref 6.4–8.4)
PTH-INTACT SERPL-MCNC: 81.4 PG/ML (ref 12–88)
RBC # BLD AUTO: 4.53 MILLION/UL (ref 3.81–5.12)
SODIUM SERPL-SCNC: 139 MMOL/L (ref 135–147)
T3FREE SERPL-MCNC: 3.08 PG/ML (ref 2.5–3.9)
T4 FREE SERPL-MCNC: 0.84 NG/DL (ref 0.61–1.12)
T4 SERPL-MCNC: 7.05 UG/DL (ref 6.09–12.23)
TIBC SERPL-MCNC: 291 UG/DL (ref 250–450)
TRIGL SERPL-MCNC: 68 MG/DL
TSH SERPL DL<=0.05 MIU/L-ACNC: 1.41 UIU/ML (ref 0.45–4.5)
UIBC SERPL-MCNC: 114 UG/DL (ref 155–355)
WBC # BLD AUTO: 4.69 THOUSAND/UL (ref 4.31–10.16)

## 2023-08-24 PROCEDURE — 87086 URINE CULTURE/COLONY COUNT: CPT

## 2023-08-24 PROCEDURE — 82728 ASSAY OF FERRITIN: CPT

## 2023-08-24 PROCEDURE — 84439 ASSAY OF FREE THYROXINE: CPT

## 2023-08-24 PROCEDURE — 83550 IRON BINDING TEST: CPT

## 2023-08-24 PROCEDURE — 80053 COMPREHEN METABOLIC PANEL: CPT

## 2023-08-24 PROCEDURE — 87077 CULTURE AEROBIC IDENTIFY: CPT

## 2023-08-24 PROCEDURE — 84436 ASSAY OF TOTAL THYROXINE: CPT

## 2023-08-24 PROCEDURE — 82128 AMINO ACIDS MULT QUAL: CPT

## 2023-08-24 PROCEDURE — 83540 ASSAY OF IRON: CPT

## 2023-08-24 PROCEDURE — 84481 FREE ASSAY (FT-3): CPT

## 2023-08-24 PROCEDURE — 80061 LIPID PANEL: CPT

## 2023-08-24 PROCEDURE — 82306 VITAMIN D 25 HYDROXY: CPT

## 2023-08-24 PROCEDURE — 83970 ASSAY OF PARATHORMONE: CPT

## 2023-08-24 PROCEDURE — 87186 SC STD MICRODIL/AGAR DIL: CPT

## 2023-08-24 PROCEDURE — 85025 COMPLETE CBC W/AUTO DIFF WBC: CPT

## 2023-08-24 PROCEDURE — 84443 ASSAY THYROID STIM HORMONE: CPT

## 2023-08-24 PROCEDURE — 83036 HEMOGLOBIN GLYCOSYLATED A1C: CPT

## 2023-08-24 PROCEDURE — 84210 ASSAY OF PYRUVATE: CPT

## 2023-08-24 PROCEDURE — 36415 COLL VENOUS BLD VENIPUNCTURE: CPT

## 2023-08-24 PROCEDURE — 82978 ASSAY OF GLUTATHIONE: CPT

## 2023-08-25 ENCOUNTER — TELEPHONE (OUTPATIENT)
Dept: NEUROLOGY | Facility: CLINIC | Age: 48
End: 2023-08-25

## 2023-08-25 NOTE — TELEPHONE ENCOUNTER
----- Message from Luli Dumont RN sent at 8/24/2023  8:19 AM EDT -----  Regarding: ALEJANDRO: carin  Contact: [phone]    ----- Message -----  From: Salinas Carrero  Sent: 8/23/2023   8:01 PM EDT  To: [facility]  Subject: Ani Skinner                                           Thank you for getting back to me. I agree with the 300mg dose. 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 Thank you so much.

## 2023-08-25 NOTE — TELEPHONE ENCOUNTER
----- Message from Jaime Rodas RN sent at 8/24/2023  8:19 AM EDT -----  Regarding: FW: vyepti  Contact: [phone]     ----- Message -----  From: Mike Reyes  Sent: 8/23/2023   8:01 PM EDT  To: [facility]  Subject: Tab Diaz                                            Thank you for getting back to me. I agree with the 300mg dose. 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 Thank you so much.

## 2023-08-25 NOTE — TELEPHONE ENCOUNTER
Initiated -[id number],   Codes: , [address], [address]  [facility] [id number]  ID # [id number]  ICD  G43.709  vyepti  mg every 84-90 days    Faxed clinicals to: [phone]    PA for vyepti initated; up to 15 days to review request.

## 2023-08-26 LAB
BACTERIA UR CULT: ABNORMAL
PYRUVATE BLD-MCNC: 0.2 MG/DL (ref 0.3–0.7)

## 2023-08-29 LAB
A-AMINOBUTYR SERPL-SCNC: 20.3 UMOL/L (ref 5.4–34.5)
AAA SERPL-SCNC: 0.7 UMOL/L (ref 0–1.9)
ALANINE SERPL-SCNC: 245.9 UMOL/L (ref 209.2–515.5)
ALLOISOLEUCINE SERPL-SCNC: 1.1 UMOL/L (ref 0–3.2)
AMINO ACID PAT SERPL-IMP: NORMAL
ARGININE SERPL-SCNC: 56.3 UMOL/L (ref 36.3–119.2)
ARGININOSUCCINATE SERPL-SCNC: <0.1 UMOL/L (ref 0–3)
ASPARAGINE SERPL-SCNC: 54.8 UMOL/L (ref 29.5–84.5)
ASPARTATE SERPL-SCNC: 1.2 UMOL/L (ref 0–7.4)
B-AIB SERPL-SCNC: 2.4 UMOL/L (ref 0–4.3)
B-ALANINE SERPL-SCNC: 3 UMOL/L (ref 1.1–9)
CITRULLINE SERPL-SCNC: 32.5 UMOL/L (ref 15.6–46.9)
CYSTATHIONIN SERPL-SCNC: <0.5 UMOL/L (ref 0–0.7)
CYSTINE SERPL-SCNC: 20.5 UMOL/L (ref 15.8–47.3)
GABA SERPL-SCNC: <0.5 UMOL/L (ref 0–0.6)
GLUTAMATE SERPL-SCNC: 31.5 UMOL/L (ref 18.1–155.9)
GLUTAMINE SERPL-SCNC: 515 UMOL/L (ref 372.8–701.4)
GLYCINE SERPL-SCNC: 342.8 UMOL/L (ref 144–411)
HCYS SERPL-SCNC: <0.3 UMOL/L (ref 0–0.2)
HISTIDINE SERPL-SCNC: 64.6 UMOL/L (ref 47.2–98.5)
HOMOCITRULLINE SERPL-SCNC: <0.5 UMOL/L (ref 0–1.7)
ISOLEUCINE SERPL-SCNC: 42.4 UMOL/L (ref 32.8–88.3)
LAB DIRECTOR NAME PROVIDER: NORMAL
LEUCINE SERPL-SCNC: 90.4 UMOL/L (ref 66.7–165.7)
LYSINE SERPL-SCNC: 193.1 UMOL/L (ref 94–278)
METHIONINE SERPL-SCNC: 22.5 UMOL/L (ref 14.7–35.2)
OH-LYSINE SERPL-SCNC: 0.2 UMOL/L (ref 0.1–0.8)
OH-PROLINE SERPL-SCNC: 6 UMOL/L (ref 4.7–35.2)
ORNITHINE SERPL-SCNC: 56.8 UMOL/L (ref 30.1–101.3)
PHE SERPL-SCNC: 52 UMOL/L (ref 35.8–76.9)
PROLINE SERPL-SCNC: 130.8 UMOL/L (ref 84.8–352.5)
REF LAB TEST METHOD: NORMAL
SARCOSINE SERPL-SCNC: 1.1 UMOL/L (ref 0–4)
SERINE SERPL-SCNC: 110.9 UMOL/L (ref 48.7–145.2)
TAURINE SERPL-SCNC: 69.2 UMOL/L (ref 29.2–132.3)
THREONINE SERPL-SCNC: 166.2 UMOL/L (ref 67.8–211.6)
TRYPTOPHAN SERPL-SCNC: 37.5 UMOL/L (ref 23.5–93)
TYROSINE SERPL-SCNC: 52.1 UMOL/L (ref 27.8–83.3)
VALINE SERPL-SCNC: 177 UMOL/L (ref 133–317.1)

## 2023-08-30 LAB — MISCELLANEOUS LAB TEST RESULT: NORMAL

## 2023-08-31 DIAGNOSIS — E16.2 HYPOGLYCEMIA: ICD-10-CM

## 2023-08-31 DIAGNOSIS — E16.1 REACTIVE HYPOGLYCEMIA: ICD-10-CM

## 2023-08-31 RX ORDER — PROCHLORPERAZINE 25 MG/1
SUPPOSITORY RECTAL
Qty: 9 EACH | Refills: 0 | Status: SHIPPED | OUTPATIENT
Start: 2023-08-31

## 2023-08-31 RX ORDER — ACARBOSE 100 MG/1
100 TABLET ORAL
Qty: 270 TABLET | Refills: 0 | Status: SHIPPED | OUTPATIENT
Start: 2023-08-31 | End: 2024-06-26

## 2023-08-31 NOTE — TELEPHONE ENCOUNTER
Pt last seen 2/2023, was due back this month, scheduled 1/22/24. Does she need sooner appt for refills?

## 2023-09-05 DIAGNOSIS — E16.1 REACTIVE HYPOGLYCEMIA: ICD-10-CM

## 2023-09-05 NOTE — TELEPHONE ENCOUNTER
Requested medication(s) are due for refill today: yes  Patient has already received a courtesy refill: yes  Other reason request has been forwarded to provider: failed protocol check

## 2023-09-20 LAB — MISCELLANEOUS LAB TEST RESULT: NORMAL

## 2023-09-21 ENCOUNTER — HOSPITAL ENCOUNTER (OUTPATIENT)
Dept: INFUSION CENTER | Facility: CLINIC | Age: 48
Discharge: HOME | End: 2023-09-21

## 2023-09-23 DIAGNOSIS — E16.1 REACTIVE HYPOGLYCEMIA: ICD-10-CM

## 2023-09-25 RX ORDER — PROCHLORPERAZINE 25 MG/1
SUPPOSITORY RECTAL
Qty: 1 EACH | Refills: 0 | Status: SHIPPED | OUTPATIENT
Start: 2023-09-25

## 2023-10-23 DIAGNOSIS — I73.00 RAYNAUD'S DISEASE WITHOUT GANGRENE: ICD-10-CM

## 2023-10-23 RX ORDER — NIFEDIPINE 60 MG/1
60 TABLET, EXTENDED RELEASE ORAL DAILY
Qty: 90 TABLET | Refills: 3 | Status: SHIPPED | OUTPATIENT
Start: 2023-10-23

## 2023-11-03 DIAGNOSIS — E16.2 HYPOGLYCEMIA: ICD-10-CM

## 2023-11-03 RX ORDER — ACARBOSE 100 MG/1
100 TABLET ORAL
Qty: 270 TABLET | Refills: 0 | Status: SHIPPED | OUTPATIENT
Start: 2023-11-03 | End: 2024-08-29

## 2023-11-09 ENCOUNTER — TELEPHONE (OUTPATIENT)
Dept: RHEUMATOLOGY | Facility: CLINIC | Age: 48
End: 2023-11-09

## 2023-11-09 NOTE — TELEPHONE ENCOUNTER
Patient's appointment for today was canceled, please call her back and schedule with Charleen Mojica (she is a prior Dr. Miguel Chi patient).

## 2023-11-10 NOTE — TELEPHONE ENCOUNTER
Pt insisted on staying w Dr. Mack Serrano. She was scheduled for 2/26/24 @ 3 pm in the [facility].

## 2023-11-28 ENCOUNTER — TELEPHONE (OUTPATIENT)
Dept: NEUROLOGY | Facility: CLINIC | Age: 48
End: 2023-11-28

## 2023-11-28 DIAGNOSIS — E16.1 REACTIVE HYPOGLYCEMIA: ICD-10-CM

## 2023-11-28 RX ORDER — PROCHLORPERAZINE 25 MG/1
SUPPOSITORY RECTAL
Qty: 9 EACH | Refills: 0 | Status: SHIPPED | OUTPATIENT
Start: 2023-11-28

## 2023-11-28 NOTE — TELEPHONE ENCOUNTER
-- Message -----   From: Mayuri Gong, Pharmacist   Sent: 11/27/2023  10:52 AM EST   To: Gera Busch, Pharmacist; *            Hello, looks like vyepti was ordered for this patient's migraines. Patient has Chronic venous embolism and thrombosis of deep vessels of distal lower extremity. Per hematology notes- she did have a DVT while on prophylactic Lovenox after ankle surgery and she does have an IVC filter in place. Are you okay with her still getting vyepti?

## 2023-11-30 ENCOUNTER — HOSPITAL ENCOUNTER (OUTPATIENT)
Dept: INFUSION CENTER | Facility: CLINIC | Age: 48
Discharge: HOME | End: 2023-11-30
Payer: COMMERCIAL

## 2023-11-30 ENCOUNTER — DOCUMENTATION (OUTPATIENT)
Dept: BARIATRICS | Facility: CLINIC | Age: 48
End: 2023-11-30

## 2023-11-30 VITALS
DIASTOLIC BLOOD PRESSURE: 91 MMHG | SYSTOLIC BLOOD PRESSURE: 141 MMHG | RESPIRATION RATE: 18 BRPM | TEMPERATURE: 98 F | OXYGEN SATURATION: 100 % | HEART RATE: 85 BPM

## 2023-11-30 DIAGNOSIS — I95.1 DYSAUTONOMIA ORTHOSTATIC HYPOTENSION SYNDROME: Primary | ICD-10-CM

## 2023-11-30 DIAGNOSIS — G43.009 MIGRAINE WITHOUT AURA AND WITHOUT STATUS MIGRAINOSUS, NOT INTRACTABLE: Primary | ICD-10-CM

## 2023-11-30 PROCEDURE — 96365 THER/PROPH/DIAG IV INF INIT: CPT

## 2023-11-30 PROCEDURE — 96375 TX/PRO/DX INJ NEW DRUG ADDON: CPT

## 2023-11-30 RX ORDER — ALBUTEROL SULFATE 2.5 MG/3ML
2.5 SOLUTION RESPIRATORY (INHALATION) ONCE
Status: DISCONTINUED | OUTPATIENT
Start: 2023-11-30 | End: 2023-12-03 | Stop reason: HOSPADM

## 2023-11-30 RX ORDER — SODIUM CHLORIDE 9 MG/ML
20 INJECTION, SOLUTION INTRAVENOUS ONCE
Status: COMPLETED | OUTPATIENT
Start: 2023-11-30 | End: 2023-11-30

## 2023-11-30 RX ORDER — METHYLPREDNISOLONE SODIUM SUCCINATE 40 MG/ML
20 INJECTION, POWDER, LYOPHILIZED, FOR SOLUTION INTRAMUSCULAR; INTRAVENOUS ONCE
Status: COMPLETED | OUTPATIENT
Start: 2023-11-30 | End: 2023-11-30

## 2023-11-30 RX ORDER — FAMOTIDINE 10 MG/ML
INJECTION, SOLUTION INTRAVENOUS
Status: COMPLETED
Start: 2023-11-30 | End: 2023-11-30

## 2023-11-30 RX ORDER — SODIUM CHLORIDE 9 MG/ML
20 INJECTION, SOLUTION INTRAVENOUS ONCE
OUTPATIENT
Start: 2024-02-22

## 2023-11-30 RX ORDER — DIPHENHYDRAMINE HYDROCHLORIDE 50 MG/ML
50 INJECTION INTRAMUSCULAR; INTRAVENOUS ONCE
Status: COMPLETED | OUTPATIENT
Start: 2023-11-30 | End: 2023-11-30

## 2023-11-30 RX ORDER — FAMOTIDINE 10 MG/ML
20 INJECTION, SOLUTION INTRAVENOUS EVERY 12 HOURS SCHEDULED
Status: DISCONTINUED | OUTPATIENT
Start: 2023-11-30 | End: 2023-12-03 | Stop reason: HOSPADM

## 2023-11-30 RX ADMIN — METHYLPREDNISOLONE SODIUM SUCCINATE 40 MG: 40 INJECTION, POWDER, FOR SOLUTION INTRAMUSCULAR; INTRAVENOUS at 14:12

## 2023-11-30 RX ADMIN — EPTINEZUMAB-JJMR 300 MG: 100 INJECTION INTRAVENOUS at 13:32

## 2023-11-30 RX ADMIN — SODIUM CHLORIDE 20 ML/HR: 0.9 INJECTION, SOLUTION INTRAVENOUS at 13:08

## 2023-11-30 RX ADMIN — FAMOTIDINE 20 MG: 10 INJECTION, SOLUTION INTRAVENOUS at 14:14

## 2023-11-30 RX ADMIN — DIPHENHYDRAMINE HYDROCHLORIDE 50 MG: 50 INJECTION, SOLUTION INTRAMUSCULAR; INTRAVENOUS at 14:09

## 2023-11-30 NOTE — PROGRESS NOTES
F[facility]ed [facility] for [facility] a Cath, and saline bags ( 500 ml) to be infused once weekly as needed via her pump   Dispense 4 bags per month, with 12 refills     Orders scanned into chart

## 2023-11-30 NOTE — PROGRESS NOTES
Pt here for vyepti infusion, offers no complaints, pt accessed her own port prior to arrival, port flushed and brisk blood retun was noted.  Pt resting comfortably in chair

## 2023-11-30 NOTE — PROGRESS NOTES
Patient stable for discharge. Reports resolution of all symptoms. Gait steady. Patient flushed and de-accessed her port. Patient to make additional appointments for infusion pending provider follow up. AVS declined.

## 2023-11-30 NOTE — PROGRESS NOTES
1402-pt feeling SOB, Flush, chills (rigors), throat tightening, and nausea. Medication stopped, NS opened wide. 1408- vitals checked tachycardia noted, no respiratory distress    1409- 50 mg benadryl give  1412- 40mg solumedrol given  1414- 20mg pepcid given- vitals still stable with tachycardia    1420- pt SOB and chest tightness improving, HR improving    1423- physician notified    1430- pt improving, symptoms almost completely resolved, pt up to restroom without issue    [phone] 90- pt and physician decided to end treatment at this time, will finish bolus and discharge patient.

## 2023-12-09 DIAGNOSIS — E16.1 REACTIVE HYPOGLYCEMIA: ICD-10-CM

## 2023-12-09 DIAGNOSIS — I95.89 OTHER SPECIFIED HYPOTENSION: ICD-10-CM

## 2023-12-11 RX ORDER — MIDODRINE HYDROCHLORIDE 5 MG/1
5 TABLET ORAL 3 TIMES DAILY
Qty: 270 TABLET | Refills: 0 | Status: SHIPPED | OUTPATIENT
Start: 2023-12-11

## 2024-01-11 ENCOUNTER — TELEPHONE (OUTPATIENT)
Age: 49
End: 2024-01-11

## 2024-01-11 NOTE — TELEPHONE ENCOUNTER
Dee is going to be faxing a auth form to the office. She was told that since the pt has [employer], her pcps office will have to fill out the form.

## 2024-01-19 ENCOUNTER — OFFICE VISIT (OUTPATIENT)
Dept: INTERNAL MEDICINE CLINIC | Facility: CLINIC | Age: 49
End: 2024-01-19
Payer: COMMERCIAL

## 2024-01-19 VITALS
DIASTOLIC BLOOD PRESSURE: 82 MMHG | WEIGHT: 178 LBS | TEMPERATURE: 96.6 F | SYSTOLIC BLOOD PRESSURE: 122 MMHG | BODY MASS INDEX: 29.66 KG/M2 | HEIGHT: 65 IN | HEART RATE: 74 BPM | OXYGEN SATURATION: 98 %

## 2024-01-19 DIAGNOSIS — K91.2 POSTSURGICAL MALABSORPTION: ICD-10-CM

## 2024-01-19 DIAGNOSIS — Z12.31 ENCOUNTER FOR SCREENING MAMMOGRAM FOR BREAST CANCER: ICD-10-CM

## 2024-01-19 DIAGNOSIS — K21.9 GASTROESOPHAGEAL REFLUX DISEASE WITHOUT ESOPHAGITIS: ICD-10-CM

## 2024-01-19 DIAGNOSIS — E88.40 MITOCHONDRIAL DISEASE (HCC): Primary | ICD-10-CM

## 2024-01-19 DIAGNOSIS — I73.00 RAYNAUD'S DISEASE WITHOUT GANGRENE: ICD-10-CM

## 2024-01-19 DIAGNOSIS — Z98.84 S/P GASTRIC BYPASS: ICD-10-CM

## 2024-01-19 DIAGNOSIS — I82.5Z2 CHRONIC VENOUS EMBOLISM AND THROMBOSIS OF DEEP VESSELS OF DISTAL END OF LEFT LOWER EXTREMITY (HCC): ICD-10-CM

## 2024-01-19 DIAGNOSIS — I83.893 VARICOSE VEINS OF LEG WITH SWELLING, BILATERAL: ICD-10-CM

## 2024-01-19 DIAGNOSIS — K59.04 CHRONIC IDIOPATHIC CONSTIPATION: ICD-10-CM

## 2024-01-19 DIAGNOSIS — N31.9 NEUROGENIC BLADDER: ICD-10-CM

## 2024-01-19 DIAGNOSIS — I95.1 ORTHOSTATIC HYPOTENSION: ICD-10-CM

## 2024-01-19 DIAGNOSIS — H57.04 FIXED DILATED PUPIL OF RIGHT EYE: ICD-10-CM

## 2024-01-19 DIAGNOSIS — G43.009 MIGRAINE WITHOUT AURA AND WITHOUT STATUS MIGRAINOSUS, NOT INTRACTABLE: ICD-10-CM

## 2024-01-19 DIAGNOSIS — Z00.00 HEALTH MAINTENANCE EXAMINATION: ICD-10-CM

## 2024-01-19 DIAGNOSIS — M79.89 LEFT LEG SWELLING: ICD-10-CM

## 2024-01-19 DIAGNOSIS — N39.42 URINARY INCONTINENCE WITHOUT SENSORY AWARENESS: ICD-10-CM

## 2024-01-19 PROCEDURE — 99214 OFFICE O/P EST MOD 30 MIN: CPT | Performed by: INTERNAL MEDICINE

## 2024-01-19 PROCEDURE — 99396 PREV VISIT EST AGE 40-64: CPT | Performed by: INTERNAL MEDICINE

## 2024-01-19 NOTE — ASSESSMENT & PLAN NOTE
Stopped Ajovy. S/p Vyepti infusion x 1 with side effects.  Awaiting for Zyprexa approval.  Per neurology.

## 2024-01-19 NOTE — PROGRESS NOTES
Assessment/Plan:    Neurogenic bladder  S/p stimulator, which moves around, mild discomfort.  Symptoms improved. Self catheterization 1-2x a week only.  Seen at [facility].    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HCC)  Followed at [facility].    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  Saw neurology, ASA not needed.    Orthostatic hypotension  Stable.  Taking midodrine, Florinef.  Recommend to resume at least once a week IVF.    Migraine without aura and without status migrainosus, not intractable  Stopped Ajovy. S/p Vyepti infusion x 1 with side effects.  Awaiting for Zyprexa approval.  Per neurology.    Postsurgical malabsorption  On acarbose and Januvia.  Discuss insulin resistance with Endo.    Raynaud's disease  On nifedipine.  No recent issues.    GERD (gastroesophageal reflux disease)  Taking PPI bid.    Constipation  Taking Linzess and Motegrity daily, Senakot bid.    S/P gastric bypass  Taking  MVI.  Weight loss 6 lbs since a year ago.    Chronic venous embolism and thrombosis of deep vessels of distal lower extremity (HCC)  Hx of DVT LLE after surgery.  Check ultrasuond.     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  Orders:  -     VAS Lower extremity venous insufficiency duplex, bilateral w/ measurements; Future    Encounter for screening mammogram for breast cancer  -     Mammo screening bilateral w 3d & cad; Future    Postsurgical malabsorption    Orthostatic hypotension    Neurogenic bladder    Gastroesophageal reflux disease  without esophagitis    Chronic venous embolism and thrombosis of deep vessels of distal end of left lower extremity (HCC)    S/P gastric bypass    Health maintenance examination  Comments:  Will schedule colonoscopy.      Follow up in 1 year or as needed.      Subjective:      Patient ID: Leigh Watson is a 48 y.o. female here for a follow up.    She reports the stimulator is moving around, has some discomfort.  Since surgery, she reports urine symptoms have significantly improved.  She still has the cath maybe once or twice a week.  She is able to urinate more comfortably, still has leakage.    She complains of ongoing migraine headaches.  They had tried Depakote which did not really help.  She was reluctant to do steroids, did it in the past and only developed rebound headaches.  She had 1 infusion of a new medication and developed side effects.  She is still waiting for insurance approval for the Zyprexa.    She reports more frequent leg swelling, worse in the past month.  She does wear her compression stockings daily.  She recalls having a DVT in her left leg in the past after surgery.  She reports no pain or redness, feels her left leg is always slightly swollen.  Symptoms worse at the end of the day.    She has not been doing her infusions recently.  She feels she is drinking enough oral fluids on a daily basis.    She is to be scheduled for a muscle biopsy.  She has been delaying this, both her children had it.  She is more afraid of the anesthesia.  She is planning to have it done together with the EGD and colonoscopy.      The following portions of the patient's history were reviewed and updated as appropriate: allergies, current medications, past medical history, past social history, and problem list.    Review of Systems   Constitutional:  Negative for activity change, appetite change and fatigue.   HENT:  Negative for congestion, ear pain and postnasal drip.    Eyes:  Negative for visual disturbance.  "  Respiratory:  Negative for cough and shortness of breath.    Cardiovascular:  Positive for leg swelling. Negative for chest pain and palpitations.   Gastrointestinal:  Negative for abdominal pain, constipation and diarrhea.   Genitourinary:  Positive for difficulty urinating. Negative for dysuria, frequency and urgency.   Musculoskeletal:  Negative for arthralgias and myalgias.   Skin:  Negative for rash and wound.   Neurological:  Positive for headaches. Negative for dizziness, syncope, weakness and numbness.   Hematological:  Does not bruise/bleed easily.   Psychiatric/Behavioral:  Negative for confusion. The patient is not nervous/anxious.          Objective:      /82   Pulse 74   Temp (!) 96.6 °F (35.9 °C)   Ht 5' 4.5\" (1.638 m)   Wt 80.7 kg (178 lb)   SpO2 98%   BMI 30.08 kg/m²          Physical Exam  Vitals and nursing note reviewed.   Constitutional:       General: She is not in acute distress.     Appearance: She is well-developed.   HENT:      Head: Normocephalic and atraumatic.      Mouth/Throat:      Mouth: Mucous membranes are moist.   Eyes:      Pupils: Pupils are equal, round, and reactive to light.   Cardiovascular:      Rate and Rhythm: Normal rate and regular rhythm.      Heart sounds: Normal heart sounds.   Pulmonary:      Effort: Pulmonary effort is normal.      Breath sounds: Normal breath sounds. No wheezing.   Abdominal:      General: Bowel sounds are normal.      Palpations: Abdomen is soft.   Musculoskeletal:         General: No swelling.      Right lower le+ Edema present.      Left lower le+ Edema present.   Skin:     General: Skin is warm.      Findings: No rash.   Neurological:      General: No focal deficit present.      Mental Status: She is alert and oriented to person, place, and time.   Psychiatric:         Mood and Affect: Mood and affect normal. Mood is not anxious or depressed.         Behavior: Behavior normal.           Labs & imaging results reviewed with " patient.

## 2024-01-19 NOTE — ASSESSMENT & PLAN NOTE
S/p stimulator, which moves around, mild discomfort.  Symptoms improved. Self catheterization 1-2x a week only.  Seen at [facility].

## 2024-01-21 DIAGNOSIS — E16.1 REACTIVE HYPOGLYCEMIA: ICD-10-CM

## 2024-01-22 ENCOUNTER — OFFICE VISIT (OUTPATIENT)
Dept: ENDOCRINOLOGY | Facility: CLINIC | Age: 49
End: 2024-01-22
Payer: COMMERCIAL

## 2024-01-22 VITALS
HEART RATE: 87 BPM | DIASTOLIC BLOOD PRESSURE: 88 MMHG | HEIGHT: 65 IN | BODY MASS INDEX: 29.79 KG/M2 | WEIGHT: 178.8 LBS | SYSTOLIC BLOOD PRESSURE: 118 MMHG

## 2024-01-22 DIAGNOSIS — E16.1 REACTIVE HYPOGLYCEMIA: Primary | ICD-10-CM

## 2024-01-22 DIAGNOSIS — E21.1 HYPERPARATHYROIDISM , SECONDARY, NON-RENAL (HCC): ICD-10-CM

## 2024-01-22 DIAGNOSIS — Z98.84 S/P GASTRIC BYPASS: ICD-10-CM

## 2024-01-22 PROCEDURE — 95251 CONT GLUC MNTR ANALYSIS I&R: CPT | Performed by: INTERNAL MEDICINE

## 2024-01-22 PROCEDURE — 99214 OFFICE O/P EST MOD 30 MIN: CPT | Performed by: INTERNAL MEDICINE

## 2024-01-22 RX ORDER — ACYCLOVIR 400 MG/1
1 TABLET ORAL
Qty: 9 EACH | Refills: 3 | Status: SHIPPED | OUTPATIENT
Start: 2024-01-22

## 2024-01-22 RX ORDER — PROCHLORPERAZINE 25 MG/1
SUPPOSITORY RECTAL
Qty: 1 EACH | Refills: 0 | Status: SHIPPED | OUTPATIENT
Start: 2024-01-22

## 2024-01-22 NOTE — PROGRESS NOTES
"Assessment/Plan:    Reactive hypoglycemia  This is stable with acarbose, Januvia and a continuous glucose monitor.  She will continue on her current regimen.  Her continuous glucose monitor was upgraded from Dexcom G6 to the Dexcom G7.    Hyperparathyroidism , secondary, non-renal (HCC)  Most recent laboratory testing shows a normal PTH level along with a normal 25-hydroxy vitamin D level.  Continue calcium supplementation.    S/P gastric bypass  Continue vitamin supplementation to prevent nutritional/mineral deficits.     Diagnoses and all orders for this visit:    Reactive hypoglycemia  -     Continuous Blood Gluc Sensor (Dexcom G7 Sensor); Use 1 Device every 10 days    Hyperparathyroidism , secondary, non-renal (HCC)    S/P gastric bypass          Subjective:      Patient ID: Leigh Watson is a 48 y.o. female.    48-year-old woman with history of gastric bypass surgery presents for follow-up.  She is currently on Januvia and acarbose for reactive hypoglycemia.  She has very few episodes of hypoglycemia if she is cognizant of what she she is eating.        The following portions of the patient's history were reviewed and updated as appropriate: allergies, current medications, past family history, past medical history, past social history, past surgical history, and problem list.    Review of Systems   Constitutional:  Negative for chills and fever.   Respiratory:  Negative for shortness of breath.    Cardiovascular:  Negative for chest pain.   Gastrointestinal:  Negative for constipation, diarrhea, nausea and vomiting.   All other systems reviewed and are negative.        Objective:      /88   Pulse 87   Ht 5' 4.5\" (1.638 m)   Wt 81.1 kg (178 lb 12.8 oz)   BMI 30.22 kg/m²          Physical Exam  Vitals reviewed.   Constitutional:       General: She is not in acute distress.     Appearance: She is well-developed. She is not diaphoretic.   HENT:      Head: Normocephalic and atraumatic.   Eyes:      " General: Lids are normal.         Right eye: No discharge.         Left eye: No discharge.   Pulmonary:      Effort: Pulmonary effort is normal. No respiratory distress.   Abdominal:      General: Bowel sounds are normal. There is no distension.      Palpations: Abdomen is soft.      Tenderness: There is no abdominal tenderness.   Musculoskeletal:         General: Normal range of motion.      Cervical back: Normal range of motion.   Lymphadenopathy:      Head:      Right side of head: No occipital adenopathy.      Left side of head: No occipital adenopathy.      Upper Body:      Right upper body: No supraclavicular adenopathy.      Left upper body: No supraclavicular adenopathy.   Skin:     Coloration: Skin is not jaundiced.   Neurological:      General: No focal deficit present.      Mental Status: She is alert and oriented to person, place, and time.   Psychiatric:         Mood and Affect: Mood normal.         Behavior: Behavior normal.         Leigh Watson   Device used Dexcom  Home use       Indication   Hypoglycemia    More than 72 hours of data was reviewed. Report to be scanned to chart.     Date Range: January 9, 2024 to January 22, 2024    Analysis of data:   % time CGM used: 93%  Average Glucose: 112 mg/dL  SD : 18 mg/dL  Time in Target Range: 98%  Time Above Range: Less than 1% high  Time Below Range: 1% low and less than 1% very low    Interpretation of data: Hyperglycemia is under good control.  Continue current therapy.

## 2024-01-22 NOTE — ASSESSMENT & PLAN NOTE
This is stable with acarbose, Januvia and a continuous glucose monitor.  She will continue on her current regimen.  Her continuous glucose monitor was upgraded from Dexcom G6 to the Dexcom G7.

## 2024-01-22 NOTE — ASSESSMENT & PLAN NOTE
Most recent laboratory testing shows a normal PTH level along with a normal 25-hydroxy vitamin D level.  Continue calcium supplementation.

## 2024-01-24 DIAGNOSIS — Z00.6 ENCOUNTER FOR EXAMINATION FOR NORMAL COMPARISON OR CONTROL IN CLINICAL RESEARCH PROGRAM: ICD-10-CM

## 2024-01-31 DIAGNOSIS — G43.009 MIGRAINE WITHOUT AURA AND WITHOUT STATUS MIGRAINOSUS, NOT INTRACTABLE: ICD-10-CM

## 2024-01-31 DIAGNOSIS — E28.2 POLYCYSTIC OVARIAN SYNDROME: Primary | ICD-10-CM

## 2024-01-31 RX ORDER — OLANZAPINE 10 MG/1
10 TABLET ORAL
Qty: 7 TABLET | Refills: 0 | Status: SHIPPED | OUTPATIENT
Start: 2024-01-31

## 2024-02-06 ENCOUNTER — PATIENT MESSAGE (OUTPATIENT)
Dept: NEUROLOGY | Facility: CLINIC | Age: 49
End: 2024-02-06

## 2024-02-15 ENCOUNTER — ESTABLISHED COMPREHENSIVE EXAM (OUTPATIENT)
Dept: URBAN - METROPOLITAN AREA CLINIC 6 | Facility: CLINIC | Age: 49
End: 2024-02-15

## 2024-02-15 DIAGNOSIS — H35.52: ICD-10-CM

## 2024-02-15 DIAGNOSIS — G93.2: ICD-10-CM

## 2024-02-15 DIAGNOSIS — Q14.1: ICD-10-CM

## 2024-02-15 DIAGNOSIS — H04.123: ICD-10-CM

## 2024-02-15 PROCEDURE — 92250 FUNDUS PHOTOGRAPHY W/I&R: CPT

## 2024-02-15 PROCEDURE — 92014 COMPRE OPH EXAM EST PT 1/>: CPT

## 2024-02-15 ASSESSMENT — VISUAL ACUITY
OU_CC: J1+
OS_CC: 20/40-2
OD_CC: 20/40-2
OS_PH: 20/30
OD_PH: 20/30-2

## 2024-02-15 ASSESSMENT — TONOMETRY
OD_IOP_MMHG: 12
OS_IOP_MMHG: 14

## 2024-02-16 ENCOUNTER — HOSPITAL ENCOUNTER (OUTPATIENT)
Dept: VASCULAR ULTRASOUND | Facility: HOSPITAL | Age: 49
End: 2024-02-16
Payer: COMMERCIAL

## 2024-02-16 ENCOUNTER — HOSPITAL ENCOUNTER (OUTPATIENT)
Dept: VASCULAR ULTRASOUND | Facility: HOSPITAL | Age: 49
Discharge: HOME | End: 2024-02-16
Payer: COMMERCIAL

## 2024-02-16 ENCOUNTER — OFFICE VISIT (OUTPATIENT)
Dept: UROLOGY | Facility: AMBULATORY SURGERY CENTER | Age: 49
End: 2024-02-16
Payer: COMMERCIAL

## 2024-02-16 VITALS
DIASTOLIC BLOOD PRESSURE: 78 MMHG | HEIGHT: 64 IN | SYSTOLIC BLOOD PRESSURE: 126 MMHG | BODY MASS INDEX: 30.39 KG/M2 | HEART RATE: 87 BPM | WEIGHT: 178 LBS | OXYGEN SATURATION: 98 % | RESPIRATION RATE: 18 BRPM

## 2024-02-16 DIAGNOSIS — N28.1 RENAL CYST: ICD-10-CM

## 2024-02-16 DIAGNOSIS — N39.42 URINARY INCONTINENCE WITHOUT SENSORY AWARENESS: Primary | ICD-10-CM

## 2024-02-16 DIAGNOSIS — I83.893 VARICOSE VEINS OF LEG WITH SWELLING, BILATERAL: ICD-10-CM

## 2024-02-16 LAB
POST-VOID RESIDUAL VOLUME, ML POC: 88 ML
SL AMB  POCT GLUCOSE, UA: NORMAL
SL AMB LEUKOCYTE ESTERASE,UA: NORMAL
SL AMB POCT BILIRUBIN,UA: NORMAL
SL AMB POCT BLOOD,UA: 5
SL AMB POCT CLARITY,UA: CLEAR
SL AMB POCT COLOR,UA: YELLOW
SL AMB POCT KETONES,UA: 0.2
SL AMB POCT NITRITE,UA: NORMAL
SL AMB POCT PH,UA: NORMAL
SL AMB POCT SPECIFIC GRAVITY,UA: NORMAL
SL AMB POCT URINE PROTEIN: 1.01
SL AMB POCT UROBILINOGEN: NORMAL

## 2024-02-16 PROCEDURE — 81002 URINALYSIS NONAUTO W/O SCOPE: CPT | Performed by: UROLOGY

## 2024-02-16 PROCEDURE — 93970 EXTREMITY STUDY: CPT

## 2024-02-16 PROCEDURE — 99213 OFFICE O/P EST LOW 20 MIN: CPT | Performed by: UROLOGY

## 2024-02-16 PROCEDURE — 51798 US URINE CAPACITY MEASURE: CPT | Performed by: UROLOGY

## 2024-02-16 NOTE — LETTER
February 16, 2024     Jyoti Muir MD  [address] [facility] [address] [facility]  [address]  [facility] [address]    Patient: Leigh Watson   YOB: 1975   Date of Visit: 2/16/2024       Dear Dr. Muir:    Thank you for referring Leigh Watson to me for evaluation. Below are my notes for this consultation.    If you have questions, please do not hesitate to call me. I look forward to following your patient along with you.         Sincerely,        Ramez Lentz MD        CC: MD Ramez Baker MD  2/16/2024 10:46 AM  Sign when Signing Visit  2/16/2024    Leigh Watson  1975  [id number]        Assessment  History of mitochondrial syndrome, history of neurogenic bladder s/p bladder stimulator placement 9/20/23 with [facility]    Discussion  Overall the patient is doing very well with her stimulator in place.  I recommend that she return in follow-up f in 1 year with a renal and bladder U/S.  She will also continue to occasionally perform clean intermittent catheterization if she feels that is required.        History of Present Illness  48 y.o. female with a history of neurogenic bladder s/p bladder stimulator placement 9/20/23. Follows with [facility] (last visit 12/14/23), last seen by Dr Lentz 6/20/23.    Pt reports that her symptoms of frequency and inadequate voiding have significantly improved since her bladder stimulator placement. She states that she only needs to self-catheterize about 1-2x/week. She also reports she is leaking less urine than prior to bladder stimulator placement.    Pt continues, however, to have a thin urine stream. She states that she is able to self-catheterize easily without meeting resistance. She states her urine stream is stronger when her constipation is in better control. She also states that she will sometimes not make it to the bathroom with voiding and will need to rush. She manages this with a strict voiding  schedule.    Of note, pt is being monitored for renal cyst last seen on renal U/S 3/6/23 d/t family history of renal cell cancer in mother.      AUA Symptom Score  Incomplete emptying: less than half the time +2  Frequency: not at all 0  Intermittency: not at all 0  Urgency: less than half the time +2  Weak stream: more than half the time +4  Straining: about half the time +3  Nocturia: 1 time +1  Quality of Life: mostly satisfied    AUA Score: 12    Review of Systems  Review of Systems   Constitutional:  Negative for chills and fever.   Gastrointestinal:  Positive for constipation. Negative for abdominal pain.   Genitourinary:  Positive for urgency. Negative for difficulty urinating, dysuria, frequency, hematuria and pelvic pain.   Musculoskeletal:  Negative for back pain.   Psychiatric/Behavioral:  Negative for sleep disturbance.          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   • Gastrostomy tube in place (HCC)    • Gastrostomy tube in place ([facility])    • GERD (gastroesophageal reflux disease)    • History of blood clots 2012    Left leg. Has IVC filter now. Occurred while on Lovenox   • Iron deficiency    • Malignant hyperthermia due to anesthesia     Patient's son has M.H.  States she needs precautions   • Migraines    • 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HCC)    • 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HCC)    • MTHFR (methylene THF reductase) deficiency and homocystinuria (HCC)    • Muscle weakness (generalized)    • Nausea    • On total parenteral nutrition (TPN)     for 8 mts   • PCOS (polycystic ovarian syndrome)    • PONV (postoperative nausea and vomiting)    • Postgastrectomy syndrome     malabsorption   • Postsurgical malabsorption    • Status post gastric bypass for obesity    • Stroke (HCC) 2004, 2009    Metabolic strokes. Right  "hemiparesis= minor now.   • Urinary incontinence    • Urinary retention    • Vomiting     When eating       Past Social History  Past Surgical History:   Procedure Laterality Date   • ANKLE SURGERY Left     Has plate and screws   • COLONOSCOPY     • COSMETIC SURGERY      X14 as child post attack by dog. Arms, legs and face   • DILATION AND CURETTAGE OF UTERUS      x2   • ESOPHAGOGASTRODUODENOSCOPY N/A 01/27/2016    Procedure: ESOPHAGOGASTRODUODENOSCOPY (EGD);  Surgeon: Trevor Owens MD;  Location: [facility];  Service:    • FRACTURE SURGERY Left     Left ankle - hardware   • GASTRIC BYPASS     • GASTROSTOMY     • IVC FILTER INSERTION     • NISSEN FUNDOPLICATION     • OVARY SURGERY Bilateral     \"Drilling\" due to multiple cysts- PCOS   • TX EGD TRANSORAL BIOPSY SINGLE/MULTIPLE N/A 03/09/2016    Procedure: ESOPHAGOGASTRODUODENOSCOPY (EGD);  Surgeon: Trevor Owens MD;  Location: [facility];  Service: Bariatrics   • TX HYSTEROSCOPY BX ENDOMETRIUM&/POLYPC W/WO D&C N/A 05/22/2020    Procedure: DILATATION AND CURETTAGE (D&C) WITH HYSTEROSCOPY;  Surgeon: Bee Ramirez MD;  [facility];  Service: Gynecology   • TX HYSTEROSCOPY ENDOMETRIAL ABLATION N/A 05/22/2020    Procedure: ABLATION ENDOMETRIAL MARISSA;  Surgeon: Bee Ramirez MD;  [facility];  Service: Gynecology   • TX LAPS SURG GASTROSTOMY W/O CONSTJ GSTR TUBE SPX N/A 04/19/2016    Procedure: INSERTION GASTROSTOMY TUBE LAPAROSCOPIC WITH INTRAOP EGD;  Surgeon: Trevor Owens MD;  Location: [facility];  Service: Bariatrics   • ULNAR NERVE TRANSPOSITION Right    • WISDOM TOOTH EXTRACTION         Past Family History  Family History   Problem Relation Age of Onset   • Mitochondrial disorder Mother    • Hypertension Mother    • Thyroid cancer Mother 50   • Clotting disorder Mother         MTFR   • Cancer Mother 58        Thyroid, carcinoid, renal   • Kidney cancer Mother    • Arthritis Mother    • Depression Father    • Endocrinopathy Father    • " Clotting disorder Father         MTFR   • Stroke Father    • Aneurysm Father         brain   • Anesthesia problems Father    • Substance Abuse Father         Alcohol   • Arthritis Father    • Depression Brother    • Narcolepsy Brother    • Heart failure Maternal Grandmother 96   • Coronary artery disease Maternal Grandmother    • Stroke Maternal Grandmother    • Dementia Maternal Grandmother         Brina   • Breast cancer Maternal Grandmother    • Mitochondrial disorder Son    • Mitochondrial disorder Daughter    • Stroke Family    • Anuerysm Paternal Uncle         abdominal    • Breast cancer Maternal Grandfather 46   • Cancer Maternal Grandfather          at 46 of breast cancer that went to lungs   • Breast cancer Maternal Aunt 42   • Heart attack Neg Hx    • Arrhythmia Neg Hx    • Sudden death Neg Hx         scd       Past Social history  Social History     Socioeconomic History   • Marital status: /Civil Union     Spouse name: Not on file   • Number of children: Not on file   • Years of education: Not on file   • Highest education level: Not on file   Occupational History   • Occupation: infusion center   Tobacco Use   • Smoking status: Never   • Smokeless tobacco: Never   Vaping Use   • Vaping status: Never Used   Substance and Sexual Activity   • Alcohol use: No   • Drug use: No   • Sexual activity: Yes     Partners: Male     Birth control/protection: Male Sterilization   Other Topics Concern   • Not on file   Social History Narrative        2 children - son and daughter with mitrochondrial diseas     Social Determinants of Health     Financial Resource Strain: Not on file   Food Insecurity: No Food Insecurity (2023)    Hunger Vital Sign    • Worried About Running Out of Food in the Last Year: Never true    • Ran Out of Food in the Last Year: Never true   Transportation Needs: No Transportation Needs (2023)    PRAPARE - Transportation    • Lack of Transportation (Medical): No    •  Lack of Transportation (Non-Medical): No   Physical Activity: Not on file   Stress: Not on file   Social Connections: Not on file   Intimate Partner Violence: Not on file   Housing Stability: Low Risk  (1/13/2023)    Housing Stability Vital Sign    • Unable to Pay for Housing in the Last Year: No    • Number of Places Lived in the Last Year: 1    • Unstable Housing in the Last Year: No       Current Medications  Current Outpatient Medications   Medication Sig Dispense Refill   • acarbose (PRECOSE) 100 MG tablet Take 1 tablet (100 mg total) by mouth 3 (three) times a day with meals 270 tablet 0   • acetylcysteine (MUCOMYST) 200 mg/mL nebulizer solution as needed      • Alpha-Lipoic Acid 100 MG CAPS Take by mouth 2 (two) times a day     • B Complex-Biotin-FA (TH VITAMIN B 50/B-COMPLEX PO) Take 1 tablet by mouth daily.       • Calcium Citrate 1040 MG TABS Take by mouth     • COENZYME Q-10 PO Dose varies     • Continuous Blood Gluc Sensor (Dexcom G7 Sensor) Use 1 Device every 10 days 9 each 3   • CREATINE MONOHYDRATE PO Take 2.5 g by mouth     • Cyanocobalamin (B-12) 1000 MCG/ML KIT Inject  as directed every 30 days.     • docusate sodium (COLACE) 100 mg capsule Take 200 mg by mouth 2 (two) times a day.     • fludrocortisone (FLORINEF) 0.1 mg tablet Take 1 tablet (0.1 mg total) by mouth daily 90 tablet 3   • L-Arginine 1000 MG TABS Take 6,000 mg by mouth 2 (two) times a day      • linaCLOtide 145 MCG CAPS Take 145 mcg by mouth 2 (two) times a day     • midodrine (PROAMATINE) 5 mg tablet Take 1 tablet (5 mg total) by mouth 3 (three) times a day 270 tablet 0   • Motegrity 2 MG TABS      • Multiple Vitamin (MULTIVITAMIN) tablet Take 1 tablet by mouth daily.     • NIFEdipine (PROCARDIA XL) 60 mg 24 hr tablet Take 1 tablet (60 mg total) by mouth daily 90 tablet 3   • nystatin (MYCOSTATIN) cream Apply topically 2 (two) times a day 30 g 0   • OLANZapine (ZyPREXA) 10 mg tablet Take 1 tablet (10 mg total) by mouth daily at  "bedtime 7 tablet 0   • Oral Vehicles (PCCA-PLUS) SUSP      • P-Aminobenzoic Acid (PARA-AMINOBENZOIC ACID) POWD      • pantoprazole (PROTONIX) 40 mg tablet   3   • Riboflavin 100 MG TABS Take 100 mg by mouth     • Rimegepant Sulfate (Nurtec) 75 MG TBDP One tab as needed for migraine, max one per day, max 3 days per week 8 tablet 2   • sitaGLIPtin (JANUVIA) 50 mg tablet 1 tab daily 90 tablet 0   • Turmeric 500 MG CAPS Take by mouth daily     • Ubiquinol Liposomal 100 MG/5ML SYRP Take 300 mg by mouth     • Ubiquinol POWD      • vitamin E 100 UNIT capsule Take 78 Units by mouth daily       No current facility-administered medications for this visit.       Allergies  Allergies   Allergen Reactions   • Lactated Ringers Other (See Comments)     Causes lactic acidosis   • Sulfate Other (See Comments)   • Sulfa Antibiotics      Arrhythmia    • Guaifenesin      Arrhythmia   • Other      Malignant hyperhermia precautions. NEVER use Lacted Ringers   • Silver Hives, Itching and Rash       Past Medical History, Social History, Family History, medications and allergies were reviewed.    Vitals  Vitals:    02/16/24 0832   BP: 126/78   BP Location: Left arm   Patient Position: Sitting   Cuff Size: Adult   Pulse: 87   Resp: 18   SpO2: 98%   Weight: 80.7 kg (178 lb)   Height: 5' 4\" (1.626 m)       Physical Exam  Physical Exam  Constitutional:       General: She is not in acute distress.     Appearance: Normal appearance.   HENT:      Head: Normocephalic and atraumatic.   Cardiovascular:      Rate and Rhythm: Normal rate and regular rhythm.      Heart sounds: Normal heart sounds.   Pulmonary:      Breath sounds: Normal breath sounds.   Abdominal:      General: Bowel sounds are normal.      Palpations: Abdomen is soft.      Tenderness: There is no abdominal tenderness. There is no guarding or rebound.   Musculoskeletal:         General: No tenderness. Normal range of motion.   Skin:     General: Skin is warm and dry.      Findings: No " "rash.      Comments: Bladder stimulator in place on R lower back   Neurological:      Mental Status: She is alert.   Psychiatric:         Mood and Affect: Mood normal.         Behavior: Behavior normal.       Stimulator easily palpable in the superficial location in the right supragluteal region      Results  No results found for: \"PSA\"  Lab Results   Component Value Date    GLUCOSE 76 01/06/2016    CALCIUM 10.8 (H) 08/24/2023     01/06/2016    K 4.2 08/24/2023    CO2 29 08/24/2023     08/24/2023    BUN 11 08/24/2023    CREATININE 0.68 08/24/2023     Lab Results   Component Value Date    WBC 4.69 08/24/2023    HGB 14.0 08/24/2023    HCT 42.5 08/24/2023    MCV 94 08/24/2023     08/24/2023         Office Urine Dip  Recent Results (from the past 1 hour(s))   POCT urine dip    Collection Time: 02/16/24  8:40 AM   Result Value Ref Range    LEUKOCYTE ESTERASE,UA -     NITRITE,UA -     SL AMB POCT UROBILINOGEN -     POCT URINE PROTEIN 1.015      PH,UA -     BLOOD,UA 5.0     SPECIFIC GRAVITY,UA -     KETONES,UA 0.2     BILIRUBIN,UA -     GLUCOSE, UA -      COLOR,UA yellow     CLARITY,UA clear    POCT Measure PVR    Collection Time: 02/16/24  8:41 AM   Result Value Ref Range    POST-VOID RESIDUAL VOLUME, ML POC 88 mL   ]      Yusra Suarez, DO PGY-3  Family Medicine        "

## 2024-02-16 NOTE — PROGRESS NOTES
2/16/2024    Leigh Watson  1975  [id number]        Assessment  History of mitochondrial syndrome, history of neurogenic bladder s/p bladder stimulator placement 9/20/23 with [facility]    Discussion  Overall the patient is doing very well with her stimulator in place.  I recommend that she return in follow-up f in 1 year with a renal and bladder U/S.  She will also continue to occasionally perform clean intermittent catheterization if she feels that is required.        History of Present Illness  48 y.o. female with a history of neurogenic bladder s/p bladder stimulator placement 9/20/23. Follows with [facility] (last visit 12/14/23), last seen by Dr Lentz 6/20/23.    Pt reports that her symptoms of frequency and inadequate voiding have significantly improved since her bladder stimulator placement. She states that she only needs to self-catheterize about 1-2x/week. She also reports she is leaking less urine than prior to bladder stimulator placement.    Pt continues, however, to have a thin urine stream. She states that she is able to self-catheterize easily without meeting resistance. She states her urine stream is stronger when her constipation is in better control. She also states that she will sometimes not make it to the bathroom with voiding and will need to rush. She manages this with a strict voiding schedule.    Of note, pt is being monitored for renal cyst last seen on renal U/S 3/6/23 d/t family history of renal cell cancer in mother.      AUA Symptom Score  Incomplete emptying: less than half the time +2  Frequency: not at all 0  Intermittency: not at all 0  Urgency: less than half the time +2  Weak stream: more than half the time +4  Straining: about half the time +3  Nocturia: 1 time +1  Quality of Life: mostly satisfied    AUA Score: 12    Review of Systems  Review of Systems   Constitutional:  Negative for chills and fever.   Gastrointestinal:  Positive for constipation.  Negative for abdominal pain.   Genitourinary:  Positive for urgency. Negative for difficulty urinating, dysuria, frequency, hematuria and pelvic pain.   Musculoskeletal:  Negative for back pain.   Psychiatric/Behavioral:  Negative for sleep disturbance.          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    Gastrostomy tube in place (HCC)     Gastrostomy tube in place (HCC)     GERD (gastroesophageal reflux disease)     History of blood clots 2012    Left leg. Has IVC filter now. Occurred while on Lovenox    Iron deficiency     Malignant hyperthermia due to anesthesia     Patient's son has M.H.  States she needs precautions    Migraines     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HCC)     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HCC)     MTHFR (methylene THF reductase) deficiency and homocystinuria (HCC)     Muscle weakness (generalized)     Nausea     On total parenteral nutrition (TPN)     for 8 mts    PCOS (polycystic ovarian syndrome)     PONV (postoperative nausea and vomiting)     Postgastrectomy syndrome     malabsorption    Postsurgical malabsorption     Status post gastric bypass for obesity     Stroke (HCC) 2004, 2009    Metabolic strokes. Right hemiparesis= minor now.    Urinary incontinence     Urinary retention     Vomiting     When eating       Past Social History  Past Surgical History:   Procedure Laterality Date    ANKLE SURGERY Left     Has plate and screws    COLONOSCOPY      COSMETIC SURGERY      X14 as child post attack by dog. Arms, legs and face    DILATION AND CURETTAGE OF UTERUS      x2    ESOPHAGOGASTRODUODENOSCOPY N/A 01/27/2016    Procedure: ESOPHAGOGASTRODUODENOSCOPY (EGD);  Surgeon: Trevor Owens MD;  Location: [facility];  Service:     FRACTURE SURGERY Left     Left ankle - hardware    GASTRIC BYPASS      GASTROSTOMY      IVC FILTER  "INSERTION      NISSEN FUNDOPLICATION      OVARY SURGERY Bilateral     \"Drilling\" due to multiple cysts- PCOS    DE EGD TRANSORAL BIOPSY SINGLE/MULTIPLE N/A 03/09/2016    Procedure: ESOPHAGOGASTRODUODENOSCOPY (EGD);  Surgeon: Trevor Owens MD;  Location: [facility];  Service: Bariatrics    DE HYSTEROSCOPY BX ENDOMETRIUM&/POLYPC W/WO D&C N/A 05/22/2020    Procedure: DILATATION AND CURETTAGE (D&C) WITH HYSTEROSCOPY;  Surgeon: Bee Ramirez MD;  Location: [facility];  Service: Gynecology    DE HYSTEROSCOPY ENDOMETRIAL ABLATION N/A 05/22/2020    Procedure: ABLATION ENDOMETRIAL MARISSA;  Surgeon: Bee Ramirez MD;  [facility]: [facility];  Service: Gynecology    DE LAPS SURG GASTROSTOMY W/O CONSTJ GSTR TUBE SPX N/A 04/19/2016    Procedure: INSERTION GASTROSTOMY TUBE LAPAROSCOPIC WITH INTRAOP EGD;  Surgeon: Trevor Owens MD;  Location: [facility];  Service: Bariatrics    ULNAR NERVE TRANSPOSITION Right     WISDOM TOOTH EXTRACTION         Past Family History  Family History   Problem Relation Age of Onset    Mitochondrial disorder Mother     Hypertension Mother     Thyroid cancer Mother 50    Clotting disorder Mother         MTFR    Cancer Mother 58        Thyroid, carcinoid, renal    Kidney cancer Mother     Arthritis Mother     Depression Father     Endocrinopathy Father     Clotting disorder Father         MTFR    Stroke Father     Aneurysm Father         brain    Anesthesia problems Father     Substance Abuse Father         Alcohol    Arthritis Father     Depression Brother     Narcolepsy Brother     Heart failure Maternal Grandmother 96    Coronary artery disease Maternal Grandmother     Stroke Maternal Grandmother     Dementia Maternal Grandmother         Brina    Breast cancer Maternal Grandmother     Mitochondrial disorder Son     Mitochondrial disorder Daughter     Stroke Family     Anuerysm Paternal Uncle         abdominal     Breast cancer Maternal Grandfather 46    Cancer Maternal Grandfather         "  at 46 of breast cancer that went to lungs    Breast cancer Maternal Aunt 42    Heart attack Neg Hx     Arrhythmia Neg Hx     Sudden death Neg Hx         scd       Past Social history  Social History     Socioeconomic History    Marital status: /Civil Union     Spouse name: Not on file    Number of children: Not on file    Years of education: Not on file    Highest education level: Not on file   Occupational History    Occupation: infusion center   Tobacco Use    Smoking status: Never    Smokeless tobacco: Never   Vaping Use    Vaping status: Never Used   Substance and Sexual Activity    Alcohol use: No    Drug use: No    Sexual activity: Yes     Partners: Male     Birth control/protection: Male Sterilization   Other Topics Concern    Not on file   Social History Narrative        2 children - son and daughter with mitrochondrial diseas     Social Determinants of Health     Financial Resource Strain: Not on file   Food Insecurity: No Food Insecurity (2023)    Hunger Vital Sign     Worried About Running Out of Food in the Last Year: Never true     Ran Out of Food in the Last Year: Never true   Transportation Needs: No Transportation Needs (2023)    PRAPARE - Transportation     Lack of Transportation (Medical): No     Lack of Transportation (Non-Medical): No   Physical Activity: Not on file   Stress: Not on file   Social Connections: Not on file   Intimate Partner Violence: Not on file   Housing Stability: Low Risk  (2023)    Housing Stability Vital Sign     Unable to Pay for Housing in the Last Year: No     Number of Places Lived in the Last Year: 1     Unstable Housing in the Last Year: No       Current Medications  Current Outpatient Medications   Medication Sig Dispense Refill    acarbose (PRECOSE) 100 MG tablet Take 1 tablet (100 mg total) by mouth 3 (three) times a day with meals 270 tablet 0    acetylcysteine (MUCOMYST) 200 mg/mL nebulizer solution as needed       Alpha-Lipoic  Acid 100 MG CAPS Take by mouth 2 (two) times a day      B Complex-Biotin-FA (TH VITAMIN B 50/B-COMPLEX PO) Take 1 tablet by mouth daily.        Calcium Citrate 1040 MG TABS Take by mouth      COENZYME Q-10 PO Dose varies      Continuous Blood Gluc Sensor (Dexcom G7 Sensor) Use 1 Device every 10 days 9 each 3    CREATINE MONOHYDRATE PO Take 2.5 g by mouth      Cyanocobalamin (B-12) 1000 MCG/ML KIT Inject  as directed every 30 days.      docusate sodium (COLACE) 100 mg capsule Take 200 mg by mouth 2 (two) times a day.      fludrocortisone (FLORINEF) 0.1 mg tablet Take 1 tablet (0.1 mg total) by mouth daily 90 tablet 3    L-Arginine 1000 MG TABS Take 6,000 mg by mouth 2 (two) times a day       linaCLOtide 145 MCG CAPS Take 145 mcg by mouth 2 (two) times a day      midodrine (PROAMATINE) 5 mg tablet Take 1 tablet (5 mg total) by mouth 3 (three) times a day 270 tablet 0    Motegrity 2 MG TABS       Multiple Vitamin (MULTIVITAMIN) tablet Take 1 tablet by mouth daily.      NIFEdipine (PROCARDIA XL) 60 mg 24 hr tablet Take 1 tablet (60 mg total) by mouth daily 90 tablet 3    nystatin (MYCOSTATIN) cream Apply topically 2 (two) times a day 30 g 0    OLANZapine (ZyPREXA) 10 mg tablet Take 1 tablet (10 mg total) by mouth daily at bedtime 7 tablet 0    Oral Vehicles (PCCA-PLUS) SUSP       P-Aminobenzoic Acid (PARA-AMINOBENZOIC ACID) POWD       pantoprazole (PROTONIX) 40 mg tablet   3    Riboflavin 100 MG TABS Take 100 mg by mouth      Rimegepant Sulfate (Nurtec) 75 MG TBDP One tab as needed for migraine, max one per day, max 3 days per week 8 tablet 2    sitaGLIPtin (JANUVIA) 50 mg tablet 1 tab daily 90 tablet 0    Turmeric 500 MG CAPS Take by mouth daily      Ubiquinol Liposomal 100 MG/5ML SYRP Take 300 mg by mouth      Ubiquinol POWD       vitamin E 100 UNIT capsule Take 78 Units by mouth daily       No current facility-administered medications for this visit.       Allergies  Allergies   Allergen Reactions    Lactated Ringers  "Other (See Comments)     Causes lactic acidosis    Sulfate Other (See Comments)    Sulfa Antibiotics      Arrhythmia     Guaifenesin      Arrhythmia    Other      Malignant hyperhermia precautions. NEVER use Lacted Ringers    Silver Hives, Itching and Rash       Past Medical History, Social History, Family History, medications and allergies were reviewed.    Vitals  Vitals:    02/16/24 0832   BP: 126/78   BP Location: Left arm   Patient Position: Sitting   Cuff Size: Adult   Pulse: 87   Resp: 18   SpO2: 98%   Weight: 80.7 kg (178 lb)   Height: 5' 4\" (1.626 m)       Physical Exam  Physical Exam  Constitutional:       General: She is not in acute distress.     Appearance: Normal appearance.   HENT:      Head: Normocephalic and atraumatic.   Cardiovascular:      Rate and Rhythm: Normal rate and regular rhythm.      Heart sounds: Normal heart sounds.   Pulmonary:      Breath sounds: Normal breath sounds.   Abdominal:      General: Bowel sounds are normal.      Palpations: Abdomen is soft.      Tenderness: There is no abdominal tenderness. There is no guarding or rebound.   Musculoskeletal:         General: No tenderness. Normal range of motion.   Skin:     General: Skin is warm and dry.      Findings: No rash.      Comments: Bladder stimulator in place on R lower back   Neurological:      Mental Status: She is alert.   Psychiatric:         Mood and Affect: Mood normal.         Behavior: Behavior normal.       Stimulator easily palpable in the superficial location in the right supragluteal region      Results  No results found for: \"PSA\"  Lab Results   Component Value Date    GLUCOSE 76 01/06/2016    CALCIUM 10.8 (H) 08/24/2023     01/06/2016    K 4.2 08/24/2023    CO2 29 08/24/2023     08/24/2023    BUN 11 08/24/2023    CREATININE 0.68 08/24/2023     Lab Results   Component Value Date    WBC 4.69 08/24/2023    HGB 14.0 08/24/2023    HCT 42.5 08/24/2023    MCV 94 08/24/2023     08/24/2023 "         Office Urine Dip  Recent Results (from the past 1 hour(s))   POCT urine dip    Collection Time: 02/16/24  8:40 AM   Result Value Ref Range    LEUKOCYTE ESTERASE,UA -     NITRITE,UA -     SL AMB POCT UROBILINOGEN -     POCT URINE PROTEIN 1.015      PH,UA -     BLOOD,UA 5.0     SPECIFIC GRAVITY,UA -     KETONES,UA 0.2     BILIRUBIN,UA -     GLUCOSE, UA -      COLOR,UA yellow     CLARITY,UA clear    POCT Measure PVR    Collection Time: 02/16/24  8:41 AM   Result Value Ref Range    POST-VOID RESIDUAL VOLUME, ML POC 88 mL   ]      Yusra Suarez, DO PGY-3  Family Medicine

## 2024-02-18 PROCEDURE — 93970 EXTREMITY STUDY: CPT | Performed by: SURGERY

## 2024-02-21 ENCOUNTER — APPOINTMENT (OUTPATIENT)
Dept: LAB | Facility: CLINIC | Age: 49
End: 2024-02-21

## 2024-02-21 DIAGNOSIS — Z00.6 ENCOUNTER FOR EXAMINATION FOR NORMAL COMPARISON OR CONTROL IN CLINICAL RESEARCH PROGRAM: ICD-10-CM

## 2024-02-21 PROCEDURE — 36415 COLL VENOUS BLD VENIPUNCTURE: CPT

## 2024-02-23 DIAGNOSIS — G43.009 MIGRAINE WITHOUT AURA AND WITHOUT STATUS MIGRAINOSUS, NOT INTRACTABLE: Primary | ICD-10-CM

## 2024-02-25 NOTE — PROGRESS NOTES
Assessment and Plan:   Ms. Watson is a 48-year-old female with history significant for a genetic mitochondrial disorder, small fiber neuropathy and Raynaud's phenomenon who presents for a follow-up.  She is currently on nifedipine 60 mg once daily.  She is transferring care from Dr. Aiken.    - Leigh presents today for a follow-up of an overlap of Raynaud's phenomenon and small fiber neuropathy that have been contributing to chronic complaints of paresthesias as well as cold intolerance of her extremities.  As she has realized that nifedipine has benefited her she will continue this unchanged and ensure she is keeping both her core body temperature and extremities warm.    - In regards to the progressive swelling and stiffness that she has been experiencing of her bilateral hands as well as diffuse gelling phenomenon I will update an inflammatory arthritis serological workup as well as x-rays of her hands.  I suspect her symptoms particularly in the hands may be representative of osteoarthritis.      Plan:  Diagnoses and all orders for this visit:    Raynaud's disease without gangrene    Small fiber neuropathy    Bilateral hand pain  -     XR hand 3+ vw right; Future  -     XR hand 3+ vw left; Future  -     RF Screen w/ Reflex to Titer; Future  -     Cyclic citrul peptide antibody, IgG; Future  -     C-reactive protein; Future  -     Sedimentation rate, automated; Future      Activities as tolerated.   Continue other medications as prescribed by PCP and other specialists.       RTC in 1 year.       HPI      Rheumatic Disease Summary  1. Primary Raynaud phenomenon   -Rheum eval 7/25/19 for raynaud’s, presented on amlodipine 10mg doing well; saw Dr. Vazquez previously and not dx with an AI disease   -Labs 7/30/19: negative BLAIRE, SSA, SSB, RF, CCP, APLs, hep panel, normal C3/4  -Visit 1/7/20: raynaud's sx uncontrolled on amlodipine 10mg, D/C'd and started Nifedipine 30mg   -Visit 11/10/20: doing better on nifedipine  but still frequent raynaud's, increased to 60mg; advised talking to neuro about tx for neuropathy in hands  -Visit 11/11/21: doing better on nifedipine 60mg for raynaud's, still with other symptoms from a probable small fiber neuropathy   -Visit 11/8/22: continues to have cold intolerance, suspected to be related to a SFN; cont nifedipine, had worsening symptoms with trial off nifedipine; f/u with neuro  - 2/26/24: continue nifedipine for Raynaud's. Re-eval IA for hand arthralgias.  2. 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 Watson is a 48-year-old female with history significant for a genetic mitochondrial disorder, small fiber neuropathy and Raynaud's phenomenon who presents for a follow-up.  She is currently on nifedipine 60 mg once daily.  She is transferring care from Dr. Aiken.    Patient reports the Raynaud's symptoms overlapping with symptoms of small fiber neuropathy still continue with appreciating very cold hands and feet with color changes upon cold exposure.  She is cautious to keep her core body temperature and extremities as warm as possible.  She has previously tried discontinuing the nifedipine and did realize it was making a positive difference so this medication has been continued.    She mentions gradually progressive swelling and stiffness of her hands which she previously discussed with Dr. Aiken and was felt to be representative of osteoarthritis.  As she is on multiple medications she has not tried over-the-counter pain medications for her symptoms but did trial diclofenac gel which did not  help.  She is not experiencing joint pains or additionally swollen joints but does describe prominent gelling phenomenon which can affect any part of her body.  She reports when she is active she generally feels well but any time she has to sit even for a short duration of time she will feel very stiff upon ambulating.    The following portions of the patient's history were reviewed and updated as appropriate: allergies, current medications, past family history, past medical history, past social history, past surgical history and problem list.      Review of Systems  Constitutional: Negative for weight change, fevers, chills, night sweats, fatigue.  ENT/Mouth: Negative for hearing changes, ear pain, nasal congestion, sinus pain, hoarseness, sore throat, rhinorrhea, swallowing difficulty.   Eyes: Negative for pain, redness, discharge, vision changes.   Cardiovascular: Negative for chest pain, SOB, palpitations.   Respiratory: Negative for cough, sputum, wheezing, dyspnea.   Gastrointestinal: Negative for nausea, vomiting, diarrhea, constipation, pain, heartburn.  Genitourinary: Negative for dysuria, urinary frequency, hematuria.   Musculoskeletal: As per HPI.  Skin: Negative for skin rash.  Positive for color changes.  Neuro: Negative for weakness, loss of consciousness.  Positive for numbness and tingling.  Psych: Negative for anxiety, depression.   Heme/Lymph: Negative for easy bruising, bleeding, lymphadenopathy.        Past Medical History:   Diagnosis Date    Acid reflux     Clotting disorder (HCC)     MTHFR    Constipated     Dysautonomia (HCC)     Esophageal abnormality     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.    Gastrostomy tube in place (HCC)     Gastrostomy tube in place (HCC)     GERD (gastroesophageal reflux disease)     History of blood clots 2012    Left leg. Has IVC filter now. Occurred while on Lovenox    Iron deficiency     Malignant hyperthermia due to anesthesia     Patient's son has  "M.H.  States she needs precautions    Migraines     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HCC)     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HCC)     MTHFR (methylene THF reductase) deficiency and homocystinuria (HCC)     Muscle weakness (generalized)     Nausea     On total parenteral nutrition (TPN)     for 8 mts    PCOS (polycystic ovarian syndrome)     PONV (postoperative nausea and vomiting)     Postgastrectomy syndrome     malabsorption    Postsurgical malabsorption     Status post gastric bypass for obesity     Stroke (HCC) 2004, 2009    Metabolic strokes. Right hemiparesis= minor now.    Urinary incontinence     Urinary retention     Vomiting     When eating       Past Surgical History:   Procedure Laterality Date    ANKLE SURGERY Left     Has plate and screws    COLONOSCOPY      COSMETIC SURGERY      X14 as child post attack by dog. Arms, legs and face    DILATION AND CURETTAGE OF UTERUS      x2    ESOPHAGOGASTRODUODENOSCOPY N/A 01/27/2016    Procedure: ESOPHAGOGASTRODUODENOSCOPY (EGD);  Surgeon: Trevor Owens MD;  Location: [facility];  Service:     FRACTURE SURGERY Left     Left ankle - hardware    GASTRIC BYPASS      GASTROSTOMY      IVC FILTER INSERTION      NISSEN FUNDOPLICATION      OVARY SURGERY Bilateral     \"Drilling\" due to multiple cysts- PCOS    WV EGD TRANSORAL BIOPSY SINGLE/MULTIPLE N/A 03/09/2016    Procedure: ESOPHAGOGASTRODUODENOSCOPY (EGD);  Surgeon: Trevor Owens MD;  Location: [facility];  Service: Bariatrics    WV HYSTEROSCOPY BX ENDOMETRIUM&/POLYPC W/WO D&C N/A 05/22/2020    Procedure: DILATATION AND CURETTAGE (D&C) WITH HYSTEROSCOPY;  Surgeon: Bee Ramirez MD;  [facility];  Service: Gynecology    WV HYSTEROSCOPY ENDOMETRIAL ABLATION N/A 05/22/2020    Procedure: ABLATION ENDOMETRIAL MARISSA;  Surgeon: Bee Ramirez MD;  [facility];  Service: Gynecology    WV LAPS SURG GASTROSTOMY W/O CONSTJ GSTR TUBE SPX N/A 04/19/2016    Procedure: INSERTION GASTROSTOMY TUBE LAPAROSCOPIC WITH INTRAOP EGD;  " Surgeon: Trevor Owens MD;  [facility];  Service: Bariatrics    ULNAR NERVE TRANSPOSITION Right     WISDOM TOOTH EXTRACTION         Social History     Socioeconomic History    Marital status: /Civil Union     Spouse name: Not on file    Number of children: Not on file    Years of education: Not on file    Highest education level: Not on file   Occupational History    Occupation: infusion center   Tobacco Use    Smoking status: Never    Smokeless tobacco: Never   Vaping Use    Vaping status: Never Used   Substance and Sexual Activity    Alcohol use: No    Drug use: No    Sexual activity: Yes     Partners: Male     Birth control/protection: Male Sterilization   Other Topics Concern    Not on file   Social History Narrative        2 children - son and daughter with mitrochondrial diseas     Social Determinants of Health     Financial Resource Strain: Not on file   Food Insecurity: No Food Insecurity (1/13/2023)    Hunger Vital Sign     Worried About Running Out of Food in the Last Year: Never true     Ran Out of Food in the Last Year: Never true   Transportation Needs: No Transportation Needs (1/13/2023)    PRAPARE - Transportation     Lack of Transportation (Medical): No     Lack of Transportation (Non-Medical): No   Physical Activity: Not on file   Stress: Not on file   Social Connections: Not on file   Intimate Partner Violence: Not on file   Housing Stability: Low Risk  (1/13/2023)    Housing Stability Vital Sign     Unable to Pay for Housing in the Last Year: No     Number of Places Lived in the Last Year: 1     Unstable Housing in the Last Year: No       Family History   Problem Relation Age of Onset    Mitochondrial disorder Mother     Hypertension Mother     Thyroid cancer Mother 50    Clotting disorder Mother         MTFR    Cancer Mother 58        Thyroid, carcinoid, renal    Kidney cancer Mother     Arthritis Mother     Depression Father     Endocrinopathy Father     Clotting  disorder Father         MTFR    Stroke Father     Aneurysm Father         brain    Anesthesia problems Father     Substance Abuse Father         Alcohol    Arthritis Father     Depression Brother     Narcolepsy Brother     Heart failure Maternal Grandmother 96    Coronary artery disease Maternal Grandmother     Stroke Maternal Grandmother     Dementia Maternal Grandmother         Brina    Breast cancer Maternal Grandmother     Mitochondrial disorder Son     Mitochondrial disorder Daughter     Stroke Family     Anuerysm Paternal Uncle         abdominal     Breast cancer Maternal Grandfather 46    Cancer Maternal Grandfather          at 46 of breast cancer that went to lungs    Breast cancer Maternal Aunt 42    Heart attack Neg Hx     Arrhythmia Neg Hx     Sudden death Neg Hx         scd       Allergies   Allergen Reactions    Lactated Ringers Other (See Comments)     Causes lactic acidosis    Sulfate Other (See Comments)    Sulfa Antibiotics      Arrhythmia     Guaifenesin      Arrhythmia    Other      Malignant hyperhermia precautions. NEVER use Lacted Ringers       Current Outpatient Medications:     acarbose (PRECOSE) 100 MG tablet, Take 1 tablet (100 mg total) by mouth 3 (three) times a day with meals, Disp: 270 tablet, Rfl: 0    acetylcysteine (MUCOMYST) 200 mg/mL nebulizer solution, as needed , Disp: , Rfl:     Alpha-Lipoic Acid 100 MG CAPS, Take by mouth 2 (two) times a day, Disp: , Rfl:     Atogepant 60 MG TABS, Take 60 mg by mouth in the morning, Disp: 30 tablet, Rfl: 3    B Complex-Biotin-FA ( VITAMIN B 50/B-COMPLEX PO), Take 1 tablet by mouth daily.  , Disp: , Rfl:     Calcium Citrate 1040 MG TABS, Take by mouth, Disp: , Rfl:     COENZYME Q-10 PO, Dose varies, Disp: , Rfl:     Continuous Blood Gluc Sensor (Dexcom G7 Sensor), Use 1 Device every 10 days, Disp: 9 each, Rfl: 3    CREATINE MONOHYDRATE PO, Take 2.5 g by mouth, Disp: , Rfl:     docusate sodium (COLACE) 100 mg capsule, Take 200 mg by mouth  "2 (two) times a day., Disp: , Rfl:     fludrocortisone (FLORINEF) 0.1 mg tablet, Take 1 tablet (0.1 mg total) by mouth daily, Disp: 90 tablet, Rfl: 3    L-Arginine 1000 MG TABS, Take 6,000 mg by mouth 2 (two) times a day , Disp: , Rfl:     linaCLOtide 145 MCG CAPS, Take 145 mcg by mouth 2 (two) times a day, Disp: , Rfl:     midodrine (PROAMATINE) 5 mg tablet, Take 1 tablet (5 mg total) by mouth 3 (three) times a day, Disp: 270 tablet, Rfl: 0    Motegrity 2 MG TABS, , Disp: , Rfl:     Multiple Vitamin (MULTIVITAMIN) tablet, Take 1 tablet by mouth daily., Disp: , Rfl:     NIFEdipine (PROCARDIA XL) 60 mg 24 hr tablet, Take 1 tablet (60 mg total) by mouth daily, Disp: 90 tablet, Rfl: 3    nystatin (MYCOSTATIN) cream, Apply topically 2 (two) times a day, Disp: 30 g, Rfl: 0    Oral Vehicles (PCCA-PLUS) SUSP, , Disp: , Rfl:     P-Aminobenzoic Acid (PARA-AMINOBENZOIC ACID) POWD, , Disp: , Rfl:     pantoprazole (PROTONIX) 40 mg tablet, , Disp: , Rfl: 3    Riboflavin 100 MG TABS, Take 100 mg by mouth, Disp: , Rfl:     Rimegepant Sulfate (Nurtec) 75 MG TBDP, One tab as needed for migraine, max one per day, max 3 days per week, Disp: 8 tablet, Rfl: 2    sitaGLIPtin (JANUVIA) 50 mg tablet, 1 tab daily, Disp: 90 tablet, Rfl: 0    Turmeric 500 MG CAPS, Take by mouth daily, Disp: , Rfl:     Ubiquinol Liposomal 100 MG/5ML SYRP, Take 300 mg by mouth, Disp: , Rfl:     Ubiquinol POWD, , Disp: , Rfl:     vitamin E 100 UNIT capsule, Take 78 Units by mouth daily, Disp: , Rfl:       Objective:    Vitals:    02/26/24 1508   BP: 118/78   Weight: 83 kg (183 lb)   Height: 5' 4\" (1.626 m)       Physical Exam  General: Well appearing, well nourished, in no distress. Oriented x 3, normal mood and affect.  Ambulating without difficulty.  Skin: Good turgor, no rash, unusual bruising or prominent lesions.  Hair: Normal texture and distribution.  Nails: Normal color, no deformities.  HEENT:  Head: Normocephalic, atraumatic.  Eyes: Conjunctiva clear, " sclera non-icteric, EOM intact.  Musculoskeletal:   Hands - bony enlargement of the bilateral PIP joints noted without soft tissue swelling or tenderness.  The MCPs are unremarkable.  Neurologic: Alert and oriented.   Psychiatric: Normal mood and affect.       Nancy Niño M.D.  Rheumatology

## 2024-02-26 ENCOUNTER — OFFICE VISIT (OUTPATIENT)
Dept: RHEUMATOLOGY | Facility: CLINIC | Age: 49
End: 2024-02-26
Payer: COMMERCIAL

## 2024-02-26 VITALS
WEIGHT: 183 LBS | BODY MASS INDEX: 31.24 KG/M2 | SYSTOLIC BLOOD PRESSURE: 118 MMHG | DIASTOLIC BLOOD PRESSURE: 78 MMHG | HEIGHT: 64 IN

## 2024-02-26 DIAGNOSIS — G62.9 SMALL FIBER NEUROPATHY: ICD-10-CM

## 2024-02-26 DIAGNOSIS — E16.2 HYPOGLYCEMIA: ICD-10-CM

## 2024-02-26 DIAGNOSIS — M79.641 BILATERAL HAND PAIN: ICD-10-CM

## 2024-02-26 DIAGNOSIS — I73.00 RAYNAUD'S DISEASE WITHOUT GANGRENE: Primary | ICD-10-CM

## 2024-02-26 DIAGNOSIS — M79.642 BILATERAL HAND PAIN: ICD-10-CM

## 2024-02-26 PROCEDURE — 99214 OFFICE O/P EST MOD 30 MIN: CPT | Performed by: INTERNAL MEDICINE

## 2024-02-27 RX ORDER — ACARBOSE 100 MG/1
100 TABLET ORAL
Qty: 270 TABLET | Refills: 0 | Status: SHIPPED | OUTPATIENT
Start: 2024-02-27 | End: 2024-12-23

## 2024-02-29 ENCOUNTER — OFFICE VISIT (OUTPATIENT)
Dept: BARIATRICS | Facility: CLINIC | Age: 49
End: 2024-02-29
Payer: COMMERCIAL

## 2024-02-29 VITALS
WEIGHT: 178.5 LBS | HEART RATE: 73 BPM | HEIGHT: 65 IN | DIASTOLIC BLOOD PRESSURE: 78 MMHG | TEMPERATURE: 97 F | BODY MASS INDEX: 29.74 KG/M2 | SYSTOLIC BLOOD PRESSURE: 116 MMHG

## 2024-02-29 DIAGNOSIS — Z98.84 S/P GASTRIC BYPASS: Primary | ICD-10-CM

## 2024-02-29 DIAGNOSIS — K91.2 POSTSURGICAL MALABSORPTION: ICD-10-CM

## 2024-02-29 DIAGNOSIS — Z09 S/P GASTROSTOMY TUBE (G TUBE) PLACEMENT, FOLLOW-UP EXAM: ICD-10-CM

## 2024-02-29 PROCEDURE — 99213 OFFICE O/P EST LOW 20 MIN: CPT | Performed by: SURGERY

## 2024-02-29 RX ORDER — NYSTATIN 100000 U/G
CREAM TOPICAL 2 TIMES DAILY
Qty: 30 G | Refills: 0 | Status: SHIPPED | OUTPATIENT
Start: 2024-02-29

## 2024-02-29 NOTE — PROGRESS NOTES
FOLLOW UP VISIT - BARIATRIC SURGERY  Leigh Watson 48 y.o. female MRN: [id number]  Unit/Bed#:  Encounter: [id number]      HPI:  Leigh Watson is a 48 y.o. female who presents status post gastric bypass after Nissen takedown in 2015.  This is her scheduled yearly follow-up      Review of Systems   Gastrointestinal:  Positive for abdominal pain and constipation.        Occasional   All other systems reviewed and are negative.      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    Gastrostomy tube in place (HCC)     Gastrostomy tube in place (HCC)     GERD (gastroesophageal reflux disease)     History of blood clots 2012    Left leg. Has IVC filter now. Occurred while on Lovenox    Iron deficiency     Malignant hyperthermia due to anesthesia     Patient's son has M.H.  States she needs precautions    Migraines     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HCC)     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HCC)     MTHFR (methylene THF reductase) deficiency and homocystinuria (HCC)     Muscle weakness (generalized)     Nausea     On total parenteral nutrition (TPN)     for 8 mts    PCOS (polycystic ovarian syndrome)     PONV (postoperative nausea and vomiting)     Postgastrectomy syndrome     malabsorption    Postsurgical malabsorption     Status post gastric bypass for obesity     Stroke (HCC) 2004, 2009    Metabolic strokes. Right hemiparesis= minor now.    Urinary incontinence     Urinary retention     Vomiting     When eating     Past Surgical History:   Procedure Laterality Date    ANKLE SURGERY Left     Has plate and screws    COLONOSCOPY      COSMETIC SURGERY      X14 as child post attack by dog. Arms, legs and face    DILATION AND CURETTAGE OF UTERUS      x2    ESOPHAGOGASTRODUODENOSCOPY N/A 01/27/2016    Procedure: ESOPHAGOGASTRODUODENOSCOPY (EGD);   "Surgeon: Trevor Owens MD;  [facility];  Service:     FRACTURE SURGERY Left     Left ankle - hardware    GASTRIC BYPASS      GASTROSTOMY      IVC FILTER INSERTION      NISSEN FUNDOPLICATION      OVARY SURGERY Bilateral     \"Drilling\" due to multiple cysts- PCOS    MO EGD TRANSORAL BIOPSY SINGLE/MULTIPLE N/A 03/09/2016    Procedure: ESOPHAGOGASTRODUODENOSCOPY (EGD);  Surgeon: Trevor Owens MD;  [facility];  Service: Bariatrics    MO HYSTEROSCOPY BX ENDOMETRIUM&/POLYPC W/WO D&C N/A 05/22/2020    Procedure: DILATATION AND CURETTAGE (D&C) WITH HYSTEROSCOPY;  Surgeon: Bee Ramirez MD;  [facility];  Service: Gynecology    MO HYSTEROSCOPY ENDOMETRIAL ABLATION N/A 05/22/2020    Procedure: ABLATION ENDOMETRIAL MARISSA;  Surgeon: Bee Ramirez MD;  [facility];  Service: Gynecology    MO LAPS SURG GASTROSTOMY W/O CONSTJ GSTR TUBE SPX N/A 04/19/2016    Procedure: INSERTION GASTROSTOMY TUBE LAPAROSCOPIC WITH INTRAOP EGD;  Surgeon: Trevor Owens MD;  [facility];  Service: Bariatrics    ULNAR NERVE TRANSPOSITION Right     WISDOM TOOTH EXTRACTION       Social History   Social History     Substance and Sexual Activity   Alcohol Use No     Social History     Substance and Sexual Activity   Drug Use No     Social History     Tobacco Use   Smoking Status Never   Smokeless Tobacco Never     Family History: non-contributory    Meds/Allergies   all medications and allergies reviewed  Allergies   Allergen Reactions    Lactated Ringers Other (See Comments)     Causes lactic acidosis    Sulfate Other (See Comments)    Sulfa Antibiotics      Arrhythmia     Guaifenesin      Arrhythmia    Other      Malignant hyperhermia precautions. NEVER use Lacted Ringers       Objective       Current Vitals:   Blood Pressure: 116/78 (02/29/24 0908)  Pulse: 73 (02/29/24 0908)  Temperature: (!) 97 °F (36.1 °C) (02/29/24 0908)  Temp Source: Tympanic (02/29/24 0908)  Height: 5' 4.5\" (163.8 cm) " (02/29/24 0908)  Weight - Scale: 81 kg (178 lb 8 oz) (02/29/24 0908)        Invasive Devices       Central Venous Catheter Line  Duration             Port A Cath Right Chest -- days              Drain  Duration             Gastrostomy/Enterostomy Gastrostomy 20 Fr. LUQ 2871 days                    Physical Exam  Vitals reviewed.   Constitutional:       General: She is not in acute distress.     Appearance: She is well-developed. She is not diaphoretic.   HENT:      Head: Normocephalic and atraumatic.      Right Ear: External ear normal.      Left Ear: External ear normal.      Nose: Nose normal.   Eyes:      General: No scleral icterus.        Right eye: No discharge.         Left eye: No discharge.      Conjunctiva/sclera: Conjunctivae normal.   Abdominal:      Comments: Abdomen is soft and benign.  (CARLIN-Key) Button G tube in place.  Site is clean with no evidence of infection   Neurological:      Mental Status: She is alert and oriented to person, place, and time.   Psychiatric:         Behavior: Behavior normal.         Thought Content: Thought content normal.         Judgment: Judgment normal.         Lab Results: I have personally reviewed pertinent lab results.    Imaging: I have personally reviewed pertinent reports.    EKG, Pathology, and Other Studies: I have personally reviewed pertinent reports.        Assessment/PLAN:    48 y.o. female status post Nissen takedown and Laparoscopic RNYGB by in November 2015 by me, presents to office for annual visit.     Subjective     She tells me that she feels relatively well.  Since her last annual visit, she states that her heartburn persists intermittently and has been unchanged since the last visit.  She is on stool softeners and miralax along with other meds to help with constipation.  She tells me that she gets occasional pain sometimes twice a month.  Pretty sharp in nature around the epigastrium and left upper quadrant.  She does not think it is associated with  any food that she eats but has noticed that whenever she opens her G-tube button bile decompresses out.     She has complicated past medical history.  Patient had a gastrostomy tube (CARLIN-Key) Button in April of 2016 to assist with her nutritional requirements.  She continues to supplement her hydration with her G-tube and gets iron infusions as needed. She has a 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CMS-HCC), 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 and follows up with the [facility] health systems.  She continues to follow with the team at [facility] and she has decided to stay put for now with regards to other operations as the offered her at some point a colectomy.    In November she underwent a bladder stimulator placement that has helped her a lot and she is very happy with it.    I have suggested to her to vent her G-tube and to keep herself on a liquid diet if she has those symptoms but if they continue to happen it would not be unreasonable to order a dedicated CT scan with the bariatric protocol to rule out any other issues like a potential internal hernia.    She tells me that she will keep an eye on this and let me know if she wants to proceed with the study.    She is a scheduled to have a colonoscopy and endoscopy at [facility] and the results will be sent to us for the records.    Her nutritional labs from last August were mostly within normal limits.    We are ordering a new set of nutritional labs to be done in conjunction with her other specialist labs in a couple of months.    She is committed to continue to observe her diet and to increase her physical activity.    She will follow up with us as scheduled yearly.    Trevor Owens MD  2/29/2024  9:32 AM      .

## 2024-03-01 ENCOUNTER — HOSPITAL ENCOUNTER (OUTPATIENT)
Dept: RADIOLOGY | Facility: HOSPITAL | Age: 49
Discharge: HOME | End: 2024-03-01
Payer: COMMERCIAL

## 2024-03-01 ENCOUNTER — OFFICE VISIT (OUTPATIENT)
Dept: NEUROLOGY | Facility: CLINIC | Age: 49
End: 2024-03-01
Payer: COMMERCIAL

## 2024-03-01 ENCOUNTER — PATIENT MESSAGE (OUTPATIENT)
Dept: NEUROLOGY | Facility: CLINIC | Age: 49
End: 2024-03-01

## 2024-03-01 ENCOUNTER — TELEPHONE (OUTPATIENT)
Dept: NEUROLOGY | Facility: CLINIC | Age: 49
End: 2024-03-01

## 2024-03-01 ENCOUNTER — APPOINTMENT (OUTPATIENT)
Dept: LAB | Facility: CLINIC | Age: 49
End: 2024-03-01
Payer: COMMERCIAL

## 2024-03-01 VITALS
SYSTOLIC BLOOD PRESSURE: 131 MMHG | HEIGHT: 65 IN | TEMPERATURE: 99.1 F | HEART RATE: 87 BPM | WEIGHT: 181.7 LBS | BODY MASS INDEX: 30.27 KG/M2 | OXYGEN SATURATION: 99 % | DIASTOLIC BLOOD PRESSURE: 82 MMHG

## 2024-03-01 DIAGNOSIS — M79.641 BILATERAL HAND PAIN: ICD-10-CM

## 2024-03-01 DIAGNOSIS — M79.642 BILATERAL HAND PAIN: ICD-10-CM

## 2024-03-01 DIAGNOSIS — G43.009 MIGRAINE WITHOUT AURA AND WITHOUT STATUS MIGRAINOSUS, NOT INTRACTABLE: Primary | ICD-10-CM

## 2024-03-01 LAB
CRP SERPL QL: <1 MG/L
ERYTHROCYTE [SEDIMENTATION RATE] IN BLOOD: 5 MM/HOUR (ref 0–19)

## 2024-03-01 PROCEDURE — 86430 RHEUMATOID FACTOR TEST QUAL: CPT

## 2024-03-01 PROCEDURE — 99214 OFFICE O/P EST MOD 30 MIN: CPT | Performed by: PSYCHIATRY & NEUROLOGY

## 2024-03-01 PROCEDURE — 36415 COLL VENOUS BLD VENIPUNCTURE: CPT

## 2024-03-01 PROCEDURE — 85652 RBC SED RATE AUTOMATED: CPT

## 2024-03-01 PROCEDURE — 86140 C-REACTIVE PROTEIN: CPT

## 2024-03-01 PROCEDURE — 86200 CCP ANTIBODY: CPT

## 2024-03-01 PROCEDURE — 73130 X-RAY EXAM OF HAND: CPT

## 2024-03-01 RX ORDER — KETOROLAC TROMETHAMINE 30 MG/ML
INJECTION, SOLUTION INTRAMUSCULAR; INTRAVENOUS
Qty: 6 ML | Refills: 0 | Status: SHIPPED | OUTPATIENT
Start: 2024-03-01

## 2024-03-01 RX ORDER — SYRINGE W-NEEDLE,DISPOSAB,3 ML 25GX5/8"
SYRINGE, EMPTY DISPOSABLE MISCELLANEOUS
Qty: 6 EACH | Refills: 0 | Status: SHIPPED | OUTPATIENT
Start: 2024-03-01

## 2024-03-01 RX ORDER — DIHYDROERGOTAMINE MESYLATE 4 MG/ML
SPRAY NASAL
Qty: 16 ML | Refills: 1 | Status: SHIPPED | OUTPATIENT
Start: 2024-03-01

## 2024-03-01 NOTE — PATIENT INSTRUCTIONS
Leigh presents for a follow-up evaluation with regard to her chronic daily migraines.  She continues to experience migraine unfortunately on a very regular basis.  She did have a reaction to Vyepti at the time of her most recent infusion.  -We will plan for prevention to transition to Qulipta 60 mg taken once per day.  She is currently pending the prior authorization which we will pursue  -Depending on the benefit of Qulipta consideration could be given to using Nerivio as an additional preventive option or even considering Botox  -As an abortive treatment we will begin Migranal nasal spray.  She will use 1 spray in each nostril as early as possible to treat a migraine as it occurs.  To that she can add 1-2 mL of Toradol which is equivalent to 30 to 60 mg.  If she uses 60 mg she should not use Toradol again for at least 24 hours.  -If she notices nausea with the DHE nasal spray she can either pretreat or take it concurrent with Zofran  -We will also prescribe Nerivio to be used initially on an as-needed basis according to package instructions.  That prescription was sent electronically today.  -I would like for her to keep track of how often she is having severe migraines that require as needed treatment.    We will plan for her to reach out and let us know how she is doing in no more than 3 to 4 weeks and to return to the office to see me directly in 6 months time.

## 2024-03-01 NOTE — PROGRESS NOTES
"Assessment:    Patient Instructions   Leigh presents for a follow-up evaluation with regard to her chronic daily migraines.  She continues to experience migraine unfortunately on a very regular basis.  She did have a reaction to Vyepti at the time of her most recent infusion.  -We will plan for prevention to transition to Qulipta 60 mg taken once per day.  She is currently pending the prior authorization which we will pursue  -Depending on the benefit of Qulipta consideration could be given to using Nerivio as an additional preventive option or even considering Botox  -As an abortive treatment we will begin Migranal nasal spray.  She will use 1 spray in each nostril as early as possible to treat a migraine as it occurs.  To that she can add 1-2 mL of Toradol which is equivalent to 30 to 60 mg.  If she uses 60 mg she should not use Toradol again for at least 24 hours.  -If she notices nausea with the DHE nasal spray she can either pretreat or take it concurrent with Zofran  -We will also prescribe Nerivio to be used initially on an as-needed basis according to package instructions.  That prescription was sent electronically today.  -I would like for her to keep track of how often she is having severe migraines that require as needed treatment.    We will plan for her to reach out and let us know how she is doing in no more than 3 to 4 weeks and to return to the office to see me directly in 6 months time.      HPI:    Since your last visit are your headaches Remained the Same    Are you taking your current medications as prescribed? yes      How often do you use abortive medications to treat a headache? 0 times per week.   How effective are they? ineffective    From 0-10, how severe is the pain for a typical headache for you? 3/10 on typical day and will increase to 7 or 8/10 three days per week.    What does your typical headache pain feel like? \"Deep squeezing pressure in center of brain, occasionally feels like " "knives stabbing me in the eyeballs.\"    Where is your typical headache? Center of brain    What is your current headache frequency: 7 times per week    How long does your typical headache last? \"Unsure. I don't pay attention to that. It is what it is.\"    In addition to the head pain, what other symptoms do you have before or during your headaches? Light sensitivity, nausea    Do you have anything you need to discuss with the doctor during your visit? Currently experiencing episodes of disorientation once lights go out in a room, must hold on to something,.Denies confusion but extreme unsteadiness- \"I can't sense myself in space.\" Reports almost falling at [place] in July when lights went on,  prevented patient from falling.     Prior preventives: Topamax, Verapamil, Vyepti    Abortives: Nurtec (ineffective), Triptans (rizatriptan ineffective),      Review of Systems   Constitutional:  Negative for appetite change, fatigue and fever.   HENT: Negative.  Negative for hearing loss, tinnitus, trouble swallowing and voice change.    Eyes: Negative.  Negative for photophobia, pain and visual disturbance.   Respiratory: Negative.  Negative for shortness of breath.    Cardiovascular: Negative.  Negative for palpitations.   Gastrointestinal: Negative.  Negative for nausea and vomiting.   Endocrine: Negative.  Negative for cold intolerance.   Genitourinary: Negative.  Negative for dysuria, frequency and urgency.   Musculoskeletal:  Negative for back pain, gait problem, myalgias, neck pain and neck stiffness.   Skin: Negative.  Negative for rash.   Allergic/Immunologic: Negative.    Neurological: Negative.  Negative for dizziness, tremors, seizures, syncope, facial asymmetry, speech difficulty, weakness, light-headedness, numbness and headaches.   Hematological: Negative.  Does not bruise/bleed easily.   Psychiatric/Behavioral: Negative.  Negative for confusion, hallucinations and sleep disturbance.    All other systems " "reviewed and are negative.    /82 (BP Location: Left arm, Patient Position: Sitting, Cuff Size: Adult)   Pulse 87   Temp 99.1 °F (37.3 °C) (Temporal)   Ht 5' 4.5\" (1.638 m)   Wt 82.4 kg (181 lb 11.2 oz)   SpO2 99%   BMI 30.71 kg/m²     At the time of my evaluation she was awake and alert and in no distress.  There were no clear new cranial neuropathies or lateralizing signs.  "

## 2024-03-01 NOTE — TELEPHONE ENCOUNTER
Me     MS    3/1/24  1:48 PM  Note     Leigh Watson   to [facility] (supporting Srinivas Zhu MD)          3/1/24 10:38 AM  Our pharmacy plan is still waiting for a prior-auth to be completed for this medication.  To date, nothing was initiated which I just did but it requires documentation from your office. Thank you in advance.      _____________________  I spoke to patient; she is aware attempt was made by office to submit PA but unable to do so.  I will call insurance [phone] to provide updated information.        ID [id number]  _________    I called insurance [phone] and was able to provide verbal updated; expedited request submitted; may take up to 72 hours; determination pending.  Case ID [id number]

## 2024-03-02 LAB — RHEUMATOID FACT SER QL LA: NEGATIVE

## 2024-03-05 LAB — CCP AB SER IA-ACNC: 1.9

## 2024-03-10 LAB
APOB+LDLR+PCSK9 GENE MUT ANL BLD/T: NOT DETECTED
BRCA1+BRCA2 DEL+DUP + FULL MUT ANL BLD/T: NOT DETECTED
MLH1+MSH2+MSH6+PMS2 GN DEL+DUP+FUL M: NOT DETECTED

## 2024-03-15 ENCOUNTER — TELEPHONE (OUTPATIENT)
Age: 49
End: 2024-03-15

## 2024-04-17 DIAGNOSIS — E16.1 REACTIVE HYPOGLYCEMIA: ICD-10-CM

## 2024-05-14 ENCOUNTER — HOSPITAL ENCOUNTER (OUTPATIENT)
Dept: MAMMOGRAPHY | Facility: HOSPITAL | Age: 49
Discharge: HOME | End: 2024-05-14
Payer: COMMERCIAL

## 2024-05-14 VITALS — BODY MASS INDEX: 30.16 KG/M2 | HEIGHT: 65 IN | WEIGHT: 181 LBS

## 2024-05-14 DIAGNOSIS — Z12.31 ENCOUNTER FOR SCREENING MAMMOGRAM FOR BREAST CANCER: ICD-10-CM

## 2024-05-14 PROCEDURE — 77067 SCR MAMMO BI INCL CAD: CPT

## 2024-05-14 PROCEDURE — 77063 BREAST TOMOSYNTHESIS BI: CPT

## 2024-05-31 DIAGNOSIS — E16.2 HYPOGLYCEMIA: ICD-10-CM

## 2024-05-31 RX ORDER — ACARBOSE 100 MG/1
100 TABLET ORAL
Qty: 270 TABLET | Refills: 0 | Status: SHIPPED | OUTPATIENT
Start: 2024-05-31 | End: 2025-03-27

## 2024-06-06 DIAGNOSIS — I95.89 OTHER SPECIFIED HYPOTENSION: ICD-10-CM

## 2024-06-07 RX ORDER — MIDODRINE HYDROCHLORIDE 5 MG/1
5 TABLET ORAL 3 TIMES DAILY
Qty: 270 TABLET | Refills: 0 | Status: SHIPPED | OUTPATIENT
Start: 2024-06-07

## 2024-06-07 NOTE — TELEPHONE ENCOUNTER
Requested medication(s) are due for refill today: Yes  Patient has already received a courtesy refill: No  Other reason request has been forwarded to provider: last visit 2022

## 2024-07-17 DIAGNOSIS — G43.009 MIGRAINE WITHOUT AURA AND WITHOUT STATUS MIGRAINOSUS, NOT INTRACTABLE: ICD-10-CM

## 2024-07-22 NOTE — TELEPHONE ENCOUNTER
Called and left message clarifying what she is tolerating for her tube feeding  Received fax to update order:  Previously on elecare homar 1200 ml per day to provide 1200 calories and 37 grams of protein, plus prosource QID and infinity 500 ml enteral pump feeding bags  Name and contact information left 
family member/on unit
family member
family member

## 2024-08-08 ENCOUNTER — APPOINTMENT (OUTPATIENT)
Dept: LAB | Facility: CLINIC | Age: 49
End: 2024-08-08

## 2024-08-08 DIAGNOSIS — Z00.8 OTHER SPECIFIED GENERAL MEDICAL EXAMINATION: ICD-10-CM

## 2024-08-08 DIAGNOSIS — K91.2 POSTSURGICAL MALABSORPTION: ICD-10-CM

## 2024-08-08 LAB
CHOLEST SERPL-MCNC: 187 MG/DL
EST. AVERAGE GLUCOSE BLD GHB EST-MCNC: 100 MG/DL
HBA1C MFR BLD: 5.1 %
HDLC SERPL-MCNC: 77 MG/DL
LDLC SERPL CALC-MCNC: 91 MG/DL (ref 0–100)
NONHDLC SERPL-MCNC: 110 MG/DL
TRIGL SERPL-MCNC: 93 MG/DL

## 2024-08-08 PROCEDURE — 36415 COLL VENOUS BLD VENIPUNCTURE: CPT

## 2024-08-08 PROCEDURE — 80061 LIPID PANEL: CPT

## 2024-08-08 PROCEDURE — 83036 HEMOGLOBIN GLYCOSYLATED A1C: CPT

## 2024-08-25 DIAGNOSIS — E16.2 HYPOGLYCEMIA: ICD-10-CM

## 2024-08-25 RX ORDER — ACARBOSE 100 MG/1
100 TABLET ORAL
Qty: 90 TABLET | Refills: 0 | Status: SHIPPED | OUTPATIENT
Start: 2024-08-25 | End: 2024-09-24

## 2024-09-11 ENCOUNTER — OFFICE VISIT (OUTPATIENT)
Dept: GASTROENTEROLOGY | Facility: AMBULARY SURGERY CENTER | Age: 49
End: 2024-09-11
Payer: COMMERCIAL

## 2024-09-11 VITALS
HEART RATE: 74 BPM | BODY MASS INDEX: 30.02 KG/M2 | WEIGHT: 180.2 LBS | OXYGEN SATURATION: 99 % | DIASTOLIC BLOOD PRESSURE: 80 MMHG | SYSTOLIC BLOOD PRESSURE: 128 MMHG | HEIGHT: 65 IN

## 2024-09-11 DIAGNOSIS — K21.9 CHRONIC GERD: ICD-10-CM

## 2024-09-11 DIAGNOSIS — R13.10 DYSPHAGIA, UNSPECIFIED TYPE: ICD-10-CM

## 2024-09-11 DIAGNOSIS — Z12.11 SCREENING FOR COLON CANCER: Primary | ICD-10-CM

## 2024-09-11 DIAGNOSIS — K59.09 CHRONIC CONSTIPATION: ICD-10-CM

## 2024-09-11 PROCEDURE — 99214 OFFICE O/P EST MOD 30 MIN: CPT | Performed by: PHYSICIAN ASSISTANT

## 2024-09-11 NOTE — PROGRESS NOTES
[facility] - Outpatient Consultation  Leigh Watson 49 y.o. female MRN: [id number]  Encounter: [id number]          ASSESSMENT AND PLAN:      Very pleasant 49-year-old female with history of mitochondrial disease following with [facility], gastric bypass, chronic constipation, esophageal dysmotility, G tube, gastric bypass who presents the office as a new patient for colon cancer screening, EGD.    Colon cancer screening  Patient was recommended by GI specialist at [facility] to have colonoscopy through [facility]. Patient would also prefer it to be here.  She has chronic constipation for which she is on Motegrity and Linzess.  Reports remote colonoscopy many years ago.    -Schedule colonoscopy to be performed at the hospital in setting of mitochondrial disease  -We will plan for 2-day prep in the setting of her significant constipation.  -Patient has reactive hypoglycemia and will make sure to increase hydration throughout preparation.  -I obtained informed consent from the patient. The risks/benefits/alternatives of the procedure were discussed with the patient. Risks included, but not limited to, infection, bleeding, perforation, injury to organs in the abdomen, missed lesion and incomplete procedure were discussed.  She is also aware of increased risks of anesthesia in the setting of uncontrolled disease. Patient was agreeable.      2. Chronic GERD  3.  Esophageal dysmotility  Last EGD in 2019 with inflammation of the GE junction, negative for Linares's.  She is on PPI twice daily, Pepcid as needed.  Does report reflux symptoms have been worse recently.    -We will plan for EGD at same time as colonoscopy.  - Continue Protonix 40 mg twice daily.  - Continue following with motility specialist at [facility].    4. Chronic constipation  Longstanding history of constipation.  She is on Motegrity and Linzess.  She reports bowel movement nearly daily.    -Continue Motegrity and Linzess.  -  Continue to follow with motility specialist        Patient was recommended to follow up after procedure as needed. Continue following with [facility]. Patient was recommended to reach out via [employer] with any questions or concerns in the meantime.   ______________________________________________________________________    HPI:      Leigh Watson is a 49 y.o. female with gastric bypass, longstanding history of GERD, esophageal dysmotility and dysphagia in the setting of mitochondrial disease, migraines, hypertension, CAS, PCOS, hyperparathyroidism who presents the office as a new patient for colon cancer screening.    Patient follows very closely with [facility] for symptoms.  They have been urging her to get repeat EGD and colonoscopy for a while now.  She takes Protonix twice daily however still having reflux symptoms.  She has chronic dysphagia which is currently being managed well.  She has G-tube still in place which is used for hydration however she has not needed to use this as much lately.  For her bowel movements she is on Motegrity and Linzess which is helping her bowel movement around 1 time daily.  She has not needed to use bowel cleanses as often.    Patient had EGD 10/2019 with inflammation at the GE junction and biopsies showing chronic inflammation but negative for Linares's.    Patient works at the [employer].    Reports remote history of colonoscopy years ago.    REVIEW OF SYSTEMS:    CONSTITUTIONAL: Denies any fever, chills, rigors, and weight loss.  HEENT: No earache or tinnitus. Denies hearing loss or visual disturbances.  CARDIOVASCULAR: No chest pain or palpitations.   RESPIRATORY: Denies any cough, hemoptysis, shortness of breath or dyspnea on exertion.  GASTROINTESTINAL: As noted in the History of Present Illness.   GENITOURINARY: No problems with urination. Denies any hematuria or dysuria.  NEUROLOGIC: No dizziness or vertigo, denies headaches.   MUSCULOSKELETAL:  Denies any muscle or joint pain.   SKIN: Denies skin rashes or itching.   ENDOCRINE: Denies excessive thirst. Denies intolerance to heat or cold.  PSYCHOSOCIAL: Denies depression or anxiety. Denies any recent memory loss.       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    Gastrostomy tube in place (HCC)     Gastrostomy tube in place (HCC)     GERD (gastroesophageal reflux disease)     History of blood clots 2012    Left leg. Has IVC filter now. Occurred while on Lovenox    Iron deficiency     Malignant hyperthermia due to anesthesia     Patient's son has M.H.  States she needs precautions    Migraines     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HCC)     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HCC)     MTHFR (methylene THF reductase) deficiency and homocystinuria (HCC)     Muscle weakness (generalized)     Nausea     On total parenteral nutrition (TPN)     for 8 mts    PCOS (polycystic ovarian syndrome)     PONV (postoperative nausea and vomiting)     Postgastrectomy syndrome     malabsorption    Postsurgical malabsorption     Status post gastric bypass for obesity     Stroke (HCC) 2004, 2009    Metabolic strokes. Right hemiparesis= minor now.    Urinary incontinence     Urinary retention     Vomiting     When eating     Past Surgical History:   Procedure Laterality Date    ANKLE SURGERY Left     Has plate and screws    COLONOSCOPY      COSMETIC SURGERY      X14 as child post attack by dog. Arms, legs and face    DILATION AND CURETTAGE OF UTERUS      x2    ESOPHAGOGASTRODUODENOSCOPY N/A 01/27/2016    Procedure: ESOPHAGOGASTRODUODENOSCOPY (EGD);  Surgeon: Trevor Owens MD;  Location: [facility];  Service:     FRACTURE SURGERY Left     Left ankle - hardware    GASTRIC BYPASS      GASTROSTOMY      IVC FILTER INSERTION      NISSEN FUNDOPLICATION      OVARY  "SURGERY Bilateral     \"Drilling\" due to multiple cysts- PCOS    MD EGD TRANSORAL BIOPSY SINGLE/MULTIPLE N/A 03/09/2016    Procedure: ESOPHAGOGASTRODUODENOSCOPY (EGD);  Surgeon: Trevor Owens MD;  Location: [facility];  Service: Bariatrics    MD HYSTEROSCOPY BX ENDOMETRIUM&/POLYPC W/WO D&C N/A 05/22/2020    Procedure: DILATATION AND CURETTAGE (D&C) WITH HYSTEROSCOPY;  Surgeon: Bee Ramirez MD;  [facility];  Service: Gynecology    MD HYSTEROSCOPY ENDOMETRIAL ABLATION N/A 05/22/2020    Procedure: ABLATION ENDOMETRIAL MARISSA;  Surgeon: Bee Ramirez MD;  [facility];  Service: Gynecology    MD LAPS SURG GASTROSTOMY W/O CONSTJ GSTR TUBE SPX N/A 04/19/2016    Procedure: INSERTION GASTROSTOMY TUBE LAPAROSCOPIC WITH INTRAOP EGD;  Surgeon: Trevor Owens MD;  [facility]: [facility];  Service: Bariatrics    ULNAR NERVE TRANSPOSITION Right     WISDOM TOOTH EXTRACTION       Social History   Social History     Substance and Sexual Activity   Alcohol Use No     Social History     Substance and Sexual Activity   Drug Use No     Social History     Tobacco Use   Smoking Status Never   Smokeless Tobacco Never     Family History   Problem Relation Age of Onset    Mitochondrial disorder Mother     Hypertension Mother     Thyroid cancer Mother 50    Clotting disorder Mother         MTFR    Cancer Mother 58        Thyroid, carcinoid, renal    Kidney cancer Mother     Arthritis Mother     Depression Father     Endocrinopathy Father     Clotting disorder Father         MTFR    Stroke Father     Aneurysm Father         brain    Anesthesia problems Father     Substance Abuse Father         Alcohol    Arthritis Father     Depression Brother     Narcolepsy Brother     Heart failure Maternal Grandmother 96    Coronary artery disease Maternal Grandmother     Stroke Maternal Grandmother     Dementia Maternal Grandmother         Brina    Breast cancer Maternal Grandmother     Mitochondrial disorder Son     Mitochondrial " "disorder Daughter     Stroke Family     Anuerysm Paternal Uncle         abdominal     Breast cancer Maternal Grandfather 46    Cancer Maternal Grandfather          at 46 of breast cancer that went to lungs    Breast cancer Maternal Aunt 42    Heart attack Neg Hx     Arrhythmia Neg Hx     Sudden death Neg Hx         scd       Meds/Allergies       Current Outpatient Medications:     acarbose (PRECOSE) 100 MG tablet    acetylcysteine (MUCOMYST) 200 mg/mL nebulizer solution    Alpha-Lipoic Acid 100 MG CAPS    Atogepant 60 MG TABS    B Complex-Biotin-FA ( VITAMIN B 50/B-COMPLEX PO)    Calcium Citrate 1040 MG TABS    COENZYME Q-10 PO    Continuous Blood Gluc Sensor (Dexcom G7 Sensor)    CREATINE MONOHYDRATE PO    dihydroergotamine (MIGRANAL) 4 MG/ML nasal spray    docusate sodium (COLACE) 100 mg capsule    ketorolac (TORADOL) 30 mg/mL injection    L-Arginine 1000 MG TABS    linaCLOtide 145 MCG CAPS    midodrine (PROAMATINE) 5 mg tablet    Motegrity 2 MG TABS    Multiple Vitamin (MULTIVITAMIN) tablet    Nerve Stimulator (Nerivio) SEJAL    NIFEdipine (PROCARDIA XL) 60 mg 24 hr tablet    nystatin (MYCOSTATIN) cream    Oral Vehicles (PCCA-PLUS) SUSP    P-Aminobenzoic Acid (PARA-AMINOBENZOIC ACID) POWD    pantoprazole (PROTONIX) 40 mg tablet    Riboflavin 100 MG TABS    sitaGLIPtin (JANUVIA) 50 mg tablet    Syringe/Needle, Disp, (SYRINGE 3CC/26RR3-9\") 27G X 1-4\" 3 ML MISC    Ubiquinol Liposomal 100 MG/5ML SYRP    Ubiquinol POWD    vitamin E 100 UNIT capsule    fludrocortisone (FLORINEF) 0.1 mg tablet    Turmeric 500 MG CAPS    Allergies   Allergen Reactions    Lactated Ringers Other (See Comments)     Causes lactic acidosis    Sulfate Other (See Comments)    Sulfa Antibiotics      Arrhythmia     Vyepti [Eptinezumab-[facility]] Chest Pain     Rigors, SOB    Guaifenesin      Arrhythmia    Other      Malignant hyperhermia precautions. NEVER use Lacted Ringers           Objective     Blood pressure 128/80, pulse 74, height 5' " "4.5\" (1.638 m), weight 81.7 kg (180 lb 3.2 oz), SpO2 99%, not currently breastfeeding. Body mass index is 30.45 kg/m².        PHYSICAL EXAM:      General Appearance:   Alert, cooperative, no distress   HEENT:   Normocephalic, atraumatic, anicteric.     Neck:  Supple, symmetrical, trachea midline   Lungs:   Clear to auscultation bilaterally; no rales, rhonchi or wheezing; respirations unlabored    Heart::   Regular rate and rhythm; no murmur, rub, or gallop.   Abdomen:   Soft, non-tender, non-distended; normal bowel sounds; no masses, no organomegaly. Benign abdomen. +Gtube, clean and dry. No significant redness   Genitalia:   Deferred    Rectal:   Deferred    Extremities:  No cyanosis, clubbing or edema    Pulses:  2+ and symmetric    Skin:  No jaundice, rashes, or lesions    Lymph nodes:  No palpable cervical lymphadenopathy        Lab Results:   No visits with results within 1 Day(s) from this visit.   Latest known visit with results is:   Appointment on 08/08/2024   Component Date Value    Hemoglobin A1C 08/08/2024 5.1     EAG 08/08/2024 100     Cholesterol 08/08/2024 187     Triglycerides 08/08/2024 93     HDL, Direct 08/08/2024 77     LDL Calculated 08/08/2024 91     Non-HDL-Chol (CHOL-HDL) 08/08/2024 110          Radiology Results:   No results found.  "

## 2024-09-11 NOTE — PATIENT INSTRUCTIONS
Scheduled date of EGD/colonoscopy (as of today): 10/10/24  Physician performing EGD/colonoscopy:   [facility] of [facility]oscopy[facility]  Desired bowel prep reviewed with patient: golytely 2 day  Instructions reviewed with patient by: alfred  Clearances:  na

## 2024-09-13 DIAGNOSIS — E88.40 MITOCHONDRIAL DISEASE (HCC): Primary | ICD-10-CM

## 2024-09-17 ENCOUNTER — TELEPHONE (OUTPATIENT)
Dept: GASTROENTEROLOGY | Facility: CLINIC | Age: 49
End: 2024-09-17

## 2024-09-17 NOTE — TELEPHONE ENCOUNTER
Case reviewed by surgical optimization team.  Okay to proceed.      ----- Message -----  From: Lili Clemons MD  Sent: 9/16/2024  10:19 AM EDT  To: Rody Lund    Chart reviewed.     Looks like patient has had anesthesia (versed, propofol) back in 2023 without issues.   Agree with GI notes that case should be done in hospital setting. Should not have issues with propofol sedation.     Thanks,   Lili Clemons  ----- Message -----  From: Rody Lund  Sent: 9/16/2024   8:41 AM EDT  To: Soc Md/Np    Referred to anesthesia by    BRANDON BARRERA  [facility] [place]    Patient is having EGD and Colonoscopy done in 10/10    Message from provider:  History of nonspecific mitochondrial disease which could impact complications from anesthesia    Please review

## 2024-10-07 DIAGNOSIS — Z12.11 SCREENING FOR COLON CANCER: Primary | ICD-10-CM

## 2024-10-07 DIAGNOSIS — I73.00 RAYNAUD'S DISEASE WITHOUT GANGRENE: ICD-10-CM

## 2024-10-07 RX ORDER — NIFEDIPINE 60 MG/1
60 TABLET, EXTENDED RELEASE ORAL DAILY
Qty: 90 TABLET | Refills: 1 | Status: SHIPPED | OUTPATIENT
Start: 2024-10-07

## 2024-10-10 ENCOUNTER — ANESTHESIA EVENT (OUTPATIENT)
Dept: GASTROENTEROLOGY | Facility: HOSPITAL | Age: 49
End: 2024-10-10
Payer: COMMERCIAL

## 2024-10-10 ENCOUNTER — ANESTHESIA (OUTPATIENT)
Dept: GASTROENTEROLOGY | Facility: HOSPITAL | Age: 49
End: 2024-10-10
Payer: COMMERCIAL

## 2024-10-10 ENCOUNTER — HOSPITAL ENCOUNTER (OUTPATIENT)
Dept: GASTROENTEROLOGY | Facility: HOSPITAL | Age: 49
Setting detail: OUTPATIENT SURGERY
End: 2024-10-10
Payer: COMMERCIAL

## 2024-10-10 VITALS
TEMPERATURE: 97.1 F | RESPIRATION RATE: 18 BRPM | HEIGHT: 63 IN | BODY MASS INDEX: 30.65 KG/M2 | WEIGHT: 173 LBS | DIASTOLIC BLOOD PRESSURE: 76 MMHG | SYSTOLIC BLOOD PRESSURE: 121 MMHG | HEART RATE: 68 BPM | OXYGEN SATURATION: 100 %

## 2024-10-10 DIAGNOSIS — K21.9 CHRONIC GERD: ICD-10-CM

## 2024-10-10 DIAGNOSIS — R13.10 DYSPHAGIA, UNSPECIFIED TYPE: ICD-10-CM

## 2024-10-10 DIAGNOSIS — Z12.11 SCREENING FOR COLON CANCER: ICD-10-CM

## 2024-10-10 PROBLEM — T88.3XXA MALIGNANT HYPERTHERMIA: Status: ACTIVE | Noted: 2024-10-10

## 2024-10-10 LAB
GLUCOSE SERPL-MCNC: 258 MG/DL (ref 65–140)
GLUCOSE SERPL-MCNC: 71 MG/DL (ref 65–140)

## 2024-10-10 PROCEDURE — 43239 EGD BIOPSY SINGLE/MULTIPLE: CPT | Performed by: INTERNAL MEDICINE

## 2024-10-10 PROCEDURE — G0121 COLON CA SCRN NOT HI RSK IND: HCPCS | Performed by: INTERNAL MEDICINE

## 2024-10-10 PROCEDURE — 82948 REAGENT STRIP/BLOOD GLUCOSE: CPT

## 2024-10-10 PROCEDURE — 88305 TISSUE EXAM BY PATHOLOGIST: CPT | Performed by: STUDENT IN AN ORGANIZED HEALTH CARE EDUCATION/TRAINING PROGRAM

## 2024-10-10 RX ORDER — PROPOFOL 10 MG/ML
INJECTION, EMULSION INTRAVENOUS AS NEEDED
Status: DISCONTINUED | OUTPATIENT
Start: 2024-10-10 | End: 2024-10-10

## 2024-10-10 RX ORDER — LIDOCAINE HYDROCHLORIDE 20 MG/ML
INJECTION, SOLUTION EPIDURAL; INFILTRATION; INTRACAUDAL; PERINEURAL AS NEEDED
Status: DISCONTINUED | OUTPATIENT
Start: 2024-10-10 | End: 2024-10-10

## 2024-10-10 RX ORDER — PROPOFOL 10 MG/ML
INJECTION, EMULSION INTRAVENOUS CONTINUOUS PRN
Status: DISCONTINUED | OUTPATIENT
Start: 2024-10-10 | End: 2024-10-10

## 2024-10-10 RX ORDER — DEXTROSE MONOHYDRATE AND SODIUM CHLORIDE 5; .45 G/100ML; G/100ML
INJECTION, SOLUTION INTRAVENOUS CONTINUOUS PRN
Status: DISCONTINUED | OUTPATIENT
Start: 2024-10-10 | End: 2024-10-10

## 2024-10-10 RX ADMIN — PROPOFOL 50 MG: 10 INJECTION, EMULSION INTRAVENOUS at 10:59

## 2024-10-10 RX ADMIN — PROPOFOL 50 MG: 10 INJECTION, EMULSION INTRAVENOUS at 11:02

## 2024-10-10 RX ADMIN — PROPOFOL 100 MG: 10 INJECTION, EMULSION INTRAVENOUS at 10:57

## 2024-10-10 RX ADMIN — DEXTROSE AND SODIUM CHLORIDE: 5; .45 INJECTION, SOLUTION INTRAVENOUS at 10:55

## 2024-10-10 RX ADMIN — LIDOCAINE HYDROCHLORIDE 100 MG: 20 INJECTION, SOLUTION EPIDURAL; INFILTRATION; INTRACAUDAL at 10:55

## 2024-10-10 RX ADMIN — PROPOFOL 100 MCG/KG/MIN: 10 INJECTION, EMULSION INTRAVENOUS at 10:57

## 2024-10-10 NOTE — ANESTHESIA POSTPROCEDURE EVALUATION
Post-Op Assessment Note    CV Status:  Stable  Pain Score: 0    Pain management: adequate       Mental Status:  Sleepy and arousable   Hydration Status:  Stable   PONV Controlled:  None   Airway Patency:  Patent     Post Op Vitals Reviewed: Yes    No anethesia notable event occurred.    Staff: CRNA           Last Filed PACU Vitals:  Vitals Value Taken Time   Temp 97.1 °F (36.2 °C) 10/10/24 1137   Pulse 73 10/10/24 1137   /61 10/10/24 1137   Resp     SpO2 100 % 10/10/24 1137       Modified Tasia:  Activity: 0 (10/10/2024 11:37 AM)  Respiration: 2 (10/10/2024 11:37 AM)  Circulation: 2 (10/10/2024 11:37 AM)  Consciousness: 0 (10/10/2024 11:37 AM)  Oxygen Saturation: 2 (10/10/2024 11:37 AM)  Modified Tasia Score: 6 (10/10/2024 11:37 AM)         We can go ahead and give him a letter as such

## 2024-10-10 NOTE — ANESTHESIA PREPROCEDURE EVALUATION
Procedure:  COLONOSCOPY  EGD    Relevant Problems   ANESTHESIA  Son has a ryanodine receptor mutation diagnosed wit positive h/o of MH   (+) Malignant hyperthermia      CARDIO   (+) Hypertension   (+) Migraine without aura and without status migrainosus, not intractable      ENDO   (+) Hyperparathyroidism , secondary, non-renal (HCC)      GI/HEPATIC   (+) Dysphagia   (+) GERD (gastroesophageal reflux disease)   (+) Reactive hypoglycemia      /RENAL   (+) Renal cyst      HEMATOLOGY   (+) Anemia      NEURO/PSYCH   (+) Complex regional pain syndrome type 1 of right upper extremity   (+) Migraine without aura and without status migrainosus, not intractable   (+) Nyctalopia      Eye   (+) Metabolic stroke secondary to mitochondrial disease   (+) Retinitis pigmentosa      Surgery/Wound/Pain   (+) S/P gastric bypass      Other   (+) S/P gastrostomy tube (G tube) placement, follow-up exam        Physical Exam    Airway    Mallampati score: II  TM Distance: >3 FB  Neck ROM: full     Dental   No notable dental hx     Cardiovascular      Pulmonary      Other Findings  post-pubertal.      Anesthesia Plan  ASA Score- 3     Anesthesia Type- IV sedation with anesthesia with ASA Monitors.         Additional Monitors:     Airway Plan:            Plan Factors-Exercise tolerance (METS): >4 METS.    Chart reviewed. EKG reviewed.  Existing labs reviewed. Patient summary reviewed.    Patient is not a current smoker.              Induction- intravenous.    Postoperative Plan-         Informed Consent- Anesthetic plan and risks discussed with patient.  I personally reviewed this patient with the CRNA. Discussed and agreed on the Anesthesia Plan with the CRNA..

## 2024-10-10 NOTE — ANESTHESIA POSTPROCEDURE EVALUATION
Post-Op Assessment Note             Post Op Vitals Reviewed: Yes    No anethesia notable event occurred.    Staff: Anesthesiologist, CRNA           Last Filed PACU Vitals:  Vitals Value Taken Time   Temp 97.1 °F (36.2 °C) 10/10/24 1137   Pulse 68 10/10/24 1202   /76 10/10/24 1202   Resp 18 10/10/24 1202   SpO2 100 % 10/10/24 1202       Modified Tasia:  Activity: 2 (10/10/2024 12:03 PM)  Respiration: 2 (10/10/2024 12:03 PM)  Circulation: 2 (10/10/2024 12:03 PM)  Consciousness: 2 (10/10/2024 12:03 PM)  Oxygen Saturation: 2 (10/10/2024 12:03 PM)  Modified Tasia Score: 10 (10/10/2024 12:03 PM)

## 2024-10-10 NOTE — H&P
History and Physical -  [facility]  Leigh Watson 49 y.o. female MRN: [id number]    HPI: Leigh Watson is a 49 y.o. year old female who presents with GERD, dysphagia, chronic constipation, colon cancer screening.       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    Gastrostomy tube in place (HCC)     Gastrostomy tube in place (HCC)     GERD (gastroesophageal reflux disease)     History of blood clots 2012    Left leg. Has IVC filter now. Occurred while on Lovenox    Iron deficiency     Malignant hyperthermia due to anesthesia     Patient's son has M.H.  States she needs precautions    Migraines     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HCC)     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HCC)     MTHFR (methylene THF reductase) deficiency and homocystinuria (HCC)     Muscle weakness (generalized)     Nausea     On total parenteral nutrition (TPN)     for 8 mts    PCOS (polycystic ovarian syndrome)     PONV (postoperative nausea and vomiting)     Postgastrectomy syndrome     malabsorption    Postsurgical malabsorption     Status post gastric bypass for obesity     Stroke (HCC) 2004, 2009    Metabolic strokes. Right hemiparesis= minor now.    Urinary incontinence     Urinary retention     Vomiting     When eating     Past Surgical History:   Procedure Laterality Date    ANKLE SURGERY Left     Has plate and screws    COLONOSCOPY      COSMETIC SURGERY      X14 as child post attack by dog. Arms, legs and face    DILATION AND CURETTAGE OF UTERUS      x2    ESOPHAGOGASTRODUODENOSCOPY N/A 01/27/2016    Procedure: ESOPHAGOGASTRODUODENOSCOPY (EGD);  Surgeon: Trevor Owens MD;  Location: [facility];  Service:     FRACTURE SURGERY Left     Left ankle - hardware    GASTRIC BYPASS      GASTROSTOMY      IVC FILTER INSERTION       "NISSEN FUNDOPLICATION      OVARY SURGERY Bilateral     \"Drilling\" due to multiple cysts- PCOS    TN EGD TRANSORAL BIOPSY SINGLE/MULTIPLE N/A 03/09/2016    Procedure: ESOPHAGOGASTRODUODENOSCOPY (EGD);  Surgeon: Trevor Owens MD;  Location: [facility];  Service: Bariatrics    TN HYSTEROSCOPY BX ENDOMETRIUM&/POLYPC W/WO D&C N/A 05/22/2020    Procedure: DILATATION AND CURETTAGE (D&C) WITH HYSTEROSCOPY;  Surgeon: Bee Ramirez MD;  [facility];  Service: Gynecology    TN HYSTEROSCOPY ENDOMETRIAL ABLATION N/A 05/22/2020    Procedure: ABLATION ENDOMETRIAL MARISSA;  Surgeon: Bee Ramirez MD;  [facility];  Service: Gynecology    TN LAPS SURG GASTROSTOMY W/O CONSTJ GSTR TUBE SPX N/A 04/19/2016    Procedure: INSERTION GASTROSTOMY TUBE LAPAROSCOPIC WITH INTRAOP EGD;  Surgeon: Trevor Owens MD;  Location: [facility];  Service: Bariatrics    ULNAR NERVE TRANSPOSITION Right     WISDOM TOOTH EXTRACTION       Social History   Social History     Substance and Sexual Activity   Alcohol Use No     Social History     Substance and Sexual Activity   Drug Use No     Social History     Tobacco Use   Smoking Status Never   Smokeless Tobacco Never     Family History   Problem Relation Age of Onset    Mitochondrial disorder Mother     Hypertension Mother     Thyroid cancer Mother 50    Clotting disorder Mother         MTFR    Cancer Mother 58        Thyroid, carcinoid, renal    Kidney cancer Mother     Arthritis Mother     Depression Father     Endocrinopathy Father     Clotting disorder Father         MTFR    Stroke Father     Aneurysm Father         brain    Anesthesia problems Father     Substance Abuse Father         Alcohol    Arthritis Father     Depression Brother     Narcolepsy Brother     Heart failure Maternal Grandmother 96    Coronary artery disease Maternal Grandmother     Stroke Maternal Grandmother     Dementia Maternal Grandmother         Brina    Breast cancer Maternal Grandmother     " "Mitochondrial disorder Son     Mitochondrial disorder Daughter     Stroke Family     Anuerysm Paternal Uncle         abdominal     Breast cancer Maternal Grandfather 46    Cancer Maternal Grandfather          at 46 of breast cancer that went to lungs    Breast cancer Maternal Aunt 42    Heart attack Neg Hx     Arrhythmia Neg Hx     Sudden death Neg Hx         scd       Meds/Allergies     Not in a hospital admission.    Allergies   Allergen Reactions    Lactated Ringers Other (See Comments)     Causes lactic acidosis    Sulfate Other (See Comments)    Sulfa Antibiotics      Arrhythmia     Vyepti [Eptinezumab-Jjmr] Chest Pain     Rigors, SOB    Guaifenesin      Arrhythmia    Other      Malignant hyperhermia precautions. NEVER use Lacted Ringers       Objective     /77   Pulse 68   Temp (!) 96.9 °F (36.1 °C) (Temporal)   Resp 18   Ht 5' 2.5\" (1.588 m)   Wt 78.5 kg (173 lb)   SpO2 100%   BMI 31.14 kg/m²       PHYSICAL EXAM    Gen: NAD  CV: RRR  CHEST: Clear  ABD: soft, NT/ND  EXT: no edema  Neuro: AAO      ASSESSMENT/PLAN:  This is a 49 y.o. year old female here for GERD, dysphagia, chronic constipation, colon cancer screening.     PLAN:   Procedure: egd/colonoscopy      "

## 2024-10-15 PROCEDURE — 88305 TISSUE EXAM BY PATHOLOGIST: CPT | Performed by: STUDENT IN AN ORGANIZED HEALTH CARE EDUCATION/TRAINING PROGRAM

## 2024-10-21 ENCOUNTER — TELEPHONE (OUTPATIENT)
Dept: GASTROENTEROLOGY | Facility: CLINIC | Age: 49
End: 2024-10-21

## 2024-10-21 NOTE — TELEPHONE ENCOUNTER
Written by Tom Patel MD on 10/17/2024 10:37 AM EDT  Seen by patient Leigh Watson on 10/17/2024 10:58 AM    10 yr colon recall set

## 2024-10-21 NOTE — TELEPHONE ENCOUNTER
----- Message from Ave JETT sent at 10/17/2024 11:53 AM EDT -----    ----- Message -----  From: Tom Patel MD  Sent: 10/17/2024  10:37 AM EDT  To: [facility]    The biopsies from your endoscopy showed some mild acid reflux changes but no evidence of Linares's esophagus which is good news.  Continue current medications.    As you recall he had a normal colonoscopy should have repeat examination in 10 years.    Follow-up in the office as needed, call with any questions or concerns.

## 2024-10-29 ENCOUNTER — TELEPHONE (OUTPATIENT)
Age: 49
End: 2024-10-29

## 2024-10-29 ENCOUNTER — PATIENT MESSAGE (OUTPATIENT)
Dept: NEUROLOGY | Facility: CLINIC | Age: 49
End: 2024-10-29

## 2024-10-29 NOTE — TELEPHONE ENCOUNTER
Qulipta has been approved through 10/29/25. Called \[facility]\"" [facility] and made the pharmacist aware of the approval. Sent pt a message through [employer].

## 2024-10-29 NOTE — TELEPHONE ENCOUNTER
Urgent prior auth submitted through [facility], [place] [id number]. Awaiting determination.

## 2024-10-29 NOTE — TELEPHONE ENCOUNTER
Leigh Watson   to [facility] (supporting Srinivas Zhu MD)         10/29/24  1:28 PM  Just following up to see if the pre-auth for my qulipta was completed? Thank you in advance,  Leigh

## 2024-11-09 PROBLEM — T88.3XXA MALIGNANT HYPERTHERMIA: Status: RESOLVED | Noted: 2024-10-10 | Resolved: 2024-11-09

## 2024-11-16 DIAGNOSIS — E16.1 REACTIVE HYPOGLYCEMIA: ICD-10-CM

## 2024-11-21 DIAGNOSIS — E16.2 HYPOGLYCEMIA: ICD-10-CM

## 2024-11-21 RX ORDER — ACARBOSE 100 MG/1
100 TABLET ORAL
Qty: 90 TABLET | Refills: 0 | Status: SHIPPED | OUTPATIENT
Start: 2024-11-21 | End: 2024-12-21

## 2025-01-09 DIAGNOSIS — G43.009 MIGRAINE WITHOUT AURA AND WITHOUT STATUS MIGRAINOSUS, NOT INTRACTABLE: ICD-10-CM

## 2025-01-20 ENCOUNTER — OFFICE VISIT (OUTPATIENT)
Dept: INTERNAL MEDICINE CLINIC | Facility: CLINIC | Age: 50
End: 2025-01-20
Payer: COMMERCIAL

## 2025-01-20 VITALS
HEART RATE: 84 BPM | BODY MASS INDEX: 31.86 KG/M2 | WEIGHT: 179.8 LBS | TEMPERATURE: 97.9 F | HEIGHT: 63 IN | OXYGEN SATURATION: 100 % | DIASTOLIC BLOOD PRESSURE: 64 MMHG | RESPIRATION RATE: 14 BRPM | SYSTOLIC BLOOD PRESSURE: 118 MMHG

## 2025-01-20 DIAGNOSIS — E88.40 MITOCHONDRIAL DISEASE (HCC): ICD-10-CM

## 2025-01-20 DIAGNOSIS — K21.9 GASTROESOPHAGEAL REFLUX DISEASE WITHOUT ESOPHAGITIS: ICD-10-CM

## 2025-01-20 DIAGNOSIS — K59.04 CHRONIC IDIOPATHIC CONSTIPATION: ICD-10-CM

## 2025-01-20 DIAGNOSIS — I95.1 ORTHOSTATIC HYPOTENSION: ICD-10-CM

## 2025-01-20 DIAGNOSIS — I73.00 RAYNAUD'S DISEASE WITHOUT GANGRENE: ICD-10-CM

## 2025-01-20 DIAGNOSIS — Z12.31 ENCOUNTER FOR SCREENING MAMMOGRAM FOR BREAST CANCER: ICD-10-CM

## 2025-01-20 DIAGNOSIS — Z00.00 ANNUAL PHYSICAL EXAM: ICD-10-CM

## 2025-01-20 DIAGNOSIS — I82.5Z2 CHRONIC VENOUS EMBOLISM AND THROMBOSIS OF DEEP VESSELS OF DISTAL END OF LEFT LOWER EXTREMITY (HCC): ICD-10-CM

## 2025-01-20 DIAGNOSIS — Z00.00 LABORATORY EXAMINATION ORDERED AS PART OF A ROUTINE GENERAL MEDICAL EXAMINATION: ICD-10-CM

## 2025-01-20 DIAGNOSIS — G43.009 MIGRAINE WITHOUT AURA AND WITHOUT STATUS MIGRAINOSUS, NOT INTRACTABLE: Primary | ICD-10-CM

## 2025-01-20 DIAGNOSIS — K91.2 POSTSURGICAL MALABSORPTION: ICD-10-CM

## 2025-01-20 DIAGNOSIS — N31.9 NEUROGENIC BLADDER: ICD-10-CM

## 2025-01-20 DIAGNOSIS — Z98.84 S/P GASTRIC BYPASS: ICD-10-CM

## 2025-01-20 PROCEDURE — 99214 OFFICE O/P EST MOD 30 MIN: CPT | Performed by: INTERNAL MEDICINE

## 2025-01-20 PROCEDURE — 99396 PREV VISIT EST AGE 40-64: CPT | Performed by: INTERNAL MEDICINE

## 2025-01-20 NOTE — PROGRESS NOTES
Name: Leigh Watson      : 1975      MRN: [id number]  Encounter Provider: Jyoti Muir MD  Encounter Date: 2025   Encounter department: [facility]  :  Assessment & Plan  Migraine without aura and without status migrainosus, not intractable  Now on Qulipta.  Per neurology.       Neurogenic bladder  Revision of stimulator scheduled next month.  Has not done self cath.       Raynaud's disease without gangrene  On nifedipine.  Sees rheum.       Postsurgical malabsorption  On acarbose and Januvia.  Per Endo.       Orthostatic hypotension  Stable.   Taking midodrine daily  Off florinef.         Gastroesophageal reflux disease without esophagitis  Taking PPI bid.       Chronic idiopathic constipation  Taking Linzess, Motegrity daily.       S/P gastric bypass  Taking MVI daily.       Chronic venous embolism and thrombosis of deep vessels of distal end of left lower extremity (HCC)  Recommend compression stockings daily.       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HCC)  As above.       Laboratory examination ordered as part of a routine general medical examination    Orders:  •  Lipid panel; Future  •  Hemoglobin A1C; Future    Encounter for screening mammogram for breast cancer    Orders:  •  Mammo screening bilateral w 3d and cad; Future    Annual physical exam  Declined COVID vaccine. Mammogram due.  Colonoscopy updated.       Follow up in 1 year or as needed.         History of Present Illness   She has a procedure scheduled to change the location of her bladder stimulator next month. She plans to be out of work afterwards to recover.    She reports migraine headaches have significantly improved, rarely takes Motrin as needed.  She continues to take acarbose prior to meals. Symptoms worse when she eats carbs. No nausea or vomiting.  She takes her medications for constipation.    She has been taking midodrine as needed only during the winter, takes it more regularly during the summer  "months.    She would like her Port to be removed. She uses her G tube as needed only, used it a few weeks ago.    She is concerned about a red spot on her right leg. She has had it for about 7 months, says it would scab over and occasionally bleed, has not healed.      Review of Systems   Constitutional:  Negative for activity change and appetite change.   HENT:  Negative for congestion.    Eyes:  Negative for visual disturbance.   Respiratory:  Negative for cough, chest tightness and shortness of breath.    Cardiovascular:  Negative for chest pain.   Gastrointestinal:  Negative for nausea.   Genitourinary:  Negative for dysuria and urgency.   Musculoskeletal:  Negative for arthralgias.   Skin:  Positive for color change.   Neurological:  Negative for dizziness, weakness and headaches.   Psychiatric/Behavioral:  Negative for dysphoric mood and sleep disturbance. The patient is not nervous/anxious.        Objective   /64 (BP Location: Left arm, Patient Position: Sitting, Cuff Size: Large)   Pulse 84   Temp 97.9 °F (36.6 °C)   Resp 14   Ht 5' 2.5\" (1.588 m)   Wt 81.6 kg (179 lb 12.8 oz)   SpO2 100%   BMI 32.36 kg/m²      Physical Exam  Vitals and nursing note reviewed.   Constitutional:       General: She is not in acute distress.     Appearance: She is well-developed.   HENT:      Head: Normocephalic and atraumatic.      Right Ear: Tympanic membrane, ear canal and external ear normal.      Left Ear: Tympanic membrane, ear canal and external ear normal.      Nose: Nose normal.      Mouth/Throat:      Mouth: Mucous membranes are moist.   Eyes:      Pupils: Pupils are equal, round, and reactive to light.   Cardiovascular:      Rate and Rhythm: Normal rate and regular rhythm.      Heart sounds: Normal heart sounds.   Pulmonary:      Effort: Pulmonary effort is normal.      Breath sounds: Normal breath sounds. No wheezing.   Abdominal:      General: Bowel sounds are normal.      Palpations: Abdomen is soft. "   Musculoskeletal:         General: No swelling.      Right lower leg: No edema.      Left lower leg: No edema.   Skin:     General: Skin is warm.      Findings: Erythema present. No rash.          Neurological:      General: No focal deficit present.      Mental Status: She is alert and oriented to person, place, and time.   Psychiatric:         Mood and Affect: Mood and affect normal. Mood is not anxious or depressed.         Behavior: Behavior normal.           Labs & imaging results reviewed with patient.

## 2025-01-23 ENCOUNTER — TELEPHONE (OUTPATIENT)
Dept: BARIATRICS | Facility: CLINIC | Age: 50
End: 2025-01-23

## 2025-01-23 NOTE — TELEPHONE ENCOUNTER
LVM to reschedule 2/26 appointment with Dr Owens, out of office, can keep day/time if wants to see Radha; if not, need to reschedule to next available.

## 2025-01-30 NOTE — PROGRESS NOTES
Message  Faxed over the following orders for TPN:  Dextrose 20% 800 ml, Amino Acid 10% 1300 ml, Lipids 20% 200 ml for total of 1480 kcal, and 130 grams of protein  2300 ml of fluid per day  CMP, mag, phos, q 3 days then:  CMP, mag, phos, prealbumin and lipid panel weekly  infused over 12 hours, taper up and down one hour  Same electrolytes and additives as previously    Patient experiencing decreased gut motility  Bile and tube feedings coming out of her tube and skin surrounding the tube is getting raw  Instructed patient to d/c all po intake and to use g tube for hydration as needed  TPN restarted to provide hydration and nutritional needs  Active Problems    1  Abdominal discomfort (789 00) (R10 9)   2  Abdominal pain (789 00) (R10 9)   3  Abnormal weight gain (783 1) (R63 5)   4  Acute UTI (599 0) (N39 0)   5  Allergic contact dermatitis due to other agents (692 89) (L23 89)   6  Cervical cancer screening (V76 2) (Z12 4)   7  Chronic venous embolism and thrombosis of deep vessels of distal lower extremity   (453 52) (I82 5Z9)   8  Constipation (564 00) (K59 00)   9  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)   10  Dysphagia (787 20) (R13 10)   11  Edema (782 3) (R60 9)   12  Gastroesophageal reflux disease (530 81) (K21 9)   13  History of Nissen fundoplication (W31 14) (Z67 01)   14  Hypertension (401 9) (I10)   15  Iron deficiency (280 9) (E61 1)   16  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)   17  Migraine headache (346 90) (G43 909)   18  Muscle weakness (generalized) (728 87) (M62 81)   19  Nausea (787 02) (R11 0)   20  Nausea with vomiting (787 01) (R11 2)   21  Need for diphtheria-tetanus-pertussis (Tdap) vaccine (V06 1) (Z23)   22  Obesity (278 00) (E66 9)   23  Oropharyngeal dysphagia (787 22) (R13 12)   24  Polycystic ovarian syndrome (256 4) (E28 2)   25  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 3) (K91 2,Z90 3)   26  Pre-op evaluation (V76 70) (Z01 818)   27  Pre-operative cardiovascular examination (V72 81) (Z01 810)   28  Retinitis pigmentosa (362 74) (H35 52)   29  Status post gastric bypass for obesity (V45 86) (Z98 84)   30  Stroke Syndrome (436)   31  Type B influenza (487 1) (J10 1)   32  Urinary retention (788 20) (R33 9)   33  Urination pain (788 1) (R30 9)   34  Voiding dysfunction (599 9) (N39 8)    Current Meds   1  B Complex CAPS; Therapy: (Recorded:35Xlv5131) to Recorded   2  Citracal TABS; Therapy: (Recorded:54Jsw5780) to Recorded   3  Culturelle CAPS; Therapy: (Recorded:40Smc0726) to Recorded   4  Levsin 0 125 MG Oral Tablet; Therapy: (Recorded:42Kdf7621) to Recorded   5  MiraLax Oral Powder (Polyethylene Glycol 3350); Therapy: (Recorded:11Zxr1644) to Recorded   6  Multivitamins TABS; TAKE 1 TABLET DAILY; Therapy: (Recorded:75Mij8318) to Recorded   7  Ondansetron 4 MG Oral Tablet Dispersible (Zofran ODT); Take one every 4-6 hours prn   for nausea; Therapy: 49KDG2743 to (Last Rx:23Nov2015)  Requested for: 23Nov2015 Ordered   8  Pantoprazole Sodium 40 MG Oral Tablet Delayed Release (Protonix); TAKE 1 TABLET   TWICE DAILY 30 MINUTES BEFORE BREAKFAST AND DINNER; Therapy: 42ABC1002 to (Veto Chin)  Requested for: 45BAL8771; Last   Rx:10Mar2016 Ordered   9  Stool Softener TABS; Therapy: (Recorded:99Til5032) to Recorded    Allergies    1  Guaifenesin & Derivatives   2   Sulfa Drugs    Signatures   Electronically signed by : ANGELA Peter; Sep 20 2016 12:08PM EST                       (Author) 4 = No assist / stand by assistance

## 2025-02-10 DIAGNOSIS — E16.1 REACTIVE HYPOGLYCEMIA: ICD-10-CM

## 2025-02-11 RX ORDER — ACYCLOVIR 400 MG/1
1 TABLET ORAL
Qty: 9 EACH | Refills: 1 | Status: SHIPPED | OUTPATIENT
Start: 2025-02-11 | End: 2025-02-14 | Stop reason: SDUPTHER

## 2025-02-14 ENCOUNTER — OFFICE VISIT (OUTPATIENT)
Dept: ENDOCRINOLOGY | Facility: CLINIC | Age: 50
End: 2025-02-14
Payer: COMMERCIAL

## 2025-02-14 VITALS
BODY MASS INDEX: 31.89 KG/M2 | HEIGHT: 63 IN | SYSTOLIC BLOOD PRESSURE: 110 MMHG | DIASTOLIC BLOOD PRESSURE: 70 MMHG | HEART RATE: 87 BPM | WEIGHT: 180 LBS | OXYGEN SATURATION: 93 %

## 2025-02-14 DIAGNOSIS — E16.2 HYPOGLYCEMIA: ICD-10-CM

## 2025-02-14 DIAGNOSIS — E16.1 REACTIVE HYPOGLYCEMIA: Primary | ICD-10-CM

## 2025-02-14 PROCEDURE — 95251 CONT GLUC MNTR ANALYSIS I&R: CPT | Performed by: INTERNAL MEDICINE

## 2025-02-14 PROCEDURE — 99213 OFFICE O/P EST LOW 20 MIN: CPT | Performed by: INTERNAL MEDICINE

## 2025-02-14 RX ORDER — ACYCLOVIR 400 MG/1
1 TABLET ORAL
Qty: 9 EACH | Refills: 3 | Status: SHIPPED | OUTPATIENT
Start: 2025-02-14

## 2025-02-14 RX ORDER — ACARBOSE 100 MG/1
100 TABLET ORAL
Qty: 300 TABLET | Refills: 3 | Status: SHIPPED | OUTPATIENT
Start: 2025-02-14 | End: 2026-03-21

## 2025-02-14 NOTE — ASSESSMENT & PLAN NOTE
Overall, this has improved over time.  This is well-managed with the use of a continuous glucose monitor and acarbose as well as lifestyle modification.  Continue current regimen.    Orders:    Continuous Glucose Sensor (Dexcom G7 Sensor); Use 1 Device every 10 days

## 2025-02-14 NOTE — PROGRESS NOTES
Name: Leigh Watson      : 1975      MRN: [id number]  Encounter Provider: Yoni Christian MD  Encounter Date: 2025   Encounter department: [facility]  :  Assessment & Plan  Reactive hypoglycemia  Overall, this has improved over time.  This is well-managed with the use of a continuous glucose monitor and acarbose as well as lifestyle modification.  Continue current regimen.    Orders:    Continuous Glucose Sensor (Dexcom G7 Sensor); Use 1 Device every 10 days    Hypoglycemia    Orders:    acarbose (PRECOSE) 100 MG tablet; Take 1 tablet (100 mg total) by mouth 3 (three) times a day with meals        History of Present Illness   HPI  Leigh Watson is a 49 y.o. female who presents reactive hypoglycemia for which she is on acarbose.  She does use a continuous glucose monitor which is extremely helpful in preventing lows.  History obtained from: patient    Review of Systems   Constitutional:  Negative for chills and fever.   Respiratory:  Negative for shortness of breath.    Cardiovascular:  Negative for chest pain.   Gastrointestinal:  Negative for constipation, diarrhea, nausea and vomiting.   All other systems reviewed and are negative.    Current Outpatient Medications on File Prior to Visit   Medication Sig Dispense Refill    acetylcysteine (MUCOMYST) 200 mg/mL nebulizer solution as needed       Alpha-Lipoic Acid 100 MG CAPS Take by mouth 2 (two) times a day      Atogepant 60 MG TABS Take 60 mg by mouth in the morning 90 tablet 3    B Complex-Biotin-FA ( VITAMIN B 50/B-COMPLEX PO) Take 1 tablet by mouth daily.        Calcium Citrate 1040 MG TABS Take by mouth      COENZYME Q-10 PO Dose varies      CREATINE MONOHYDRATE PO Take 2.5 g by mouth      docusate sodium (COLACE) 100 mg capsule Take 200 mg by mouth 2 (two) times a day.      ketorolac (TORADOL) 30 mg/mL injection INJECT 1-2 ML IN THE MUSCLE ONCE PER 24 HOURS AS NEEDED FOR MIGRAINE. MAX  "2X/WEEK 6 mL 0    L-Arginine 1000 MG TABS Take 6,000 mg by mouth 2 (two) times a day       linaCLOtide 145 MCG CAPS Take 145 mcg by mouth 2 (two) times a day      midodrine (PROAMATINE) 5 mg tablet Take 1 tablet (5 mg total) by mouth 3 (three) times a day 270 tablet 0    Motegrity 2 MG TABS       Multiple Vitamin (MULTIVITAMIN) tablet Take 1 tablet by mouth daily.      Nerve Stimulator (Nerivio) SEJAL 1 activation as early as possible for migraine, treat for 60 minutes 1 each 12    NIFEdipine (PROCARDIA XL) 60 mg 24 hr tablet Take 1 tablet (60 mg total) by mouth daily 90 tablet 1    nystatin (MYCOSTATIN) cream Apply topically 2 (two) times a day 30 g 0    Oral Vehicles (PCCA-PLUS) SUSP       P-Aminobenzoic Acid (PARA-AMINOBENZOIC ACID) POWD       pantoprazole (PROTONIX) 40 mg tablet   3    Riboflavin 100 MG TABS Take 100 mg by mouth      sitaGLIPtin (JANUVIA) 50 mg tablet 1 tab daily 90 tablet 0    Syringe/Needle, Disp, (SYRINGE 3CC/36NE0-6/4\") 27G X 1-1/4\" 3 ML MISC Use for Toradol intramuscular injections. 6 each 0    Ubiquinol Liposomal 100 MG/5ML SYRP Take 300 mg by mouth      Ubiquinol POWD       vitamin E 100 UNIT capsule Take 78 Units by mouth daily      [DISCONTINUED] acarbose (PRECOSE) 100 MG tablet Take 1 tablet (100 mg total) by mouth 3 (three) times a day with meals 90 tablet 0    [DISCONTINUED] Continuous Glucose Sensor (Dexcom G7 Sensor) Use 1 Device every 10 days 9 each 1    dihydroergotamine (MIGRANAL) 4 MG/ML nasal spray 1 spray in each nostril as needed for migraine, may repeat X1 in 15 minutes, MAX 4 sprays in 24 hours, max 8 sprays per week (Patient not taking: Reported on 2/14/2025) 16 mL 1     No current facility-administered medications on file prior to visit.         Objective   /70   Pulse 87   Ht 5' 2.5\" (1.588 m)   Wt 81.6 kg (180 lb)   SpO2 93%   BMI 32.40 kg/m²      Physical Exam  Vitals and nursing note reviewed.   Constitutional:       Appearance: Normal appearance.   HENT: "      Head: Normocephalic and atraumatic.   Eyes:      General: No scleral icterus.        Right eye: No discharge.         Left eye: No discharge.   Pulmonary:      Effort: Pulmonary effort is normal.   Musculoskeletal:         General: Normal range of motion.      Cervical back: Normal range of motion.   Skin:     Coloration: Skin is not jaundiced.   Neurological:      General: No focal deficit present.      Mental Status: She is alert and oriented to person, place, and time.   Psychiatric:         Mood and Affect: Mood normal.         Behavior: Behavior normal.       Leigh JON Watson   Device used Dexcom  Home use       Indication   Hypoglycemia    More than 72 hours of data was reviewed. Report to be scanned to chart.     Date Range: February 1, 2025 to February 14, 2025    Analysis of data:   % time CGM used: 80%  Average Glucose: 102 mg/dL  Coefficient of Variation: 18.1%  SD : 18 mg/dL  Time in Target Range: 95%  Time Above Range: 1%  Time Below Range: 3% low and 1% very low    Interpretation of data: Overall, the blood sugars under good control and she is having manageable episodes of hypoglycemia.

## 2025-02-15 ENCOUNTER — APPOINTMENT (OUTPATIENT)
Dept: LAB | Facility: CLINIC | Age: 50
End: 2025-02-15
Payer: COMMERCIAL

## 2025-02-15 DIAGNOSIS — Z00.00 LABORATORY EXAMINATION ORDERED AS PART OF A ROUTINE GENERAL MEDICAL EXAMINATION: ICD-10-CM

## 2025-02-15 LAB
25(OH)D3 SERPL-MCNC: 37.2 NG/ML (ref 30–100)
ALBUMIN SERPL BCG-MCNC: 4 G/DL (ref 3.5–5)
ALP SERPL-CCNC: 60 U/L (ref 34–104)
ALT SERPL W P-5'-P-CCNC: 15 U/L (ref 7–52)
ANION GAP SERPL CALCULATED.3IONS-SCNC: 4 MMOL/L (ref 4–13)
AST SERPL W P-5'-P-CCNC: 15 U/L (ref 13–39)
BASOPHILS # BLD AUTO: 0.03 THOUSANDS/ΜL (ref 0–0.1)
BASOPHILS NFR BLD AUTO: 1 % (ref 0–1)
BILIRUB SERPL-MCNC: 0.47 MG/DL (ref 0.2–1)
BUN SERPL-MCNC: 12 MG/DL (ref 5–25)
CALCIUM SERPL-MCNC: 9.7 MG/DL (ref 8.4–10.2)
CHLORIDE SERPL-SCNC: 106 MMOL/L (ref 96–108)
CHOLEST SERPL-MCNC: 172 MG/DL (ref ?–200)
CO2 SERPL-SCNC: 29 MMOL/L (ref 21–32)
CREAT SERPL-MCNC: 0.75 MG/DL (ref 0.6–1.3)
EOSINOPHIL # BLD AUTO: 0.09 THOUSAND/ΜL (ref 0–0.61)
EOSINOPHIL NFR BLD AUTO: 2 % (ref 0–6)
ERYTHROCYTE [DISTWIDTH] IN BLOOD BY AUTOMATED COUNT: 12.3 % (ref 11.6–15.1)
EST. AVERAGE GLUCOSE BLD GHB EST-MCNC: 105 MG/DL
FERRITIN SERPL-MCNC: 247 NG/ML (ref 11–307)
GFR SERPL CREATININE-BSD FRML MDRD: 95 ML/MIN/1.73SQ M
GLUCOSE P FAST SERPL-MCNC: 92 MG/DL (ref 65–99)
HBA1C MFR BLD: 5.3 %
HCT VFR BLD AUTO: 38.9 % (ref 34.8–46.1)
HDLC SERPL-MCNC: 76 MG/DL
HGB BLD-MCNC: 13.2 G/DL (ref 11.5–15.4)
IMM GRANULOCYTES # BLD AUTO: 0.02 THOUSAND/UL (ref 0–0.2)
IMM GRANULOCYTES NFR BLD AUTO: 0 % (ref 0–2)
IRON SATN MFR SERPL: 37 % (ref 15–50)
IRON SERPL-MCNC: 110 UG/DL (ref 50–212)
LDLC SERPL CALC-MCNC: 82 MG/DL (ref 0–100)
LYMPHOCYTES # BLD AUTO: 1.34 THOUSANDS/ΜL (ref 0.6–4.47)
LYMPHOCYTES NFR BLD AUTO: 29 % (ref 14–44)
MCH RBC QN AUTO: 31.2 PG (ref 26.8–34.3)
MCHC RBC AUTO-ENTMCNC: 33.9 G/DL (ref 31.4–37.4)
MCV RBC AUTO: 92 FL (ref 82–98)
MONOCYTES # BLD AUTO: 0.27 THOUSAND/ΜL (ref 0.17–1.22)
MONOCYTES NFR BLD AUTO: 6 % (ref 4–12)
NEUTROPHILS # BLD AUTO: 2.84 THOUSANDS/ΜL (ref 1.85–7.62)
NEUTS SEG NFR BLD AUTO: 62 % (ref 43–75)
NONHDLC SERPL-MCNC: 96 MG/DL
NRBC BLD AUTO-RTO: 0 /100 WBCS
PLATELET # BLD AUTO: 222 THOUSANDS/UL (ref 149–390)
PMV BLD AUTO: 9.9 FL (ref 8.9–12.7)
POTASSIUM SERPL-SCNC: 3.5 MMOL/L (ref 3.5–5.3)
PROT SERPL-MCNC: 6.6 G/DL (ref 6.4–8.4)
PTH-INTACT SERPL-MCNC: 77.1 PG/ML (ref 12–88)
RBC # BLD AUTO: 4.23 MILLION/UL (ref 3.81–5.12)
SODIUM SERPL-SCNC: 139 MMOL/L (ref 135–147)
TIBC SERPL-MCNC: 299.6 UG/DL (ref 250–450)
TRANSFERRIN SERPL-MCNC: 214 MG/DL (ref 203–362)
TRIGL SERPL-MCNC: 69 MG/DL (ref ?–150)
UIBC SERPL-MCNC: 190 UG/DL (ref 155–355)
VIT B12 SERPL-MCNC: 354 PG/ML (ref 180–914)
WBC # BLD AUTO: 4.59 THOUSAND/UL (ref 4.31–10.16)

## 2025-02-15 PROCEDURE — 83036 HEMOGLOBIN GLYCOSYLATED A1C: CPT

## 2025-02-15 PROCEDURE — 80061 LIPID PANEL: CPT

## 2025-02-20 ENCOUNTER — OFFICE VISIT (OUTPATIENT)
Dept: BARIATRICS | Facility: CLINIC | Age: 50
End: 2025-02-20
Payer: COMMERCIAL

## 2025-02-20 VITALS
BODY MASS INDEX: 29.16 KG/M2 | WEIGHT: 175 LBS | DIASTOLIC BLOOD PRESSURE: 88 MMHG | HEART RATE: 95 BPM | OXYGEN SATURATION: 95 % | SYSTOLIC BLOOD PRESSURE: 116 MMHG | HEIGHT: 65 IN | TEMPERATURE: 97.4 F

## 2025-02-20 DIAGNOSIS — Z98.84 S/P GASTRIC BYPASS: Primary | ICD-10-CM

## 2025-02-20 DIAGNOSIS — Z09 S/P GASTROSTOMY TUBE (G TUBE) PLACEMENT, FOLLOW-UP EXAM: ICD-10-CM

## 2025-02-20 LAB
VIT A SERPL-MCNC: 26.9 UG/DL (ref 20.1–62)
VIT B1 BLD-SCNC: 194.3 NMOL/L (ref 66.5–200)

## 2025-02-20 PROCEDURE — 99213 OFFICE O/P EST LOW 20 MIN: CPT | Performed by: SURGERY

## 2025-02-20 RX ORDER — NYSTATIN 100000 U/G
CREAM TOPICAL 2 TIMES DAILY
Qty: 30 G | Refills: 0 | Status: SHIPPED | OUTPATIENT
Start: 2025-02-20

## 2025-02-20 NOTE — PROGRESS NOTES
FOLLOW UP VISIT - BARIATRIC SURGERY  Leigh Watson 49 y.o. female MRN: [id number]  Unit/Bed#:  Encounter: [id number]      HPI:  Leigh Watson is a 49 y.o. female who presents status post gastric bypass in 2015.      Review of Systems   All other systems reviewed and are negative.      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    Gastrostomy tube in place (HCC)     Gastrostomy tube in place (HCC)     GERD (gastroesophageal reflux disease)     History of blood clots 2012    Left leg. Has IVC filter now. Occurred while on Lovenox    Iron deficiency     Malignant hyperthermia due to anesthesia     Patient's son has M.H.  States she needs precautions    Migraines     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HCC)     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HCC)     MTHFR (methylene THF reductase) deficiency and homocystinuria (HCC)     Muscle weakness (generalized)     Nausea     Neuromuscular disorder (HCC)     CRPS right arm    On total parenteral nutrition (TPN)     for 8 mts    PCOS (polycystic ovarian syndrome)     PONV (postoperative nausea and vomiting)     Postgastrectomy syndrome     malabsorption    Postsurgical malabsorption     Status post gastric bypass for obesity     Stroke (HCC) 2004, 2009, 2023    Metabolic strokes. Right hemiparesis= minor now.    Urinary incontinence     Urinary retention     Vomiting     When eating     Past Surgical History:   Procedure Laterality Date    ANKLE SURGERY Left     Has plate and screws    COLONOSCOPY      COSMETIC SURGERY      X14 as child post attack by dog. Arms, legs and face    DILATION AND CURETTAGE OF UTERUS      x2    ESOPHAGOGASTRODUODENOSCOPY N/A 01/27/2016    Procedure: ESOPHAGOGASTRODUODENOSCOPY (EGD);  Surgeon: Trevor Owens MD;  Location: [facility];  Service:     FRACTURE SURGERY Left      "Left ankle - hardware    GASTRIC BYPASS      GASTROSTOMY      IVC FILTER INSERTION      NISSEN FUNDOPLICATION      OVARY SURGERY Bilateral     \"Drilling\" due to multiple cysts- PCOS    NC EGD TRANSORAL BIOPSY SINGLE/MULTIPLE N/A 03/09/2016    Procedure: ESOPHAGOGASTRODUODENOSCOPY (EGD);  Surgeon: Trevor Owens MD;  Location: [facility];  Service: Bariatrics    NC HYSTEROSCOPY BX ENDOMETRIUM&/POLYPC W/WO D&C N/A 05/22/2020    Procedure: DILATATION AND CURETTAGE (D&C) WITH HYSTEROSCOPY;  Surgeon: Bee Ramirez MD;  [facility];  Service: Gynecology    NC HYSTEROSCOPY ENDOMETRIAL ABLATION N/A 05/22/2020    Procedure: ABLATION ENDOMETRIAL MARISSA;  Surgeon: Bee Ramirez MD;  [facility];  Service: Gynecology    NC LAPS SURG GASTROSTOMY W/O CONSTJ GSTR TUBE SPX N/A 04/19/2016    Procedure: INSERTION GASTROSTOMY TUBE LAPAROSCOPIC WITH INTRAOP EGD;  Surgeon: Trevor Owens MD;  Location: [facility];  Service: Bariatrics    ULNAR NERVE TRANSPOSITION Right     WISDOM TOOTH EXTRACTION       Social History   Social History     Substance and Sexual Activity   Alcohol Use No     Social History     Substance and Sexual Activity   Drug Use No     Social History     Tobacco Use   Smoking Status Never   Smokeless Tobacco Never     Family History: Family history non-contributory    Meds/Allergies   all medications and allergies reviewed  Allergies   Allergen Reactions    Guaifenesin Other (See Comments)     Arrhythmia    guaifenesin    Lactated Ringers Other (See Comments)     Causes lactic acidosis    Sulfa Antibiotics Other (See Comments)     Arrhythmia    Substance with sulfonamide structure and antibacterial mechanism of action (substance)    Sulfate Other (See Comments)    Vyepti [Eptinezumab-[facility]] Chest Pain     Rigors, SOB    Other      Malignant hyperhermia precautions. NEVER use Lacted Ringers       Objective       Current Vitals:   Blood Pressure: 116/88 (02/20/25 1143)  Pulse: 95 (02/20/25 " "1143)  Temperature: (!) 97.4 °F (36.3 °C) (02/20/25 1143)  Temp Source: Tympanic (02/20/25 1143)  Height: 5' 4.5\" (163.8 cm) (02/20/25 1143)  Weight - Scale: 79.4 kg (175 lb) (02/20/25 1143)  SpO2: 95 % (02/20/25 1143)        Invasive Devices       Central Venous Catheter Line  Duration             Port A Cath Right Chest -- days              Drain  Duration             Gastrostomy/Enterostomy Gastrostomy 20 Fr. LUQ 3228 days                    Physical Exam  Vitals reviewed.   Constitutional:       General: She is not in acute distress.     Appearance: She is well-developed. She is not diaphoretic.   HENT:      Head: Normocephalic and atraumatic.      Right Ear: External ear normal.      Left Ear: External ear normal.      Nose: Nose normal.   Eyes:      General: No scleral icterus.        Right eye: No discharge.         Left eye: No discharge.      Conjunctiva/sclera: Conjunctivae normal.   Neurological:      Mental Status: She is alert and oriented to person, place, and time.   Psychiatric:         Behavior: Behavior normal.         Thought Content: Thought content normal.         Judgment: Judgment normal.         Lab Results: I have personally reviewed pertinent lab results.    Imaging: Results Review Statement: No pertinent imaging studies reviewed.  EKG, Pathology, and Other Studies: Results Review Statement: No pertinent imaging studies reviewed.      Assessment/PLAN:    49 y.o. female status post Nissen takedown and Laparoscopic RNYGB by in November 2015 by me, presents to office for annual visit.       She has complicated past medical history.  Patient had a gastrostomy tube (CARLIN-Key) Button in April of 2016 to assist with her nutritional requirements.  She continues to supplement her hydration with her G-tube and gets iron infusions as needed. She has a 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CMS-HCC), 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 and follows up with the [facility] " systems. She continues to follow with the team at [facility] and she has decided to stay put for now with regards to other operations as the offered her at some point a colectomy.     In she underwent a bladder stimulator placement that has helped her a lot and she is very happy with it. She is having some issues with the battery and it might need to repositioned.     Her teenage daughter is currently having some health issues and that has caused a great deal of stress to her and her family.    She continues to vent her G tube and has been managed to live with everything that comes with it. She told me that she has occasional bleed from that site probably from some granulation tissue.     She underwent a colonoscopy and everything was normal.     Her nutritional labs from this month are all within normal limits. She is very disciplined  at taking her supplements.      She is committed to continue to observe her diet and to increase her physical activity.     She will follow up with us as scheduled yearly..    I had a detailed discussion with her stressing the fact that long-term success is largely dependent on compliance and abidance to the recommendations of the program as well as participation within the support groups.    She is committed to continue to observe her diet and to increase her physical activity.    She will follow up with us as scheduled.    Trevor Owens MD  2/20/2025  12:18 PM      .

## 2025-02-20 NOTE — PROGRESS NOTES
Date of surgery: 11/11/2015  Procedure: RNY gastric bypass w/ PEHR  Performing surgeon: Dr. Owens    Initial Weight - 261.5 lbs.  Current Weight - 175.0 lbs.  Rachid Weight - 161.0 lbs.  Total Body Weight Loss (EWL) - 86.4 lbs.  EWL% - 76%  TWB% - 33%

## 2025-02-21 LAB — ZINC SERPL-MCNC: 59 UG/DL (ref 44–115)

## 2025-02-26 PROBLEM — G62.9 SMALL FIBER NEUROPATHY: Status: ACTIVE | Noted: 2025-02-26

## 2025-02-27 ENCOUNTER — TELEPHONE (OUTPATIENT)
Dept: RHEUMATOLOGY | Facility: CLINIC | Age: 50
End: 2025-02-27

## 2025-02-27 ENCOUNTER — TELEMEDICINE (OUTPATIENT)
Dept: RHEUMATOLOGY | Facility: CLINIC | Age: 50
End: 2025-02-27
Payer: COMMERCIAL

## 2025-02-27 DIAGNOSIS — M25.50 DIFFUSE ARTHRALGIA: ICD-10-CM

## 2025-02-27 DIAGNOSIS — I73.00 RAYNAUD'S DISEASE WITHOUT GANGRENE: Primary | ICD-10-CM

## 2025-02-27 DIAGNOSIS — L40.9 PSORIASIS: ICD-10-CM

## 2025-02-27 DIAGNOSIS — G62.9 SMALL FIBER NEUROPATHY: ICD-10-CM

## 2025-02-27 PROCEDURE — 99215 OFFICE O/P EST HI 40 MIN: CPT | Performed by: INTERNAL MEDICINE

## 2025-02-27 NOTE — ASSESSMENT & PLAN NOTE
- At our visit 1 year ago she reported bilateral hand pain and gelling phenomenon for which I completed an inflammatory arthritis workup which showed normal inflammatory markers, rheumatoid serologies and hand x-rays.  She now provides new information with a history of psoriasis that has intermittently flared on her left elbow for years and is also associated with widespread joint pains, with localization to her low back, elbows and knees.  She does not describe joint swelling or prolonged morning stiffness of her joint but continues to have gelling.  She will stop by the office so I can perform a joint examination and also complete spinal x-rays before this, but I did review with her given this presentation it may raise suspicion for early psoriatic arthritis.  For now I recommend a monitoring approach and use of cautious NSAIDs as needed [given a history of gastric bypass surgery] with Tylenol on occasion if it is beneficial.  Depending on the progression of her arthralgias we will revisit the need for DMARDs.    Orders:    XR sacroiliac joints 3+ views; Future    XR spine lumbar minimum 4 views non injury; Future

## 2025-02-27 NOTE — ASSESSMENT & PLAN NOTE
Ms. Watson is a 49-year-old female with history significant for a genetic mitochondrial disorder, small fiber neuropathy and Raynaud's phenomenon who presents for a follow-up.  She is currently on nifedipine 60 mg once daily.       Rheumatic Disease Summary  1. Primary Raynaud phenomenon   -Rheum eval 7/25/19 for raynaud’s, presented on amlodipine 10mg doing well; saw Dr. Vazquez previously and not dx with an AI disease   -Labs 7/30/19: negative BLAIRE, SSA, SSB, RF, CCP, APLs, hep panel, normal C3/4  -Visit 1/7/20: raynaud's sx uncontrolled on amlodipine 10mg, D/C'd and started Nifedipine 30mg   -Visit 11/10/20: doing better on nifedipine but still frequent raynaud's, increased to 60mg; advised talking to neuro about tx for neuropathy in hands  -Visit 11/11/21: doing better on nifedipine 60mg for raynaud's, still with other symptoms from a probable small fiber neuropathy   -Visit 11/8/22: continues to have cold intolerance, suspected to be related to a SFN; cont nifedipine, had worsening symptoms with trial off nifedipine; f/u with neuro  - 2/26/24: continue nifedipine for Raynaud's. Re-eval IA for hand arthralgias.  - 2/27/24: continue nifedipine for Raynaud's. Reports PsO and arthralgias - monitor for IA.  2. 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        - Leigh presents today for a follow-up of an overlap of Raynaud's phenomenon and small fiber neuropathy that have been contributing to chronic complaints of paresthesias as well as cold intolerance of her extremities.  As she has realized that nifedipine has benefited her she will  continue this unchanged and ensure she is keeping both her core body temperature and extremities warm.

## 2025-02-27 NOTE — PROGRESS NOTES
Virtual Regular VisitName: Leigh Watson      : 1975      MRN: [id number]  Encounter Provider: Nancy Niño MD  Encounter Date: 2025   Encounter department: [facility]  :  Assessment & Plan  Raynaud's disease without gangrene  Ms. Watson is a 49-year-old female with history significant for a genetic mitochondrial disorder, small fiber neuropathy and Raynaud's phenomenon who presents for a follow-up.  She is currently on nifedipine 60 mg once daily.       Rheumatic Disease Summary  1. Primary Raynaud phenomenon   -Rheum eval 19 for raynaud’s, presented on amlodipine 10mg doing well; saw Dr. Vazquez previously and not dx with an AI disease   -Labs 19: negative BLAIRE, SSA, SSB, RF, CCP, APLs, hep panel, normal C3/4  -Visit 20: raynaud's sx uncontrolled on amlodipine 10mg, D/C'd and started Nifedipine 30mg   -Visit 11/10/20: doing better on nifedipine but still frequent raynaud's, increased to 60mg; advised talking to neuro about tx for neuropathy in hands  -Visit 21: doing better on nifedipine 60mg for raynaud's, still with other symptoms from a probable small fiber neuropathy   -Visit 22: continues to have cold intolerance, suspected to be related to a SFN; cont nifedipine, had worsening symptoms with trial off nifedipine; f/u with neuro  - 24: continue nifedipine for Raynaud's. Re-eval IA for hand arthralgias.  - 24: continue nifedipine for Raynaud's. Reports PsO and arthralgias - monitor for IA.  2. 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        -  Leigh presents today for a follow-up of an overlap of Raynaud's phenomenon and small fiber neuropathy that have been contributing to chronic complaints of paresthesias as well as cold intolerance of her extremities.  As she has realized that nifedipine has benefited her she will continue this unchanged and ensure she is keeping both her core body temperature and extremities warm.         Small fiber neuropathy         Psoriasis         Diffuse arthralgia  - At our visit 1 year ago she reported bilateral hand pain and gelling phenomenon for which I completed an inflammatory arthritis workup which showed normal inflammatory markers, rheumatoid serologies and hand x-rays.  She now provides new information with a history of psoriasis that has intermittently flared on her left elbow for years and is also associated with widespread joint pains, with localization to her low back, elbows and knees.  She does not describe joint swelling or prolonged morning stiffness of her joint but continues to have gelling.  She will stop by the office so I can perform a joint examination and also complete spinal x-rays before this, but I did review with her given this presentation it may raise suspicion for early psoriatic arthritis.  For now I recommend a monitoring approach and use of cautious NSAIDs as needed [given a history of gastric bypass surgery] with Tylenol on occasion if it is beneficial.  Depending on the progression of her arthralgias we will revisit the need for DMARDs.    Orders:    XR sacroiliac joints 3+ views; Future    XR spine lumbar minimum 4 views non injury; Future    Raynaud's disease without gangrene         Small fiber neuropathy         Psoriasis         Diffuse arthralgia               History of Present Illness     HPI    INITIAL VISIT NOTE (2/2024):  Ms. Watson is a 48-year-old female with history significant for a genetic mitochondrial disorder, small fiber neuropathy and Raynaud's phenomenon who  presents for a follow-up.  She is currently on nifedipine 60 mg once daily.  She is transferring care from Dr. Aiken.     Patient reports the Raynaud's symptoms overlapping with symptoms of small fiber neuropathy still continue with appreciating very cold hands and feet with color changes upon cold exposure.  She is cautious to keep her core body temperature and extremities as warm as possible.  She has previously tried discontinuing the nifedipine and did realize it was making a positive difference so this medication has been continued.     She mentions gradually progressive swelling and stiffness of her hands which she previously discussed with Dr. Aiken and was felt to be representative of osteoarthritis.  As she is on multiple medications she has not tried over-the-counter pain medications for her symptoms but did trial diclofenac gel which did not help.  She is not experiencing joint pains or additionally swollen joints but does describe prominent gelling phenomenon which can affect any part of her body.  She reports when she is active she generally feels well but any time she has to sit even for a short duration of time she will feel very stiff upon ambulating.      2/27/2025:  Patient presents for a follow-up of Raynaud's phenomenon.  She is currently on nifedipine 60 mg once daily.  I reviewed her testing done after the last visit which showed normal hand x-rays.  An ESR, CRP, rheumatoid factor and anti-CCP antibody were normal.    From a Raynaud's perspective she has overall been stable.    She is reporting new concerns of noticing a psoriatic lesion on her left elbow which has been present for years, but intermittently flares up.  She also has a patch near her right ankle/foot but this is not present currently.  She mentions when the skin rash on her left elbow flares up this will typically be associated with a sensation of all over body pain and stiffness, and outside of this she has noticed slightly  more prominent joint pains to affect her elbows, knees and low back region.  She denies any joint swelling.  She gets in the shower as soon as she wakes up in the morning so she does not appreciate morning stiffness, but does endorse gelling phenomenon through the day.  She does have a history of gastric bypass surgery so utilizes NSAIDs very cautiously in association with PPIs, and takes ibuprofen as needed which generally helps her.  For the skin rash she has tried hydrocortisone without much benefit, but does have an over-the-counter nonmedicated cream that she will use as needed.  She also has a history of psoriasis and psoriatic arthritis in her father who has more advanced disease.      Review of Systems  Constitutional: Negative for weight change, fevers, chills, night sweats, fatigue.  ENT/Mouth: Negative for hearing changes, ear pain, nasal congestion, sinus pain, hoarseness, sore throat, rhinorrhea, swallowing difficulty.   Eyes: Negative for pain, redness, discharge, vision changes.   Cardiovascular: Negative for chest pain, SOB, palpitations.   Respiratory: Negative for cough, sputum, wheezing, dyspnea.   Gastrointestinal: Negative for nausea, vomiting, diarrhea, constipation, pain, heartburn.  Genitourinary: Negative for dysuria, urinary frequency, hematuria.   Musculoskeletal: As per HPI.  Skin: Negative for color changes currently.  Positive for skin rash.   Neuro: Negative for weakness, numbness, tingling, loss of consciousness.   Psych: Negative for anxiety, depression.   Heme/Lymph: Negative for easy bruising, bleeding, lymphadenopathy.      Objective   There were no vitals taken for this visit.    Physical Exam  General: Well appearing, well nourished, in no distress. Oriented x 3, normal mood and affect.  Skin: Good turgor, no unusual bruising or prominent lesions.  Erythematous non scaly rash noted on her left elbow.   Hair: Normal texture and distribution.  HEENT:  Head: Normocephalic,  atraumatic.  Eyes: Conjunctiva clear, sclera non-icteric, EOM intact.  Nose: No external lesions.  Neck: Supple.  Neurologic: Alert and oriented. No focal neurological deficits appreciated.   Psychiatric: Normal mood and affect.     Administrative Statements   Encounter provider Nancy Niño MD    The Patient is located at Home and in the following state in which I hold an active license [place].    The patient was identified by name and date of birth. Leigh Watson was informed that this is a telemedicine visit and that the visit is being conducted through the Epic Embedded platform. She agrees to proceed..  My office door was closed. No one else was in the room.  She acknowledged consent and understanding of privacy and security of the video platform. The patient has agreed to participate and understands they can discontinue the visit at any time.    I have spent a total time of 40 minutes in caring for this patient on the day of the visit/encounter including Risks and benefits of tx options, Instructions for management, Patient and family education, Impressions, Documenting in the medical record, and Obtaining or reviewing history  .

## 2025-04-02 DIAGNOSIS — I73.00 RAYNAUD'S DISEASE WITHOUT GANGRENE: ICD-10-CM

## 2025-04-02 RX ORDER — NIFEDIPINE 60 MG/1
60 TABLET, EXTENDED RELEASE ORAL DAILY
Qty: 90 TABLET | Refills: 1 | Status: SHIPPED | OUTPATIENT
Start: 2025-04-02

## 2025-04-07 ENCOUNTER — HOSPITAL ENCOUNTER (OUTPATIENT)
Dept: RADIOLOGY | Facility: HOSPITAL | Age: 50
Discharge: HOME | End: 2025-04-07
Payer: COMMERCIAL

## 2025-04-07 DIAGNOSIS — M25.50 DIFFUSE ARTHRALGIA: ICD-10-CM

## 2025-04-07 PROCEDURE — 72110 X-RAY EXAM L-2 SPINE 4/>VWS: CPT

## 2025-04-07 PROCEDURE — 72200 X-RAY EXAM SI JOINTS: CPT

## 2025-05-29 ENCOUNTER — HOSPITAL ENCOUNTER (OUTPATIENT)
Dept: ULTRASOUND IMAGING | Facility: HOSPITAL | Age: 50
Discharge: HOME | End: 2025-05-29
Payer: COMMERCIAL

## 2025-05-29 DIAGNOSIS — N28.1 RENAL CYST: ICD-10-CM

## 2025-05-29 PROCEDURE — 76775 US EXAM ABDO BACK WALL LIM: CPT

## 2025-06-12 ENCOUNTER — OFFICE VISIT (OUTPATIENT)
Dept: UROLOGY | Facility: AMBULATORY SURGERY CENTER | Age: 50
End: 2025-06-12
Payer: COMMERCIAL

## 2025-06-12 VITALS
DIASTOLIC BLOOD PRESSURE: 76 MMHG | OXYGEN SATURATION: 95 % | BODY MASS INDEX: 30.35 KG/M2 | HEART RATE: 79 BPM | HEIGHT: 65 IN | SYSTOLIC BLOOD PRESSURE: 140 MMHG | WEIGHT: 182.2 LBS

## 2025-06-12 DIAGNOSIS — N39.42 URINARY INCONTINENCE WITHOUT SENSORY AWARENESS: Primary | ICD-10-CM

## 2025-06-12 LAB — POST-VOID RESIDUAL VOLUME, ML POC: 10 ML

## 2025-06-12 PROCEDURE — 99213 OFFICE O/P EST LOW 20 MIN: CPT | Performed by: UROLOGY

## 2025-06-12 PROCEDURE — 51798 US URINE CAPACITY MEASURE: CPT | Performed by: UROLOGY

## 2025-06-12 NOTE — PROGRESS NOTES
"Name: Leigh Watson      : 1975      MRN: [id number]  Encounter Provider: Ramez Lentz MD  Encounter Date: 2025   Encounter department: [facility] [place]  :  Assessment & Plan  Urinary incontinence without sensory awareness    Orders:  •  POCT Measure PVR  •  Urinalysis with microscopic; Future  •  Urine culture; Future      Impression: Mitochondrial syndrome, status post bladder stimulator    Plan: I provided the patient with reassurance that PVR, creatinine, and renal and bladder ultrasound are all within normal limits.  Follow-up in 1 years time recommended.  Urinalysis and urine culture ordered if she believes she has another infection.    History of Present Illness   Leigh Watson is a 49 y.o. female who presents in follow-up for history of neurogenic bladder.  She underwent a bladder stimulator placed in the 2023 at [place].  She returns for routine follow-up today.  Postvoid residual in the office today is only 10 cc.  A recent renal and bladder ultrasound was performed and is reported as normal.  A creatinine level in 2025 is normal at 0.75 with a GFR of 95.  She states that she recently had the generator for her bladder stimulator removed as this was causing her discomfort.  Overall she feels that she is voiding well.  She does report occasional incontinence.  She does state that her mother had a history of renal cell carcinoma.    Review of Systems       Objective   /76 (BP Location: Left arm, Patient Position: Sitting, Cuff Size: Standard)   Pulse 79   Ht 5' 4.5\" (1.638 m)   Wt 82.6 kg (182 lb 3.2 oz)   SpO2 95%   BMI 30.79 kg/m²     Physical Exam on examination she is in no acute distress.  Her incision in her right supragluteal region well-healed with a generator in place.  Gait normal.  Affect normal    Results   No results found for: \"PSA\"  Lab Results   Component Value Date    GLUCOSE 76 2016    CALCIUM 9.7 " 02/15/2025     01/06/2016    K 3.5 02/15/2025    CO2 29 02/15/2025     02/15/2025    BUN 12 02/15/2025    CREATININE 0.75 02/15/2025     Lab Results   Component Value Date    WBC 4.59 02/15/2025    HGB 13.2 02/15/2025    HCT 38.9 02/15/2025    MCV 92 02/15/2025     02/15/2025       Office Urine Dip  Recent Results (from the past hour)   POCT Measure PVR    Collection Time: 06/12/25  7:50 AM   Result Value Ref Range    POST-VOID RESIDUAL VOLUME, ML POC 10 mL

## 2025-06-30 ENCOUNTER — HOSPITAL ENCOUNTER (OUTPATIENT)
Facility: HOSPITAL | Age: 50
Discharge: HOME | End: 2025-06-30
Payer: COMMERCIAL

## 2025-06-30 VITALS — HEIGHT: 65 IN | WEIGHT: 182 LBS | BODY MASS INDEX: 30.32 KG/M2

## 2025-06-30 DIAGNOSIS — Z12.31 ENCOUNTER FOR SCREENING MAMMOGRAM FOR BREAST CANCER: ICD-10-CM

## 2025-06-30 PROCEDURE — 77067 SCR MAMMO BI INCL CAD: CPT

## 2025-06-30 PROCEDURE — 77063 BREAST TOMOSYNTHESIS BI: CPT

## 2025-08-06 DIAGNOSIS — E16.1 REACTIVE HYPOGLYCEMIA: ICD-10-CM

## 2025-08-07 RX ORDER — SITAGLIPTIN 50 MG/1
50 TABLET, FILM COATED ORAL DAILY
Qty: 30 TABLET | Refills: 5 | Status: SHIPPED | OUTPATIENT
Start: 2025-08-07

## 2025-08-08 ENCOUNTER — OFFICE VISIT (OUTPATIENT)
Dept: INTERNAL MEDICINE CLINIC | Facility: CLINIC | Age: 50
End: 2025-08-08
Payer: COMMERCIAL

## 2025-08-08 VITALS
WEIGHT: 186 LBS | SYSTOLIC BLOOD PRESSURE: 110 MMHG | DIASTOLIC BLOOD PRESSURE: 72 MMHG | OXYGEN SATURATION: 98 % | HEART RATE: 80 BPM | HEIGHT: 64 IN | RESPIRATION RATE: 14 BRPM | TEMPERATURE: 98.3 F | BODY MASS INDEX: 31.76 KG/M2

## 2025-08-08 DIAGNOSIS — M54.50 CHRONIC BILATERAL LOW BACK PAIN WITHOUT SCIATICA: ICD-10-CM

## 2025-08-08 DIAGNOSIS — L98.9 SKIN LESION OF FACE: Primary | ICD-10-CM

## 2025-08-08 DIAGNOSIS — R21 RASH: ICD-10-CM

## 2025-08-08 DIAGNOSIS — Z85.820 HISTORY OF MELANOMA: ICD-10-CM

## 2025-08-08 DIAGNOSIS — G89.29 CHRONIC BILATERAL LOW BACK PAIN WITHOUT SCIATICA: ICD-10-CM

## 2025-08-08 PROCEDURE — 99213 OFFICE O/P EST LOW 20 MIN: CPT | Performed by: INTERNAL MEDICINE

## 2025-08-08 RX ORDER — CYCLOBENZAPRINE HCL 5 MG
5 TABLET ORAL 3 TIMES DAILY PRN
Qty: 60 TABLET | Refills: 0 | Status: SHIPPED | OUTPATIENT
Start: 2025-08-08

## 2025-08-08 RX ORDER — FLUOCINOLONE ACETONIDE 0.25 MG/G
CREAM TOPICAL 2 TIMES DAILY PRN
Qty: 15 G | Refills: 0 | Status: SHIPPED | OUTPATIENT
Start: 2025-08-08

## 2025-08-22 DIAGNOSIS — E04.1 THYROID NODULE: Primary | ICD-10-CM

## (undated) DEVICE — MAYO STAND COVER: Brand: CONVERTORS

## (undated) DEVICE — SYRINGE CATH TIP 50ML

## (undated) DEVICE — GLOVE PI ULTRA TOUCH SZ.7.0

## (undated) DEVICE — STERILE SURGICAL LUBRICANT,  TUBE: Brand: SURGILUBE

## (undated) DEVICE — STERILE MINERVA DISPOSABLE HANDPIECECONTENTS:(1) ONE SINGLE USE STERILE MINERVA ES DISPOSABLE HANDPIECE (1) ONE SINGLE USE STERILE SYRINGE(1) ONE SINGLE USE STERILE 8MM HEGAR DILATOR(1) ONE SINGLE USE NON-STERILE DESICCANT(1) ONE NON-STERILE HANDPIECE INSTRUCTIONS FOR USE(1)  ONE NON-STERILE DILATOR INSTRUCTIONS FOR USE: Brand: MINERVA SINGLE STERILE DISPOSABLE HANDPIECE

## (undated) DEVICE — GLOVE INDICATOR PI UNDERGLOVE SZ 7 BLUE

## (undated) DEVICE — LIGHT HANDLE COVER SLEEVE DISP BLUE STELLAR

## (undated) DEVICE — BETHLEHEM UNIVERSAL MINOR VAG: Brand: CARDINAL HEALTH